# Patient Record
Sex: MALE | Race: BLACK OR AFRICAN AMERICAN | Employment: UNEMPLOYED
[De-identification: names, ages, dates, MRNs, and addresses within clinical notes are randomized per-mention and may not be internally consistent; named-entity substitution may affect disease eponyms.]

---

## 2017-02-24 ENCOUNTER — OFFICE VISIT (OUTPATIENT)
Dept: INTERNAL MEDICINE | Facility: CLINIC | Age: 62
End: 2017-02-24

## 2017-02-24 ENCOUNTER — HOSPITAL ENCOUNTER (OUTPATIENT)
Age: 62
Setting detail: SPECIMEN
Discharge: HOME | End: 2017-02-24
Payer: MEDICAID

## 2017-02-24 VITALS
HEART RATE: 64 BPM | HEIGHT: 75 IN | SYSTOLIC BLOOD PRESSURE: 164 MMHG | WEIGHT: 173.8 LBS | DIASTOLIC BLOOD PRESSURE: 85 MMHG | BODY MASS INDEX: 21.61 KG/M2

## 2017-02-24 DIAGNOSIS — Z11.59 NEED FOR HEPATITIS C SCREENING TEST: ICD-10-CM

## 2017-02-24 DIAGNOSIS — N40.1 BPH (BENIGN PROSTATIC HYPERTROPHY) WITH URINARY OBSTRUCTION: ICD-10-CM

## 2017-02-24 DIAGNOSIS — Z11.4 SCREENING FOR HIV WITHOUT PRESENCE OF RISK FACTORS: ICD-10-CM

## 2017-02-24 DIAGNOSIS — M16.11 ARTHRITIS OF RIGHT HIP: ICD-10-CM

## 2017-02-24 DIAGNOSIS — E78.00 PURE HYPERCHOLESTEROLEMIA: ICD-10-CM

## 2017-02-24 DIAGNOSIS — F14.10 COCAINE ABUSE (HCC): ICD-10-CM

## 2017-02-24 DIAGNOSIS — Z01.818 PRE-OP EVALUATION: Primary | ICD-10-CM

## 2017-02-24 DIAGNOSIS — N18.30 CKD (CHRONIC KIDNEY DISEASE) STAGE 3, GFR 30-59 ML/MIN (HCC): ICD-10-CM

## 2017-02-24 DIAGNOSIS — I10 ESSENTIAL HYPERTENSION: ICD-10-CM

## 2017-02-24 DIAGNOSIS — N13.8 BPH (BENIGN PROSTATIC HYPERTROPHY) WITH URINARY OBSTRUCTION: ICD-10-CM

## 2017-02-24 DIAGNOSIS — I50.22 CHRONIC SYSTOLIC CONGESTIVE HEART FAILURE (HCC): ICD-10-CM

## 2017-02-24 LAB
ANION GAP SERPL CALCULATED.3IONS-SCNC: 25 MMOL/L (ref 9–17)
BUN BLDV-MCNC: 16 MG/DL (ref 8–23)
BUN/CREAT BLD: ABNORMAL (ref 9–20)
CALCIUM SERPL-MCNC: 9.2 MG/DL (ref 8.6–10.4)
CHLORIDE BLD-SCNC: 102 MMOL/L (ref 98–107)
CHOLESTEROL/HDL RATIO: 2.3
CHOLESTEROL: 153 MG/DL
CO2: 22 MMOL/L (ref 20–31)
CREAT SERPL-MCNC: 1.16 MG/DL (ref 0.7–1.2)
GFR AFRICAN AMERICAN: >60 ML/MIN
GFR NON-AFRICAN AMERICAN: >60 ML/MIN
GFR SERPL CREATININE-BSD FRML MDRD: ABNORMAL ML/MIN/{1.73_M2}
GFR SERPL CREATININE-BSD FRML MDRD: ABNORMAL ML/MIN/{1.73_M2}
GLUCOSE BLD-MCNC: 83 MG/DL (ref 70–99)
HCT VFR BLD CALC: 47.3 % (ref 41–53)
HDLC SERPL-MCNC: 68 MG/DL
HEMOGLOBIN: 15.1 G/DL (ref 13.5–17.5)
HEPATITIS C ANTIBODY: NONREACTIVE
HIV AG/AB: NONREACTIVE
LDL CHOLESTEROL: 74 MG/DL (ref 0–130)
MCH RBC QN AUTO: 26.8 PG (ref 26–34)
MCHC RBC AUTO-ENTMCNC: 32 G/DL (ref 31–37)
MCV RBC AUTO: 84 FL (ref 80–100)
PDW BLD-RTO: 15.1 % (ref 12.5–15.4)
PLATELET # BLD: 294 K/UL (ref 140–450)
PMV BLD AUTO: 9.2 FL (ref 6–12)
POTASSIUM SERPL-SCNC: 4 MMOL/L (ref 3.7–5.3)
RBC # BLD: 5.63 M/UL (ref 4.5–5.9)
SODIUM BLD-SCNC: 149 MMOL/L (ref 135–144)
TRIGL SERPL-MCNC: 54 MG/DL
VLDLC SERPL CALC-MCNC: NORMAL MG/DL (ref 1–30)
WBC # BLD: 6.3 K/UL (ref 3.5–11)

## 2017-02-24 PROCEDURE — 80061 LIPID PANEL: CPT

## 2017-02-24 PROCEDURE — 87389 HIV-1 AG W/HIV-1&-2 AB AG IA: CPT

## 2017-02-24 PROCEDURE — 99214 OFFICE O/P EST MOD 30 MIN: CPT | Performed by: INTERNAL MEDICINE

## 2017-02-24 PROCEDURE — 86803 HEPATITIS C AB TEST: CPT

## 2017-02-24 PROCEDURE — 36415 COLL VENOUS BLD VENIPUNCTURE: CPT

## 2017-02-24 PROCEDURE — 80048 BASIC METABOLIC PNL TOTAL CA: CPT

## 2017-02-24 PROCEDURE — 85027 COMPLETE CBC AUTOMATED: CPT

## 2017-02-24 RX ORDER — LISINOPRIL 5 MG/1
5 TABLET ORAL DAILY
Qty: 30 TABLET | Refills: 2 | Status: SHIPPED | OUTPATIENT
Start: 2017-02-24 | End: 2017-06-12 | Stop reason: SDUPTHER

## 2017-02-24 RX ORDER — PRAVASTATIN SODIUM 20 MG
20 TABLET ORAL EVERY EVENING
Qty: 30 TABLET | Refills: 2 | Status: SHIPPED | OUTPATIENT
Start: 2017-02-24 | End: 2017-06-12 | Stop reason: SDUPTHER

## 2017-02-24 RX ORDER — TAMSULOSIN HYDROCHLORIDE 0.4 MG/1
0.4 CAPSULE ORAL DAILY
Qty: 30 CAPSULE | Refills: 2 | Status: SHIPPED | OUTPATIENT
Start: 2017-02-24 | End: 2017-06-12 | Stop reason: SDUPTHER

## 2017-02-24 RX ORDER — ASPIRIN 81 MG/1
81 TABLET ORAL DAILY
Qty: 30 TABLET | Refills: 2 | Status: SHIPPED | OUTPATIENT
Start: 2017-02-24 | End: 2017-06-12 | Stop reason: SDUPTHER

## 2017-02-24 RX ORDER — SENNOSIDES 8.6 MG
650 CAPSULE ORAL EVERY 8 HOURS PRN
Qty: 60 TABLET | Refills: 2 | Status: SHIPPED | OUTPATIENT
Start: 2017-02-24 | End: 2017-06-12 | Stop reason: SDUPTHER

## 2017-02-24 RX ORDER — AMLODIPINE BESYLATE 5 MG/1
5 TABLET ORAL DAILY
Qty: 30 TABLET | Refills: 2 | Status: SHIPPED | OUTPATIENT
Start: 2017-02-24 | End: 2017-06-12 | Stop reason: SDUPTHER

## 2017-02-24 ASSESSMENT — PATIENT HEALTH QUESTIONNAIRE - PHQ9
SUM OF ALL RESPONSES TO PHQ9 QUESTIONS 1 & 2: 1
1. LITTLE INTEREST OR PLEASURE IN DOING THINGS: 1
2. FEELING DOWN, DEPRESSED OR HOPELESS: 0
SUM OF ALL RESPONSES TO PHQ QUESTIONS 1-9: 1

## 2017-04-12 ENCOUNTER — HOSPITAL ENCOUNTER (OUTPATIENT)
Age: 62
Discharge: HOME | End: 2017-04-12
Payer: MEDICAID

## 2017-05-09 ENCOUNTER — TELEPHONE (OUTPATIENT)
Dept: INTERNAL MEDICINE | Age: 62
End: 2017-05-09

## 2017-06-12 ENCOUNTER — OFFICE VISIT (OUTPATIENT)
Dept: INTERNAL MEDICINE | Age: 62
End: 2017-06-12
Payer: MEDICAID

## 2017-06-12 VITALS
DIASTOLIC BLOOD PRESSURE: 83 MMHG | HEART RATE: 79 BPM | BODY MASS INDEX: 21.76 KG/M2 | HEIGHT: 75 IN | WEIGHT: 175 LBS | SYSTOLIC BLOOD PRESSURE: 131 MMHG

## 2017-06-12 DIAGNOSIS — M16.11 ARTHRITIS OF RIGHT HIP: ICD-10-CM

## 2017-06-12 DIAGNOSIS — E78.00 PURE HYPERCHOLESTEROLEMIA: ICD-10-CM

## 2017-06-12 DIAGNOSIS — N13.8 BPH (BENIGN PROSTATIC HYPERTROPHY) WITH URINARY OBSTRUCTION: ICD-10-CM

## 2017-06-12 DIAGNOSIS — N40.1 BPH (BENIGN PROSTATIC HYPERTROPHY) WITH URINARY OBSTRUCTION: ICD-10-CM

## 2017-06-12 DIAGNOSIS — I10 ESSENTIAL HYPERTENSION: Primary | ICD-10-CM

## 2017-06-12 DIAGNOSIS — Z12.11 COLON CANCER SCREENING: ICD-10-CM

## 2017-06-12 DIAGNOSIS — I50.22 CHRONIC SYSTOLIC CONGESTIVE HEART FAILURE (HCC): ICD-10-CM

## 2017-06-12 PROCEDURE — 99214 OFFICE O/P EST MOD 30 MIN: CPT | Performed by: INTERNAL MEDICINE

## 2017-06-12 RX ORDER — PRAVASTATIN SODIUM 20 MG
20 TABLET ORAL EVERY EVENING
Qty: 30 TABLET | Refills: 2 | Status: SHIPPED | OUTPATIENT
Start: 2017-06-12 | End: 2017-09-20 | Stop reason: SDUPTHER

## 2017-06-12 RX ORDER — ASPIRIN 81 MG/1
81 TABLET ORAL DAILY
Qty: 30 TABLET | Refills: 2 | Status: SHIPPED | OUTPATIENT
Start: 2017-06-12 | End: 2017-09-20 | Stop reason: SDUPTHER

## 2017-06-12 RX ORDER — SENNOSIDES 8.6 MG
650 CAPSULE ORAL EVERY 8 HOURS PRN
Qty: 60 TABLET | Refills: 2 | Status: SHIPPED | OUTPATIENT
Start: 2017-06-12 | End: 2017-09-20 | Stop reason: SDUPTHER

## 2017-06-12 RX ORDER — AMLODIPINE BESYLATE 5 MG/1
5 TABLET ORAL DAILY
Qty: 30 TABLET | Refills: 2 | Status: SHIPPED | OUTPATIENT
Start: 2017-06-12 | End: 2017-09-20 | Stop reason: SDUPTHER

## 2017-06-12 RX ORDER — TAMSULOSIN HYDROCHLORIDE 0.4 MG/1
0.4 CAPSULE ORAL DAILY
Qty: 30 CAPSULE | Refills: 2 | Status: SHIPPED | OUTPATIENT
Start: 2017-06-12 | End: 2017-09-20 | Stop reason: SDUPTHER

## 2017-06-12 RX ORDER — LISINOPRIL 5 MG/1
5 TABLET ORAL DAILY
Qty: 30 TABLET | Refills: 2 | Status: SHIPPED | OUTPATIENT
Start: 2017-06-12 | End: 2017-09-20 | Stop reason: SDUPTHER

## 2017-06-17 ENCOUNTER — HOSPITAL ENCOUNTER (OUTPATIENT)
Age: 62
Setting detail: OBSERVATION
Discharge: HOME | End: 2017-06-19
Attending: EMERGENCY MEDICINE | Admitting: EMERGENCY MEDICINE
Payer: MEDICAID

## 2017-06-17 ENCOUNTER — APPOINTMENT (OUTPATIENT)
Dept: GENERAL RADIOLOGY | Age: 62
End: 2017-06-17
Payer: MEDICAID

## 2017-06-17 DIAGNOSIS — R63.0 APPETITE IMPAIRED: ICD-10-CM

## 2017-06-17 DIAGNOSIS — R11.0 NAUSEA: ICD-10-CM

## 2017-06-17 DIAGNOSIS — R10.13 ABDOMINAL PAIN, EPIGASTRIC: Primary | ICD-10-CM

## 2017-06-17 LAB
ABSOLUTE EOS #: 0.3 K/UL (ref 0–0.4)
ABSOLUTE LYMPH #: 2.1 K/UL (ref 1–4.8)
ABSOLUTE MONO #: 0.7 K/UL (ref 0.1–1.2)
ALBUMIN SERPL-MCNC: 3.7 G/DL (ref 3.5–5.2)
ALBUMIN/GLOBULIN RATIO: 1.2 (ref 1–2.5)
ALP BLD-CCNC: 81 U/L (ref 40–129)
ALT SERPL-CCNC: 14 U/L (ref 5–41)
ANION GAP SERPL CALCULATED.3IONS-SCNC: 13 MMOL/L (ref 9–17)
AST SERPL-CCNC: 17 U/L
BASOPHILS # BLD: 1 %
BASOPHILS ABSOLUTE: 0 K/UL (ref 0–0.2)
BILIRUB SERPL-MCNC: 0.26 MG/DL (ref 0.3–1.2)
BILIRUBIN DIRECT: 0.09 MG/DL
BILIRUBIN URINE: NEGATIVE
BILIRUBIN, INDIRECT: 0.17 MG/DL (ref 0–1)
BUN BLDV-MCNC: 12 MG/DL (ref 8–23)
BUN/CREAT BLD: ABNORMAL (ref 9–20)
CALCIUM SERPL-MCNC: 8.7 MG/DL (ref 8.6–10.4)
CHLORIDE BLD-SCNC: 111 MMOL/L (ref 98–107)
CO2: 20 MMOL/L (ref 20–31)
COLOR: YELLOW
COMMENT UA: NORMAL
CREAT SERPL-MCNC: 1.1 MG/DL (ref 0.7–1.2)
DIFFERENTIAL TYPE: ABNORMAL
EOSINOPHILS RELATIVE PERCENT: 4 %
GFR AFRICAN AMERICAN: >60 ML/MIN
GFR NON-AFRICAN AMERICAN: >60 ML/MIN
GFR SERPL CREATININE-BSD FRML MDRD: ABNORMAL ML/MIN/{1.73_M2}
GFR SERPL CREATININE-BSD FRML MDRD: ABNORMAL ML/MIN/{1.73_M2}
GLOBULIN: ABNORMAL G/DL (ref 1.5–3.8)
GLUCOSE BLD-MCNC: 79 MG/DL (ref 70–99)
GLUCOSE URINE: NEGATIVE
HCT VFR BLD CALC: 44.8 % (ref 41–53)
HEMOGLOBIN: 14.5 G/DL (ref 13.5–17.5)
KETONES, URINE: NEGATIVE
LACTIC ACID, WHOLE BLOOD: 1.1 MMOL/L (ref 0.7–2.1)
LEUKOCYTE ESTERASE, URINE: NEGATIVE
LYMPHOCYTES # BLD: 36 %
MCH RBC QN AUTO: 27.4 PG (ref 26–34)
MCHC RBC AUTO-ENTMCNC: 32.4 G/DL (ref 31–37)
MCV RBC AUTO: 84.3 FL (ref 80–100)
MONOCYTES # BLD: 12 %
NITRITE, URINE: NEGATIVE
PDW BLD-RTO: 15.9 % (ref 12.5–15.4)
PH UA: 5.5 (ref 5–8)
PLATELET # BLD: 275 K/UL (ref 140–450)
PLATELET ESTIMATE: ABNORMAL
PMV BLD AUTO: 8.7 FL (ref 6–12)
POC TROPONIN I: 0.01 NG/ML (ref 0–0.1)
POC TROPONIN I: 0.01 NG/ML (ref 0–0.1)
POC TROPONIN INTERP: NORMAL
POC TROPONIN INTERP: NORMAL
POTASSIUM SERPL-SCNC: 3.6 MMOL/L (ref 3.7–5.3)
PROTEIN UA: NEGATIVE
RBC # BLD: 5.31 M/UL (ref 4.5–5.9)
RBC # BLD: ABNORMAL 10*6/UL
SEG NEUTROPHILS: 47 %
SEGMENTED NEUTROPHILS ABSOLUTE COUNT: 2.7 K/UL (ref 1.8–7.7)
SODIUM BLD-SCNC: 144 MMOL/L (ref 135–144)
SPECIFIC GRAVITY UA: 1.02 (ref 1–1.03)
TOTAL PROTEIN: 6.8 G/DL (ref 6.4–8.3)
TURBIDITY: CLEAR
URINE HGB: NEGATIVE
UROBILINOGEN, URINE: NORMAL
WBC # BLD: 5.9 K/UL (ref 3.5–11)
WBC # BLD: ABNORMAL 10*3/UL

## 2017-06-17 PROCEDURE — 2580000003 HC RX 258: Performed by: EMERGENCY MEDICINE

## 2017-06-17 PROCEDURE — 87088 URINE BACTERIA CULTURE: CPT

## 2017-06-17 PROCEDURE — 99285 EMERGENCY DEPT VISIT HI MDM: CPT

## 2017-06-17 PROCEDURE — 6360000002 HC RX W HCPCS: Performed by: EMERGENCY MEDICINE

## 2017-06-17 PROCEDURE — 83605 ASSAY OF LACTIC ACID: CPT

## 2017-06-17 PROCEDURE — 87086 URINE CULTURE/COLONY COUNT: CPT

## 2017-06-17 PROCEDURE — 74020 XR ABDOMEN STANDARD: CPT

## 2017-06-17 PROCEDURE — 71020 XR CHEST STANDARD TWO VW: CPT

## 2017-06-17 PROCEDURE — G0378 HOSPITAL OBSERVATION PER HR: HCPCS

## 2017-06-17 PROCEDURE — 84484 ASSAY OF TROPONIN QUANT: CPT

## 2017-06-17 PROCEDURE — 87186 SC STD MICRODIL/AGAR DIL: CPT

## 2017-06-17 PROCEDURE — 96374 THER/PROPH/DIAG INJ IV PUSH: CPT

## 2017-06-17 PROCEDURE — 80076 HEPATIC FUNCTION PANEL: CPT

## 2017-06-17 PROCEDURE — 81003 URINALYSIS AUTO W/O SCOPE: CPT

## 2017-06-17 PROCEDURE — 6370000000 HC RX 637 (ALT 250 FOR IP): Performed by: EMERGENCY MEDICINE

## 2017-06-17 PROCEDURE — 93005 ELECTROCARDIOGRAM TRACING: CPT

## 2017-06-17 PROCEDURE — 85025 COMPLETE CBC W/AUTO DIFF WBC: CPT

## 2017-06-17 PROCEDURE — 80048 BASIC METABOLIC PNL TOTAL CA: CPT

## 2017-06-17 RX ORDER — LISINOPRIL 5 MG/1
5 TABLET ORAL DAILY
Status: DISCONTINUED | OUTPATIENT
Start: 2017-06-17 | End: 2017-06-19 | Stop reason: HOSPADM

## 2017-06-17 RX ORDER — SODIUM CHLORIDE 9 MG/ML
INJECTION, SOLUTION INTRAVENOUS CONTINUOUS
Status: DISCONTINUED | OUTPATIENT
Start: 2017-06-17 | End: 2017-06-19 | Stop reason: HOSPADM

## 2017-06-17 RX ORDER — SODIUM CHLORIDE 0.9 % (FLUSH) 0.9 %
10 SYRINGE (ML) INJECTION EVERY 12 HOURS SCHEDULED
Status: DISCONTINUED | OUTPATIENT
Start: 2017-06-17 | End: 2017-06-19 | Stop reason: HOSPADM

## 2017-06-17 RX ORDER — 0.9 % SODIUM CHLORIDE 0.9 %
1000 INTRAVENOUS SOLUTION INTRAVENOUS ONCE
Status: COMPLETED | OUTPATIENT
Start: 2017-06-17 | End: 2017-06-17

## 2017-06-17 RX ORDER — AMLODIPINE BESYLATE 5 MG/1
5 TABLET ORAL DAILY
Status: DISCONTINUED | OUTPATIENT
Start: 2017-06-17 | End: 2017-06-19 | Stop reason: HOSPADM

## 2017-06-17 RX ORDER — SODIUM CHLORIDE 0.9 % (FLUSH) 0.9 %
10 SYRINGE (ML) INJECTION PRN
Status: DISCONTINUED | OUTPATIENT
Start: 2017-06-17 | End: 2017-06-19 | Stop reason: HOSPADM

## 2017-06-17 RX ORDER — MORPHINE SULFATE 2 MG/ML
2 INJECTION, SOLUTION INTRAMUSCULAR; INTRAVENOUS EVERY 4 HOURS PRN
Status: DISCONTINUED | OUTPATIENT
Start: 2017-06-17 | End: 2017-06-19 | Stop reason: HOSPADM

## 2017-06-17 RX ORDER — ASPIRIN 81 MG/1
81 TABLET ORAL DAILY
Status: DISCONTINUED | OUTPATIENT
Start: 2017-06-17 | End: 2017-06-19 | Stop reason: HOSPADM

## 2017-06-17 RX ORDER — MORPHINE SULFATE 4 MG/ML
4 INJECTION, SOLUTION INTRAMUSCULAR; INTRAVENOUS EVERY 4 HOURS PRN
Status: DISCONTINUED | OUTPATIENT
Start: 2017-06-17 | End: 2017-06-19 | Stop reason: HOSPADM

## 2017-06-17 RX ORDER — ACETAMINOPHEN 325 MG/1
650 TABLET ORAL EVERY 4 HOURS PRN
Status: DISCONTINUED | OUTPATIENT
Start: 2017-06-17 | End: 2017-06-18

## 2017-06-17 RX ORDER — TAMSULOSIN HYDROCHLORIDE 0.4 MG/1
0.4 CAPSULE ORAL DAILY
Status: DISCONTINUED | OUTPATIENT
Start: 2017-06-17 | End: 2017-06-19 | Stop reason: HOSPADM

## 2017-06-17 RX ORDER — ONDANSETRON 2 MG/ML
4 INJECTION INTRAMUSCULAR; INTRAVENOUS ONCE
Status: COMPLETED | OUTPATIENT
Start: 2017-06-17 | End: 2017-06-17

## 2017-06-17 RX ORDER — PRAVASTATIN SODIUM 20 MG
20 TABLET ORAL EVERY EVENING
Status: DISCONTINUED | OUTPATIENT
Start: 2017-06-17 | End: 2017-06-19 | Stop reason: HOSPADM

## 2017-06-17 RX ADMIN — ASPIRIN 81 MG: 81 TABLET, COATED ORAL at 18:40

## 2017-06-17 RX ADMIN — TAMSULOSIN HYDROCHLORIDE 0.4 MG: 0.4 CAPSULE ORAL at 18:40

## 2017-06-17 RX ADMIN — SODIUM CHLORIDE: 9 INJECTION, SOLUTION INTRAVENOUS at 18:32

## 2017-06-17 RX ADMIN — PRAVASTATIN SODIUM 20 MG: 20 TABLET ORAL at 18:40

## 2017-06-17 RX ADMIN — SODIUM CHLORIDE 1000 ML: 9 INJECTION, SOLUTION INTRAVENOUS at 14:56

## 2017-06-17 RX ADMIN — ACETAMINOPHEN 650 MG: 325 TABLET ORAL at 23:02

## 2017-06-17 RX ADMIN — ONDANSETRON 4 MG: 2 INJECTION INTRAMUSCULAR; INTRAVENOUS at 14:56

## 2017-06-17 RX ADMIN — AMLODIPINE BESYLATE 5 MG: 5 TABLET ORAL at 18:40

## 2017-06-17 RX ADMIN — LISINOPRIL 5 MG: 5 TABLET ORAL at 18:40

## 2017-06-17 RX ADMIN — ACETAMINOPHEN 650 MG: 325 TABLET ORAL at 18:40

## 2017-06-17 ASSESSMENT — PAIN SCALES - GENERAL
PAINLEVEL_OUTOF10: 0
PAINLEVEL_OUTOF10: 10
PAINLEVEL_OUTOF10: 3
PAINLEVEL_OUTOF10: 6

## 2017-06-17 ASSESSMENT — ENCOUNTER SYMPTOMS
RHINORRHEA: 0
VOMITING: 1
BLOOD IN STOOL: 0
SORE THROAT: 0
CONSTIPATION: 0
DIARRHEA: 1
ABDOMINAL PAIN: 1
SHORTNESS OF BREATH: 0
COUGH: 0
NAUSEA: 1

## 2017-06-17 ASSESSMENT — PAIN DESCRIPTION - PAIN TYPE
TYPE: ACUTE PAIN

## 2017-06-17 ASSESSMENT — PAIN DESCRIPTION - DESCRIPTORS
DESCRIPTORS: SHARP
DESCRIPTORS: BURNING;SORE
DESCRIPTORS: SORE

## 2017-06-17 ASSESSMENT — PAIN DESCRIPTION - LOCATION
LOCATION: ABDOMEN

## 2017-06-17 ASSESSMENT — PAIN DESCRIPTION - FREQUENCY
FREQUENCY: CONTINUOUS
FREQUENCY: INTERMITTENT

## 2017-06-17 ASSESSMENT — PAIN DESCRIPTION - PROGRESSION: CLINICAL_PROGRESSION: NOT CHANGED

## 2017-06-17 ASSESSMENT — PAIN DESCRIPTION - ORIENTATION
ORIENTATION: MID;UPPER
ORIENTATION: MID;UPPER

## 2017-06-18 PROCEDURE — 96375 TX/PRO/DX INJ NEW DRUG ADDON: CPT

## 2017-06-18 PROCEDURE — C9113 INJ PANTOPRAZOLE SODIUM, VIA: HCPCS | Performed by: INTERNAL MEDICINE

## 2017-06-18 PROCEDURE — 6360000002 HC RX W HCPCS: Performed by: EMERGENCY MEDICINE

## 2017-06-18 PROCEDURE — 2580000003 HC RX 258: Performed by: EMERGENCY MEDICINE

## 2017-06-18 PROCEDURE — 96376 TX/PRO/DX INJ SAME DRUG ADON: CPT

## 2017-06-18 PROCEDURE — 6360000002 HC RX W HCPCS: Performed by: INTERNAL MEDICINE

## 2017-06-18 PROCEDURE — G0378 HOSPITAL OBSERVATION PER HR: HCPCS

## 2017-06-18 PROCEDURE — 6370000000 HC RX 637 (ALT 250 FOR IP): Performed by: EMERGENCY MEDICINE

## 2017-06-18 PROCEDURE — 2580000003 HC RX 258: Performed by: INTERNAL MEDICINE

## 2017-06-18 RX ORDER — 0.9 % SODIUM CHLORIDE 0.9 %
10 VIAL (ML) INJECTION DAILY
Status: DISCONTINUED | OUTPATIENT
Start: 2017-06-18 | End: 2017-06-19 | Stop reason: HOSPADM

## 2017-06-18 RX ORDER — SENNOSIDES 8.6 MG
650 CAPSULE ORAL EVERY 8 HOURS PRN
Status: DISCONTINUED | OUTPATIENT
Start: 2017-06-18 | End: 2017-06-18

## 2017-06-18 RX ORDER — PANTOPRAZOLE SODIUM 40 MG/10ML
40 INJECTION, POWDER, LYOPHILIZED, FOR SOLUTION INTRAVENOUS DAILY
Status: DISCONTINUED | OUTPATIENT
Start: 2017-06-18 | End: 2017-06-19 | Stop reason: HOSPADM

## 2017-06-18 RX ORDER — PANTOPRAZOLE SODIUM 40 MG/1
40 TABLET, DELAYED RELEASE ORAL
Status: DISCONTINUED | OUTPATIENT
Start: 2017-06-18 | End: 2017-06-18

## 2017-06-18 RX ORDER — SENNOSIDES 8.6 MG
650 CAPSULE ORAL EVERY 6 HOURS PRN
Status: DISCONTINUED | OUTPATIENT
Start: 2017-06-18 | End: 2017-06-19 | Stop reason: HOSPADM

## 2017-06-18 RX ORDER — SENNOSIDES 8.6 MG
650 CAPSULE ORAL EVERY 4 HOURS PRN
Status: DISCONTINUED | OUTPATIENT
Start: 2017-06-18 | End: 2017-06-18

## 2017-06-18 RX ADMIN — TAMSULOSIN HYDROCHLORIDE 0.4 MG: 0.4 CAPSULE ORAL at 08:56

## 2017-06-18 RX ADMIN — SODIUM CHLORIDE: 9 INJECTION, SOLUTION INTRAVENOUS at 12:11

## 2017-06-18 RX ADMIN — Medication 650 MG: at 23:28

## 2017-06-18 RX ADMIN — LISINOPRIL 5 MG: 5 TABLET ORAL at 08:55

## 2017-06-18 RX ADMIN — SODIUM CHLORIDE: 9 INJECTION, SOLUTION INTRAVENOUS at 04:34

## 2017-06-18 RX ADMIN — MORPHINE SULFATE 2 MG: 2 INJECTION, SOLUTION INTRAMUSCULAR; INTRAVENOUS at 12:08

## 2017-06-18 RX ADMIN — ASPIRIN 81 MG: 81 TABLET, COATED ORAL at 08:56

## 2017-06-18 RX ADMIN — AMLODIPINE BESYLATE 5 MG: 5 TABLET ORAL at 08:56

## 2017-06-18 RX ADMIN — PANTOPRAZOLE SODIUM 40 MG: 40 INJECTION, POWDER, FOR SOLUTION INTRAVENOUS at 12:00

## 2017-06-18 RX ADMIN — MORPHINE SULFATE 2 MG: 2 INJECTION, SOLUTION INTRAMUSCULAR; INTRAVENOUS at 16:45

## 2017-06-18 RX ADMIN — PRAVASTATIN SODIUM 20 MG: 20 TABLET ORAL at 18:27

## 2017-06-18 RX ADMIN — ACETAMINOPHEN 650 MG: 325 TABLET ORAL at 08:55

## 2017-06-18 RX ADMIN — Medication 650 MG: at 18:00

## 2017-06-18 RX ADMIN — Medication 10 ML: at 12:00

## 2017-06-18 RX ADMIN — SODIUM CHLORIDE: 9 INJECTION, SOLUTION INTRAVENOUS at 23:50

## 2017-06-18 ASSESSMENT — PAIN DESCRIPTION - LOCATION
LOCATION: HIP
LOCATION_2: ABDOMEN
LOCATION: ABDOMEN
LOCATION: ABDOMEN
LOCATION: HIP

## 2017-06-18 ASSESSMENT — PAIN DESCRIPTION - FREQUENCY
FREQUENCY: CONTINUOUS
FREQUENCY: CONTINUOUS
FREQUENCY: INTERMITTENT

## 2017-06-18 ASSESSMENT — PAIN DESCRIPTION - DESCRIPTORS
DESCRIPTORS: ACHING
DESCRIPTORS: ACHING
DESCRIPTORS: ACHING;BURNING

## 2017-06-18 ASSESSMENT — PAIN SCALES - GENERAL
PAINLEVEL_OUTOF10: 7
PAINLEVEL_OUTOF10: 0
PAINLEVEL_OUTOF10: 5
PAINLEVEL_OUTOF10: 3
PAINLEVEL_OUTOF10: 7
PAINLEVEL_OUTOF10: 6
PAINLEVEL_OUTOF10: 5

## 2017-06-18 ASSESSMENT — PAIN DESCRIPTION - PROGRESSION
CLINICAL_PROGRESSION: NOT CHANGED
CLINICAL_PROGRESSION: NOT CHANGED

## 2017-06-18 ASSESSMENT — PAIN DESCRIPTION - INTENSITY: RATING_2: 5

## 2017-06-18 ASSESSMENT — PAIN DESCRIPTION - ORIENTATION
ORIENTATION: UPPER;MID
ORIENTATION: UPPER;MID
ORIENTATION: LEFT

## 2017-06-18 ASSESSMENT — PAIN DESCRIPTION - PAIN TYPE: TYPE: CHRONIC PAIN

## 2017-06-18 ASSESSMENT — PAIN DESCRIPTION - DIRECTION: RADIATING_TOWARDS: TO KNEE

## 2017-06-18 ASSESSMENT — PAIN DESCRIPTION - ONSET: ONSET: ON-GOING

## 2017-06-19 ENCOUNTER — APPOINTMENT (OUTPATIENT)
Dept: CT IMAGING | Age: 62
End: 2017-06-19
Payer: MEDICAID

## 2017-06-19 ENCOUNTER — ANESTHESIA EVENT (OUTPATIENT)
Dept: ENDOSCOPY | Age: 62
End: 2017-06-19
Payer: MEDICAID

## 2017-06-19 ENCOUNTER — ANESTHESIA (OUTPATIENT)
Dept: ENDOSCOPY | Age: 62
End: 2017-06-19
Payer: MEDICAID

## 2017-06-19 VITALS
OXYGEN SATURATION: 100 % | DIASTOLIC BLOOD PRESSURE: 56 MMHG | RESPIRATION RATE: 17 BRPM | SYSTOLIC BLOOD PRESSURE: 99 MMHG

## 2017-06-19 VITALS
SYSTOLIC BLOOD PRESSURE: 147 MMHG | WEIGHT: 171.3 LBS | HEIGHT: 75 IN | BODY MASS INDEX: 21.3 KG/M2 | TEMPERATURE: 97.6 F | OXYGEN SATURATION: 97 % | RESPIRATION RATE: 15 BRPM | DIASTOLIC BLOOD PRESSURE: 88 MMHG | HEART RATE: 65 BPM

## 2017-06-19 LAB
CULTURE: ABNORMAL
CULTURE: ABNORMAL
GLUCOSE BLD-MCNC: 103 MG/DL (ref 75–110)
GLUCOSE BLD-MCNC: 59 MG/DL (ref 75–110)
LIPASE: 17 U/L (ref 13–60)
Lab: ABNORMAL
ORGANISM: ABNORMAL
SPECIMEN DESCRIPTION: ABNORMAL
STATUS: ABNORMAL

## 2017-06-19 PROCEDURE — 6360000002 HC RX W HCPCS: Performed by: NURSE ANESTHETIST, CERTIFIED REGISTERED

## 2017-06-19 PROCEDURE — 3700000000 HC ANESTHESIA ATTENDED CARE: Performed by: INTERNAL MEDICINE

## 2017-06-19 PROCEDURE — 74177 CT ABD & PELVIS W/CONTRAST: CPT

## 2017-06-19 PROCEDURE — 2580000003 HC RX 258: Performed by: NURSE ANESTHETIST, CERTIFIED REGISTERED

## 2017-06-19 PROCEDURE — 6370000000 HC RX 637 (ALT 250 FOR IP): Performed by: EMERGENCY MEDICINE

## 2017-06-19 PROCEDURE — 88342 IMHCHEM/IMCYTCHM 1ST ANTB: CPT

## 2017-06-19 PROCEDURE — 6360000004 HC RX CONTRAST MEDICATION: Performed by: EMERGENCY MEDICINE

## 2017-06-19 PROCEDURE — 74160 CT ABDOMEN W/CONTRAST: CPT

## 2017-06-19 PROCEDURE — 82947 ASSAY GLUCOSE BLOOD QUANT: CPT

## 2017-06-19 PROCEDURE — 7100000011 HC PHASE II RECOVERY - ADDTL 15 MIN: Performed by: INTERNAL MEDICINE

## 2017-06-19 PROCEDURE — 2580000003 HC RX 258: Performed by: INTERNAL MEDICINE

## 2017-06-19 PROCEDURE — 7100000010 HC PHASE II RECOVERY - FIRST 15 MIN: Performed by: INTERNAL MEDICINE

## 2017-06-19 PROCEDURE — 2500000003 HC RX 250 WO HCPCS: Performed by: NURSE ANESTHETIST, CERTIFIED REGISTERED

## 2017-06-19 PROCEDURE — 3609012400 HC EGD TRANSORAL BIOPSY SINGLE/MULTIPLE: Performed by: INTERNAL MEDICINE

## 2017-06-19 PROCEDURE — 88305 TISSUE EXAM BY PATHOLOGIST: CPT

## 2017-06-19 PROCEDURE — 6360000002 HC RX W HCPCS: Performed by: INTERNAL MEDICINE

## 2017-06-19 PROCEDURE — C9113 INJ PANTOPRAZOLE SODIUM, VIA: HCPCS | Performed by: INTERNAL MEDICINE

## 2017-06-19 PROCEDURE — G0378 HOSPITAL OBSERVATION PER HR: HCPCS

## 2017-06-19 PROCEDURE — 96376 TX/PRO/DX INJ SAME DRUG ADON: CPT

## 2017-06-19 PROCEDURE — 36415 COLL VENOUS BLD VENIPUNCTURE: CPT

## 2017-06-19 PROCEDURE — 83690 ASSAY OF LIPASE: CPT

## 2017-06-19 RX ORDER — PROPOFOL 10 MG/ML
INJECTION, EMULSION INTRAVENOUS PRN
Status: DISCONTINUED | OUTPATIENT
Start: 2017-06-19 | End: 2017-06-19 | Stop reason: SDUPTHER

## 2017-06-19 RX ORDER — CEPHALEXIN 250 MG/1
250 CAPSULE ORAL 4 TIMES DAILY
Qty: 28 CAPSULE | Refills: 0 | Status: SHIPPED | OUTPATIENT
Start: 2017-06-19 | End: 2017-06-26

## 2017-06-19 RX ORDER — SODIUM CHLORIDE 9 MG/ML
INJECTION, SOLUTION INTRAVENOUS ONCE
Status: COMPLETED | OUTPATIENT
Start: 2017-06-19 | End: 2017-06-19

## 2017-06-19 RX ORDER — SODIUM CHLORIDE 9 MG/ML
INJECTION, SOLUTION INTRAVENOUS CONTINUOUS PRN
Status: DISCONTINUED | OUTPATIENT
Start: 2017-06-19 | End: 2017-06-19 | Stop reason: SDUPTHER

## 2017-06-19 RX ORDER — LIDOCAINE HYDROCHLORIDE 10 MG/ML
INJECTION, SOLUTION INFILTRATION; PERINEURAL PRN
Status: DISCONTINUED | OUTPATIENT
Start: 2017-06-19 | End: 2017-06-19 | Stop reason: SDUPTHER

## 2017-06-19 RX ORDER — GLYCOPYRROLATE 0.2 MG/ML
INJECTION INTRAMUSCULAR; INTRAVENOUS PRN
Status: DISCONTINUED | OUTPATIENT
Start: 2017-06-19 | End: 2017-06-19 | Stop reason: SDUPTHER

## 2017-06-19 RX ADMIN — IOVERSOL 130 ML: 741 INJECTION INTRA-ARTERIAL; INTRAVENOUS at 02:40

## 2017-06-19 RX ADMIN — AMLODIPINE BESYLATE 5 MG: 5 TABLET ORAL at 14:19

## 2017-06-19 RX ADMIN — PROPOFOL 10 MG: 10 INJECTION, EMULSION INTRAVENOUS at 08:47

## 2017-06-19 RX ADMIN — Medication 650 MG: at 05:48

## 2017-06-19 RX ADMIN — GLYCOPYRROLATE 0.2 MG: 0.2 INJECTION INTRAMUSCULAR; INTRAVENOUS at 08:45

## 2017-06-19 RX ADMIN — SODIUM CHLORIDE: 9 INJECTION, SOLUTION INTRAVENOUS at 08:40

## 2017-06-19 RX ADMIN — GLYCOPYRROLATE 0.2 MG: 0.2 INJECTION INTRAMUSCULAR; INTRAVENOUS at 08:43

## 2017-06-19 RX ADMIN — PROPOFOL 20 MG: 10 INJECTION, EMULSION INTRAVENOUS at 08:51

## 2017-06-19 RX ADMIN — LISINOPRIL 5 MG: 5 TABLET ORAL at 14:20

## 2017-06-19 RX ADMIN — PANTOPRAZOLE SODIUM 40 MG: 40 INJECTION, POWDER, FOR SOLUTION INTRAVENOUS at 14:20

## 2017-06-19 RX ADMIN — Medication 10 ML: at 14:29

## 2017-06-19 RX ADMIN — LIDOCAINE HYDROCHLORIDE 50 MG: 10 INJECTION, SOLUTION INFILTRATION; PERINEURAL at 08:43

## 2017-06-19 RX ADMIN — PROPOFOL 50 MG: 10 INJECTION, EMULSION INTRAVENOUS at 08:43

## 2017-06-19 RX ADMIN — TAMSULOSIN HYDROCHLORIDE 0.4 MG: 0.4 CAPSULE ORAL at 14:19

## 2017-06-19 RX ADMIN — PROPOFOL 10 MG: 10 INJECTION, EMULSION INTRAVENOUS at 08:49

## 2017-06-19 RX ADMIN — PROPOFOL 10 MG: 10 INJECTION, EMULSION INTRAVENOUS at 08:45

## 2017-06-19 RX ADMIN — SODIUM CHLORIDE: 9 INJECTION, SOLUTION INTRAVENOUS at 07:58

## 2017-06-19 RX ADMIN — ASPIRIN 81 MG: 81 TABLET, COATED ORAL at 14:19

## 2017-06-19 RX ADMIN — Medication 650 MG: at 14:18

## 2017-06-19 ASSESSMENT — PAIN SCALES - GENERAL
PAINLEVEL_OUTOF10: 0
PAINLEVEL_OUTOF10: 5
PAINLEVEL_OUTOF10: 0
PAINLEVEL_OUTOF10: 5
PAINLEVEL_OUTOF10: 3

## 2017-06-20 LAB
EKG ATRIAL RATE: 54 BPM
EKG P AXIS: 68 DEGREES
EKG P-R INTERVAL: 148 MS
EKG Q-T INTERVAL: 448 MS
EKG QRS DURATION: 82 MS
EKG QTC CALCULATION (BAZETT): 424 MS
EKG R AXIS: 36 DEGREES
EKG T AXIS: 54 DEGREES
EKG VENTRICULAR RATE: 54 BPM
SURGICAL PATHOLOGY REPORT: NORMAL

## 2017-07-06 ENCOUNTER — TELEPHONE (OUTPATIENT)
Dept: GASTROENTEROLOGY | Age: 62
End: 2017-07-06

## 2017-07-06 DIAGNOSIS — A04.8 H. PYLORI INFECTION: Primary | ICD-10-CM

## 2017-07-06 RX ORDER — METRONIDAZOLE 250 MG/1
250 TABLET ORAL 4 TIMES DAILY
Qty: 56 TABLET | Refills: 0 | Status: CANCELLED | OUTPATIENT
Start: 2017-07-06 | End: 2017-07-20

## 2017-07-06 RX ORDER — TETRACYCLINE HYDROCHLORIDE 500 MG/1
500 CAPSULE ORAL 4 TIMES DAILY
Qty: 56 CAPSULE | Refills: 0 | Status: CANCELLED | OUTPATIENT
Start: 2017-07-06 | End: 2017-07-20

## 2017-07-06 RX ORDER — BISMUTH SUBSALICYLATE 262 MG/1
524 TABLET, CHEWABLE ORAL
Qty: 112 TABLET | Refills: 0 | Status: CANCELLED | OUTPATIENT
Start: 2017-07-06 | End: 2017-07-20

## 2017-07-06 RX ORDER — OMEPRAZOLE 20 MG/1
20 CAPSULE, DELAYED RELEASE ORAL 2 TIMES DAILY
Qty: 28 CAPSULE | Refills: 0 | Status: CANCELLED | OUTPATIENT
Start: 2017-07-06 | End: 2017-07-20

## 2017-07-14 RX ORDER — BISMUTH SUBSALICYLATE 262 MG/1
524 TABLET, CHEWABLE ORAL
Qty: 112 TABLET | Refills: 0 | OUTPATIENT
Start: 2017-07-14 | End: 2017-07-28

## 2017-07-14 RX ORDER — TETRACYCLINE HYDROCHLORIDE 500 MG/1
500 CAPSULE ORAL 4 TIMES DAILY
Qty: 56 CAPSULE | Refills: 0 | OUTPATIENT
Start: 2017-07-15 | End: 2017-07-29

## 2017-07-14 RX ORDER — METRONIDAZOLE 250 MG/1
250 TABLET ORAL 4 TIMES DAILY
Qty: 56 TABLET | Refills: 0 | OUTPATIENT
Start: 2017-07-15 | End: 2017-07-29

## 2017-07-14 RX ORDER — OMEPRAZOLE 20 MG/1
20 CAPSULE, DELAYED RELEASE ORAL 2 TIMES DAILY
Qty: 28 CAPSULE | Refills: 0 | Status: ON HOLD | OUTPATIENT
Start: 2017-07-15 | End: 2018-03-11 | Stop reason: ALTCHOICE

## 2017-09-20 ENCOUNTER — OFFICE VISIT (OUTPATIENT)
Dept: INTERNAL MEDICINE | Age: 62
End: 2017-09-20
Payer: MEDICAID

## 2017-09-20 VITALS
DIASTOLIC BLOOD PRESSURE: 88 MMHG | HEART RATE: 58 BPM | BODY MASS INDEX: 21.28 KG/M2 | HEIGHT: 74 IN | SYSTOLIC BLOOD PRESSURE: 162 MMHG | WEIGHT: 165.8 LBS

## 2017-09-20 DIAGNOSIS — Z23 NEED FOR PROPHYLACTIC VACCINATION AND INOCULATION AGAINST INFLUENZA: ICD-10-CM

## 2017-09-20 DIAGNOSIS — N40.1 BPH (BENIGN PROSTATIC HYPERTROPHY) WITH URINARY OBSTRUCTION: ICD-10-CM

## 2017-09-20 DIAGNOSIS — I50.22 CHRONIC SYSTOLIC CONGESTIVE HEART FAILURE (HCC): ICD-10-CM

## 2017-09-20 DIAGNOSIS — I10 ESSENTIAL HYPERTENSION: Primary | ICD-10-CM

## 2017-09-20 DIAGNOSIS — M16.11 ARTHRITIS OF RIGHT HIP: ICD-10-CM

## 2017-09-20 DIAGNOSIS — N13.8 BPH (BENIGN PROSTATIC HYPERTROPHY) WITH URINARY OBSTRUCTION: ICD-10-CM

## 2017-09-20 DIAGNOSIS — E78.00 PURE HYPERCHOLESTEROLEMIA: ICD-10-CM

## 2017-09-20 PROCEDURE — 90471 IMMUNIZATION ADMIN: CPT | Performed by: INTERNAL MEDICINE

## 2017-09-20 PROCEDURE — 99214 OFFICE O/P EST MOD 30 MIN: CPT | Performed by: INTERNAL MEDICINE

## 2017-09-20 PROCEDURE — 90688 IIV4 VACCINE SPLT 0.5 ML IM: CPT | Performed by: INTERNAL MEDICINE

## 2017-09-20 RX ORDER — SIMETHICONE 40MG/0.6ML
SUSPENSION, DROPS(FINAL DOSAGE FORM)(ML) ORAL
Refills: 0 | Status: ON HOLD | COMMUNITY
Start: 2017-07-22 | End: 2018-03-11 | Stop reason: ALTCHOICE

## 2017-09-20 RX ORDER — SENNOSIDES 8.6 MG
650 CAPSULE ORAL EVERY 8 HOURS PRN
Qty: 60 TABLET | Refills: 2 | Status: ON HOLD | OUTPATIENT
Start: 2017-09-20 | End: 2022-03-11

## 2017-09-20 RX ORDER — TAMSULOSIN HYDROCHLORIDE 0.4 MG/1
0.4 CAPSULE ORAL DAILY
Qty: 30 CAPSULE | Refills: 2 | Status: ON HOLD | OUTPATIENT
Start: 2017-09-20 | End: 2018-03-11 | Stop reason: HOSPADM

## 2017-09-20 RX ORDER — AMLODIPINE BESYLATE 5 MG/1
5 TABLET ORAL DAILY
Qty: 30 TABLET | Refills: 2 | Status: ON HOLD | OUTPATIENT
Start: 2017-09-20 | End: 2018-03-11 | Stop reason: HOSPADM

## 2017-09-20 RX ORDER — PRAVASTATIN SODIUM 20 MG
20 TABLET ORAL EVERY EVENING
Qty: 30 TABLET | Refills: 2 | Status: ON HOLD | OUTPATIENT
Start: 2017-09-20 | End: 2018-03-11 | Stop reason: HOSPADM

## 2017-09-20 RX ORDER — METRONIDAZOLE 250 MG/1
250 TABLET ORAL 4 TIMES DAILY
Status: ON HOLD | COMMUNITY
End: 2018-03-11 | Stop reason: ALTCHOICE

## 2017-09-20 RX ORDER — LISINOPRIL 10 MG/1
10 TABLET ORAL DAILY
Qty: 30 TABLET | Refills: 2 | Status: ON HOLD | OUTPATIENT
Start: 2017-09-20 | End: 2018-03-11 | Stop reason: HOSPADM

## 2017-09-20 RX ORDER — ASPIRIN 81 MG/1
81 TABLET ORAL DAILY
Qty: 30 TABLET | Refills: 2 | Status: ON HOLD | OUTPATIENT
Start: 2017-09-20 | End: 2018-03-11 | Stop reason: HOSPADM

## 2017-09-20 RX ORDER — TETRACYCLINE HYDROCHLORIDE 500 MG/1
500 CAPSULE ORAL 4 TIMES DAILY
Status: ON HOLD | COMMUNITY
End: 2018-03-11 | Stop reason: ALTCHOICE

## 2018-03-10 ENCOUNTER — APPOINTMENT (OUTPATIENT)
Dept: GENERAL RADIOLOGY | Age: 63
End: 2018-03-10
Payer: MEDICAID

## 2018-03-10 ENCOUNTER — HOSPITAL ENCOUNTER (OUTPATIENT)
Age: 63
Setting detail: OBSERVATION
Discharge: HOME | End: 2018-03-11
Attending: EMERGENCY MEDICINE | Admitting: EMERGENCY MEDICINE
Payer: MEDICAID

## 2018-03-10 ENCOUNTER — APPOINTMENT (OUTPATIENT)
Dept: CT IMAGING | Age: 63
End: 2018-03-10
Payer: MEDICAID

## 2018-03-10 DIAGNOSIS — N17.9 AKI (ACUTE KIDNEY INJURY) (HCC): ICD-10-CM

## 2018-03-10 DIAGNOSIS — R79.89 ELEVATED LACTIC ACID LEVEL: ICD-10-CM

## 2018-03-10 DIAGNOSIS — R10.84 GENERALIZED ABDOMINAL PAIN: Primary | ICD-10-CM

## 2018-03-10 PROBLEM — R10.9 ABDOMINAL PAIN: Status: ACTIVE | Noted: 2018-03-10

## 2018-03-10 LAB
ALBUMIN SERPL-MCNC: 3.8 G/DL (ref 3.5–5.2)
ALBUMIN/GLOBULIN RATIO: 1.2 (ref 1–2.5)
ALP BLD-CCNC: 85 U/L (ref 40–129)
ALT SERPL-CCNC: 15 U/L (ref 5–41)
ANION GAP SERPL CALCULATED.3IONS-SCNC: 17 MMOL/L (ref 9–17)
AST SERPL-CCNC: 34 U/L
BILIRUB SERPL-MCNC: 0.52 MG/DL (ref 0.3–1.2)
BNP INTERPRETATION: ABNORMAL
BUN BLDV-MCNC: 10 MG/DL (ref 8–23)
BUN/CREAT BLD: ABNORMAL (ref 9–20)
CALCIUM SERPL-MCNC: 8.6 MG/DL (ref 8.6–10.4)
CHLORIDE BLD-SCNC: 102 MMOL/L (ref 98–107)
CO2: 20 MMOL/L (ref 20–31)
CREAT SERPL-MCNC: 1.37 MG/DL (ref 0.7–1.2)
EKG ATRIAL RATE: 75 BPM
EKG P AXIS: 80 DEGREES
EKG P-R INTERVAL: 126 MS
EKG Q-T INTERVAL: 408 MS
EKG QRS DURATION: 76 MS
EKG QTC CALCULATION (BAZETT): 455 MS
EKG R AXIS: 48 DEGREES
EKG T AXIS: 69 DEGREES
EKG VENTRICULAR RATE: 75 BPM
GFR AFRICAN AMERICAN: >60 ML/MIN
GFR NON-AFRICAN AMERICAN: 52 ML/MIN
GFR SERPL CREATININE-BSD FRML MDRD: ABNORMAL ML/MIN/{1.73_M2}
GFR SERPL CREATININE-BSD FRML MDRD: ABNORMAL ML/MIN/{1.73_M2}
GLUCOSE BLD-MCNC: 95 MG/DL (ref 70–99)
HCT VFR BLD CALC: 55.5 % (ref 40.7–50.3)
HEMOGLOBIN: 16.6 G/DL (ref 13–17)
LACTIC ACID, WHOLE BLOOD: 3 MMOL/L (ref 0.7–2.1)
LACTIC ACID, WHOLE BLOOD: 4.2 MMOL/L (ref 0.7–2.1)
LACTIC ACID: ABNORMAL MMOL/L
LACTIC ACID: ABNORMAL MMOL/L
LIPASE: 21 U/L (ref 13–60)
MCH RBC QN AUTO: 26.7 PG (ref 25.2–33.5)
MCHC RBC AUTO-ENTMCNC: 29.9 G/DL (ref 28.4–34.8)
MCV RBC AUTO: 89.4 FL (ref 82.6–102.9)
NRBC AUTOMATED: 0.5 PER 100 WBC
PDW BLD-RTO: 14.6 % (ref 11.8–14.4)
PLATELET # BLD: 200 K/UL (ref 138–453)
PMV BLD AUTO: 11.3 FL (ref 8.1–13.5)
POC TROPONIN I: 0.02 NG/ML (ref 0–0.1)
POC TROPONIN I: 0.03 NG/ML (ref 0–0.1)
POC TROPONIN INTERP: NORMAL
POC TROPONIN INTERP: NORMAL
POTASSIUM SERPL-SCNC: 4 MMOL/L (ref 3.7–5.3)
PRO-BNP: 440 PG/ML
RBC # BLD: 6.21 M/UL (ref 4.21–5.77)
SODIUM BLD-SCNC: 139 MMOL/L (ref 135–144)
TOTAL PROTEIN: 6.9 G/DL (ref 6.4–8.3)
WBC # BLD: 3.9 K/UL (ref 3.5–11.3)

## 2018-03-10 PROCEDURE — 94640 AIRWAY INHALATION TREATMENT: CPT

## 2018-03-10 PROCEDURE — 93005 ELECTROCARDIOGRAM TRACING: CPT

## 2018-03-10 PROCEDURE — S0028 INJECTION, FAMOTIDINE, 20 MG: HCPCS | Performed by: EMERGENCY MEDICINE

## 2018-03-10 PROCEDURE — 80053 COMPREHEN METABOLIC PANEL: CPT

## 2018-03-10 PROCEDURE — 6360000002 HC RX W HCPCS: Performed by: EMERGENCY MEDICINE

## 2018-03-10 PROCEDURE — 84484 ASSAY OF TROPONIN QUANT: CPT

## 2018-03-10 PROCEDURE — 96374 THER/PROPH/DIAG INJ IV PUSH: CPT

## 2018-03-10 PROCEDURE — 83880 ASSAY OF NATRIURETIC PEPTIDE: CPT

## 2018-03-10 PROCEDURE — 71045 X-RAY EXAM CHEST 1 VIEW: CPT

## 2018-03-10 PROCEDURE — 6370000000 HC RX 637 (ALT 250 FOR IP): Performed by: EMERGENCY MEDICINE

## 2018-03-10 PROCEDURE — 99285 EMERGENCY DEPT VISIT HI MDM: CPT

## 2018-03-10 PROCEDURE — 2500000003 HC RX 250 WO HCPCS: Performed by: EMERGENCY MEDICINE

## 2018-03-10 PROCEDURE — G0378 HOSPITAL OBSERVATION PER HR: HCPCS

## 2018-03-10 PROCEDURE — 85027 COMPLETE CBC AUTOMATED: CPT

## 2018-03-10 PROCEDURE — 83605 ASSAY OF LACTIC ACID: CPT

## 2018-03-10 PROCEDURE — 74022 RADEX COMPL AQT ABD SERIES: CPT

## 2018-03-10 PROCEDURE — 74174 CTA ABD&PLVS W/CONTRAST: CPT

## 2018-03-10 PROCEDURE — 6360000004 HC RX CONTRAST MEDICATION: Performed by: EMERGENCY MEDICINE

## 2018-03-10 PROCEDURE — 2580000003 HC RX 258: Performed by: EMERGENCY MEDICINE

## 2018-03-10 PROCEDURE — 96375 TX/PRO/DX INJ NEW DRUG ADDON: CPT

## 2018-03-10 PROCEDURE — 83690 ASSAY OF LIPASE: CPT

## 2018-03-10 RX ORDER — IPRATROPIUM BROMIDE AND ALBUTEROL SULFATE 2.5; .5 MG/3ML; MG/3ML
1 SOLUTION RESPIRATORY (INHALATION)
Status: DISCONTINUED | OUTPATIENT
Start: 2018-03-10 | End: 2018-03-11

## 2018-03-10 RX ORDER — 0.9 % SODIUM CHLORIDE 0.9 %
1000 INTRAVENOUS SOLUTION INTRAVENOUS ONCE
Status: COMPLETED | OUTPATIENT
Start: 2018-03-10 | End: 2018-03-10

## 2018-03-10 RX ORDER — ALBUTEROL SULFATE 2.5 MG/3ML
5 SOLUTION RESPIRATORY (INHALATION)
Status: DISCONTINUED | OUTPATIENT
Start: 2018-03-10 | End: 2018-03-11

## 2018-03-10 RX ORDER — ALBUTEROL SULFATE 90 UG/1
2 AEROSOL, METERED RESPIRATORY (INHALATION)
Status: DISCONTINUED | OUTPATIENT
Start: 2018-03-10 | End: 2018-03-11

## 2018-03-10 RX ORDER — MORPHINE SULFATE 4 MG/ML
4 INJECTION, SOLUTION INTRAMUSCULAR; INTRAVENOUS ONCE
Status: COMPLETED | OUTPATIENT
Start: 2018-03-10 | End: 2018-03-10

## 2018-03-10 RX ORDER — 0.9 % SODIUM CHLORIDE 0.9 %
30 INTRAVENOUS SOLUTION INTRAVENOUS ONCE
Status: COMPLETED | OUTPATIENT
Start: 2018-03-10 | End: 2018-03-10

## 2018-03-10 RX ORDER — LABETALOL HYDROCHLORIDE 5 MG/ML
10 INJECTION, SOLUTION INTRAVENOUS ONCE
Status: COMPLETED | OUTPATIENT
Start: 2018-03-10 | End: 2018-03-10

## 2018-03-10 RX ADMIN — ALBUTEROL SULFATE 2 PUFF: 90 AEROSOL, METERED RESPIRATORY (INHALATION) at 21:54

## 2018-03-10 RX ADMIN — IOPAMIDOL 75 ML: 755 INJECTION, SOLUTION INTRAVENOUS at 19:07

## 2018-03-10 RX ADMIN — FAMOTIDINE 20 MG: 10 INJECTION INTRAVENOUS at 14:06

## 2018-03-10 RX ADMIN — MORPHINE SULFATE 4 MG: 4 INJECTION INTRAVENOUS at 17:52

## 2018-03-10 RX ADMIN — SODIUM CHLORIDE 1000 ML: 9 INJECTION, SOLUTION INTRAVENOUS at 19:12

## 2018-03-10 RX ADMIN — SODIUM CHLORIDE 2136 ML: 9 INJECTION, SOLUTION INTRAVENOUS at 16:01

## 2018-03-10 RX ADMIN — LABETALOL HYDROCHLORIDE 10 MG: 5 INJECTION, SOLUTION INTRAVENOUS at 20:40

## 2018-03-10 ASSESSMENT — ENCOUNTER SYMPTOMS
WHEEZING: 0
EYE ITCHING: 0
COLOR CHANGE: 0
NAUSEA: 0
EYE DISCHARGE: 0
ABDOMINAL PAIN: 1
ABDOMINAL DISTENTION: 0
STRIDOR: 0
CHEST TIGHTNESS: 0
BACK PAIN: 0
SHORTNESS OF BREATH: 0
COUGH: 0
VOMITING: 0
BLOOD IN STOOL: 0
DIARRHEA: 0

## 2018-03-10 ASSESSMENT — PAIN SCALES - GENERAL
PAINLEVEL_OUTOF10: 7
PAINLEVEL_OUTOF10: 7

## 2018-03-10 ASSESSMENT — PAIN DESCRIPTION - DESCRIPTORS: DESCRIPTORS: SHOOTING

## 2018-03-10 ASSESSMENT — PAIN DESCRIPTION - LOCATION: LOCATION: ABDOMEN

## 2018-03-10 ASSESSMENT — PAIN DESCRIPTION - PAIN TYPE: TYPE: ACUTE PAIN

## 2018-03-10 ASSESSMENT — PAIN DESCRIPTION - PROGRESSION: CLINICAL_PROGRESSION: RESOLVED

## 2018-03-10 ASSESSMENT — PAIN DESCRIPTION - FREQUENCY: FREQUENCY: INTERMITTENT

## 2018-03-10 NOTE — ED PROVIDER NOTES
Systems   Constitutional: Negative for chills, diaphoresis, fatigue and fever. HENT: Negative for congestion. Eyes: Negative for discharge and itching. Respiratory: Negative for cough, chest tightness, shortness of breath, wheezing and stridor. Cardiovascular: Negative for chest pain, palpitations and leg swelling. Gastrointestinal: Positive for abdominal pain. Negative for abdominal distention, blood in stool, diarrhea, nausea and vomiting. Genitourinary: Negative for difficulty urinating, dysuria, flank pain, frequency, hematuria and urgency. Musculoskeletal: Negative for arthralgias and back pain. Skin: Negative for color change and pallor. Neurological: Negative for dizziness, light-headedness, numbness and headaches. Psychiatric/Behavioral: Negative for agitation and behavioral problems.        PHYSICAL EXAM   (up to 7 for level 4, 8 or more for level 5)      INITIAL VITALS:   BP (!) 166/78   Pulse 77   Temp 99.1 °F (37.3 °C) (Oral)   Resp 20   Ht 6' 3\" (1.905 m)   Wt 157 lb (71.2 kg)   SpO2 95%   BMI 19.62 kg/m²     Physical Exam    CONSTITUTIONAL: AOx4, NAD, cooperative with exam, afebrile   HEAD: normocephalic, atraumatic   EYES: PERRL, EOMI, anicteric sclera   ENT: Moist mucous membranes, uvula midline   NECK: Supple, symmetric, trachea midline   BACK: Symmetric, no deformity   LUNGS: Bilateral breath sounds, CTAB, no rales/ronchi/wheezes   CARDIOVASCULAR: RRR, no m/r/g, 2+ pulses throughout   ABDOMEN: Soft, non-tender, non-distended, +BS   : No inguinal hernias or lymphadenopathy   NEUROLOGIC:  MAEx4, normal sensorium and gait; normal strength throughout   MUSCULOSKELETAL: No clubbing, cyanosis or edema;    SKIN: No rash, pallor or wounds        DIFFERENTIAL  DIAGNOSIS     PLAN (LABS / IMAGING / EKG):  Orders Placed This Encounter   Procedures    XR Acute Abd Series Chest 1 VW    CTA ABDOMEN PELVIS W CONTRAST    XR CHEST LATERAL    Comprehensive Metabolic Panel    Lactic Acid, Plasma    LIPASE    Lactic Acid, Plasma    CBC    Brain Natriuretic Peptide    Vital signs    Misc nursing order (specify)    Inpatient consult to Vascular Surgery    POCT troponin    POCT troponin    PATIENT STATUS (FROM ED OR OR/PROCEDURAL) Observation       MEDICATIONS ORDERED:  Orders Placed This Encounter   Medications    famotidine (PEPCID) injection 20 mg    0.9 % sodium chloride bolus    morphine (PF) injection 4 mg    iopamidol (ISOVUE-370) 76 % injection 75 mL    0.9 % sodium chloride bolus    labetalol (NORMODYNE;TRANDATE) injection 10 mg       DDX: DDX: GERD, PUD, pancreatitis, cholecystitis, GB colic, cholangitis, Nokr-Jahi-Sqakgs, ACS/ MI, pneumonia, SBO, DKA, AAA, mesenteric ischemia, perforated viscous, acute gastroenteritis, NSAP, pyelonephritis, kidney stone, appendicitis, hernia,  UTI, constipation,    DIAGNOSTIC RESULTS / EMERGENCY DEPARTMENT COURSE / MDM     LABS:  Results for orders placed or performed during the hospital encounter of 03/10/18   Comprehensive Metabolic Panel   Result Value Ref Range    Glucose 95 70 - 99 mg/dL    BUN 10 8 - 23 mg/dL    CREATININE 1.37 (H) 0.70 - 1.20 mg/dL    Bun/Cre Ratio NOT REPORTED 9 - 20    Calcium 8.6 8.6 - 10.4 mg/dL    Sodium 139 135 - 144 mmol/L    Potassium 4.0 3.7 - 5.3 mmol/L    Chloride 102 98 - 107 mmol/L    CO2 20 20 - 31 mmol/L    Anion Gap 17 9 - 17 mmol/L    Alkaline Phosphatase 85 40 - 129 U/L    ALT 15 5 - 41 U/L    AST 34 <40 U/L    Total Bilirubin 0.52 0.3 - 1.2 mg/dL    Total Protein 6.9 6.4 - 8.3 g/dL    Alb 3.8 3.5 - 5.2 g/dL    Albumin/Globulin Ratio 1.2 1.0 - 2.5    GFR Non-African American 52 (L) >60 mL/min    GFR African American >60 >60 mL/min    GFR Comment          GFR Staging NOT REPORTED    Lactic Acid, Plasma   Result Value Ref Range    Lactic Acid NOT REPORTED mmol/L    Lactic Acid, Whole Blood 4.2 (H) 0.7 - 2.1 mmol/L   LIPASE   Result Value Ref Range    Lipase 21 13 - 60 U/L   Lactic Acid, Plasma iliac arteries without evidence of a significant stenosis. Bulky calcified plaque in both internal iliac arteries. External iliac arteries are mildly tortuous and patent without stenosis. Moderate to large stool load in the rectosigmoid colon. Urinary bladder is grossly normal.  No free air or free fluid. There is severe right hip degenerative osteoarthritis. There is a left hip arthroplasty with migration. Calcific aorto iliac atherosclerotic disease without evidence of of significant stenosis or aneurysm. There is bulky calcified plaque in both internal iliac arteries. Mild calcific plaque at the origins of the celiac and superior mesenteric arteries without evidence of a significant stenosis. There is a mild stenosis at the origin of the SMA. The TORRI is patent. There are 2 right renal arteries. There is a moderate stenosis of the smaller more inferiorly located right renal artery. Severe right hip degenerative osteoarthritis. Rectal fecal impaction. EKG  None    All EKG's are interpreted by the Emergency Department Physician who either signs or Co-signs this chart in the absence of a cardiologist.    EMERGENCY DEPARTMENT COURSE:  ED Course as of Mar 10 2146   Sat Mar 10, 2018   1604 Patient presented with a complaint of abdominal pain associated with diaphoresis. Elevated lactic acid, concern for mesenteric ischemia. Will obtain CTA of the abdomen. [KO]   1630 After 30 mL. Take the patient's lactic acid is still 3. We'll repeat a lactic acid. [KO]   2121 Vascular surgery have come down to evaluate the patient. I was in the room when the vascular surgery resident Dr. Rick Perez was sent speaking to the patient. Patient now denies the symptoms that was concerning when he initially came in. When initially came in he is manic component with abdominal pain associated with diaphoresis and the pain happen every time he eats. Nausea. He only has abdominal pain when he coughs.   Nevertheless vascular

## 2018-03-10 NOTE — ED NOTES
Pt reports pain in his legs. Pt reports started 3 days ago. Resident notified.       Lin Peraza RN  03/10/18 [id number]

## 2018-03-11 VITALS
HEIGHT: 75 IN | RESPIRATION RATE: 15 BRPM | DIASTOLIC BLOOD PRESSURE: 91 MMHG | HEART RATE: 73 BPM | BODY MASS INDEX: 19.52 KG/M2 | TEMPERATURE: 98.8 F | SYSTOLIC BLOOD PRESSURE: 153 MMHG | WEIGHT: 157 LBS | OXYGEN SATURATION: 93 %

## 2018-03-11 LAB
ALBUMIN SERPL-MCNC: 3.2 G/DL (ref 3.5–5.2)
ALBUMIN/GLOBULIN RATIO: 1.1 (ref 1–2.5)
ALP BLD-CCNC: 70 U/L (ref 40–129)
ALT SERPL-CCNC: 13 U/L (ref 5–41)
ANION GAP SERPL CALCULATED.3IONS-SCNC: 14 MMOL/L (ref 9–17)
AST SERPL-CCNC: 27 U/L
BILIRUB SERPL-MCNC: 0.35 MG/DL (ref 0.3–1.2)
BUN BLDV-MCNC: 6 MG/DL (ref 8–23)
BUN/CREAT BLD: ABNORMAL (ref 9–20)
CALCIUM SERPL-MCNC: 7.8 MG/DL (ref 8.6–10.4)
CHLORIDE BLD-SCNC: 105 MMOL/L (ref 98–107)
CO2: 19 MMOL/L (ref 20–31)
CREAT SERPL-MCNC: 0.88 MG/DL (ref 0.7–1.2)
GFR AFRICAN AMERICAN: >60 ML/MIN
GFR NON-AFRICAN AMERICAN: >60 ML/MIN
GFR SERPL CREATININE-BSD FRML MDRD: ABNORMAL ML/MIN/{1.73_M2}
GFR SERPL CREATININE-BSD FRML MDRD: ABNORMAL ML/MIN/{1.73_M2}
GLUCOSE BLD-MCNC: 132 MG/DL (ref 70–99)
LACTIC ACID, WHOLE BLOOD: 1.7 MMOL/L (ref 0.7–2.1)
LACTIC ACID: NORMAL MMOL/L
POTASSIUM SERPL-SCNC: 3.4 MMOL/L (ref 3.7–5.3)
SODIUM BLD-SCNC: 138 MMOL/L (ref 135–144)
TOTAL PROTEIN: 6.2 G/DL (ref 6.4–8.3)

## 2018-03-11 PROCEDURE — 6370000000 HC RX 637 (ALT 250 FOR IP): Performed by: EMERGENCY MEDICINE

## 2018-03-11 PROCEDURE — 6360000002 HC RX W HCPCS: Performed by: EMERGENCY MEDICINE

## 2018-03-11 PROCEDURE — 2580000003 HC RX 258: Performed by: EMERGENCY MEDICINE

## 2018-03-11 PROCEDURE — 94640 AIRWAY INHALATION TREATMENT: CPT

## 2018-03-11 PROCEDURE — 36415 COLL VENOUS BLD VENIPUNCTURE: CPT

## 2018-03-11 PROCEDURE — G0378 HOSPITAL OBSERVATION PER HR: HCPCS

## 2018-03-11 PROCEDURE — 83605 ASSAY OF LACTIC ACID: CPT

## 2018-03-11 PROCEDURE — 80053 COMPREHEN METABOLIC PANEL: CPT

## 2018-03-11 RX ORDER — AMLODIPINE BESYLATE 5 MG/1
5 TABLET ORAL DAILY
Qty: 30 TABLET | Refills: 3 | Status: ON HOLD | OUTPATIENT
Start: 2018-03-12 | End: 2022-04-07 | Stop reason: HOSPADM

## 2018-03-11 RX ORDER — ASPIRIN 81 MG/1
81 TABLET ORAL DAILY
Status: DISCONTINUED | OUTPATIENT
Start: 2018-03-11 | End: 2018-03-11 | Stop reason: HOSPADM

## 2018-03-11 RX ORDER — AMLODIPINE BESYLATE 5 MG/1
5 TABLET ORAL DAILY
Status: DISCONTINUED | OUTPATIENT
Start: 2018-03-11 | End: 2018-03-11 | Stop reason: HOSPADM

## 2018-03-11 RX ORDER — LISINOPRIL 10 MG/1
10 TABLET ORAL DAILY
Status: DISCONTINUED | OUTPATIENT
Start: 2018-03-11 | End: 2018-03-11 | Stop reason: HOSPADM

## 2018-03-11 RX ORDER — TAMSULOSIN HYDROCHLORIDE 0.4 MG/1
0.4 CAPSULE ORAL DAILY
Qty: 30 CAPSULE | Refills: 3 | Status: SHIPPED | OUTPATIENT
Start: 2018-03-12

## 2018-03-11 RX ORDER — PRAVASTATIN SODIUM 20 MG
20 TABLET ORAL EVERY EVENING
Qty: 30 TABLET | Refills: 3 | Status: ON HOLD | OUTPATIENT
Start: 2018-03-11 | End: 2022-04-07 | Stop reason: HOSPADM

## 2018-03-11 RX ORDER — TAMSULOSIN HYDROCHLORIDE 0.4 MG/1
0.4 CAPSULE ORAL DAILY
Status: DISCONTINUED | OUTPATIENT
Start: 2018-03-11 | End: 2018-03-11 | Stop reason: HOSPADM

## 2018-03-11 RX ORDER — SODIUM CHLORIDE 9 MG/ML
INJECTION, SOLUTION INTRAVENOUS CONTINUOUS
Status: DISCONTINUED | OUTPATIENT
Start: 2018-03-11 | End: 2018-03-11 | Stop reason: HOSPADM

## 2018-03-11 RX ORDER — POTASSIUM CHLORIDE 20 MEQ/1
20 TABLET, EXTENDED RELEASE ORAL ONCE
Status: COMPLETED | OUTPATIENT
Start: 2018-03-11 | End: 2018-03-11

## 2018-03-11 RX ORDER — ASPIRIN 81 MG/1
81 TABLET ORAL DAILY
Qty: 30 TABLET | Refills: 3 | Status: SHIPPED | OUTPATIENT
Start: 2018-03-12

## 2018-03-11 RX ORDER — LISINOPRIL 10 MG/1
10 TABLET ORAL DAILY
Qty: 30 TABLET | Refills: 3 | Status: ON HOLD | OUTPATIENT
Start: 2018-03-12 | End: 2022-04-07 | Stop reason: HOSPADM

## 2018-03-11 RX ORDER — PREDNISONE 20 MG/1
40 TABLET ORAL ONCE
Status: COMPLETED | OUTPATIENT
Start: 2018-03-11 | End: 2018-03-11

## 2018-03-11 RX ORDER — PRAVASTATIN SODIUM 20 MG
20 TABLET ORAL EVERY EVENING
Status: DISCONTINUED | OUTPATIENT
Start: 2018-03-11 | End: 2018-03-11 | Stop reason: HOSPADM

## 2018-03-11 RX ADMIN — LISINOPRIL 10 MG: 10 TABLET ORAL at 09:35

## 2018-03-11 RX ADMIN — SODIUM CHLORIDE: 9 INJECTION, SOLUTION INTRAVENOUS at 00:53

## 2018-03-11 RX ADMIN — PREDNISONE 40 MG: 20 TABLET ORAL at 00:55

## 2018-03-11 RX ADMIN — AMLODIPINE BESYLATE 5 MG: 5 TABLET ORAL at 09:35

## 2018-03-11 RX ADMIN — PRAVASTATIN SODIUM 20 MG: 20 TABLET ORAL at 00:55

## 2018-03-11 RX ADMIN — Medication 81 MG: at 09:35

## 2018-03-11 RX ADMIN — ALBUTEROL SULFATE 5 MG: 5 SOLUTION RESPIRATORY (INHALATION) at 08:20

## 2018-03-11 RX ADMIN — POTASSIUM CHLORIDE 20 MEQ: 20 TABLET, EXTENDED RELEASE ORAL at 09:35

## 2018-03-11 RX ADMIN — TAMSULOSIN HYDROCHLORIDE 0.4 MG: 0.4 CAPSULE ORAL at 09:35

## 2018-03-11 ASSESSMENT — PAIN SCALES - GENERAL: PAINLEVEL_OUTOF10: 0

## 2018-03-11 NOTE — CARE COORDINATION
Patient will be discharged to home, cab set up through [facility]. [facility] to all back with  time for patient.

## 2018-03-11 NOTE — PROGRESS NOTES
Division of Vascular Surgery             Progress Note      Name: Otoniel Correia  MRN: [id number]         Overnight Events:      No acute events overnight. Subjective:       Patient was seen and examined. Still complaining of cough which is worse when coughing. Physical Exam:     Vitals:  BP (!) 153/88   Pulse 72   Temp 98.1 °F (36.7 °C) (Oral)   Resp 18   Ht 6' 3\" (1.905 m)   Wt 157 lb (71.2 kg)   SpO2 95%   BMI 19.62 kg/m²     General appearance - alert, well appearing and in no acute distress  Mental status - oriented to person, place and time with normal affect  Head - normocephalic and atraumatic  Neck - supple, no carotid bruits, thyroid not palpable, no JVD  Chest - clear to auscultation, normal effort  Heart - normal rate, regular rhythm, no murmurs  Abdomen - soft, non-tender, non-distended, bowel sounds present all four quadrants, no masses  Neurological - normal speech, no focal findings or movement disorder noted, cranial nerves II through XII grossly intact  Extremities - peripheral pulses palpable, no pedal edema or calf pain with palpation  Skin - no gross lesions, rashes, or induration noted     R radial 2+ L radial 2+   R femoral 2+ L femoral 2+   R posterior tibial 2+ L posterior tibial 2+   R dorsalis pedis 2+ L dorsalis pedis 2+     Imaging:     XR CHEST LATERAL   Final Result   Middle lobe opacification is identified, which could represent scarring   atelectasis. Correlation with multiple previous evaluations dating back to   2015 suggested there is some chronic appearing process in this area. A CT of   the chest should be considered, perhaps on outpatient basis depending on   clinical presentation. CTA ABDOMEN PELVIS W CONTRAST   Final Result   Calcific aorto iliac atherosclerotic disease without evidence of of   significant stenosis or aneurysm. There is bulky calcified plaque in both   internal iliac arteries.       Mild calcific plaque at the origins of the celiac

## 2018-03-11 NOTE — ED NOTES
Pt asleep on cart, RR are even and unlabored, NAD, visible chest rise and fall. Continuing to monitor.      Shena Rivera RN  17/52/92 0910

## 2018-03-11 NOTE — H&P
[facility]  CDU / OBSERVATION eNCOUnter  Resident Note     Pt Name: Kelsey Mehta  MRN: [id number]  [place] 1955  Date of evaluation: 3/11/18  Patient's PCP is :  Joey Arana Officer, MD    [address] [place] [address]       Chief Complaint   Patient presents with    Abdominal Pain     c/o abdominal pain x2-3 days. States the pain worsens when he eats. HISTORY OF PRESENT ILLNESS    Kelsey Mehta is a 57 y.o. male who presents with acute Diffuse sharp abdominal pain most likely etiology GERD versus atherosclerosis. Patient presents with acute diffuse sharp abdominal pain, nonradiating, worsened with food intake, no alleviating factors, intermittent that his been occurring over the past 3 days with meals. Patient had CT abdomen concerning for mild stenosis of intestinal arteries however patient had pain resolved after he had a large bowel movement. Location/Symptom: Diffuse abdominal pain  Timing/Onset: Acute  Provocation: Food intake  Quality: Sharp  Radiation: None  Severity: Moderate  Timing/Duration: Intermittent    Modifying Factors: None    REVIEW OF SYSTEMS       General ROS - No fevers, No malaise   Ophthalmic ROS - No discharge, No changes in vision  ENT ROS -  No sore throat, No rhinorrhea,   Respiratory ROS - no shortness of breath, no cough, no  wheezing  Cardiovascular ROS - No chest pain, no dyspnea on exertion  Gastrointestinal ROS - Positive abdominal pain, no nausea or vomiting, no change in bowel habits, no black or bloody stools  Genito-Urinary ROS - No dysuria, trouble voiding, or hematuria  Musculoskeletal ROS - No myalgias, No arthalgias  Neurological ROS - No headache, no dizziness/lightheadedness, No focal weakness, no loss of sensation  Dermatological ROS - No lesions, No rash     (PQRS) Advance directives on face sheet per hospital policy.  No change unless specifically mentioned in chart    PAST MEDICAL HISTORY    has a past medical history of Alcohol abuse; Arthritis; Chronic kidney disease; Full dentures; Hypertension; Pure hypercholesterolemia; Systolic CHF ([address] and Wears glasses. I have reviewed the past medical history with the patient and it is pertinent to this complaint. SURGICAL HISTORY      has a past surgical history that includes Hip fracture surgery (Left); Appendectomy; and pr egd transoral biopsy single/multiple (2017). I have reviewed and agree with Surgical History entered    CURRENT MEDICATIONS         amLODIPine (NORVASC) tablet 5 mg Daily   aspirin EC tablet 81 mg Daily   lisinopril (PRINIVIL;ZESTRIL) tablet 10 mg Daily   pravastatin (PRAVACHOL) tablet 20 mg QPM   tamsulosin (FLOMAX) capsule 0.4 mg Daily   0.9 % sodium chloride infusion Continuous   albuterol (PROVENTIL) nebulizer solution 2.5 mg Q6H PRN       All medication charted and reviewed. ALLERGIES     has No Known Allergies. FAMILY HISTORY     indicated that his mother is . family history includes Cancer in his mother; Diabetes in his mother. The patient denies any pertinent family history. I have reviewed and agree with the family history entered. I have reviewed the Family History and it is not significant to the case    SOCIAL HISTORY      reports that he has been smoking Cigarettes. He has been smoking about 0.00 packs per day. He does not have any smokeless tobacco history on file. He reports that he drinks alcohol. He reports that he uses drugs, including Cocaine and Marijuana. I have reviewed and agree with all Social.  There are no concerns for substance abuse/use. PHYSICAL EXAM     INITIAL VITALS:  height is 6' 3\" (1.905 m) and weight is 157 lb (71.2 kg). His oral temperature is 98.8 °F (37.1 °C). His blood pressure is 153/91 (abnormal) and his pulse is 73. His respiration is 15 and oxygen saturation is 93%.       CONSTITUTIONAL: AOx4, no apparent distress, appears stated age    HEAD: normocephalic, atraumatic   EYES: PERRLA, EOMI    ENT: moist mucous membranes, uvula midline   NECK: supple, symmetric   BACK: symmetric   LUNGS: clear to auscultation bilaterally   CARDIOVASCULAR: regular rate and rhythm, no murmurs, rubs or gallops   ABDOMEN: soft, non-tender, non-distended with normal active bowel sounds   NEUROLOGIC:  MAEx4, no focal sensory or motor deficits   MUSCULOSKELETAL: no clubbing, cyanosis or edema   SKIN: no rash or wounds       DIFFERENTIAL DIAGNOSIS/MDM:     GERD, PUD, pancreatitis, cholecystitis, GB colic, cholangitis, SBO, DKA, AAA, mesenteric ischemia, perforated viscous, acute gastroenteritis, NSAP, pyelonephritis, kidney stone, appendicitis, hernia, D-TICS, UTI, constipation, testicular torsion, epididymitis/ orchitis      DIAGNOSTIC RESULTS       RADIOLOGY:   I directly visualized the following  images and reviewed the radiologist interpretations:    Xr Acute Abd Series Chest 1 Vw    Result Date: 3/10/2018  EXAMINATION: ACUTE ABDOMINAL SERIES WITH SINGLE  VIEW OF THE CHEST 3/10/2018 2:23 pm COMPARISON: Abdomen radiographs from 06/17/2017. HISTORY: ORDERING SYSTEM PROVIDED HISTORY: epigastric abdomina pain TECHNOLOGIST PROVIDED HISTORY: Reason for exam:->epigastric abdomina pain FINDINGS: There is no focal airspace consolidation, pleural effusion or pneumothorax. The cardiomediastinal silhouette appears within normal limits, given technique and there is no pulmonary vascular congestion. There is a moderate stool burden in the colon. Air and stool is seen to the level of the distal colon/rectum. No air-filled dilated loops of small bowel identified. There are no differential air-fluid levels. No free intraperitoneal air. No abnormal calcifications project over the abdomen. Incompletely visualized left hip prosthesis identified. There are severe hypertrophic degenerative changes with near complete obliteration of the joint space, osteophytosis and subchondral cystic and sclerotic change of the right hip.   Visualized osseous structures appear intact, and are otherwise grossly unremarkable, given the non dedicated imaging. 1. No radiographic evidence for acute cardiopulmonary disease process. 2. Nonobstructive bowel gas pattern. 3. No free intraperitoneal air. Xr Chest Lateral    Result Date: 3/10/2018  EXAMINATION: XR CHEST LATERAL LEFT 3/10/2018 9:08 pm COMPARISON: Multiple prior x-rays dating back to 02/20/2015. HISTORY: ORDERING SYSTEM PROVIDED HISTORY: cough TECHNOLOGIST PROVIDED HISTORY: Reason for exam:->cough FINDINGS: Middle lobe opacity is identified on the lateral view the chest with a juxta phrenic peak seen related to the right hemidiaphragm. Increased reticular opacities are identified elsewhere without focal consolidation. Middle lobe opacification is identified, which could represent scarring atelectasis. Correlation with multiple previous evaluations dating back to 2015 suggested there is some chronic appearing process in this area. A CT of the chest should be considered, perhaps on outpatient basis depending on clinical presentation. Cta Abdomen Pelvis W Contrast    Result Date: 3/10/2018  EXAMINATION: CTA OF THE ABDOMEN AND PELVIS WITH AND WITHOUT CONTRAST 3/10/2018 7:08 pm: TECHNIQUE: CTA of the abdomen and pelvis was performed without and with the administration of intravenous contrast. Multiplanar reformatted images are provided for review. MIP images are provided for review. Dose modulation, iterative reconstruction, and/or weight based adjustment of the mA/kV was utilized to reduce the radiation dose to as low as reasonably achievable. COMPARISON: 06/19/2017 HISTORY: ORDERING SYSTEM PROVIDED HISTORY: abdominal pain, worse every time he eats and associated with diaphoresis. . Concern for meesenteric ischemia FINDINGS: CTA ABDOMEN: The abdominal aorta is normal in caliber with homogeneous enhancement. No evidence of aneurysm or dissection. There is calcific aorto iliac atherosclerotic disease. hip degenerative osteoarthritis. Rectal fecal impaction. LABS:  I have reviewed and interpreted all available lab results. Labs Reviewed   COMPREHENSIVE METABOLIC PANEL - Abnormal; Notable for the following:        Result Value    CREATININE 1.37 (*)     GFR Non- 52 (*)     All other components within normal limits   LACTIC ACID, PLASMA - Abnormal; Notable for the following:     Lactic Acid, Whole Blood 4.2 (*)     All other components within normal limits   LACTIC ACID, PLASMA - Abnormal; Notable for the following:     Lactic Acid, Whole Blood 3.0 (*)     All other components within normal limits   CBC - Abnormal; Notable for the following:     RBC 6.21 (*)     Hematocrit 55.5 (*)     RDW 14.6 (*)     NRBC Automated 0.5 (*)     All other components within normal limits   BRAIN NATRIURETIC PEPTIDE - Abnormal; Notable for the following:     Pro- (*)     All other components within normal limits   COMPREHENSIVE METABOLIC PANEL - Abnormal; Notable for the following:     Glucose 132 (*)     BUN 6 (*)     Calcium 7.8 (*)     Potassium 3.4 (*)     CO2 19 (*)     Total Protein 6.2 (*)     Alb 3.2 (*)     All other components within normal limits   LIPASE   LACTIC ACID, PLASMA   CBC WITH AUTO DIFFERENTIAL   POCT TROPONIN   POCT TROPONIN   POCT TROPONIN   POCT TROPONIN     CDU IMPRESSION       Regine Wei is a 57 y.o. male who presents with    1.  Acute Diffuse sharp abdominal pain most likely etiology GERD versus atherosclerosis         CDU PLAN     · Acute Diffuse sharp abdominal pain: CT abdomen concerning for atherosclerosis, vascular surgery stating no intervention and patient had improvement of pain after large bowel movement, discharge today   IP CONSULT TO VASCULAR SURGERY  IP CONSULT TO SOCIAL WORK  · Further workup and evaluation   · Follow up recommendations     · Continue home meds, pain control   · Monitor vitals, labs, imaging         CONSULTS:    IP CONSULT TO VASCULAR

## 2018-03-11 NOTE — ED NOTES
Vascular at bedside to perform occult blood stool sample, negative.      Shena Rivera RN  34/36/52 [id number]

## 2018-03-11 NOTE — CARE COORDINATION
Case Management Initial Discharge Plan  Maxi Leslie,         Readmission Risk              Readmission Risk:        16.75       Age 70 or Greater:  0    Admitted from SNF or Requires Paid or Family Care:  0    Currently has CHF,COPD,ARF,CRI,or is on dialysis:  4    Takes more than 5 Prescription Medications:  4    Takes Digoxin,Insulin,Anticoagulants,Narcotics or ASA/Plavix:  [address] in Past 12 Months:  0    On Disability:  3    Patient Considers own Health:  3.75            Met with:patient to discuss discharge plans.    Information verified: address, contacts, phone number, , insurance Yes  PCP: Joey Blackmon MD  Date of last visit:  Past year    Insurance Provider: [facility]    Discharge Planning  Current Residence:  Private Residence  Living Arrangements:  Family Members   Home has 1 stories/few stairs to climb  Support Systems:  Family Members  Current Services PTA:  None, Durable Medical Equipment Supplier:  rollator  Patient able to perform ADL's:Independent  DME used to aid ambulation prior to admission:  rollator/during admission his rollator    Potential Assistance Needed:  [phone]: [employer] Medications:     Does patient want to participate in local refill/ meds to beds program?       Patient agreeable to home care: No  Cabot of choice provided:  n/a      Type of Home Care Services:  None  Patient expects to be discharged to:  home    Prior SNF/Rehab Placement and Facility:   Agreeable to SNF/Rehab: No  Cabot of choice provided: n/a   Evaluation: no    Expected Discharge date:  18  Follow Up Appointment: Best Day/ Time: Wednesday PM    Transportation provider:  Traci  Transportation arrangements needed for discharge: Yes    Discharge Plan: home independently        Electronically signed by Ryan RN on 3/11/18 at 11:57 AM

## 2018-03-11 NOTE — CONSULTS
(ACHROMYCIN;SUMYCIN) 500 MG capsule Take 500 mg by mouth 4 times daily    Historical Provider, MD   metroNIDAZOLE (FLAGYL) 250 MG tablet Take 250 mg by mouth 4 times daily    Historical Provider, MD   tamsulosin (FLOMAX) 0.4 MG capsule Take 1 capsule by mouth daily 9/20/17   Joey Chino MD   pravastatin (PRAVACHOL) 20 MG tablet Take 1 tablet by mouth every evening 9/20/17   Joey Chino MD   lisinopril (PRINIVIL;ZESTRIL) 10 MG tablet Take 1 tablet by mouth daily DC 5 mg dose 9/20/17   Joey Chino MD   amLODIPine (NORVASC) 5 MG tablet Take 1 tablet by mouth daily 9/20/17   Joey Chino MD   aspirin 81 MG EC tablet Take 1 tablet by mouth daily 9/20/17   Joey Chino MD   acetaminophen (TYLENOL 8 HOUR) 650 MG extended release tablet Take 1 tablet by mouth every 8 hours as needed for Pain 9/20/17   Joey Chino MD   omeprazole (PRILOSEC) 20 MG delayed release capsule Take 1 capsule by mouth 2 times daily for 14 days 7/15/17 9/20/18  Ally Chris,         Allergies:       Patient has no known allergies. Social History:     Tobacco:    reports that he has been smoking Cigarettes. He has been smoking about 0.00 packs per day. He does not have any smokeless tobacco history on file. Alcohol:      reports that he drinks alcohol. Drug Use:  reports that he uses drugs, including Cocaine and Marijuana.     Family History:     Family History   Problem Relation Age of Onset    Diabetes Mother     Cancer Mother        Review of Systems:     Positive and Negative as described in HPI    Constitutional:  Admits to recent chills/ night sweats, negative for  Current fevers, chills, sweats, fatigue, and weight loss though   HEENT:  negative for vision or hearing changes,   Respiratory:  negative for shortness of breath, cough, or congestion  Cardiovascular:  negative for  chest pain, palpitations  Gastrointestinal:  Constipation for past 2 days, abdominal pain past 4 days, negative for nausea, vomiting, diarrhea, , Genitourinary:  negative for frequency, dysuria  Integument:  negative for rash, skin lesions  Chest/Breast:  No painful inspiration or expiration, no rib sternal pain  Musculoskeletal:  negative for muscle aches or joint pain  Neurological:  negative for headaches, dizziness, lightheadedness, numbness, pain and tingling extremities  Lymphatics: no lymphadenopathy or painful masses  Behavior/Psych:  negative for depression and anxiety    Physical Exam:     Vitals:  BP (!) 194/97   Pulse 77   Temp 99.1 °F (37.3 °C) (Oral)   Resp 20   Ht 6' 3\" (1.905 m)   Wt 157 lb (71.2 kg)   SpO2 95%   BMI 19.62 kg/m²     General appearance - alert, well appearing and in no acute distress  Mental status - oriented to person, place and time with normal affect  Head - normocephalic and atraumatic  Eyes - pupils equal and reactive, extraocular eye movements intact, conjunctiva clear  Ears - hearing appears to be intact  Nose - no drainage noted  Mouth - mucous membranes moist  Neck - supple, no carotid bruits, thyroid not palpable, no JVD  Chest - clear to auscultation, normal effort  Heart - normal rate, regular rhythm, no murmurs  Abdomen - soft, mild tenderness to lower quadrants, non-distended, bowel sounds present in all four quadrants, no masses, hepatomegaly, splenomegaly or aortic enlargement  Neurological - normal speech, no focal findings or movement disorder noted, cranial nerves II through XII grossly intact  Extremities - peripheral pulses palpable, no pedal edema or calf pain with palpation  Skin - no gross lesions, rashes, or induration noted    R radial 2+ L radial 2+   R femoral 2+ L femoral 2+   R posterior tibial 2+ L posterior tibial 2+   R dorsalis pedis 2+ L dorsalis pedis 2+         Imaging:   Xr Acute Abd Series Chest 1 Vw    Result Date: 3/10/2018  EXAMINATION: ACUTE ABDOMINAL SERIES WITH SINGLE  VIEW OF THE CHEST 3/10/2018 2:23 pm COMPARISON: Abdomen radiographs from 06/17/2017. HISTORY: ORDERING SYSTEM PROVIDED HISTORY: epigastric abdomina pain TECHNOLOGIST PROVIDED HISTORY: Reason for exam:->epigastric abdomina pain FINDINGS: There is no focal airspace consolidation, pleural effusion or pneumothorax. The cardiomediastinal silhouette appears within normal limits, given technique and there is no pulmonary vascular congestion. There is a moderate stool burden in the colon. Air and stool is seen to the level of the distal colon/rectum. No air-filled dilated loops of small bowel identified. There are no differential air-fluid levels. No free intraperitoneal air. No abnormal calcifications project over the abdomen. Incompletely visualized left hip prosthesis identified. There are severe hypertrophic degenerative changes with near complete obliteration of the joint space, osteophytosis and subchondral cystic and sclerotic change of the right hip. Visualized osseous structures appear intact, and are otherwise grossly unremarkable, given the non dedicated imaging. 1. No radiographic evidence for acute cardiopulmonary disease process. 2. Nonobstructive bowel gas pattern. 3. No free intraperitoneal air. Xr Chest Lateral    Result Date: 3/10/2018  EXAMINATION: XR CHEST LATERAL LEFT 3/10/2018 9:08 pm COMPARISON: Multiple prior x-rays dating back to 02/20/2015. HISTORY: ORDERING SYSTEM PROVIDED HISTORY: cough TECHNOLOGIST PROVIDED HISTORY: Reason for exam:->cough FINDINGS: Middle lobe opacity is identified on the lateral view the chest with a juxta phrenic peak seen related to the right hemidiaphragm. Increased reticular opacities are identified elsewhere without focal consolidation. Middle lobe opacification is identified, which could represent scarring atelectasis. Correlation with multiple previous evaluations dating back to 2015 suggested there is some chronic appearing process in this area.   A CT of the chest should be considered, perhaps on outpatient basis depending on

## 2018-03-11 NOTE — ED PROVIDER NOTES
[facility] ED  Emergency Department  Emergency Medicine Resident Sign-out     Care of Parisa Webster was assumed from Dr. Yoan Diez and is being seen for Abdominal Pain (c/o abdominal pain x2-3 days. States the pain worsens when he eats.  )  . The patient's initial evaluation and plan have been discussed with the prior provider who initially evaluated the patient.      EMERGENCY DEPARTMENT COURSE / MEDICAL DECISION MAKING:       MEDICATIONS GIVEN:  Orders Placed This Encounter   Medications    famotidine (PEPCID) injection 20 mg    0.9 % sodium chloride bolus    morphine (PF) injection 4 mg    iopamidol (ISOVUE-370) 76 % injection 75 mL    0.9 % sodium chloride bolus    labetalol (NORMODYNE;TRANDATE) injection 10 mg    albuterol (PROVENTIL) nebulizer solution 5 mg    ipratropium-albuterol (DUONEB) nebulizer solution 1 ampule    albuterol (PROVENTIL) nebulizer solution 5 mg    albuterol sulfate  (90 Base) MCG/ACT inhaler 2 puff    AND Linked Order Group     albuterol sulfate  (90 Base) MCG/ACT inhaler 2 puff     ipratropium (ATROVENT HFA) 17 MCG/ACT inhaler 2 puff    ipratropium (ATROVENT) 0.02 % nebulizer solution 0.5 mg       LABS / RADIOLOGY:     Labs Reviewed   COMPREHENSIVE METABOLIC PANEL - Abnormal; Notable for the following:        Result Value    CREATININE 1.37 (*)     GFR Non- 52 (*)     All other components within normal limits   LACTIC ACID, PLASMA - Abnormal; Notable for the following:     Lactic Acid, Whole Blood 4.2 (*)     All other components within normal limits   LACTIC ACID, PLASMA - Abnormal; Notable for the following:     Lactic Acid, Whole Blood 3.0 (*)     All other components within normal limits   CBC - Abnormal; Notable for the following:     RBC 6.21 (*)     Hematocrit 55.5 (*)     RDW 14.6 (*)     NRBC Automated 0.5 (*)     All other components within normal limits   BRAIN NATRIURETIC PEPTIDE - Abnormal; Notable for the following:     Pro- (*)     All other components within normal limits   LIPASE   POCT TROPONIN   POCT TROPONIN   POCT TROPONIN       Xr Acute Abd Series Chest 1 Vw    Result Date: 3/10/2018  EXAMINATION: ACUTE ABDOMINAL SERIES WITH SINGLE  VIEW OF THE CHEST 3/10/2018 2:23 pm COMPARISON: Abdomen radiographs from 06/17/2017. HISTORY: ORDERING SYSTEM PROVIDED HISTORY: epigastric abdomina pain TECHNOLOGIST PROVIDED HISTORY: Reason for exam:->epigastric abdomina pain FINDINGS: There is no focal airspace consolidation, pleural effusion or pneumothorax. The cardiomediastinal silhouette appears within normal limits, given technique and there is no pulmonary vascular congestion. There is a moderate stool burden in the colon. Air and stool is seen to the level of the distal colon/rectum. No air-filled dilated loops of small bowel identified. There are no differential air-fluid levels. No free intraperitoneal air. No abnormal calcifications project over the abdomen. Incompletely visualized left hip prosthesis identified. There are severe hypertrophic degenerative changes with near complete obliteration of the joint space, osteophytosis and subchondral cystic and sclerotic change of the right hip. Visualized osseous structures appear intact, and are otherwise grossly unremarkable, given the non dedicated imaging. 1. No radiographic evidence for acute cardiopulmonary disease process. 2. Nonobstructive bowel gas pattern. 3. No free intraperitoneal air. Xr Chest Lateral    Result Date: 3/10/2018  EXAMINATION: XR CHEST LATERAL LEFT 3/10/2018 9:08 pm COMPARISON: Multiple prior x-rays dating back to 02/20/2015. HISTORY: ORDERING SYSTEM PROVIDED HISTORY: cough TECHNOLOGIST PROVIDED HISTORY: Reason for exam:->cough FINDINGS: Middle lobe opacity is identified on the lateral view the chest with a juxta phrenic peak seen related to the right hemidiaphragm.   Increased reticular opacities are identified elsewhere without focal consolidation. Middle lobe opacification is identified, which could represent scarring atelectasis. Correlation with multiple previous evaluations dating back to 2015 suggested there is some chronic appearing process in this area. A CT of the chest should be considered, perhaps on outpatient basis depending on clinical presentation. Cta Abdomen Pelvis W Contrast    Result Date: 3/10/2018  EXAMINATION: CTA OF THE ABDOMEN AND PELVIS WITH AND WITHOUT CONTRAST 3/10/2018 7:08 pm: TECHNIQUE: CTA of the abdomen and pelvis was performed without and with the administration of intravenous contrast. Multiplanar reformatted images are provided for review. MIP images are provided for review. Dose modulation, iterative reconstruction, and/or weight based adjustment of the mA/kV was utilized to reduce the radiation dose to as low as reasonably achievable. COMPARISON: 06/19/2017 HISTORY: ORDERING SYSTEM PROVIDED HISTORY: abdominal pain, worse every time he eats and associated with diaphoresis. . Concern for meesenteric ischemia FINDINGS: CTA ABDOMEN: The abdominal aorta is normal in caliber with homogeneous enhancement. No evidence of aneurysm or dissection. There is calcific aorto iliac atherosclerotic disease. There is mild calcific plaque at the origins of the celiac and superior mesenteric arteries without evidence of a significant stenosis. A mild stenosis is suspected at the origin of the SMA. There are 2 right renal arteries. The larger more superiorly located renal artery is patent without stenosis. There is a moderate stenosis in the proximal aspect of the smaller more inferiorly located right renal artery. There is a single left renal artery. There is focal calcified plaque at the origin of the left renal artery without evidence of stenosis. The inferior mesenteric artery is patent.  Lung bases are clear and the heart size is normal.  There was reflux of contrast into the inferior vena cava and

## 2018-03-11 NOTE — ED NOTES
Pt back in ED from [address] [facility] X [address] [facility]  60/59/29 2111

## 2018-04-25 ENCOUNTER — APPOINTMENT (OUTPATIENT)
Dept: CT IMAGING | Age: 63
End: 2018-04-25
Payer: MEDICAID

## 2018-04-25 ENCOUNTER — HOSPITAL ENCOUNTER (EMERGENCY)
Age: 63
Discharge: HOME | End: 2018-04-25
Attending: EMERGENCY MEDICINE
Payer: MEDICAID

## 2018-04-25 VITALS
HEIGHT: 75 IN | TEMPERATURE: 97.8 F | RESPIRATION RATE: 16 BRPM | SYSTOLIC BLOOD PRESSURE: 175 MMHG | HEART RATE: 64 BPM | OXYGEN SATURATION: 97 % | BODY MASS INDEX: 19.52 KG/M2 | WEIGHT: 157 LBS | DIASTOLIC BLOOD PRESSURE: 97 MMHG

## 2018-04-25 DIAGNOSIS — R10.9 FLANK PAIN: Primary | ICD-10-CM

## 2018-04-25 LAB
ABSOLUTE EOS #: 0.28 K/UL (ref 0–0.44)
ABSOLUTE IMMATURE GRANULOCYTE: <0.03 K/UL (ref 0–0.3)
ABSOLUTE LYMPH #: 2.61 K/UL (ref 1.1–3.7)
ABSOLUTE MONO #: 0.46 K/UL (ref 0.1–1.2)
ALBUMIN SERPL-MCNC: 3.6 G/DL (ref 3.5–5.2)
ALBUMIN/GLOBULIN RATIO: 1 (ref 1–2.5)
ALP BLD-CCNC: 83 U/L (ref 40–129)
ALT SERPL-CCNC: 9 U/L (ref 5–41)
ANION GAP SERPL CALCULATED.3IONS-SCNC: 14 MMOL/L (ref 9–17)
AST SERPL-CCNC: 16 U/L
BASOPHILS # BLD: 1 % (ref 0–2)
BASOPHILS ABSOLUTE: 0.05 K/UL (ref 0–0.2)
BILIRUB SERPL-MCNC: 0.4 MG/DL (ref 0.3–1.2)
BILIRUBIN URINE: NEGATIVE
BUN BLDV-MCNC: 10 MG/DL (ref 8–23)
BUN/CREAT BLD: ABNORMAL (ref 9–20)
CALCIUM SERPL-MCNC: 8.7 MG/DL (ref 8.6–10.4)
CHLORIDE BLD-SCNC: 110 MMOL/L (ref 98–107)
CO2: 19 MMOL/L (ref 20–31)
COLOR: YELLOW
COMMENT UA: NORMAL
CREAT SERPL-MCNC: 1.09 MG/DL (ref 0.7–1.2)
DIFFERENTIAL TYPE: ABNORMAL
EKG ATRIAL RATE: 61 BPM
EKG P AXIS: 53 DEGREES
EKG P-R INTERVAL: 132 MS
EKG Q-T INTERVAL: 428 MS
EKG QRS DURATION: 88 MS
EKG QTC CALCULATION (BAZETT): 430 MS
EKG R AXIS: 34 DEGREES
EKG T AXIS: 38 DEGREES
EKG VENTRICULAR RATE: 61 BPM
EOSINOPHILS RELATIVE PERCENT: 5 % (ref 1–4)
GFR AFRICAN AMERICAN: >60 ML/MIN
GFR NON-AFRICAN AMERICAN: >60 ML/MIN
GFR SERPL CREATININE-BSD FRML MDRD: ABNORMAL ML/MIN/{1.73_M2}
GFR SERPL CREATININE-BSD FRML MDRD: ABNORMAL ML/MIN/{1.73_M2}
GLUCOSE BLD-MCNC: 78 MG/DL (ref 70–99)
GLUCOSE URINE: NEGATIVE
HCT VFR BLD CALC: 49.3 % (ref 40.7–50.3)
HEMOGLOBIN: 15.5 G/DL (ref 13–17)
IMMATURE GRANULOCYTES: 0 %
KETONES, URINE: NEGATIVE
LEUKOCYTE ESTERASE, URINE: NEGATIVE
LIPASE: 28 U/L (ref 13–60)
LYMPHOCYTES # BLD: 44 % (ref 24–43)
MCH RBC QN AUTO: 26.4 PG (ref 25.2–33.5)
MCHC RBC AUTO-ENTMCNC: 31.4 G/DL (ref 28.4–34.8)
MCV RBC AUTO: 83.8 FL (ref 82.6–102.9)
MONOCYTES # BLD: 8 % (ref 3–12)
NITRITE, URINE: NEGATIVE
NRBC AUTOMATED: 0 PER 100 WBC
PDW BLD-RTO: 14.1 % (ref 11.8–14.4)
PH UA: 6.5 (ref 5–8)
PLATELET # BLD: 231 K/UL (ref 138–453)
PLATELET ESTIMATE: ABNORMAL
PMV BLD AUTO: 10.7 FL (ref 8.1–13.5)
POC TROPONIN I: 0.02 NG/ML (ref 0–0.1)
POC TROPONIN INTERP: NORMAL
POTASSIUM SERPL-SCNC: 4.5 MMOL/L (ref 3.7–5.3)
PROTEIN UA: NEGATIVE
RBC # BLD: 5.88 M/UL (ref 4.21–5.77)
RBC # BLD: ABNORMAL 10*6/UL
SEG NEUTROPHILS: 42 % (ref 36–65)
SEGMENTED NEUTROPHILS ABSOLUTE COUNT: 2.47 K/UL (ref 1.5–8.1)
SODIUM BLD-SCNC: 143 MMOL/L (ref 135–144)
SPECIFIC GRAVITY UA: 1.01 (ref 1–1.03)
TOTAL PROTEIN: 7.2 G/DL (ref 6.4–8.3)
TURBIDITY: CLEAR
URINE HGB: NEGATIVE
UROBILINOGEN, URINE: NORMAL
WBC # BLD: 5.9 K/UL (ref 3.5–11.3)
WBC # BLD: ABNORMAL 10*3/UL

## 2018-04-25 PROCEDURE — 80053 COMPREHEN METABOLIC PANEL: CPT

## 2018-04-25 PROCEDURE — 99285 EMERGENCY DEPT VISIT HI MDM: CPT

## 2018-04-25 PROCEDURE — 83690 ASSAY OF LIPASE: CPT

## 2018-04-25 PROCEDURE — 84484 ASSAY OF TROPONIN QUANT: CPT

## 2018-04-25 PROCEDURE — 93005 ELECTROCARDIOGRAM TRACING: CPT

## 2018-04-25 PROCEDURE — 85025 COMPLETE CBC W/AUTO DIFF WBC: CPT

## 2018-04-25 PROCEDURE — 81003 URINALYSIS AUTO W/O SCOPE: CPT

## 2018-04-25 PROCEDURE — 74176 CT ABD & PELVIS W/O CONTRAST: CPT

## 2018-04-25 RX ORDER — IBUPROFEN 800 MG/1
800 TABLET ORAL EVERY 8 HOURS PRN
Qty: 30 TABLET | Refills: 0 | Status: SHIPPED | OUTPATIENT
Start: 2018-04-25 | End: 2022-08-04

## 2018-04-25 RX ORDER — CYCLOBENZAPRINE HCL 5 MG
5 TABLET ORAL 3 TIMES DAILY PRN
Qty: 10 TABLET | Refills: 0 | Status: SHIPPED | OUTPATIENT
Start: 2018-04-25 | End: 2018-05-05

## 2018-04-25 RX ORDER — ONDANSETRON 2 MG/ML
4 INJECTION INTRAMUSCULAR; INTRAVENOUS ONCE
Status: DISCONTINUED | OUTPATIENT
Start: 2018-04-25 | End: 2018-04-25 | Stop reason: HOSPADM

## 2018-04-25 RX ORDER — MORPHINE SULFATE 4 MG/ML
4 INJECTION, SOLUTION INTRAMUSCULAR; INTRAVENOUS ONCE
Status: DISCONTINUED | OUTPATIENT
Start: 2018-04-25 | End: 2018-04-25 | Stop reason: HOSPADM

## 2018-04-25 ASSESSMENT — ENCOUNTER SYMPTOMS
EYE PAIN: 0
DIARRHEA: 0
ABDOMINAL PAIN: 0
VOMITING: 0
BACK PAIN: 0
SHORTNESS OF BREATH: 1
EYE DISCHARGE: 0
SORE THROAT: 0
NAUSEA: 0
COUGH: 0
COLOR CHANGE: 0

## 2018-04-25 ASSESSMENT — PAIN SCALES - GENERAL: PAINLEVEL_OUTOF10: 3

## 2018-04-25 ASSESSMENT — PAIN DESCRIPTION - LOCATION: LOCATION: FLANK

## 2018-04-25 ASSESSMENT — PAIN DESCRIPTION - ORIENTATION: ORIENTATION: LEFT

## 2018-11-29 ENCOUNTER — HOSPITAL ENCOUNTER (EMERGENCY)
Age: 63
Discharge: HOME | End: 2018-11-29
Attending: EMERGENCY MEDICINE
Payer: MEDICARE

## 2018-11-29 VITALS
OXYGEN SATURATION: 96 % | HEIGHT: 75 IN | SYSTOLIC BLOOD PRESSURE: 172 MMHG | BODY MASS INDEX: 19.65 KG/M2 | HEART RATE: 73 BPM | TEMPERATURE: 97.9 F | DIASTOLIC BLOOD PRESSURE: 96 MMHG | RESPIRATION RATE: 16 BRPM | WEIGHT: 158 LBS

## 2018-11-29 DIAGNOSIS — H57.11 PAIN AROUND RIGHT EYE: Primary | ICD-10-CM

## 2018-11-29 PROCEDURE — 6370000000 HC RX 637 (ALT 250 FOR IP): Performed by: EMERGENCY MEDICINE

## 2018-11-29 PROCEDURE — 99282 EMERGENCY DEPT VISIT SF MDM: CPT

## 2018-11-29 RX ORDER — ERYTHROMYCIN 5 MG/G
OINTMENT OPHTHALMIC EVERY 6 HOURS SCHEDULED
Status: DISCONTINUED | OUTPATIENT
Start: 2018-11-29 | End: 2018-11-29 | Stop reason: HOSPADM

## 2018-11-29 RX ADMIN — ERYTHROMYCIN: 5 OINTMENT OPHTHALMIC at 05:33

## 2018-11-29 ASSESSMENT — ENCOUNTER SYMPTOMS
NAUSEA: 0
TROUBLE SWALLOWING: 0
WHEEZING: 0
SORE THROAT: 0
BLOOD IN STOOL: 0
VOMITING: 0
PHOTOPHOBIA: 0
COLOR CHANGE: 0
BACK PAIN: 0
CHEST TIGHTNESS: 0
FACIAL SWELLING: 0
DIARRHEA: 0
EYE REDNESS: 0
CONSTIPATION: 0
SINUS PRESSURE: 0
SHORTNESS OF BREATH: 0
COUGH: 0
EYE PAIN: 1
EYE DISCHARGE: 0
RHINORRHEA: 0
ABDOMINAL PAIN: 0

## 2018-11-29 NOTE — ED PROVIDER NOTES
confusion, decreased concentration, hallucinations, self-injury, sleep disturbance and suicidal ideas. PAST MEDICAL HISTORY     Past Medical History:   Diagnosis Date    Alcohol abuse 6/23/2015    Arthritis     Chronic kidney disease     Full dentures     upper and lower full    Hypertension     Pure hypercholesterolemia 66/3/9855    Systolic CHF ([address]     Wears glasses        SURGICAL HISTORY       Past Surgical History:   Procedure Laterality Date    APPENDECTOMY      HIP FRACTURE SURGERY Left     W/ HARDWARE    [place] EGD TRANSORAL BIOPSY SINGLE/MULTIPLE  6/19/2017    EGD BIOPSY performed by Ebony López DO at [address] [place] [address]       Current Discharge Medication List      CONTINUE these medications which have NOT CHANGED    Details   ibuprofen (ADVIL;MOTRIN) 800 MG tablet Take 1 tablet by mouth every 8 hours as needed for Pain  Qty: 30 tablet, Refills: 0      lisinopril (PRINIVIL;ZESTRIL) 10 MG tablet Take 1 tablet by mouth daily  Qty: 30 tablet, Refills: 3      pravastatin (PRAVACHOL) 20 MG tablet Take 1 tablet by mouth every evening  Qty: 30 tablet, Refills: 3      amLODIPine (NORVASC) 5 MG tablet Take 1 tablet by mouth daily  Qty: 30 tablet, Refills: 3      aspirin 81 MG EC tablet Take 1 tablet by mouth daily  Qty: 30 tablet, Refills: 3      tamsulosin (FLOMAX) 0.4 MG capsule Take 1 capsule by mouth daily  Qty: 30 capsule, Refills: 3      acetaminophen (TYLENOL 8 HOUR) 650 MG extended release tablet Take 1 tablet by mouth every 8 hours as needed for Pain  Qty: 60 tablet, Refills: 2    Associated Diagnoses: Arthritis of right hip             ALLERGIES     has No Known Allergies. SOCIAL HISTORY      reports that he has been smoking Cigarettes. He has been smoking about 0.00 packs per day. He does not have any smokeless tobacco history on file. He reports that he drinks alcohol. He reports that he uses drugs, including Cocaine and Marijuana.     PHYSICAL EXAM     INITIAL

## 2018-11-30 ENCOUNTER — CARE COORDINATION (OUTPATIENT)
Dept: CARE COORDINATION | Age: 63
End: 2018-11-30

## 2022-03-09 ENCOUNTER — HOSPITAL ENCOUNTER (INPATIENT)
Age: 67
LOS: 2 days | Discharge: NURSING, REHAB OR LONG-TERM CARE | DRG: 303 | End: 2022-03-11
Attending: EMERGENCY MEDICINE | Admitting: INTERNAL MEDICINE
Payer: MEDICARE

## 2022-03-09 ENCOUNTER — APPOINTMENT (OUTPATIENT)
Dept: GENERAL RADIOLOGY | Age: 67
DRG: 303 | End: 2022-03-09
Payer: MEDICARE

## 2022-03-09 DIAGNOSIS — R07.9 CHEST PAIN, UNSPECIFIED TYPE: Primary | ICD-10-CM

## 2022-03-09 PROBLEM — I50.42 CHRONIC COMBINED SYSTOLIC AND DIASTOLIC CONGESTIVE HEART FAILURE (HCC): Status: ACTIVE | Noted: 2022-03-09

## 2022-03-09 LAB
ABSOLUTE EOS #: 0.2 K/UL (ref 0–0.4)
ABSOLUTE LYMPH #: 1.9 K/UL (ref 1–4.8)
ABSOLUTE MONO #: 0.5 K/UL (ref 0.1–1.3)
ALBUMIN SERPL-MCNC: 3.8 G/DL (ref 3.5–5.2)
ALP BLD-CCNC: 98 U/L (ref 40–129)
ALT SERPL-CCNC: 10 U/L (ref 5–41)
AMPHETAMINE SCREEN URINE: NEGATIVE
ANION GAP SERPL CALCULATED.3IONS-SCNC: 12 MMOL/L (ref 9–17)
AST SERPL-CCNC: 18 U/L
BARBITURATE SCREEN URINE: NEGATIVE
BASOPHILS # BLD: 1 % (ref 0–2)
BASOPHILS ABSOLUTE: 0.1 K/UL (ref 0–0.2)
BENZODIAZEPINE SCREEN, URINE: NEGATIVE
BILIRUB SERPL-MCNC: 0.59 MG/DL (ref 0.3–1.2)
BUN BLDV-MCNC: 19 MG/DL (ref 8–23)
CALCIUM SERPL-MCNC: 9.1 MG/DL (ref 8.6–10.4)
CANNABINOID SCREEN URINE: NEGATIVE
CHLORIDE BLD-SCNC: 105 MMOL/L (ref 98–107)
CO2: 23 MMOL/L (ref 20–31)
COCAINE METABOLITE, URINE: POSITIVE
CREAT SERPL-MCNC: 1.03 MG/DL (ref 0.7–1.2)
EOSINOPHILS RELATIVE PERCENT: 3 % (ref 0–4)
GFR AFRICAN AMERICAN: >60 ML/MIN
GFR NON-AFRICAN AMERICAN: >60 ML/MIN
GFR SERPL CREATININE-BSD FRML MDRD: ABNORMAL ML/MIN/{1.73_M2}
GLUCOSE BLD-MCNC: 135 MG/DL (ref 75–110)
GLUCOSE BLD-MCNC: 58 MG/DL (ref 70–99)
HCT VFR BLD CALC: 39.9 % (ref 41–53)
HEMOGLOBIN: 12.8 G/DL (ref 13.5–17.5)
INFLUENZA A: NOT DETECTED
INFLUENZA B: NOT DETECTED
LIPASE: 25 U/L (ref 13–60)
LYMPHOCYTES # BLD: 28 % (ref 24–44)
MAGNESIUM: 1.9 MG/DL (ref 1.6–2.6)
MCH RBC QN AUTO: 26.8 PG (ref 26–34)
MCHC RBC AUTO-ENTMCNC: 31.9 G/DL (ref 31–37)
MCV RBC AUTO: 83.9 FL (ref 80–100)
METHADONE SCREEN, URINE: NEGATIVE
MONOCYTES # BLD: 7 % (ref 1–7)
OPIATES, URINE: NEGATIVE
OXYCODONE SCREEN URINE: NEGATIVE
PDW BLD-RTO: 15.6 % (ref 11.5–14.9)
PHENCYCLIDINE, URINE: NEGATIVE
PLATELET # BLD: 301 K/UL (ref 150–450)
PMV BLD AUTO: 8.1 FL (ref 6–12)
POTASSIUM SERPL-SCNC: 3.8 MMOL/L (ref 3.7–5.3)
RBC # BLD: 4.76 M/UL (ref 4.5–5.9)
REASON FOR REJECTION: NORMAL
SARS-COV-2 RNA, RT PCR: NOT DETECTED
SEG NEUTROPHILS: 61 % (ref 36–66)
SEGMENTED NEUTROPHILS ABSOLUTE COUNT: 4.4 K/UL (ref 1.3–9.1)
SODIUM BLD-SCNC: 140 MMOL/L (ref 135–144)
SOURCE: NORMAL
SPECIMEN DESCRIPTION: NORMAL
TEST INFORMATION: ABNORMAL
TOTAL PROTEIN: 7.1 G/DL (ref 6.4–8.3)
TROPONIN, HIGH SENSITIVITY: 20 NG/L (ref 0–22)
TROPONIN, HIGH SENSITIVITY: 20 NG/L (ref 0–22)
WBC # BLD: 7 K/UL (ref 3.5–11)
ZZ NTE CLEAN UP: ORDERED TEST: NORMAL
ZZ NTE WITH NAME CLEAN UP: SPECIMEN SOURCE: NORMAL

## 2022-03-09 PROCEDURE — 6370000000 HC RX 637 (ALT 250 FOR IP): Performed by: STUDENT IN AN ORGANIZED HEALTH CARE EDUCATION/TRAINING PROGRAM

## 2022-03-09 PROCEDURE — 82947 ASSAY GLUCOSE BLOOD QUANT: CPT

## 2022-03-09 PROCEDURE — 6360000002 HC RX W HCPCS: Performed by: STUDENT IN AN ORGANIZED HEALTH CARE EDUCATION/TRAINING PROGRAM

## 2022-03-09 PROCEDURE — 99285 EMERGENCY DEPT VISIT HI MDM: CPT

## 2022-03-09 PROCEDURE — 80307 DRUG TEST PRSMV CHEM ANLYZR: CPT

## 2022-03-09 PROCEDURE — 93005 ELECTROCARDIOGRAM TRACING: CPT | Performed by: EMERGENCY MEDICINE

## 2022-03-09 PROCEDURE — 85025 COMPLETE CBC W/AUTO DIFF WBC: CPT

## 2022-03-09 PROCEDURE — 2580000003 HC RX 258: Performed by: STUDENT IN AN ORGANIZED HEALTH CARE EDUCATION/TRAINING PROGRAM

## 2022-03-09 PROCEDURE — 36415 COLL VENOUS BLD VENIPUNCTURE: CPT

## 2022-03-09 PROCEDURE — 83735 ASSAY OF MAGNESIUM: CPT

## 2022-03-09 PROCEDURE — 84484 ASSAY OF TROPONIN QUANT: CPT

## 2022-03-09 PROCEDURE — 80053 COMPREHEN METABOLIC PANEL: CPT

## 2022-03-09 PROCEDURE — 87636 SARSCOV2 & INF A&B AMP PRB: CPT

## 2022-03-09 PROCEDURE — 2060000000 HC ICU INTERMEDIATE R&B

## 2022-03-09 PROCEDURE — 71045 X-RAY EXAM CHEST 1 VIEW: CPT

## 2022-03-09 PROCEDURE — 83690 ASSAY OF LIPASE: CPT

## 2022-03-09 RX ORDER — ACETAMINOPHEN 650 MG/1
650 SUPPOSITORY RECTAL EVERY 6 HOURS PRN
Status: DISCONTINUED | OUTPATIENT
Start: 2022-03-09 | End: 2022-03-11 | Stop reason: HOSPADM

## 2022-03-09 RX ORDER — AMLODIPINE BESYLATE 5 MG/1
5 TABLET ORAL DAILY
Status: DISCONTINUED | OUTPATIENT
Start: 2022-03-09 | End: 2022-03-10

## 2022-03-09 RX ORDER — TAMSULOSIN HYDROCHLORIDE 0.4 MG/1
0.4 CAPSULE ORAL DAILY
Status: DISCONTINUED | OUTPATIENT
Start: 2022-03-09 | End: 2022-03-11 | Stop reason: HOSPADM

## 2022-03-09 RX ORDER — ONDANSETRON 2 MG/ML
4 INJECTION INTRAMUSCULAR; INTRAVENOUS EVERY 6 HOURS PRN
Status: DISCONTINUED | OUTPATIENT
Start: 2022-03-09 | End: 2022-03-11 | Stop reason: HOSPADM

## 2022-03-09 RX ORDER — ONDANSETRON 4 MG/1
4 TABLET, ORALLY DISINTEGRATING ORAL EVERY 8 HOURS PRN
Status: DISCONTINUED | OUTPATIENT
Start: 2022-03-09 | End: 2022-03-11 | Stop reason: HOSPADM

## 2022-03-09 RX ORDER — NITROGLYCERIN 0.4 MG/1
0.4 TABLET SUBLINGUAL EVERY 5 MIN PRN
Status: DISCONTINUED | OUTPATIENT
Start: 2022-03-09 | End: 2022-03-11 | Stop reason: HOSPADM

## 2022-03-09 RX ORDER — HYDRALAZINE HYDROCHLORIDE 10 MG/1
10 TABLET, FILM COATED ORAL EVERY 8 HOURS PRN
Status: DISCONTINUED | OUTPATIENT
Start: 2022-03-09 | End: 2022-03-11 | Stop reason: HOSPADM

## 2022-03-09 RX ORDER — SODIUM CHLORIDE 0.9 % (FLUSH) 0.9 %
5-40 SYRINGE (ML) INJECTION EVERY 12 HOURS SCHEDULED
Status: DISCONTINUED | OUTPATIENT
Start: 2022-03-09 | End: 2022-03-11 | Stop reason: HOSPADM

## 2022-03-09 RX ORDER — POTASSIUM CHLORIDE 7.45 MG/ML
10 INJECTION INTRAVENOUS PRN
Status: DISCONTINUED | OUTPATIENT
Start: 2022-03-09 | End: 2022-03-11 | Stop reason: HOSPADM

## 2022-03-09 RX ORDER — SODIUM CHLORIDE 9 MG/ML
25 INJECTION, SOLUTION INTRAVENOUS PRN
Status: DISCONTINUED | OUTPATIENT
Start: 2022-03-09 | End: 2022-03-11 | Stop reason: HOSPADM

## 2022-03-09 RX ORDER — ASPIRIN 81 MG/1
81 TABLET ORAL DAILY
Status: DISCONTINUED | OUTPATIENT
Start: 2022-03-09 | End: 2022-03-09

## 2022-03-09 RX ORDER — METOPROLOL TARTRATE 5 MG/5ML
5 INJECTION INTRAVENOUS EVERY 6 HOURS PRN
Status: DISCONTINUED | OUTPATIENT
Start: 2022-03-09 | End: 2022-03-09

## 2022-03-09 RX ORDER — ACETAMINOPHEN 325 MG/1
650 TABLET ORAL EVERY 6 HOURS PRN
Status: DISCONTINUED | OUTPATIENT
Start: 2022-03-09 | End: 2022-03-11 | Stop reason: HOSPADM

## 2022-03-09 RX ORDER — LISINOPRIL 10 MG/1
10 TABLET ORAL DAILY
Status: DISCONTINUED | OUTPATIENT
Start: 2022-03-09 | End: 2022-03-11 | Stop reason: HOSPADM

## 2022-03-09 RX ORDER — PRAVASTATIN SODIUM 20 MG
20 TABLET ORAL EVERY EVENING
Status: CANCELLED | OUTPATIENT
Start: 2022-03-09

## 2022-03-09 RX ORDER — ASPIRIN 81 MG/1
81 TABLET ORAL DAILY
Status: DISCONTINUED | OUTPATIENT
Start: 2022-03-10 | End: 2022-03-11 | Stop reason: HOSPADM

## 2022-03-09 RX ORDER — ATORVASTATIN CALCIUM 40 MG/1
40 TABLET, FILM COATED ORAL NIGHTLY
Status: DISCONTINUED | OUTPATIENT
Start: 2022-03-09 | End: 2022-03-11 | Stop reason: HOSPADM

## 2022-03-09 RX ORDER — SODIUM CHLORIDE 0.9 % (FLUSH) 0.9 %
5-40 SYRINGE (ML) INJECTION PRN
Status: DISCONTINUED | OUTPATIENT
Start: 2022-03-09 | End: 2022-03-11 | Stop reason: HOSPADM

## 2022-03-09 RX ORDER — POLYETHYLENE GLYCOL 3350 17 G/17G
17 POWDER, FOR SOLUTION ORAL DAILY PRN
Status: DISCONTINUED | OUTPATIENT
Start: 2022-03-09 | End: 2022-03-11 | Stop reason: HOSPADM

## 2022-03-09 RX ORDER — POTASSIUM CHLORIDE 20 MEQ/1
40 TABLET, EXTENDED RELEASE ORAL PRN
Status: DISCONTINUED | OUTPATIENT
Start: 2022-03-09 | End: 2022-03-11 | Stop reason: HOSPADM

## 2022-03-09 RX ORDER — NITROGLYCERIN 0.3 MG/1
0.3 TABLET SUBLINGUAL EVERY 5 MIN PRN
COMMUNITY

## 2022-03-09 RX ADMIN — TAMSULOSIN HYDROCHLORIDE 0.4 MG: 0.4 CAPSULE ORAL at 17:17

## 2022-03-09 RX ADMIN — ENOXAPARIN SODIUM 40 MG: 100 INJECTION SUBCUTANEOUS at 17:17

## 2022-03-09 RX ADMIN — LISINOPRIL 10 MG: 10 TABLET ORAL at 17:17

## 2022-03-09 RX ADMIN — ATORVASTATIN CALCIUM 40 MG: 40 TABLET, FILM COATED ORAL at 20:53

## 2022-03-09 RX ADMIN — AMLODIPINE BESYLATE 5 MG: 5 TABLET ORAL at 17:17

## 2022-03-09 RX ADMIN — SODIUM CHLORIDE, PRESERVATIVE FREE 10 ML: 5 INJECTION INTRAVENOUS at 20:52

## 2022-03-09 ASSESSMENT — ENCOUNTER SYMPTOMS
BACK PAIN: 0
RESPIRATORY NEGATIVE: 1
DIARRHEA: 0
COUGH: 0
VOMITING: 0
NAUSEA: 0

## 2022-03-09 ASSESSMENT — PAIN SCALES - GENERAL
PAINLEVEL_OUTOF10: 1
PAINLEVEL_OUTOF10: 0
PAINLEVEL_OUTOF10: 0

## 2022-03-09 ASSESSMENT — PAIN DESCRIPTION - LOCATION: LOCATION: CHEST

## 2022-03-09 NOTE — PROGRESS NOTES
Medication History completed:    New medications: nitroGLYCERIN 0.3 MG SL     Medications discontinued: None    Other pertinent information:  medications were confirmed with patient. Thank you,   Sharron Lindo, PharmD candidate 2022.

## 2022-03-09 NOTE — ED PROVIDER NOTES
Daryl    Pt Name: Joel Michael  MRN: [id number]  [place] 1955  Date of evaluation: 3/9/22  CHIEF COMPLAINT       Chief Complaint   Patient presents with    Chest Pain     PT GIVEN  MG PER LS 2 / PT TOOK 1 NTG AT HOME - pt did cocaine yesterday , ETOH/ smokes cigarettes     HISTORY OF PRESENT ILLNESS     Chest Pain  Pain location:  Substernal area  Pain quality: aching and pressure    Pain radiates to:  Does not radiate  Pain severity:  Moderate  Onset quality:  Gradual  Duration: this morning, took nitro, resolved. Progression:  Resolved  Chronicity:  New  Context comment:  Admits to cocaine and alcohol use  Relieved by:  Nitroglycerin  Worsened by:  Nothing  Associated symptoms: no back pain, no cough, no nausea and no vomiting            REVIEW OF SYSTEMS     Review of Systems   Respiratory: Negative for cough. Cardiovascular: Positive for chest pain. Gastrointestinal: Negative for nausea and vomiting. Musculoskeletal: Negative for back pain. All other systems reviewed and are negative.     PASTMEDICAL HISTORY     Past Medical History:   Diagnosis Date    Alcohol abuse 6/23/2015    Arthritis     Chronic kidney disease     Full dentures     upper and lower full    Hypertension     Pure hypercholesterolemia 87/0/9202    Systolic CHF ([address]     Wears glasses      Past Problem List  Patient Active Problem List   Diagnosis Code    CKD (chronic kidney disease) stage 3, GFR 30-59 ml/min (HCC) N18.30    Essential hypertension I10    Arthritis of right hip M16.11    Chronic systolic congestive heart failure (HCC) I50.22    Benign prostatic hyperplasia with urinary obstruction N40.1, N13.8    Pure hypercholesterolemia E78.00    Abdominal pain R10.9    Chest pain R07.9     SURGICAL HISTORY       Past Surgical History:   Procedure Laterality Date    APPENDECTOMY      HIP FRACTURE SURGERY Left     W/ HARDWARE    [place] EGD TRANSORAL BIOPSY SINGLE/MULTIPLE External ear normal.      Nose: Nose normal. No congestion. Mouth/Throat:      Mouth: Mucous membranes are moist.      Pharynx: Oropharynx is clear. Eyes:      General:         Right eye: No discharge. Left eye: No discharge. Conjunctiva/sclera: Conjunctivae normal.      Pupils: Pupils are equal, round, and reactive to light. Neck:      Trachea: No tracheal deviation. Cardiovascular:      Rate and Rhythm: Normal rate and regular rhythm. Pulses: Normal pulses. Heart sounds: Normal heart sounds. Pulmonary:      Effort: Pulmonary effort is normal. No respiratory distress. Breath sounds: Normal breath sounds. No stridor. No wheezing or rales. Abdominal:      Palpations: Abdomen is soft. Tenderness: There is no abdominal tenderness. There is no guarding or rebound. Musculoskeletal:         General: No tenderness or deformity. Normal range of motion. Cervical back: Normal range of motion and neck supple. Skin:     General: Skin is warm and dry. Capillary Refill: Capillary refill takes less than 2 seconds. Findings: No erythema or rash. Neurological:      General: No focal deficit present. Mental Status: He is alert and oriented to person, place, and time. Coordination: Coordination normal.   Psychiatric:         Mood and Affect: Mood normal.         Behavior: Behavior normal.         Thought Content:  Thought content normal.         Judgment: Judgment normal.         MEDICAL DECISION MAKING:       ED Course as of 03/09/22 1504   Wed Mar 09, 2022   [address]  res[address] for admit to pcu [WM]   [address] Rice Prader for admit [WM]      ED Course User Index  [WM] Creasie Homans, MD     Do not suspect aortic dissection or pe  Procedures    DIAGNOSTIC RESULTS   EKG:All EKG's are interpreted by the Emergency Department Physician who either signs or Co-signs this chart in the absence of a cardiologist.  NSR, nonspecific changes, new t wave inversions lateral leads, no st elevations, normal rate and normal intervals        RADIOLOGY:All plain film, CT, MRI, and formal ultrasound images (except ED bedside ultrasound) are read by the radiologist, see reports below, unless otherwisenoted in MDM or here. XR CHEST PORTABLE   Final Result   No acute cardiopulmonary findings           LABS: All lab results were reviewed by myself, and all abnormals are listed below. Labs Reviewed   CBC WITH AUTO DIFFERENTIAL - Abnormal; Notable for the following components:       Result Value    Hemoglobin 12.8 (*)     Hematocrit 39.9 (*)     RDW 15.6 (*)     All other components within normal limits   COMPREHENSIVE METABOLIC PANEL - Abnormal; Notable for the following components:    Glucose 58 (*)     All other components within normal limits   COVID-19 & INFLUENZA COMBO   SPECIMEN REJECTION   MAGNESIUM   TROPONIN   TROPONIN       EMERGENCY DEPARTMENTCOURSE:         Vitals:    Vitals:    03/09/22 1312 03/09/22 1332 03/09/22 1345 03/09/22 1402   BP: (!) 164/96 (!) 169/98 (!) 169/100 (!) 173/94   Pulse: 78 78 76 79   Resp: 18 18 19 20   Temp:       SpO2: 95%  95%    Weight:           The patient was given the following medications while in the emergency department:  No orders of the defined types were placed in this encounter. CONSULTS:  IP CONSULT TO INTERNAL MEDICINE    FINAL IMPRESSION      1. Chest pain, unspecified type          DISPOSITION/PLAN   DISPOSITION Admitted 03/09/2022 02:06:09 PM      PATIENT REFERRED TO:  No follow-up provider specified. DISCHARGE MEDICATIONS:  New Prescriptions    No medications on file     The care is provided during an unprecedented national emergency due to the novel coronavirus, COVID 19.   MD Danilo Montenegro MD  03/09/22 [id number]

## 2022-03-09 NOTE — H&P
[address] [facility] [address]      [address] [facility]     HISTORY AND PHYSICAL EXAMINATION            Date:   3/10/2022  Patient name:  Ness Woody  Date of admission:  3/9/2022 11:03 AM  MRN:   [id number]  Account:  [de-identified]  YOB: 1955  PCP:    Sylvia Sun MD  Room:   [address] [place] [address]  Code Status:    Full Code    Chief Complaint:     Chief Complaint   Patient presents with    Chest Pain     PT GIVEN  MG PER LS 2 / PT TOOK 1 NTG AT HOME - pt did cocaine yesterday , ETOH/ smokes cigarettes       History Obtained From:     patient    History of Present Illness: The patient is a 74 y.o. Non- / non  male who presents withChest Pain (PT GIVEN  MG PER LS 2 / PT TOOK 1 NTG AT HOME - pt did cocaine yesterday , ETOH/ smokes cigarettes)   and he is admitted to the hospital for the management of chest pain. Has prior history of CAD, HFrEF45%, CVA, hypertension, cocaine use. Current episode of chest pain started last night. Chest pain:  Started: Last night  Location: Substernal  Frequency: Constant  Quality: Dull  Intensity: moderate  Radiation: none  Aggravated by: none  Relieved by: nitroglycerin    Recent trauma: none  Shortness of breath: no  Cough: no  Fever/chills: no    History of similar complaint in the past: Yes. Multiple admissions with CP. Hx of ischemic cardiomyopathy, severe triple vessel coronary calcification.          Past Medical History:     Past Medical History:   Diagnosis Date    Alcohol abuse 6/23/2015    Arthritis     Chronic kidney disease     Full dentures     upper and lower full    Hypertension     Pure hypercholesterolemia 72/8/4896    Systolic CHF ([address]     Wears glasses         Past SurgicalHistory:     Past Surgical History:   Procedure Laterality Date    APPENDECTOMY      HIP FRACTURE SURGERY Left     W/ HARDWARE    NE EGD TRANSORAL BIOPSY SINGLE/MULTIPLE  6/19/2017    EGD BIOPSY performed by Blane Cruz DO at [facility]        Medications Prior to Admission:        Prior to Admission medications    Medication Sig Start Date End Date Taking? Authorizing Provider   nitroGLYCERIN (NITROSTAT) 0.3 MG SL tablet Place 0.3 mg under the tongue every 5 minutes as needed for Chest pain up to max of 3 total doses. If no relief after 1 dose, call 911. Yes Historical Provider, MD   ibuprofen (ADVIL;MOTRIN) 800 MG tablet Take 1 tablet by mouth every 8 hours as needed for Pain 4/25/18  Yes Familia Coombs MD   lisinopril (PRINIVIL;ZESTRIL) 10 MG tablet Take 1 tablet by mouth daily 3/12/18  Yes Marisol Short MD   pravastatin (PRAVACHOL) 20 MG tablet Take 1 tablet by mouth every evening 3/11/18  Yes Marisol Short MD   [facility]) 5 MG tablet Take 1 tablet by mouth daily 3/12/18  Yes Marisol Short MD   aspirin 81 MG EC tablet Take 1 tablet by mouth daily 3/12/18  Yes Marisol Short MD   tamsulosin [facility]) 0.4 MG capsule Take 1 capsule by mouth daily 3/12/18  Yes Marisol Short MD   acetaminophen (TYLENOL 8 HOUR) 650 MG extended release tablet Take 1 tablet by mouth every 8 hours as needed for Pain 9/20/17  Yes Joey Perdomo MD        Allergies:     Patient has no known allergies. Social History:     Tobacco:    reports that he has been smoking cigarettes. He has been smoking about 0.00 packs per day. He has never used smokeless tobacco.  Alcohol:      reports current alcohol use. Drug Use:  reports current drug use. Drugs: Cocaine and Marijuana ([place]). Family History:     Family History   Problem Relation Age of Onset    Diabetes Mother     Cancer Mother        Review of Systems:     Positive and Negative as described in HPI. Review of Systems   Constitutional: Negative. Respiratory: Negative. Cardiovascular: Positive for chest pain. Negative for palpitations and leg swelling. Gastrointestinal: Negative for diarrhea, nausea and vomiting. Genitourinary: Negative. Skin: Negative. Neurological: Negative. Psychiatric/Behavioral: Negative. Physical Exam:   BP (!) 93/48   Pulse 70   Temp 97.5 °F (36.4 °C) (Oral)   Resp 18   Ht 6' 3\" (1.905 m)   Wt 140 lb 3.4 oz (63.6 kg)   SpO2 96%   BMI 17.53 kg/m²   Temp (24hrs), Av.9 °F (36.6 °C), Min:97.3 °F (36.3 °C), Max:98.4 °F (36.9 °C)    Recent Labs     22  1547   POCGLU 135*       Intake/Output Summary (Last 24 hours) at 3/10/2022 1407  Last data filed at 3/10/2022 0720  Gross per 24 hour   Intake --   Output 525 ml   Net -525 ml       Physical Exam  Vitals and nursing note reviewed. Constitutional:       General: He is not in acute distress. Cardiovascular:      Rate and Rhythm: Normal rate and regular rhythm. Heart sounds: Normal heart sounds. No murmur heard. No gallop. Pulmonary:      Effort: No respiratory distress. Breath sounds: No wheezing or rales. Abdominal:      General: There is no distension. Tenderness: There is no abdominal tenderness. Skin:     Capillary Refill: Capillary refill takes less than 2 seconds. Neurological:      Mental Status: He is alert and oriented to person, place, and time.    Psychiatric:         Mood and Affect: Mood normal.         Investigations:     Laboratory Testing:  Recent Results (from the past 24 hour(s))   Comprehensive Metabolic Panel    Collection Time: 22  2:18 PM   Result Value Ref Range    Glucose 58 (L) 70 - 99 mg/dL    BUN 19 8 - 23 mg/dL    CREATININE 1.03 0.70 - 1.20 mg/dL    Calcium 9.1 8.6 - 10.4 mg/dL    Sodium 140 135 - 144 mmol/L    Potassium 3.8 3.7 - 5.3 mmol/L    Chloride 105 98 - 107 mmol/L    CO2 23 20 - 31 mmol/L    Anion Gap 12 9 - 17 mmol/L    Alkaline Phosphatase 98 40 - 129 U/L    ALT 10 5 - 41 U/L    AST 18 <40 U/L    Total Bilirubin 0.59 0.3 - 1.2 mg/dL    Total Protein 7.1 6.4 - 8.3 g/dL    Albumin 3.8 3.5 - 5.2 g/dL    GFR Non-African American >60 >60 mL/min    GFR African American >60 >60 mL/min    GFR Comment         Magnesium    Collection Time: 03/09/22  2:18 PM   Result Value Ref Range    Magnesium 1.9 1.6 - 2.6 mg/dL   Troponin    Collection Time: 03/09/22  2:18 PM   Result Value Ref Range    Troponin, High Sensitivity 20 0 - 22 ng/L   POC Glucose Fingerstick    Collection Time: 03/09/22  3:47 PM   Result Value Ref Range    POC Glucose 135 (H) 75 - 110 mg/dL   Troponin    Collection Time: 03/09/22  5:30 PM   Result Value Ref Range    Troponin, High Sensitivity 20 0 - 22 ng/L   Lipase    Collection Time: 03/09/22  5:30 PM   Result Value Ref Range    Lipase 25 13 - 60 U/L   Urine Drug Screen    Collection Time: 03/09/22  7:35 PM   Result Value Ref Range    Amphetamine Screen, Ur NEGATIVE NEGATIVE    Barbiturate Screen, Ur NEGATIVE NEGATIVE    Benzodiazepine Screen, Urine NEGATIVE NEGATIVE    Cocaine Metabolite, Urine POSITIVE (A) NEGATIVE    Methadone Screen, Urine NEGATIVE NEGATIVE    Opiates, Urine NEGATIVE NEGATIVE    Phencyclidine, Urine NEGATIVE NEGATIVE    Cannabinoid Scrn, Ur NEGATIVE NEGATIVE    Oxycodone Screen, Ur NEGATIVE NEGATIVE    Test Information       Assay provides medical screening only. The absence of expected drug(s) and/or metabolite(s) may indicate diluted or adulterated urine, limitations of testing or timing of collection.    CBC    Collection Time: 03/10/22  4:41 AM   Result Value Ref Range    WBC 5.8 3.5 - 11.0 k/uL    RBC 4.50 4.5 - 5.9 m/uL    Hemoglobin 12.0 (L) 13.5 - 17.5 g/dL    Hematocrit 37.4 (L) 41 - 53 %    MCV 82.9 80 - 100 fL    MCH 26.6 26 - 34 pg    MCHC 32.1 31 - 37 g/dL    RDW 16.1 (H) 11.5 - 14.9 %    Platelets 654 333 - 981 k/uL    MPV 7.9 6.0 - 12.0 fL   Basic Metabolic Panel w/ Reflex to MG    Collection Time: 03/10/22  4:41 AM   Result Value Ref Range    Glucose 99 70 - 99 mg/dL    BUN 26 (H) 8 - 23 mg/dL    CREATININE 1.00 0.70 - 1.20 mg/dL    Calcium 8.3 (L) 8.6 - 10.4 mg/dL    Sodium 139 135 - 144 mmol/L    Potassium 3.7 3.7 - 5.3 mmol/L    Chloride 106 98 - 107 mmol/L    CO2 24 20 - 31 mmol/L    Anion Gap 9 9 - 17 mmol/L    GFR Non-African American >60 >60 mL/min    GFR African American >60 >60 mL/min    GFR Comment         Lipid panel - fasting    Collection Time: 03/10/22  4:41 AM   Result Value Ref Range    Cholesterol 108 <200 mg/dL    HDL 46 >40 mg/dL    LDL Cholesterol 52 0 - 130 mg/dL    Chol/HDL Ratio 2.3 <5    Triglycerides 49 <150 mg/dL   Cortisol Total    Collection Time: 03/10/22  4:41 AM   Result Value Ref Range    Cortisol 8.5 2.7 - 18.4 ug/dL    Cortisol Collection Info PENDING    Prealbumin    Collection Time: 03/10/22  4:41 AM   Result Value Ref Range    Prealbumin 14.7 (L) 20 - 40 mg/dL   INSULIN, TOTAL    Collection Time: 03/10/22  4:41 AM   Result Value Ref Range    Insulin Comment PENDING     Insulin 14.3 mU/L    Insulin Reference Range:         C-Peptide    Collection Time: 03/10/22  4:41 AM   Result Value Ref Range    C-Peptide 2.5 1.1 - 4.4 ng/mL       Imaging/Diagnostics:  XR CHEST PORTABLE    Result Date: 3/9/2022  EXAMINATION: ONE XRAY VIEW OF THE CHEST 3/9/2022 11:36 am COMPARISON: Chest x-ray dated 17 June 2017, 10 March 2018 HISTORY: ORDERING SYSTEM PROVIDED HISTORY: chest pain TECHNOLOGIST PROVIDED HISTORY: chest pain Reason for Exam: chest pain FINDINGS: Normal cardiomediastinal silhouette. No acute airspace infiltrate. No pneumothorax or pleural effusion. No acute cardiopulmonary findings       Assessment :      Primary Problem  Chest pain    Active Hospital Problems    Diagnosis Date Noted    Chest pain [R07.9] 03/09/2022    Chronic combined systolic and diastolic congestive heart failure ([facility] [address] [I50.42] 03/09/2022    Essential hypertension [I10] 06/23/2015       Plan:     Patient status Admit as inpatient in the  Progressive Unit/Step down    Chest pain r/u  Trend trops 20>?   EKG- NSR, new T wave inversions in lateral leads, no ST elevation  Start ASA, lipitor, lisinopril. Nitro sublingual prn. Avoid beta-blockers for cocaine abuse  Cardio consult for stress test  Will also check lipase, UDS for other toxic substances  Check and replace electrolytes as needed  Lipid panel    Hypertension  Continue norvasc 10mg, lisinopril 10mg. Hydralazine prn added. Avoid BB    Low glucose: 58 - not diabetic. Will check cortisol and total insulin/proinsulin/sulfonylurea screen. DVT ppx: [id number]    Consultations:   IP CONSULT TO INTERNAL MEDICINE  IP CONSULT TO CARDIOLOGY  IP CONSULT TO SOCIAL WORK     Patient is admitted as inpatient status because of co-morbiditieslisted above, severity of signs and symptoms as outlined, requirement for current medical therapies and most importantly because of direct risk to patient if care not provided in a hospital setting. Risa Harris MD  3/10/2022  2:07 PM    Copy sent to Dr. Travis Dutta MD    Attending Physician Statement    I have discussed the case of Kenney Nielson, including pertinent history and exam findings with the resident. I have seen and examined the patient and the key elements of the encounter have been performed by me. I agree with the assessment, plan, and orders as documented by the resident. The patient is admitted with atypical chest pain it is to be noted that he uses cocaine on a regular basis.   It is to be noted that he is noncompliant with his antihypertensive regimen  Electronically signed by Risa Harris MD on 3/10/2022 at 2:08 PM

## 2022-03-10 ENCOUNTER — APPOINTMENT (OUTPATIENT)
Dept: NUCLEAR MEDICINE | Age: 67
DRG: 303 | End: 2022-03-10
Payer: MEDICARE

## 2022-03-10 LAB
ANION GAP SERPL CALCULATED.3IONS-SCNC: 9 MMOL/L (ref 9–17)
BUN BLDV-MCNC: 26 MG/DL (ref 8–23)
C-PEPTIDE: 2.5 NG/ML (ref 1.1–4.4)
CALCIUM SERPL-MCNC: 8.3 MG/DL (ref 8.6–10.4)
CHLORIDE BLD-SCNC: 106 MMOL/L (ref 98–107)
CHOLESTEROL/HDL RATIO: 2.3
CHOLESTEROL: 108 MG/DL
CO2: 24 MMOL/L (ref 20–31)
CORTISOL: 8.5 UG/DL (ref 2.7–18.4)
CREAT SERPL-MCNC: 1 MG/DL (ref 0.7–1.2)
GFR AFRICAN AMERICAN: >60 ML/MIN
GFR NON-AFRICAN AMERICAN: >60 ML/MIN
GFR SERPL CREATININE-BSD FRML MDRD: ABNORMAL ML/MIN/{1.73_M2}
GLUCOSE BLD-MCNC: 99 MG/DL (ref 70–99)
HCT VFR BLD CALC: 37.4 % (ref 41–53)
HDLC SERPL-MCNC: 46 MG/DL
HEMOGLOBIN: 12 G/DL (ref 13.5–17.5)
INSULIN REFERENCE RANGE:: NORMAL
INSULIN: 14.3 MU/L
LDL CHOLESTEROL: 52 MG/DL (ref 0–130)
LV EF: 34 %
LVEF MODALITY: NORMAL
MCH RBC QN AUTO: 26.6 PG (ref 26–34)
MCHC RBC AUTO-ENTMCNC: 32.1 G/DL (ref 31–37)
MCV RBC AUTO: 82.9 FL (ref 80–100)
PDW BLD-RTO: 16.1 % (ref 11.5–14.9)
PLATELET # BLD: 276 K/UL (ref 150–450)
PMV BLD AUTO: 7.9 FL (ref 6–12)
POTASSIUM SERPL-SCNC: 3.7 MMOL/L (ref 3.7–5.3)
PREALBUMIN: 14.7 MG/DL (ref 20–40)
RBC # BLD: 4.5 M/UL (ref 4.5–5.9)
SODIUM BLD-SCNC: 139 MMOL/L (ref 135–144)
TRIGL SERPL-MCNC: 49 MG/DL
WBC # BLD: 5.8 K/UL (ref 3.5–11)

## 2022-03-10 PROCEDURE — 80061 LIPID PANEL: CPT

## 2022-03-10 PROCEDURE — 97162 PT EVAL MOD COMPLEX 30 MIN: CPT

## 2022-03-10 PROCEDURE — 2580000003 HC RX 258: Performed by: INTERNAL MEDICINE

## 2022-03-10 PROCEDURE — 84134 ASSAY OF PREALBUMIN: CPT

## 2022-03-10 PROCEDURE — 6360000002 HC RX W HCPCS: Performed by: INTERNAL MEDICINE

## 2022-03-10 PROCEDURE — 85027 COMPLETE CBC AUTOMATED: CPT

## 2022-03-10 PROCEDURE — 82533 TOTAL CORTISOL: CPT

## 2022-03-10 PROCEDURE — G0480 DRUG TEST DEF 1-7 CLASSES: HCPCS

## 2022-03-10 PROCEDURE — 93017 CV STRESS TEST TRACING ONLY: CPT

## 2022-03-10 PROCEDURE — 2580000003 HC RX 258: Performed by: STUDENT IN AN ORGANIZED HEALTH CARE EDUCATION/TRAINING PROGRAM

## 2022-03-10 PROCEDURE — 83525 ASSAY OF INSULIN: CPT

## 2022-03-10 PROCEDURE — A9500 TC99M SESTAMIBI: HCPCS | Performed by: INTERNAL MEDICINE

## 2022-03-10 PROCEDURE — 36415 COLL VENOUS BLD VENIPUNCTURE: CPT

## 2022-03-10 PROCEDURE — 97166 OT EVAL MOD COMPLEX 45 MIN: CPT

## 2022-03-10 PROCEDURE — 99223 1ST HOSP IP/OBS HIGH 75: CPT | Performed by: INTERNAL MEDICINE

## 2022-03-10 PROCEDURE — 78452 HT MUSCLE IMAGE SPECT MULT: CPT

## 2022-03-10 PROCEDURE — 80048 BASIC METABOLIC PNL TOTAL CA: CPT

## 2022-03-10 PROCEDURE — 6360000002 HC RX W HCPCS: Performed by: STUDENT IN AN ORGANIZED HEALTH CARE EDUCATION/TRAINING PROGRAM

## 2022-03-10 PROCEDURE — 6370000000 HC RX 637 (ALT 250 FOR IP): Performed by: STUDENT IN AN ORGANIZED HEALTH CARE EDUCATION/TRAINING PROGRAM

## 2022-03-10 PROCEDURE — 84681 ASSAY OF C-PEPTIDE: CPT

## 2022-03-10 PROCEDURE — 3430000000 HC RX DIAGNOSTIC RADIOPHARMACEUTICAL: Performed by: INTERNAL MEDICINE

## 2022-03-10 PROCEDURE — 2060000000 HC ICU INTERMEDIATE R&B

## 2022-03-10 RX ORDER — ATROPINE SULFATE 0.1 MG/ML
0.5 INJECTION INTRAVENOUS EVERY 5 MIN PRN
Status: ACTIVE | OUTPATIENT
Start: 2022-03-10 | End: 2022-03-10

## 2022-03-10 RX ORDER — SODIUM CHLORIDE 0.9 % (FLUSH) 0.9 %
10 SYRINGE (ML) INJECTION PRN
Status: DISCONTINUED | OUTPATIENT
Start: 2022-03-10 | End: 2022-03-11 | Stop reason: HOSPADM

## 2022-03-10 RX ORDER — SODIUM CHLORIDE 0.9 % (FLUSH) 0.9 %
5-40 SYRINGE (ML) INJECTION PRN
Status: ACTIVE | OUTPATIENT
Start: 2022-03-10 | End: 2022-03-10

## 2022-03-10 RX ORDER — SODIUM CHLORIDE 9 MG/ML
500 INJECTION, SOLUTION INTRAVENOUS CONTINUOUS PRN
Status: ACTIVE | OUTPATIENT
Start: 2022-03-10 | End: 2022-03-10

## 2022-03-10 RX ORDER — NITROGLYCERIN 0.4 MG/1
0.4 TABLET SUBLINGUAL EVERY 5 MIN PRN
Status: ACTIVE | OUTPATIENT
Start: 2022-03-10 | End: 2022-03-10

## 2022-03-10 RX ORDER — ALBUTEROL SULFATE 90 UG/1
2 AEROSOL, METERED RESPIRATORY (INHALATION) PRN
Status: ACTIVE | OUTPATIENT
Start: 2022-03-10 | End: 2022-03-10

## 2022-03-10 RX ORDER — AMINOPHYLLINE DIHYDRATE 25 MG/ML
50 INJECTION, SOLUTION INTRAVENOUS PRN
Status: ACTIVE | OUTPATIENT
Start: 2022-03-10 | End: 2022-03-10

## 2022-03-10 RX ORDER — METOPROLOL TARTRATE 5 MG/5ML
5 INJECTION INTRAVENOUS EVERY 5 MIN PRN
Status: ACTIVE | OUTPATIENT
Start: 2022-03-10 | End: 2022-03-10

## 2022-03-10 RX ADMIN — LISINOPRIL 10 MG: 10 TABLET ORAL at 08:42

## 2022-03-10 RX ADMIN — TETRAKIS(2-METHOXYISOBUTYLISOCYANIDE)COPPER(I) TETRAFLUOROBORATE 10.8 MILLICURIE: 1 INJECTION, POWDER, LYOPHILIZED, FOR SOLUTION INTRAVENOUS at 08:50

## 2022-03-10 RX ADMIN — SODIUM CHLORIDE, PRESERVATIVE FREE 10 ML: 5 INJECTION INTRAVENOUS at 08:50

## 2022-03-10 RX ADMIN — SODIUM CHLORIDE, PRESERVATIVE FREE 10 ML: 5 INJECTION INTRAVENOUS at 20:50

## 2022-03-10 RX ADMIN — SODIUM CHLORIDE, PRESERVATIVE FREE 10 ML: 5 INJECTION INTRAVENOUS at 08:41

## 2022-03-10 RX ADMIN — TETRAKIS(2-METHOXYISOBUTYLISOCYANIDE)COPPER(I) TETRAFLUOROBORATE 45.8 MILLICURIE: 1 INJECTION, POWDER, LYOPHILIZED, FOR SOLUTION INTRAVENOUS at 10:19

## 2022-03-10 RX ADMIN — ENOXAPARIN SODIUM 40 MG: 100 INJECTION SUBCUTANEOUS at 08:42

## 2022-03-10 RX ADMIN — REGADENOSON 0.4 MG: 0.08 INJECTION, SOLUTION INTRAVENOUS at 10:17

## 2022-03-10 RX ADMIN — AMLODIPINE BESYLATE 5 MG: 5 TABLET ORAL at 08:40

## 2022-03-10 RX ADMIN — TAMSULOSIN HYDROCHLORIDE 0.4 MG: 0.4 CAPSULE ORAL at 08:42

## 2022-03-10 RX ADMIN — ATORVASTATIN CALCIUM 40 MG: 40 TABLET, FILM COATED ORAL at 20:50

## 2022-03-10 RX ADMIN — ASPIRIN 81 MG: 81 TABLET, COATED ORAL at 08:42

## 2022-03-10 RX ADMIN — SODIUM CHLORIDE, PRESERVATIVE FREE 10 ML: 5 INJECTION INTRAVENOUS at 10:19

## 2022-03-10 ASSESSMENT — ENCOUNTER SYMPTOMS
ABDOMINAL PAIN: 0
EYE REDNESS: 0
NAUSEA: 0
SORE THROAT: 0
VOMITING: 0
COUGH: 0
SHORTNESS OF BREATH: 0

## 2022-03-10 ASSESSMENT — PAIN DESCRIPTION - LOCATION
LOCATION: GENERALIZED
LOCATION: GENERALIZED

## 2022-03-10 ASSESSMENT — PAIN DESCRIPTION - PAIN TYPE
TYPE: CHRONIC PAIN
TYPE: CHRONIC PAIN

## 2022-03-10 NOTE — PROGRESS NOTES
Discussed case with Dr. Sarah Young. Ordered brian scan ST. OK to resume pts diet when he returns. RN called Nuc med and informed them of order.  Electronically signed by Mack Thomas RN on 3/10/2022 at 8:26 AM

## 2022-03-10 NOTE — PROCEDURES
[address] [facility]                    [address] [facility]                              CARDIAC STRESS TEST    PATIENT NAME: Gaby Gonzalez                      :        1955  MED REC NO:   [id number]                              ROOM:       [id number]  ACCOUNT NO:   [de-identified]                           ADMIT DATE: 2022  PROVIDER:     Brian Womack    DATE OF STUDY:  03/10/2022    ORDERING PROVIDER:  Mellisa Manzano MD    INTERPRETING PHYSICIAN:  Paolo Woody MD    _____ STRESS TESTING  TEST TYPE: LEXISCAN CARDIOLYTE STRESS TEST  INDICATION: CHEST PAIN  REFERRING PHYSICIAN: NAY JEAN MD    RESTING HEART RATE: 62 BEATS PER MINUTE  RESTING BLOOD PRESSURE: 126/79    MEDICATION(S) GIVEN: 0.4 MG IV LEXISCAN  REASON FOR TERMINATION: MEDICATION INFUSION COMPLETE    RESTING EKG: ABNORMAL SR HEART RATE 62 BEATS PER MINUTE T WAVE  ABNORMALITIES INFERIOR AND PRECORDIAL LEADS. STRESS HEART RESPONSE: NORMAL RESPONSE  BLOOD PRESSURE RESPONSE:  STRESS EKGs: NO CHANGES SEEN  CHEST DISCOMFORT: NO PAIN DURING STRESS  ISCHEMIC EKG CHANGES: UNINTERPRETABLE    EKG IMPRESSION: ELECTROCARDIOGRAPHICALLY UNINTERPRETABLE FOR EKG CHANGES  LEXISCAN STRESS TEST. RADIOISOTOPE RESULTS TO FOLLOW FROM THE DEPARTMENT  OF NUCLEAR MEDICINE. COMMENTS: ABNORMAL PRETEST EKG PRECLUDES ECG CRITERIAS FOR MYOCARDIAL  ISCHEMIA. RARE PREMATURE VENTRICULAR CONTRACTION.   Paolo Woody    D: 03/10/2022 12:17:54       T: 03/10/2022 12:18:41     AS/SAVANNA  Job#: [id number]     Doc#: Unknown    CC:    (Retain this field even if not dictated or not decipherable)

## 2022-03-10 NOTE — PLAN OF CARE
Problem: Skin Integrity:  Goal: Will show no infection signs and symptoms  Description: Will show no infection signs and symptoms  3/10/2022 1709 by Randy Mello RN  Outcome: Ongoing  Note: Skin assessment complete. Pt turned and repositioned per self. Area kept free from moisture. Proper nourishment and fluids encouraged, as appropriate. Skin remains clean, dry, and intact. Will continue to monitor for additional needs and changes in skin breakdown. Problem: Falls - Risk of:  Goal: Will remain free from falls  Description: Will remain free from falls  3/10/2022 1709 by Randy Mello RN  Outcome: Ongoing  Note: The patient remained free from falls this shift, call light within reach, bed in locked and lowest position. Side rails up x2. Continue to monitor closely.        Problem: Pain:  Goal: Pain level will decrease  Description: Pain level will decrease  Outcome: Ongoing  Note: No complaints of chest pain this shift

## 2022-03-10 NOTE — PROGRESS NOTES
Patient presents for CST Lexiscan  Stress Tech performs patient preparation of physical comfort, review test procedures, pre-stress EKG. Consent verified. Educated patient on test procedure and possible side effects of Lexiscan. Cardiologist reviewed pre-test EKG and is present for test. Patient tolerated test well and provided po caffienated beverage. Pre test /79 HR 62 and post test /72 HR 76. EKG portion complete, Nuc Med portion pending.

## 2022-03-10 NOTE — PROGRESS NOTES
Dr. Meenakshi Brambila notified regarding Pt's NPO status. Dr. Meenakshi Brambila stated physician rounding today will decide on further testing and keep Pt NPO. RN notified  regarding Pt's low blood pressures overnight. Dr. Meenakshi Brambila did not want to put parameters on Bp meds at this time.

## 2022-03-10 NOTE — CARE COORDINATION
CASE MANAGEMENT NOTE:    Admission Date:  3/9/2022 Kenney Nielson is a 74 y.o.  male    Admitted for : Chest pain [R07.9]  Chest pain, unspecified type [R07.9]    Met with:  Patient    PCP:  None, offered assist patient reported someone is already helping him set it up he has all the information at home. Insurance:  BCBS     Is patient alert and oriented at time of discussion:  Yes    Current Residence/ Living Arrangements:  independently at home             Current Services PTA:  No    Does patient go to outpatient dialysis: No  If yes, location and chair time:     Is patient agreeable to VNS: No    Freedom of choice provided:  NA    List of [address] provided: NA    VNS chosen:  NA    DME:  shower chair and grab bars    Home Oxygen: No    Nebulizer: No    CPAP/BIPAP: No    Supplier: N/A    Potential Assistance Needed: Yes    SNF needed: Yes, patient has no preferences LSW notified. [place] of choice and list provided: No, LSW to follow    Pharmacy:  [facility]       Does Patient want to use MEDS to BEDS? No    Is patient currently receiving oral anticoagulation therapy? No    Is the Patient an [facility] with Readmission Risk Score greater than 14%? No  If yes, pt needs a follow up appointment made within 7 days. Family Members/Caregivers that pt would like involved in their care:    No    If yes, list name here:      Transportation Provider:  Family  And friends           Discharge Plan:  Patient from two story home with cousin. Relies on family and friends for transport. DME: [employer]. Denies wanting assistance for PCP. PT recommends SNF, patient agreeable. LSW following. Denies need for information on drug and alcohol rehab.                    Electronically signed by: Rejeana Scheuermann, RN on 3/10/2022 at 1:20 PM

## 2022-03-10 NOTE — PROGRESS NOTES
Dr. Fran Ellis notified of positive stress test results. Also notified of hypotension and reviewed that pt is on anytihypertensives. Pts BP got in the 80s last night. Dr. Fran Ellis will review meds, plan for cath tomorrow.

## 2022-03-10 NOTE — PLAN OF CARE
Problem: Skin Integrity:  Goal: Will show no infection signs and symptoms  Description: Will show no infection signs and symptoms  3/10/2022 0351 by Mingo Paulson RN  Outcome: Ongoing     Problem: Skin Integrity:  Goal: Absence of new skin breakdown  Description: Absence of new skin breakdown  3/10/2022 0351 by Mingo Paulson RN  Outcome: Ongoing     Problem: Falls - Risk of:  Goal: Will remain free from falls  Description: Will remain free from falls  3/10/2022 0351 by Mingo Paulson RN  Outcome: Ongoing     Problem: Falls - Risk of:  Goal: Absence of physical injury  Description: Absence of physical injury  3/10/2022 0351 by Mingo Paulson RN  Outcome: Ongoing

## 2022-03-10 NOTE — CONSULTS
[facility]  In Patient Cardiology Consult             Date:   3/10/2022  Patient name: Trisha Jarquin  Date of admission:  3/9/2022 11:03 AM  MRN:   [id number]  YOB: 1955    Reason for Admission: Chest pain    CHIEF COMPLAINT: Chest pain    History Obtained From: Patient and chart    HISTORY OF PRESENT ILLNESS:    This is a 58-year-old male presented due to chest pain. He has a history of CAD. He stated that his chest pain started 1 night prior to admission. He does use cocaine. Also uses alcohol. Has a history of HFrEF with ejection fraction 45%, CVA, hypertension. He underwent a nuclear stress test results of which are pending. Denies any active chest pains. Past Medical History:    Past Medical History:   Diagnosis Date    Alcohol abuse 6/23/2015    Arthritis     Chronic kidney disease     Full dentures     upper and lower full    Hypertension     Pure hypercholesterolemia 56/9/5846    Systolic CHF ([address]     Wears glasses          Past Surgical History:    Past Surgical History:   Procedure Laterality Date    APPENDECTOMY      HIP FRACTURE SURGERY Left     W/ HARDWARE    CT EGD TRANSORAL BIOPSY SINGLE/MULTIPLE  6/19/2017    EGD BIOPSY performed by Chanelle Oscar, DO at [address] Medications:    Outpatient Medications Marked as Taking for the 3/9/22 encounter [facility] Encounter)   Medication Sig Dispense Refill    nitroGLYCERIN (NITROSTAT) 0.3 MG SL tablet Place 0.3 mg under the tongue every 5 minutes as needed for Chest pain up to max of 3 total doses. If no relief after 1 dose, call 911.       ibuprofen (ADVIL;MOTRIN) 800 MG tablet Take 1 tablet by mouth every 8 hours as needed for Pain 30 tablet 0    lisinopril (PRINIVIL;ZESTRIL) 10 MG tablet Take 1 tablet by mouth daily 30 tablet 3    pravastatin (PRAVACHOL) 20 MG tablet Take 1 tablet by mouth every evening 30 tablet 3    amLODIPine (NORVASC) 5 MG tablet Take 1 tablet by mouth daily 30 tablet 3    aspirin 81 MG EC tablet Take 1 tablet by mouth daily 30 tablet 3    tamsulosin (FLOMAX) 0.4 MG capsule Take 1 capsule by mouth daily 30 capsule 3    acetaminophen (TYLENOL 8 HOUR) 650 MG extended release tablet Take 1 tablet by mouth every 8 hours as needed for Pain 60 tablet 2        Allergies:  Patient has no known allergies. Social History:    Social History     Socioeconomic History    Marital status: Single     Spouse name: None    Number of children: None    Years of education: None    Highest education level: None   Occupational History    None   Tobacco Use    Smoking status: Light Tobacco Smoker     Packs/day: 0.00     Types: Cigarettes    Smokeless tobacco: Never Used    Tobacco comment: \"only smokes when he drinks, 1 packs lasts him a month\"   Vaping Use    Vaping Use: Never used   Substance and Sexual Activity    Alcohol use: Yes     Alcohol/week: 0.0 standard drinks     Comment:  \" drinks a pint on the weekend\" off and on    Drug use: Yes     Types: Cocaine, Marijuana (Weed)     Comment: cocaine and marijuana last used 9/9/17,    Sexual activity: Not Currently   Other Topics Concern    None   Social History Narrative    None     Social Determinants of Health     Financial Resource Strain:     Difficulty of Paying Living Expenses: Not on file   Food Insecurity:     Worried About Running Out of Food in the Last Year: Not on file    Tino of Food in the Last Year: Not on file   Transportation Needs:     Lack of Transportation (Medical): Not on file    Lack of Transportation (Non-Medical):  Not on file   Physical Activity:     Days of Exercise per Week: Not on file    Minutes of Exercise per Session: Not on file   Stress:     Feeling of Stress : Not on file   Social Connections:     Frequency of Communication with Friends and Family: Not on file    Frequency of Social Gatherings with Friends and Family: Not on file    Attends Jainism Services: Not on file   [facility] Active Member of Clubs or Organizations: Not on file    Attends Club or Organization Meetings: Not on file    Marital Status: Not on file   Intimate Partner Violence:     Fear of Current or Ex-Partner: Not on file    Emotionally Abused: Not on file    Physically Abused: Not on file    Sexually Abused: Not on file   Housing Stability:     Unable to Pay for Housing in the Last Year: Not on file    Number of [place] in the Last Year: Not on file    Unstable Housing in the Last Year: Not on file        Family History:   Family History   Problem Relation Age of Onset    Diabetes Mother     Cancer Mother        REVIEW OF SYSTEMS:    · Constitutional: there has been no unanticipated weight loss. There's been No change in energy level, No change in activity level. · Eyes: No visual changes or diplopia. No scleral icterus. · ENT: No Headaches, hearing loss or vertigo. No mouth sores or sore throat. · Cardiovascular: As HPI  · Respiratory: As HPI  · Gastrointestinal: No abdominal pain, appetite loss, blood in stools. No change in bowel or bladder habits. · Genitourinary: No dysuria, trouble voiding, or hematuria. · Musculoskeletal:  No gait disturbance, No weakness or joint complaints. · Integumentary: No rash or pruritis. · Neurological: No headache, diplopia, change in muscle strength, numbness or tingling. No change in gait, balance, coordination, mood, affect, memory, mentation, behavior. · Psychiatric: No anxiety, or depression. · Endocrine: No temperature intolerance. No excessive thirst, fluid intake, or urination. No tremor. · Hematologic/Lymphatic: No abnormal bruising or bleeding, blood clots or swollen lymph nodes. · Allergic/Immunologic: No nasal congestion or hives.     PHYSICAL EXAM:    Physical Examination:    /73   Pulse 67   Temp 97.3 °F (36.3 °C) (Oral)   Resp 18   Ht 6' 3\" (1.905 m)   Wt 140 lb 3.4 oz (63.6 kg)   SpO2 93%   BMI 17.53 kg/m²    Constitutional and General Appearance: alert, cooperative, no distress and appears stated age  [de-identified]: PERRL, no cervical lymphadenopathy. No masses palpable. Normal oral mucosa  Respiratory:  · Normal excursion and expansion without use of accessory muscles  · Resp Auscultation: Good respiratory effort. No for increased work of breathing. On auscultation: clear  Cardiovascular:  · Heart tones are crisp and normal. regular S1 and S2. Murmurs: none  · Jugular venous pulsation Normal  Abdomen:  · No masses or tenderness  · Bowel sounds present  Extremities:  ·  No Cyanosis or Clubbing  ·  Lower extremity edema: none  ·  Skin: Warm and dry  Neurological:  · Alert and oriented. · Moves all extremities well  · No abnormalities of mood, affect, memory, mentation, or behavior are noted    DATA:    Diagnostics:      EKG:   Results for orders placed or performed during the hospital encounter of 04/25/18   EKG 12 Lead   Result Value Ref Range    Ventricular Rate 61 BPM    Atrial Rate 61 BPM    P-R Interval 132 ms    QRS Duration 88 ms    Q-T Interval 428 ms    QTc Calculation (Bazett) 430 ms    P Axis 53 degrees    R Axis 34 degrees    T Axis 38 degrees    Narrative    Normal sinus rhythm  Normal ECG  When compared with ECG of 10-MAR-2018 21:57,  No significant change was found     ECG: not available to me- needs to be scanned. Per ER record there is TWI laterally       Labs:     CBC:   Recent Labs     03/09/22  1200 03/10/22  0441   WBC 7.0 5.8   HGB 12.8* 12.0*   HCT 39.9* 37.4*    276     BMP:   Recent Labs     03/09/22  1418 03/10/22  0441    139   K 3.8 3.7   CO2 23 24   BUN 19 26*   CREATININE 1.03 1.00   LABGLOM >60 >60   GLUCOSE 58* 99     BNP: No results for input(s): BNP in the last 72 hours. PT/INR: No results for input(s): PROTIME, INR in the last 72 hours. APTT:No results for input(s): APTT in the last 72 hours.   CARDIAC ENZYMES:  Recent Labs     03/09/22  1418 03/09/22  1730   TROPHS 20 20     FASTING LIPID PANEL:  Lab Results   Component Value Date    HDL 46 03/10/2022    TRIG 49 03/10/2022     LIVER PROFILE:  Recent Labs     03/09/22  1418   AST 18   ALT 10   LABALBU 3.8         IMPRESSION:    Patient Active Problem List   Diagnosis    CKD (chronic kidney disease) stage 3, GFR 30-59 ml/min (HCC)    Essential hypertension    Arthritis of right hip    Chronic systolic congestive heart failure (HCC)    Benign prostatic hyperplasia with urinary obstruction    Pure hypercholesterolemia    Abdominal pain    Chest pain    Chronic combined systolic and diastolic congestive heart failure (HCC)       - CP- concern for angina  - Mild LV dysfunction- EF 45% on 2015  - HTN  - DL  - cocaine use    RECOMMENDATIONS:  - needs to stop cocaine use  - acute MI ruled out with troponins  - agree with stress test  - await results    Dispo: if stress test low risk, can follow up in 2 weeks. Discussed with patient and nursing. Thank you for allowing me to participate in the care of this patient, please do not hesitate to call if you have any questions. Chasidy Vasquez DO, [facility] [place], [address], [address], [address] [facility]  [email or web address]  [phone]

## 2022-03-10 NOTE — PROGRESS NOTES
[address] [facility] [employer]    PROGRESS NOTE             3/10/2022    7:36 AM    Name:   Bella Diaz  MRN:     [id number]     Acct:      [de-identified]   Room:   [facility] [place]/[address] [place] [address] Day:  1  Admit Date:  3/9/2022 11:03 AM    PCP:  No primary care provider on file. Code Status:  Full Code    Subjective:     C/C:   Chief Complaint   Patient presents with    Chest Pain     PT GIVEN  MG PER LS 2 / PT TOOK 1 NTG AT HOME - pt did cocaine yesterday , ETOH/ smokes cigarettes     This is a delayed entry note and reflect's the patient's condition and management earlier in day. Interval History Status: improved. Pt seen and examined. Chest pain has resolved. No new complaints. No acute overnight events. Brief History:     78-year-old male with past medical history of CAD, HFr EF 45%, CKD, HTN, cocaine use, tobacco use, and alcohol use presents to the ED on 3/9 complaining of constant dull substernal chest pain (nitroglycerin on night prior to admission after cocaine intake. Review of Systems:     Review of Systems   Constitutional: Negative for chills and fever. HENT: Negative for sore throat and tinnitus. Eyes: Negative for redness and visual disturbance. Respiratory: Negative for cough and shortness of breath. Cardiovascular: Negative for chest pain. Gastrointestinal: Negative for abdominal pain, nausea and vomiting. Musculoskeletal: Positive for arthralgias (chronic). Negative for myalgias. Neurological: Negative for dizziness, light-headedness and numbness. Psychiatric/Behavioral: Negative for agitation and behavioral problems. Medications:      Allergies:  No Known Allergies    Current Meds:   Scheduled Meds:    amLODIPine  5 mg Oral Daily    lisinopril  10 mg Oral Daily    tamsulosin  0.4 mg Oral Daily    sodium chloride flush  5-40 mL IntraVENous 2 times per day    atorvastatin  40 mg Oral Nightly    enoxaparin 40 mg SubCUTAneous Daily    aspirin  81 mg Oral Daily     Continuous Infusions:    sodium chloride       PRN Meds: nitroGLYCERIN, sodium chloride flush, sodium chloride, ondansetron **OR** ondansetron, acetaminophen **OR** acetaminophen, polyethylene glycol, potassium chloride **OR** potassium alternative oral replacement **OR** potassium chloride, magnesium sulfate, hydrALAZINE    Data:     Past Medical History:   has a past medical history of Alcohol abuse, Arthritis, Chronic kidney disease, Full dentures, Hypertension, Pure hypercholesterolemia, Systolic CHF ([address] and Wears glasses. Social History:   reports that he has been smoking cigarettes. He has been smoking about 0.00 packs per day. He has never used smokeless tobacco. He reports current alcohol use. He reports current drug use. Drugs: Cocaine and Marijuana (Margurite Pederson). Family History:   Family History   Problem Relation Age of Onset    Diabetes Mother     Cancer Mother        Vitals:  /73   Pulse 67   Temp 97.3 °F (36.3 °C) (Oral)   Resp 18   Ht 6' 3\" (1.905 m)   Wt 140 lb 3.4 oz (63.6 kg)   SpO2 93%   BMI 17.53 kg/m²   Temp (24hrs), Av °F (36.7 °C), Min:97.3 °F (36.3 °C), Max:98.4 °F (36.9 °C)    Recent Labs     22  1547   POCGLU 135*     Vitals:    03/10/22 0112 03/10/22 0404 03/10/22 0459 03/10/22 0723   BP: (!) 95/57 (!) 107/58  107/73   Pulse:  65  67   Resp:  16  18   Temp:  98.2 °F (36.8 °C)  97.3 °F (36.3 °C)   TempSrc:    Oral   SpO2:  94%  93%   Weight:   140 lb 3.4 oz (63.6 kg)    Height:           I/O(24Hr):     Intake/Output Summary (Last 24 hours) at 3/10/2022 0736  Last data filed at 3/9/2022 1846  Gross per 24 hour   Intake --   Output 200 ml   Net -200 ml       Labs:  Recent Results (from the past 24 hour(s))   CBC with Auto Differential    Collection Time: 22 12:00 PM   Result Value Ref Range    WBC 7.0 3.5 - 11.0 k/uL    RBC 4.76 4.5 - 5.9 m/uL    Hemoglobin 12.8 (L) 13.5 - 17.5 g/dL    Hematocrit Range    Cholesterol 108 <200 mg/dL    HDL 46 >40 mg/dL    LDL Cholesterol 52 0 - 130 mg/dL    Chol/HDL Ratio 2.3 <5    Triglycerides 49 <150 mg/dL         Lab Results   Component Value Date/Time    SPECIAL NOT REPORTED 06/17/2017 10:40 PM     Lab Results   Component Value Date/Time    CULTURE ESCHERICHIA COLI 10 to 50,000 CFU/ML (A) 06/17/2017 10:40 PM    CULTURE  06/17/2017 10:40 PM     [employer] [address] [address] [phone]         Radiology:    XR CHEST PORTABLE    Result Date: 3/9/2022  No acute cardiopulmonary findings        Physical Examination:        Physical Exam  Constitutional:       General: He is not in acute distress. Appearance: Normal appearance. HENT:      Head: Normocephalic. Right Ear: External ear normal.      Left Ear: External ear normal.      Nose: Nose normal.   Eyes:      Conjunctiva/sclera: Conjunctivae normal.   Cardiovascular:      Rate and Rhythm: Normal rate and regular rhythm. Pulmonary:      Effort: Pulmonary effort is normal. No respiratory distress. Breath sounds: No wheezing or rhonchi. Comments: Decreased aeration throughout  Abdominal:      General: There is no distension. Palpations: Abdomen is soft. Tenderness: There is no abdominal tenderness. Musculoskeletal:         General: No tenderness. Right lower leg: No edema. Left lower leg: No edema. Skin:     Comments: Dry skin   Neurological:      Mental Status: He is alert and oriented to person, place, and time. Psychiatric:         Behavior: Behavior normal.           Assessment:        Primary Problem  Chest pain    Active Hospital Problems    Diagnosis Date Noted    Chest pain [R07.9] 03/09/2022    Chronic combined systolic and diastolic congestive heart failure ([facility] [address] [I50.42] 03/09/2022    Essential hypertension [I10] 06/23/2015       Plan:        Rule out ACS  Tropes 20, repeat tropes 20  Continue ASA, lipitor, lisinopril.  Nitro sublingual prn  Avoid beta-blockers for cocaine abuse  Lipid panel unremarkable, lipase within normal limits  Cardio consulted  UDS positive for cocaine  Stress test today - results pending    Hypertension  Amlodipine 5 mg p.o. daily  Lisinopril 10mg   Hydralazine prn  Avoid BB    Code: full   DVT prophylaxis: lovenox      Please note: Use of a speech recognition software was used in the creation of portions of this note and dictation errors, including those of syntax and sound alike word substitutions, may have escaped proofreading.        Anthony Boswell DO  3/10/2022  7:36 AM

## 2022-03-10 NOTE — PROGRESS NOTES
[address]   Occupational Therapy Evaluation  Date: 3/10/22  Patient Name: Tutu Bautista       Room:   MRN: [id number]  Account: [de-identified]   : 1955  (75 y.o.) Gender: male     Discharge Recommendations:  Further Occupational Therapy is recommended upon facility discharge. Equipment Needed:  (TBD)    Referring Practitioner: Aston Chan MD  Diagnosis: Chest pain       Treatment Diagnosis: Impaired self care status  Past Medical History:  has a past medical history of Alcohol abuse, Arthritis, Chronic kidney disease, Full dentures, Hypertension, Pure hypercholesterolemia, Systolic CHF ([address] and Wears glasses. Past Surgical History:   has a past surgical history that includes Hip fracture surgery (Left); Appendectomy; and pr egd transoral biopsy single/multiple (2017).     Restrictions  Restrictions/Precautions: Fall Risk,General Precautions  Implants present? : Metal implants (L Hip)  Required Braces or Orthoses?: No     Vitals  Temp: 97.5 °F (36.4 °C)  Pulse: 70  Resp: 18  BP: (!) 93/48  Height: 6' 3\" (190.5 cm)  Weight: 140 lb 3.4 oz (63.6 kg)  BMI (Calculated): 17.6  Oxygen Therapy  SpO2: 96 %  Pulse Oximeter Device Mode: Intermittent  Pulse Oximeter Device Location: Finger  O2 Device: None (Room air)  O2 Flow Rate (L/min): 2 L/min  Level of Consciousness: Alert (0)    Subjective  Subjective: Pt resting in bed upon arrival. Pt was pleasant and agreeable to OT/PT eval  Comments: Ok per RN for OT/PT eval  Overall Orientation Status: Within Functional Limits  Vision  Vision: Impaired  Vision Exceptions: Wears glasses for reading  Hearing  Hearing: Within functional limits  Social/Functional History  Lives With: Family (Cousin)  Type of Home: House  Home Layout: Two level,Bed/Bath upstairs,1/2 bath on main level  Home Access: Stairs to enter without rails  Entrance Stairs - Number of Steps: 4 MIKHAIL; 13 upstairs with HR right side  Bathroom Shower/Tub: Tub/Shower unit,Curtain,Shower chair with back  Bathroom Toilet: Standard  Bathroom Equipment: Hand-held shower (sicnk close to toilet)  Bathroom Accessibility: Accessible  Home Equipment: [address] walker,4 wheeled walker  ADL Assistance: [address]: [address] [facility] Responsibilities: Yes  Ambulation Assistance: Independent (Radha Poser inside and W/C outside)  Transfer Assistance: Independent  Active : No  Patient's  Info: Uses wheelchair to go to store; pt reports that the doctors has someone to come and pick him up  IADL Comments: Pt reports sleepting in flat bed  Additional Comments: Pt reports that cousin is home all the time and able to assist as needed . Pain Assessment  Pain Assessment: 0-10  Pain Type: Chronic pain  Pain Location: Generalized (\"arthritis\")    Objective          Sensation  Overall Sensation Status: WFL (Pt denies)   ADL  Feeding: Setup  Grooming: Setup  UE Bathing: Stand by assistance  LE Bathing: Minimal assistance  UE Dressing: Stand by assistance  LE Dressing: Minimal assistance  Toileting: Contact guard assistance  Additional Comments: ADl scores based on clinical reasoning and skilled observation unless otherwise noted. UE Function           LUE Strength  Gross LUE Strength: WFL  L Hand General: 4-/5     LUE Tone: Normotonic     LUE AROM (degrees)  LUE AROM : WFL     Left Hand AROM (degrees)  Left Hand AROM: WFL  RUE Strength  Gross RUE Strength: WFL  R Hand General: 4-/5      RUE Tone: Normotonic     RUE AROM (degrees)  RUE AROM : WFL     Right Hand AROM (degrees)  Right Hand AROM: WFL    Fine Motor Skills  Coordination  Movements Are Fluid And Coordinated:  Yes                           Mobility  Supine to Sit: Stand by assistance  Sit to Supine: Stand by assistance       Balance  Sitting Balance: Stand by assistance  Standing Balance: Contact guard assistance  Standing Balance  Time: 1-2 minutes  Activity: functional transfers/mobility  Comment: with RW. Anterior lean with heavy reliance on BUE   Functional Mobility  Functional - Mobility Device: Rolling Walker  Activity: Other (To/from door)  Assist Level: Contact guard assistance  Functional Mobility Comments: Verbal cues for hand placement and safety. Pt demonstrated unsteadiness with anterior lean with heavy reliance on BUE  Bed mobility  Supine to Sit: Stand by assistance  Sit to Supine: Stand by assistance  Scooting: Stand by assistance  Comment: Bed mobility with HOB elevated with increased time to complete. Pt self assist BLE into bed      Transfers  Sit to stand: Contact guard assistance  Stand to sit: Contact guard assistance  Transfer Comments: Verbal cues for hand palcement and safety  Functional Activity Tolerance  Functional Activity Tolerance:  Tolerates 10 - 20 min exercise with multiple rests     Assessment  Assessment  Performance deficits / Impairments: Decreased ADL status,Decreased functional mobility ,Decreased strength,Decreased safe awareness,Decreased endurance,Decreased balance,Decreased high-level IADLs  Treatment Diagnosis: Impaired self care status  Prognosis: Good  Decision Making: Medium Complexity  REQUIRES OT FOLLOW UP: Yes  Discharge Recommendations: Patient would benefit from continued therapy after discharge  Activity Tolerance: Patient limited by fatigue         Functional Outcome Measures  AM-PAC Daily Activity Inpatient   How much help for putting on and taking off regular lower body clothing?: A Little  How much help for Bathing?: A Little  How much help for Toileting?: A Little  How much help for putting on and taking off regular upper body clothing?: A Little  How much help for taking care of personal grooming?: A Little  How much help for eating meals?: A Little  AM-[facility] Inpatient Daily Activity Raw Score: 18  AM-PAC Inpatient ADL T-Scale Score : 38.66  ADL Inpatient CMS 0-100% Score: 46.65  ADL Inpatient CMS G-Code Modifier : ELIZABETH Goals  Short term goals  Time Frame for Short term goals: By discharge  Short term goal 1: Pt will complete BADLs with modified independence and good safety with use of AE as needed  Short term goal 2: Pt will complete functional transfers/mobility during self care tasks with modified independence and good safety with use of least restrictive device   Short term goal 3: Pt will tolerate standing 5+ minutes during functional activities of choice with good safety  Short term goal 4: Pt will verbalize/demonstrate good understanding of home safety/fall prevention strateiges to increase safety and independence with self care and mobility  Short term goal 5: Pt will participate in 15+ minutes of therapetuic exercises/functional activities to increase safety and independence with self care and mobility    Plan  Safety Devices  Safety Devices in place: Yes  Type of devices:  All fall risk precautions in place,Bed alarm in place,Call light within reach,Gait belt,Patient at risk for falls,Left in bed,Nurse notified     Plan  Times per week: 4-6  Current Treatment Recommendations: Self-Care / ADL,Strengthening,Balance Training,Functional Mobility Training,Endurance Training,Pain Management,Safety Education & Training,Patient/Caregiver Education & Training,Equipment Evaluation, Education, & procurement,Home Management Training       Equipment Recommendations  Equipment Needed:  (TBD)  OT Individual Minutes  Time In: 0812  Time Out: 5157  Minutes: 19    Electronically signed by Roxane Bloch, OT on 3/10/22 at 4:02 PM EST

## 2022-03-11 ENCOUNTER — HOSPITAL ENCOUNTER (INPATIENT)
Dept: CARDIAC CATH/INVASIVE PROCEDURES | Age: 67
LOS: 28 days | Discharge: NURSING, REHAB OR LONG-TERM CARE | DRG: 233 | End: 2022-04-08
Attending: INTERNAL MEDICINE | Admitting: THORACIC SURGERY (CARDIOTHORACIC VASCULAR SURGERY)
Payer: MEDICARE

## 2022-03-11 VITALS
TEMPERATURE: 97.6 F | BODY MASS INDEX: 18.09 KG/M2 | SYSTOLIC BLOOD PRESSURE: 132 MMHG | HEART RATE: 65 BPM | RESPIRATION RATE: 18 BRPM | HEIGHT: 75 IN | WEIGHT: 145.5 LBS | DIASTOLIC BLOOD PRESSURE: 72 MMHG | OXYGEN SATURATION: 97 %

## 2022-03-11 DIAGNOSIS — Z98.890 S/P CARDIAC CATHETERIZATION: ICD-10-CM

## 2022-03-11 DIAGNOSIS — Z95.1 S/P CABG X 2: Primary | ICD-10-CM

## 2022-03-11 PROBLEM — I25.10 3-VESSEL CAD: Status: ACTIVE | Noted: 2022-03-11

## 2022-03-11 LAB
ANION GAP SERPL CALCULATED.3IONS-SCNC: 10 MMOL/L (ref 9–17)
BUN BLDV-MCNC: 15 MG/DL (ref 8–23)
CALCIUM SERPL-MCNC: 8.1 MG/DL (ref 8.6–10.4)
CHLORIDE BLD-SCNC: 109 MMOL/L (ref 98–107)
CO2: 21 MMOL/L (ref 20–31)
CREAT SERPL-MCNC: 0.9 MG/DL (ref 0.7–1.2)
EKG ATRIAL RATE: 83 BPM
EKG P AXIS: 83 DEGREES
EKG P-R INTERVAL: 144 MS
EKG Q-T INTERVAL: 390 MS
EKG QRS DURATION: 88 MS
EKG QTC CALCULATION (BAZETT): 458 MS
EKG R AXIS: -4 DEGREES
EKG T AXIS: -122 DEGREES
EKG VENTRICULAR RATE: 83 BPM
GFR AFRICAN AMERICAN: >60 ML/MIN
GFR NON-AFRICAN AMERICAN: >60 ML/MIN
GFR SERPL CREATININE-BSD FRML MDRD: ABNORMAL ML/MIN/{1.73_M2}
GLUCOSE BLD-MCNC: 94 MG/DL (ref 70–99)
HCT VFR BLD CALC: 39.1 % (ref 40.7–50.3)
HEMOGLOBIN: 11.7 G/DL (ref 13–17)
LV EF: 30 %
LV EF: 30 %
LVEF MODALITY: NORMAL
LVEF MODALITY: NORMAL
MCH RBC QN AUTO: 27.1 PG (ref 25.2–33.5)
MCHC RBC AUTO-ENTMCNC: 29.9 G/DL (ref 28.4–34.8)
MCV RBC AUTO: 90.7 FL (ref 82.6–102.9)
NRBC AUTOMATED: 0 PER 100 WBC
PARTIAL THROMBOPLASTIN TIME: 25.6 SEC (ref 20.5–30.5)
PDW BLD-RTO: 15.3 % (ref 11.8–14.4)
PLATELET # BLD: 265 K/UL (ref 138–453)
PMV BLD AUTO: 10.2 FL (ref 8.1–13.5)
POTASSIUM SERPL-SCNC: 3.8 MMOL/L (ref 3.7–5.3)
RBC # BLD: 4.31 M/UL (ref 4.21–5.77)
SODIUM BLD-SCNC: 140 MMOL/L (ref 135–144)
WBC # BLD: 6.6 K/UL (ref 3.5–11.3)

## 2022-03-11 PROCEDURE — 36415 COLL VENOUS BLD VENIPUNCTURE: CPT

## 2022-03-11 PROCEDURE — C1894 INTRO/SHEATH, NON-LASER: HCPCS

## 2022-03-11 PROCEDURE — 93010 ELECTROCARDIOGRAM REPORT: CPT | Performed by: INTERNAL MEDICINE

## 2022-03-11 PROCEDURE — 93458 L HRT ARTERY/VENTRICLE ANGIO: CPT

## 2022-03-11 PROCEDURE — 2709999900 HC NON-CHARGEABLE SUPPLY

## 2022-03-11 PROCEDURE — 2060000000 HC ICU INTERMEDIATE R&B

## 2022-03-11 PROCEDURE — B2151ZZ FLUOROSCOPY OF LEFT HEART USING LOW OSMOLAR CONTRAST: ICD-10-PCS | Performed by: INTERNAL MEDICINE

## 2022-03-11 PROCEDURE — 2580000003 HC RX 258: Performed by: STUDENT IN AN ORGANIZED HEALTH CARE EDUCATION/TRAINING PROGRAM

## 2022-03-11 PROCEDURE — 6370000000 HC RX 637 (ALT 250 FOR IP): Performed by: STUDENT IN AN ORGANIZED HEALTH CARE EDUCATION/TRAINING PROGRAM

## 2022-03-11 PROCEDURE — 6360000002 HC RX W HCPCS: Performed by: STUDENT IN AN ORGANIZED HEALTH CARE EDUCATION/TRAINING PROGRAM

## 2022-03-11 PROCEDURE — 7100000001 HC PACU RECOVERY - ADDTL 15 MIN

## 2022-03-11 PROCEDURE — 4A023N7 MEASUREMENT OF CARDIAC SAMPLING AND PRESSURE, LEFT HEART, PERCUTANEOUS APPROACH: ICD-10-PCS | Performed by: INTERNAL MEDICINE

## 2022-03-11 PROCEDURE — 7100000000 HC PACU RECOVERY - FIRST 15 MIN

## 2022-03-11 PROCEDURE — 99239 HOSP IP/OBS DSCHRG MGMT >30: CPT | Performed by: INTERNAL MEDICINE

## 2022-03-11 PROCEDURE — B2111ZZ FLUOROSCOPY OF MULTIPLE CORONARY ARTERIES USING LOW OSMOLAR CONTRAST: ICD-10-PCS | Performed by: INTERNAL MEDICINE

## 2022-03-11 PROCEDURE — 85027 COMPLETE CBC AUTOMATED: CPT

## 2022-03-11 PROCEDURE — 80048 BASIC METABOLIC PNL TOTAL CA: CPT

## 2022-03-11 PROCEDURE — 6360000004 HC RX CONTRAST MEDICATION

## 2022-03-11 PROCEDURE — 6360000002 HC RX W HCPCS

## 2022-03-11 PROCEDURE — 85730 THROMBOPLASTIN TIME PARTIAL: CPT

## 2022-03-11 PROCEDURE — 2500000003 HC RX 250 WO HCPCS

## 2022-03-11 RX ORDER — NITROGLYCERIN 0.4 MG/1
0.4 TABLET SUBLINGUAL EVERY 5 MIN PRN
Status: DISCONTINUED | OUTPATIENT
Start: 2022-03-11 | End: 2022-03-21

## 2022-03-11 RX ORDER — HEPARIN SODIUM 1000 [USP'U]/ML
60 INJECTION, SOLUTION INTRAVENOUS; SUBCUTANEOUS ONCE
Status: COMPLETED | OUTPATIENT
Start: 2022-03-11 | End: 2022-03-11

## 2022-03-11 RX ORDER — ACETAMINOPHEN 325 MG/1
650 TABLET ORAL EVERY 4 HOURS PRN
Status: DISCONTINUED | OUTPATIENT
Start: 2022-03-11 | End: 2022-03-21

## 2022-03-11 RX ORDER — SODIUM CHLORIDE 0.9 % (FLUSH) 0.9 %
5-40 SYRINGE (ML) INJECTION PRN
Status: DISCONTINUED | OUTPATIENT
Start: 2022-03-11 | End: 2022-03-21

## 2022-03-11 RX ORDER — SODIUM CHLORIDE 9 MG/ML
INJECTION, SOLUTION INTRAVENOUS CONTINUOUS
Status: DISCONTINUED | OUTPATIENT
Start: 2022-03-11 | End: 2022-03-18

## 2022-03-11 RX ORDER — SODIUM CHLORIDE 9 MG/ML
25 INJECTION, SOLUTION INTRAVENOUS PRN
Status: DISCONTINUED | OUTPATIENT
Start: 2022-03-11 | End: 2022-03-21

## 2022-03-11 RX ORDER — ASPIRIN 81 MG/1
81 TABLET ORAL DAILY
Status: DISCONTINUED | OUTPATIENT
Start: 2022-03-11 | End: 2022-03-21

## 2022-03-11 RX ORDER — SODIUM CHLORIDE 0.9 % (FLUSH) 0.9 %
5-40 SYRINGE (ML) INJECTION EVERY 12 HOURS SCHEDULED
Status: DISCONTINUED | OUTPATIENT
Start: 2022-03-11 | End: 2022-03-21

## 2022-03-11 RX ORDER — HEPARIN SODIUM 1000 [USP'U]/ML
30 INJECTION, SOLUTION INTRAVENOUS; SUBCUTANEOUS PRN
Status: DISCONTINUED | OUTPATIENT
Start: 2022-03-11 | End: 2022-03-21

## 2022-03-11 RX ORDER — HEPARIN SODIUM 1000 [USP'U]/ML
60 INJECTION, SOLUTION INTRAVENOUS; SUBCUTANEOUS PRN
Status: DISCONTINUED | OUTPATIENT
Start: 2022-03-11 | End: 2022-03-21

## 2022-03-11 RX ORDER — ATORVASTATIN CALCIUM 40 MG/1
40 TABLET, FILM COATED ORAL NIGHTLY
Status: DISCONTINUED | OUTPATIENT
Start: 2022-03-11 | End: 2022-03-21

## 2022-03-11 RX ADMIN — DESMOPRESSIN ACETATE 40 MG: 0.2 TABLET ORAL at 20:29

## 2022-03-11 RX ADMIN — ACETAMINOPHEN 650 MG: 325 TABLET ORAL at 22:12

## 2022-03-11 RX ADMIN — SODIUM CHLORIDE, PRESERVATIVE FREE 10 ML: 5 INJECTION INTRAVENOUS at 08:33

## 2022-03-11 RX ADMIN — Medication 12 UNITS/KG/HR: at 22:23

## 2022-03-11 RX ADMIN — SODIUM CHLORIDE, PRESERVATIVE FREE 10 ML: 5 INJECTION INTRAVENOUS at 20:59

## 2022-03-11 RX ADMIN — TAMSULOSIN HYDROCHLORIDE 0.4 MG: 0.4 CAPSULE ORAL at 08:33

## 2022-03-11 RX ADMIN — ASPIRIN 81 MG: 81 TABLET, COATED ORAL at 08:33

## 2022-03-11 RX ADMIN — HEPARIN SODIUM 3960 UNITS: 1000 INJECTION, SOLUTION INTRAVENOUS; SUBCUTANEOUS at 22:11

## 2022-03-11 ASSESSMENT — PAIN SCALES - GENERAL
PAINLEVEL_OUTOF10: 0
PAINLEVEL_OUTOF10: 0
PAINLEVEL_OUTOF10: 3

## 2022-03-11 NOTE — CONSULTS
[facility] Cardiothoracic Surgery  Consult    Patient's Name/Date of Birth: Homero Bailey / [birth year] (33 y.o.)    Date: March 11, 2022     Chief Complaint: Chest pains    HPI: Homero Bailey is a 74 y.o.  male who presented with the onset of chest pain. Patient states never had this type of chest pain before. Patient does admit that he is a heavy smoker alcohol user and drug user. Patient states he smokes crack 3 times a week. Patient smokes a pack of cigarettes a day but has cut down to 5 cigarettes a day. patient admitted to recent crack cocaine use. After cardiac catheterization patient noted to have multivessel CAD therefore cardiothoracic surgery was consulted. Patient denies taking any antiplatelet medication at home. Unknown family history of heart disease    Patient is currently resting in bed with no chest pain or shortness of breath. Showing no signs of DTs at this time.       ROS:   CONSTITUTIONAL:  A&0x4  Respiratory: negative  Cardiovascular: negative  Gastrointestinal: negative  Genitourinary:negative  Hematologic/lymphatic: negative  Musculoskeletal:negative  Neurological: negative  Endocrine: negative  Psychiatric: negative  Past Medical History:   Diagnosis Date    Alcohol abuse 6/23/2015    Arthritis     Chronic kidney disease     Full dentures     upper and lower full    Hypertension     Pure hypercholesterolemia 47/5/7201    Systolic CHF ([facility])     Wears glasses      Past Surgical History:   Procedure Laterality Date    APPENDECTOMY      HIP FRACTURE SURGERY Left     W/ HARDWARE    [place] EGD TRANSORAL BIOPSY SINGLE/MULTIPLE  6/19/2017    EGD BIOPSY performed by Hector Yip DO at [facility]     No Known Allergies  Family History   Problem Relation Age of Onset    Diabetes Mother     Cancer Mother      Social History     Socioeconomic History    Marital status: Single     Spouse name: Not on file    Number of children: Not on file    Years of education: Not on file    Highest education level: Not on file   Occupational History    Not on file   Tobacco Use    Smoking status: Light Tobacco Smoker     Packs/day: 0.00     Types: Cigarettes    Smokeless tobacco: Never Used    Tobacco comment: Karri Araujo smokes when he drinks, 1 packs lasts him a month\"   Vaping Use    Vaping Use: Never used   Substance and Sexual Activity    Alcohol use: Yes     Alcohol/week: 0.0 standard drinks     Comment:  \" drinks a pint on the weekend\" off and on    Drug use: Yes     Types: Cocaine, Marijuana (Weed)     Comment: cocaine and marijuana last used 9/9/17,    Sexual activity: Not Currently   Other Topics Concern    Not on file   Social History Narrative    Not on file     Social Determinants of Health     Financial Resource Strain:     Difficulty of Paying Living Expenses: Not on file   Food Insecurity:     Worried About Running Out of Food in the Last Year: Not on file    Tino of Food in the Last Year: Not on file   Transportation Needs:     Lack of Transportation (Medical): Not on file    Lack of Transportation (Non-Medical):  Not on file   Physical Activity:     Days of Exercise per Week: Not on file    Minutes of Exercise per Session: Not on file   Stress:     Feeling of Stress : Not on file   Social Connections:     Frequency of Communication with Friends and Family: Not on file    Frequency of Social Gatherings with Friends and Family: Not on file    Attends Sikh Services: Not on file    Active Member of Clubs or Organizations: Not on file    Attends Club or Organization Meetings: Not on file    Marital Status: Not on file   Intimate Partner Violence:     Fear of Current or Ex-Partner: Not on file    Emotionally Abused: Not on file    Physically Abused: Not on file    Sexually Abused: Not on file   Housing Stability:     Unable to Pay for Housing in the Last Year: Not on file    Number of [place] in the Last Year: Not on file    Unstable Housing in the Last Year: Not on file       Current Facility-Administered Medications   Medication Dose Route Frequency Provider Last Rate Last Admin    0.9 % sodium chloride infusion   IntraVENous Continuous Hemindermeet Kandy Meckel, MD        aspirin EC tablet 81 mg  81 mg Oral Daily Maria Guadalupe Crane MD        atorvastatin (LIPITOR) tablet 40 mg  40 mg Oral Nightly Maria Guadalupe Crane MD        sodium chloride flush 0.9 % injection 5-40 mL  5-40 mL IntraVENous 2 times per day Maria Guadalupe Crane MD        sodium chloride flush 0.9 % injection 5-40 mL  5-40 mL IntraVENous PRN Maria Guadalupe Crane MD        0.9 % sodium chloride infusion  25 mL IntraVENous PRN Maria Guadalupe Crane MD        acetaminophen (TYLENOL) tablet 650 mg  650 mg Oral Q4H PRN Maria Guadalupe Crane MD         Current Outpatient Medications   Medication Sig Dispense Refill    nitroGLYCERIN (NITROSTAT) 0.3 MG SL tablet Place 0.3 mg under the tongue every 5 minutes as needed for Chest pain up to max of 3 total doses. If no relief after 1 dose, call 911.       ibuprofen (ADVIL;MOTRIN) 800 MG tablet Take 1 tablet by mouth every 8 hours as needed for Pain 30 tablet 0    lisinopril (PRINIVIL;ZESTRIL) 10 MG tablet Take 1 tablet by mouth daily 30 tablet 3    pravastatin (PRAVACHOL) 20 MG tablet Take 1 tablet by mouth every evening 30 tablet 3    amLODIPine (NORVASC) 5 MG tablet Take 1 tablet by mouth daily 30 tablet 3    aspirin 81 MG EC tablet Take 1 tablet by mouth daily 30 tablet 3    tamsulosin (FLOMAX) 0.4 MG capsule Take 1 capsule by mouth daily 30 capsule 3    acetaminophen (TYLENOL 8 HOUR) 650 MG extended release tablet Take 1 tablet by mouth every 8 hours as needed for Pain 60 tablet 2     Facility-Administered Medications Ordered in Other Encounters   Medication Dose Route Frequency Provider Last Rate Last Admin    sodium chloride flush 0.9 % injection 10 mL  10 mL IntraVENous PRN Derek Quinonez DO   10 mL at 03/10/22 0850    [Held by provider] lisinopril (PRINIVIL;ZESTRIL) tablet 10 mg  10 mg Oral Daily Triny Muniz MD   10 mg at 03/10/22 0842    nitroGLYCERIN (NITROSTAT) SL tablet 0.4 mg  0.4 mg SubLINGual Q5 Min PRN Triny Muniz MD        tamsulosin (FLOMAX) capsule 0.4 mg  0.4 mg Oral Daily Triny Muniz MD   0.4 mg at 03/11/22 2037    sodium chloride flush 0.9 % injection 5-40 mL  5-40 mL IntraVENous 2 times per day Triny Muniz MD   10 mL at 03/11/22 0833    sodium chloride flush 0.9 % injection 5-40 mL  5-40 mL IntraVENous PRN Triny Muniz MD        0.9 % sodium chloride infusion  25 mL IntraVENous PRN Triny Muniz MD        ondansetron (ZOFRAN-ODT) disintegrating tablet 4 mg  4 mg Oral Q8H PRN Triny Muniz MD        Or    ondansetron (ZOFRAN) injection 4 mg  4 mg IntraVENous Q6H PRN Triny Muniz MD        acetaminophen (TYLENOL) tablet 650 mg  650 mg Oral Q6H PRN Triny Muniz MD        Or    acetaminophen (TYLENOL) suppository 650 mg  650 mg Rectal Q6H PRN Triny Muniz MD        polyethylene glycol (GLYCOLAX) packet 17 g  17 g Oral Daily PRN Triny Muniz MD        atorvastatin (LIPITOR) tablet 40 mg  40 mg Oral Nightly Triny Muniz MD   40 mg at 03/10/22 2050    potassium chloride (KLOR-CON M) extended release tablet 40 mEq  40 mEq Oral PRN Triny Muniz MD        Or    potassium bicarb-citric acid (EFFER-K) effervescent tablet 40 mEq  40 mEq Oral PRN Triny Muniz MD        Or    potassium chloride 10 mEq/100 mL IVPB (Peripheral Line)  10 mEq IntraVENous PRN Triny Muniz MD        magnesium sulfate 2,000 mg in dextrose 5 % 100 mL IVPB  2,000 mg IntraVENous PRN Triny Muniz MD        enoxaparin (LOVENOX) injection 40 mg  40 mg SubCUTAneous Daily Triny Muniz MD   40 mg at 03/10/22 0842    aspirin EC tablet 81 mg  81 mg Oral Daily Triny Muniz MD   81 mg at 03/11/22 0833    hydrALAZINE (APRESOLINE) tablet 10 mg  10 mg Oral Q8H PRN Triny Muniz MD           Physical Exam:  Vitals:    03/11/22 1545   BP: 124/65   Pulse: 64   Resp:    SpO2:      Weight:               General: Alert and Oriented x3. Sitting up in bed. No apparent distress. HEENT:  Normocephalic and atraumatic. PERRL. EOMI. Lips and oral mucosa moist and without lesions. Neck:  Supple. Trachea midline. Chest:  No abnormality. Equal and symmetric expansion with respiration. Lungs:  Clear to auscultation. Cardiac:  Regular rate and rhythm without murmurs, rubs or gallops. Abdomen:  Soft, non-tender, normoactive bowel sounds. No masses or organomegaly. Extremities:  No cyanosis, clubbing, or edema. Intact pulses in all four extremities. Musculoskeletal:  Intact range of motion of peripheral joints. Normal muscular strength. Neurologic:  Cranial nerves are grossly intact. Non-focal sensory deficits on exam.  Psychiatric: Mood and affect are appropriate. Imaging Studies:    Cardiac Cath:LMCA: has heavy calcification with mid 60% stenosis. LAD: has heavy calcification in the proximal segment with 40% stenosis. The   mid segment has 85% stenosis. LCx: has heavy calcification with ostial 99% stenosis. The OM1 has 100%   occlusion with left to left collaterals. The OM2 has 100% occlusion with   left to left collaterals. The LPDA is large and is normal.     RCA: is a non-dominant vessel and has proximal 90% stenosis. Echo:pending  CT:    1. Extensive centrilobular and paraseptal emphysema. 2. Extensive atherosclerotic disease, particularly along the coronary   arteries. 3. There are areas of atelectasis in the lingula.  There is collapse of the   right middle lobe   4. No focal lung infiltrate.          Prior Labs reviewed:   No labs of concern noted    Assessment   Patient Active Problem List   Diagnosis    CKD (chronic kidney disease) stage 3, GFR 30-59 ml/min (HCC)    Essential hypertension    Arthritis of right hip    Chronic systolic congestive heart failure (HCC)    Benign prostatic hyperplasia with urinary obstruction    Pure hypercholesterolemia    Abdominal pain    Chest pain    Chronic combined systolic and diastolic congestive heart failure (HCC)    S/P cardiac catheterization           PLAN:  Preop cabg ordered vascular US of carotids, vein mapping, upper arterial mapping. Ct chest shows scatter bullae. Add PFTs while awaiting surgical date and decision  Pending echocardiogram ordered  Pending surgical date and decision.       Spent 45 min with patient and chart on Friday and [address], APRN - NP, CNP  Phone: [phone]

## 2022-03-11 NOTE — DISCHARGE SUMMARY
[place] [address] Internal Medicine    Discharge Summary     Patient ID: Theo Santos  :     MRN: [id number]     ACCOUNT:  [de-identified]   Patient's PCP: Gailen Mortimer, MD  Admit Date: 3/9/2022   Discharge Date: 3/11/22  Length of Stay: 2  Code Status:  Full Code  Admitting Physician: Marcio Nelson MD  Discharge Physician: Aarti Davis MD     Active Discharge Diagnoses:     Primary Problem  Chest pain      [place] Problems    Diagnosis Date Noted    Chest pain [R07.9] 2022    Chronic combined systolic and diastolic congestive heart failure ([facility] [address] [I50.42] 2022    Essential hypertension [I10] 2015       Admission Condition:  fair     Discharged Condition: fair    Hospital Stay:     Hospital Course:  Theo Santos is a 74 y.o. male who was admitted for the management of Chest pain , presented to ER with Chest Pain (PT GIVEN  MG PER LS 2 / PT TOOK 1 NTG AT HOME - pt did cocaine yesterday , ETOH/ smokes cigarettes)      Theo Santos is a 22-year-old male with past medical history of CAD, HFr EF 45%, CKD, HTN, cocaine use, tobacco use, and alcohol use presents to the ED on 3/9 complaining of constant, dull, substernal chest pain on night prior to admission after cocaine intake which improved on nitroglycerin.     Seen by cardiology  Stress test done, on 3/9- High risk with stress-induced ischemia in lateral wall and septum, LVEF 34%  Transferred to [facility] for cardiac cath on 3/11      Significant therapeutic interventions: As above    Significant Diagnostic Studies:   Labs / Micro:    Lab Results   Component Value Date    WBC 5.8 03/10/2022    HGB 12.0 (L) 03/10/2022    HCT 37.4 (L) 03/10/2022    MCV 82.9 03/10/2022     03/10/2022          Lab Results   Component Value Date     2022    K 3.8 2022     2022    CO2 21 2022    BUN 15 2022    CREATININE 0.90 2022 GLUCOSE 94 03/11/2022    CALCIUM 8.1 03/11/2022          Radiology:    XR CHEST PORTABLE    Result Date: 3/9/2022  EXAMINATION: ONE XRAY VIEW OF THE CHEST 3/9/2022 11:36 am COMPARISON: Chest x-ray dated 17 June 2017, 10 March 2018 HISTORY: ORDERING SYSTEM PROVIDED HISTORY: chest pain TECHNOLOGIST PROVIDED HISTORY: chest pain Reason for Exam: chest pain FINDINGS: Normal cardiomediastinal silhouette. No acute airspace infiltrate. No pneumothorax or pleural effusion. No acute cardiopulmonary findings     NM Cardiac Stress Test Nuclear Imaging    Result Date: 3/10/2022  EXAMINATION: MYOCARDIAL PERFUSION IMAGING 3/10/2022 8:51 am TECHNIQUE: For the rest study, 10.8 mCi of Tc 99 labeled sestamibi were injected. SPECT images were acquired. Under cardiology supervision, 0.4mg Inetta Clayman was infused. After pharmacologic stress, 45.8 mCi of Tc 99 labeled sestamibi were injected. SPECT images with ECG gating were acquired. COMPARISON: None Available. HISTORY: ORDERING SYSTEM PROVIDED HISTORY: chest pain TECHNOLOGIST PROVIDED HISTORY: Reason for Exam: Chest pain Procedure Type->Rx chest pain Reason for Exam: chest pain FINDINGS: Images interpreted utilizing [employer] system. Oasis software not available for correlation. . Stress prone images are acquired. There is fixed severe apical perfusion defect encroaching into the apical inferior wall which persists on prone images. Fixed basal inferior wall perfusion defect also persisting on prone imaging. These would be compatible with infarct. Significant reversible moderate-sized lateral wall perfusion defect persisting on prone imaging. There is a lesser degree of septal reversibility. These would be compatible with stress-induced ischemia. Total perfusion defect 14%. Global hypokinesis. Decreased myocardial thickening. Perfusion scores are visually adjusted to account for artifact.  Summed stress score:  14 Summed rest score:  11 Summed reversibility score:  3 Function: End diastolic volume:  090JA Left ventricular ejection fraction:  34% TID score:  1.11 (scores greater than 1.39 are considered elevated for Lexiscan stress with Tc99m) Notes concerning risk stratification: Risk stratification incorporates both clinical history and some testing results. Final risk determination is the responsibility of the ordering provider as other patient information and test results may increase or decrease the risk assessment reported for this examination. Risk stratification criteria are adapted from \"Noninvasive Risk Stratification\" criteria from [employer] Arroyo. Al, ACC/AATS/AHA/ASE/ASNC/SCAI/SCCT/STS 2017 Appropriate Use Criteria For Coronary Revascularization in Patients With Stable Ischemic Heart Disease [facility] Volume 69, Issue 17, May 2017 High risk (>3% annual death or MI) 1. Severe resting LV dysfunction (LVEF <35%) not readily explained by non coronary causes 2. Resting perfusion abnormalities greater than 10% of the myocardium in patients without prior history or evidence of MI 3. Stress-induced perfusion abnormalities encumbering greater than or equal to 10% myocardium or stress segmental scores indicating multiple vascular territories with abnormalities 4. Stress-induced LV dilatation (TID ratio greater than 1.19 for exercise and greater than 1.39 for regadenoson) Intermediate risk (1% to 3% annual death or MI) 1. Mild/moderate resting LV dysfunction (LVEF 35% to 49%) not readily explained by non coronary causes. 2. Resting perfusion abnormalities in 5%-9.9% of the myocardium in patients without a history or prior evidence of MI 3. Stress-induced perfusion abnormality encumbering 5%-9.9% of the myocardium or stress segmental scores indicating 1 vascular territory with abnormalities but without LV dilation 4. Small wall motion abnormality involving 1-2 segments and only 1 coronary bed. Low Risk (Less than 1% annual death or MI) 1.  Normal or small myocardial perfusion defect at rest or with stress encumbering less than 5% of the myocardium. Significant stress-induced ischemia in the lateral wall and septum. Infarct in the apex encroaching into the apical inferior wall and also in the basal inferior wall. LVEF 34% Risk stratification: High risk. The findings were sent to the Radiology Results [address] at 1:49 pm on 3/10/2022to be communicated to a licensed caregiver. RECOMMENDATIONS: Unavailable         Consultations:    Consults:     Final Specialist Recommendations/Findings:   IP CONSULT TO INTERNAL MEDICINE  IP CONSULT TO CARDIOLOGY  IP CONSULT TO SOCIAL WORK      The patient was seen and examined on day of discharge and this discharge summary is in conjunction with any daily progress note from day of discharge. Discharge plan:     Disposition: [facility]      Instructions to Patient: Keep follow-up appointments      Requiring Further Evaluation/Follow Up POST HOSPITALIZATION/Incidental Findings:     Physician Follow Up:     No follow-up provider specified.      Activity: Resume as directed    Discharge Medications:      Medication List      ASK your doctor about these medications    amLODIPine 5 MG tablet  Commonly known as: NORVASC  Take 1 tablet by mouth daily     aspirin 81 MG EC tablet  Take 1 tablet by mouth daily     ibuprofen 800 MG tablet  Commonly known as: ADVIL;MOTRIN  Take 1 tablet by mouth every 8 hours as needed for Pain     lisinopril 10 MG tablet  Commonly known as: PRINIVIL;ZESTRIL  Take 1 tablet by mouth daily     nitroGLYCERIN 0.3 MG SL tablet  Commonly known as: NITROSTAT     pravastatin 20 MG tablet  Commonly known as: Pravachol  Take 1 tablet by mouth every evening     tamsulosin 0.4 MG capsule  Commonly known as: FLOMAX  Take 1 capsule by mouth daily            Time Spent on discharge is  35 mins in patient examination, evaluation, counseling as well as medication reconciliation, prescriptions for required medications, discharge plan and follow up.    Electronically signed by   Tabitha Ny MD  3/11/2022  9:28 AM      Thank you Dr. Cirilo Morse MD for the opportunity to be involved in this patient's care.

## 2022-03-11 NOTE — OP NOTE
[facility]    CARDIAC CATHETERIZATION    Date:   3/11/2022  Patient name:  Shanice Hernandez  Date of admission:  3/11/2022 11:25 AM  MRN:   [id number]  YOB: 1955    Operators:  Primary:   FAWAD Rachel MD (Attending Physician)    Assistant/CV fellow:  Matt Calhoun MD      Procedure performed:     [x] Left Heart Catheterization. [] Graft Angiography. [x] Left Ventriculography. [] Right Heart Catheterization. [x] Coronary Angiography. [] Aortic Valve Studies. [] PCI:      [] Other:       Pre Procedure Conscious Sedation Data:  ASA Class:    [] I [] II [] III [x] IV    Mallampati Class:  [x] I [] II [] III [] IV      Indication:  [] STEMI      [x] + Stress test  [] ACS      [] + EKG Changes  [] Non Q MI       [] Significant Risk Factors  [x] Recurrent Angina             [] Diabetes Mellitus    [] New LBBB      [] Uncontrolled HTN. [] CHF / Low EF changes     [] Abnormal CTA / Ca Score      Procedure:  Access:  [x] Femoral  [] Radial  artery       [x] Right  [] Left    Procedure: After informed consent was obtained with explanation of the risks and benefits, patient was brought to the cath lab. The access area was prepped and draped in sterile fashion. 1% lidocaine was used for local block. The artery was cannulated with 6  Fr sheath with brisk arterial blood return. The side port was frequently flushed and aspirated with normal saline. Findings:   Angiographic Findings        Cardiac Arteries and Lesion Findings       LMCA: has heavy calcification with mid 60% stenosis. LAD: has heavy calcification in the proximal segment with 40% stenosis. The   mid segment has 85% stenosis. LCx: has heavy calcification with ostial 99% stenosis. The OM1 has 100%   occlusion with left to left collaterals. The OM2 has 100% occlusion with   left to left collaterals.  The LPDA is large and is normal.     RCA: is a non-dominant vessel and has proximal 90% stenosis.      Coronary Tree      Dominance: Right       LV Analysis   LV function assessed as:Abnormal.   Ejection Fraction   +----------------------------------------------------------------------+---+   ! Method                                                                !EF%! +----------------------------------------------------------------------+---+   ! LV gram                                                               !30 !   +----------------------------------------------------------------------+---+       Procedure Summary        Three vessel disease with left main involvement.    Moderate LV systolic dysfunction. LVEF 30%.      Recommendations        Medical therapy as needed.    Risk factor modification.  CABG evaluation by CT surgery. Estimated Blood Loss: 5  mL      ____________________________________________________________________    History and Risk Factors    [x] Hypertension     [] Family history of CAD  [x] Hyperlipidemia     [] Cerebrovascular Disease   [] Prior MI       [] Peripheral Vascular disease   [] Prior PCI              [] Diabetes Mellitus    [] Left Main PCI. [] Currently on Dialysis. [] Prior CABG. [] Currently smoker. [] Cardiac Arrest outside of healthcare facility. [] Yes    [] No        Witnessed     [] Yes   [] No     Arrest after arrival of EMS  [] Yes   [] No     [] Cardiac Arrest at other Facility. [] Yes   [] No    Pre-Procedure Information. Heart Failure       [] Yes    [] No        Class  [] I      [] II  [] III    [] IV. New Diagnosis    [] Yes  [] No    HF Type      [] Systolic   [] Diastolic          [] Unknown. Diagnostic Test:   EKG       [x] Normal   [] Abnormal    New antiarrhythmia medications:    [] Yes   [] No   New onset atrial fibrillation / Flutter     [] Yes   [] No   ECG Abnormalities:      [] V. Fib   [] Qi V. Tach           [] NS V. T   [] New LBBB           [] T.  Inv  []  ST dev > 0.5 mm         [] PVC's freq  [] PVC's infrequent    Stress Test Performed:      [x] Yes    [] No     Type:     [] Stress Echo   [] Exercise Stress Test (no imaging)      [x] Stress Nuclear  [] Stress Imaging     Results   [] Negative   [x] Positive        [] Indeterminate  [] Unavailable     If Positive/ Risk / Extent of Ischemia:       [] Low  [x] Intermediate         [] High  [] Unavailable      Cardiac CTA Performed:     [] Yes    [x] No      Results   [] CAD   [] Non obstructive CAD      [] No CAD   [] Uncertain      [] Unknown   [] Structural Disease. Pre Procedure Medications:   [x] Yes    [] No         [x] ASA   [] Beta Blockers      [] Nitrate   [] Ca Channel Blockers      [] Ranolazine   [] Statin       [] Plavix/Others antiplatelets      Electronically signed on 3/11/2022 at 2:15 PM by:    Tony Johnston MD  Fellow, [address] I was present during entire procedure and performed all critical elements of the procedure.     Nimo Jones MD

## 2022-03-11 NOTE — PROGRESS NOTES
[facility]             Date:   3/11/2022  Patient name: Boo Kerr  Date of admission:  3/9/2022 11:03 AM  MRN:   [id number]  YOB: 1955    Reason for Admission: Chest pain    CHIEF COMPLAINT: Chest pain    Subjective :  Denies any active cp. Is s/p stress test as below. No sob.        Current Facility-Administered Medications:     sodium chloride flush 0.9 % injection 10 mL, 10 mL, IntraVENous, PRN, Derek Quinonez DO, 10 mL at 03/10/22 0850    [Held by provider] lisinopril (PRINIVIL;ZESTRIL) tablet 10 mg, 10 mg, Oral, Daily, Mattie Sanchez MD, 10 mg at 03/10/22 0842    nitroGLYCERIN (NITROSTAT) SL tablet 0.4 mg, 0.4 mg, SubLINGual, Q5 Min PRN, Mattie Sanchez MD    tamsulosin (FLOMAX) capsule 0.4 mg, 0.4 mg, Oral, Daily, Mattie Sanchez MD, 0.4 mg at 03/10/22 0842    sodium chloride flush 0.9 % injection 5-40 mL, 5-40 mL, IntraVENous, 2 times per day, Mattie Sanchez MD, 10 mL at 03/10/22 2050    sodium chloride flush 0.9 % injection 5-40 mL, 5-40 mL, IntraVENous, PRN, Mattie Sanchez MD    0.9 % sodium chloride infusion, 25 mL, IntraVENous, PRN, Mattie Sanchez MD    ondansetron (ZOFRAN-ODT) disintegrating tablet 4 mg, 4 mg, Oral, Q8H PRN **OR** ondansetron (ZOFRAN) injection 4 mg, 4 mg, IntraVENous, Q6H PRN, Mattie Sanchez MD    acetaminophen (TYLENOL) tablet 650 mg, 650 mg, Oral, Q6H PRN **OR** acetaminophen (TYLENOL) suppository 650 mg, 650 mg, Rectal, Q6H PRN, Mattie Sanchez MD    polyethylene glycol (GLYCOLAX) packet 17 g, 17 g, Oral, Daily PRN, Mattie Sanchez MD    atorvastatin (LIPITOR) tablet 40 mg, 40 mg, Oral, Nightly, Mattie Sanchez MD, 40 mg at 03/10/22 2050    potassium chloride (KLOR-CON M) extended release tablet 40 mEq, 40 mEq, Oral, PRN **OR** potassium bicarb-citric acid (EFFER-K) effervescent tablet 40 mEq, 40 mEq, Oral, PRN **OR** potassium chloride 10 mEq/100 mL IVPB (Peripheral Line), 10 mEq, IntraVENous, PRN, Elihu MD Daniel    magnesium sulfate 2,000 mg in dextrose 5 % 100 mL IVPB, 2,000 mg, IntraVENous, PRN, Yolanda Hodgson MD    enoxaparin (LOVENOX) injection 40 mg, 40 mg, SubCUTAneous, Daily, Yolanda Hodgson MD, 40 mg at 03/10/22 0842    aspirin EC tablet 81 mg, 81 mg, Oral, Daily, Yolanda Hodgson MD, 81 mg at 03/10/22 0842    hydrALAZINE (APRESOLINE) tablet 10 mg, 10 mg, Oral, Q8H PRN, Yolanda Hodgson MD     PHYSICAL EXAM:    Physical Examination:    /63   Pulse 64   Temp 97.5 °F (36.4 °C) (Oral)   Resp 18   Ht 6' 3\" (1.905 m)   Wt 145 lb 8.1 oz (66 kg)   SpO2 97%   BMI 18.19 kg/m²    Constitutional and General Appearance: alert, cooperative, no distress and appears stated age  HEENT: PERRL, no cervical lymphadenopathy. No masses palpable. Normal oral mucosa  Respiratory:  · Normal excursion and expansion without use of accessory muscles  · Resp Auscultation: Good respiratory effort. No for increased work of breathing. On auscultation: clear  Cardiovascular:  · Heart tones are crisp and normal. regular S1 and S2. Murmurs: none  · Jugular venous pulsation Normal  Abdomen:  · No masses or tenderness  · Bowel sounds present  Extremities:  ·  No Cyanosis or Clubbing  ·  Lower extremity edema: none  ·  Skin: Warm and dry  Neurological:  · Alert and oriented. · Moves all extremities well  · No abnormalities of mood, affect, memory, mentation, or behavior are noted    DATA:    Diagnostics:      EKG:   Sinus TWI inferolateral      Labs:     CBC:   Recent Labs     03/09/22  1200 03/10/22  0441   WBC 7.0 5.8   HGB 12.8* 12.0*   HCT 39.9* 37.4*    276     BMP:   Recent Labs     03/10/22  0441 03/11/22  0515    140   K 3.7 3.8   CO2 24 21   BUN 26* 15   CREATININE 1.00 0.90   LABGLOM >60 >60   GLUCOSE 99 94     BNP: No results for input(s): BNP in the last 72 hours. PT/INR: No results for input(s): PROTIME, INR in the last 72 hours. APTT:No results for input(s): APTT in the last 72 hours.   CARDIAC ENZYMES:  Recent Labs     03/09/22  1418 03/09/22  1730   TROPHS 20 20 FASTING LIPID PANEL:  Lab Results   Component Value Date    HDL 46 03/10/2022    TRIG 49 03/10/2022     LIVER PROFILE:  Recent Labs     03/09/22  1418   AST 18   ALT 10   LABALBU 3.8     Nuclear Stress 3/10/22:  Impression       Significant stress-induced ischemia in the lateral wall and septum.       Infarct in the apex encroaching into the apical inferior wall and also in the   basal inferior wall.       LVEF 34%       Risk stratification: High risk. IMPRESSION:    - CP- concern for angina  - Abnormal stress test 3/10/22- ischemia in lateral and septal walls- with EF 34%  - HTN  - DL  - cocaine use    RECOMMENDATIONS:  - needs to stop cocaine use  - check 2d echo  - needs cardiac cath  - I have recommend left heart catheterization with possible PCI. I have discussed risks (including but not limited to vascular injury, infection, hematoma, contrast induced kidney dysfunction, CVA and MI), benefits, alternatives in detail. All questions answered. Patient agrees to proceed. Discussed with patient and nursing. Thank you for allowing me to participate in the care of this patient, please do not hesitate to call if you have any questions. Paco Aguilar DO, [facility], [address], [address], [place] [address] [facility]  [facility]  [phone]

## 2022-03-11 NOTE — PLAN OF CARE
Problem: Skin Integrity:  Goal: Will show no infection signs and symptoms  Description: Will show no infection signs and symptoms  3/11/2022 0228 by Tejas Larose RN  Outcome: Ongoing     Problem: Skin Integrity:  Goal: Absence of new skin breakdown  Description: Absence of new skin breakdown  Outcome: Ongoing

## 2022-03-11 NOTE — H&P
[facility]  Procedure History and Physical Update          Patient Name: Farida Meade  MRN:    [id number]  YOB: 1955  Date of evaluation:  3/11/2022    Procedure:    Cardiac cath +/- PCI    Indication for procedure:  Abnormal stress test      Please refer to the office note completed by Dr. Jessika Upton on 03/11/0/2022 in the medical record and note that:    [x] I have examined the patient and reviewed the H&P/Consult and there are no changes to be made to the assessment or plan. [] I have examined the patient and reviewed the H&P/Consult and have noted the following changes:    Past Medical History:   Diagnosis Date    Alcohol abuse 6/23/2015    Arthritis     Chronic kidney disease     Full dentures     upper and lower full    Hypertension     Pure hypercholesterolemia 49/6/2633    Systolic CHF ([address]     Wears glasses        Past Surgical History:   Procedure Laterality Date    APPENDECTOMY      HIP FRACTURE SURGERY Left     W/ HARDWARE    [facility] EGD TRANSORAL BIOPSY SINGLE/MULTIPLE  6/19/2017    EGD BIOPSY performed by Jorge Deleon DO at \[facility]"" Endoscopy       Family History   Problem Relation Age of Onset    Diabetes Mother     Cancer Mother        No Known Allergies    Prior to Admission medications    Medication Sig Start Date End Date Taking? Authorizing Provider   nitroGLYCERIN (NITROSTAT) 0.3 MG SL tablet Place 0.3 mg under the tongue every 5 minutes as needed for Chest pain up to max of 3 total doses. If no relief after 1 dose, call 911.     Historical Provider, MD   ibuprofen (ADVIL;MOTRIN) 800 MG tablet Take 1 tablet by mouth every 8 hours as needed for Pain 4/25/18   Gumaro Clemons MD   lisinopril (PRINIVIL;ZESTRIL) 10 MG tablet Take 1 tablet by mouth daily 3/12/18   Nohemi Vicente MD   pravastatin (PRAVACHOL) 20 MG tablet Take 1 tablet by mouth every evening 3/11/18   Nohemi Vicente MD   amLODIPine (NORVASC) 5 MG tablet Take 1 tablet by mouth daily 3/12/18 Arron Tinajero MD   aspirin 81 MG EC tablet Take 1 tablet by mouth daily 3/12/18   Arron Tinajero MD   tamsulosin [facility]) 0.4 MG capsule Take 1 capsule by mouth daily 3/12/18   Arron Tinajero MD   acetaminophen (TYLENOL 8 HOUR) 650 MG extended release tablet Take 1 tablet by mouth every 8 hours as needed for Pain 9/20/17   Joey Cunha MD         Vitals:    03/11/22 1200   BP: (!) 158/77   Pulse: 58   Resp: 20   SpO2: 99%       Constitutional and General Appearance:   alert, cooperative, no distress and appears stated age  HEENT:  · PERRL, EOMI  Respiratory:  · Normal excursion and expansion without use of accessory muscles  · Resp Auscultation:  Good respiratory effort. No for increased work of breathing. On auscultation: clear to auscultation bilaterally  Cardiovascular:  · Regular rate and rhythm. · S1/S2  · No murmurs. · The apical impulse is not displaced  Abdomen:  · Soft  · Bowel sounds present  · Non-tender to palpation  Extremities:  · No cyanosis or clubbing  · Lower extremity edema: No.  Skin:  · Warm and dry  Neurological:  · Alert and oriented. · Moves all extremities well      Plan:  · Proceed with planned procedure. · Further orders to follow. Pre Procedure Conscious Sedation Data:     ASA Class:                  [] I [] II [x] III [] IV     Mallampati Class:       [] I [] II [x] III [] IV    Risks, benefits, and alternatives of cardiac catheterization were discussed, in detail, with patient. Risks include, but not limited to, bleeding, requiring blood transfusion, vascular complication requiring surgery, renal failure with need of dialysis, CVA, MI, death and anesthesia complications including intubation were discussed. Patient verbalized understanding and agreed to proceed with the procedure understanding the above risks and alternatives to the procedure.         Electronically signed on 03/11/22 at 1:39 PM by:    Hui Cabrera MD, MD   Fellow, [facility]. [facility]

## 2022-03-12 ENCOUNTER — APPOINTMENT (OUTPATIENT)
Dept: CT IMAGING | Age: 67
DRG: 233 | End: 2022-03-12
Attending: INTERNAL MEDICINE
Payer: MEDICARE

## 2022-03-12 LAB
PARTIAL THROMBOPLASTIN TIME: 37.9 SEC (ref 20.5–30.5)
PARTIAL THROMBOPLASTIN TIME: 64 SEC (ref 20.5–30.5)
PARTIAL THROMBOPLASTIN TIME: 65.6 SEC (ref 20.5–30.5)

## 2022-03-12 PROCEDURE — 93970 EXTREMITY STUDY: CPT

## 2022-03-12 PROCEDURE — 85730 THROMBOPLASTIN TIME PARTIAL: CPT

## 2022-03-12 PROCEDURE — 2060000000 HC ICU INTERMEDIATE R&B

## 2022-03-12 PROCEDURE — 2580000003 HC RX 258: Performed by: STUDENT IN AN ORGANIZED HEALTH CARE EDUCATION/TRAINING PROGRAM

## 2022-03-12 PROCEDURE — 6370000000 HC RX 637 (ALT 250 FOR IP): Performed by: STUDENT IN AN ORGANIZED HEALTH CARE EDUCATION/TRAINING PROGRAM

## 2022-03-12 PROCEDURE — 93931 UPPER EXTREMITY STUDY: CPT

## 2022-03-12 PROCEDURE — 36415 COLL VENOUS BLD VENIPUNCTURE: CPT

## 2022-03-12 PROCEDURE — 71250 CT THORAX DX C-: CPT

## 2022-03-12 PROCEDURE — 6360000002 HC RX W HCPCS: Performed by: STUDENT IN AN ORGANIZED HEALTH CARE EDUCATION/TRAINING PROGRAM

## 2022-03-12 PROCEDURE — 93880 EXTRACRANIAL BILAT STUDY: CPT

## 2022-03-12 PROCEDURE — 6370000000 HC RX 637 (ALT 250 FOR IP): Performed by: SURGERY

## 2022-03-12 RX ORDER — LISINOPRIL 10 MG/1
10 TABLET ORAL DAILY
Status: DISCONTINUED | OUTPATIENT
Start: 2022-03-12 | End: 2022-03-12

## 2022-03-12 RX ORDER — PRAVASTATIN SODIUM 20 MG
20 TABLET ORAL EVERY EVENING
Status: DISCONTINUED | OUTPATIENT
Start: 2022-03-12 | End: 2022-03-12

## 2022-03-12 RX ORDER — TAMSULOSIN HYDROCHLORIDE 0.4 MG/1
0.4 CAPSULE ORAL DAILY
Status: DISCONTINUED | OUTPATIENT
Start: 2022-03-12 | End: 2022-04-08 | Stop reason: HOSPADM

## 2022-03-12 RX ORDER — AMLODIPINE BESYLATE 5 MG/1
5 TABLET ORAL DAILY
Status: DISCONTINUED | OUTPATIENT
Start: 2022-03-12 | End: 2022-03-12

## 2022-03-12 RX ADMIN — SODIUM CHLORIDE, PRESERVATIVE FREE 10 ML: 5 INJECTION INTRAVENOUS at 22:30

## 2022-03-12 RX ADMIN — SODIUM CHLORIDE, PRESERVATIVE FREE 10 ML: 5 INJECTION INTRAVENOUS at 08:00

## 2022-03-12 RX ADMIN — HEPARIN SODIUM 1980 UNITS: 1000 INJECTION INTRAVENOUS; SUBCUTANEOUS at 06:21

## 2022-03-12 RX ADMIN — ASPIRIN 81 MG: 81 TABLET, COATED ORAL at 08:00

## 2022-03-12 RX ADMIN — DESMOPRESSIN ACETATE 40 MG: 0.2 TABLET ORAL at 22:30

## 2022-03-12 RX ADMIN — TAMSULOSIN HYDROCHLORIDE 0.4 MG: 0.4 CAPSULE ORAL at 14:21

## 2022-03-12 ASSESSMENT — PAIN SCALES - GENERAL
PAINLEVEL_OUTOF10: 0

## 2022-03-12 ASSESSMENT — PAIN - FUNCTIONAL ASSESSMENT
PAIN_FUNCTIONAL_ASSESSMENT: ACTIVITIES ARE NOT PREVENTED
PAIN_FUNCTIONAL_ASSESSMENT: ACTIVITIES ARE NOT PREVENTED

## 2022-03-12 NOTE — PROGRESS NOTES
[facility]  Progress Note                   Date:   3/12/2022  Patient name: José Miguel Hernandez  Date of admission:  3/11/2022  7:15 PM  MRN:   [id number]  YOB: 1955  PCP: Karina Key MD    Reason for Admission: Chest pain [R07.9]  S/P cardiac catheterization [Z98.890]  3-vessel CAD [I25.10]    Subjective:       Clinical Changes /Abnormalities: Patient seen and examined. Denies chest pain or shortness of breath. Tele/vitals/labs reviewed . Discussed case with RN. MVD on  cardiac cath yesterday. SR on monitor      Review of Systems    Medications:   Scheduled Meds:   tamsulosin  0.4 mg Oral Daily    aspirin  81 mg Oral Daily    atorvastatin  40 mg Oral Nightly    sodium chloride flush  5-40 mL IntraVENous 2 times per day     Continuous Infusions:   sodium chloride      sodium chloride      heparin (PORCINE) Infusion 14 Units/kg/hr (03/12/22 0700)     CBC:   Recent Labs     03/10/22  0441 03/11/22  2155   WBC 5.8 6.6   HGB 12.0* 11.7*    265     BMP:    Recent Labs     03/09/22  1418 03/10/22  0441 03/11/22  0515    139 140   K 3.8 3.7 3.8    106 109*   CO2 23 24 21   BUN 19 26* 15   CREATININE 1.03 1.00 0.90   GLUCOSE 58* 99 94     Hepatic:  Recent Labs     03/09/22  1418   AST 18   ALT 10   BILITOT 0.59   ALKPHOS 98     Troponin:   Recent Labs     03/09/22  1418 03/09/22  1730   TROPHS 20 20     BNP: No results for input(s): BNP in the last 72 hours. Lipids:   Recent Labs     03/10/22  0441   CHOL 108   HDL 46     INR: No results for input(s): INR in the last 72 hours. 3/11/2022- cardiac cath     Findings:   Angiographic Findings        Cardiac Arteries and Lesion Findings       LMCA: has heavy calcification with mid 60% stenosis. LAD: has heavy calcification in the proximal segment with 40% stenosis. The   mid segment has 85% stenosis. LCx: has heavy calcification with ostial 99% stenosis.  The OM1 has 100%   occlusion with left to left collaterals. The OM2 has 100% occlusion with   left to left collaterals. The LPDA is large and is normal.     RCA: is a non-dominant vessel and has proximal 90% stenosis.      Coronary Tree        Dominance: Right       LV Analysis   LV function assessed as:Abnormal.   Ejection Fraction   +----------------------------------------------------------------------+---+   ! Method                                                                !EF%! +----------------------------------------------------------------------+---+   ! LV gram                                                               !30 !   +----------------------------------------------------------------------+---+       Procedure Summary        Three vessel disease with left main involvement.    Moderate LV systolic dysfunction. LVEF 30%.      Recommendations        Medical therapy as needed.    Risk factor modification.  CABG evaluation by CT surgery. Nuclear Stress 3/10/22:  Impression       Significant stress-induced ischemia in the lateral wall and septum.       Infarct in the apex encroaching into the apical inferior wall and also in the   basal inferior wall.       LVEF 34%       Risk stratification: High risk. Objective:   Vitals: /66   Pulse 61   Temp 97.9 °F (36.6 °C) (Oral)   Resp 17   Ht 6' 3\" (1.905 m)   Wt 138 lb 3.2 oz (62.7 kg)   SpO2 93%   BMI 17.27 kg/m²   General appearance: alert and cooperative with exam  HEENT: Head: Normocephalic, no lesions, without obvious abnormality.   Neck:no JVD, trachea midline, no adenopathy  Lungs: Clear to auscultation  Heart: Regular rate and rhythm, s1/s2 auscultated, no murmurs, right grion no hematoma or bleeding   Abdomen: soft, non-tender, bowel sounds active  Extremities: no edema  Neurologic: not done        Assessment / Acute Cardiac Problems:   - CP- concern for angina  - Abnormal stress test 3/10/22- ischemia in lateral and septal walls- with EF 34%  - HTN  - DL  - cocaine use    Patient Active Problem List:     CKD (chronic kidney disease) stage 3, GFR 30-59 ml/min (HCC)     Essential hypertension     Arthritis of right hip     Chronic systolic congestive heart failure (HCC)     Benign prostatic hyperplasia with urinary obstruction     Pure hypercholesterolemia     Abdominal pain     Chest pain     Chronic combined systolic and diastolic congestive heart failure (HCC)     S/P cardiac catheterization     3-vessel CAD      Plan of Treatment:   1. MVD on cardiac cath - continue heparin , statin , asa for now , systolic Blood pressure in low 100's   2.  Awaiting CTS recommendation     Electronically signed by JACKELYN Gamboa NP on 3/12/2022 at 12:14 PM  [address]  [phone]

## 2022-03-12 NOTE — PROGRESS NOTES
Writer entered patient's room to adjust heparin drip rate. IV site was swollen and firm to touch. Writer stopped heparin drip at 0545 and notified Dr. Leo Guzman. Writer removed IV and held pressure for approximately 10 minutes. New IV access was obtained. Heparin drip was restarted and rate was adjusted (see MAR). Original IV site now soft to touch.

## 2022-03-13 LAB
HCT VFR BLD CALC: 38.1 % (ref 40.7–50.3)
HEMOGLOBIN: 11.5 G/DL (ref 13–17)
LV EF: 53 %
LVEF MODALITY: NORMAL
MCH RBC QN AUTO: 26.9 PG (ref 25.2–33.5)
MCHC RBC AUTO-ENTMCNC: 30.2 G/DL (ref 28.4–34.8)
MCV RBC AUTO: 89.2 FL (ref 82.6–102.9)
NRBC AUTOMATED: 0 PER 100 WBC
PARTIAL THROMBOPLASTIN TIME: 48.9 SEC (ref 20.5–30.5)
PARTIAL THROMBOPLASTIN TIME: 68.6 SEC (ref 20.5–30.5)
PARTIAL THROMBOPLASTIN TIME: 72.4 SEC (ref 20.5–30.5)
PDW BLD-RTO: 15.5 % (ref 11.8–14.4)
PLATELET # BLD: 264 K/UL (ref 138–453)
PMV BLD AUTO: 10.3 FL (ref 8.1–13.5)
RBC # BLD: 4.27 M/UL (ref 4.21–5.77)
WBC # BLD: 6.7 K/UL (ref 3.5–11.3)

## 2022-03-13 PROCEDURE — 2060000000 HC ICU INTERMEDIATE R&B

## 2022-03-13 PROCEDURE — 2580000003 HC RX 258: Performed by: STUDENT IN AN ORGANIZED HEALTH CARE EDUCATION/TRAINING PROGRAM

## 2022-03-13 PROCEDURE — 6370000000 HC RX 637 (ALT 250 FOR IP): Performed by: STUDENT IN AN ORGANIZED HEALTH CARE EDUCATION/TRAINING PROGRAM

## 2022-03-13 PROCEDURE — 6370000000 HC RX 637 (ALT 250 FOR IP): Performed by: SURGERY

## 2022-03-13 PROCEDURE — 36415 COLL VENOUS BLD VENIPUNCTURE: CPT

## 2022-03-13 PROCEDURE — 93306 TTE W/DOPPLER COMPLETE: CPT

## 2022-03-13 PROCEDURE — 99221 1ST HOSP IP/OBS SF/LOW 40: CPT | Performed by: NURSE PRACTITIONER

## 2022-03-13 PROCEDURE — 85027 COMPLETE CBC AUTOMATED: CPT

## 2022-03-13 PROCEDURE — 85730 THROMBOPLASTIN TIME PARTIAL: CPT

## 2022-03-13 PROCEDURE — 6360000002 HC RX W HCPCS: Performed by: STUDENT IN AN ORGANIZED HEALTH CARE EDUCATION/TRAINING PROGRAM

## 2022-03-13 RX ADMIN — TAMSULOSIN HYDROCHLORIDE 0.4 MG: 0.4 CAPSULE ORAL at 08:13

## 2022-03-13 RX ADMIN — Medication 13.94 UNITS/KG/HR: at 04:13

## 2022-03-13 RX ADMIN — SODIUM CHLORIDE, PRESERVATIVE FREE 10 ML: 5 INJECTION INTRAVENOUS at 08:19

## 2022-03-13 RX ADMIN — METOPROLOL TARTRATE 12.5 MG: 25 TABLET ORAL at 21:16

## 2022-03-13 RX ADMIN — DESMOPRESSIN ACETATE 40 MG: 0.2 TABLET ORAL at 21:16

## 2022-03-13 RX ADMIN — ASPIRIN 81 MG: 81 TABLET, COATED ORAL at 08:13

## 2022-03-13 RX ADMIN — HEPARIN SODIUM 1980 UNITS: 1000 INJECTION INTRAVENOUS; SUBCUTANEOUS at 16:18

## 2022-03-13 ASSESSMENT — PAIN SCALES - GENERAL
PAINLEVEL_OUTOF10: 0

## 2022-03-13 NOTE — FLOWSHEET NOTE
SPIRITUAL CARE PROGRESS NOTE        Spiritual Assessment: Patient was welcoming and receptive to a visit from [facility]. Patient engaged in conversation. .  Patient expressed feeling \"fine\". Patient denies any emotional distress or any profoundly negative feelings. Patient denies having spiritual beliefs which suppourt him. Patient denies experiencing any spiritual distress at this time. Patient is well connected with friends and family who are communicating with the patient regularly.  Intervention: I provided compassionate/active listening and validated patient's feelings, concerns, and experiences.  inquired about patient's support network.  facilitated discussion related to eduardo issues and offered a prayer. Patient was not receptive to a prayer at this time.  Outcome: Patient expressed gratitude for my visit.      Chaplains will remain available to offer spiritual and emotional support upon request.       03/13/22 1906   Encounter Summary   Services provided to: Patient   Referral/Consult From: [facility] Family members   Continue Visiting   (3/13/22)   Complexity of Encounter Low   Length of Encounter 15 minutes   Routine   Type Initial   Assessment Calm   Intervention Active listening;Explored feelings, thoughts, concerns   Outcome Expressed gratitude       Electronically signed by Chaplain Resident Kathy Strickland MDiv.on 3/13/2022 at 7:07 PM   [facility] [facility]  [phone]

## 2022-03-13 NOTE — CARE COORDINATION
Case Management Initial Discharge Plan  Kerrie,             Met with:patient to discuss discharge plans. Information verified: address, contacts, phone number, , insurance Yes  Insurance Provider: [facility]    Emergency Contact/Next of Kin name & number: Magno Reyes [phone] (cousin)  Who are involved in patient's support system? Sarai    PCP: Dary Posada MD  Date of last visit: unsure      Discharge Planning    Living Arrangements:  Family Members     Home has 2 stories  4 stairs to climb to get into front door, 1 flightstairs to climb to reach second floor  Location of bedroom/bathroom in home he lives with his cousin and he lives on second floor of house    Patient able to perform ADL's:Independent    Current Services (outpatient & in home) none  DME equipment: electric wheelchair  DME provider:      Is patient receiving oral anticoagulation therapy? No    If indicated:   Physician managing anticoagulation treatment:    Where does patient obtain lab work for ATC treatment? Potential Assistance Needed:  Durable Medical Equipment    Patient agreeable to home care: No  [place] of choice provided:  n/a    Prior SNF/Rehab Placement and Facility:    Agreeable to SNF/Rehab: No  [place] of choice provided: n/a     Evaluation: no    Expected Discharge date:  22    Patient expects to be discharged to: If home: is the family and/or caregiver wiling & able to provide support at home? Yes   Who will be providing this support? Sarai    Follow Up Appointment: Best Day/ Time: Monday AM    Transportation provider: can get a ride  Transportation arrangements needed for discharge: No     Readmission Risk              Risk of Unplanned Readmission:  9             Does patient have a readmission risk score greater than 14?: No  If yes, follow-up appointment must be made within 7 days of discharge.      Goals of Care:       Educated patient on transitional options, provided freedom of choice and are agreeable with plan      Discharge Plan: home, can get a ride          Electronically signed by Gera Hernandez RN on 3/13/22 at 9:51 AM EDT

## 2022-03-13 NOTE — PROGRESS NOTES
[facility]  Progress Note                   Date:   3/13/2022  Patient name: Trisha Jarquin  Date of admission:  3/11/2022  7:15 PM  MRN:   [id number]  YOB: 1955  PCP: Sun Franco MD    Reason for Admission: Chest pain [R07.9]  S/P cardiac catheterization [Z98.890]  3-vessel CAD [I25.10]    Subjective:       Clinical Changes /Abnormalities: Patient seen and examined. Denies chest pain or shortness of breath. Tele/vitals/labs reviewed . Discussed case with RN. SR on monitor      Review of Systems    Medications:   Scheduled Meds:   tamsulosin  0.4 mg Oral Daily    aspirin  81 mg Oral Daily    atorvastatin  40 mg Oral Nightly    sodium chloride flush  5-40 mL IntraVENous 2 times per day     Continuous Infusions:   sodium chloride      sodium chloride      heparin (PORCINE) Infusion 12 Units/kg/hr (03/13/22 1108)     CBC:   Recent Labs     03/11/22  2155 03/13/22  0629   WBC 6.6 6.7   HGB 11.7* 11.5*    264     BMP:    Recent Labs     03/11/22  0515      K 3.8   *   CO2 21   BUN 15   CREATININE 0.90   GLUCOSE 94     Hepatic:  No results for input(s): AST, ALT, ALB, BILITOT, ALKPHOS in the last 72 hours. Troponin:   No results for input(s): TROPHS in the last 72 hours. BNP: No results for input(s): BNP in the last 72 hours. Lipids:   No results for input(s): CHOL, HDL in the last 72 hours. Invalid input(s): LDLCALCU  INR: No results for input(s): INR in the last 72 hours. 3/11/2022- cardiac cath     Findings:   Angiographic Findings        Cardiac Arteries and Lesion Findings       LMCA: has heavy calcification with mid 60% stenosis. LAD: has heavy calcification in the proximal segment with 40% stenosis. The   mid segment has 85% stenosis. LCx: has heavy calcification with ostial 99% stenosis. The OM1 has 100%   occlusion with left to left collaterals. The OM2 has 100% occlusion with   left to left collaterals.  The LPDA is large and is normal.     RCA: is a non-dominant vessel and has proximal 90% stenosis.      Coronary Tree        Dominance: Right       LV Analysis   LV function assessed as:Abnormal.   Ejection Fraction   +----------------------------------------------------------------------+---+   ! Method                                                                !EF%! +----------------------------------------------------------------------+---+   ! LV gram                                                               !30 !   +----------------------------------------------------------------------+---+       Procedure Summary        Three vessel disease with left main involvement.    Moderate LV systolic dysfunction. LVEF 30%.      Recommendations        Medical therapy as needed.    Risk factor modification.  CABG evaluation by CT surgery. Nuclear Stress 3/10/22:  Impression       Significant stress-induced ischemia in the lateral wall and septum.       Infarct in the apex encroaching into the apical inferior wall and also in the   basal inferior wall.       LVEF 34%       Risk stratification: High risk. Objective:   Vitals: /70   Pulse 81   Temp 98 °F (36.7 °C) (Oral)   Resp 21   Ht 6' 3\" (1.905 m)   Wt 136 lb 9.6 oz (62 kg)   SpO2 95%   BMI 17.07 kg/m²   General appearance: alert and cooperative with exam  HEENT: Head: Normocephalic, no lesions, without obvious abnormality.   Neck:no JVD, trachea midline, no adenopathy  Lungs: Clear to auscultation  Heart: Regular rate and rhythm, s1/s2 auscultated, no murmurs, right grion no hematoma or bleeding   Abdomen: soft, non-tender, bowel sounds active  Extremities: no edema  Neurologic: not done        Assessment / Acute Cardiac Problems:   - CP- concern for angina  - Abnormal stress test 3/10/22- ischemia in lateral and septal walls- with EF 34%  - HTN  - DL  - cocaine use    Patient Active Problem List:     CKD (chronic kidney disease) stage 3, GFR 30-59 ml/min [facility])     Essential hypertension     Arthritis of right hip     Chronic systolic congestive heart failure (HCC)     Benign prostatic hyperplasia with urinary obstruction     Pure hypercholesterolemia     Abdominal pain     Chest pain     Chronic combined systolic and diastolic congestive heart failure (HCC)     S/P cardiac catheterization     3-vessel CAD      Plan of Treatment:   1. MVD on cardiac cath -clinically no fluid overload , continue heparin , statin , asa for now , will start low dose BB   2. Appreciate CTS recommendation -preop work-up for surgery in process  3. Echo pending  4.  Keep K.4, Mg >2    Electronically signed by JACKELYN Collado - NP on 3/13/2022 at 4:01 PM  [address]  [phone]

## 2022-03-14 LAB — PARTIAL THROMBOPLASTIN TIME: 62.7 SEC (ref 20.5–30.5)

## 2022-03-14 PROCEDURE — 85730 THROMBOPLASTIN TIME PARTIAL: CPT

## 2022-03-14 PROCEDURE — 6370000000 HC RX 637 (ALT 250 FOR IP): Performed by: SURGERY

## 2022-03-14 PROCEDURE — 94729 DIFFUSING CAPACITY: CPT

## 2022-03-14 PROCEDURE — 94726 PLETHYSMOGRAPHY LUNG VOLUMES: CPT

## 2022-03-14 PROCEDURE — 6360000002 HC RX W HCPCS: Performed by: STUDENT IN AN ORGANIZED HEALTH CARE EDUCATION/TRAINING PROGRAM

## 2022-03-14 PROCEDURE — 94664 DEMO&/EVAL PT USE INHALER: CPT

## 2022-03-14 PROCEDURE — 2580000003 HC RX 258: Performed by: STUDENT IN AN ORGANIZED HEALTH CARE EDUCATION/TRAINING PROGRAM

## 2022-03-14 PROCEDURE — 94060 EVALUATION OF WHEEZING: CPT

## 2022-03-14 PROCEDURE — 94640 AIRWAY INHALATION TREATMENT: CPT

## 2022-03-14 PROCEDURE — 2060000000 HC ICU INTERMEDIATE R&B

## 2022-03-14 PROCEDURE — 6370000000 HC RX 637 (ALT 250 FOR IP): Performed by: STUDENT IN AN ORGANIZED HEALTH CARE EDUCATION/TRAINING PROGRAM

## 2022-03-14 PROCEDURE — 36415 COLL VENOUS BLD VENIPUNCTURE: CPT

## 2022-03-14 RX ADMIN — METOPROLOL TARTRATE 12.5 MG: 25 TABLET ORAL at 10:08

## 2022-03-14 RX ADMIN — DESMOPRESSIN ACETATE 40 MG: 0.2 TABLET ORAL at 20:50

## 2022-03-14 RX ADMIN — ACETAMINOPHEN 650 MG: 325 TABLET ORAL at 20:50

## 2022-03-14 RX ADMIN — TAMSULOSIN HYDROCHLORIDE 0.4 MG: 0.4 CAPSULE ORAL at 10:08

## 2022-03-14 RX ADMIN — Medication 13.94 UNITS/KG/HR: at 13:30

## 2022-03-14 RX ADMIN — METOPROLOL TARTRATE 12.5 MG: 25 TABLET ORAL at 20:50

## 2022-03-14 RX ADMIN — ASPIRIN 81 MG: 81 TABLET, COATED ORAL at 10:08

## 2022-03-14 ASSESSMENT — PAIN SCALES - GENERAL
PAINLEVEL_OUTOF10: 3
PAINLEVEL_OUTOF10: 0
PAINLEVEL_OUTOF10: 1
PAINLEVEL_OUTOF10: 0
PAINLEVEL_OUTOF10: 0
PAINLEVEL_OUTOF10: 3
PAINLEVEL_OUTOF10: 0

## 2022-03-14 ASSESSMENT — PAIN DESCRIPTION - LOCATION: LOCATION: HIP;KNEE

## 2022-03-14 ASSESSMENT — PAIN DESCRIPTION - PAIN TYPE: TYPE: CHRONIC PAIN

## 2022-03-14 ASSESSMENT — PAIN DESCRIPTION - FREQUENCY: FREQUENCY: CONTINUOUS

## 2022-03-14 ASSESSMENT — PAIN DESCRIPTION - ONSET: ONSET: ON-GOING

## 2022-03-14 NOTE — PROCEDURES
[address] [facility]                               PULMONARY FUNCTION    PATIENT NAME: Misti Carmona                      :        1955  MED REC NO:   [id number]                             ROOM:         ACCOUNT NO:   [de-identified]                           ADMIT DATE: 2022  PROVIDER:     Gregg López    DATE OF PROCEDURE:  2022    The spirometry shows FVC is 3.13, 72% predicted. FEV1 is 1.54, 46%  predicted, consistent with moderate-to-severe obstructive ventilatory  impairment. Post bronchodilator, there is significant improvement in  FEV1 and both FVC consistent with bronchospasticity in response to  bronchodilator. Lung volume shows residual volume is 3.39, 126%  predicted consistent with mild airway trapping or hyperinflation. Total  lung capacity is 6.75, 84% predicted, which is within normal limits. Diffusion capacity is 9.50, 24% predicted consistent with severe  reduction in diffusion capacity which could be secondary to emphysema,  pulmonary vascular disease or intraparenchymal lung disease. Clinical  correlation is recommended. IMPRESSION:  This pulmonary function test is consistent with moderate to  severe obstructive ventilatory impairment with significant response to  bronchodilator. Lung volume is suggestive of mild airway  trapping/hyperinflation. Diffusion capacity is severely reduced, which  is likely secondary to emphysema or concomitant pulmonary vascular  disease, intraparenchymal lung disease. Clinical correlation is  recommended.         Dmitriy Damian    D: 2022 11:14:54       T: 2022 11:18:31     [email or web address]  Job#: [id number]     Doc#: [id number]    CC:

## 2022-03-14 NOTE — PLAN OF CARE
Problem: Pain:  Goal: Pain level will decrease  Description: Pain level will decrease  3/13/2022 2328 by Elizabeth Arriola RN  Outcome: Ongoing  3/13/2022 1359 by Marcus Knox RN  Outcome: Met This Shift  Goal: Control of acute pain  Description: Control of acute pain  3/13/2022 2328 by Elizabeth Arriola RN  Outcome: Ongoing  3/13/2022 1359 by Marcus Knox RN  Outcome: Met This Shift  Goal: Control of chronic pain  Description: Control of chronic pain  3/13/2022 2328 by Elizabeth Arriola RN  Outcome: Ongoing  3/13/2022 1359 by Marcus Knox RN  Outcome: Met This Shift     Problem: Skin Integrity:  Goal: Will show no infection signs and symptoms  Description: Will show no infection signs and symptoms  3/13/2022 2328 by Elizabeth Arriola RN  Outcome: Ongoing  3/13/2022 1359 by Marcus Knox RN  Outcome: Met This Shift  Goal: Absence of new skin breakdown  Description: Absence of new skin breakdown  3/13/2022 2328 by Elizabeth Arriola RN  Outcome: Ongoing  3/13/2022 1359 by Marcus Knox RN  Outcome: Met This Shift     Problem: Falls - Risk of:  Goal: Will remain free from falls  Description: Will remain free from falls  3/13/2022 2328 by Elizabeth Arriola RN  Outcome: Ongoing  3/13/2022 1359 by Marcus Knox RN  Outcome: Met This Shift  Goal: Absence of physical injury  Description: Absence of physical injury  3/13/2022 2328 by Elizabeth Arriola RN  Outcome: Ongoing  3/13/2022 1359 by Marcus Knox RN  Outcome: Met This Shift

## 2022-03-14 NOTE — PROGRESS NOTES
Writer entered pt room to replace pulse ox. Pt allowed writer to replace pulse ox but then stated \"I am not keeping this on\". Writer educated pt on importance of continuous monitoring. Pt stated \"Ill just take it off when you leave\".

## 2022-03-14 NOTE — PROGRESS NOTES
[facility]  Progress Note                   Date:   3/14/2022  Patient name: Toi Montalvo  Date of admission:  3/11/2022  7:15 PM  MRN:   [id number]  YOB: 1955  PCP: Markie Crane MD    Reason for Admission: Chest pain [R07.9]  S/P cardiac catheterization [Z98.890]  3-vessel CAD [I25.10]    Subjective:       Clinical Changes /Abnormalities: Patient seen and examined. Denies chest pain or shortness of breath, resting comfortably with no distress . Tele/vitals/labs reviewed . . SR on monitor      Review of Systems    Medications:   Scheduled Meds:   metoprolol tartrate  12.5 mg Oral BID    tamsulosin  0.4 mg Oral Daily    aspirin  81 mg Oral Daily    atorvastatin  40 mg Oral Nightly    sodium chloride flush  5-40 mL IntraVENous 2 times per day     Continuous Infusions:   sodium chloride      sodium chloride      heparin (PORCINE) Infusion 14 Units/kg/hr (03/14/22 0700)     CBC:   Recent Labs     03/11/22  2155 03/13/22  0629   WBC 6.6 6.7   HGB 11.7* 11.5*    264     BMP:    No results for input(s): NA, K, CL, CO2, BUN, CREATININE, GLUCOSE in the last 72 hours. Hepatic:  No results for input(s): AST, ALT, ALB, BILITOT, ALKPHOS in the last 72 hours. Troponin:   No results for input(s): TROPHS in the last 72 hours. BNP: No results for input(s): BNP in the last 72 hours. Lipids:   No results for input(s): CHOL, HDL in the last 72 hours. Invalid input(s): LDLCALCU  INR: No results for input(s): INR in the last 72 hours. 3/11/2022- cardiac cath     Findings:   Angiographic Findings        Cardiac Arteries and Lesion Findings       LMCA: has heavy calcification with mid 60% stenosis. LAD: has heavy calcification in the proximal segment with 40% stenosis. The   mid segment has 85% stenosis. LCx: has heavy calcification with ostial 99% stenosis. The OM1 has 100%   occlusion with left to left collaterals.  The OM2 has 100% occlusion with   left to left collaterals. The LPDA is large and is normal.     RCA: is a non-dominant vessel and has proximal 90% stenosis.      Coronary Tree        Dominance: Right       LV Analysis   LV function assessed as:Abnormal.   Ejection Fraction   +----------------------------------------------------------------------+---+   ! Method                                                                !EF%! +----------------------------------------------------------------------+---+   ! LV gram                                                               !30 !   +----------------------------------------------------------------------+---+       Procedure Summary        Three vessel disease with left main involvement.    Moderate LV systolic dysfunction. LVEF 30%.      Recommendations        Medical therapy as needed.    Risk factor modification.  CABG evaluation by CT surgery. Nuclear Stress 3/10/22:  Impression       Significant stress-induced ischemia in the lateral wall and septum.       Infarct in the apex encroaching into the apical inferior wall and also in the   basal inferior wall.       LVEF 34%       Risk stratification: High risk. Echo 3/13//2022     Summary  Global left ventricular systolic function is normal. Estimated EF 93-40%  Normal diastolic filling. Normal right ventricular size and function. Mild mitral regurgitation. Mild tricuspid regurgitation. Mild pulmonary hypertension with an estimated right ventricular systolic  pressure of 41 mmHg.   No pericardial effusion.     Signature  ----------------------------------------------------------------------------   Electronically signed by Vesna Rider(Sonographer) on 03/13/2022   12:51 PM  ----------------------------------------------------------------------------     ----------------------------------------------------------------------------   Electronically signed by Vinh Chau(Interpreting physician) on   03/14/2022 09:19 AM    Objective: Vitals: BP (!) 165/86   Pulse 74   Temp 97.3 °F (36.3 °C)   Resp 25   Ht 6' 3\" (1.905 m)   Wt 143 lb (64.9 kg)   SpO2 99%   BMI 17.87 kg/m²   General appearance: alert and cooperative with exam  HEENT: Head: Normocephalic, no lesions, without obvious abnormality. Neck:no JVD, trachea midline, no adenopathy  Lungs: Clear to auscultation  Heart: Regular rate and rhythm, s1/s2 auscultated, no murmurs, right grion no hematoma or bleeding   Abdomen: soft, non-tender, bowel sounds active  Extremities: no edema  Neurologic: not done        Assessment / Acute Cardiac Problems:   - CP- concern for angina  - Abnormal stress test 3/10/22- ischemia in lateral and septal walls- with EF 34%  - HTN  - DL  - cocaine use    Patient Active Problem List:     CKD (chronic kidney disease) stage 3, GFR 30-59 ml/min (HCC)     Essential hypertension     Arthritis of right hip     Chronic systolic congestive heart failure (HCC)     Benign prostatic hyperplasia with urinary obstruction     Pure hypercholesterolemia     Abdominal pain     Chest pain     Chronic combined systolic and diastolic congestive heart failure (HCC)     S/P cardiac catheterization     3-vessel CAD      Plan of Treatment:   1. MVD on cardiac cath -clinically no fluid overload , continue heparin , statin , asa for now , will start low dose BB   2. Appreciate CTS recommendation -preop work-up for surgery in process  3. Echo reviewed as above - preserved LV function with EF 50-55%  4.  Keep K.4, Mg >2    Electronically signed by JACKELYN Jamison NP on 3/14/2022 at 11:16 AM  [address]  [phone]

## 2022-03-14 NOTE — CARE COORDINATION
03/14/22 0854   Readmission Assessment   Number of Days since last admission?   (not a readmit. [facility])

## 2022-03-15 LAB
ACETOHEXAMIDE: NOT DETECTED NG/ML
CHLORPROPAMIDE: NOT DETECTED NG/ML
GLIMEPIRIDE: NOT DETECTED NG/ML
GLIPIZIDE: NOT DETECTED NG/ML
GLYBURIDE: NOT DETECTED NG/ML
HCT VFR BLD CALC: 36.4 % (ref 40.7–50.3)
HEMOGLOBIN: 11.1 G/DL (ref 13–17)
MCH RBC QN AUTO: 27.3 PG (ref 25.2–33.5)
MCHC RBC AUTO-ENTMCNC: 30.5 G/DL (ref 28.4–34.8)
MCV RBC AUTO: 89.4 FL (ref 82.6–102.9)
NATEGLINIDE: NOT DETECTED NG/ML
NRBC AUTOMATED: 0 PER 100 WBC
PARTIAL THROMBOPLASTIN TIME: 52.2 SEC (ref 20.5–30.5)
PDW BLD-RTO: 15.5 % (ref 11.8–14.4)
PLATELET # BLD: 267 K/UL (ref 138–453)
PMV BLD AUTO: 10.4 FL (ref 8.1–13.5)
RBC # BLD: 4.07 M/UL (ref 4.21–5.77)
REPAGLINIDE: NOT DETECTED NG/ML
TOLAZAMIDE: NOT DETECTED NG/ML
TOLBUTAMIDE: NOT DETECTED NG/ML
WBC # BLD: 7 K/UL (ref 3.5–11.3)

## 2022-03-15 PROCEDURE — 85027 COMPLETE CBC AUTOMATED: CPT

## 2022-03-15 PROCEDURE — 2580000003 HC RX 258: Performed by: STUDENT IN AN ORGANIZED HEALTH CARE EDUCATION/TRAINING PROGRAM

## 2022-03-15 PROCEDURE — 85730 THROMBOPLASTIN TIME PARTIAL: CPT

## 2022-03-15 PROCEDURE — 6370000000 HC RX 637 (ALT 250 FOR IP): Performed by: SURGERY

## 2022-03-15 PROCEDURE — 6360000002 HC RX W HCPCS: Performed by: STUDENT IN AN ORGANIZED HEALTH CARE EDUCATION/TRAINING PROGRAM

## 2022-03-15 PROCEDURE — 2060000000 HC ICU INTERMEDIATE R&B

## 2022-03-15 PROCEDURE — 99223 1ST HOSP IP/OBS HIGH 75: CPT | Performed by: INTERNAL MEDICINE

## 2022-03-15 PROCEDURE — 6370000000 HC RX 637 (ALT 250 FOR IP): Performed by: STUDENT IN AN ORGANIZED HEALTH CARE EDUCATION/TRAINING PROGRAM

## 2022-03-15 PROCEDURE — 36415 COLL VENOUS BLD VENIPUNCTURE: CPT

## 2022-03-15 RX ORDER — ALBUTEROL SULFATE 2.5 MG/3ML
2.5 SOLUTION RESPIRATORY (INHALATION) EVERY 6 HOURS PRN
Status: DISCONTINUED | OUTPATIENT
Start: 2022-03-15 | End: 2022-03-21

## 2022-03-15 RX ORDER — BUDESONIDE AND FORMOTEROL FUMARATE DIHYDRATE 160; 4.5 UG/1; UG/1
2 AEROSOL RESPIRATORY (INHALATION) 2 TIMES DAILY
Status: DISCONTINUED | OUTPATIENT
Start: 2022-03-15 | End: 2022-03-22

## 2022-03-15 RX ORDER — 0.9 % SODIUM CHLORIDE 0.9 %
500 INTRAVENOUS SOLUTION INTRAVENOUS ONCE
Status: COMPLETED | OUTPATIENT
Start: 2022-03-15 | End: 2022-03-15

## 2022-03-15 RX ORDER — AMLODIPINE BESYLATE 5 MG/1
5 TABLET ORAL DAILY
Status: DISCONTINUED | OUTPATIENT
Start: 2022-03-15 | End: 2022-03-17

## 2022-03-15 RX ADMIN — METOPROLOL TARTRATE 12.5 MG: 25 TABLET ORAL at 20:00

## 2022-03-15 RX ADMIN — ASPIRIN 81 MG: 81 TABLET, COATED ORAL at 10:15

## 2022-03-15 RX ADMIN — SODIUM CHLORIDE 500 ML: 9 INJECTION, SOLUTION INTRAVENOUS at 01:16

## 2022-03-15 RX ADMIN — AMLODIPINE BESYLATE 5 MG: 5 TABLET ORAL at 17:58

## 2022-03-15 RX ADMIN — METOPROLOL TARTRATE 12.5 MG: 25 TABLET ORAL at 10:15

## 2022-03-15 RX ADMIN — Medication 14 UNITS/KG/HR: at 15:36

## 2022-03-15 RX ADMIN — TAMSULOSIN HYDROCHLORIDE 0.4 MG: 0.4 CAPSULE ORAL at 10:15

## 2022-03-15 RX ADMIN — DESMOPRESSIN ACETATE 40 MG: 0.2 TABLET ORAL at 20:00

## 2022-03-15 ASSESSMENT — PAIN SCALES - GENERAL
PAINLEVEL_OUTOF10: 0

## 2022-03-15 NOTE — PROGRESS NOTES
airway reversibility but does have significantly reduced diffusion capacity his RVSP was reported to be mild 40 on echocardiogram.  He does have dyspnea on mild exertion but some of that could also be related to multivessel coronary artery disease along with his COPD/emphysema. Depending upon his dyspnea and pulmonary function test he is at least moderate risk for surgery for postop complication including postop hypoxia/postop respiratory failure/atelectasis but surgery is not contraindicated currently is maintaining saturation on room air will optimize bronchodilator therapy as he has multivessel coronary artery disease discussed the risk and benefit of surgery with patient and would recommend CT surgery to discuss with patient before proceeding with surgery. Will start Symbicort and Spiriva. Encourage incentive spirometry deep breathing and cough. Complete smoking cessation and cocaine use discussed. Aspirin statin and heparin per cardiology and CT surgery      Discussed with nursing staff, treatment and plan discussed. Please note that this chart was generated using voice recognition Dragon dictation software. Although every effort was made to ensure the accuracy of this automated transcription, some errors in transcription may have occurred.      Jarred Burgos MD  3/15/2022 3:55 PM

## 2022-03-15 NOTE — CARE COORDINATION
Met with pt to discuss transitional planning. He plans to return home with family support and home care after CABG. He has no preference on agency. Referral to [facility]. [place] notified. They can accept and will follow along.  Pt has walker available at home if needed

## 2022-03-15 NOTE — PROGRESS NOTES
[facility]  Progress Note                   Date:   3/15/2022  Patient name: Ira Reese  Date of admission:  3/11/2022  7:15 PM  MRN:   [id number]  YOB: 1955  PCP: Cirilo Morse MD    Reason for Admission: Chest pain [R07.9]  S/P cardiac catheterization [Z98.890]  3-vessel CAD [I25.10]    Subjective:       Clinical Changes /Abnormalities: Patient seen and examined. Denies chest pain or shortness of breath, resting comfortably with no distress . Tele/vitals/labs reviewed . Review of Systems    Medications:   Scheduled Meds:   budesonide-formoterol  2 puff Inhalation BID    tiotropium  2 puff Inhalation Daily    metoprolol tartrate  12.5 mg Oral BID    tamsulosin  0.4 mg Oral Daily    aspirin  81 mg Oral Daily    atorvastatin  40 mg Oral Nightly    sodium chloride flush  5-40 mL IntraVENous 2 times per day     Continuous Infusions:   sodium chloride      sodium chloride      heparin (PORCINE) Infusion 14 Units/kg/hr (03/15/22 1536)     CBC:   Recent Labs     03/13/22  0629 03/15/22  0536   WBC 6.7 7.0   HGB 11.5* 11.1*    267     BMP:    No results for input(s): NA, K, CL, CO2, BUN, CREATININE, GLUCOSE in the last 72 hours. Hepatic:  No results for input(s): AST, ALT, ALB, BILITOT, ALKPHOS in the last 72 hours. Troponin:   No results for input(s): TROPHS in the last 72 hours. BNP: No results for input(s): BNP in the last 72 hours. Lipids:   No results for input(s): CHOL, HDL in the last 72 hours. Invalid input(s): LDLCALCU  INR: No results for input(s): INR in the last 72 hours. 3/11/2022- cardiac cath     Findings:   Angiographic Findings        Cardiac Arteries and Lesion Findings       LMCA: has heavy calcification with mid 60% stenosis. LAD: has heavy calcification in the proximal segment with 40% stenosis. The   mid segment has 85% stenosis. LCx: has heavy calcification with ostial 99% stenosis.  The OM1 has 100%   occlusion with left to left collaterals. The OM2 has 100% occlusion with   left to left collaterals. The LPDA is large and is normal.     RCA: is a non-dominant vessel and has proximal 90% stenosis.      Coronary Tree        Dominance: Right       LV Analysis   LV function assessed as:Abnormal.   Ejection Fraction   +----------------------------------------------------------------------+---+   ! Method                                                                !EF%! +----------------------------------------------------------------------+---+   ! LV gram                                                               !30 !   +----------------------------------------------------------------------+---+       Procedure Summary        Three vessel disease with left main involvement.    Moderate LV systolic dysfunction. LVEF 30%.      Recommendations        Medical therapy as needed.    Risk factor modification.  CABG evaluation by CT surgery. Nuclear Stress 3/10/22:  Impression       Significant stress-induced ischemia in the lateral wall and septum.       Infarct in the apex encroaching into the apical inferior wall and also in the   basal inferior wall.       LVEF 34%       Risk stratification: High risk. Echo 3/13//2022     Summary  Global left ventricular systolic function is normal. Estimated EF 12-85%  Normal diastolic filling. Normal right ventricular size and function. Mild mitral regurgitation. Mild tricuspid regurgitation. Mild pulmonary hypertension with an estimated right ventricular systolic  pressure of 41 mmHg.   No pericardial effusion.     Signature  ----------------------------------------------------------------------------   Electronically signed by Deirdre GarciaSonographer) on 03/13/2022   12:51 PM  ----------------------------------------------------------------------------     ----------------------------------------------------------------------------   Electronically signed by Vinh Chau(Interpreting physician) on   03/14/2022 09:19 AM    Objective:   Vitals: BP (!) 146/82   Pulse 53   Temp 98.2 °F (36.8 °C) (Oral)   Resp 18   Ht 6' 3\" (1.905 m)   Wt 144 lb (65.3 kg)   SpO2 99%   BMI 18.00 kg/m²   General appearance: alert and cooperative with exam  HEENT: Head: Normocephalic, no lesions, without obvious abnormality. Neck:no JVD, trachea midline, no adenopathy  Lungs: Clear to auscultation  Heart: Regular rate and rhythm, s1/s2 auscultated, no murmurs, right grion no hematoma or bleeding   Abdomen: soft, non-tender, bowel sounds active  Extremities: no edema  Neurologic: not done        Assessment / Acute Cardiac Problems:   - CP- concern for angina  - Abnormal stress test 3/10/22- ischemia in lateral and septal walls- with EF 34%  - HTN  - DL  - cocaine use    Patient Active Problem List:     CKD (chronic kidney disease) stage 3, GFR 30-59 ml/min (HCC)     Essential hypertension     Arthritis of right hip     Chronic systolic congestive heart failure (HCC)     Benign prostatic hyperplasia with urinary obstruction     Pure hypercholesterolemia     Abdominal pain     Chest pain     Chronic combined systolic and diastolic congestive heart failure (HCC)     S/P cardiac catheterization     3-vessel CAD      Plan of Treatment:   1. MVD on cardiac cath -clinically no fluid overload , continue BB,  heparin , statin , asa for now  . Will add norvasc   2. Appreciate CTS recommendation -preop work-up for surgery in process  3. Echo reviewed as above - preserved LV function with EF 50-55%  4.  Keep K.4, Mg >2    Electronically signed by JACKELYN Baxter - NP on 3/15/2022 at 5:00 PM  [address]  [phone]

## 2022-03-15 NOTE — CONSULTS
PULMONARY & CRITICAL CARE MEDICINE CONSULT NOTE     Patient:  Helen اعللي  MRN: [id number]  Admit date: 3/11/2022  Primary Care Physician: Manuel Zaragoza MD  Consulting Physician: Vinny Loyola MD    HISTORY     CHIEF COMPLAINT/REASON FOR CONSULT:    Preop CABG, poor PFT    HISTORY OF PRESENT ILLNESS:  The patient is a 74 y.o. male, who is a poor historian, admitted with chest pain to [place] on 3/9/2022, underwent stress test which revealed ischemia to lateral septal wall with EF 34%. Transferred to [facility] for cardiac catheterization which showed multivessel coronary artery disease EF 30% including left main disease. CT surgery on board for CABG. Periop pulmonary function tests revealed FEV1 46% and significant positive bronchodilator response with FEV1 of 56% and severe reduction in diffusion capacity less than 30% with air trapping and hyperinflation. CT chest revealed emphysema and bullae. Patient has centrilobular and paraseptal emphysema. He is a chronic smoker for 50 years. 1 pack lasting for 1 week. Denies alcohol use. Consumes cocaine. Patient has undiagnosed COPD, Gold stage II with FEV1 postbronchodilator 56%. Diffuse emphysema evidenced by decreased diffusion capacity. He has never been on home oxygen or bronchodilators. He has past medical history of CKD stage III  Essential hypertension  Chronic combined diastolic and systolic CHF, EF 00%. Previously 50-55%  Mild pulmonary hypertension  Cocaine use  Chronic smoker  Right hip arthritis  Essential hypertension CT chest 3/12/2022:    Revealed extensive centrilobular and paraseptal emphysema. Some areas of atelectasis in the lingula.   Collapse of right middle lobe      AST MEDICAL HISTORY:        Diagnosis Date    3-vessel CAD 3/11/2022    Alcohol abuse 6/23/2015    Arthritis     Chronic kidney disease     Full dentures     upper and lower full    Hypertension     Pure hypercholesterolemia 11/4/2016    Systolic CHF ([facility] [address]     Wears glasses      PAST SURGICAL HISTORY:        Procedure Laterality Date    APPENDECTOMY      HIP FRACTURE SURGERY Left     W/ HARDWARE    CO EGD TRANSORAL BIOPSY SINGLE/MULTIPLE  6/19/2017    EGD BIOPSY performed by Brigette Paris DO at [facility]     FAMILY HISTORY:       Problem Relation Age of Onset    Diabetes Mother     Cancer Mother      SOCIAL HISTORY:   TOBACCO:   reports that he has been smoking cigarettes. He has been smoking about 0.00 packs per day. He has never used smokeless tobacco.  ETOH:  reports current alcohol use. DRUGS: reports current drug use. Drugs: Cocaine and Marijuana (Charlalatoya Brighter). AVOCATION/OCCUPATIONAL EXPOSURE:    The patient denies asbestos, silica dust, coal, foundry, quarry or [employer] exposure. The patient denies  to having pet dogs, cats, turtles or exotic birds at home. There is no history of TB or TB exposure. There is no exposure to sick contacts. Travel history is not significant history of risk factors for pulmonary disease. The patient denies using Hot Tubs. ALLERGIES:    No Known Allergies      HOME MEDICATIONS:  Prior to Admission medications    Medication Sig Start Date End Date Taking? Authorizing Provider   nitroGLYCERIN (NITROSTAT) 0.3 MG SL tablet Place 0.3 mg under the tongue every 5 minutes as needed for Chest pain up to max of 3 total doses. If no relief after 1 dose, call 911.    Yes Historical Provider, MD   ibuprofen (ADVIL;MOTRIN) 800 MG tablet Take 1 tablet by mouth every 8 hours as needed for Pain 4/25/18   Jules Kang MD   lisinopril (PRINIVIL;ZESTRIL) 10 MG tablet Take 1 tablet by mouth daily 3/12/18   Renzo Arzola MD   pravastatin (PRAVACHOL) 20 MG tablet Take 1 tablet by mouth every evening 3/11/18   Renzo Arzola MD   amLODIPine [facility]) 5 MG tablet Take 1 tablet by mouth daily 3/12/18   Renzo Arzola MD   aspirin 81 MG EC tablet Take 1 tablet by mouth daily 3/12/18   Renzo Arzola MD   tamsulosin [facility]) 0.4 MG capsule Take 1 capsule by mouth daily 3/12/18   Nohemi Vicente MD     IMMUNIZATIONS:  Most Recent Immunizations   Administered Date(s) Administered    COVID-19, J&J, PF, 0.5 mL 2021    Influenza, Quadv, IM, (6 mo and older Fluzone, Flulaval, Fluarix and 3 yrs and older Afluria) 2017    Pneumococcal Polysaccharide ([id number]) 2014    Tdap (Boostrix, Adacel) 2016       REVIEW OF SYSTEMS:  General: negative for chills, fatigue or fever  ENT: negative for headaches, nasal congestion, sore throat or visual changes  Allergy and Immunology: negative for postnasal drip or seasonal allergies  Hematological and Lymphatic: negative for bleeding problems, swollen lymph nodes  Respiratory: Positive for exertional dyspnea.   Negative for cough, hemoptysis, sputum changes, tachypnea or wheezing  Positive for chest pain negative for edema or palpitations  Gastrointestinal: negative for abdominal pain, change in bowel habits or nausea/vomiting  Genito-Urinary: negative for dysuria or urinary frequency/urgency  Musculoskeletal: negative for joint pain or joint swelling  Neurological: negative for numbness/tingling, seizures or weakness  Dermatological: negative for pruritus or rash    PHYSICAL EXAMINATION     VITAL SIGNS:   LAST-  BP (!) 146/82   Pulse 53   Temp 98.2 °F (36.8 °C) (Oral)   Resp 18   Ht 6' 3\" (1.905 m)   Wt 144 lb (65.3 kg)   SpO2 99%   BMI 18.00 kg/m²   8-24 HR RANGE-  TEMP Temp  Av.4 °F (36.9 °C)  Min: 97.8 °F (36.6 °C)  Max: 99.1 °F (66.8 °C)   BP Systolic (04TSY), XHQ:393 , Min:99 , LYV:416      Diastolic (29XVD), JYOTSNA:62, Min:53, Max:85     PULSE Pulse  Av.7  Min: 51  Max: 63   RR Resp  Av.5  Min: 18  Max: 21   O2 SAT SpO2  Av %  Min: 99 %  Max: 99 %   OXYGEN DELIVERY No data recorded     SYSTEMIC EXAMINATION:    General appearance - well appearing, overweight, comfortable and in no acute distress   Mental status - alert, oriented to person, place, and time   Eyes - pupils equal and reactive, extraocular eye movements intact, sclera anicteric   Ears - not examined   Nose - normal and patent, no erythema, discharge   Mouth - mucous membranes moist, pharynx normal without lesions   Neck - supple, no significant adenopathy, carotids upstroke normal bilaterally, no bruits   Lymphatics - no palpable lymphadenopathy, no hepatosplenomegaly   Chest -bilateral breath sounds clear, but decreased , no wheezes, rales or rhonchi, symmetric air entry   Heart - normal rate, regular rhythm, normal S1, S2, no murmurs, rubs, clicks or gallops   Abdomen - soft, nontender, nondistended, no masses or organomegaly   Neurological - motor and sensory grossly normal bilaterally   Musculoskeletal - no joint tenderness, deformity or swelling   Extremities -clubbing present. No pedal edema   Skin - normal coloration and turgor, no rashes, no suspicious skin lesions noted    DATA REVIEW     Medications: Current Inpatient  Scheduled Meds:   budesonide-formoterol  2 puff Inhalation BID    tiotropium  2 puff Inhalation Daily    amLODIPine  5 mg Oral Daily    metoprolol tartrate  12.5 mg Oral BID    tamsulosin  0.4 mg Oral Daily    aspirin  81 mg Oral Daily    atorvastatin  40 mg Oral Nightly    sodium chloride flush  5-40 mL IntraVENous 2 times per day     Continuous Infusions:   sodium chloride      sodium chloride      heparin (PORCINE) Infusion 14 Units/kg/hr (03/15/22 1536)     INPUT/OUTPUT:  In: 1362.4 [P.O.:600; I.V.:262.1]  Out: 2100 [Urine:2100]    LABS:-  ABGs:   No results found for: PH, PCO2, PO2, HCO3, O2SAT  No results for input(s): PHART, PO2ART, VJJ3PXQ, FXJ3WXG, BEART, Q2VKLXHG in the last 72 hours.   No results found for: POCPH, POCPCO2, POCPO2, POCHCO3, RIES0FVA  CBC:   Recent Labs     03/13/22  0629 03/15/22  0536   WBC 6.7 7.0   HGB 11.5* 11.1*   HCT 38.1* 36.4*   MCV 89.2 89.4    267   RBC 4.27 4.07*   MCH 26.9 27.3   MCHC 30.2 30.5   RDW 15.5* 15.5*     BMP:   No results for input(s): NA, K, CL, CO2, BUN, CREATININE, GLUCOSE, PHOS, CA, IONCA in the last 72 hours. Invalid input(s):  MG  Liver Function Test:   No results for input(s): PROT, LABALBU, ALT, AST, GGT, ALKPHOS, BILITOT in the last 72 hours. Amylase/Lipase:  No results for input(s): AMYLASE, LIPASE in the last 72 hours. Coagulation Profile:   Recent Labs     03/13/22  2148 03/14/22  0343 03/15/22  0536   APTT 68.6* 62.7* 52.2*     Cardiac Enzymes:  No results for input(s): CKTOTAL, CKMB, CKMBINDEX, TROPONINI in the last 72 hours.   Lactic Acid:  Lab Results   Component Value Date    LACTA NOT REPORTED 03/11/2018    LACTA NOT REPORTED 03/10/2018    LACTA NOT REPORTED 03/10/2018     BNP:   Lab Results   Component Value Date    BNP 70 09/13/2013     D-Dimer:  No results found for: DDIMER  Others:   No results found for: TSH, B5APEEY, Z6YSEGU, THYROIDAB, FT3, T4FREE  Lab Results   Component Value Date    SEDRATE 20 (H) 05/21/2014    CRP 15.2 (H) 05/21/2014     No results found for: Tomeka Bell  No results found for: IRON, TIBC, FERRITIN  No results found for: SPEP, UPEP  Lab Results   Component Value Date    PSA 1.11 11/04/2016     Microbiology:    Pathology:    Radiology:  Echocardiogram Complete 2D w Doppler w Color    Result Date: 3/14/2022  Transthoracic Echocardiography Report (TTE)  Patient Name Juvenal Montenegro       Date of Study           03/13/2022               [place]   Date of      1955  Gender                  Male  Birth   Age          74 year(s)  Race                    Black   Room Number  2107        Height:                 75 inch, 190.5 cm   Corporate ID [id number]    Weight:                 136 pounds, 61.7 kg  #   Patient Acct [de-identified]   BSA:        1.86 m^2    BMI:        17 kg/m^2  #   MR #         [id number]      Sonographer             Swati Bethea   Accession #  [id number]  Interpreting Physician  Vinh Chau   Fellow Referring Nurse                           Practitioner   Interpreting             Referring Physician     Ron Reyes. Roxie Singh,  Fellow                                           APRN-NP  Type of Study   TTE procedure:2D Echocardiogram, M-Mode, Doppler, Color Doppler. Procedure Date Date: 03/13/2022 Start: 12:17 PM Study Location: [facility] Technical Quality: Fair visualization Indications:Multi vessel CAD and Pre-Op CABG. History / Tech. Comments: Procedure explained to patient. Study done at the bedside. STAT call in Patient Status: Inpatient Height: 75 inches Weight: 136 pounds BSA: 1.86 m^2 BMI: 17 kg/m^2 Allergies   - *No Known Allergies. CONCLUSIONS Summary Global left ventricular systolic function is normal. Estimated EF 65-61% Normal diastolic filling. Normal right ventricular size and function. Mild mitral regurgitation. Mild tricuspid regurgitation. Mild pulmonary hypertension with an estimated right ventricular systolic pressure of 41 mmHg. No pericardial effusion. Signature ----------------------------------------------------------------------------  Electronically signed by Vesna Sagastume(Sonographer) on 03/13/2022  12:51 PM ---------------------------------------------------------------------------- ----------------------------------------------------------------------------  Electronically signed by Vinh Chau(Interpreting physician) on  03/14/2022 09:19 AM ---------------------------------------------------------------------------- FINDINGS Left Atrium Left atrium is normal in size. Left Ventricle Left ventricle is normal in size. Global left ventricular systolic function is normal. Calculated EF via Parks's method is 48 %. Mild septal hypertrophy. Normal diastolic filling. Right Atrium Right atrium is normal in size. Right Ventricle Normal right ventricular size and function. Mitral Valve Normal mitral valve structure. Mild mitral regurgitation. No mitral stenosis.  Aortic Valve Aortic valve structure and function normal. Aortic valve is trileaflet. No aortic insufficiency. No aortic stenosis. Tricuspid Valve Normal tricuspid valve leaflets. Mild tricuspid regurgitation. No tricuspid stenosis. Mild pulmonary hypertension with an estimated right ventricular systolic pressure of 41 mmHg. Pulmonic Valve Pulmonic valve is normal in structure and function. No pulmonic insufficiency. No evidence of pulmonic stenosis. Pericardial Effusion No pericardial effusion. Miscellaneous Normal aortic root dimension. E/E' average = 11.0. IVC normal diameter & inspiratory collapse indicating normal RA filling pressure .  M-mode / 2D Measurements & Calculations:   LVIDd:4.9 cm(3.7 - 5.6 cm)       Diastolic CWPXMM:44.98 ml  LVIDs:3.8 cm(2.2 - 4.0 cm)       Systolic BBJOCN:71.91 ml  IVSd:1.2 cm(0.6 - 1.1 cm)        Aortic Root:3.3 cm(2.0 - 3.7 cm)  LVPWd:0.8 cm(0.6 - 1.1 cm)       LA Dimension: 2.9 cm(1.9 - 4.0 cm)  Fractional Shortenin.45 %    LA volume/Index: 35.47 ml /19m^2  Calculated LVEF (%): 49.08 %     LVOT:2.2 cm                                   RVDd:3.5 cm   Mitral:                                 Aortic   Valve Area (P1/2-Time): 2.78 cm^2       Peak Velocity: 1.26 m/s  Peak E-Wave: 0.87 m/s                   Mean Velocity: 0.84 m/s  Peak A-Wave: 0.67 m/s                   Peak Gradient: 6.35 mmHg  E/A Ratio: 1.29                         Mean Gradient: 4 mmHg  Peak Gradient: 2.99 mmHg  Mean Gradient: 1 mmHg  Deceleration Time: 258 msec             Area (continuity): 2.33 cm^2  P1/2t: 79 msec                          AV VTI: 26.9 cm   Area (continuity): 3.32 cm^2  Mean Velocity: 0.49 m/s   Tricuspid:                              Pulmonic:   Estimated RVSP: 41 mmHg                 Peak Velocity: 0.71 m/s  Peak TR Velocity: 3.00 m/s              Peak Gradient: 1.99 mmHg  Peak TR Gradient: 36 mmHg  Estimated RA Pressure: 5 mmHg                                           Estimated PASP: 41 mmHg Diastology / Tissue Doppler Septal Wall E' velocity:0.06 m/s Septal Wall E/E':14.7 Lateral Wall E' velocity:0.12 m/s Lateral Wall E/E':7.3    CT CHEST WO CONTRAST    Result Date: 3/12/2022  EXAMINATION: CT OF THE CHEST WITHOUT CONTRAST 3/12/2022 2:23 am TECHNIQUE: CT of the chest was performed without the administration of intravenous contrast. Multiplanar reformatted images are provided for review. Dose modulation, iterative reconstruction, and/or weight based adjustment of the mA/kV was utilized to reduce the radiation dose to as low as reasonably achievable. COMPARISON: Chest x-ray on 03/09/2022 HISTORY: ORDERING SYSTEM PROVIDED HISTORY: preop cabg TECHNOLOGIST PROVIDED HISTORY: preop cabg Reason for Exam: pre op cabg FINDINGS: Mediastinum: Thyroid gland is normal in appearance. Vascular calcifications are noted along the aorta and its branch vessels. Calcified right hilar and mediastinal lymph nodes are compatible with sequela of old granulomatous disease. The heart size is normal.  Extensive coronary artery vascular calcifications are noted. The pulmonary artery is dilated suggesting pulmonary hypertension. No pericardial effusion. Lungs/pleura: Extensive centrilobular and paraseptal emphysema is noted in the lungs. There are areas of atelectasis in the lingula and posterior left upper lobe There is collapse of the right middle lobe. Calcified granuloma is noted in the right lower lobe. No focal lung infiltrate. No pleural effusion or pneumothorax. Upper Abdomen: High attenuation material is noted in the kidneys which could be related to vascular calcifications or excretion of contrast.  Extensive atherosclerotic plaque is noted in the aorta and its branch vessels. Limited images through the upper abdomen are otherwise unremarkable. Soft Tissues/Bones: No appreciable soft tissue swelling. No axillary adenopathy. Degenerative changes are noted in the spine. No fracture.      1. Extensive centrilobular and paraseptal emphysema. 2. Extensive atherosclerotic disease, particularly along the coronary arteries. 3. There are areas of atelectasis in the lingula. There is collapse of the right middle lobe 4. No focal lung infiltrate. XR CHEST PORTABLE    Result Date: 3/9/2022  EXAMINATION: ONE XRAY VIEW OF THE CHEST 3/9/2022 11:36 am COMPARISON: Chest x-ray dated 17 June 2017, 10 March 2018 HISTORY: ORDERING SYSTEM PROVIDED HISTORY: chest pain TECHNOLOGIST PROVIDED HISTORY: chest pain Reason for Exam: chest pain FINDINGS: Normal cardiomediastinal silhouette. No acute airspace infiltrate. No pneumothorax or pleural effusion. No acute cardiopulmonary findings     VL DUP CAROTID BILATERAL    Result Date: 3/13/2022    [facility]  Vascular Carotid Procedure   Patient Name Ekaterina Reinoso       Date of Study         03/12/2022               [place]   Date of      1955  Gender                Male  Birth   Age          74 year(s)  Race                  Black   Room Number  2107        Height:               75 inch, 190.5 cm   Corporate ID [id number]    Weight:               136 pounds, 61.7 kg  #   Patient Acct [de-identified]   BSA:       1.86 m^2   BMI:         17 kg/m^2  #   MR #         [id number]      Tessa Hodge, [facility]   Accession #  [id number]  Interpreting          [address]                           Physician   Referring                Referring Physician   Cb Garza. Charmayne Sender, APRN-SOBIA  Nurse  Practitioner  Procedure Type of Study:   Cerebral: Carotid, Carotid Scan Bilateral.  Indications for Study:Pre-op [facility]. Patient Status: In Patient. Technical Quality:Adequate visualization.  Comments:Basic Classification of ICA Stenosis: PSV - Peak Systolic Velocity Normal: No plaque or calcification identified, no elevation of PSV Mild: <50% spectral broadening without increased PSV Moderate: 50 - 69% PSV >125 - <230 cm/sec Severe: 70 - 99% PSV >230 cm/sec Critical: 80 - 99% PSV >230cm/sec and/or End Diastolic Velocities >336TH/TIMOTEO. Conclusions   Summary   Mild < 50% stenosis of the internal carotid arteries bilaterally. Patent vertebral arteries bilaterally with antegrade flow. Signature   ----------------------------------------------------------------  Electronically signed by Ethel Pisano RVT(Sonographer) on  03/12/2022 02:27 PM  ----------------------------------------------------------------   ----------------------------------------------------------------  Electronically signed by Thomasenia Starr Reyes,Arthur(Interpreting  physician) on 03/13/2022 08:04 PM  ----------------------------------------------------------------  Findings:   Right Impression:                    Left Impression:  The common carotid artery has a      The common carotid artery has a  smooth heterogeneous plaque causing  smooth heterogeneous plaque causing a  a <50% stenosis. <50% stenosis. The internal carotid artery has an   The internal carotid artery has an  irregular homogeneous plaque causing irregular calcific plaque causing a  a <50% stenosis based on velocities. <50% stenosis based on velocities. The vertebral artery is patent with  The vertebral artery is patent with  antegrade flow. antegrade flow. Risk Factors   - The patient's risk factor(s) include: untreated diabetes mellitus. - Current - Every day. - The patient's last creatinine was 0.9 mg/dl. Allergies   - Allergy:*No Known Allergies(Miscellaneous). Velocities are measured in cm/s ; Diameters are measured in cm Carotid Right Measurements +------------+-------+-------+--------+-------+------------+---------------+ ! Location    ! PSV    ! EDV    ! Angle   ! RI     !%Stenosis   ! Tortuosity     ! +------------+-------+-------+--------+-------+------------+---------------+ ! Prox CCA    !128    !18     !60      !0.86   !            !               ! +------------+-------+-------+--------+-------+------------+---------------+ ! Mid CCA     !135    !14.9   !60      !0.89   !            !               ! +------------+-------+-------+--------+-------+------------+---------------+ ! Dist CCA    !126    !19.3   !60      !0.85   !            !               ! +------------+-------+-------+--------+-------+------------+---------------+ ! Bulb        !117    !14.3   !60      !0.88   !            !               ! +------------+-------+-------+--------+-------+------------+---------------+ ! Prox ICA    !63.4   !8.76   !60      !0.86   !            !               ! +------------+-------+-------+--------+-------+------------+---------------+ ! Mid ICA     !61.6   !11.2   !60      !0.82   !            !               ! +------------+-------+-------+--------+-------+------------+---------------+ ! Dist ICA    !69.4   !18.6   ! 60      !0.73   !            !               ! +------------+-------+-------+--------+-------+------------+---------------+ ! Prox ECA    !75.3   !7.36   !60      !0.9    ! !               ! +------------+-------+-------+--------+-------+------------+---------------+ ! Vertebral   !73.2   !11.6   !60      !0.84   !            !               ! +------------+-------+-------+--------+-------+------------+---------------+   - There is antegrade vertebral flow noted on the right side. - Additional Measurements:ICAPSV/CCAPSV 0.54. ICAEDV/CCAEDV 1.03. Carotid Left Measurements +------------+-------+-------+--------+-------+------------+---------------+ ! Location    ! PSV    ! EDV    ! Angle   ! RI     !%Stenosis   ! Tortuosity     ! +------------+-------+-------+--------+-------+------------+---------------+ ! Prox CCA    !138    !16.8   !60      !0.88   !            !               ! +------------+-------+-------+--------+-------+------------+---------------+ ! Mid CCA     !114    !17.5   !60      !0.85   !            !               ! +------------+-------+-------+--------+-------+------------+---------------+ ! Dist CCA    !91.8   !11     !60      !0.88   !            !               ! +------------+-------+-------+--------+-------+------------+---------------+ ! Bulb        !83.4   !10.1   ! 60      !0.88   !            !               ! +------------+-------+-------+--------+-------+------------+---------------+ ! Prox ICA    !92.2   !8.78   !60      !0.9    ! !               ! +------------+-------+-------+--------+-------+------------+---------------+ ! Mid ICA     !72.9   !15.4   !60      !0.79   !            !               ! +------------+-------+-------+--------+-------+------------+---------------+ ! Dist ICA    !57.4   !8.78   !60      !0.85   !            !               ! +------------+-------+-------+--------+-------+------------+---------------+ ! Prox ECA    !60.5   !4.08   !60      !0.93   !            !               ! +------------+-------+-------+--------+-------+------------+---------------+ ! Vertebral   !52.5   !9.8    ! 60      !0.81   !            !               ! +------------+-------+-------+--------+-------+------------+---------------+   - There is antegrade vertebral flow noted on the left side. - Additional Measurements:ICAPSV/CCAPSV 0.67. ICAEDV/CCAEDV 0.92.     VL DUP UPPER EXTREMITY ARTERIES BILATERAL    Result Date: 3/13/2022    [facility]  Vascular Upper Extremities Arterial Duplex Procedure   Patient Name Clint Rivas       Date of Study         03/12/2022               [place]   Date of      1955  Gender                Male  Birth   Age          74 year(s)  Race                  Black   Room Number  2107        Height:               75 inch, 190.5 cm   Corporate ID [id number]    Weight:               136 pounds, 61.7 kg  #   Patient Acct [de-identified]   BSA:       1.86 m^2   BMI:         17 kg/m^2  #   MR #         [id number]      [facility]   Accession #  [id number] ! +----------------+----+-----+-------------------+---------+----------------+ ! Mid Radial      !0   !0    !                   !         !                ! +----------------+----+-----+-------------------+---------+----------------+ ! Dist Radial     !67.3! ! Multiphasic        !0.25     !0.28            ! +----------------+----+-----+-------------------+---------+----------------+ ! Prox Ulnar      !49.6!0.74 ! Multiphasic        !0.2      !0.2             ! +----------------+----+-----+-------------------+---------+----------------+ ! Mid Ulnar       !56  !1.13 ! Multiphasic        !0.18     !0.16            ! +----------------+----+-----+-------------------+---------+----------------+ ! Dist Ulnar      !64. 9!1.16 ! Multiphasic        !0.15     !0.11            ! +----------------+----+-----+-------------------+---------+----------------+ Left Upper Extremities Duplex Measurements +----------------+----+-----+-------------------+---------+----------------+ ! Location        ! PSV ! Ratio! Wave Description   ! AP Diam  !Trans Diam      ! +----------------+----+-----+-------------------+---------+----------------+ ! Dist Brachial   !67.3! ! Multiphasic        !         !                ! +----------------+----+-----+-------------------+---------+----------------+ ! Prox Radial     !74. 7!1.11 ! Multiphasic        !0.18     !0.2             ! +----------------+----+-----+-------------------+---------+----------------+ ! Mid Radial      !65.4!0.88 ! Multiphasic        !0.21     !0.26            ! +----------------+----+-----+-------------------+---------+----------------+ ! Dist Radial     !65.9!1.01 ! Multiphasic        !0.19     !0.26            ! +----------------+----+-----+-------------------+---------+----------------+ ! Prox Ulnar      !56  !0.83 ! Multiphasic        !0.16     !0.2             ! +----------------+----+-----+-------------------+---------+----------------+ ! Mid Ulnar       !55  !0.98 ! Multiphasic        !0.2 !0. 25            ! +----------------+----+-----+-------------------+---------+----------------+ ! Dist Ulnar      !69.8!1.27 ! Multiphasic        !0.16     !0.18            ! +----------------+----+-----+-------------------+---------+----------------+    NM Cardiac Stress Test Nuclear Imaging    Result Date: 3/10/2022  EXAMINATION: MYOCARDIAL PERFUSION IMAGING 3/10/2022 8:51 am TECHNIQUE: For the rest study, 10.8 mCi of Tc 99 labeled sestamibi were injected. SPECT images were acquired. Under cardiology supervision, 0.4mg Leandrew Heys was infused. After pharmacologic stress, 45.8 mCi of Tc 99 labeled sestamibi were injected. SPECT images with ECG gating were acquired. COMPARISON: None Available. HISTORY: ORDERING SYSTEM PROVIDED HISTORY: chest pain TECHNOLOGIST PROVIDED HISTORY: Reason for Exam: Chest pain Procedure Type->Rx chest pain Reason for Exam: chest pain FINDINGS: Images interpreted utilizing IMedExchangeS system. Oasis software not available for correlation. . Stress prone images are acquired. There is fixed severe apical perfusion defect encroaching into the apical inferior wall which persists on prone images. Fixed basal inferior wall perfusion defect also persisting on prone imaging. These would be compatible with infarct. Significant reversible moderate-sized lateral wall perfusion defect persisting on prone imaging. There is a lesser degree of septal reversibility. These would be compatible with stress-induced ischemia. Total perfusion defect 14%. Global hypokinesis. Decreased myocardial thickening. Perfusion scores are visually adjusted to account for artifact.  Summed stress score:  14 Summed rest score:  11 Summed reversibility score:  3 Function: End diastolic volume:  581WT Left ventricular ejection fraction:  34% TID score:  1.11 (scores greater than 1.39 are considered elevated for Lexiscan stress with Tc99m) Notes concerning risk stratification: Risk stratification incorporates both clinical history and some testing results. Final risk determination is the responsibility of the ordering provider as other patient information and test results may increase or decrease the risk assessment reported for this examination. Risk stratification criteria are adapted from \"Noninvasive Risk Stratification\" criteria from Macario Arroyo. Al, ACC/AATS/AHA/ASE/ASNC/SCAI/SCCT/STS 2017 Appropriate Use Criteria For Coronary Revascularization in Patients With Stable Ischemic Heart Disease [facility] Volume 69, Issue 17, May 2017 High risk (>3% annual death or MI) 1. Severe resting LV dysfunction (LVEF <35%) not readily explained by non coronary causes 2. Resting perfusion abnormalities greater than 10% of the myocardium in patients without prior history or evidence of MI 3. Stress-induced perfusion abnormalities encumbering greater than or equal to 10% myocardium or stress segmental scores indicating multiple vascular territories with abnormalities 4. Stress-induced LV dilatation (TID ratio greater than 1.19 for exercise and greater than 1.39 for regadenoson) Intermediate risk (1% to 3% annual death or MI) 1. Mild/moderate resting LV dysfunction (LVEF 35% to 49%) not readily explained by non coronary causes. 2. Resting perfusion abnormalities in 5%-9.9% of the myocardium in patients without a history or prior evidence of MI 3. Stress-induced perfusion abnormality encumbering 5%-9.9% of the myocardium or stress segmental scores indicating 1 vascular territory with abnormalities but without LV dilation 4. Small wall motion abnormality involving 1-2 segments and only 1 coronary bed. Low Risk (Less than 1% annual death or MI) 1. Normal or small myocardial perfusion defect at rest or with stress encumbering less than 5% of the myocardium. Significant stress-induced ischemia in the lateral wall and septum. Infarct in the apex encroaching into the apical inferior wall and also in the basal inferior wall.  LVEF 34% Risk stratification: High risk. The findings were sent to the Radiology Results [address] at 1:49 pm on 3/10/2022to be communicated to a licensed caregiver. RECOMMENDATIONS: Unavailable     VL Vein Mapping Lower Bilateral    Result Date: 3/13/2022    [facility]  Vascular Lower Extremity Vein Mapping Procedure   Patient Name Garima Green       Date of Study         03/12/2022               [facility]   Date of      1955  Gender                Male  Birth   Age          74 year(s)  Race                  Black   Room Number  2107        Height:               75 inch, 190.5 cm   Corporate ID [id number]    Weight:               136 pounds, 61.7 kg  #   Patient Acct [de-identified]   BSA:       1.86 m^2   BMI:         17 kg/m^2  #   MR #         [id number]      Amor Aviles RVT   Accession #  [id number]  Interpreting          [address] [place] [address]                           Physician   Referring                Referring Physician   Gallo Brito. JACKELYN Gonzales-NP  Nurse  Practitioner  Procedure Type of Study:   Veins: Lower Extremity Vein Mapping. Indications for Study:Pre-op OHS. Patient Status: In Patient. Technical Quality:Adequate visualization. Conclusions   Summary   No DVT in the bilateral common femoral veins. Vein sizes are listed in the table above. Signature   ----------------------------------------------------------------  Electronically signed by Magui Floyd RVT(Sonographer) on  03/12/2022 02:31 PM  ----------------------------------------------------------------   ----------------------------------------------------------------  Electronically signed by Madlyn Kindle Reyes,Arthur(Interpreting  physician) on 03/13/2022 08:02 PM  ----------------------------------------------------------------  Findings:   Right Impression:                    Left Impression:  Common femoral and femoral veins are Common femoral and femoral veins are  compressible. compressible.   Risk Factors   - The patient's risk factor(s) include: untreated diabetes mellitus. - Current - Every day. - The patient's last creatinine was 0.9 mg/dl. Allergies   - Allergy:*No Known Allergies(Miscellaneous). Velocities are measured in cm/s ; Diameters are measured in cm +--------------------------------------++--------+-----+----+--------+-----+ ! Superficial - Great Saphenous Vein    ! ! Right   ! ! Left!        !     ! +--------------------------------------++--------+-----+----+--------+-----+ ! Location                              ! !Diameter! Depth! !Diameter! Depth! +--------------------------------------++--------+-----+----+--------+-----+ ! Sapheno Femoral Junction              ! !0.47    !     !    !0.42    !     ! +--------------------------------------++--------+-----+----+--------+-----+ ! GSV Mid Thigh                         !!0.24    !     !    !0.25    !     ! +--------------------------------------++--------+-----+----+--------+-----+ ! GSV Knee                              !!0.26    !     !    !0.21    !     ! +--------------------------------------++--------+-----+----+--------+-----+ ! GSV High Calf                         !!0.15    !     !    !0.18    !     ! +--------------------------------------++--------+-----+----+--------+-----+ ! GSV Low Calf                          !!0.2     !     !    !0.07    !     ! +--------------------------------------++--------+-----+----+--------+-----+ ! GSV Ankle                             !!0.24    !     !    !0.07    !     ! +--------------------------------------++--------+-----+----+--------+-----+      Pulmonary Function test:    Polysomnogram:    Echocardiogram:   Results for orders placed during the hospital encounter of 03/11/22    Echocardiogram Complete 2D w Doppler w Color    Narrative  Transthoracic Echocardiography Report (TTE)    Patient Name Rc El       Date of Study           03/13/2022  [facility]    Date of      1955  Gender Male  Birth    Age          74 year(s)  Race                    Black    Room Number  2107        Height:                 75 inch, 190.5 cm    Corporate ID [id number]    Weight:                 136 pounds, 61.7 kg  #    Patient Acct [de-identified]   BSA:        1.86 m^2    BMI:        17 kg/m^2  #    MR #         [id number]      Sonographer             Wolfgang Augustin    Accession #  [id number]  Interpreting Physician  Vinh Chau    Fellow                   Referring Nurse  Practitioner    Interpreting             Referring Physician     Michele Castro. Erica Mcgregor,  Fellow                                           APRN-NP    Type of Study    TTE procedure:2D Echocardiogram, M-Mode, Doppler, Color Doppler. Procedure Date  Date: 03/13/2022 Start: 12:17 PM    Study Location: [facility]  Technical Quality: Fair visualization    Indications:Multi vessel CAD and Pre-Op CABG. History / Tech. Comments:  Procedure explained to patient. Study done at the bedside. STAT call in    Patient Status: Inpatient    Height: 75 inches Weight: 136 pounds BSA: 1.86 m^2 BMI: 17 kg/m^2    Allergies  - *No Known Allergies. CONCLUSIONS    Summary  Global left ventricular systolic function is normal. Estimated EF 94-48%  Normal diastolic filling. Normal right ventricular size and function. Mild mitral regurgitation. Mild tricuspid regurgitation. Mild pulmonary hypertension with an estimated right ventricular systolic  pressure of 41 mmHg. No pericardial effusion.     Signature  ----------------------------------------------------------------------------  Electronically signed by Vesna Mims(Sonographer) on 03/13/2022  12:51 PM  ----------------------------------------------------------------------------    ----------------------------------------------------------------------------  Electronically signed by Vinh Chau(Interpreting physician) on  03/14/2022 09:19 AM  ----------------------------------------------------------------------------  FINDINGS  Left Atrium  Left atrium is normal in size. Left Ventricle  Left ventricle is normal in size. Global left ventricular systolic function  is normal. Calculated EF via Parks's method is 48 %. Mild septal hypertrophy. Normal diastolic filling. Right Atrium  Right atrium is normal in size. Right Ventricle  Normal right ventricular size and function. Mitral Valve  Normal mitral valve structure. Mild mitral regurgitation. No mitral stenosis. Aortic Valve  Aortic valve structure and function normal.  Aortic valve is trileaflet. No aortic insufficiency. No aortic stenosis. Tricuspid Valve  Normal tricuspid valve leaflets. Mild tricuspid regurgitation. No tricuspid stenosis. Mild pulmonary hypertension with an estimated right ventricular systolic  pressure of 41 mmHg. Pulmonic Valve  Pulmonic valve is normal in structure and function. No pulmonic insufficiency. No evidence of pulmonic stenosis. Pericardial Effusion  No pericardial effusion. Miscellaneous  Normal aortic root dimension. E/E' average = 11.0. IVC normal diameter & inspiratory collapse indicating normal RA filling  pressure .     M-mode / 2D Measurements & Calculations:    LVIDd:4.9 cm(3.7 - 5.6 cm)       Diastolic QGGXBV:27.43 ml  LVIDs:3.8 cm(2.2 - 4.0 cm)       Systolic RZOHBD:61.04 ml  IVSd:1.2 cm(0.6 - 1.1 cm)        Aortic Root:3.3 cm(2.0 - 3.7 cm)  LVPWd:0.8 cm(0.6 - 1.1 cm)       LA Dimension: 2.9 cm(1.9 - 4.0 cm)  Fractional Shortenin.45 %    LA volume/Index: 35.47 ml /19m^2  Calculated LVEF (%): 49.08 %     LVOT:2.2 cm  RVDd:3.5 cm    Mitral:                                 Aortic    Valve Area (P1/2-Time): 2.78 cm^2       Peak Velocity: 1.26 m/s  Peak E-Wave: 0.87 m/s                   Mean Velocity: 0.84 m/s  Peak A-Wave: 0.67 m/s                   Peak Gradient: 6.35 mmHg  E/A Ratio: 1.29                         Mean Gradient: 4 mmHg  Peak Gradient: 2.99 mmHg  Mean Gradient: 1 mmHg  Deceleration Time: 258 msec             Area (continuity): 2.33 cm^2  P1/2t: 79 msec                          AV VTI: 26.9 cm    Area (continuity): 3.32 cm^2  Mean Velocity: 0.49 m/s    Tricuspid:                              Pulmonic:    Estimated RVSP: 41 mmHg                 Peak Velocity: 0.71 m/s  Peak TR Velocity: 3.00 m/s              Peak Gradient: 1.99 mmHg  Peak TR Gradient: 36 mmHg  Estimated RA Pressure: 5 mmHg    Estimated PASP: 41 mmHg    Diastology / Tissue Doppler  Septal Wall E' velocity:0.06 m/s  Septal Wall E/E':14.7  Lateral Wall E' velocity:0.12 m/s  Lateral Wall E/E':7.3    No results found for this or any previous visit. Cardiac Catheterization:   No results found for this or any previous visit. ASSESSMENT AND PLAN     Assessment:    MV CAD,Plan for CABG   Undiagnosed COPD, stage II  Emphysema  Chronic smoker  Cocaine use  Chronic combined diastolic and systolic CHF, EF 84%. Previously 50-55%  Mild pulmonary hypertension  Right hip arthritis  Essential hypertension  CKD stage III  Essential hypertension    Pulmonary function test:  FEV1 56 with airway reversibility  Significantly reduced diffusion capacity  RVSP 41 on echo      Plan:    I personally interviewed/examined the patient; reviewed interval history, interpreted all available radiographic and laboratory data at the time of service. Patient remains hemodynamically stable and is currently saturating well on room air  Recommend supplemental oxygen if needed to keep oxygen saturation greater than 92%    Patient is moderate risk for surgery with this chronic undiagnosed untreated COPD/emphysema and poor pulmonary reserve in the setting of multivessel coronary artery disease. At risk for postop hypoxia/respiratory failure atelectasis. \    Started on bronchodilators with Symbicort and Spiriva.   Albuterol nebulization as needed  Encourage incentive spirometry  Counseled on smoking cessation and cocaine use cessation    DVT and stress ulcer prophylaxis  Physical/occupational therapy; increase activity as tolerated. I updated the patient regarding the current clinical condition, provisional diagnosis and management plan. He verbalized a clear understanding and I addressed his concerns, and answered all questions to the best of my abilities. It was my pleasure to evaluate Ji Contreras today. We will continue to follow. I would like to thank you for allowing me to participate in the care of this patient. Please feel free to call with any further questions or concerns. Maye Tejeda MD  PGY-3, Internal Medicine Resident  [id number] [facility]  3/15/2022 5:34 PM      Please note that this chart was generated using voice recognition Dragon dictation software. Although every effort was made to ensure the accuracy of this automated transcription, some errors in transcription may have occurred. Attending Physician Statement  I have discussed the care of Ji Contreras, including pertinent history and exam findings with the resident. I have reviewed the key elements of all parts of the encounter with the resident. I have seen and examined the patient with the resident. I agree with the assessment and plan and status of the problem list as documented.     Please see full consultation note by pulmonary resident Dr. Keri Donato  Patient is rather poor historian history of hypertension, history of mild systolic dysfunction admitted with chest pain initially to [facility] on 03/09/2022 non-ST elevation MI ruled out by troponins in 20s had a stress test done because of high suspicion which shows ischemia of lateral septal wall with ejection fraction of 34%.   He does have history of cocaine use and apparently he had used cocaine before admission to [facility].  He was transferred to [facility] for cardiac catheterization which shows multivessel coronary artery disease with EF of 30% including left main disease. He remains on a statin and heparin. CT surgery was consulted for CABG. Patient had pulmonary function test done which shows FEV1 of 46% with significance positive bronchodilator FEV1 of 56% with severe reduction in diffusion capacity of less than 30% with air trapping/hyperinflation. CT scan of the chest shows areas of blebs and bulla medially and centrilobular and paraseptal emphysema on CT scan.     Patient does complain of shortness of breath on mild activity and exertion. He does have chronic cough without no change denies increased wheezing currently denies chest pain. He has history of cocaine use. Does have history of smoking patient is not able to tell me that how many cigarettes he used to smoke but he claimed that now he smoke 1 pack/week and had been smoking for almost 50 years.     He does have a stage II obstruction with postbronchodilator FEV1 of 56 with airway reversibility but does have significantly reduced diffusion capacity his RVSP was reported to be mild 40 on echocardiogram.  He does have dyspnea on mild exertion but some of that could also be related to multivessel coronary artery disease along with his COPD/emphysema. Depending upon his dyspnea and pulmonary function test he is at least moderate risk for surgery for postop complication including postop hypoxia/postop respiratory failure/atelectasis but surgery is not contraindicated currently is maintaining saturation on room air will optimize bronchodilator therapy as he has multivessel coronary artery disease discussed the risk and benefit of surgery with patient and would recommend CT surgery to discuss with patient before proceeding with surgery.     Will start Symbicort and Spiriva. Encourage incentive spirometry deep breathing and cough. Complete smoking cessation and cocaine use discussed.   Aspirin statin and heparin per cardiology and CT surgery        Discussed with nursing staff, treatment and plan discussed.        Please note that this chart was generated using voice recognition Dragon dictation software.  Although every effort was made to ensure the accuracy of this automated transcription, some errors in transcription may have occurred.   Deloris Jean MD  3/15/2022 5:55 PM

## 2022-03-16 LAB — PARTIAL THROMBOPLASTIN TIME: 50.2 SEC (ref 20.5–30.5)

## 2022-03-16 PROCEDURE — 36415 COLL VENOUS BLD VENIPUNCTURE: CPT

## 2022-03-16 PROCEDURE — 6360000002 HC RX W HCPCS: Performed by: STUDENT IN AN ORGANIZED HEALTH CARE EDUCATION/TRAINING PROGRAM

## 2022-03-16 PROCEDURE — 6370000000 HC RX 637 (ALT 250 FOR IP): Performed by: STUDENT IN AN ORGANIZED HEALTH CARE EDUCATION/TRAINING PROGRAM

## 2022-03-16 PROCEDURE — 6370000000 HC RX 637 (ALT 250 FOR IP): Performed by: SURGERY

## 2022-03-16 PROCEDURE — 2580000003 HC RX 258: Performed by: STUDENT IN AN ORGANIZED HEALTH CARE EDUCATION/TRAINING PROGRAM

## 2022-03-16 PROCEDURE — 2060000000 HC ICU INTERMEDIATE R&B

## 2022-03-16 PROCEDURE — 85730 THROMBOPLASTIN TIME PARTIAL: CPT

## 2022-03-16 PROCEDURE — 94761 N-INVAS EAR/PLS OXIMETRY MLT: CPT

## 2022-03-16 PROCEDURE — 94640 AIRWAY INHALATION TREATMENT: CPT

## 2022-03-16 PROCEDURE — 99233 SBSQ HOSP IP/OBS HIGH 50: CPT | Performed by: INTERNAL MEDICINE

## 2022-03-16 RX ADMIN — BUDESONIDE AND FORMOTEROL FUMARATE DIHYDRATE 2 PUFF: 160; 4.5 AEROSOL RESPIRATORY (INHALATION) at 07:46

## 2022-03-16 RX ADMIN — TAMSULOSIN HYDROCHLORIDE 0.4 MG: 0.4 CAPSULE ORAL at 08:03

## 2022-03-16 RX ADMIN — Medication 14 UNITS/KG/HR: at 22:32

## 2022-03-16 RX ADMIN — BUDESONIDE AND FORMOTEROL FUMARATE DIHYDRATE 2 PUFF: 160; 4.5 AEROSOL RESPIRATORY (INHALATION) at 21:13

## 2022-03-16 RX ADMIN — SODIUM CHLORIDE, PRESERVATIVE FREE 10 ML: 5 INJECTION INTRAVENOUS at 08:04

## 2022-03-16 RX ADMIN — METOPROLOL TARTRATE 12.5 MG: 25 TABLET ORAL at 21:16

## 2022-03-16 RX ADMIN — TIOTROPIUM BROMIDE INHALATION SPRAY 2 PUFF: 3.12 SPRAY, METERED RESPIRATORY (INHALATION) at 07:46

## 2022-03-16 RX ADMIN — DESMOPRESSIN ACETATE 40 MG: 0.2 TABLET ORAL at 21:16

## 2022-03-16 RX ADMIN — ASPIRIN 81 MG: 81 TABLET, COATED ORAL at 08:03

## 2022-03-16 RX ADMIN — AMLODIPINE BESYLATE 5 MG: 5 TABLET ORAL at 08:03

## 2022-03-16 ASSESSMENT — PAIN SCALES - WONG BAKER
WONGBAKER_NUMERICALRESPONSE: 0

## 2022-03-16 ASSESSMENT — PAIN SCALES - GENERAL
PAINLEVEL_OUTOF10: 0

## 2022-03-16 ASSESSMENT — ENCOUNTER SYMPTOMS
SORE THROAT: 0
DIARRHEA: 0
TROUBLE SWALLOWING: 0
COUGH: 1
PHOTOPHOBIA: 0
WHEEZING: 0
VOICE CHANGE: 0
ALLERGIC/IMMUNOLOGIC NEGATIVE: 1
VOMITING: 0
EYE REDNESS: 0
SHORTNESS OF BREATH: 1
ABDOMINAL PAIN: 0

## 2022-03-16 NOTE — PROGRESS NOTES
[facility]  Progress Note                   Date:   3/16/2022  Patient name: Deepak Wilkes  Date of admission:  3/11/2022  7:15 PM  MRN:   [id number]  YOB: 1955  PCP: Moira Lentz MD    Reason for Admission: Chest pain [R07.9]  S/P cardiac catheterization [Z98.890]  3-vessel CAD [I25.10]    Subjective:       Clinical Changes /Abnormalities: Patient seen and examined. Denies chest pain or shortness of breath, resting comfortably with no distress  . Tele/vitals/labs reviewed . Review of Systems    Medications:   Scheduled Meds:   budesonide-formoterol  2 puff Inhalation BID    tiotropium  2 puff Inhalation Daily    amLODIPine  5 mg Oral Daily    metoprolol tartrate  12.5 mg Oral BID    tamsulosin  0.4 mg Oral Daily    aspirin  81 mg Oral Daily    atorvastatin  40 mg Oral Nightly    sodium chloride flush  5-40 mL IntraVENous 2 times per day     Continuous Infusions:   sodium chloride      sodium chloride      heparin (PORCINE) Infusion 14 Units/kg/hr (03/15/22 1600)     CBC:   Recent Labs     03/15/22  0536   WBC 7.0   HGB 11.1*        BMP:    No results for input(s): NA, K, CL, CO2, BUN, CREATININE, GLUCOSE in the last 72 hours. Hepatic:  No results for input(s): AST, ALT, ALB, BILITOT, ALKPHOS in the last 72 hours. Troponin:   No results for input(s): TROPHS in the last 72 hours. BNP: No results for input(s): BNP in the last 72 hours. Lipids:   No results for input(s): CHOL, HDL in the last 72 hours. Invalid input(s): LDLCALCU  INR: No results for input(s): INR in the last 72 hours. 3/11/2022- cardiac cath     Findings:   Angiographic Findings        Cardiac Arteries and Lesion Findings       LMCA: has heavy calcification with mid 60% stenosis. LAD: has heavy calcification in the proximal segment with 40% stenosis. The   mid segment has 85% stenosis. LCx: has heavy calcification with ostial 99% stenosis.  The OM1 has 100%   occlusion with left to left collaterals. The OM2 has 100% occlusion with   left to left collaterals. The LPDA is large and is normal.     RCA: is a non-dominant vessel and has proximal 90% stenosis.      Coronary Tree        Dominance: Right       LV Analysis   LV function assessed as:Abnormal.   Ejection Fraction   +----------------------------------------------------------------------+---+   ! Method                                                                !EF%! +----------------------------------------------------------------------+---+   ! LV gram                                                               !30 !   +----------------------------------------------------------------------+---+       Procedure Summary        Three vessel disease with left main involvement.    Moderate LV systolic dysfunction. LVEF 30%.      Recommendations        Medical therapy as needed.    Risk factor modification.  CABG evaluation by CT surgery. Nuclear Stress 3/10/22:  Impression       Significant stress-induced ischemia in the lateral wall and septum.       Infarct in the apex encroaching into the apical inferior wall and also in the   basal inferior wall.       LVEF 34%       Risk stratification: High risk. Echo 3/13//2022     Summary  Global left ventricular systolic function is normal. Estimated EF 57-40%  Normal diastolic filling. Normal right ventricular size and function. Mild mitral regurgitation. Mild tricuspid regurgitation. Mild pulmonary hypertension with an estimated right ventricular systolic  pressure of 41 mmHg.   No pericardial effusion.     Signature  ----------------------------------------------------------------------------   Electronically signed by Vesna Balderrama(Sonographer) on 03/13/2022   12:51 PM  ----------------------------------------------------------------------------     ----------------------------------------------------------------------------   Electronically signed by Vinh Chau(Interpreting physician) on   03/14/2022 09:19 AM    Objective:   Vitals: /62   Pulse 55   Temp 97.5 °F (36.4 °C) (Oral)   Resp 21   Ht 6' 3\" (1.905 m)   Wt 144 lb (65.3 kg)   SpO2 99%   BMI 18.00 kg/m²   General appearance: alert and cooperative with exam  HEENT: Head: Normocephalic, no lesions, without obvious abnormality. Neck:no JVD, trachea midline, no adenopathy  Lungs: Clear to auscultation  Heart: Regular rate and rhythm, s1/s2 auscultated, no murmurs, right grion no hematoma or bleeding   Abdomen: soft, non-tender, bowel sounds active  Extremities: no edema  Neurologic: not done        Assessment / Acute Cardiac Problems:   - CP- concern for angina  - Abnormal stress test 3/10/22- ischemia in lateral and septal walls- with EF 34%  - HTN  - DL  - cocaine use    Patient Active Problem List:     CKD (chronic kidney disease) stage 3, GFR 30-59 ml/min (HCC)     Essential hypertension     Arthritis of right hip     Chronic systolic congestive heart failure (HCC)     Benign prostatic hyperplasia with urinary obstruction     Pure hypercholesterolemia     Abdominal pain     Chest pain     Chronic combined systolic and diastolic congestive heart failure (HCC)     S/P cardiac catheterization     3-vessel CAD      Plan of Treatment:   1. MVD on cardiac cath -clinically no fluid overload , continue BB,  heparin , statin , asa and norvasc   2. preop work-up for surgery in process- appreciate pulmonary and CTS surgery input   3. Echo reviewed as above - preserved LV function with EF 50-55%  4.  Keep K.4, Mg >2    Electronically signed by JACKELYN Isaacs NP on 3/16/2022 at [address] [facility] [address].  [phone]

## 2022-03-16 NOTE — PROGRESS NOTES
PULMONARY & CRITICAL CARE MEDICINE PROGRESS  NOTE     Patient:  Farida Meade  MRN: [id number]  Admit date: 3/11/2022  Primary Care Physician: Yoon Sanchez MD  Consulting Physician: Nathan Land MD  CODE Status: Full Code  LOS: 5    SUBJECTIVE     I personally interviewed/examined the patient, reviewed interval history and interpreted all available radiographic, laboratory data at the time of service. Chief Compliant/Reason for Initial Consult:     COPD/preop evaluation/chronic cough    Brief Hospital Course: The patient is a 74 y.o. male history of hypertension, history of mild systolic dysfunction admitted with chest pain initially to [facility] on 03/09/2022 non-ST elevation MI ruled out by troponins in 20s had a stress test done because of high suspicion which shows ischemia of lateral septal wall with ejection fraction of 34%. He does have history of cocaine use and apparently he had used cocaine before admission to [facility].  He was transferred to [facility] for cardiac catheterization which shows multivessel coronary artery disease with EF of 30% including left main disease. He remains on a statin and heparin. CT surgery was consulted for CABG. Patient had pulmonary function test done which shows FEV1 of 46% with significance positive bronchodilator FEV1 of 56% with severe reduction in diffusion capacity of less than 30% with air trapping/hyperinflation. CT scan of the chest shows areas of blebs and bulla medially and centrilobular and paraseptal emphysema on CT scan.     Patient does complain of shortness of breath on mild activity and exertion. He does have chronic cough without no change denies increased wheezing currently denies chest pain. He has history of cocaine use.   Does have history of smoking patient is not able to tell me that how many cigarettes he used to smoke but he claimed that now he smoke 1 pack/week and had been smoking for almost 50 years. Interval History:  22  Overnight he remained hemodynamically stable. He is on room air maintaining saturation. He is afebrile. Does not complain of chest pain currently. He does have cough denies sputum production feel like mostly in his throat. Review of Systems:  Review of Systems   Constitutional: Positive for activity change and fatigue. Negative for appetite change. HENT: Negative for postnasal drip, sore throat, trouble swallowing and voice change. Eyes: Negative for photophobia, redness and visual disturbance. Respiratory: Positive for cough and shortness of breath. Negative for wheezing. Cardiovascular: Negative for chest pain, palpitations and leg swelling. Gastrointestinal: Negative for abdominal pain, diarrhea and vomiting. Endocrine: Negative. Genitourinary: Negative for difficulty urinating, dysuria, frequency and hematuria. Musculoskeletal: Negative for arthralgias, gait problem and joint swelling. Allergic/Immunologic: Negative. Neurological: Negative for dizziness, syncope, speech difficulty and headaches. Hematological: Negative for adenopathy. Does not bruise/bleed easily. Psychiatric/Behavioral: Negative.         OBJECTIVE     VITAL SIGNS:   LAST-  /78   Pulse 55   Temp 97.7 °F (36.5 °C) (Oral)   Resp 21   Ht 6' 3\" (1.905 m)   Wt 144 lb (65.3 kg)   SpO2 99%   BMI 18.00 kg/m²   8-24 HR RANGE-  TEMP Temp  Av.9 °F (36.6 °C)  Min: 97.5 °F (36.4 °C)  Max: 98.2 °F (56.7 °C)   BP Systolic (98DCH), TVH:768 , Min:113 , YGL:199      Diastolic (77BWK), UTI:64, Min:65, Max:82     PULSE Pulse  Av  Min: 53  Max: 62   RR Resp  Av.3  Min: 18  Max: 21   O2 SAT No data recorded   OXYGEN DELIVERY No data recorded     Systemic Examination:   Physical Exam  General appearance - looks comfortable and in no acute distress, chronically ill  Mental status - alert, oriented to person, place, and time  Eyes - pupils equal and reactive, extraocular eye movements intact  Mouth - mucous membranes moist, pharynx normal without lesions  Neck - supple, no significant adenopathy  Chest - Chest was symmetrical without dullness to percussion. Breath sounds bilaterally distant. There were no wheezes, rhonchi or rales. There is no intercostal recession or use of accessory muscles  Heart - normal rate, regular rhythm, normal S1, S2, no murmurs, rubs, clicks or gallops  Abdomen - soft, nontender, nondistended, no masses or organomegaly  Neurological - alert, oriented, normal speech, no focal findings or movement disorder noted  Extremities - peripheral pulses normal, no pedal edema, no clubbing or cyanosis  Skin - normal coloration and turgor, no rashes, no suspicious skin lesions noted     DATA REVIEW     Medications:  Scheduled Meds:   budesonide-formoterol  2 puff Inhalation BID    tiotropium  2 puff Inhalation Daily    amLODIPine  5 mg Oral Daily    metoprolol tartrate  12.5 mg Oral BID    tamsulosin  0.4 mg Oral Daily    aspirin  81 mg Oral Daily    atorvastatin  40 mg Oral Nightly    sodium chloride flush  5-40 mL IntraVENous 2 times per day     Continuous Infusions:   sodium chloride      sodium chloride      heparin (PORCINE) Infusion 14 Units/kg/hr (03/15/22 1600)     LABS:-  ABG:   No results for input(s): POCPH, POCPCO2, POCPO2, POCHCO3, KHGI5KPV in the last 72 hours. CBC:   Recent Labs     03/15/22  0536   WBC 7.0   HGB 11.1*   HCT 36.4*   MCV 89.4      RBC 4.07*   MCH 27.3   MCHC 30.5   RDW 15.5*     BMP:   No results for input(s): NA, K, CL, CO2, BUN, CREATININE, GLUCOSE, PHOS, CA, IONCA in the last 72 hours. Invalid input(s):  MG  Liver Function Test:   No results for input(s): PROT, LABALBU, ALT, AST, GGT, ALKPHOS, BILITOT in the last 72 hours. Amylase/Lipase:  No results for input(s): AMYLASE, LIPASE in the last 72 hours.   Coagulation Profile:   Recent Labs 03/14/22  0343 03/15/22  0536 03/16/22  0548   APTT 62.7* 52.2* 50.2*     Cardiac Enzymes:  No results for input(s): CKTOTAL, CKMB, CKMBINDEX, TROPONINI in the last 72 hours. Lactic Acid:  Lab Results   Component Value Date    LACTA NOT REPORTED 03/11/2018    LACTA NOT REPORTED 03/10/2018    LACTA NOT REPORTED 03/10/2018     BNP:   Lab Results   Component Value Date    BNP 70 09/13/2013     D-Dimer:  No results found for: DDIMER  Others:   No results found for: TSH, W5HOUZX, W2RFPYO, THYROIDAB, FT3, T4FREE  Lab Results   Component Value Date    SEDRATE 20 (H) 05/21/2014    CRP 15.2 (H) 05/21/2014     No results found for: Eugene Hedger  No results found for: IRON, TIBC, FERRITIN  No results found for: SPEP, UPEP  Lab Results   Component Value Date    PSA 1.11 11/04/2016       Input/Output:    Intake/Output Summary (Last 24 hours) at 3/16/2022 1057  Last data filed at 3/15/2022 1700  Gross per 24 hour   Intake 677 ml   Output 600 ml   Net 77 ml       Microbiology:  No results for input(s): SPECDESC, SPECDESC, SPECIAL, CULTURE, CULTURE, STATUS, ORG, CDIFFTOXPCR, CAMPYLOBPCR, SALMONELLAPC, SHIGAPCR, SHIGELLAPCR, MPNEUG, MPNEUM, LACTOQL in the last 72 hours. Pathology:    Radiology reports:  VL DUP UPPER EXTREMITY ARTERIES BILATERAL   Final Result      VL Vein Mapping Lower Bilateral   Final Result      VL DUP CAROTID BILATERAL   Final Result      CT CHEST WO CONTRAST   Final Result   1. Extensive centrilobular and paraseptal emphysema. 2. Extensive atherosclerotic disease, particularly along the coronary   arteries. 3. There are areas of atelectasis in the lingula. There is collapse of the   right middle lobe   4. No focal lung infiltrate.              Echocardiogram:   Results for orders placed during the hospital encounter of 03/11/22    Echocardiogram Complete 2D w Doppler w Color    Narrative  Transthoracic Echocardiography Report (TTE)    Patient Name Imer Mccann       Date of Study 03/13/2022  [facility]CY    Date of      1955  Gender                  Male  Birth    Age          74 year(s)  Race                    Black    Room Number  2107        Height:                 75 inch, 190.5 cm    Corporate ID [id number]    Weight:                 136 pounds, 61.7 kg  #    Patient Acct [de-identified]   BSA:        1.86 m^2    BMI:        17 kg/m^2  #    MR #         [id number]      Sonographer             Crystal Cruz    Accession #  [id number]  Interpreting Physician  Vinh Chau    Fellow                   Referring Nurse  Practitioner    Interpreting             Referring Physician     Piter Renner. Marley Watson,  Fellow                                           APRN-NP    Type of Study    TTE procedure:2D Echocardiogram, M-Mode, Doppler, Color Doppler. Procedure Date  Date: 03/13/2022 Start: 12:17 PM    Study Location: [facility]  Technical Quality: Fair visualization    Indications:Multi vessel CAD and Pre-Op CABG. History / Tech. Comments:  Procedure explained to patient. Study done at the bedside. STAT call in    Patient Status: Inpatient    Height: 75 inches Weight: 136 pounds BSA: 1.86 m^2 BMI: 17 kg/m^2    Allergies  - *No Known Allergies. CONCLUSIONS    Summary  Global left ventricular systolic function is normal. Estimated EF 76-15%  Normal diastolic filling. Normal right ventricular size and function. Mild mitral regurgitation. Mild tricuspid regurgitation. Mild pulmonary hypertension with an estimated right ventricular systolic  pressure of 41 mmHg. No pericardial effusion.     Signature  ----------------------------------------------------------------------------  Electronically signed by Vesna Wallace(Sonographer) on 03/13/2022  12:51 PM  ----------------------------------------------------------------------------    ----------------------------------------------------------------------------  Electronically signed by Tony ChauInterpreting physician) on  2022 09:19 AM  ----------------------------------------------------------------------------  FINDINGS  Left Atrium  Left atrium is normal in size. Left Ventricle  Left ventricle is normal in size. Global left ventricular systolic function  is normal. Calculated EF via Parks's method is 48 %. Mild septal hypertrophy. Normal diastolic filling. Right Atrium  Right atrium is normal in size. Right Ventricle  Normal right ventricular size and function. Mitral Valve  Normal mitral valve structure. Mild mitral regurgitation. No mitral stenosis. Aortic Valve  Aortic valve structure and function normal.  Aortic valve is trileaflet. No aortic insufficiency. No aortic stenosis. Tricuspid Valve  Normal tricuspid valve leaflets. Mild tricuspid regurgitation. No tricuspid stenosis. Mild pulmonary hypertension with an estimated right ventricular systolic  pressure of 41 mmHg. Pulmonic Valve  Pulmonic valve is normal in structure and function. No pulmonic insufficiency. No evidence of pulmonic stenosis. Pericardial Effusion  No pericardial effusion. Miscellaneous  Normal aortic root dimension. E/E' average = 11.0. IVC normal diameter & inspiratory collapse indicating normal RA filling  pressure .     M-mode / 2D Measurements & Calculations:    LVIDd:4.9 cm(3.7 - 5.6 cm)       Diastolic CJTFIH:38.01 ml  LVIDs:3.8 cm(2.2 - 4.0 cm)       Systolic YQXQS.16 ml  IVSd:1.2 cm(0.6 - 1.1 cm)        Aortic Root:3.3 cm(2.0 - 3.7 cm)  LVPWd:0.8 cm(0.6 - 1.1 cm)       LA Dimension: 2.9 cm(1.9 - 4.0 cm)  Fractional Shortenin.45 %    LA volume/Index: 35.47 ml /19m^2  Calculated LVEF (%): 49.08 %     LVOT:2.2 cm  RVDd:3.5 cm    Mitral:                                 Aortic    Valve Area (P1/2-Time): 2.78 cm^2       Peak Velocity: 1.26 m/s  Peak E-Wave: 0.87 m/s                   Mean Velocity: 0.84 m/s  Peak A-Wave: 0.67 m/s                   Peak Gradient: 6.35 mmHg  E/A Ratio: 1.29 Mean Gradient: 4 mmHg  Peak Gradient: 2.99 mmHg  Mean Gradient: 1 mmHg  Deceleration Time: 258 msec             Area (continuity): 2.33 cm^2  P1/2t: 79 msec                          AV VTI: 26.9 cm    Area (continuity): 3.32 cm^2  Mean Velocity: 0.49 m/s    Tricuspid:                              Pulmonic:    Estimated RVSP: 41 mmHg                 Peak Velocity: 0.71 m/s  Peak TR Velocity: 3.00 m/s              Peak Gradient: 1.99 mmHg  Peak TR Gradient: 36 mmHg  Estimated RA Pressure: 5 mmHg    Estimated PASP: 41 mmHg    Diastology / Tissue Doppler  Septal Wall E' velocity:0.06 m/s  Septal Wall E/E':14.7  Lateral Wall E' velocity:0.12 m/s  Lateral Wall E/E':7.3      Cardiac Catheterization:   No results found for this or any previous visit. ASSESSMENT AND PLAN     Assessment:    MV CAD,Plan for CABG   COPD stage II on PFTs with severe reduction diffusion capacity  Chronic smoker  Cocaine use  Chronic combined diastolic and systolic CHF, EF 12%. Previously 50-55%  Mild pulmonary hypertension  Right hip arthritis  Essential hypertension  CKD stage III  Essential hypertension    Plan:    He does have a stage II obstruction with postbronchodilator FEV1 of 56 with airway reversibility but does have significantly reduced diffusion capacity his RVSP was reported to be mild 40 on echocardiogram.  He does have dyspnea on mild exertion but some of that could also be related to multivessel coronary artery disease along with his COPD/emphysema.   Depending upon his dyspnea and pulmonary function test he is at least moderate risk for surgery for postop complication including postop hypoxia/postop respiratory failure/atelectasis but surgery is not contraindicated currently is maintaining saturation on room air will optimize bronchodilator therapy as he has multivessel coronary artery disease discussed the risk and benefit of surgery with patient and would recommend CT surgery to discuss with patient before proceeding with surgery. Patient remains hemodynamically stable and is currently saturating well on room air  Supplemental oxygen to keep oxygen saturation greater than 92% if needed  Started on Symbicort and will continue. Continue with the Spiriva. Albuterol to be used as needed. Aspirin, statin, amlodipine, beta-blocker and heparin per cardiology  Encourage incentive spirometry, pulmonary toilet, aspiration precautions   Continue to monitor I/O with a goal of even/negative fluid balance  Antimicrobials reviewed; not on antibiotic  No need for steroid  DVT prophylaxis on therapeutic heparin  Physical/occupational/speech therapy; increase activity as tolerated    I updated the patient regarding the current clinical condition, provisional diagnosis and management plan. I addressed concerns and answered all questions to the best of my abilities. It was my pleasure to evaluate Boo Kerr today. We will continue to follow. I would like to thank you for allowing me to participate in the care of this patient. Please feel free to call with any further questions or concerns. Shania Cohen MD, M.D. Pulmonary and Critical Care Medicine           3/16/2022, 10:57 AM    Please note that this chart was generated using voice recognition Dragon dictation software. Although every effort was made to ensure the accuracy of this automated transcription, some errors in transcription may have occurred.

## 2022-03-16 NOTE — PLAN OF CARE
Appreciate Pulmonary input and assistance. Current plan is to continue maximal pulmonary and CHF medical management thru the weekend to optimize patient for the Dr. Silvia Ramirez early next week.     SAEID Berry

## 2022-03-17 LAB
HCT VFR BLD CALC: 38.1 % (ref 40.7–50.3)
HEMOGLOBIN: 11.2 G/DL (ref 13–17)
MCH RBC QN AUTO: 27.1 PG (ref 25.2–33.5)
MCHC RBC AUTO-ENTMCNC: 29.4 G/DL (ref 28.4–34.8)
MCV RBC AUTO: 92.3 FL (ref 82.6–102.9)
NRBC AUTOMATED: 0 PER 100 WBC
PARTIAL THROMBOPLASTIN TIME: 49.4 SEC (ref 20.5–30.5)
PARTIAL THROMBOPLASTIN TIME: 55.6 SEC (ref 20.5–30.5)
PARTIAL THROMBOPLASTIN TIME: 72.3 SEC (ref 20.5–30.5)
PDW BLD-RTO: 15.7 % (ref 11.8–14.4)
PLATELET # BLD: 262 K/UL (ref 138–453)
PMV BLD AUTO: 10.6 FL (ref 8.1–13.5)
RBC # BLD: 4.13 M/UL (ref 4.21–5.77)
WBC # BLD: 7.2 K/UL (ref 3.5–11.3)

## 2022-03-17 PROCEDURE — 85027 COMPLETE CBC AUTOMATED: CPT

## 2022-03-17 PROCEDURE — 2060000000 HC ICU INTERMEDIATE R&B

## 2022-03-17 PROCEDURE — 99233 SBSQ HOSP IP/OBS HIGH 50: CPT | Performed by: INTERNAL MEDICINE

## 2022-03-17 PROCEDURE — 6370000000 HC RX 637 (ALT 250 FOR IP): Performed by: SURGERY

## 2022-03-17 PROCEDURE — 85730 THROMBOPLASTIN TIME PARTIAL: CPT

## 2022-03-17 PROCEDURE — 6360000002 HC RX W HCPCS: Performed by: STUDENT IN AN ORGANIZED HEALTH CARE EDUCATION/TRAINING PROGRAM

## 2022-03-17 PROCEDURE — 6370000000 HC RX 637 (ALT 250 FOR IP): Performed by: STUDENT IN AN ORGANIZED HEALTH CARE EDUCATION/TRAINING PROGRAM

## 2022-03-17 PROCEDURE — 36415 COLL VENOUS BLD VENIPUNCTURE: CPT

## 2022-03-17 PROCEDURE — 2580000003 HC RX 258: Performed by: STUDENT IN AN ORGANIZED HEALTH CARE EDUCATION/TRAINING PROGRAM

## 2022-03-17 PROCEDURE — 94640 AIRWAY INHALATION TREATMENT: CPT

## 2022-03-17 RX ORDER — AMLODIPINE BESYLATE 10 MG/1
10 TABLET ORAL DAILY
Status: DISCONTINUED | OUTPATIENT
Start: 2022-03-18 | End: 2022-03-21

## 2022-03-17 RX ADMIN — METOPROLOL TARTRATE 12.5 MG: 25 TABLET ORAL at 08:48

## 2022-03-17 RX ADMIN — SODIUM CHLORIDE, PRESERVATIVE FREE 10 ML: 5 INJECTION INTRAVENOUS at 08:49

## 2022-03-17 RX ADMIN — AMLODIPINE BESYLATE 5 MG: 5 TABLET ORAL at 08:48

## 2022-03-17 RX ADMIN — ASPIRIN 81 MG: 81 TABLET, COATED ORAL at 08:48

## 2022-03-17 RX ADMIN — BUDESONIDE AND FORMOTEROL FUMARATE DIHYDRATE 2 PUFF: 160; 4.5 AEROSOL RESPIRATORY (INHALATION) at 21:25

## 2022-03-17 RX ADMIN — TAMSULOSIN HYDROCHLORIDE 0.4 MG: 0.4 CAPSULE ORAL at 08:48

## 2022-03-17 RX ADMIN — METOPROLOL TARTRATE 12.5 MG: 25 TABLET ORAL at 20:55

## 2022-03-17 RX ADMIN — HEPARIN SODIUM 1980 UNITS: 1000 INJECTION INTRAVENOUS; SUBCUTANEOUS at 08:53

## 2022-03-17 RX ADMIN — Medication 14 UNITS/KG/HR: at 20:59

## 2022-03-17 RX ADMIN — DESMOPRESSIN ACETATE 40 MG: 0.2 TABLET ORAL at 20:55

## 2022-03-17 ASSESSMENT — PAIN SCALES - GENERAL
PAINLEVEL_OUTOF10: 0

## 2022-03-17 ASSESSMENT — ENCOUNTER SYMPTOMS
EYE REDNESS: 0
SHORTNESS OF BREATH: 1
VOICE CHANGE: 0
ALLERGIC/IMMUNOLOGIC NEGATIVE: 1
COUGH: 0
WHEEZING: 0
VOMITING: 0
TROUBLE SWALLOWING: 0
ABDOMINAL PAIN: 0
PHOTOPHOBIA: 0
SORE THROAT: 0
DIARRHEA: 0

## 2022-03-17 ASSESSMENT — PAIN SCALES - WONG BAKER

## 2022-03-17 NOTE — PROGRESS NOTES
Spoke to the patient's daughter over the phone while in the patient's room and updated her with her father's current status. She is currently in [facility] and was requesting for an update from the medical team regarding plans for surgery next week. I've messaged the cardiology point-of-contact via secure chat regarding this request.  I'll pass this on so this can be followed up in the morning.     Daughter: Briana Meier  Contact #: [phone]

## 2022-03-17 NOTE — PROGRESS NOTES
[facility]  Progress Note                   Date:   3/17/2022  Patient name: Caitlin Borges  Date of admission:  3/11/2022  7:15 PM  MRN:   [id number]  YOB: 1955  PCP: Shy Piedra MD    Reason for Admission: Chest pain [R07.9]  S/P cardiac catheterization [Z98.890]  3-vessel CAD [I25.10]    Subjective:       Clinical Changes /Abnormalities: Patient seen and examined, eating lunch . Denies chest pain or shortness of breath, resting comfortably with no distress . Tele/vitals/labs reviewed . . SR on monitor      Review of Systems    Medications:   Scheduled Meds:   budesonide-formoterol  2 puff Inhalation BID    tiotropium  2 puff Inhalation Daily    amLODIPine  5 mg Oral Daily    metoprolol tartrate  12.5 mg Oral BID    tamsulosin  0.4 mg Oral Daily    aspirin  81 mg Oral Daily    atorvastatin  40 mg Oral Nightly    sodium chloride flush  5-40 mL IntraVENous 2 times per day     Continuous Infusions:   sodium chloride      sodium chloride      heparin (PORCINE) Infusion 16 Units/kg/hr (03/17/22 0853)     CBC:   Recent Labs     03/15/22  0536 03/17/22  0725   WBC 7.0 7.2   HGB 11.1* 11.2*    262     BMP:    No results for input(s): NA, K, CL, CO2, BUN, CREATININE, GLUCOSE in the last 72 hours. Hepatic:  No results for input(s): AST, ALT, ALB, BILITOT, ALKPHOS in the last 72 hours. Troponin:   No results for input(s): TROPHS in the last 72 hours. BNP: No results for input(s): BNP in the last 72 hours. Lipids:   No results for input(s): CHOL, HDL in the last 72 hours. Invalid input(s): LDLCALCU  INR: No results for input(s): INR in the last 72 hours. 3/11/2022- cardiac cath     Findings:   Angiographic Findings        Cardiac Arteries and Lesion Findings       LMCA: has heavy calcification with mid 60% stenosis. LAD: has heavy calcification in the proximal segment with 40% stenosis. The   mid segment has 85% stenosis.      LCx: has heavy calcification with ostial 99% stenosis. The OM1 has 100%   occlusion with left to left collaterals. The OM2 has 100% occlusion with   left to left collaterals. The LPDA is large and is normal.     RCA: is a non-dominant vessel and has proximal 90% stenosis.      Coronary Tree        Dominance: Right       LV Analysis   LV function assessed as:Abnormal.   Ejection Fraction   +----------------------------------------------------------------------+---+   ! Method                                                                !EF%! +----------------------------------------------------------------------+---+   ! LV gram                                                               !30 !   +----------------------------------------------------------------------+---+       Procedure Summary        Three vessel disease with left main involvement.    Moderate LV systolic dysfunction. LVEF 30%.      Recommendations        Medical therapy as needed.    Risk factor modification.  CABG evaluation by CT surgery. Nuclear Stress 3/10/22:  Impression       Significant stress-induced ischemia in the lateral wall and septum.       Infarct in the apex encroaching into the apical inferior wall and also in the   basal inferior wall.       LVEF 34%       Risk stratification: High risk. Echo 3/13//2022     Summary  Global left ventricular systolic function is normal. Estimated EF 97-74%  Normal diastolic filling. Normal right ventricular size and function. Mild mitral regurgitation. Mild tricuspid regurgitation. Mild pulmonary hypertension with an estimated right ventricular systolic  pressure of 41 mmHg.   No pericardial effusion.     Signature  ----------------------------------------------------------------------------   Electronically signed by Vesna Rider(Sonographer) on 03/13/2022   12:51 PM  ----------------------------------------------------------------------------     ----------------------------------------------------------------------------   Electronically signed by Vinh Chau(Interpreting physician) on   03/14/2022 09:19 AM    Objective:   Vitals: /73   Pulse 60   Temp 97.7 °F (36.5 °C) (Oral)   Resp 21   Ht 6' 3\" (1.905 m)   Wt 143 lb 11.2 oz (65.2 kg)   SpO2 94%   BMI 17.96 kg/m²   General appearance: alert and cooperative with exam  HEENT: Head: Normocephalic, no lesions, without obvious abnormality. Neck:no JVD, trachea midline, no adenopathy  Lungs: Clear to auscultation  Heart: Regular rate and rhythm, s1/s2 auscultated, no murmurs, right grion no hematoma or bleeding   Abdomen: soft, non-tender, bowel sounds active  Extremities: no edema  Neurologic: not done        Assessment / Acute Cardiac Problems:   - CP- concern for angina  - Abnormal stress test 3/10/22- ischemia in lateral and septal walls- with EF 34%  - HTN  - DL  - cocaine use    Patient Active Problem List:     CKD (chronic kidney disease) stage 3, GFR 30-59 ml/min ([facility])     Essential hypertension     Arthritis of right hip     Chronic systolic congestive heart failure (HCC)     Benign prostatic hyperplasia with urinary obstruction     Pure hypercholesterolemia     Abdominal pain     Chest pain     Chronic combined systolic and diastolic congestive heart failure (HCC)     S/P cardiac catheterization     3-vessel CAD      Plan of Treatment:   1. MVD on cardiac cath -clinically no fluid overload , continue heparin , statin , asa for now , low dose BB , will increase Norvasc to 10 mg blood pressure slightly elevated   2. Appreciate CTS recommendation -preop work-up for surgery in process  3. Echo reviewed as above - preserved LV function with EF 50-55%  4.  Keep K.4, Mg >2    Electronically signed by JACKELYN Hodgson NP on 3/17/2022 at 2:55 PM  [facility]

## 2022-03-17 NOTE — PLAN OF CARE
Problem: Pain:  Goal: Pain level will decrease  Description: Pain level will decrease  3/16/2022 2324 by Janee Vital RN  Outcome: Ongoing  3/16/2022 1633 by Sandra Mccartney RN  Outcome: Ongoing  Goal: Control of acute pain  Description: Control of acute pain  3/16/2022 2324 by Janee Vital RN  Outcome: Ongoing  3/16/2022 1633 by Sandra Mccartney RN  Outcome: Ongoing  Goal: Control of chronic pain  Description: Control of chronic pain  3/16/2022 2324 by Janee Vital RN  Outcome: Ongoing  3/16/2022 1633 by Sandra Mccartney RN  Outcome: Ongoing     Problem: Skin Integrity:  Goal: Will show no infection signs and symptoms  Description: Will show no infection signs and symptoms  3/16/2022 2324 by Janee Vital RN  Outcome: Ongoing  3/16/2022 1633 by Sandra Mccartney RN  Outcome: Ongoing  Goal: Absence of new skin breakdown  Description: Absence of new skin breakdown  3/16/2022 2324 by Janee Vital RN  Outcome: Ongoing  3/16/2022 1633 by Sandra Mccartney RN  Outcome: Ongoing     Problem: Falls - Risk of:  Goal: Will remain free from falls  Description: Will remain free from falls  3/16/2022 2324 by Janee Vital RN  Outcome: Ongoing  3/16/2022 1633 by Sandra Mccartney RN  Outcome: Ongoing  Goal: Absence of physical injury  Description: Absence of physical injury  3/16/2022 2324 by Janee Vital RN  Outcome: Ongoing  3/16/2022 1633 by Sandra Mccartney RN  Outcome: Ongoing

## 2022-03-17 NOTE — PROGRESS NOTES
PULMONARY & CRITICAL CARE MEDICINE PROGRESS  NOTE     Patient:  Ji Contreras  MRN: [id number]  Admit date: 3/11/2022  Primary Care Physician: Cassandra Yepez MD  Consulting Physician: Adria Molina MD  CODE Status: Full Code  LOS: 6    SUBJECTIVE     I personally interviewed/examined the patient, reviewed interval history and interpreted all available radiographic, laboratory data at the time of service. Chief Compliant/Reason for Initial Consult:     COPD/preop evaluation/chronic cough    Brief Hospital Course: The patient is a 74 y.o. male history of hypertension, history of mild systolic dysfunction admitted with chest pain initially to [facility] on 03/09/2022 non-ST elevation MI ruled out by troponins in 20s had a stress test done because of high suspicion which shows ischemia of lateral septal wall with ejection fraction of 34%. He does have history of cocaine use and apparently he had used cocaine before admission to [facility].  He was transferred to [place] for cardiac catheterization which shows multivessel coronary artery disease with EF of 30% including left main disease. He remains on a statin and heparin. CT surgery was consulted for CABG. Patient had pulmonary function test done which shows FEV1 of 46% with significance positive bronchodilator FEV1 of 56% with severe reduction in diffusion capacity of less than 30% with air trapping/hyperinflation. CT scan of the chest shows areas of blebs and bulla medially and centrilobular and paraseptal emphysema on CT scan.     Patient does complain of shortness of breath on mild activity and exertion. He does have chronic cough without no change denies increased wheezing currently denies chest pain. He has history of cocaine use.   Does have history of smoking patient is not able to tell me that how many cigarettes he used to smoke but he claimed that now he smoke 1 pack/week and had been smoking for almost 50 years. Interval History:  22     Afebrile, hemodynamically stable. Saturating well on room air. No chest pain this morning. Has some dry cough. Review of Systems:  Review of Systems   Constitutional: Positive for fatigue. Negative for activity change and appetite change. HENT: Negative for postnasal drip, sore throat, trouble swallowing and voice change. Eyes: Negative for photophobia, redness and visual disturbance. Respiratory: Positive for shortness of breath. Negative for cough and wheezing. Cardiovascular: Negative for chest pain, palpitations and leg swelling. Gastrointestinal: Negative for abdominal pain, diarrhea and vomiting. Endocrine: Negative. Genitourinary: Negative for difficulty urinating, dysuria, frequency and hematuria. Musculoskeletal: Negative for arthralgias, gait problem and joint swelling. Allergic/Immunologic: Negative. Neurological: Negative for dizziness, syncope, speech difficulty and headaches. Hematological: Negative for adenopathy. Does not bruise/bleed easily. Psychiatric/Behavioral: Negative. OBJECTIVE     VITAL SIGNS:   LAST-  /73   Pulse 60   Temp 97.7 °F (36.5 °C) (Oral)   Resp 21   Ht 6' 3\" (1.905 m)   Wt 143 lb 11.2 oz (65.2 kg)   SpO2 94%   BMI 17.96 kg/m²   8-24 HR RANGE-  TEMP Temp  Av.9 °F (36.6 °C)  Min: 97.7 °F (36.5 °C)  Max: 98.1 °F (72.6 °C)   BP Systolic (14TBH), MGK:115 , Min:107 , EQT:253      Diastolic (05ISD), OQR:86, Min:35, Max:73     PULSE Pulse  Av.3  Min: 54  Max: 88   RR Resp  Av  Min: 21  Max: 21   O2 SAT No data recorded   OXYGEN DELIVERY No data recorded     Systemic Examination:   Physical Exam  Constitutional:       Appearance: Normal appearance. Eyes:      Pupils: Pupils are equal, round, and reactive to light. Cardiovascular:      Rate and Rhythm: Normal rate and regular rhythm. Pulses: Normal pulses. Heart sounds: Normal heart sounds. Pulmonary:      Comments: Decreased bilateral breath sounds  Abdominal:      General: Abdomen is flat. Palpations: Abdomen is soft. Skin:     Capillary Refill: Capillary refill takes less than 2 seconds. Neurological:      General: No focal deficit present. Mental Status: He is alert. DATA REVIEW     Medications:  Scheduled Meds:   budesonide-formoterol  2 puff Inhalation BID    tiotropium  2 puff Inhalation Daily    amLODIPine  5 mg Oral Daily    metoprolol tartrate  12.5 mg Oral BID    tamsulosin  0.4 mg Oral Daily    aspirin  81 mg Oral Daily    atorvastatin  40 mg Oral Nightly    sodium chloride flush  5-40 mL IntraVENous 2 times per day     Continuous Infusions:   sodium chloride      sodium chloride      heparin (PORCINE) Infusion 16 Units/kg/hr (03/17/22 0853)     LABS:-  ABG:   No results for input(s): POCPH, POCPCO2, POCPO2, POCHCO3, MANN9ZAA in the last 72 hours. CBC:   Recent Labs     03/15/22  0536 03/17/22  0725   WBC 7.0 7.2   HGB 11.1* 11.2*   HCT 36.4* 38.1*   MCV 89.4 92.3    262   RBC 4.07* 4.13*   MCH 27.3 27.1   MCHC 30.5 29.4   RDW 15.5* 15.7*     BMP:   No results for input(s): NA, K, CL, CO2, BUN, CREATININE, GLUCOSE, PHOS, CA, IONCA in the last 72 hours. Invalid input(s):  MG  Liver Function Test:   No results for input(s): PROT, LABALBU, ALT, AST, GGT, ALKPHOS, BILITOT in the last 72 hours. Amylase/Lipase:  No results for input(s): AMYLASE, LIPASE in the last 72 hours. Coagulation Profile:   Recent Labs     03/15/22  0536 03/16/22  0548 03/17/22  0725   APTT 52.2* 50.2* 49.4*     Cardiac Enzymes:  No results for input(s): CKTOTAL, CKMB, CKMBINDEX, TROPONINI in the last 72 hours.   Lactic Acid:  Lab Results   Component Value Date    LACTA NOT REPORTED 03/11/2018    LACTA NOT REPORTED 03/10/2018    LACTA NOT REPORTED 03/10/2018     BNP:   Lab Results   Component Value Date    BNP 70 09/13/2013 D-Dimer:  No results found for: DDIMER  Others:   No results found for: TSH, X8WBTVG, D6HOUVU, THYROIDAB, FT3, T4FREE  Lab Results   Component Value Date    SEDRATE 20 (H) 05/21/2014    CRP 15.2 (H) 05/21/2014     No results found for: Milli Godwin  No results found for: IRON, TIBC, FERRITIN  No results found for: SPEP, UPEP  Lab Results   Component Value Date    PSA 1.11 11/04/2016       Input/Output:    Intake/Output Summary (Last 24 hours) at 3/17/2022 1347  Last data filed at 3/17/2022 0700  Gross per 24 hour   Intake 450 ml   Output 1175 ml   Net -725 ml       Microbiology:  No results for input(s): SPECDESC, SPECDESC, SPECIAL, CULTURE, CULTURE, STATUS, ORG, CDIFFTOXPCR, CAMPYLOBPCR, SALMONELLAPC, SHIGAPCR, SHIGELLAPCR, MPNEUG, MPNEUM, LACTOQL in the last 72 hours. Pathology:    Radiology reports:  VL DUP UPPER EXTREMITY ARTERIES BILATERAL   Final Result      VL Vein Mapping Lower Bilateral   Final Result      VL DUP CAROTID BILATERAL   Final Result      CT CHEST WO CONTRAST   Final Result   1. Extensive centrilobular and paraseptal emphysema. 2. Extensive atherosclerotic disease, particularly along the coronary   arteries. 3. There are areas of atelectasis in the lingula. There is collapse of the   right middle lobe   4. No focal lung infiltrate.              Echocardiogram:   Results for orders placed during the hospital encounter of 03/11/22    Echocardiogram Complete 2D w Doppler w Color    Narrative  Transthoracic Echocardiography Report (TTE)    Patient Name Omega Laughlin       Date of Study           03/13/2022  [place]    Date of      1955  Gender                  Male  Birth    Age          74 year(s)  Race                    Black    Room Number  2107        Height:                 75 inch, 190.5 cm    Corporate ID [id number]    Weight:                 136 pounds, 61.7 kg  #    Patient Acct [de-identified]   BSA:        1.86 m^2    BMI:        17 kg/m^2  #    MR #         [id number]      [id number] Haydee Brennan    Accession #  [id number]  Interpreting Physician  Vinh Chau    Fellow                   Referring Nurse  Practitioner    Interpreting             Referring Physician     Estephania Seay,  Fellow                                           APRN-NP    Type of Study    TTE procedure:2D Echocardiogram, M-Mode, Doppler, Color Doppler. Procedure Date  Date: 03/13/2022 Start: 12:17 PM    Study Location: [facility]  Technical Quality: Fair visualization    Indications:Multi vessel CAD and Pre-Op CABG. History / Tech. Comments:  Procedure explained to patient. Study done at the bedside. STAT call in    Patient Status: Inpatient    Height: 75 inches Weight: 136 pounds BSA: 1.86 m^2 BMI: 17 kg/m^2    Allergies  - *No Known Allergies. CONCLUSIONS    Summary  Global left ventricular systolic function is normal. Estimated EF 49-55%  Normal diastolic filling. Normal right ventricular size and function. Mild mitral regurgitation. Mild tricuspid regurgitation. Mild pulmonary hypertension with an estimated right ventricular systolic  pressure of 41 mmHg. No pericardial effusion. Signature  ----------------------------------------------------------------------------  Electronically signed by Vesna Mendiola(Sonographer) on 03/13/2022  12:51 PM  ----------------------------------------------------------------------------    ----------------------------------------------------------------------------  Electronically signed by Vinh Chau(Interpreting physician) on  03/14/2022 09:19 AM  ----------------------------------------------------------------------------  FINDINGS  Left Atrium  Left atrium is normal in size. Left Ventricle  Left ventricle is normal in size. Global left ventricular systolic function  is normal. Calculated EF via Parks's method is 48 %. Mild septal hypertrophy. Normal diastolic filling.   Right Atrium  Right atrium is normal in size.  Right Ventricle  Normal right ventricular size and function. Mitral Valve  Normal mitral valve structure. Mild mitral regurgitation. No mitral stenosis. Aortic Valve  Aortic valve structure and function normal.  Aortic valve is trileaflet. No aortic insufficiency. No aortic stenosis. Tricuspid Valve  Normal tricuspid valve leaflets. Mild tricuspid regurgitation. No tricuspid stenosis. Mild pulmonary hypertension with an estimated right ventricular systolic  pressure of 41 mmHg. Pulmonic Valve  Pulmonic valve is normal in structure and function. No pulmonic insufficiency. No evidence of pulmonic stenosis. Pericardial Effusion  No pericardial effusion. Miscellaneous  Normal aortic root dimension. E/E' average = 11.0. IVC normal diameter & inspiratory collapse indicating normal RA filling  pressure .     M-mode / 2D Measurements & Calculations:    LVIDd:4.9 cm(3.7 - 5.6 cm)       Diastolic QTHMQT:34.50 ml  LVIDs:3.8 cm(2.2 - 4.0 cm)       Systolic IAABJA:46.29 ml  IVSd:1.2 cm(0.6 - 1.1 cm)        Aortic Root:3.3 cm(2.0 - 3.7 cm)  LVPWd:0.8 cm(0.6 - 1.1 cm)       LA Dimension: 2.9 cm(1.9 - 4.0 cm)  Fractional Shortenin.45 %    LA volume/Index: 35.47 ml /19m^2  Calculated LVEF (%): 49.08 %     LVOT:2.2 cm  RVDd:3.5 cm    Mitral:                                 Aortic    Valve Area (P1/2-Time): 2.78 cm^2       Peak Velocity: 1.26 m/s  Peak E-Wave: 0.87 m/s                   Mean Velocity: 0.84 m/s  Peak A-Wave: 0.67 m/s                   Peak Gradient: 6.35 mmHg  E/A Ratio: 1.29                         Mean Gradient: 4 mmHg  Peak Gradient: 2.99 mmHg  Mean Gradient: 1 mmHg  Deceleration Time: 258 msec             Area (continuity): 2.33 cm^2  P1/2t: 79 msec                          AV VTI: 26.9 cm    Area (continuity): 3.32 cm^2  Mean Velocity: 0.49 m/s    Tricuspid:                              Pulmonic:    Estimated RVSP: 41 mmHg                 Peak Velocity: 0.71 m/s  Peak TR Velocity: 3.00 m/s              Peak Gradient: 1.99 mmHg  Peak TR Gradient: 36 mmHg  Estimated RA Pressure: 5 mmHg    Estimated PASP: 41 mmHg    Diastology / Tissue Doppler  Septal Wall E' velocity:0.06 m/s  Septal Wall E/E':14.7  Lateral Wall E' velocity:0.12 m/s  Lateral Wall E/E':7.3      Cardiac Catheterization:   No results found for this or any previous visit. ASSESSMENT AND PLAN     Assessment:    MV CAD,Plan for CABG   COPD stage II on PFTs with severe reduction diffusion capacity  Centrilobular and paraseptal emphysema  Chronic smoker  Cocaine use  Chronic combined diastolic and systolic CHF, EF 51%. Previously 50-55%  Mild pulmonary hypertension  Right hip arthritis  Essential hypertension  CKD stage III      Plan:    He does have a stage II obstruction with postbronchodilator FEV1 of 56 with airway reversibility but does have significantly reduced diffusion capacity his RVSP was reported to be mild 40 on echocardiogram.  He does have dyspnea on mild exertion but some of that could also be related to multivessel coronary artery disease along with his COPD/emphysema. Depending upon his dyspnea and pulmonary function test he is at least moderate risk for surgery for postop complication including postop hypoxia/postop respiratory failure/atelectasis but surgery is not contraindicated currently is maintaining saturation on room air will optimize bronchodilator therapy as he has multivessel coronary artery disease discussed the risk and benefit of surgery with patient and would recommend CT surgery to discuss with patient before proceeding with surgery. Patient remains hemodynamically stable and is currently saturating well on room air  Supplemental oxygen to keep oxygen saturation greater than 92% if needed  Continue Symbicort  Continue with the Spiriva. Albuterol to be used as needed.   Aspirin, statin, amlodipine, beta-blocker and heparin per cardiology  Encourage incentive spirometry, pulmonary toilet, aspiration precautions   Continue to monitor I/O with a goal of even/negative fluid balance  not on antibiotic  No need for steroid  DVT prophylaxis on therapeutic heparin  Physical/occupational/speech therapy; increase activity as tolerated    I updated the patient regarding the current clinical condition, provisional diagnosis and management plan. I addressed concerns and answered all questions to the best of my abilities. It was my pleasure to evaluate Love Baca today. We will continue to follow. I would like to thank you for allowing me to participate in the care of this patient. Please feel free to call with any further questions or concerns. Tala Meza MD  PGY-3, Internal Medicine Resident  [facility]  3/17/2022 1:50 PM    Please note that this chart was generated using voice recognition Dragon dictation software. Although every effort was made to ensure the accuracy of this automated transcription, some errors in transcription may have occurred. Attending Physician Statement  I have discussed the care of Love Baca, including pertinent history and exam findings,  with the resident. I have seen and examined the patient and the key elements of all parts of the encounter have been performed by me. I agree with the assessment, plan and orders as documented by the resident with additions . Moderate but not prohibitive risk for CABG  Continue bronchodilator therapy  I-S and deep breathing    Treatment plan Discussed with nursing staff in detail , all questions answered . Electronically signed by Greg Mack MD on   3/17/22 at 9:03 PM EDT    Please note that this chart was generated using voice recognition Dragon dictation software. Although every effort was made to ensure the accuracy of this automated transcription, some errors in transcription may have occurred.

## 2022-03-18 LAB
ANION GAP SERPL CALCULATED.3IONS-SCNC: 11 MMOL/L (ref 9–17)
BUN BLDV-MCNC: 18 MG/DL (ref 8–23)
CALCIUM SERPL-MCNC: 8.8 MG/DL (ref 8.6–10.4)
CHLORIDE BLD-SCNC: 111 MMOL/L (ref 98–107)
CO2: 21 MMOL/L (ref 20–31)
CREAT SERPL-MCNC: 0.99 MG/DL (ref 0.7–1.2)
GFR AFRICAN AMERICAN: >60 ML/MIN
GFR NON-AFRICAN AMERICAN: >60 ML/MIN
GFR SERPL CREATININE-BSD FRML MDRD: ABNORMAL ML/MIN/{1.73_M2}
GLUCOSE BLD-MCNC: 126 MG/DL (ref 70–99)
MAGNESIUM: 1.7 MG/DL (ref 1.6–2.6)
PARTIAL THROMBOPLASTIN TIME: 64.3 SEC (ref 20.5–30.5)
POTASSIUM SERPL-SCNC: 4.4 MMOL/L (ref 3.7–5.3)
SODIUM BLD-SCNC: 143 MMOL/L (ref 135–144)

## 2022-03-18 PROCEDURE — 99232 SBSQ HOSP IP/OBS MODERATE 35: CPT | Performed by: INTERNAL MEDICINE

## 2022-03-18 PROCEDURE — 6370000000 HC RX 637 (ALT 250 FOR IP): Performed by: SURGERY

## 2022-03-18 PROCEDURE — 80048 BASIC METABOLIC PNL TOTAL CA: CPT

## 2022-03-18 PROCEDURE — 94664 DEMO&/EVAL PT USE INHALER: CPT

## 2022-03-18 PROCEDURE — 6370000000 HC RX 637 (ALT 250 FOR IP): Performed by: STUDENT IN AN ORGANIZED HEALTH CARE EDUCATION/TRAINING PROGRAM

## 2022-03-18 PROCEDURE — 83735 ASSAY OF MAGNESIUM: CPT

## 2022-03-18 PROCEDURE — 94640 AIRWAY INHALATION TREATMENT: CPT

## 2022-03-18 PROCEDURE — 2060000000 HC ICU INTERMEDIATE R&B

## 2022-03-18 PROCEDURE — 2580000003 HC RX 258: Performed by: STUDENT IN AN ORGANIZED HEALTH CARE EDUCATION/TRAINING PROGRAM

## 2022-03-18 PROCEDURE — 36415 COLL VENOUS BLD VENIPUNCTURE: CPT

## 2022-03-18 PROCEDURE — 85730 THROMBOPLASTIN TIME PARTIAL: CPT

## 2022-03-18 PROCEDURE — 94761 N-INVAS EAR/PLS OXIMETRY MLT: CPT

## 2022-03-18 RX ADMIN — AMLODIPINE BESYLATE 10 MG: 10 TABLET ORAL at 09:30

## 2022-03-18 RX ADMIN — BUDESONIDE AND FORMOTEROL FUMARATE DIHYDRATE 2 PUFF: 160; 4.5 AEROSOL RESPIRATORY (INHALATION) at 22:13

## 2022-03-18 RX ADMIN — ASPIRIN 81 MG: 81 TABLET, COATED ORAL at 09:30

## 2022-03-18 RX ADMIN — BUDESONIDE AND FORMOTEROL FUMARATE DIHYDRATE 2 PUFF: 160; 4.5 AEROSOL RESPIRATORY (INHALATION) at 08:15

## 2022-03-18 RX ADMIN — SODIUM CHLORIDE, PRESERVATIVE FREE 10 ML: 5 INJECTION INTRAVENOUS at 09:30

## 2022-03-18 RX ADMIN — TIOTROPIUM BROMIDE INHALATION SPRAY 2 PUFF: 3.12 SPRAY, METERED RESPIRATORY (INHALATION) at 08:15

## 2022-03-18 RX ADMIN — DESMOPRESSIN ACETATE 40 MG: 0.2 TABLET ORAL at 20:29

## 2022-03-18 RX ADMIN — TAMSULOSIN HYDROCHLORIDE 0.4 MG: 0.4 CAPSULE ORAL at 09:30

## 2022-03-18 ASSESSMENT — PAIN SCALES - WONG BAKER
WONGBAKER_NUMERICALRESPONSE: 0

## 2022-03-18 ASSESSMENT — PAIN SCALES - GENERAL
PAINLEVEL_OUTOF10: 0

## 2022-03-18 ASSESSMENT — ENCOUNTER SYMPTOMS
DIARRHEA: 0
VOMITING: 0
VOICE CHANGE: 0
EYE REDNESS: 0
COUGH: 0
ALLERGIC/IMMUNOLOGIC NEGATIVE: 1
PHOTOPHOBIA: 0
WHEEZING: 0
TROUBLE SWALLOWING: 0
ABDOMINAL PAIN: 0
SORE THROAT: 0
SHORTNESS OF BREATH: 1

## 2022-03-18 NOTE — PROGRESS NOTES
PULMONARY & CRITICAL CARE MEDICINE PROGRESS  NOTE     Patient:  Bella Diaz  MRN: [id number]  Admit date: 3/11/2022  Primary Care Physician: Jeffrey Lawson MD  Consulting Physician: Dwayne Purvis MD  CODE Status: Full Code  LOS: 7    SUBJECTIVE     I personally interviewed/examined the patient, reviewed interval history and interpreted all available radiographic, laboratory data at the time of service. Chief Compliant/Reason for Initial Consult:     COPD/preop evaluation/chronic cough    Brief Hospital Course: The patient is a 74 y.o. male history of hypertension, history of mild systolic dysfunction admitted with chest pain initially to [facility] on 03/09/2022 non-ST elevation MI ruled out by troponins in 20s had a stress test done because of high suspicion which shows ischemia of lateral septal wall with ejection fraction of 34%. He does have history of cocaine use and apparently he had used cocaine before admission to [facility].  He was transferred to [facility] for cardiac catheterization which shows multivessel coronary artery disease with EF of 30% including left main disease. He remains on a statin and heparin. CT surgery was consulted for CABG. Patient had pulmonary function test done which shows FEV1 of 46% with significance positive bronchodilator FEV1 of 56% with severe reduction in diffusion capacity of less than 30% with air trapping/hyperinflation. CT scan of the chest shows areas of blebs and bulla medially and centrilobular and paraseptal emphysema on CT scan.     Patient does complain of shortness of breath on mild activity and exertion. He does have chronic cough without no change denies increased wheezing currently denies chest pain. He has history of cocaine use.   Does have history of smoking patient is not able to tell me that how many cigarettes he used to smoke but he claimed that now he smoke 1 pack/week and had been smoking for almost 50 years. Interval History:  22     Patient seen and examined in his room. Resting comfortably in his bed  Afebrile hemodynamically stable. Saturating at 98% on room air  Patient was bradycardic overnight with heart rates 50s-60s  Per nursing staff, patient had a pause for 4.7 seconds, patient was asymptomatic during this period    Patient scheduled for CABG early next week Monday/Tuesday    Review of Systems:  Review of Systems   Constitutional: Positive for fatigue. Negative for activity change and appetite change. HENT: Negative for postnasal drip, sore throat, trouble swallowing and voice change. Eyes: Negative for photophobia, redness and visual disturbance. Respiratory: Positive for shortness of breath. Negative for cough and wheezing. Cardiovascular: Negative for chest pain, palpitations and leg swelling. Gastrointestinal: Negative for abdominal pain, diarrhea and vomiting. Endocrine: Negative. Genitourinary: Negative for difficulty urinating, dysuria, frequency and hematuria. Musculoskeletal: Negative for arthralgias, gait problem and joint swelling. Allergic/Immunologic: Negative. Neurological: Negative for dizziness, syncope, speech difficulty and headaches. Hematological: Negative for adenopathy. Does not bruise/bleed easily. Psychiatric/Behavioral: Negative.         OBJECTIVE     VITAL SIGNS:   LAST-  /63   Pulse 52   Temp 97.9 °F (36.6 °C) (Oral)   Resp 17   Ht 6' 3\" (1.905 m)   Wt 146 lb 4.8 oz (66.4 kg)   SpO2 94%   BMI 18.29 kg/m²   8-24 HR RANGE-  TEMP Temp  Av °F (36.7 °C)  Min: 97.5 °F (36.4 °C)  Max: 98.7 °F (36.8 °C)   BP Systolic (57ATF), PXE:786 , Min:107 , QBR:403      Diastolic (55CUP), PQU:84, Min:63, Max:73     PULSE Pulse  Av.9  Min: 48  Max: 68   RR Resp  Av.3  Min: 15  Max: 17   O2 SAT SpO2  Av %  Min: 94 %  Max: 98 %   OXYGEN DELIVERY No data recorded Systemic Examination:   Physical Exam  Constitutional:       Appearance: Normal appearance. Eyes:      Pupils: Pupils are equal, round, and reactive to light. Cardiovascular:      Rate and Rhythm: Normal rate and regular rhythm. Pulses: Normal pulses. Heart sounds: Normal heart sounds. Pulmonary:      Comments: Decreased bilateral breath sounds  Abdominal:      General: Abdomen is flat. Palpations: Abdomen is soft. Skin:     Capillary Refill: Capillary refill takes less than 2 seconds. Neurological:      General: No focal deficit present. Mental Status: He is alert. DATA REVIEW     Medications:  Scheduled Meds:   amLODIPine  10 mg Oral Daily    budesonide-formoterol  2 puff Inhalation BID    tiotropium  2 puff Inhalation Daily    [Held by provider] metoprolol tartrate  12.5 mg Oral BID    tamsulosin  0.4 mg Oral Daily    aspirin  81 mg Oral Daily    atorvastatin  40 mg Oral Nightly    sodium chloride flush  5-40 mL IntraVENous 2 times per day     Continuous Infusions:   sodium chloride      heparin (PORCINE) Infusion 14 Units/kg/hr (03/17/22 2059)     LABS:-  ABG:   No results for input(s): POCPH, POCPCO2, POCPO2, POCHCO3, AUIE1SDJ in the last 72 hours. CBC:   Recent Labs     03/17/22  0725   WBC 7.2   HGB 11.2*   HCT 38.1*   MCV 92.3      RBC 4.13*   MCH 27.1   MCHC 29.4   RDW 15.7*     BMP:   No results for input(s): NA, K, CL, CO2, BUN, CREATININE, GLUCOSE, PHOS, CA, IONCA in the last 72 hours. Invalid input(s):  MG  Liver Function Test:   No results for input(s): PROT, LABALBU, ALT, AST, GGT, ALKPHOS, BILITOT in the last 72 hours. Amylase/Lipase:  No results for input(s): AMYLASE, LIPASE in the last 72 hours. Coagulation Profile:   Recent Labs     03/17/22  1435 03/17/22  2147 03/18/22  0554   APTT 72.3* 55.6* 64.3*     Cardiac Enzymes:  No results for input(s): CKTOTAL, CKMB, CKMBINDEX, TROPONINI in the last 72 hours.   Lactic Acid:  Lab Results Component Value Date    LACTA NOT REPORTED 03/11/2018    LACTA NOT REPORTED 03/10/2018    LACTA NOT REPORTED 03/10/2018     BNP:   Lab Results   Component Value Date    BNP 70 09/13/2013     D-Dimer:  No results found for: DDIMER  Others:   No results found for: TSH, E9VSPCK, B9ENMIE, THYROIDAB, FT3, T4FREE  Lab Results   Component Value Date    SEDRATE 20 (H) 05/21/2014    CRP 15.2 (H) 05/21/2014       Lab Results   Component Value Date    PSA 1.11 11/04/2016       Input/Output:    Intake/Output Summary (Last 24 hours) at 3/18/2022 1437  Last data filed at 3/18/2022 1253  Gross per 24 hour   Intake 799.67 ml   Output 1650 ml   Net -850.33 ml       Microbiology:  No results for input(s): SPECDESC, SPECDESC, SPECIAL, CULTURE, CULTURE, STATUS, ORG, CDIFFTOXPCR, CAMPYLOBPCR, SALMONELLAPC, SHIGAPCR, SHIGELLAPCR, MPNEUG, MPNEUM, LACTOQL in the last 72 hours. Pathology:    Radiology reports:  VL DUP UPPER EXTREMITY ARTERIES BILATERAL   Final Result      VL Vein Mapping Lower Bilateral   Final Result      VL DUP CAROTID BILATERAL   Final Result      CT CHEST WO CONTRAST   Final Result   1. Extensive centrilobular and paraseptal emphysema. 2. Extensive atherosclerotic disease, particularly along the coronary   arteries. 3. There are areas of atelectasis in the lingula. There is collapse of the   right middle lobe   4. No focal lung infiltrate.              Echocardiogram:   Results for orders placed during the hospital encounter of 03/11/22    Echocardiogram Complete 2D w Doppler w Color    Narrative  Transthoracic Echocardiography Report (TTE)    Patient Name Demetria Marie       Date of Study           03/13/2022  [facility]    Date of      1955  Gender                  Male  Birth    Age          74 year(s)  Race                    Black    Room Number  2107        Height:                 75 inch, 190.5 cm    Corporate ID [id number]    Weight:                 136 pounds, 61.7 kg  #    Patient Acct [de-identified]   BSA: 1.86 m^2    BMI:        17 kg/m^2  #    MR #         [id number]      Sonographer             Wolfgang Augustin    Accession #  [id number]  Interpreting Physician  Vinh Chau    Fellow                   Referring Nurse  Practitioner    Interpreting             Referring Physician     Michele Castro. Erica Mcgregor,  Fellow                                           JACKELYN-NP    Type of Study    TTE procedure:2D Echocardiogram, M-Mode, Doppler, Color Doppler. Procedure Date  Date: 03/13/2022 Start: 12:17 PM    Study Location: [facility]  Technical Quality: Fair visualization    Indications:Multi vessel CAD and Pre-Op CABG. History / Tech. Comments:  Procedure explained to patient. Study done at the bedside. STAT call in    Patient Status: Inpatient    Height: 75 inches Weight: 136 pounds BSA: 1.86 m^2 BMI: 17 kg/m^2    Allergies  - *No Known Allergies. CONCLUSIONS    Summary  Global left ventricular systolic function is normal. Estimated EF 25-07%  Normal diastolic filling. Normal right ventricular size and function. Mild mitral regurgitation. Mild tricuspid regurgitation. Mild pulmonary hypertension with an estimated right ventricular systolic  pressure of 41 mmHg. No pericardial effusion. Signature  ----------------------------------------------------------------------------  Electronically signed by Vesna Mims(Sonographer) on 03/13/2022  12:51 PM  ----------------------------------------------------------------------------    ----------------------------------------------------------------------------  Electronically signed by Chau,Hemindermeet(Interpreting physician) on  03/14/2022 09:19 AM  ----------------------------------------------------------------------------  FINDINGS  Left Atrium  Left atrium is normal in size. Left Ventricle  Left ventricle is normal in size. Global left ventricular systolic function  is normal. Calculated EF via Parks's method is 48 %.   Mild septal hypertrophy. Normal diastolic filling. Right Atrium  Right atrium is normal in size. Right Ventricle  Normal right ventricular size and function. Mitral Valve  Normal mitral valve structure. Mild mitral regurgitation. No mitral stenosis. Aortic Valve  Aortic valve structure and function normal.  Aortic valve is trileaflet. No aortic insufficiency. No aortic stenosis. Tricuspid Valve  Normal tricuspid valve leaflets. Mild tricuspid regurgitation. No tricuspid stenosis. Mild pulmonary hypertension with an estimated right ventricular systolic  pressure of 41 mmHg. Pulmonic Valve  Pulmonic valve is normal in structure and function. No pulmonic insufficiency. No evidence of pulmonic stenosis. Pericardial Effusion  No pericardial effusion. Miscellaneous  Normal aortic root dimension. E/E' average = 11.0. IVC normal diameter & inspiratory collapse indicating normal RA filling  pressure .     M-mode / 2D Measurements & Calculations:    LVIDd:4.9 cm(3.7 - 5.6 cm)       Diastolic MPCSSO:60.23 ml  LVIDs:3.8 cm(2.2 - 4.0 cm)       Systolic OOHXZV:63.83 ml  IVSd:1.2 cm(0.6 - 1.1 cm)        Aortic Root:3.3 cm(2.0 - 3.7 cm)  LVPWd:0.8 cm(0.6 - 1.1 cm)       LA Dimension: 2.9 cm(1.9 - 4.0 cm)  Fractional Shortenin.45 %    LA volume/Index: 35.47 ml /19m^2  Calculated LVEF (%): 49.08 %     LVOT:2.2 cm  RVDd:3.5 cm    Mitral:                                 Aortic    Valve Area (P1/2-Time): 2.78 cm^2       Peak Velocity: 1.26 m/s  Peak E-Wave: 0.87 m/s                   Mean Velocity: 0.84 m/s  Peak A-Wave: 0.67 m/s                   Peak Gradient: 6.35 mmHg  E/A Ratio: 1.29                         Mean Gradient: 4 mmHg  Peak Gradient: 2.99 mmHg  Mean Gradient: 1 mmHg  Deceleration Time: 258 msec             Area (continuity): 2.33 cm^2  P1/2t: 79 msec                          AV VTI: 26.9 cm    Area (continuity): 3.32 cm^2  Mean Velocity: 0.49 m/s    Tricuspid: Pulmonic:    Estimated RVSP: 41 mmHg                 Peak Velocity: 0.71 m/s  Peak TR Velocity: 3.00 m/s              Peak Gradient: 1.99 mmHg  Peak TR Gradient: 36 mmHg  Estimated RA Pressure: 5 mmHg    Estimated PASP: 41 mmHg    Diastology / Tissue Doppler  Septal Wall E' velocity:0.06 m/s  Septal Wall E/E':14.7  Lateral Wall E' velocity:0.12 m/s  Lateral Wall E/E':7.3      Cardiac Catheterization:   No results found for this or any previous visit. ASSESSMENT AND PLAN     Assessment:    MV CAD, Plan for CABG   Bradycardia  Sinus pause lasting for 4.7 seconds 3/17/2022  COPD stage II on PFTs with severe reduction diffusion capacity  Centrilobular and paraseptal emphysema  Chronic smoker  Cocaine use  Chronic combined diastolic and systolic CHF, EF 05%. Previously 50-55%  Mild pulmonary hypertension  Right hip arthritis  Essential hypertension  CKD stage III    Plan:    He does have a stage II obstruction with postbronchodilator FEV1 of 56 with airway reversibility but does have significantly reduced diffusion capacity his RVSP was reported to be mild 40 on echocardiogram.  He does have dyspnea on mild exertion but some of that could also be related to multivessel coronary artery disease along with his COPD/emphysema. Depending upon his dyspnea and pulmonary function test he is at least moderate risk for surgery for postop complication including postop hypoxia/postop respiratory failure/atelectasis but surgery is not contraindicated currently is maintaining saturation on room air will optimize bronchodilator therapy as he has multivessel coronary artery disease discussed the risk and benefit of surgery with patient and would recommend CT surgery to discuss with patient before proceeding with surgery. Recommend holding beta-blockers due to bradycardia and an episode of sinus pause overnight.   Follow-up BMP and magnesium    Patient remains hemodynamically stable and is currently saturating well on room air  Supplemental oxygen to keep oxygen saturation greater than 92% if needed  Continue Symbicort  Continue with the Spiriva. Albuterol to be used as needed. Aspirin, statin, amlodipine, beta-blocker (on hold) and heparin per cardiology  Encourage incentive spirometry, pulmonary toilet, aspiration precautions   Continue to monitor I/O with a goal of even/negative fluid balance  not on antibiotic  No need for steroid  DVT prophylaxis on therapeutic heparin  Physical/occupational/speech therapy; increase activity as tolerated    I updated the patient regarding the current clinical condition, provisional diagnosis and management plan. I addressed concerns and answered all questions to the best of my abilities. It was my pleasure to evaluate Homero Bailey today. We will continue to follow. I would like to thank you for allowing me to participate in the care of this patient. Please feel free to call with any further questions or concerns. Rosemarie Breen MD  PGY-3, Internal Medicine Resident  [address] [facility]  3/18/2022 2:37 PM    Please note that this chart was generated using voice recognition Dragon dictation software. Although every effort was made to ensure the accuracy of this automated transcription, some errors in transcription may have occurred. Attending Physician Statement  I have discussed the care of Homero Bailey, including pertinent history and exam findings with the resident. I have reviewed the key elements of all parts of the encounter with the resident. I have seen and examined the patient with the resident. I agree with the assessment and plan and status of the problem list as documented.     Please see full note by pulmonary as Dr. Teri Adhikari  I seen the patient during around today discussed with nursing staff. He is hemodynamically stable he was bradycardic this morning to 40s according to nursing staff. Metoprolol 12.5 mg was put on hold.   Patient did not have hypotension and was asymptomatic  According to patient he does not complain of shortness of breath and denies chest pain he denies sputum production mild cough he has bilateral distant breath sound no expiratory wheezing and rhonchi on my exam.  Continue with Symbicort and Spiriva. On heparin drip. CT surgery to follow for CABG. Follow-up with cardiology for adjustment of beta-blocker for bradycardia. Recommend to check BMP and magnesium.     Please note that this chart was generated using voice recognition Dragon dictation software.  Although every effort was made to ensure the accuracy of this automated transcription, some errors in transcription may have occurred.   Yoel Smith MD  3/18/2022 4:32 PM

## 2022-03-18 NOTE — PLAN OF CARE
Problem: Pain:  Goal: Pain level will decrease  Description: Pain level will decrease  3/18/2022 0148 by Leonides Simmonds, RN  Outcome: Ongoing  3/17/2022 1707 by John Díaz RN  Outcome: Ongoing  Goal: Control of acute pain  Description: Control of acute pain  3/18/2022 0148 by Leonides Simmonds, RN  Outcome: Ongoing  3/17/2022 1707 by John Díaz RN  Outcome: Ongoing  Goal: Control of chronic pain  Description: Control of chronic pain  3/18/2022 0148 by Leonides Simmonds, RN  Outcome: Ongoing  3/17/2022 1707 by John Díaz RN  Outcome: Ongoing     Problem: Skin Integrity:  Goal: Will show no infection signs and symptoms  Description: Will show no infection signs and symptoms  3/18/2022 0148 by Leonides Simmonds, RN  Outcome: Ongoing  3/17/2022 1707 by John Díaz RN  Outcome: Ongoing  Goal: Absence of new skin breakdown  Description: Absence of new skin breakdown  3/18/2022 0148 by Leonides Simmonds, RN  Outcome: Ongoing  3/17/2022 1707 by John Díaz RN  Outcome: Ongoing     Problem: Falls - Risk of:  Goal: Will remain free from falls  Description: Will remain free from falls  3/18/2022 0148 by Leonides Simmonds, RN  Outcome: Ongoing  3/17/2022 1707 by John Díaz RN  Outcome: Ongoing  Goal: Absence of physical injury  Description: Absence of physical injury  3/18/2022 0148 by Leonides Simmonds, RN  Outcome: Ongoing  3/17/2022 1707 by John Díaz RN  Outcome: Ongoing

## 2022-03-18 NOTE — PROGRESS NOTES
[facility]  Progress Note                   Date:   3/18/2022  Patient name: Elza Ty  Date of admission:  3/11/2022  7:15 PM  MRN:   [id number]  YOB: 1955  PCP: Martha Avila MD    Reason for Admission: Chest pain [R07.9]  S/P cardiac catheterization [Z98.890]  3-vessel CAD [I25.10]    Subjective:       Clinical Changes /Abnormalities: Patient seen and examined lying quietly in bed. Denies any chest pain or SOB. Labs, vitals, & tele reviewed. Remains SB/SR. Had pause last PM >3 second - asymptomatic. Unsure if he was sleeping. On IV Heparin gtt    Review of Systems    Medications:   Scheduled Meds:   amLODIPine  10 mg Oral Daily    budesonide-formoterol  2 puff Inhalation BID    tiotropium  2 puff Inhalation Daily    metoprolol tartrate  12.5 mg Oral BID    tamsulosin  0.4 mg Oral Daily    aspirin  81 mg Oral Daily    atorvastatin  40 mg Oral Nightly    sodium chloride flush  5-40 mL IntraVENous 2 times per day     Continuous Infusions:   sodium chloride      sodium chloride      heparin (PORCINE) Infusion 14 Units/kg/hr (03/17/22 2059)     CBC:   Recent Labs     03/17/22  0725   WBC 7.2   HGB 11.2*        BMP:    No results for input(s): NA, K, CL, CO2, BUN, CREATININE, GLUCOSE in the last 72 hours. Hepatic:  No results for input(s): AST, ALT, ALB, BILITOT, ALKPHOS in the last 72 hours. Troponin:   No results for input(s): TROPHS in the last 72 hours. BNP: No results for input(s): BNP in the last 72 hours. Lipids:   No results for input(s): CHOL, HDL in the last 72 hours. Invalid input(s): LDLCALCU  INR: No results for input(s): INR in the last 72 hours. 3/11/2022- cardiac cath     Findings:   Angiographic Findings        Cardiac Arteries and Lesion Findings       LMCA: has heavy calcification with mid 60% stenosis. LAD: has heavy calcification in the proximal segment with 40% stenosis. The   mid segment has 85% stenosis.      LCx: has heavy calcification with ostial 99% stenosis. The OM1 has 100%   occlusion with left to left collaterals. The OM2 has 100% occlusion with   left to left collaterals. The LPDA is large and is normal.     RCA: is a non-dominant vessel and has proximal 90% stenosis.      Coronary Tree        Dominance: Right       LV Analysis   LV function assessed as:Abnormal.   Ejection Fraction   +----------------------------------------------------------------------+---+   ! Method                                                                !EF%! +----------------------------------------------------------------------+---+   ! LV gram                                                               !30 !   +----------------------------------------------------------------------+---+       Procedure Summary        Three vessel disease with left main involvement.    Moderate LV systolic dysfunction. LVEF 30%.      Recommendations        Medical therapy as needed.    Risk factor modification.  CABG evaluation by CT surgery. Nuclear Stress 3/10/22:  Impression       Significant stress-induced ischemia in the lateral wall and septum.       Infarct in the apex encroaching into the apical inferior wall and also in the   basal inferior wall.       LVEF 34%       Risk stratification: High risk. Echo 3/13//2022     Summary  Global left ventricular systolic function is normal. Estimated EF 65-88%  Normal diastolic filling. Normal right ventricular size and function. Mild mitral regurgitation. Mild tricuspid regurgitation. Mild pulmonary hypertension with an estimated right ventricular systolic  pressure of 41 mmHg.   No pericardial effusion.     Signature  ----------------------------------------------------------------------------   Electronically signed by Vesna Laura(Sonographer) on 03/13/2022   12:51 PM  ----------------------------------------------------------------------------     ----------------------------------------------------------------------------   Electronically signed by Vinh Chau(Interpreting physician) on   03/14/2022 09:19 AM    Objective:   Vitals: /73   Pulse (!) 49   Temp 98.7 °F (37.1 °C) (Oral)   Resp 15   Ht 6' 3\" (1.905 m)   Wt 146 lb 4.8 oz (66.4 kg)   SpO2 98%   BMI 18.29 kg/m²   General appearance: alert and cooperative with exam  HEENT: Head: Normocephalic, no lesions, without obvious abnormality. Neck:no JVD, trachea midline, no adenopathy  Lungs: Clear to auscultation throughout. RA without distress  Heart: Regular rate and rhythm, s1/s2 auscultated, no murmurs. SB   Abdomen: soft, non-tender, bowel sounds active  Extremities: no edema  Neurologic: not done        Assessment / Acute Cardiac Problems:   - CP- concern for angina  - Abnormal stress test 3/10/22- ischemia in lateral and septal walls- with EF 34%  - HTN  - DL  - cocaine use    Patient Active Problem List:     CKD (chronic kidney disease) stage 3, GFR 30-59 ml/min (HCC)     Essential hypertension     Arthritis of right hip     Chronic systolic congestive heart failure (HCC)     Benign prostatic hyperplasia with urinary obstruction     Pure hypercholesterolemia     Abdominal pain     Chest pain     Chronic combined systolic and diastolic congestive heart failure (HCC)     S/P cardiac catheterization     3-vessel CAD      Plan of Treatment:   1. MVD on cardiac cath -Stable. Denies any CP or SOB. Continue heparin , statin, & ASA. Hold BB given pause & bradycardia. Monitor for KARL and CPAP prn.     2. Appreciate CTS recommendation -preop work-up for surgery in process  3. Echo reviewed as above - preserved LV function with EF 50-55%  4.  Keep K.4, Mg >2- Will recheck BMP as not done in days    Electronically signed by JACKELYN Azul - CNP on 3/18/2022 at 11:25 [address] [facility] [address] [facility].  [phone]

## 2022-03-18 NOTE — PLAN OF CARE
Plan for OR either Monday or Tuesday of next week. Unsuccessful attempt to reach daughter at phone number listed in chart. Will try again later.     SAEID Monroy

## 2022-03-18 NOTE — PROGRESS NOTES
Comprehensive Nutrition Assessment    Type and Reason for Visit:  Initial (LOS)    Nutrition Recommendations/Plan: Continue current diet. Encourage/monitor PO intakes as tolerated. Monitor need for oral supplements. Will monitor labs, weights, and plan of care. Nutrition Assessment:  Pt seen for length of stay. Admitted with c/o chest pain. PMH includes: CAD, CHF, CKD, HTN, COPD. Pt reports having a good appetite and eating well at meals. Consuming % of meals. S/p cardiac cath on 3/11. Labs/Meds reviewed. Malnutrition Assessment:  Malnutrition Status: At risk for malnutrition    Context:  Acute Illness     Findings of the 6 clinical characteristics of malnutrition:  Energy Intake:  No significant decrease in energy intake  Weight Loss:  No significant weight loss (per chart review)     Body Fat Loss:  1 - Mild body fat loss Orbital   Muscle Mass Loss:  Unable to assess (Pt covered by blankets at visit.)    Fluid Accumulation:  No significant fluid accumulation   Strength:  Not Performed    Estimated Daily Nutrient Needs:  Energy (kcal):  25-28 kcal/kg = 9470-4565 kcals/day; Weight Used for Energy Requirements:  Current     Protein (g):  1.2-1.5 gm/kg =  gm pro/day; Weight Used for Protein Requirements:  Current      Fluid (ml/day):  25 mL/kg = 0351-4786 mL/day or per MD; Method Used for Fluid Requirements:  ml/Kg      Nutrition Related Findings:  Labs/Meds reviewed. Last BM 3/14. Wounds:  None       Current Nutrition Therapies:    ADULT DIET;  Regular    Anthropometric Measures:  · Height: 6' 3\" (190.5 cm)  · Current Body Weight: 146 lb 4.8 oz (66.4 kg)   · Admission Body Weight: 141 lb 5 oz (64.1 kg)    · Usual Body Weight: 145 lb (65.8 kg)     · Ideal Body Weight: 196 lbs; % Ideal Body Weight 74.6 %   · BMI: 18.3  · BMI Categories: Underweight (BMI less than 18.5)       Nutrition Diagnosis:   · Underweight related to  (current condition?) as evidenced by BMI    Nutrition Interventions:   Food and/or Nutrient Delivery:  Continue Current Diet (Monitor need for oral supplements.)  Nutrition Education/Counseling:  No recommendation at this time   Coordination of Nutrition Care:  Continue to monitor while inpatient    Goals:  Meet % of estimated nutrition needs. Nutrition Monitoring and Evaluation:   Behavioral-Environmental Outcomes:  None Identified   Food/Nutrient Intake Outcomes:  Food and Nutrient Intake  Physical Signs/Symptoms Outcomes:  Biochemical Data,GI Status,Hemodynamic Status,Fluid Status or Edema,Nutrition Focused Physical Findings,Skin,Weight     Discharge Planning:     Too soon to determine     Electronically signed by Danielle Carmen RD, LD on 3/18/22 at 10:48 AM EDT    Contact: [phone]

## 2022-03-18 NOTE — PROGRESS NOTES
Attending Physician Statement  I have discussed the care of Pura Whittaker, including pertinent history and exam findings with the resident. I have reviewed the key elements of all parts of the encounter with the resident. I have seen and examined the patient with the resident. I agree with the assessment and plan and status of the problem list as documented. Please see full note by pulmonary as Dr. Jhonny Berkowitz  I seen the patient during around today discussed with nursing staff. He is hemodynamically stable he was bradycardic this morning to 40s according to nursing staff. Metoprolol 12.5 mg was put on hold. Patient did not have hypotension and was asymptomatic  According to patient he does not complain of shortness of breath and denies chest pain he denies sputum production mild cough he has bilateral distant breath sound no expiratory wheezing and rhonchi on my exam.  Continue with Symbicort and Spiriva. On heparin drip. CT surgery to follow for CABG. Follow-up with cardiology for adjustment of beta-blocker for bradycardia. Recommend to check BMP and magnesium. Please note that this chart was generated using voice recognition Dragon dictation software. Although every effort was made to ensure the accuracy of this automated transcription, some errors in transcription may have occurred.      Stephon Murphy MD  3/18/2022 11:22 AM

## 2022-03-18 NOTE — PLAN OF CARE
Problem: Pain:  Goal: Pain level will decrease  Description: Pain level will decrease  3/18/2022 1305 by Amelia Pitts RN  Outcome: Ongoing  3/18/2022 0148 by Real Borges RN  Outcome: Ongoing  Goal: Control of acute pain  Description: Control of acute pain  3/18/2022 1305 by Amelia Pitts RN  Outcome: Ongoing  3/18/2022 0148 by Real Borges RN  Outcome: Ongoing  Goal: Control of chronic pain  Description: Control of chronic pain  3/18/2022 1305 by Amelia Pitts RN  Outcome: Ongoing  3/18/2022 0148 by Real Borges RN  Outcome: Ongoing     Problem: Skin Integrity:  Goal: Will show no infection signs and symptoms  Description: Will show no infection signs and symptoms  3/18/2022 1305 by Amelia Pitts RN  Outcome: Ongoing  3/18/2022 0148 by Real Borges RN  Outcome: Ongoing  Goal: Absence of new skin breakdown  Description: Absence of new skin breakdown  3/18/2022 1305 by Amelia Pitts RN  Outcome: Ongoing  3/18/2022 0148 by Real Borges RN  Outcome: Ongoing     Problem: Falls - Risk of:  Goal: Will remain free from falls  Description: Will remain free from falls  3/18/2022 1305 by Amelia Pitts RN  Outcome: Ongoing  3/18/2022 0148 by Real Borges RN  Outcome: Ongoing  Goal: Absence of physical injury  Description: Absence of physical injury  3/18/2022 1305 by Amelia Pitts RN  Outcome: Ongoing  3/18/2022 0148 by Real Borges RN  Outcome: Ongoing     Problem: Nutrition  Goal: Optimal nutrition therapy  3/18/2022 1305 by Amelia Pitts RN  Outcome: Ongoing  3/18/2022 1049 by Osman Wiseman RD, LD  Outcome: Ongoing  Note: Nutrition Problem #1: Underweight  Intervention: Food and/or Nutrient Delivery: Continue Current Diet (Monitor need for oral supplements.)  Nutritional Goals: Meet % of estimated nutrition needs.      Problem: Cardiac:  Goal: Ability to maintain vital signs within normal range will improve  Description: Ability to maintain vital signs within normal range will improve  Outcome: Ongoing  Goal: Cardiovascular alteration will improve  Description: Cardiovascular alteration will improve  Outcome: Ongoing     Problem: Health Behavior:  Goal: Will modify at least one risk factor affecting health status  Description: Will modify at least one risk factor affecting health status  Outcome: Ongoing  Goal: Identification of resources available to assist in meeting health care needs will improve  Description: Identification of resources available to assist in meeting health care needs will improve  Outcome: Ongoing     Problem: Physical Regulation:  Goal: Complications related to the disease process, condition or treatment will be avoided or minimized  Description: Complications related to the disease process, condition or treatment will be avoided or minimized  Outcome: Ongoing   Discussed POC with pt at bedside. Questions and concerns addressed as needed with pt. Message passed along to cardiothoracic team (Yoav Peck) to call daughter, she is requesting a physician speak with her regarding upcoming surgery.

## 2022-03-18 NOTE — PROGRESS NOTES
Had a pause of 4.78 sec in the monitor,he was sleeping and aymptomatic,blood pressure 110/78mmHg and Spo2 96%on room air,informed to on call Dr. Armin Felty to monitor,no new orders.

## 2022-03-19 LAB
ANION GAP SERPL CALCULATED.3IONS-SCNC: 11 MMOL/L (ref 9–17)
BUN BLDV-MCNC: 17 MG/DL (ref 8–23)
CALCIUM SERPL-MCNC: 8.7 MG/DL (ref 8.6–10.4)
CHLORIDE BLD-SCNC: 113 MMOL/L (ref 98–107)
CO2: 21 MMOL/L (ref 20–31)
CREAT SERPL-MCNC: 1 MG/DL (ref 0.7–1.2)
GFR AFRICAN AMERICAN: >60 ML/MIN
GFR NON-AFRICAN AMERICAN: >60 ML/MIN
GFR SERPL CREATININE-BSD FRML MDRD: ABNORMAL ML/MIN/{1.73_M2}
GLUCOSE BLD-MCNC: 113 MG/DL (ref 70–99)
MAGNESIUM: 1.7 MG/DL (ref 1.6–2.6)
POTASSIUM SERPL-SCNC: 4 MMOL/L (ref 3.7–5.3)
SODIUM BLD-SCNC: 145 MMOL/L (ref 135–144)

## 2022-03-19 PROCEDURE — 80048 BASIC METABOLIC PNL TOTAL CA: CPT

## 2022-03-19 PROCEDURE — 99233 SBSQ HOSP IP/OBS HIGH 50: CPT | Performed by: INTERNAL MEDICINE

## 2022-03-19 PROCEDURE — 83735 ASSAY OF MAGNESIUM: CPT

## 2022-03-19 PROCEDURE — 6370000000 HC RX 637 (ALT 250 FOR IP): Performed by: STUDENT IN AN ORGANIZED HEALTH CARE EDUCATION/TRAINING PROGRAM

## 2022-03-19 PROCEDURE — 6370000000 HC RX 637 (ALT 250 FOR IP): Performed by: SURGERY

## 2022-03-19 PROCEDURE — 94761 N-INVAS EAR/PLS OXIMETRY MLT: CPT

## 2022-03-19 PROCEDURE — 36415 COLL VENOUS BLD VENIPUNCTURE: CPT

## 2022-03-19 PROCEDURE — 94640 AIRWAY INHALATION TREATMENT: CPT

## 2022-03-19 PROCEDURE — 6360000002 HC RX W HCPCS: Performed by: STUDENT IN AN ORGANIZED HEALTH CARE EDUCATION/TRAINING PROGRAM

## 2022-03-19 PROCEDURE — 2060000000 HC ICU INTERMEDIATE R&B

## 2022-03-19 PROCEDURE — 2580000003 HC RX 258: Performed by: STUDENT IN AN ORGANIZED HEALTH CARE EDUCATION/TRAINING PROGRAM

## 2022-03-19 RX ORDER — POTASSIUM CHLORIDE 7.45 MG/ML
10 INJECTION INTRAVENOUS PRN
Status: DISCONTINUED | OUTPATIENT
Start: 2022-03-19 | End: 2022-03-21

## 2022-03-19 RX ORDER — POTASSIUM CHLORIDE 20 MEQ/1
40 TABLET, EXTENDED RELEASE ORAL PRN
Status: DISCONTINUED | OUTPATIENT
Start: 2022-03-19 | End: 2022-03-21

## 2022-03-19 RX ORDER — MAGNESIUM SULFATE IN WATER 40 MG/ML
2000 INJECTION, SOLUTION INTRAVENOUS PRN
Status: DISCONTINUED | OUTPATIENT
Start: 2022-03-19 | End: 2022-03-21

## 2022-03-19 RX ADMIN — Medication 14 UNITS/KG/HR: at 01:05

## 2022-03-19 RX ADMIN — TIOTROPIUM BROMIDE INHALATION SPRAY 2 PUFF: 3.12 SPRAY, METERED RESPIRATORY (INHALATION) at 08:18

## 2022-03-19 RX ADMIN — BUDESONIDE AND FORMOTEROL FUMARATE DIHYDRATE 2 PUFF: 160; 4.5 AEROSOL RESPIRATORY (INHALATION) at 08:18

## 2022-03-19 RX ADMIN — NITROGLYCERIN 0.4 MG: 0.4 TABLET SUBLINGUAL at 08:54

## 2022-03-19 RX ADMIN — BUDESONIDE AND FORMOTEROL FUMARATE DIHYDRATE 2 PUFF: 160; 4.5 AEROSOL RESPIRATORY (INHALATION) at 22:27

## 2022-03-19 RX ADMIN — SODIUM CHLORIDE, PRESERVATIVE FREE 10 ML: 5 INJECTION INTRAVENOUS at 08:25

## 2022-03-19 RX ADMIN — TAMSULOSIN HYDROCHLORIDE 0.4 MG: 0.4 CAPSULE ORAL at 08:24

## 2022-03-19 RX ADMIN — AMLODIPINE BESYLATE 10 MG: 10 TABLET ORAL at 08:24

## 2022-03-19 RX ADMIN — ASPIRIN 81 MG: 81 TABLET, COATED ORAL at 08:24

## 2022-03-19 ASSESSMENT — ENCOUNTER SYMPTOMS
ALLERGIC/IMMUNOLOGIC NEGATIVE: 1
ABDOMINAL PAIN: 0
WHEEZING: 0
VOMITING: 0
SHORTNESS OF BREATH: 0
VOICE CHANGE: 0
DIARRHEA: 0
PHOTOPHOBIA: 0
COUGH: 0
TROUBLE SWALLOWING: 0
SORE THROAT: 0
EYE REDNESS: 0

## 2022-03-19 NOTE — PROGRESS NOTES
PULMONARY & CRITICAL CARE MEDICINE PROGRESS  NOTE     Patient:  Joel Michael  MRN: [id number]  Admit date: 3/11/2022  Primary Care Physician: Lary Valentine MD  Consulting Physician: Nimo Jones MD  CODE Status: Full Code  LOS: 8    SUBJECTIVE     I personally interviewed/examined the patient, reviewed interval history and interpreted all available radiographic, laboratory data at the time of service. Chief Compliant/Reason for Initial Consult:     COPD/preop evaluation/chronic cough    Brief Hospital Course: The patient is a 74 y.o. male history of hypertension, history of mild systolic dysfunction admitted with chest pain initially to [facility] on 03/09/2022 non-ST elevation MI ruled out by troponins in 20s had a stress test done because of high suspicion which shows ischemia of lateral septal wall with ejection fraction of 34%. He does have history of cocaine use and apparently he had used cocaine before admission to [facility].  He was transferred to [place] for cardiac catheterization which shows multivessel coronary artery disease with EF of 30% including left main disease. He remains on a statin and heparin. CT surgery was consulted for CABG. Patient had pulmonary function test done which shows FEV1 of 46% with significance positive bronchodilator FEV1 of 56% with severe reduction in diffusion capacity of less than 30% with air trapping/hyperinflation. CT scan of the chest shows areas of blebs and bulla medially and centrilobular and paraseptal emphysema on CT scan.     Patient does complain of shortness of breath on mild activity and exertion. He does have chronic cough without no change denies increased wheezing currently denies chest pain. He has history of cocaine use.   Does have history of smoking patient is not able to tell me that how many cigarettes he used to smoke but he claimed that now he smoke 1 pack/week and had been smoking for almost 50 years. Interval History:  22     Patient seen and examined in his room. Resting comfortably in his bed  Afebrile hemodynamically stable. Saturating at 94 % on room air  No more bradycardia overnight  Patient scheduled for CABG early next week Monday/Tuesday  No chest pain or pressure    Review of Systems:  Review of Systems   Constitutional: Negative for activity change, appetite change and fatigue. HENT: Negative for postnasal drip, sore throat, trouble swallowing and voice change. Eyes: Negative for photophobia, redness and visual disturbance. Respiratory: Negative for cough, shortness of breath and wheezing. Cardiovascular: Negative for chest pain, palpitations and leg swelling. Gastrointestinal: Negative for abdominal pain, diarrhea and vomiting. Endocrine: Negative. Genitourinary: Negative for difficulty urinating, dysuria, frequency and hematuria. Musculoskeletal: Negative for arthralgias, gait problem and joint swelling. Allergic/Immunologic: Negative. Neurological: Negative for dizziness, syncope, speech difficulty and headaches. Hematological: Negative for adenopathy. Does not bruise/bleed easily. Psychiatric/Behavioral: Negative. OBJECTIVE     VITAL SIGNS:   LAST-  BP (!) 94/57   Pulse 64   Temp 98.4 °F (36.9 °C)   Resp 18   Ht 6' 3\" (1.905 m)   Wt 146 lb 4.8 oz (66.4 kg)   SpO2 93%   BMI 18.29 kg/m²   8-24 HR RANGE-  TEMP Temp  Av.3 °F (36.8 °C)  Min: 97.9 °F (36.6 °C)  Max: 98.7 °F (25.1 °C)   BP Systolic (73LVW), MEB:102 , Min:94 , GBP:117      Diastolic (50OAO), VGA:58, Min:57, Max:73     PULSE Pulse  Av.9  Min: 48  Max: 76   RR Resp  Av.5  Min: 18  Max: 23   O2 SAT SpO2  Av %  Min: 91 %  Max: 93 %   OXYGEN DELIVERY No data recorded     Systemic Examination:   Physical Exam  Constitutional:       Appearance: Normal appearance.    Eyes:      Pupils: Pupils are equal, round, and reactive to light. Cardiovascular:      Rate and Rhythm: Normal rate and regular rhythm. Pulses: Normal pulses. Heart sounds: Normal heart sounds. Pulmonary:      Comments: Decreased bilateral breath sounds  Abdominal:      General: Abdomen is flat. Palpations: Abdomen is soft. Skin:     Capillary Refill: Capillary refill takes less than 2 seconds. Neurological:      General: No focal deficit present. Mental Status: He is alert. DATA REVIEW     Medications:  Scheduled Meds:   amLODIPine  10 mg Oral Daily    budesonide-formoterol  2 puff Inhalation BID    tiotropium  2 puff Inhalation Daily    [Held by provider] metoprolol tartrate  12.5 mg Oral BID    tamsulosin  0.4 mg Oral Daily    aspirin  81 mg Oral Daily    atorvastatin  40 mg Oral Nightly    sodium chloride flush  5-40 mL IntraVENous 2 times per day     Continuous Infusions:   sodium chloride      heparin (PORCINE) Infusion 14 Units/kg/hr (03/19/22 0105)     LABS:-  ABG:   No results for input(s): POCPH, POCPCO2, POCPO2, POCHCO3, GDZT6WES in the last 72 hours. CBC:   Recent Labs     03/17/22  0725   WBC 7.2   HGB 11.2*   HCT 38.1*   MCV 92.3      RBC 4.13*   MCH 27.1   MCHC 29.4   RDW 15.7*     BMP:   Recent Labs     03/18/22  1412      K 4.4   *   CO2 21   BUN 18   CREATININE 0.99   GLUCOSE 126*     Liver Function Test:   No results for input(s): PROT, LABALBU, ALT, AST, GGT, ALKPHOS, BILITOT in the last 72 hours. Amylase/Lipase:  No results for input(s): AMYLASE, LIPASE in the last 72 hours. Coagulation Profile:   Recent Labs     03/17/22  1435 03/17/22  2147 03/18/22  0554   APTT 72.3* 55.6* 64.3*     Cardiac Enzymes:  No results for input(s): CKTOTAL, CKMB, CKMBINDEX, TROPONINI in the last 72 hours.   Lactic Acid:  Lab Results   Component Value Date    LACTA NOT REPORTED 03/11/2018    LACTA NOT REPORTED 03/10/2018    LACTA NOT REPORTED 03/10/2018     BNP:   Lab Results Physician  Vinh Chau    Fellow                   Referring Nurse  Practitioner    Interpreting             Referring Physician     Luan Medina. Hector Munson,  Fellow                                           APRN-NP    Type of Study    TTE procedure:2D Echocardiogram, M-Mode, Doppler, Color Doppler. Procedure Date  Date: 03/13/2022 Start: 12:17 PM    Study Location: [facility]  Technical Quality: Fair visualization    Indications:Multi vessel CAD and Pre-Op CABG. History / Tech. Comments:  Procedure explained to patient. Study done at the bedside. STAT call in    Patient Status: Inpatient    Height: 75 inches Weight: 136 pounds BSA: 1.86 m^2 BMI: 17 kg/m^2    Allergies  - *No Known Allergies. CONCLUSIONS    Summary  Global left ventricular systolic function is normal. Estimated EF 24-25%  Normal diastolic filling. Normal right ventricular size and function. Mild mitral regurgitation. Mild tricuspid regurgitation. Mild pulmonary hypertension with an estimated right ventricular systolic  pressure of 41 mmHg. No pericardial effusion. Signature  ----------------------------------------------------------------------------  Electronically signed by Vesan Shaffer(Sonographer) on 03/13/2022  12:51 PM  ----------------------------------------------------------------------------    ----------------------------------------------------------------------------  Electronically signed by Vinh Chau(Interpreting physician) on  03/14/2022 09:19 AM  ----------------------------------------------------------------------------  FINDINGS  Left Atrium  Left atrium is normal in size. Left Ventricle  Left ventricle is normal in size. Global left ventricular systolic function  is normal. Calculated EF via Parks's method is 48 %. Mild septal hypertrophy. Normal diastolic filling. Right Atrium  Right atrium is normal in size.   Right Ventricle  Normal right ventricular size and function. Mitral Valve  Normal mitral valve structure. Mild mitral regurgitation. No mitral stenosis. Aortic Valve  Aortic valve structure and function normal.  Aortic valve is trileaflet. No aortic insufficiency. No aortic stenosis. Tricuspid Valve  Normal tricuspid valve leaflets. Mild tricuspid regurgitation. No tricuspid stenosis. Mild pulmonary hypertension with an estimated right ventricular systolic  pressure of 41 mmHg. Pulmonic Valve  Pulmonic valve is normal in structure and function. No pulmonic insufficiency. No evidence of pulmonic stenosis. Pericardial Effusion  No pericardial effusion. Miscellaneous  Normal aortic root dimension. E/E' average = 11.0. IVC normal diameter & inspiratory collapse indicating normal RA filling  pressure .     M-mode / 2D Measurements & Calculations:    LVIDd:4.9 cm(3.7 - 5.6 cm)       Diastolic FANLZH:67.83 ml  LVIDs:3.8 cm(2.2 - 4.0 cm)       Systolic LTPTRJ:19.55 ml  IVSd:1.2 cm(0.6 - 1.1 cm)        Aortic Root:3.3 cm(2.0 - 3.7 cm)  LVPWd:0.8 cm(0.6 - 1.1 cm)       LA Dimension: 2.9 cm(1.9 - 4.0 cm)  Fractional Shortenin.45 %    LA volume/Index: 35.47 ml /19m^2  Calculated LVEF (%): 49.08 %     LVOT:2.2 cm  RVDd:3.5 cm    Mitral:                                 Aortic    Valve Area (P1/2-Time): 2.78 cm^2       Peak Velocity: 1.26 m/s  Peak E-Wave: 0.87 m/s                   Mean Velocity: 0.84 m/s  Peak A-Wave: 0.67 m/s                   Peak Gradient: 6.35 mmHg  E/A Ratio: 1.29                         Mean Gradient: 4 mmHg  Peak Gradient: 2.99 mmHg  Mean Gradient: 1 mmHg  Deceleration Time: 258 msec             Area (continuity): 2.33 cm^2  P1/2t: 79 msec                          AV VTI: 26.9 cm    Area (continuity): 3.32 cm^2  Mean Velocity: 0.49 m/s    Tricuspid:                              Pulmonic:    Estimated RVSP: 41 mmHg                 Peak Velocity: 0.71 m/s  Peak TR Velocity: 3.00 m/s              Peak Gradient: 1.99 mmHg  Peak TR Gradient: 36 mmHg  Estimated RA Pressure: 5 mmHg    Estimated PASP: 41 mmHg    Diastology / Tissue Doppler  Septal Wall E' velocity:0.06 m/s  Septal Wall E/E':14.7  Lateral Wall E' velocity:0.12 m/s  Lateral Wall E/E':7.3      Cardiac Catheterization:   No results found for this or any previous visit. ASSESSMENT AND PLAN     Assessment:    MV CAD, Plan for CABG   Bradycardia  Sinus pause lasting for 4.7 seconds 3/17/2022  COPD stage II on PFTs with severe reduction diffusion capacity  Centrilobular and paraseptal emphysema  Chronic smoker  Cocaine use  Chronic combined diastolic and systolic CHF, EF 86%. Previously 50-55%  Mild pulmonary hypertension  Right hip arthritis  Essential hypertension  CKD stage III    Plan:    He does have a stage II obstruction with postbronchodilator FEV1 of 56 with airway reversibility but does have significantly reduced diffusion capacity his RVSP was reported to be mild 40 on echocardiogram.  He does have dyspnea on mild exertion but some of that could also be related to multivessel coronary artery disease along with his COPD/emphysema. Depending upon his dyspnea and pulmonary function test he is at least moderate risk for surgery for postop complication including postop hypoxia/postop respiratory failure/atelectasis but surgery is not contraindicated currently is maintaining saturation on room air will optimize bronchodilator therapy as he has multivessel coronary artery disease discussed the risk and benefit of surgery with patient and would recommend CT surgery to discuss with patient before proceeding with surgery  Patient remains hemodynamically stable and is currently saturating well on room air  Supplemental oxygen to keep oxygen saturation greater than 92% if needed  Continue Symbicort  Continue with the Spiriva. Albuterol to be used as needed.   Aspirin, statin, amlodipine, and heparin per cardiology  Encourage incentive spirometry, pulmonary toilet, aspiration precautions   Continue to monitor I/O with a goal of even/negative fluid balance  not on antibiotic  No need for steroid  DVT prophylaxis on therapeutic heparin  Physical/occupational/speech therapy; increase activity as tolerated    I updated the patient regarding the current clinical condition, provisional diagnosis and management plan. I addressed concerns and answered all questions to the best of my abilities. It was my pleasure to evaluate Clauda Leaver today. We will continue to follow. I would like to thank you for allowing me to participate in the care of this patient. Please feel free to call with any further questions or concerns.     Electronically signed Sunita Verdugo MD  3/19/2022 4:54 AM

## 2022-03-19 NOTE — PLAN OF CARE
Problem: Pain:  Description: Pain management should include both nonpharmacologic and pharmacologic interventions.   Goal: Pain level will decrease  Description: Pain level will decrease  3/19/2022 1646 by Joyce Lerner RN  Outcome: Met This Shift  3/19/2022 0614 by Cr Mason RN  Outcome: Ongoing  Goal: Control of acute pain  Description: Control of acute pain  3/19/2022 1646 by Joyce Lerner RN  Outcome: Met This Shift  3/19/2022 0614 by Cr Mason RN  Outcome: Ongoing  Goal: Control of chronic pain  Description: Control of chronic pain  3/19/2022 1646 by Joyce Lerner RN  Outcome: Met This Shift  3/19/2022 0614 by Cr Mason RN  Outcome: Ongoing     Problem: Skin Integrity:  Goal: Will show no infection signs and symptoms  Description: Will show no infection signs and symptoms  3/19/2022 1646 by Joyce Lerner RN  Outcome: Met This Shift  3/19/2022 0614 by Cr Mason RN  Outcome: Ongoing  Goal: Absence of new skin breakdown  Description: Absence of new skin breakdown  3/19/2022 1646 by Joyce Lerner RN  Outcome: Met This Shift  3/19/2022 0614 by Cr Mason RN  Outcome: Ongoing     Problem: Falls - Risk of:  Goal: Will remain free from falls  Description: Will remain free from falls  3/19/2022 1646 by Joyce Lerner RN  Outcome: Met This Shift  3/19/2022 0614 by Cr Mason RN  Outcome: Ongoing  Goal: Absence of physical injury  Description: Absence of physical injury  3/19/2022 1646 by Joyce Lerner RN  Outcome: Met This Shift  3/19/2022 0614 by Cr Mason RN  Outcome: Ongoing     Problem: Nutrition  Goal: Optimal nutrition therapy  3/19/2022 1646 by Joyce Lerner RN  Outcome: Met This Shift  3/19/2022 0614 by Cr Mason RN  Outcome: Ongoing     Problem: Cardiac:  Goal: Ability to maintain vital signs within normal range will improve  Description: Ability to maintain vital signs within normal range will improve  3/19/2022 1646 by Willene Favre Eugenia Dailey RN  Outcome: Met This Shift  3/19/2022 9397 by Alisia Perez RN  Outcome: Ongoing  Goal: Cardiovascular alteration will improve  Description: Cardiovascular alteration will improve  3/19/2022 1646 by Alonzo Samano RN  Outcome: Met This Shift  3/19/2022 0614 by Alisia Perez RN  Outcome: Ongoing     Problem: Health Behavior:  Goal: Will modify at least one risk factor affecting health status  Description: Will modify at least one risk factor affecting health status  3/19/2022 1646 by Alonzo Samano RN  Outcome: Met This Shift  3/19/2022 0614 by Alisia Perez RN  Outcome: Ongoing  Goal: Identification of resources available to assist in meeting health care needs will improve  Description: Identification of resources available to assist in meeting health care needs will improve  3/19/2022 1646 by Alonzo Samano RN  Outcome: Met This Shift  3/19/2022 0614 by Alisia Perez RN  Outcome: Ongoing     Problem: Physical Regulation:  Goal: Complications related to the disease process, condition or treatment will be avoided or minimized  Description: Complications related to the disease process, condition or treatment will be avoided or minimized  3/19/2022 1646 by Alonzo Samano RN  Outcome: Met This Shift  3/19/2022 0614 by Alisia Perez RN  Outcome: Ongoing

## 2022-03-19 NOTE — PROGRESS NOTES
[facility]  Progress Note                   Date:   3/19/2022  Patient name: Grant Bishop  Date of admission:  3/11/2022  7:15 PM  MRN:   [id number]  YOB: 1955  PCP: Kayli Borges MD    Reason for Admission: Chest pain [R07.9]  S/P cardiac catheterization [Z98.890]  3-vessel CAD [I25.10]    Subjective:       Clinical Changes /Abnormalities: Patient seen and examined in room sitting up on side of bed. Denies chest pain or SOB. Plan for OR on Monday at 8 am per CTS note. SR on tele HR 82      Review of Systems    Medications:   Scheduled Meds:   amLODIPine  10 mg Oral Daily    budesonide-formoterol  2 puff Inhalation BID    tiotropium  2 puff Inhalation Daily    [Held by provider] metoprolol tartrate  12.5 mg Oral BID    tamsulosin  0.4 mg Oral Daily    aspirin  81 mg Oral Daily    atorvastatin  40 mg Oral Nightly    sodium chloride flush  5-40 mL IntraVENous 2 times per day     Continuous Infusions:   sodium chloride      heparin (PORCINE) Infusion 14 Units/kg/hr (03/19/22 0105)     CBC:   Recent Labs     03/17/22  0725   WBC 7.2   HGB 11.2*        BMP:    Recent Labs     03/18/22  1412      K 4.4   *   CO2 21   BUN 18   CREATININE 0.99   GLUCOSE 126*     Hepatic:  No results for input(s): AST, ALT, ALB, BILITOT, ALKPHOS in the last 72 hours. Troponin:   No results for input(s): TROPHS in the last 72 hours. BNP: No results for input(s): BNP in the last 72 hours. Lipids:   No results for input(s): CHOL, HDL in the last 72 hours. Invalid input(s): LDLCALCU  INR: No results for input(s): INR in the last 72 hours. 3/11/2022- cardiac cath     Findings:   Angiographic Findings        Cardiac Arteries and Lesion Findings       LMCA: has heavy calcification with mid 60% stenosis. LAD: has heavy calcification in the proximal segment with 40% stenosis. The   mid segment has 85% stenosis.      LCx: has heavy calcification with ostial 99% stenosis. The OM1 has 100%   occlusion with left to left collaterals. The OM2 has 100% occlusion with   left to left collaterals. The LPDA is large and is normal.     RCA: is a non-dominant vessel and has proximal 90% stenosis.      Coronary Tree        Dominance: Right       LV Analysis   LV function assessed as:Abnormal.   Ejection Fraction   +----------------------------------------------------------------------+---+   ! Method                                                                !EF%! +----------------------------------------------------------------------+---+   ! LV gram                                                               !30 !   +----------------------------------------------------------------------+---+       Procedure Summary        Three vessel disease with left main involvement.    Moderate LV systolic dysfunction. LVEF 30%.      Recommendations        Medical therapy as needed.    Risk factor modification.  CABG evaluation by CT surgery. Nuclear Stress 3/10/22:  Impression       Significant stress-induced ischemia in the lateral wall and septum.       Infarct in the apex encroaching into the apical inferior wall and also in the   basal inferior wall.       LVEF 34%       Risk stratification: High risk. Echo 3/13//2022     Summary  Global left ventricular systolic function is normal. Estimated EF 47-60%  Normal diastolic filling. Normal right ventricular size and function. Mild mitral regurgitation. Mild tricuspid regurgitation. Mild pulmonary hypertension with an estimated right ventricular systolic  pressure of 41 mmHg.   No pericardial effusion.     Signature  ----------------------------------------------------------------------------   Electronically signed by Vesna Bowden(Sonographer) on 03/13/2022   12:51 PM  ----------------------------------------------------------------------------     ----------------------------------------------------------------------------   Electronically signed by Vinh Chau(Interpreting physician) on   03/14/2022 09:19 AM    Objective:   Vitals: BP 96/66   Pulse 104   Temp 98.4 °F (36.9 °C) (Oral)   Resp 20   Ht 6' 3\" (1.905 m)   Wt 146 lb 4.8 oz (66.4 kg)   SpO2 94%   BMI 18.29 kg/m²   General appearance: alert and cooperative with exam  HEENT: Head: Normocephalic, no lesions, without obvious abnormality. Neck:no JVD, trachea midline, no adenopathy  Lungs: Clear to auscultation throughout. RA without distress  Heart: Regular rate and rhythm, s1/s2 auscultated, no murmurs. SB   Abdomen: soft, non-tender, bowel sounds active  Extremities: no edema  Neurologic: not done        Assessment / Acute Cardiac Problems:   - CP- concern for angina  - Abnormal stress test 3/10/22- ischemia in lateral and septal walls- with EF 34%  - HTN  - DL  - cocaine use    Patient Active Problem List:     CKD (chronic kidney disease) stage 3, GFR 30-59 ml/min ([facility])     Essential hypertension     Arthritis of right hip     Chronic systolic congestive heart failure (HCC)     Benign prostatic hyperplasia with urinary obstruction     Pure hypercholesterolemia     Abdominal pain     Chest pain     Chronic combined systolic and diastolic congestive heart failure (HCC)     S/P cardiac catheterization     3-vessel CAD      Plan of Treatment:   1. MVD on cardiac cath -Stable. Denies any CP or SOB. Continue heparin , statin, & ASA. Hold BB given pause & bradycardia. Monitor for KARL and CPAP prn.     2. Appreciate CTS recommendation -Plan for OR on Monday at 8 AM per CTS note. 3. Keep K.4, Mg >2- K 4.4 and Mag 1.7 yesterday. Will reorder labs and replace if needed per modified SS.      Electronically signed by JACKELYN Mathur NP on 3/19/2022 at 1:51 PM  [facility] [place]

## 2022-03-19 NOTE — PLAN OF CARE
Plan for OR Monday [address] with daughter. She recommends short term rehab post op. All questions answered.     SAEID Ramos

## 2022-03-20 ENCOUNTER — ANESTHESIA EVENT (OUTPATIENT)
Dept: OPERATING ROOM | Age: 67
DRG: 233 | End: 2022-03-20
Payer: MEDICARE

## 2022-03-20 LAB — PARTIAL THROMBOPLASTIN TIME: 54.1 SEC (ref 20.5–30.5)

## 2022-03-20 PROCEDURE — 99233 SBSQ HOSP IP/OBS HIGH 50: CPT | Performed by: INTERNAL MEDICINE

## 2022-03-20 PROCEDURE — 85730 THROMBOPLASTIN TIME PARTIAL: CPT

## 2022-03-20 PROCEDURE — 94760 N-INVAS EAR/PLS OXIMETRY 1: CPT

## 2022-03-20 PROCEDURE — 6370000000 HC RX 637 (ALT 250 FOR IP): Performed by: STUDENT IN AN ORGANIZED HEALTH CARE EDUCATION/TRAINING PROGRAM

## 2022-03-20 PROCEDURE — 2580000003 HC RX 258: Performed by: STUDENT IN AN ORGANIZED HEALTH CARE EDUCATION/TRAINING PROGRAM

## 2022-03-20 PROCEDURE — 2060000000 HC ICU INTERMEDIATE R&B

## 2022-03-20 PROCEDURE — 36415 COLL VENOUS BLD VENIPUNCTURE: CPT

## 2022-03-20 RX ADMIN — DESMOPRESSIN ACETATE 40 MG: 0.2 TABLET ORAL at 19:45

## 2022-03-20 RX ADMIN — SODIUM CHLORIDE, PRESERVATIVE FREE 10 ML: 5 INJECTION INTRAVENOUS at 19:45

## 2022-03-20 ASSESSMENT — ENCOUNTER SYMPTOMS
PHOTOPHOBIA: 0
SORE THROAT: 0
VOICE CHANGE: 0
SHORTNESS OF BREATH: 0
DIARRHEA: 0
VOMITING: 0
TROUBLE SWALLOWING: 0
COUGH: 0
EYE REDNESS: 0
ALLERGIC/IMMUNOLOGIC NEGATIVE: 1
WHEEZING: 0
ABDOMINAL PAIN: 0

## 2022-03-20 NOTE — PROGRESS NOTES
[facility]  Progress Note                   Date:   3/20/2022  Patient name: Farida Meade  Date of admission:  3/11/2022  7:15 PM  MRN:   [id number]  YOB: 1955  PCP: Yoon Sanchez MD    Reason for Admission: Chest pain [R07.9]  S/P cardiac catheterization [Z98.890]  3-vessel CAD [I25.10]    Subjective:       Clinical Changes /Abnormalities: Patient seen and examined in room sitting up on side of bed. Denies chest pain or SOB. Plan for OR on Monday at 8 am per CTS note. Not on monitor today because he refuses. Not cooperative with nursing care. On heparin drip. Review of Systems    Medications:   Scheduled Meds:   amLODIPine  10 mg Oral Daily    budesonide-formoterol  2 puff Inhalation BID    tiotropium  2 puff Inhalation Daily    [Held by provider] metoprolol tartrate  12.5 mg Oral BID    tamsulosin  0.4 mg Oral Daily    aspirin  81 mg Oral Daily    atorvastatin  40 mg Oral Nightly    sodium chloride flush  5-40 mL IntraVENous 2 times per day     Continuous Infusions:   sodium chloride      heparin (PORCINE) Infusion 14 Units/kg/hr (03/20/22 1311)     CBC:   No results for input(s): WBC, HGB, PLT in the last 72 hours. BMP:    Recent Labs     03/18/22  1412 03/19/22  1434    145*   K 4.4 4.0   * 113*   CO2 21 21   BUN 18 17   CREATININE 0.99 1.00   GLUCOSE 126* 113*     Hepatic:  No results for input(s): AST, ALT, ALB, BILITOT, ALKPHOS in the last 72 hours. Troponin:   No results for input(s): TROPHS in the last 72 hours. BNP: No results for input(s): BNP in the last 72 hours. Lipids:   No results for input(s): CHOL, HDL in the last 72 hours. Invalid input(s): LDLCALCU  INR: No results for input(s): INR in the last 72 hours. 3/11/2022- cardiac cath     Findings:   Angiographic Findings        Cardiac Arteries and Lesion Findings       LMCA: has heavy calcification with mid 60% stenosis.      LAD: has heavy calcification in the proximal segment with 40% stenosis. The   mid segment has 85% stenosis. LCx: has heavy calcification with ostial 99% stenosis. The OM1 has 100%   occlusion with left to left collaterals. The OM2 has 100% occlusion with   left to left collaterals. The LPDA is large and is normal.     RCA: is a non-dominant vessel and has proximal 90% stenosis.      Coronary Tree        Dominance: Right       LV Analysis   LV function assessed as:Abnormal.   Ejection Fraction   +----------------------------------------------------------------------+---+   ! Method                                                                !EF%! +----------------------------------------------------------------------+---+   ! LV gram                                                               !30 !   +----------------------------------------------------------------------+---+       Procedure Summary        Three vessel disease with left main involvement.    Moderate LV systolic dysfunction. LVEF 30%.      Recommendations        Medical therapy as needed.    Risk factor modification.  CABG evaluation by CT surgery. Nuclear Stress 3/10/22:  Impression       Significant stress-induced ischemia in the lateral wall and septum.       Infarct in the apex encroaching into the apical inferior wall and also in the   basal inferior wall.       LVEF 34%       Risk stratification: High risk. Echo 3/13//2022     Summary  Global left ventricular systolic function is normal. Estimated EF 04-11%  Normal diastolic filling. Normal right ventricular size and function. Mild mitral regurgitation. Mild tricuspid regurgitation. Mild pulmonary hypertension with an estimated right ventricular systolic  pressure of 41 mmHg.   No pericardial effusion.     Signature  ----------------------------------------------------------------------------   Electronically signed by Vesna nAdres(Sonographer) on 03/13/2022   12:51 PM  ----------------------------------------------------------------------------     ----------------------------------------------------------------------------   Electronically signed by Vinh Chau(Interpreting physician) on   03/14/2022 09:19 AM    Objective:   Vitals: /71   Pulse 83   Temp 98.3 °F (36.8 °C) (Oral)   Resp 18   Ht 6' 3\" (1.905 m)   Wt 146 lb 4.8 oz (66.4 kg)   SpO2 92%   BMI 18.29 kg/m²   General appearance: alert and cooperative with exam  HEENT: Head: Normocephalic, no lesions, without obvious abnormality. Neck:no JVD, trachea midline, no adenopathy  Lungs: Clear to auscultation throughout. RA without distress  Heart: Regular rate and rhythm, s1/s2 auscultated, no murmurs. Off telemetry  Refusing. Abdomen: soft, non-tender, bowel sounds active  Extremities: no edema  Neurologic: not done        Assessment / Acute Cardiac Problems:   - CP- concern for angina  - Abnormal stress test 3/10/22- ischemia in lateral and septal walls- with EF 34%  - HTN  - DL  - cocaine use    Patient Active Problem List:     CKD (chronic kidney disease) stage 3, GFR 30-59 ml/min (HCC)     Essential hypertension     Arthritis of right hip     Chronic systolic congestive heart failure (HCC)     Benign prostatic hyperplasia with urinary obstruction     Pure hypercholesterolemia     Abdominal pain     Chest pain     Chronic combined systolic and diastolic congestive heart failure (HCC)     S/P cardiac catheterization     3-vessel CAD      Plan of Treatment:   1. MVD on cardiac cath -Stable. Denies any CP or SOB. Continue IV heparin, statin, & ASA. Hold BB given pause & bradycardia. Monitor for KARL and CPAP prn.     2. Appreciate CTS recommendation -Plan for OR on Monday at 8 AM per CTS note. 3. Keep K.4, Mg >2-  Patient refused lab draws today.      Electronically signed by JACKELYN Banda NP on 3/20/2022 at 1:50 PM  [address]  [phone]

## 2022-03-20 NOTE — PROGRESS NOTES
Patient is being uncooperative re: lab draws, taking medication, and getting patient assessment. Writer explained the importance of all of the above. Patient stated that he does not want to be bothered and is not sure if he will stay for his surgery on Monday. Will continue to monitor.

## 2022-03-20 NOTE — PROGRESS NOTES
PULMONARY & CRITICAL CARE MEDICINE PROGRESS  NOTE     Patient:  Love Baca  MRN: [id number]  Admit date: 3/11/2022  Primary Care Physician: Krystle Carr MD  Consulting Physician: Yovany Brito MD  CODE Status: Full Code  LOS: 9    SUBJECTIVE     I personally interviewed/examined the patient, reviewed interval history and interpreted all available radiographic, laboratory data at the time of service. Chief Compliant/Reason for Initial Consult:     COPD/preop evaluation/chronic cough    Brief Hospital Course: The patient is a 74 y.o. male history of hypertension, history of mild systolic dysfunction admitted with chest pain initially to [facility] on 03/09/2022 non-ST elevation MI ruled out by troponins in 20s had a stress test done because of high suspicion which shows ischemia of lateral septal wall with ejection fraction of 34%. He does have history of cocaine use and apparently he had used cocaine before admission to [facility].  He was transferred to [facility] for cardiac catheterization which shows multivessel coronary artery disease with EF of 30% including left main disease. He remains on a statin and heparin. CT surgery was consulted for CABG. Patient had pulmonary function test done which shows FEV1 of 46% with significance positive bronchodilator FEV1 of 56% with severe reduction in diffusion capacity of less than 30% with air trapping/hyperinflation. CT scan of the chest shows areas of blebs and bulla medially and centrilobular and paraseptal emphysema on CT scan.     Patient does complain of shortness of breath on mild activity and exertion. He does have chronic cough without no change denies increased wheezing currently denies chest pain. He has history of cocaine use.   Does have history of smoking patient is not able to tell me that how many cigarettes he used to smoke but he claimed that now he smoke 1 pack/week and had been smoking for almost 50 years. Interval History:  22     Patient seen and examined in his room. Resting in his bed comfortably. Afebrile hemodynamically stable. Saturating well on room air  Heart rate: 70s this a.m. Patient scheduled for CABG early next week Monday/Tuesday  No chest pain or pressure    Review of Systems:  Review of Systems   Constitutional: Negative for activity change, appetite change and fatigue. HENT: Negative for postnasal drip, sore throat, trouble swallowing and voice change. Eyes: Negative for photophobia, redness and visual disturbance. Respiratory: Negative for cough, shortness of breath and wheezing. Cardiovascular: Negative for chest pain, palpitations and leg swelling. Gastrointestinal: Negative for abdominal pain, diarrhea and vomiting. Endocrine: Negative. Genitourinary: Negative for difficulty urinating, dysuria, frequency and hematuria. Musculoskeletal: Negative for arthralgias, gait problem and joint swelling. Allergic/Immunologic: Negative. Neurological: Negative for dizziness, syncope, speech difficulty and headaches. Hematological: Negative for adenopathy. Does not bruise/bleed easily. Psychiatric/Behavioral: Negative. OBJECTIVE     VITAL SIGNS:   LAST-  /63   Pulse 79   Temp 98.1 °F (36.7 °C) (Oral)   Resp 18   Ht 6' 3\" (1.905 m)   Wt 146 lb 4.8 oz (66.4 kg)   SpO2 92%   BMI 18.29 kg/m²   8-24 HR RANGE-  TEMP Temp  Av °F (36.7 °C)  Min: 97.7 °F (36.5 °C)  Max: 98.3 °F (62.8 °C)   BP Systolic (83MEG), HFY:865 , Min:110 , MXK:411      Diastolic (78LUG), ASW:99, Min:63, Max:73     PULSE Pulse  Av.2  Min: 78  Max: 83   RR Resp  Av  Min: 18  Max: 18   O2 SAT SpO2  Av %  Min: 92 %  Max: 92 %   OXYGEN DELIVERY No data recorded     Systemic Examination:   Physical Exam  Constitutional:       Appearance: Normal appearance.    Eyes:      Pupils: Pupils are equal, round, and reactive to light. Cardiovascular:      Rate and Rhythm: Normal rate and regular rhythm. Pulses: Normal pulses. Heart sounds: Normal heart sounds. Pulmonary:      Comments: Decreased bilateral breath sounds  Abdominal:      General: Abdomen is flat. Palpations: Abdomen is soft. Skin:     Capillary Refill: Capillary refill takes less than 2 seconds. Neurological:      General: No focal deficit present. Mental Status: He is alert. DATA REVIEW     Medications:  Scheduled Meds:   amLODIPine  10 mg Oral Daily    budesonide-formoterol  2 puff Inhalation BID    tiotropium  2 puff Inhalation Daily    [Held by provider] metoprolol tartrate  12.5 mg Oral BID    tamsulosin  0.4 mg Oral Daily    aspirin  81 mg Oral Daily    atorvastatin  40 mg Oral Nightly    sodium chloride flush  5-40 mL IntraVENous 2 times per day     Continuous Infusions:   sodium chloride      heparin (PORCINE) Infusion 14 Units/kg/hr (03/20/22 1311)     LABS:-  ABG:   No results for input(s): POCPH, POCPCO2, POCPO2, POCHCO3, DOYF4GBQ in the last 72 hours. CBC:   No results for input(s): WBC, HGB, HCT, MCV, PLT, LABLYMP, MID, GRAN, LYMPHOPCT, MIDPERCENT, GRANULOCYTES, RBC, MCH, MCHC, RDW in the last 72 hours. Invalid input(s): RELATIVEPERCENT  BMP:   Recent Labs     03/18/22  1412 03/19/22  1434    145*   K 4.4 4.0   * 113*   CO2 21 21   BUN 18 17   CREATININE 0.99 1.00   GLUCOSE 126* 113*     Liver Function Test:   No results for input(s): PROT, LABALBU, ALT, AST, GGT, ALKPHOS, BILITOT in the last 72 hours. Amylase/Lipase:  No results for input(s): AMYLASE, LIPASE in the last 72 hours. Coagulation Profile:   Recent Labs     03/17/22  2147 03/18/22  0554 03/20/22  0958   APTT 55.6* 64.3* 54.1*     Cardiac Enzymes:  No results for input(s): CKTOTAL, CKMB, CKMBINDEX, TROPONINI in the last 72 hours.   Lactic Acid:  Lab Results   Component Value Date    LACTA NOT REPORTED 03/11/2018    LACTA NOT REPORTED 03/10/2018    LACTA NOT REPORTED 03/10/2018     BNP:   Lab Results   Component Value Date    BNP 70 09/13/2013     D-Dimer:  No results found for: DDIMER  Others:   No results found for: TSH, L0MMQGG, E9MAYYY, THYROIDAB, FT3, T4FREE  Lab Results   Component Value Date    SEDRATE 20 (H) 05/21/2014    CRP 15.2 (H) 05/21/2014       Lab Results   Component Value Date    PSA 1.11 11/04/2016       Input/Output:    Intake/Output Summary (Last 24 hours) at 3/20/2022 1549  Last data filed at 3/20/2022 1311  Gross per 24 hour   Intake 625.93 ml   Output 1575 ml   Net -949.07 ml       Microbiology:  No results for input(s): SPECDESC, SPECDESC, SPECIAL, CULTURE, CULTURE, STATUS, ORG, CDIFFTOXPCR, CAMPYLOBPCR, SALMONELLAPC, SHIGAPCR, SHIGELLAPCR, MPNEUG, MPNEUM, LACTOQL in the last 72 hours. Pathology:    Radiology reports:  VL DUP UPPER EXTREMITY ARTERIES BILATERAL   Final Result      VL Vein Mapping Lower Bilateral   Final Result      VL DUP CAROTID BILATERAL   Final Result      CT CHEST WO CONTRAST   Final Result   1. Extensive centrilobular and paraseptal emphysema. 2. Extensive atherosclerotic disease, particularly along the coronary   arteries. 3. There are areas of atelectasis in the lingula. There is collapse of the   right middle lobe   4. No focal lung infiltrate.              Echocardiogram:   Results for orders placed during the hospital encounter of 03/11/22    Echocardiogram Complete 2D w Doppler w Color    Narrative  Transthoracic Echocardiography Report (TTE)    Patient Name Michael Roblero       Date of Study           03/13/2022  [place]    Date of      1955  Gender                  Male  Birth    Age          74 year(s)  Race                    Black    Room Number  2107        Height:                 75 inch, 190.5 cm    Corporate ID [id number]    Weight:                 136 pounds, 61.7 kg  #    Patient Acct [de-identified]   BSA:        1.86 m^2    BMI:        17 kg/m^2  #    MR #         [id number] Sonographer             Reyes Marrero    Accession #  [id number]  Interpreting Physician  Vinh Chau    Fellow                   Referring Nurse  Practitioner    Interpreting             Referring Physician     Jordyn Hayes,  Fellow                                           APRN-NP    Type of Study    TTE procedure:2D Echocardiogram, M-Mode, Doppler, Color Doppler. Procedure Date  Date: 03/13/2022 Start: 12:17 PM    Study Location: [facility]  Technical Quality: Fair visualization    Indications:Multi vessel CAD and Pre-Op CABG. History / Tech. Comments:  Procedure explained to patient. Study done at the bedside. STAT call in    Patient Status: Inpatient    Height: 75 inches Weight: 136 pounds BSA: 1.86 m^2 BMI: 17 kg/m^2    Allergies  - *No Known Allergies. CONCLUSIONS    Summary  Global left ventricular systolic function is normal. Estimated EF 23-20%  Normal diastolic filling. Normal right ventricular size and function. Mild mitral regurgitation. Mild tricuspid regurgitation. Mild pulmonary hypertension with an estimated right ventricular systolic  pressure of 41 mmHg. No pericardial effusion. Signature  ----------------------------------------------------------------------------  Electronically signed by Vesna Davison(Sonographer) on 03/13/2022  12:51 PM  ----------------------------------------------------------------------------    ----------------------------------------------------------------------------  Electronically signed by Vinh Chau(Interpreting physician) on  03/14/2022 09:19 AM  ----------------------------------------------------------------------------  FINDINGS  Left Atrium  Left atrium is normal in size. Left Ventricle  Left ventricle is normal in size. Global left ventricular systolic function  is normal. Calculated EF via Parks's method is 48 %. Mild septal hypertrophy. Normal diastolic filling.   Right Atrium  Right atrium is normal in size. Right Ventricle  Normal right ventricular size and function. Mitral Valve  Normal mitral valve structure. Mild mitral regurgitation. No mitral stenosis. Aortic Valve  Aortic valve structure and function normal.  Aortic valve is trileaflet. No aortic insufficiency. No aortic stenosis. Tricuspid Valve  Normal tricuspid valve leaflets. Mild tricuspid regurgitation. No tricuspid stenosis. Mild pulmonary hypertension with an estimated right ventricular systolic  pressure of 41 mmHg. Pulmonic Valve  Pulmonic valve is normal in structure and function. No pulmonic insufficiency. No evidence of pulmonic stenosis. Pericardial Effusion  No pericardial effusion. Miscellaneous  Normal aortic root dimension. E/E' average = 11.0. IVC normal diameter & inspiratory collapse indicating normal RA filling  pressure .     M-mode / 2D Measurements & Calculations:    LVIDd:4.9 cm(3.7 - 5.6 cm)       Diastolic JUSHEE:55.95 ml  LVIDs:3.8 cm(2.2 - 4.0 cm)       Systolic YNLQSZ:74.67 ml  IVSd:1.2 cm(0.6 - 1.1 cm)        Aortic Root:3.3 cm(2.0 - 3.7 cm)  LVPWd:0.8 cm(0.6 - 1.1 cm)       LA Dimension: 2.9 cm(1.9 - 4.0 cm)  Fractional Shortenin.45 %    LA volume/Index: 35.47 ml /19m^2  Calculated LVEF (%): 49.08 %     LVOT:2.2 cm  RVDd:3.5 cm    Mitral:                                 Aortic    Valve Area (P1/2-Time): 2.78 cm^2       Peak Velocity: 1.26 m/s  Peak E-Wave: 0.87 m/s                   Mean Velocity: 0.84 m/s  Peak A-Wave: 0.67 m/s                   Peak Gradient: 6.35 mmHg  E/A Ratio: 1.29                         Mean Gradient: 4 mmHg  Peak Gradient: 2.99 mmHg  Mean Gradient: 1 mmHg  Deceleration Time: 258 msec             Area (continuity): 2.33 cm^2  P1/2t: 79 msec                          AV VTI: 26.9 cm    Area (continuity): 3.32 cm^2  Mean Velocity: 0.49 m/s    Tricuspid:                              Pulmonic:    Estimated RVSP: 41 mmHg                 Peak Velocity: 0.71 m/s  Peak TR Velocity: 3.00 m/s              Peak Gradient: 1.99 mmHg  Peak TR Gradient: 36 mmHg  Estimated RA Pressure: 5 mmHg    Estimated PASP: 41 mmHg    Diastology / Tissue Doppler  Septal Wall E' velocity:0.06 m/s  Septal Wall E/E':14.7  Lateral Wall E' velocity:0.12 m/s  Lateral Wall E/E':7.3      Cardiac Catheterization:   No results found for this or any previous visit. ASSESSMENT AND PLAN     Assessment:    MV CAD, Plan for CABG   Bradycardia  Sinus pause lasting for 4.7 seconds 3/17/2022  COPD stage II on PFTs with severe reduction diffusion capacity  Centrilobular and paraseptal emphysema  Chronic smoker  Cocaine use  Chronic combined diastolic and systolic CHF, EF 41%. Previously 50-55%  Mild pulmonary hypertension  Right hip arthritis  Essential hypertension  CKD stage III    Plan:    He does have a stage II obstruction with postbronchodilator FEV1 of 56 with airway reversibility but does have significantly reduced diffusion capacity his RVSP was reported to be mild 40 on echocardiogram.  He does have dyspnea on mild exertion but some of that could also be related to multivessel coronary artery disease along with his COPD/emphysema. Depending upon his dyspnea and pulmonary function test he is at least moderate risk for surgery for postop complication including postop hypoxia/postop respiratory failure/atelectasis but surgery is not contraindicated currently is maintaining saturation on room air will optimize bronchodilator therapy as he has multivessel coronary artery disease discussed the risk and benefit of surgery with patient and would recommend CT surgery to discuss with patient before proceeding with surgery  Patient remains hemodynamically stable and is currently saturating well on room air  Supplemental oxygen to keep oxygen saturation greater than 92% if needed  Continue Symbicort  Continue with the Spiriva. Albuterol to be used as needed.   Aspirin, statin, amlodipine, and heparin per cardiology  Encourage incentive spirometry, pulmonary toilet, aspiration precautions   Continue to monitor I/O with a goal of even/negative fluid balance  not on antibiotic  No need for steroid  DVT prophylaxis on therapeutic heparin  Physical/occupational/speech therapy; increase activity as tolerated    I updated the patient regarding the current clinical condition, provisional diagnosis and management plan. I addressed concerns and answered all questions to the best of my abilities. It was my pleasure to evaluate Deepak iWlkes today. We will continue to follow. I would like to thank you for allowing me to participate in the care of this patient. Please feel free to call with any further questions or concerns.     Bartolo Bentley MD  PGY-3, Internal Medicine Resident  [address] [facility]  3/20/2022 3:50 PM

## 2022-03-20 NOTE — PLAN OF CARE
Problem: Pain:  Goal: Pain level will decrease  Description: Pain level will decrease  3/19/2022 2330 by Eddie Dick RN  Outcome: Ongoing  3/19/2022 1646 by Cory Aviles RN  Outcome: Met This Shift  Goal: Control of acute pain  Description: Control of acute pain  3/19/2022 2330 by Eddie Dick RN  Outcome: Ongoing  3/19/2022 1646 by Cory Aviles RN  Outcome: Met This Shift  Goal: Control of chronic pain  Description: Control of chronic pain  3/19/2022 2330 by Eddie Dick RN  Outcome: Ongoing  3/19/2022 1646 by Cory Aviles RN  Outcome: Met This Shift     Problem: Skin Integrity:  Goal: Will show no infection signs and symptoms  Description: Will show no infection signs and symptoms  3/19/2022 2330 by Eddie Dick RN  Outcome: Ongoing  3/19/2022 1646 by Cory Aviles RN  Outcome: Met This Shift  Goal: Absence of new skin breakdown  Description: Absence of new skin breakdown  3/19/2022 2330 by Eddie Dick RN  Outcome: Ongoing  3/19/2022 1646 by Cory Aviles RN  Outcome: Met This Shift     Problem: Falls - Risk of:  Goal: Will remain free from falls  Description: Will remain free from falls  3/19/2022 2330 by Eddie Dick RN  Outcome: Ongoing  3/19/2022 1646 by Cory Aviles RN  Outcome: Met This Shift  Goal: Absence of physical injury  Description: Absence of physical injury  3/19/2022 2330 by Eddie Dick RN  Outcome: Ongoing  3/19/2022 1646 by Cory Aviles RN  Outcome: Met This Shift     Problem: Nutrition  Goal: Optimal nutrition therapy  3/19/2022 2330 by Eddie Dick RN  Outcome: Ongoing  3/19/2022 1646 by Cory Aviles RN  Outcome: Met This Shift     Problem: Cardiac:  Goal: Ability to maintain vital signs within normal range will improve  Description: Ability to maintain vital signs within normal range will improve  3/19/2022 2330 by Eddie Dick RN  Outcome: Ongoing  3/19/2022 1646 by Cory Aviles RN  Outcome: Met This Shift  Goal: Cardiovascular alteration will improve  Description: Cardiovascular alteration will improve  3/19/2022 2330 by Jose Roberto Ybarra RN  Outcome: Ongoing  3/19/2022 1646 by Pili Chen RN  Outcome: Met This Shift     Problem: Health Behavior:  Goal: Will modify at least one risk factor affecting health status  Description: Will modify at least one risk factor affecting health status  3/19/2022 2330 by Jose Roberto Ybarra RN  Outcome: Ongoing  3/19/2022 1646 by Pili Chen RN  Outcome: Met This Shift  Goal: Identification of resources available to assist in meeting health care needs will improve  Description: Identification of resources available to assist in meeting health care needs will improve  3/19/2022 2330 by Jose Roberto Ybarra RN  Outcome: Ongoing  3/19/2022 1646 by Pili Chen RN  Outcome: Met This Shift     Problem: Physical Regulation:  Goal: Complications related to the disease process, condition or treatment will be avoided or minimized  Description: Complications related to the disease process, condition or treatment will be avoided or minimized  3/19/2022 2330 by Jose Roberto Ybarra RN  Outcome: Ongoing  3/19/2022 1646 by Pili Chen RN  Outcome: Met This Shift

## 2022-03-20 NOTE — PROGRESS NOTES
Writer went into patients room because his telemetry alarms were going off- patient was removing his left upper ekg patch on his chest because it had started to itch him. Writer attempted to place ekg patch in a different spot on patients chest and patient began to yell at writer and refused ekg patches or wires on him and demanded to not be hooked up to telemetry. Writer called charge nurse in to try to speak with patient and patient still refused care and demanded we leave him alone. Esvin File Dr. Nga Ashley stating, \"FYI patient refusing telemetry and all medical care other than the continuation of heparin gtt to be run through his IV. \" Dr. Nga Ashley responded and said to \"pls document in note and  the pt thank you. \" Writer responded, \"attempted to  patient and patient became verbally aggressive toward writer and charge nurse. \" Patient also refusing 0400 vitals to be taken as well. Will continue to monitor.

## 2022-03-21 ENCOUNTER — APPOINTMENT (OUTPATIENT)
Dept: GENERAL RADIOLOGY | Age: 67
DRG: 233 | End: 2022-03-21
Attending: INTERNAL MEDICINE
Payer: MEDICARE

## 2022-03-21 ENCOUNTER — ANESTHESIA (OUTPATIENT)
Dept: OPERATING ROOM | Age: 67
DRG: 233 | End: 2022-03-21
Payer: MEDICARE

## 2022-03-21 VITALS
RESPIRATION RATE: 14 BRPM | OXYGEN SATURATION: 100 % | TEMPERATURE: 95.5 F | SYSTOLIC BLOOD PRESSURE: 139 MMHG | DIASTOLIC BLOOD PRESSURE: 89 MMHG

## 2022-03-21 LAB
ABSOLUTE EOS #: 0.53 K/UL (ref 0–0.44)
ABSOLUTE IMMATURE GRANULOCYTE: <0.03 K/UL (ref 0–0.3)
ABSOLUTE LYMPH #: 2.97 K/UL (ref 1.1–3.7)
ABSOLUTE MONO #: 0.94 K/UL (ref 0.1–1.2)
ALLEN TEST: ABNORMAL
ANION GAP SERPL CALCULATED.3IONS-SCNC: 21 MMOL/L (ref 9–17)
ANION GAP SERPL CALCULATED.3IONS-SCNC: 9 MMOL/L (ref 9–17)
ANION GAP: 14 MMOL/L (ref 7–16)
ANION GAP: 14 MMOL/L (ref 7–16)
ANION GAP: 15 MMOL/L (ref 7–16)
BASOPHILS # BLD: 1 % (ref 0–2)
BASOPHILS ABSOLUTE: 0.11 K/UL (ref 0–0.2)
BILIRUBIN URINE: NEGATIVE
BUN BLDV-MCNC: 14 MG/DL (ref 8–23)
BUN BLDV-MCNC: 15 MG/DL (ref 8–23)
CALCIUM IONIZED: 1.28 MMOL/L (ref 1.13–1.33)
CALCIUM SERPL-MCNC: 8.9 MG/DL (ref 8.6–10.4)
CALCIUM SERPL-MCNC: 9 MG/DL (ref 8.6–10.4)
CHLORIDE BLD-SCNC: 104 MMOL/L (ref 98–107)
CHLORIDE BLD-SCNC: 112 MMOL/L (ref 98–107)
CO2: 15 MMOL/L (ref 20–31)
CO2: 20 MMOL/L (ref 20–31)
COLOR: YELLOW
COMMENT UA: NORMAL
CREAT SERPL-MCNC: 0.76 MG/DL (ref 0.7–1.2)
CREAT SERPL-MCNC: 1.26 MG/DL (ref 0.7–1.2)
EOSINOPHILS RELATIVE PERCENT: 7 % (ref 1–4)
FIO2: 100
FIO2: 40
FIO2: 55
FIO2: 55
FIO2: 60
GFR AFRICAN AMERICAN: >60 ML/MIN
GFR AFRICAN AMERICAN: >60 ML/MIN
GFR NON-AFRICAN AMERICAN: 57 ML/MIN
GFR NON-AFRICAN AMERICAN: >60 ML/MIN
GFR SERPL CREATININE-BSD FRML MDRD: >60 ML/MIN
GFR SERPL CREATININE-BSD FRML MDRD: >60 ML/MIN
GFR SERPL CREATININE-BSD FRML MDRD: ABNORMAL ML/MIN/{1.73_M2}
GFR SERPL CREATININE-BSD FRML MDRD: ABNORMAL ML/MIN/{1.73_M2}
GFR SERPL CREATININE-BSD FRML MDRD: NORMAL ML/MIN/{1.73_M2}
GFR SERPL CREATININE-BSD FRML MDRD: NORMAL ML/MIN/{1.73_M2}
GLUCOSE BLD-MCNC: 105 MG/DL (ref 74–100)
GLUCOSE BLD-MCNC: 127 MG/DL (ref 74–100)
GLUCOSE BLD-MCNC: 131 MG/DL (ref 74–100)
GLUCOSE BLD-MCNC: 132 MG/DL (ref 74–100)
GLUCOSE BLD-MCNC: 161 MG/DL (ref 74–100)
GLUCOSE BLD-MCNC: 165 MG/DL (ref 70–99)
GLUCOSE BLD-MCNC: 74 MG/DL (ref 74–100)
GLUCOSE BLD-MCNC: 77 MG/DL (ref 74–100)
GLUCOSE BLD-MCNC: 81 MG/DL (ref 74–100)
GLUCOSE BLD-MCNC: 86 MG/DL (ref 74–100)
GLUCOSE BLD-MCNC: 90 MG/DL (ref 70–99)
GLUCOSE URINE: NEGATIVE
HCT VFR BLD CALC: 22 % (ref 40.7–50.3)
HCT VFR BLD CALC: 27.5 % (ref 40.7–50.3)
HCT VFR BLD CALC: 28.2 % (ref 40.7–50.3)
HCT VFR BLD CALC: 40 % (ref 40.7–50.3)
HEMOGLOBIN: 11.9 G/DL (ref 13–17)
HEMOGLOBIN: 6.6 G/DL (ref 13–17)
HEMOGLOBIN: 8.4 G/DL (ref 13–17)
HEMOGLOBIN: 8.6 G/DL (ref 13–17)
IMMATURE GRANULOCYTES: 0 %
INR BLD: 1.5
KETONES, URINE: NEGATIVE
LEUKOCYTE ESTERASE, URINE: NEGATIVE
LYMPHOCYTES # BLD: 38 % (ref 24–43)
MAGNESIUM: 2.1 MG/DL (ref 1.6–2.6)
MAGNESIUM: 2.5 MG/DL (ref 1.6–2.6)
MCH RBC QN AUTO: 26.9 PG (ref 25.2–33.5)
MCH RBC QN AUTO: 27.4 PG (ref 25.2–33.5)
MCH RBC QN AUTO: 27.8 PG (ref 25.2–33.5)
MCHC RBC AUTO-ENTMCNC: 29.8 G/DL (ref 28.4–34.8)
MCHC RBC AUTO-ENTMCNC: 30.5 G/DL (ref 28.4–34.8)
MCHC RBC AUTO-ENTMCNC: 30.5 G/DL (ref 28.4–34.8)
MCV RBC AUTO: 89.8 FL (ref 82.6–102.9)
MCV RBC AUTO: 90.3 FL (ref 82.6–102.9)
MCV RBC AUTO: 91.1 FL (ref 82.6–102.9)
MODE: ABNORMAL
MONOCYTES # BLD: 12 % (ref 3–12)
NEGATIVE BASE EXCESS, ART: 1 (ref 0–2)
NEGATIVE BASE EXCESS, ART: 3 (ref 0–2)
NEGATIVE BASE EXCESS, ART: 4 (ref 0–2)
NEGATIVE BASE EXCESS, ART: 4 (ref 0–2)
NEGATIVE BASE EXCESS, ART: 6 (ref 0–2)
NEGATIVE BASE EXCESS, ART: 9 (ref 0–2)
NITRITE, URINE: NEGATIVE
NRBC AUTOMATED: 0 PER 100 WBC
O2 DEVICE/FLOW/%: ABNORMAL
PARTIAL THROMBOPLASTIN TIME: 27.7 SEC (ref 20.5–30.5)
PARTIAL THROMBOPLASTIN TIME: 45.1 SEC (ref 20.5–30.5)
PATIENT TEMP: 37
PDW BLD-RTO: 15.9 % (ref 11.8–14.4)
PDW BLD-RTO: 15.9 % (ref 11.8–14.4)
PDW BLD-RTO: 16.2 % (ref 11.8–14.4)
PH UA: 7 (ref 5–8)
PLATELET # BLD: 294 K/UL (ref 138–453)
PLATELET # BLD: ABNORMAL K/UL (ref 138–453)
PLATELET # BLD: ABNORMAL K/UL (ref 138–453)
PLATELET, FLUORESCENCE: 127 K/UL (ref 138–453)
PLATELET, FLUORESCENCE: 135 K/UL (ref 138–453)
PLATELET, IMMATURE FRACTION: 2.8 % (ref 1.1–10.3)
PLATELET, IMMATURE FRACTION: 4.8 % (ref 1.1–10.3)
PMV BLD AUTO: 10.8 FL (ref 8.1–13.5)
POC BUN: 13 MG/DL (ref 8–26)
POC BUN: 14 MG/DL (ref 8–26)
POC CHLORIDE: 106 MMOL/L (ref 98–107)
POC CHLORIDE: 108 MMOL/L (ref 98–107)
POC CHLORIDE: 114 MMOL/L (ref 98–107)
POC CREATININE: 0.99 MG/DL (ref 0.51–1.19)
POC CREATININE: 1.05 MG/DL (ref 0.51–1.19)
POC HCO3: 16.8 MMOL/L (ref 21–28)
POC HCO3: 18.5 MMOL/L (ref 21–28)
POC HCO3: 21.5 MMOL/L (ref 21–28)
POC HCO3: 21.6 MMOL/L (ref 21–28)
POC HCO3: 21.8 MMOL/L (ref 21–28)
POC HCO3: 22.2 MMOL/L (ref 21–28)
POC HCO3: 22.3 MMOL/L (ref 21–28)
POC HCO3: 23 MMOL/L (ref 21–28)
POC HCO3: 23.7 MMOL/L (ref 21–28)
POC HEMATOCRIT: 20 % (ref 41–53)
POC HEMATOCRIT: 23 % (ref 41–53)
POC HEMATOCRIT: 23 % (ref 41–53)
POC HEMATOCRIT: 26 % (ref 41–53)
POC HEMATOCRIT: 32 % (ref 41–53)
POC HEMATOCRIT: 36 % (ref 41–53)
POC HEMOGLOBIN: 10.9 G/DL (ref 13.5–17.5)
POC HEMOGLOBIN: 12.1 G/DL (ref 13.5–17.5)
POC HEMOGLOBIN: 6.8 G/DL (ref 13.5–17.5)
POC HEMOGLOBIN: 7.8 G/DL (ref 13.5–17.5)
POC HEMOGLOBIN: 7.9 G/DL (ref 13.5–17.5)
POC HEMOGLOBIN: 9 G/DL (ref 13.5–17.5)
POC IONIZED CALCIUM: 1.08 MMOL/L (ref 1.15–1.33)
POC IONIZED CALCIUM: 1.21 MMOL/L (ref 1.15–1.33)
POC IONIZED CALCIUM: 1.21 MMOL/L (ref 1.15–1.33)
POC IONIZED CALCIUM: 1.34 MMOL/L (ref 1.15–1.33)
POC IONIZED CALCIUM: 1.36 MMOL/L (ref 1.15–1.33)
POC IONIZED CALCIUM: 1.49 MMOL/L (ref 1.15–1.33)
POC LACTIC ACID: 1.87 MMOL/L (ref 0.56–1.39)
POC LACTIC ACID: 3.32 MMOL/L (ref 0.56–1.39)
POC O2 SATURATION: 100 % (ref 94–98)
POC O2 SATURATION: 92 % (ref 94–98)
POC O2 SATURATION: 95 % (ref 94–98)
POC O2 SATURATION: 95 % (ref 94–98)
POC O2 SATURATION: 96 % (ref 94–98)
POC O2 SATURATION: 98 % (ref 94–98)
POC PCO2: 32.1 MM HG (ref 35–48)
POC PCO2: 34 MM HG (ref 35–48)
POC PCO2: 35.8 MM HG (ref 35–48)
POC PCO2: 36.1 MM HG (ref 35–48)
POC PCO2: 37.7 MM HG (ref 35–48)
POC PCO2: 38.1 MM HG (ref 35–48)
POC PCO2: 40.9 MM HG (ref 35–48)
POC PCO2: 44 MM HG (ref 35–48)
POC PCO2: 51.5 MM HG (ref 35–48)
POC PH: 7.27 (ref 7.35–7.45)
POC PH: 7.28 (ref 7.35–7.45)
POC PH: 7.33 (ref 7.35–7.45)
POC PH: 7.33 (ref 7.35–7.45)
POC PH: 7.34 (ref 7.35–7.45)
POC PH: 7.37 (ref 7.35–7.45)
POC PH: 7.38 (ref 7.35–7.45)
POC PH: 7.38 (ref 7.35–7.45)
POC PH: 7.45 (ref 7.35–7.45)
POC PO2: 116.4 MM HG (ref 83–108)
POC PO2: 228.9 MM HG (ref 83–108)
POC PO2: 230.1 MM HG (ref 83–108)
POC PO2: 327.7 MM HG (ref 83–108)
POC PO2: 469 MM HG (ref 83–108)
POC PO2: 70.5 MM HG (ref 83–108)
POC PO2: 75.7 MM HG (ref 83–108)
POC PO2: 80.9 MM HG (ref 83–108)
POC PO2: 88.2 MM HG (ref 83–108)
POC POTASSIUM: 3.8 MMOL/L (ref 3.5–4.5)
POC POTASSIUM: 3.8 MMOL/L (ref 3.5–4.5)
POC POTASSIUM: 3.9 MMOL/L (ref 3.5–4.5)
POC POTASSIUM: 4.1 MMOL/L (ref 3.5–4.5)
POC POTASSIUM: 4.2 MMOL/L (ref 3.5–4.5)
POC POTASSIUM: 4.6 MMOL/L (ref 3.5–4.5)
POC SODIUM: 141 MMOL/L (ref 138–146)
POC SODIUM: 144 MMOL/L (ref 138–146)
POC SODIUM: 147 MMOL/L (ref 138–146)
POC TCO2: 20 MMOL/L (ref 22–30)
POC TCO2: 23 MMOL/L (ref 22–30)
POTASSIUM SERPL-SCNC: 3.9 MMOL/L (ref 3.7–5.3)
POTASSIUM SERPL-SCNC: 4.1 MMOL/L (ref 3.7–5.3)
POTASSIUM SERPL-SCNC: 4.6 MMOL/L (ref 3.7–5.3)
PROTEIN UA: NEGATIVE
PROTHROMBIN TIME: 15.6 SEC (ref 9.1–12.3)
RBC # BLD: 3.02 M/UL (ref 4.21–5.77)
RBC # BLD: 3.14 M/UL (ref 4.21–5.77)
RBC # BLD: 4.43 M/UL (ref 4.21–5.77)
RBC # BLD: ABNORMAL 10*6/UL
SAMPLE SITE: ABNORMAL
SEG NEUTROPHILS: 42 % (ref 36–65)
SEGMENTED NEUTROPHILS ABSOLUTE COUNT: 3.2 K/UL (ref 1.5–8.1)
SODIUM BLD-SCNC: 133 MMOL/L (ref 135–144)
SODIUM BLD-SCNC: 148 MMOL/L (ref 135–144)
SPECIFIC GRAVITY UA: 1.01 (ref 1–1.03)
TURBIDITY: CLEAR
URINE HGB: NEGATIVE
UROBILINOGEN, URINE: NORMAL
WBC # BLD: 19.1 K/UL (ref 3.5–11.3)
WBC # BLD: 4.1 K/UL (ref 3.5–11.3)
WBC # BLD: 7.8 K/UL (ref 3.5–11.3)

## 2022-03-21 PROCEDURE — 2500000003 HC RX 250 WO HCPCS: Performed by: THORACIC SURGERY (CARDIOTHORACIC VASCULAR SURGERY)

## 2022-03-21 PROCEDURE — 82947 ASSAY GLUCOSE BLOOD QUANT: CPT

## 2022-03-21 PROCEDURE — B24BZZ4 ULTRASONOGRAPHY OF HEART WITH AORTA, TRANSESOPHAGEAL: ICD-10-PCS | Performed by: THORACIC SURGERY (CARDIOTHORACIC VASCULAR SURGERY)

## 2022-03-21 PROCEDURE — P9041 ALBUMIN (HUMAN),5%, 50ML: HCPCS | Performed by: NURSE PRACTITIONER

## 2022-03-21 PROCEDURE — 85018 HEMOGLOBIN: CPT

## 2022-03-21 PROCEDURE — 33517 CABG ARTERY-VEIN SINGLE: CPT | Performed by: THORACIC SURGERY (CARDIOTHORACIC VASCULAR SURGERY)

## 2022-03-21 PROCEDURE — 85384 FIBRINOGEN ACTIVITY: CPT

## 2022-03-21 PROCEDURE — 2700000000 HC OXYGEN THERAPY PER DAY

## 2022-03-21 PROCEDURE — 94761 N-INVAS EAR/PLS OXIMETRY MLT: CPT

## 2022-03-21 PROCEDURE — 86901 BLOOD TYPING SEROLOGIC RH(D): CPT

## 2022-03-21 PROCEDURE — 5A1945Z RESPIRATORY VENTILATION, 24-96 CONSECUTIVE HOURS: ICD-10-PCS | Performed by: THORACIC SURGERY (CARDIOTHORACIC VASCULAR SURGERY)

## 2022-03-21 PROCEDURE — 81003 URINALYSIS AUTO W/O SCOPE: CPT

## 2022-03-21 PROCEDURE — 85014 HEMATOCRIT: CPT

## 2022-03-21 PROCEDURE — 80051 ELECTROLYTE PANEL: CPT

## 2022-03-21 PROCEDURE — 36430 TRANSFUSION BLD/BLD COMPNT: CPT

## 2022-03-21 PROCEDURE — 3600000008 HC SURGERY OHS BASE: Performed by: THORACIC SURGERY (CARDIOTHORACIC VASCULAR SURGERY)

## 2022-03-21 PROCEDURE — 82330 ASSAY OF CALCIUM: CPT

## 2022-03-21 PROCEDURE — C9113 INJ PANTOPRAZOLE SODIUM, VIA: HCPCS | Performed by: NURSE PRACTITIONER

## 2022-03-21 PROCEDURE — 2100000001 HC CVICU R&B

## 2022-03-21 PROCEDURE — 6360000002 HC RX W HCPCS: Performed by: PHYSICIAN ASSISTANT

## 2022-03-21 PROCEDURE — 2500000003 HC RX 250 WO HCPCS: Performed by: NURSE PRACTITIONER

## 2022-03-21 PROCEDURE — 3700000001 HC ADD 15 MINUTES (ANESTHESIA): Performed by: THORACIC SURGERY (CARDIOTHORACIC VASCULAR SURGERY)

## 2022-03-21 PROCEDURE — P9016 RBC LEUKOCYTES REDUCED: HCPCS

## 2022-03-21 PROCEDURE — 6370000000 HC RX 637 (ALT 250 FOR IP): Performed by: NURSE PRACTITIONER

## 2022-03-21 PROCEDURE — 80048 BASIC METABOLIC PNL TOTAL CA: CPT

## 2022-03-21 PROCEDURE — 33533 CABG ARTERIAL SINGLE: CPT | Performed by: THORACIC SURGERY (CARDIOTHORACIC VASCULAR SURGERY)

## 2022-03-21 PROCEDURE — 82435 ASSAY OF BLOOD CHLORIDE: CPT

## 2022-03-21 PROCEDURE — 6360000002 HC RX W HCPCS: Performed by: NURSE ANESTHETIST, CERTIFIED REGISTERED

## 2022-03-21 PROCEDURE — 37799 UNLISTED PX VASCULAR SURGERY: CPT

## 2022-03-21 PROCEDURE — 86850 RBC ANTIBODY SCREEN: CPT

## 2022-03-21 PROCEDURE — 2720000010 HC SURG SUPPLY STERILE: Performed by: THORACIC SURGERY (CARDIOTHORACIC VASCULAR SURGERY)

## 2022-03-21 PROCEDURE — 06BP4ZZ EXCISION OF RIGHT SAPHENOUS VEIN, PERCUTANEOUS ENDOSCOPIC APPROACH: ICD-10-PCS | Performed by: THORACIC SURGERY (CARDIOTHORACIC VASCULAR SURGERY)

## 2022-03-21 PROCEDURE — 84132 ASSAY OF SERUM POTASSIUM: CPT

## 2022-03-21 PROCEDURE — 3600000018 HC SURGERY OHS ADDTL 15MIN: Performed by: THORACIC SURGERY (CARDIOTHORACIC VASCULAR SURGERY)

## 2022-03-21 PROCEDURE — A4216 STERILE WATER/SALINE, 10 ML: HCPCS | Performed by: NURSE PRACTITIONER

## 2022-03-21 PROCEDURE — 85025 COMPLETE CBC W/AUTO DIFF WBC: CPT

## 2022-03-21 PROCEDURE — 02100Z9 BYPASS CORONARY ARTERY, ONE ARTERY FROM LEFT INTERNAL MAMMARY, OPEN APPROACH: ICD-10-PCS | Performed by: THORACIC SURGERY (CARDIOTHORACIC VASCULAR SURGERY)

## 2022-03-21 PROCEDURE — 85055 RETICULATED PLATELET ASSAY: CPT

## 2022-03-21 PROCEDURE — 2580000003 HC RX 258: Performed by: NURSE ANESTHETIST, CERTIFIED REGISTERED

## 2022-03-21 PROCEDURE — 85576 BLOOD PLATELET AGGREGATION: CPT

## 2022-03-21 PROCEDURE — 82803 BLOOD GASES ANY COMBINATION: CPT

## 2022-03-21 PROCEDURE — 2500000003 HC RX 250 WO HCPCS: Performed by: NURSE ANESTHETIST, CERTIFIED REGISTERED

## 2022-03-21 PROCEDURE — 85610 PROTHROMBIN TIME: CPT

## 2022-03-21 PROCEDURE — 36415 COLL VENOUS BLD VENIPUNCTURE: CPT

## 2022-03-21 PROCEDURE — 84520 ASSAY OF UREA NITROGEN: CPT

## 2022-03-21 PROCEDURE — 86900 BLOOD TYPING SEROLOGIC ABO: CPT

## 2022-03-21 PROCEDURE — 71045 X-RAY EXAM CHEST 1 VIEW: CPT

## 2022-03-21 PROCEDURE — 021009W BYPASS CORONARY ARTERY, ONE ARTERY FROM AORTA WITH AUTOLOGOUS VENOUS TISSUE, OPEN APPROACH: ICD-10-PCS | Performed by: THORACIC SURGERY (CARDIOTHORACIC VASCULAR SURGERY)

## 2022-03-21 PROCEDURE — 85347 COAGULATION TIME ACTIVATED: CPT

## 2022-03-21 PROCEDURE — 93005 ELECTROCARDIOGRAM TRACING: CPT | Performed by: INTERNAL MEDICINE

## 2022-03-21 PROCEDURE — 3700000000 HC ANESTHESIA ATTENDED CARE: Performed by: THORACIC SURGERY (CARDIOTHORACIC VASCULAR SURGERY)

## 2022-03-21 PROCEDURE — 86920 COMPATIBILITY TEST SPIN: CPT

## 2022-03-21 PROCEDURE — 6370000000 HC RX 637 (ALT 250 FOR IP): Performed by: PHYSICIAN ASSISTANT

## 2022-03-21 PROCEDURE — 94640 AIRWAY INHALATION TREATMENT: CPT

## 2022-03-21 PROCEDURE — 85027 COMPLETE CBC AUTOMATED: CPT

## 2022-03-21 PROCEDURE — 2709999900 HC NON-CHARGEABLE SUPPLY: Performed by: THORACIC SURGERY (CARDIOTHORACIC VASCULAR SURGERY)

## 2022-03-21 PROCEDURE — 85390 FIBRINOLYSINS SCREEN I&R: CPT

## 2022-03-21 PROCEDURE — 7100000001 HC PACU RECOVERY - ADDTL 15 MIN

## 2022-03-21 PROCEDURE — P9045 ALBUMIN (HUMAN), 5%, 250 ML: HCPCS | Performed by: NURSE ANESTHETIST, CERTIFIED REGISTERED

## 2022-03-21 PROCEDURE — 33508 ENDOSCOPIC VEIN HARVEST: CPT | Performed by: THORACIC SURGERY (CARDIOTHORACIC VASCULAR SURGERY)

## 2022-03-21 PROCEDURE — 6360000002 HC RX W HCPCS: Performed by: THORACIC SURGERY (CARDIOTHORACIC VASCULAR SURGERY)

## 2022-03-21 PROCEDURE — 6360000002 HC RX W HCPCS: Performed by: NURSE PRACTITIONER

## 2022-03-21 PROCEDURE — 2580000003 HC RX 258: Performed by: THORACIC SURGERY (CARDIOTHORACIC VASCULAR SURGERY)

## 2022-03-21 PROCEDURE — 82565 ASSAY OF CREATININE: CPT

## 2022-03-21 PROCEDURE — 7100000000 HC PACU RECOVERY - FIRST 15 MIN

## 2022-03-21 PROCEDURE — 2580000003 HC RX 258: Performed by: NURSE PRACTITIONER

## 2022-03-21 PROCEDURE — 83735 ASSAY OF MAGNESIUM: CPT

## 2022-03-21 PROCEDURE — 83605 ASSAY OF LACTIC ACID: CPT

## 2022-03-21 PROCEDURE — 85730 THROMBOPLASTIN TIME PARTIAL: CPT

## 2022-03-21 PROCEDURE — 99291 CRITICAL CARE FIRST HOUR: CPT | Performed by: INTERNAL MEDICINE

## 2022-03-21 PROCEDURE — 5A1221Z PERFORMANCE OF CARDIAC OUTPUT, CONTINUOUS: ICD-10-PCS | Performed by: THORACIC SURGERY (CARDIOTHORACIC VASCULAR SURGERY)

## 2022-03-21 PROCEDURE — 94002 VENT MGMT INPAT INIT DAY: CPT

## 2022-03-21 PROCEDURE — 84295 ASSAY OF SERUM SODIUM: CPT

## 2022-03-21 RX ORDER — SODIUM CHLORIDE 9 MG/ML
25 INJECTION, SOLUTION INTRAVENOUS PRN
Status: DISCONTINUED | OUTPATIENT
Start: 2022-03-21 | End: 2022-03-21

## 2022-03-21 RX ORDER — OXYCODONE HYDROCHLORIDE AND ACETAMINOPHEN 5; 325 MG/1; MG/1
2 TABLET ORAL EVERY 4 HOURS PRN
Status: DISCONTINUED | OUTPATIENT
Start: 2022-03-21 | End: 2022-04-08 | Stop reason: HOSPADM

## 2022-03-21 RX ORDER — NOREPINEPHRINE BIT/0.9 % NACL 16MG/250ML
INFUSION BOTTLE (ML) INTRAVENOUS CONTINUOUS PRN
Status: DISCONTINUED | OUTPATIENT
Start: 2022-03-21 | End: 2022-03-21 | Stop reason: SDUPTHER

## 2022-03-21 RX ORDER — SODIUM CHLORIDE 9 MG/ML
INJECTION, SOLUTION INTRAVENOUS PRN
Status: DISCONTINUED | OUTPATIENT
Start: 2022-03-21 | End: 2022-04-08 | Stop reason: HOSPADM

## 2022-03-21 RX ORDER — CALCIUM CHLORIDE 100 MG/ML
INJECTION INTRAVENOUS; INTRAVENTRICULAR PRN
Status: DISCONTINUED | OUTPATIENT
Start: 2022-03-21 | End: 2022-03-21 | Stop reason: SDUPTHER

## 2022-03-21 RX ORDER — PHENYLEPHRINE HCL IN 0.9% NACL 1 MG/10 ML
SYRINGE (ML) INTRAVENOUS PRN
Status: DISCONTINUED | OUTPATIENT
Start: 2022-03-21 | End: 2022-03-21 | Stop reason: SDUPTHER

## 2022-03-21 RX ORDER — SENNA AND DOCUSATE SODIUM 50; 8.6 MG/1; MG/1
1 TABLET, FILM COATED ORAL 2 TIMES DAILY
Status: DISCONTINUED | OUTPATIENT
Start: 2022-03-21 | End: 2022-04-08 | Stop reason: HOSPADM

## 2022-03-21 RX ORDER — NITROGLYCERIN 20 MG/100ML
INJECTION INTRAVENOUS PRN
Status: DISCONTINUED | OUTPATIENT
Start: 2022-03-21 | End: 2022-03-21 | Stop reason: SDUPTHER

## 2022-03-21 RX ORDER — VANCOMYCIN HYDROCHLORIDE 1 G/20ML
INJECTION, POWDER, LYOPHILIZED, FOR SOLUTION INTRAVENOUS
Status: DISCONTINUED
Start: 2022-03-21 | End: 2022-03-21

## 2022-03-21 RX ORDER — SODIUM CHLORIDE 9 MG/ML
INJECTION, SOLUTION INTRAVENOUS CONTINUOUS PRN
Status: DISCONTINUED | OUTPATIENT
Start: 2022-03-21 | End: 2022-03-21 | Stop reason: SDUPTHER

## 2022-03-21 RX ORDER — ONDANSETRON 4 MG/1
4 TABLET, ORALLY DISINTEGRATING ORAL EVERY 8 HOURS PRN
Status: DISCONTINUED | OUTPATIENT
Start: 2022-03-21 | End: 2022-04-08 | Stop reason: HOSPADM

## 2022-03-21 RX ORDER — ALBUMIN, HUMAN INJ 5% 5 %
25 SOLUTION INTRAVENOUS PRN
Status: DISCONTINUED | OUTPATIENT
Start: 2022-03-21 | End: 2022-04-08 | Stop reason: HOSPADM

## 2022-03-21 RX ORDER — SODIUM CHLORIDE, SODIUM LACTATE, POTASSIUM CHLORIDE, CALCIUM CHLORIDE 600; 310; 30; 20 MG/100ML; MG/100ML; MG/100ML; MG/100ML
INJECTION, SOLUTION INTRAVENOUS CONTINUOUS PRN
Status: DISCONTINUED | OUTPATIENT
Start: 2022-03-21 | End: 2022-03-21 | Stop reason: SDUPTHER

## 2022-03-21 RX ORDER — PROPOFOL 10 MG/ML
INJECTION, EMULSION INTRAVENOUS PRN
Status: DISCONTINUED | OUTPATIENT
Start: 2022-03-21 | End: 2022-03-21 | Stop reason: SDUPTHER

## 2022-03-21 RX ORDER — ALBUMIN, HUMAN INJ 5% 5 %
SOLUTION INTRAVENOUS
Status: COMPLETED
Start: 2022-03-21 | End: 2022-03-21

## 2022-03-21 RX ORDER — EPINEPHRINE 0.1 MG/ML
SYRINGE (ML) INJECTION
Status: DISPENSED
Start: 2022-03-21 | End: 2022-03-22

## 2022-03-21 RX ORDER — SODIUM CHLORIDE 0.9 % (FLUSH) 0.9 %
10 SYRINGE (ML) INJECTION PRN
Status: DISCONTINUED | OUTPATIENT
Start: 2022-03-21 | End: 2022-04-08 | Stop reason: HOSPADM

## 2022-03-21 RX ORDER — PROPOFOL 10 MG/ML
10 INJECTION, EMULSION INTRAVENOUS CONTINUOUS
Status: DISCONTINUED | OUTPATIENT
Start: 2022-03-21 | End: 2022-03-27

## 2022-03-21 RX ORDER — VANCOMYCIN HYDROCHLORIDE 1 G/20ML
INJECTION, POWDER, LYOPHILIZED, FOR SOLUTION INTRAVENOUS PRN
Status: DISCONTINUED | OUTPATIENT
Start: 2022-03-21 | End: 2022-03-21 | Stop reason: HOSPADM

## 2022-03-21 RX ORDER — DEXTROSE MONOHYDRATE 50 MG/ML
100 INJECTION, SOLUTION INTRAVENOUS PRN
Status: DISCONTINUED | OUTPATIENT
Start: 2022-03-21 | End: 2022-04-08 | Stop reason: HOSPADM

## 2022-03-21 RX ORDER — AMIODARONE HYDROCHLORIDE 200 MG/1
200 TABLET ORAL 3 TIMES DAILY
Status: DISCONTINUED | OUTPATIENT
Start: 2022-03-21 | End: 2022-03-26

## 2022-03-21 RX ORDER — METOPROLOL TARTRATE 5 MG/5ML
2.5 INJECTION INTRAVENOUS EVERY 10 MIN PRN
Status: DISCONTINUED | OUTPATIENT
Start: 2022-03-21 | End: 2022-04-08 | Stop reason: HOSPADM

## 2022-03-21 RX ORDER — PROPOFOL 10 MG/ML
INJECTION, EMULSION INTRAVENOUS
Status: COMPLETED
Start: 2022-03-21 | End: 2022-03-21

## 2022-03-21 RX ORDER — HEPARIN SODIUM 1000 [USP'U]/ML
INJECTION, SOLUTION INTRAVENOUS; SUBCUTANEOUS PRN
Status: DISCONTINUED | OUTPATIENT
Start: 2022-03-21 | End: 2022-03-21 | Stop reason: SDUPTHER

## 2022-03-21 RX ORDER — ATORVASTATIN CALCIUM 80 MG/1
80 TABLET, FILM COATED ORAL NIGHTLY
Status: DISCONTINUED | OUTPATIENT
Start: 2022-03-22 | End: 2022-04-08 | Stop reason: HOSPADM

## 2022-03-21 RX ORDER — FENTANYL CITRATE 50 UG/ML
25 INJECTION, SOLUTION INTRAMUSCULAR; INTRAVENOUS
Status: DISCONTINUED | OUTPATIENT
Start: 2022-03-21 | End: 2022-04-08 | Stop reason: HOSPADM

## 2022-03-21 RX ORDER — MEPERIDINE HYDROCHLORIDE 50 MG/ML
25 INJECTION INTRAMUSCULAR; INTRAVENOUS; SUBCUTANEOUS
Status: ACTIVE | OUTPATIENT
Start: 2022-03-21 | End: 2022-03-21

## 2022-03-21 RX ORDER — PAPAVERINE HYDROCHLORIDE 30 MG/ML
INJECTION INTRAMUSCULAR; INTRAVENOUS
Status: DISCONTINUED
Start: 2022-03-21 | End: 2022-03-21

## 2022-03-21 RX ORDER — SODIUM CHLORIDE 0.9 % (FLUSH) 0.9 %
10 SYRINGE (ML) INJECTION EVERY 12 HOURS SCHEDULED
Status: DISCONTINUED | OUTPATIENT
Start: 2022-03-21 | End: 2022-04-08 | Stop reason: HOSPADM

## 2022-03-21 RX ORDER — CLOPIDOGREL BISULFATE 75 MG/1
75 TABLET ORAL DAILY
Status: DISCONTINUED | OUTPATIENT
Start: 2022-03-22 | End: 2022-04-08 | Stop reason: HOSPADM

## 2022-03-21 RX ORDER — SODIUM CHLORIDE 9 MG/ML
25 INJECTION, SOLUTION INTRAVENOUS PRN
Status: DISCONTINUED | OUTPATIENT
Start: 2022-03-21 | End: 2022-04-08 | Stop reason: HOSPADM

## 2022-03-21 RX ORDER — SODIUM CHLORIDE 0.9 % (FLUSH) 0.9 %
5-40 SYRINGE (ML) INJECTION EVERY 12 HOURS SCHEDULED
Status: DISCONTINUED | OUTPATIENT
Start: 2022-03-21 | End: 2022-03-21

## 2022-03-21 RX ORDER — PROTAMINE SULFATE 10 MG/ML
INJECTION, SOLUTION INTRAVENOUS
Status: COMPLETED
Start: 2022-03-21 | End: 2022-03-21

## 2022-03-21 RX ORDER — FENTANYL CITRATE 50 UG/ML
INJECTION, SOLUTION INTRAMUSCULAR; INTRAVENOUS PRN
Status: DISCONTINUED | OUTPATIENT
Start: 2022-03-21 | End: 2022-03-21 | Stop reason: SDUPTHER

## 2022-03-21 RX ORDER — IPRATROPIUM BROMIDE AND ALBUTEROL SULFATE 2.5; .5 MG/3ML; MG/3ML
1 SOLUTION RESPIRATORY (INHALATION)
Status: DISCONTINUED | OUTPATIENT
Start: 2022-03-21 | End: 2022-03-22

## 2022-03-21 RX ORDER — ONDANSETRON 2 MG/ML
4 INJECTION INTRAMUSCULAR; INTRAVENOUS EVERY 6 HOURS PRN
Status: DISCONTINUED | OUTPATIENT
Start: 2022-03-21 | End: 2022-04-08 | Stop reason: HOSPADM

## 2022-03-21 RX ORDER — CHLORHEXIDINE GLUCONATE 4 G/100ML
SOLUTION TOPICAL SEE ADMIN INSTRUCTIONS
Status: DISCONTINUED | OUTPATIENT
Start: 2022-03-21 | End: 2022-03-21 | Stop reason: HOSPADM

## 2022-03-21 RX ORDER — MAGNESIUM SULFATE IN WATER 40 MG/ML
2000 INJECTION, SOLUTION INTRAVENOUS PRN
Status: DISCONTINUED | OUTPATIENT
Start: 2022-03-21 | End: 2022-04-08 | Stop reason: HOSPADM

## 2022-03-21 RX ORDER — PROTAMINE SULFATE 10 MG/ML
INJECTION, SOLUTION INTRAVENOUS PRN
Status: DISCONTINUED | OUTPATIENT
Start: 2022-03-21 | End: 2022-03-21 | Stop reason: SDUPTHER

## 2022-03-21 RX ORDER — SODIUM CHLORIDE 9 MG/ML
INJECTION, SOLUTION INTRAVENOUS CONTINUOUS
Status: DISCONTINUED | OUTPATIENT
Start: 2022-03-22 | End: 2022-03-21

## 2022-03-21 RX ORDER — DIPHENHYDRAMINE HCL 25 MG
25 TABLET ORAL NIGHTLY PRN
Status: DISCONTINUED | OUTPATIENT
Start: 2022-03-22 | End: 2022-04-08 | Stop reason: HOSPADM

## 2022-03-21 RX ORDER — CHLORHEXIDINE GLUCONATE 0.12 MG/ML
15 RINSE ORAL ONCE
Status: COMPLETED | OUTPATIENT
Start: 2022-03-21 | End: 2022-03-21

## 2022-03-21 RX ORDER — ISOFLURANE 1 ML/ML
LIQUID RESPIRATORY (INHALATION)
Status: DISPENSED
Start: 2022-03-21 | End: 2022-03-21

## 2022-03-21 RX ORDER — DEXTROSE MONOHYDRATE 25 G/50ML
12.5 INJECTION, SOLUTION INTRAVENOUS PRN
Status: DISCONTINUED | OUTPATIENT
Start: 2022-03-21 | End: 2022-04-08 | Stop reason: HOSPADM

## 2022-03-21 RX ORDER — INSULIN GLARGINE 100 [IU]/ML
0.15 INJECTION, SOLUTION SUBCUTANEOUS NIGHTLY
Status: DISCONTINUED | OUTPATIENT
Start: 2022-03-22 | End: 2022-03-30

## 2022-03-21 RX ORDER — PROTAMINE SULFATE 10 MG/ML
50 INJECTION, SOLUTION INTRAVENOUS
Status: ACTIVE | OUTPATIENT
Start: 2022-03-21 | End: 2022-03-21

## 2022-03-21 RX ORDER — FENTANYL CITRATE 50 UG/ML
50 INJECTION, SOLUTION INTRAMUSCULAR; INTRAVENOUS
Status: DISCONTINUED | OUTPATIENT
Start: 2022-03-21 | End: 2022-04-08 | Stop reason: HOSPADM

## 2022-03-21 RX ORDER — NICOTINE POLACRILEX 4 MG
15 LOZENGE BUCCAL PRN
Status: DISCONTINUED | OUTPATIENT
Start: 2022-03-21 | End: 2022-04-08 | Stop reason: HOSPADM

## 2022-03-21 RX ORDER — OXYCODONE HYDROCHLORIDE AND ACETAMINOPHEN 5; 325 MG/1; MG/1
1 TABLET ORAL EVERY 4 HOURS PRN
Status: DISCONTINUED | OUTPATIENT
Start: 2022-03-21 | End: 2022-04-08 | Stop reason: HOSPADM

## 2022-03-21 RX ORDER — POTASSIUM CHLORIDE 29.8 MG/ML
20 INJECTION INTRAVENOUS PRN
Status: DISCONTINUED | OUTPATIENT
Start: 2022-03-21 | End: 2022-04-08 | Stop reason: HOSPADM

## 2022-03-21 RX ORDER — MAGNESIUM HYDROXIDE 1200 MG/15ML
LIQUID ORAL CONTINUOUS PRN
Status: COMPLETED | OUTPATIENT
Start: 2022-03-21 | End: 2022-03-21

## 2022-03-21 RX ORDER — HYDRALAZINE HYDROCHLORIDE 20 MG/ML
5 INJECTION INTRAMUSCULAR; INTRAVENOUS EVERY 5 MIN PRN
Status: DISCONTINUED | OUTPATIENT
Start: 2022-03-21 | End: 2022-04-08 | Stop reason: HOSPADM

## 2022-03-21 RX ORDER — NOREPINEPHRINE BIT/0.9 % NACL 16MG/250ML
.01-.07 INFUSION BOTTLE (ML) INTRAVENOUS CONTINUOUS PRN
Status: DISCONTINUED | OUTPATIENT
Start: 2022-03-21 | End: 2022-03-29

## 2022-03-21 RX ORDER — ASPIRIN 81 MG/1
81 TABLET ORAL DAILY
Status: DISCONTINUED | OUTPATIENT
Start: 2022-03-21 | End: 2022-03-27

## 2022-03-21 RX ORDER — VANCOMYCIN HYDROCHLORIDE 1 G/200ML
INJECTION, SOLUTION INTRAVENOUS PRN
Status: DISCONTINUED | OUTPATIENT
Start: 2022-03-21 | End: 2022-03-21 | Stop reason: SDUPTHER

## 2022-03-21 RX ORDER — ALBUMIN, HUMAN INJ 5% 5 %
SOLUTION INTRAVENOUS PRN
Status: DISCONTINUED | OUTPATIENT
Start: 2022-03-21 | End: 2022-03-21 | Stop reason: SDUPTHER

## 2022-03-21 RX ORDER — ASPIRIN 81 MG/1
81 TABLET ORAL
Status: COMPLETED | OUTPATIENT
Start: 2022-03-21 | End: 2022-03-21

## 2022-03-21 RX ORDER — PANTOPRAZOLE SODIUM 40 MG/1
40 TABLET, DELAYED RELEASE ORAL DAILY
Status: DISCONTINUED | OUTPATIENT
Start: 2022-03-21 | End: 2022-04-08 | Stop reason: HOSPADM

## 2022-03-21 RX ORDER — VANCOMYCIN HYDROCHLORIDE 1 G/200ML
1000 INJECTION, SOLUTION INTRAVENOUS EVERY 12 HOURS
Status: DISCONTINUED | OUTPATIENT
Start: 2022-03-21 | End: 2022-03-21

## 2022-03-21 RX ORDER — LIDOCAINE HYDROCHLORIDE 10 MG/ML
INJECTION, SOLUTION EPIDURAL; INFILTRATION; INTRACAUDAL; PERINEURAL PRN
Status: DISCONTINUED | OUTPATIENT
Start: 2022-03-21 | End: 2022-03-21 | Stop reason: SDUPTHER

## 2022-03-21 RX ORDER — HEPARIN SODIUM 1000 [USP'U]/ML
INJECTION, SOLUTION INTRAVENOUS; SUBCUTANEOUS
Status: DISCONTINUED
Start: 2022-03-21 | End: 2022-03-21

## 2022-03-21 RX ORDER — MIDAZOLAM HYDROCHLORIDE 1 MG/ML
INJECTION INTRAMUSCULAR; INTRAVENOUS PRN
Status: DISCONTINUED | OUTPATIENT
Start: 2022-03-21 | End: 2022-03-21 | Stop reason: SDUPTHER

## 2022-03-21 RX ORDER — ROCURONIUM BROMIDE 10 MG/ML
INJECTION, SOLUTION INTRAVENOUS PRN
Status: DISCONTINUED | OUTPATIENT
Start: 2022-03-21 | End: 2022-03-21 | Stop reason: SDUPTHER

## 2022-03-21 RX ORDER — SODIUM CHLORIDE 0.9 % (FLUSH) 0.9 %
10 SYRINGE (ML) INJECTION PRN
Status: DISCONTINUED | OUTPATIENT
Start: 2022-03-21 | End: 2022-03-21

## 2022-03-21 RX ADMIN — MIDAZOLAM HYDROCHLORIDE 2 MG: 1 INJECTION, SOLUTION INTRAMUSCULAR; INTRAVENOUS at 08:35

## 2022-03-21 RX ADMIN — MUPIROCIN: 20 OINTMENT TOPICAL at 20:36

## 2022-03-21 RX ADMIN — HEPARIN SODIUM 5000 UNITS: 1000 INJECTION INTRAVENOUS; SUBCUTANEOUS at 10:40

## 2022-03-21 RX ADMIN — PROTAMINE SULFATE 250 MG: 10 INJECTION, SOLUTION INTRAVENOUS at 12:06

## 2022-03-21 RX ADMIN — VASOPRESSIN 0.04 UNITS/MIN: 20 INJECTION PARENTERAL at 20:20

## 2022-03-21 RX ADMIN — ROCURONIUM BROMIDE 50 MG: 10 INJECTION INTRAVENOUS at 08:41

## 2022-03-21 RX ADMIN — SODIUM CHLORIDE, POTASSIUM CHLORIDE, SODIUM LACTATE AND CALCIUM CHLORIDE: 600; 310; 30; 20 INJECTION, SOLUTION INTRAVENOUS at 12:26

## 2022-03-21 RX ADMIN — PROPOFOL 25 MCG/KG/MIN: 10 INJECTION, EMULSION INTRAVENOUS at 13:03

## 2022-03-21 RX ADMIN — ALBUMIN (HUMAN) 25 G: 12.5 INJECTION, SOLUTION INTRAVENOUS at 13:12

## 2022-03-21 RX ADMIN — CEFAZOLIN SODIUM 2000 MG: 10 INJECTION, POWDER, FOR SOLUTION INTRAVENOUS at 09:24

## 2022-03-21 RX ADMIN — SODIUM BICARBONATE 50 MEQ: 84 INJECTION, SOLUTION INTRAVENOUS at 23:02

## 2022-03-21 RX ADMIN — MIDAZOLAM HYDROCHLORIDE 2 MG: 1 INJECTION, SOLUTION INTRAMUSCULAR; INTRAVENOUS at 11:48

## 2022-03-21 RX ADMIN — ASPIRIN 81 MG: 81 TABLET, COATED ORAL at 08:14

## 2022-03-21 RX ADMIN — FENTANYL CITRATE 50 MCG: 50 INJECTION INTRAMUSCULAR; INTRAVENOUS at 17:20

## 2022-03-21 RX ADMIN — AMIODARONE HYDROCHLORIDE 200 MG: 200 TABLET ORAL at 14:40

## 2022-03-21 RX ADMIN — PROPOFOL 30 MCG/KG/MIN: 10 INJECTION, EMULSION INTRAVENOUS at 23:01

## 2022-03-21 RX ADMIN — OXYCODONE HYDROCHLORIDE AND ACETAMINOPHEN 2 TABLET: 5; 325 TABLET ORAL at 17:20

## 2022-03-21 RX ADMIN — CHLORHEXIDINE GLUCONATE 15 ML: 1.2 RINSE ORAL at 06:13

## 2022-03-21 RX ADMIN — AMIODARONE HYDROCHLORIDE 200 MG: 200 TABLET ORAL at 20:36

## 2022-03-21 RX ADMIN — SODIUM CHLORIDE, POTASSIUM CHLORIDE, SODIUM LACTATE AND CALCIUM CHLORIDE: 600; 310; 30; 20 INJECTION, SOLUTION INTRAVENOUS at 12:56

## 2022-03-21 RX ADMIN — MUPIROCIN: 20 OINTMENT TOPICAL at 08:14

## 2022-03-21 RX ADMIN — FENTANYL CITRATE 50 MCG: 50 INJECTION INTRAMUSCULAR; INTRAVENOUS at 13:22

## 2022-03-21 RX ADMIN — Medication 0.04 MCG/KG/MIN: at 09:30

## 2022-03-21 RX ADMIN — ALBUMIN (HUMAN) 25 G: 12.5 INJECTION, SOLUTION INTRAVENOUS at 17:40

## 2022-03-21 RX ADMIN — SUFENTANIL CITRATE 0.5 MCG/KG/HR: 50 INJECTION EPIDURAL; INTRAVENOUS at 09:13

## 2022-03-21 RX ADMIN — SODIUM BICARBONATE 50 MEQ: 84 INJECTION, SOLUTION INTRAVENOUS at 22:52

## 2022-03-21 RX ADMIN — SODIUM CHLORIDE, POTASSIUM CHLORIDE, SODIUM LACTATE AND CALCIUM CHLORIDE: 600; 310; 30; 20 INJECTION, SOLUTION INTRAVENOUS at 08:34

## 2022-03-21 RX ADMIN — NITROGLYCERIN 20 MCG: 20 INJECTION INTRAVENOUS at 09:55

## 2022-03-21 RX ADMIN — POTASSIUM CHLORIDE 20 MEQ: 400 INJECTION, SOLUTION INTRAVENOUS at 13:26

## 2022-03-21 RX ADMIN — SODIUM BICARBONATE 50 MEQ: 84 INJECTION, SOLUTION INTRAVENOUS at 13:21

## 2022-03-21 RX ADMIN — ALBUMIN (HUMAN) 25 G: 12.5 INJECTION, SOLUTION INTRAVENOUS at 15:02

## 2022-03-21 RX ADMIN — OXYCODONE HYDROCHLORIDE AND ACETAMINOPHEN 1 TABLET: 5; 325 TABLET ORAL at 22:21

## 2022-03-21 RX ADMIN — Medication 50 MCG: at 10:50

## 2022-03-21 RX ADMIN — LIDOCAINE HYDROCHLORIDE 50 MG: 10 INJECTION, SOLUTION EPIDURAL; INFILTRATION; INTRACAUDAL; PERINEURAL at 08:41

## 2022-03-21 RX ADMIN — ASPIRIN 81 MG: 81 TABLET, COATED ORAL at 14:40

## 2022-03-21 RX ADMIN — IPRATROPIUM BROMIDE AND ALBUTEROL SULFATE 1 AMPULE: .5; 3 SOLUTION RESPIRATORY (INHALATION) at 22:49

## 2022-03-21 RX ADMIN — CHLORHEXIDINE GLUCONATE: 213 SOLUTION TOPICAL at 06:23

## 2022-03-21 RX ADMIN — SODIUM BICARBONATE 100 MEQ: 84 INJECTION, SOLUTION INTRAVENOUS at 17:40

## 2022-03-21 RX ADMIN — ROCURONIUM BROMIDE 50 MG: 10 INJECTION INTRAVENOUS at 12:06

## 2022-03-21 RX ADMIN — POTASSIUM CHLORIDE 20 MEQ: 400 INJECTION, SOLUTION INTRAVENOUS at 15:09

## 2022-03-21 RX ADMIN — AMINOCAPROIC ACID 10 G/HR: 250 INJECTION, SOLUTION INTRAVENOUS at 09:13

## 2022-03-21 RX ADMIN — PROTAMINE SULFATE 50 MG: 10 INJECTION, SOLUTION INTRAVENOUS at 12:34

## 2022-03-21 RX ADMIN — Medication 0.06 MCG/KG/MIN: at 13:03

## 2022-03-21 RX ADMIN — VANCOMYCIN HYDROCHLORIDE 1 G: 1 INJECTION, SOLUTION INTRAVENOUS at 09:40

## 2022-03-21 RX ADMIN — SODIUM CHLORIDE, PRESERVATIVE FREE 40 MG: 5 INJECTION INTRAVENOUS at 14:40

## 2022-03-21 RX ADMIN — SODIUM BICARBONATE 50 MEQ: 84 INJECTION, SOLUTION INTRAVENOUS at 19:20

## 2022-03-21 RX ADMIN — ROCURONIUM BROMIDE 50 MG: 10 INJECTION INTRAVENOUS at 09:15

## 2022-03-21 RX ADMIN — Medication 50 MCG: at 10:53

## 2022-03-21 RX ADMIN — Medication 200 MCG: at 09:18

## 2022-03-21 RX ADMIN — ALBUMIN (HUMAN) 12.5 G: 12.5 INJECTION, SOLUTION INTRAVENOUS at 12:10

## 2022-03-21 RX ADMIN — ALBUMIN (HUMAN) 25 G: 12.5 INJECTION, SOLUTION INTRAVENOUS at 13:55

## 2022-03-21 RX ADMIN — HEPARIN SODIUM 23000 UNITS: 1000 INJECTION INTRAVENOUS; SUBCUTANEOUS at 10:30

## 2022-03-21 RX ADMIN — ROCURONIUM BROMIDE 50 MG: 10 INJECTION INTRAVENOUS at 09:52

## 2022-03-21 RX ADMIN — MUPIROCIN: 20 OINTMENT TOPICAL at 14:46

## 2022-03-21 RX ADMIN — DEXTROSE MONOHYDRATE 2000 MG: 50 INJECTION, SOLUTION INTRAVENOUS at 16:55

## 2022-03-21 RX ADMIN — ALBUMIN (HUMAN) 12.5 G: 12.5 INJECTION, SOLUTION INTRAVENOUS at 09:57

## 2022-03-21 RX ADMIN — SODIUM CHLORIDE, PRESERVATIVE FREE 10 ML: 5 INJECTION INTRAVENOUS at 20:36

## 2022-03-21 RX ADMIN — STANDARDIZED SENNA CONCENTRATE AND DOCUSATE SODIUM 1 TABLET: 8.6; 5 TABLET ORAL at 20:36

## 2022-03-21 RX ADMIN — ALBUMIN (HUMAN) 12.5 G: 12.5 INJECTION, SOLUTION INTRAVENOUS at 10:13

## 2022-03-21 RX ADMIN — CALCIUM CHLORIDE 0.5 G: 100 INJECTION INTRAVENOUS; INTRAVENTRICULAR at 12:10

## 2022-03-21 RX ADMIN — FENTANYL CITRATE 250 MCG: 50 INJECTION, SOLUTION INTRAMUSCULAR; INTRAVENOUS at 08:41

## 2022-03-21 RX ADMIN — PROPOFOL 30 MCG/KG/MIN: 10 INJECTION, EMULSION INTRAVENOUS at 12:29

## 2022-03-21 RX ADMIN — VANCOMYCIN HYDROCHLORIDE 1000 MG: 1 INJECTION, POWDER, LYOPHILIZED, FOR SOLUTION INTRAVENOUS at 21:51

## 2022-03-21 RX ADMIN — Medication 100 MCG: at 10:12

## 2022-03-21 RX ADMIN — IPRATROPIUM BROMIDE AND ALBUTEROL SULFATE 1 AMPULE: .5; 3 SOLUTION RESPIRATORY (INHALATION) at 15:55

## 2022-03-21 RX ADMIN — SODIUM CHLORIDE, POTASSIUM CHLORIDE, SODIUM LACTATE AND CALCIUM CHLORIDE: 600; 310; 30; 20 INJECTION, SOLUTION INTRAVENOUS at 09:43

## 2022-03-21 RX ADMIN — ROCURONIUM BROMIDE 50 MG: 10 INJECTION INTRAVENOUS at 10:41

## 2022-03-21 RX ADMIN — PROPOFOL 200 MG: 10 INJECTION, EMULSION INTRAVENOUS at 08:41

## 2022-03-21 RX ADMIN — CALCIUM CHLORIDE 0.5 G: 100 INJECTION INTRAVENOUS; INTRAVENTRICULAR at 12:15

## 2022-03-21 RX ADMIN — EPINEPHRINE 0.04 MCG/KG/MIN: 1 INJECTION PARENTERAL at 17:54

## 2022-03-21 RX ADMIN — SODIUM CHLORIDE: 9 INJECTION, SOLUTION INTRAVENOUS at 09:13

## 2022-03-21 RX ADMIN — PROTAMINE SULFATE 50 MG: 10 INJECTION, SOLUTION INTRAVENOUS at 12:43

## 2022-03-21 RX ADMIN — OXYCODONE HYDROCHLORIDE AND ACETAMINOPHEN 2 TABLET: 5; 325 TABLET ORAL at 13:21

## 2022-03-21 ASSESSMENT — PULMONARY FUNCTION TESTS
PIF_VALUE: 13
PIF_VALUE: 13
PIF_VALUE: 1
PIF_VALUE: 15
PIF_VALUE: 15
PIF_VALUE: 11
PIF_VALUE: 12
PIF_VALUE: 0
PIF_VALUE: 15
PIF_VALUE: 0
PIF_VALUE: 16
PIF_VALUE: 15
PIF_VALUE: 15
PIF_VALUE: 13
PIF_VALUE: 0
PIF_VALUE: 14
PIF_VALUE: 16
PIF_VALUE: 1
PIF_VALUE: 14
PIF_VALUE: 15
PIF_VALUE: 12
PIF_VALUE: 13
PIF_VALUE: 13
PIF_VALUE: 1
PIF_VALUE: 0
PIF_VALUE: 15
PIF_VALUE: 13
PIF_VALUE: 13
PIF_VALUE: 15
PIF_VALUE: 0
PIF_VALUE: 13
PIF_VALUE: 12
PIF_VALUE: 13
PIF_VALUE: 0
PIF_VALUE: 0
PIF_VALUE: 16
PIF_VALUE: 14
PIF_VALUE: 16
PIF_VALUE: 13
PIF_VALUE: 5
PIF_VALUE: 13
PIF_VALUE: 14
PIF_VALUE: 15
PIF_VALUE: 13
PIF_VALUE: 13
PIF_VALUE: 12
PIF_VALUE: 12
PIF_VALUE: 0
PIF_VALUE: 1
PIF_VALUE: 15
PIF_VALUE: 17
PIF_VALUE: 14
PIF_VALUE: 0
PIF_VALUE: 14
PIF_VALUE: 1
PIF_VALUE: 13
PIF_VALUE: 1
PIF_VALUE: 1
PIF_VALUE: 13
PIF_VALUE: 12
PIF_VALUE: 1
PIF_VALUE: 13
PIF_VALUE: 14
PIF_VALUE: 15
PIF_VALUE: 0
PIF_VALUE: 13
PIF_VALUE: 14
PIF_VALUE: 13
PIF_VALUE: 15
PIF_VALUE: 0
PIF_VALUE: 14
PIF_VALUE: 13
PIF_VALUE: 0
PIF_VALUE: 15
PIF_VALUE: 15
PIF_VALUE: 0
PIF_VALUE: 1
PIF_VALUE: 14
PIF_VALUE: 15
PIF_VALUE: 14
PIF_VALUE: 14
PIF_VALUE: 2
PIF_VALUE: 14
PIF_VALUE: 13
PIF_VALUE: 15
PIF_VALUE: 13
PIF_VALUE: 15
PIF_VALUE: 24
PIF_VALUE: 14
PIF_VALUE: 13
PIF_VALUE: 16
PIF_VALUE: 15
PIF_VALUE: 13
PIF_VALUE: 13
PIF_VALUE: 14
PIF_VALUE: 12
PIF_VALUE: 15
PIF_VALUE: 0
PIF_VALUE: 15
PIF_VALUE: 1
PIF_VALUE: 15
PIF_VALUE: 15
PIF_VALUE: 16
PIF_VALUE: 13
PIF_VALUE: 14
PIF_VALUE: 13
PIF_VALUE: 14
PIF_VALUE: 15
PIF_VALUE: 15
PIF_VALUE: 0
PIF_VALUE: 2
PIF_VALUE: 14
PIF_VALUE: 15
PIF_VALUE: 17
PIF_VALUE: 16
PIF_VALUE: 13
PIF_VALUE: 0
PIF_VALUE: 14
PIF_VALUE: 18
PIF_VALUE: 15
PIF_VALUE: 14
PIF_VALUE: 1
PIF_VALUE: 15
PIF_VALUE: 14
PIF_VALUE: 14
PIF_VALUE: 0
PIF_VALUE: 15
PIF_VALUE: 14
PIF_VALUE: 12
PIF_VALUE: 0
PIF_VALUE: 23
PIF_VALUE: 15
PIF_VALUE: 0
PIF_VALUE: 12
PIF_VALUE: 14
PIF_VALUE: 1
PIF_VALUE: 6
PIF_VALUE: 13
PIF_VALUE: 16
PIF_VALUE: 0
PIF_VALUE: 0
PIF_VALUE: 13
PIF_VALUE: 15
PIF_VALUE: 13
PIF_VALUE: 13
PIF_VALUE: 15
PIF_VALUE: 0
PIF_VALUE: 14
PIF_VALUE: 15
PIF_VALUE: 4
PIF_VALUE: 16
PIF_VALUE: 0
PIF_VALUE: 0
PIF_VALUE: 16
PIF_VALUE: 15
PIF_VALUE: 13
PIF_VALUE: 14
PIF_VALUE: 12
PIF_VALUE: 15
PIF_VALUE: 13
PIF_VALUE: 12
PIF_VALUE: 5
PIF_VALUE: 15
PIF_VALUE: 15
PIF_VALUE: 12
PIF_VALUE: 0
PIF_VALUE: 14
PIF_VALUE: 0
PIF_VALUE: 0
PIF_VALUE: 18
PIF_VALUE: 14
PIF_VALUE: 0
PIF_VALUE: 0
PIF_VALUE: 13
PIF_VALUE: 13
PIF_VALUE: 0
PIF_VALUE: 17
PIF_VALUE: 0
PIF_VALUE: 15
PIF_VALUE: 14
PIF_VALUE: 0
PIF_VALUE: 14
PIF_VALUE: 15
PIF_VALUE: 15
PIF_VALUE: 12
PIF_VALUE: 13
PIF_VALUE: 15
PIF_VALUE: 1
PIF_VALUE: 15
PIF_VALUE: 1
PIF_VALUE: 15
PIF_VALUE: 15
PIF_VALUE: 1
PIF_VALUE: 14
PIF_VALUE: 16
PIF_VALUE: 15
PIF_VALUE: 14
PIF_VALUE: 15
PIF_VALUE: 0
PIF_VALUE: 14
PIF_VALUE: 14
PIF_VALUE: 15
PIF_VALUE: 15
PIF_VALUE: 1
PIF_VALUE: 1
PIF_VALUE: 15
PIF_VALUE: 16
PIF_VALUE: 11
PIF_VALUE: 16
PIF_VALUE: 14
PIF_VALUE: 12
PIF_VALUE: 13
PIF_VALUE: 14
PIF_VALUE: 15
PIF_VALUE: 17
PIF_VALUE: 16
PIF_VALUE: 16
PIF_VALUE: 13
PIF_VALUE: 13
PIF_VALUE: 0
PIF_VALUE: 15
PIF_VALUE: 1
PIF_VALUE: 14
PIF_VALUE: 15
PIF_VALUE: 14
PIF_VALUE: 0
PIF_VALUE: 19
PIF_VALUE: 15
PIF_VALUE: 1
PIF_VALUE: 0
PIF_VALUE: 14
PIF_VALUE: 0
PIF_VALUE: 0
PIF_VALUE: 14
PIF_VALUE: 15
PIF_VALUE: 1
PIF_VALUE: 36
PIF_VALUE: 0
PIF_VALUE: 13
PIF_VALUE: 15
PIF_VALUE: 14
PIF_VALUE: 14

## 2022-03-21 NOTE — ANESTHESIA PRE PROCEDURE
Department of Anesthesiology  Preprocedure Note       Name:  Evonne Block   Age:  74 y.o.  :  1955                                          MRN:  [id number]         Date:  3/21/2022      Surgeon: Peter Joaquin):  Olya Osuna MD    Procedure: Procedure(s):  CABG CORONARY ARTERY BYPASS X3, ON PUMP SWAN JORGE, MELI    Medications prior to admission:   Prior to Admission medications    Medication Sig Start Date End Date Taking? Authorizing Provider   nitroGLYCERIN (NITROSTAT) 0.3 MG SL tablet Place 0.3 mg under the tongue every 5 minutes as needed for Chest pain up to max of 3 total doses. If no relief after 1 dose, call 911.    Yes Historical Provider, MD   ibuprofen (ADVIL;MOTRIN) 800 MG tablet Take 1 tablet by mouth every 8 hours as needed for Pain 18   Luly Perez MD   lisinopril (PRINIVIL;ZESTRIL) 10 MG tablet Take 1 tablet by mouth daily 3/12/18   Serena Jimenes MD   pravastatin (PRAVACHOL) 20 MG tablet Take 1 tablet by mouth every evening 3/11/18   Serena Jimenes MD   am[facility]ODIP[facility]) 5 MG tablet Take 1 tablet by mouth daily 3/12/18   Serena Jimenes MD   aspirin 81 MG EC tablet Take 1 tablet by mouth daily 3/12/18   Serena Jimenes MD   tamsulosin [facility]) 0.4 MG capsule Take 1 capsule by mouth daily 3/12/18   Serena Jimenes MD       Current medications:    Current Facility-Administered Medications   Medication Dose Route Frequency Provider Last Rate Last Admin    [START ON 3/22/2022] 0.9 % sodium chloride infusion   IntraVENous Continuous Rafal Marsh, PA        sodium chloride flush 0.9 % injection 5-40 mL  5-40 mL IntraVENous 2 times per day WillSAEID Jackson        sodium chloride flush 0.9 % injection 10 mL  10 mL IntraVENous PRN SAEID Vilchis        0.9 % sodium chloride infusion  25 mL IntraVENous PRN ASEID Vilchis        ceFAZolin (ANCEF) 2000 mg in dextrose 5 % 50 mL IVPB  2,000 mg IntraVENous On Call to 77SAEID Patel   Held at 22 0730    metoprolol tartrate (LOPRESSOR) tablet 12.5 mg  12.5 mg Oral Once SAEID Rosas        mupirocin [facility]) 2 % ointment   Nasal BID SAEID Rosas   Given at 03/21/22 2285    chlorhexidine (HIBICLENS) 4 % liquid   Topical See Admin Instructions SAEID Rosas   Given at 03/21/22 6597    heparin (porcine) 1000 UNIT/ML injection             papaverine 30 MG/ML injection             vancomycin (VANCOCIN) 1 g injection             sterile water injection             SUFentanil citrate 300 mcg in sodium chloride 0.9 % 300 mL infusion  0.25 mcg/kg/hr IntraVENous Once JACKELYN Ledesma CRNA        sodium chloride 0.9 % irrigation    Continuous PRN Meliton Holliday MD   3,000 mL at 03/21/22 6526    magnesium sulfate 2000 mg in 50 mL IVPB premix  2,000 mg IntraVENous PRN JACKELYN Guerrero - NP        potassium chloride (KLOR-CON M) extended release tablet 40 mEq  40 mEq Oral PRN JACKELYN Guerrero NP        Or    potassium bicarb-citric acid (EFFER-K) effervescent tablet 40 mEq  40 mEq Oral PRN JACKELYN Guerrero NP        Or    potassium chloride 10 mEq/100 mL IVPB (Peripheral Line)  10 mEq IntraVENous PRN JACKELYN Guerrero NP        amLODIPine (NORVASC) tablet 10 mg  10 mg Oral Daily JACKELYN Leslie NP   10 mg at 03/19/22 0824    budesonide-formoterol (SYMBICORT) 160-4.5 MCG/ACT inhaler 2 puff  2 puff Inhalation BID Ally John MD   2 puff at 03/19/22 2227    tiotropium (SPIRIVA RESPIMAT) 2.5 MCG/ACT inhaler 2 puff  2 puff Inhalation Daily Ally John MD   2 puff at 03/19/22 0818    albuterol (PROVENTIL) nebulizer solution 2.5 mg  2.5 mg Nebulization Q6H PRN Ally John MD        [Held by provider] metoprolol tartrate (LOPRESSOR) tablet 12.5 mg  12.5 mg Oral BID JACKELYN Leslie NP   12.5 mg at 03/17/22 2055    tamsulosin (FLOMAX) capsule 0.4 mg  0.4 mg Oral Daily JACKELNY Leslie NP   0.4 mg at 03/19/22 6852    aspirin EC tablet 81 mg  81 mg Oral Daily Mukesh Chery MD   81 mg at 03/19/22 0824    atorvastatin (LIPITOR) tablet 40 mg  40 mg Oral Nightly Mukesh Chery MD   40 mg at 03/20/22 1945    sodium chloride flush 0.9 % injection 5-40 mL  5-40 mL IntraVENous 2 times per day Mukesh Chery MD   10 mL at 03/20/22 1945    sodium chloride flush 0.9 % injection 5-40 mL  5-40 mL IntraVENous PRN Mukesh Chery MD        0.9 % sodium chloride infusion  25 mL IntraVENous PRN Mukesh Chery MD        acetaminophen (TYLENOL) tablet 650 mg  650 mg Oral Q4H PRN Mukesh Chery MD        heparin (porcine) injection 3,960 Units  60 Units/kg IntraVENous PRN Mukesh Chery MD        heparin (porcine) injection 1,980 Units  30 Units/kg IntraVENous PRN Mukesh Chery MD   1,980 Units at 03/17/22 0853    heparin 25,000 units in 0.9% sodium chloride 250 mL infusion  5-30 Units/kg/hr IntraVENous Continuous Mukesh Chery MD 9.2 mL/hr at 03/20/22 1918 14 Units/kg/hr at 03/20/22 1918    acetaminophen (TYLENOL) tablet 650 mg  650 mg Oral Q4H PRN Юлия Aggarwal MD   650 mg at 03/14/22 2050    nitroGLYCERIN (NITROSTAT) SL tablet 0.4 mg  0.4 mg SubLINGual Q5 Min PRN Юлия Aggarwal MD   0.4 mg at 03/19/22 6794       Allergies:  No Known Allergies    Problem List:    Patient Active Problem List   Diagnosis Code    CKD (chronic kidney disease) stage 3, GFR 30-59 ml/min ([facility]) N18.30    Essential hypertension I10    Arthritis of right hip M16.11    Chronic systolic congestive heart failure (HCC) I50.22    Benign prostatic hyperplasia with urinary obstruction N40.1, N13.8    Pure hypercholesterolemia E78.00    Abdominal pain R10.9    Chest pain R07.9    Chronic combined systolic and diastolic congestive heart failure (HCC) I50.42    S/P cardiac catheterization Z98.890    3-vessel CAD I25.10       Past Medical History:        Diagnosis Date    3-vessel CAD 3/11/2022    Alcohol abuse 6/23/2015    Arthritis     Chronic kidney disease     Full dentures upper and lower full    Hypertension     Pure hypercholesterolemia 99/9/2200    Systolic CHF ([address]     Wears glasses        Past Surgical History:        Procedure Laterality Date    APPENDECTOMY      HIP FRACTURE SURGERY Left     W/ HARDWARE    MT EGD TRANSORAL BIOPSY SINGLE/MULTIPLE  6/19/2017    EGD BIOPSY performed by Ursula Snider DO at [facility]       Social History:    Social History     Tobacco Use    Smoking status: Light Tobacco Smoker     Packs/day: 0.00     Types: Cigarettes    Smokeless tobacco: Never Used    Tobacco comment: \"only smokes when he drinks, 1 packs lasts him a month\"   Substance Use Topics    Alcohol use: Yes     Alcohol/week: 0.0 standard drinks     Comment:  \" drinks a pint on the weekend\" off and on                                Ready to quit: Not Answered  Counseling given: Not Answered  Comment: Tano Porter smokes when he drinks, 1 packs lasts him a month\"      Vital Signs (Current):   Vitals:    03/20/22 1945 03/21/22 0003 03/21/22 0005 03/21/22 0520   BP: 115/68  (!) 150/78 119/64   Pulse: 64 73 66 64   Resp: 21 22 21   Temp:   97.4 °F (36.3 °C) 97.8 °F (36.6 °C)   TempSrc:   Oral Oral   SpO2: 96%  95% 95%   Weight:       Height:                                                  BP Readings from Last 3 Encounters:   03/21/22 119/64   03/11/22 132/72   11/29/18 (!) 172/96       NPO Status:                                                                                 BMI:   Wt Readings from Last 3 Encounters:   03/18/22 146 lb 4.8 oz (66.4 kg)   03/10/22 145 lb 8.1 oz (66 kg)   11/29/18 158 lb (71.7 kg)     Body mass index is 18.29 kg/m².     CBC:   Lab Results   Component Value Date    WBC 7.8 03/21/2022    RBC 4.43 03/21/2022    HGB 11.9 03/21/2022    HCT 40.0 03/21/2022    MCV 90.3 03/21/2022    RDW 15.9 03/21/2022     03/21/2022       CMP:   Lab Results   Component Value Date     03/19/2022    K 4.0 03/19/2022     03/19/2022    CO2 21 03/19/2022 BUN 17 03/19/2022    CREATININE 1.00 03/19/2022    GFRAA >60 03/19/2022    LABGLOM >60 03/19/2022    GLUCOSE 113 03/19/2022    PROT 7.1 03/09/2022    CALCIUM 8.7 03/19/2022    BILITOT 0.59 03/09/2022    ALKPHOS 98 03/09/2022    AST 18 03/09/2022    ALT 10 03/09/2022       POC Tests: No results for input(s): POCGLU, POCNA, POCK, POCCL, POCBUN, POCHEMO, POCHCT in the last 72 hours. Coags:   Lab Results   Component Value Date    PROTIME 10.8 05/21/2014    INR 1.0 05/21/2014    APTT 54.1 03/20/2022       HCG (If Applicable): No results found for: PREGTESTUR, PREGSERUM, HCG, HCGQUANT     ABGs: No results found for: PHART, PO2ART, BJH9ZWC, IUB9AXX, BEART, [id number]     Type & Screen (If Applicable):  No results found for: LABABO, LABRH    Drug/Infectious Status (If Applicable):  Lab Results   Component Value Date    HEPCAB NONREACTIVE 02/24/2017       COVID-19 Screening (If Applicable):   Lab Results   Component Value Date    COVID19 Not Detected 03/09/2022       3/11/2022- cardiac cath      LMCA: has heavy calcification with mid 60% stenosis. LAD: has heavy calcification in the proximal segment with 40% stenosis. The   mid segment has 85% stenosis. LCx: has heavy calcification with ostial 99% stenosis. The OM1 has 100%   occlusion with left to left collaterals. The OM2 has 100% occlusion with   left to left collaterals. The LPDA is large and is normal.     RCA: is a non-dominant vessel and has proximal 90% stenosis.      Coronary Tree        Dominance: Right       LV Analysis   LV function assessed as:Abnormal.   Ejection Fraction  30%      Procedure Summar       Three vessel disease with left main involvement.    Moderate LV systolic dysfunction. LVEF 30%.            Nuclear Stress 3/10/22:        Significant stress-induced ischemia in the lateral wall and septum.       Infarct in the apex encroaching into the apical inferior wall and also in the   basal inferior wall.       LVEF 34%       Risk stratification: High risk.      Echo 3/13//2022  Global left ventricular systolic function is normal. Estimated EF 16-14%  Normal diastolic filling. Normal right ventricular size and function. Mild mitral regurgitation. Mild tricuspid regurgitation. Mild pulmonary hypertension with an estimated right ventricular systolic  pressure of 41 mmHg. No pericardial effusion.              Anesthesia Evaluation    Airway: Mallampati: II  TM distance: >3 FB   Neck ROM: full  Mouth opening: > = 3 FB Dental:    (+) other and edentulous      Pulmonary:Negative Pulmonary ROS and normal exam                               Cardiovascular:    (+) hypertension:, CAD:, CHF:,                   Neuro/Psych:   (+) psychiatric history:             ROS comment: History of alcohol abuse GI/Hepatic/Renal: Neg GI/Hepatic/Renal ROS            Endo/Other: Negative Endo/Other ROS                    Abdominal:             Vascular: negative vascular ROS. Other Findings:           Anesthesia Plan      general     ASA 4     (Chart review)  Induction: intravenous. arterial line, central line, BIS, CVP and MELI  MIPS: Postoperative opioids intended and Postoperative ventilation. Anesthetic plan and risks discussed with patient. Use of blood products discussed with patient whom consented to blood products. Plan discussed with CRNA.                 Marcial Huerta MD   3/21/2022

## 2022-03-21 NOTE — ANESTHESIA PROCEDURE NOTES
Central Venous Line:    A central venous line was placed using ultrasound guidance, in the OR for the following indication(s): central venous access and CVP monitoring.  3/21/2022 9:00 AM3/21/2022 9:10 AM    Sterility preparation included the following: hand hygiene performed prior to procedure, maximum sterile barriers used and sterile technique used to drape from head to toe. The patient was placed in Trendelenburg position. The right internal jugular vein was prepped. The site was prepped with Chloraprep. A 9 Fr (size), 10 (length), introducer slick was placed. During the procedure, the following specific steps were taken: target vein identified, needle advanced into vein and blood aspirated and guidewire advanced into vein. Intravenous verification was obtained by ultrasound and venous blood return. Post insertion care included: all ports aspirated, all ports flushed easily, guidewire removed intact, Biopatch applied, line sutured in place and dressing applied. During the procedure the patient experienced: patient tolerated procedure well with no complications and EBL < 5mL.       Insertion site scrubbed per usage guidelines?: Yes  Skin prep agent dried for 3 minutes prior to procedure?:yes  Anesthesia type: general..No  Staffing  Performed: Resident/CRNA and Other   Anesthesiologist: Laverne Mitchell MD  Resident/CRNA: JACKELYN Hawley - CRNA  Other anesthesia staff: Triston Rivera RN  Preanesthetic Checklist  Completed: patient identified, IV checked, site marked, risks and benefits discussed, surgical consent, monitors and equipment checked, pre-op evaluation, timeout performed, anesthesia consent given, oxygen available and patient being monitored

## 2022-03-21 NOTE — PROGRESS NOTES
PULMONARY & CRITICAL CARE MEDICINE PROGRESS NOTE     Patient:  Toi Montalvo  MRN: [id number]  6 [facility] date: 3/11/2022  Primary Care Physician: Markie Crane MD  Consulting Physician: Marci Martínez MD  CODE Status: Prior  LOS: 10    SUBJECTIVE     CHIEF COMPLAINT/REASON FOR INITIAL CONSULT:    COPD/chronic cough/preoperative evaluation    BRIEF HOSPITAL COURSE:  The patient is a 74 y.o. male history of hypertension, history of mild systolic dysfunction admitted with chest pain initially to [facility] on 03/09/2022 non-ST elevation MI ruled out by troponins in 20s had a stress test done because of high suspicion which shows ischemia of lateral septal wall with ejection fraction of 34%.  He does have history of cocaine use and apparently he had used cocaine before admission to [facility].  He was transferred to [facility] for cardiac catheterization which shows multivessel coronary artery disease with EF of 30% including left main disease.  He remains on a statin and heparin.  CT surgery was consulted for CABG.    Patient had pulmonary function test done which shows FEV1 of 46% with significance positive bronchodilator FEV1 of 56% with severe reduction in diffusion capacity of less than 30% with air trapping/hyperinflation. CT scan of the chest shows areas of blebs and bulla medially and centrilobular and paraseptal emphysema on CT scan.     Patient does complain of shortness of breath on mild activity and exertion.  He does have chronic cough without no change denies increased wheezing currently denies chest pain.  He has history of cocaine use.  Does have history of smoking patient is not able to tell me that how many cigarettes he used to smoke but he claimed that now he smoke 1 pack/week and had been smoking for almost 50 years. INTERVAL HISTORY:  03/21/22  Patient is seen in CVICU on ventilator after patient returned from CABG.   He underwent CABG this afternoon and was brought in from our on ventilator and kept on ventilator. When I saw the patient patient was on propofol drip was not arousable currently did not follow commands. He is on low-dose of Levophed on returning from the OR. On ventilator he was initially 100% and weaned down to 60% currently he is on 60% FiO2 when I saw him. Ventilator setting PRVC/16/600/5/60 percent and ABG 7.3 6/38/70 5.7/21.8. Chest x-ray showed endotracheal tube in position right basilar atelectasis present mild pulmonary congestion    REVIEW OF SYSTEMS:  Unobtainable from patient due to sedation/mechanical ventilation    OBJECTIVE     Ventilator Settings:  Vent Information  $Ventilation: $Initial Day  Vent Type: Servo i  Vent Mode: SIMV/PRVC  Vt Ordered: 600 mL  Rate Set: 16 bmp  Pressure Support: 6 cmH20  FiO2 : (S) 40 %  SpO2: 100 %  SpO2/FiO2 ratio: 250  Sensitivity: 5  PEEP/CPAP: 5  I Time/ I Time %: 0.9 s  Humidification Source: [facility]    VITAL SIGNS:   LAST-  /67   Pulse 87   Temp 97.8 °F (36.6 °C) (Oral)   Resp 16   Ht 6' 3\" (1.905 m)   Wt 146 lb 4.8 oz (66.4 kg)   SpO2 100%   BMI 18.29 kg/m²   8-24 HR RANGE-  TEMP Temp  Av.3 °F (35.2 °C)  Min: 91.9 °F (33.3 °C)  Max: 98.7 °F (93.6 °C)   BP Systolic (43TWW), OMM:446 , Min:74 , QKS:786      Diastolic (16KAJ), ULJ:33, Min:56, Max:90     PULSE Pulse  Av.6  Min: 64  Max: 94   RR Resp  Avg: 10.4  Min: 0  Max: 32   O2 SAT SpO2  Av.5 %  Min: 87 %  Max: 100 %   OXYGEN DELIVERY No data recorded     SYSTEMIC EXAMINATION:   General appearance - Mechanically ventilated, chronically ill-appearing  Mental status - sedated on propofol drip  Eyes - pupils equal and reactive sluggish, sclera anicteric  Mouth -oral endotracheal tube present  Neck - supple, no significant adenopathy, carotids upstroke normal bilaterally, no bruits  Chest - Breath sounds bilaterally were dimnished to auscultation at bases. There were no wheezes, rhonchi or rales.    Heart -positive blood present normal rate, regular rhythm, normal S1, S2, no murmurs, clicks or gallops  Abdomen - soft, nontender, nondistended, no masses or organomegaly  Neurological - DTR's decreased and plantars equivocal, motor and sensory cannot be done as patient is sedated on ventilator  Extremities - peripheral pulses normal, no pedal edema, no clubbing or cyanosis  Skin - normal coloration and turgor, no rashes, no suspicious skin lesions noted     DATA REVIEW     Medications: Current Inpatient  Scheduled Meds:   sodium chloride flush  10 mL IntraVENous 2 times per day    aspirin  81 mg Oral Daily    [START ON 3/22/2022] clopidogrel  75 mg Oral Daily    amiodarone  200 mg Oral TID    mupirocin   Nasal BID    sennosides-docusate sodium  1 tablet Oral BID    metoprolol tartrate  25 mg Oral BID    [START ON 3/22/2022] atorvastatin  80 mg Oral Nightly    pantoprazole  40 mg Oral Daily    pantoprazole (PROTONIX) 40 mg injection  40 mg IntraVENous Daily    ceFAZolin (ANCEF) IVPB  2,000 mg IntraVENous Q8H    ipratropium-albuterol  1 ampule Inhalation Q4H WA    [START ON 3/22/2022] insulin glargine  0.15 Units/kg SubCUTAneous Nightly    isoflurane        vancomycin 1000mg in  mL  1,000 mg IntraVENous Q12H    budesonide-formoterol  2 puff Inhalation BID    tiotropium  2 puff Inhalation Daily    tamsulosin  0.4 mg Oral Daily     Continuous Infusions:   sodium chloride      propofol 30 mcg/kg/min (03/21/22 1515)    norepinephrine 0.02 mcg/kg/min (03/21/22 1515)    EPINEPHrine      insulin Stopped (03/21/22 1330)    dextrose       INPUT/OUTPUT:  In: 2464.6 [I.V.:1910.8; Blood:500]  Out: 2900 [Urine:1850]  Date 03/21/22 0000 - 03/21/22 2359   Shift 0351-1033 0200-5798 0279-5194 24 Hour Total   INTAKE   I.V.(mL/kg)  1683. 4(25.4)  1683. 4(25.4)   Blood(mL/kg)  500(7.5)  500(7.5)   IV Piggyback(mL/kg)  53.8(0.8)  53.8(0.8)   Shift Total(mL/kg)  2237. 2(33.7)  2237. 2(33.7)   OUTPUT   Urine(mL/kg/hr) 400(0.8) 650  1050 Blood(mL/kg)  1050(15.8)  8334(47.6)   Shift Total(mL/kg) 400(6) 1700(25.6)  2100(31.6)   Weight (kg) 66.4 66.4 66.4 66.4       LABS:-  ABG:   Recent Labs     03/21/22  1039 03/21/22  1121 03/21/22  1235 03/21/22  1314 03/21/22  1424   POCPH 7.271* 7.450 7.383 7.330* 7.365   POCPCO2 51.5* 32.1* 36.1 40.9 38.1   POCPO2 228.9* 469.0* 327.7* 116.4* 75.7*   POCHCO3 23.7 22.3 21.5 21.6 21.8   YQPQ1WHN 100* 100* 100* 98 95     CBC:   Recent Labs     03/21/22  0722 03/21/22  1313   WBC 7.8 4.1   HGB 11.9* 8.6*   HCT 40.0* 28.2*   MCV 90.3 89.8    See Reflexed IPF Result   LYMPHOPCT 38  --    RBC 4.43 3.14*   MCH 26.9 27.4   MCHC 29.8 30.5   RDW 15.9* 15.9*     BMP:   Recent Labs     03/19/22  1434 03/21/22  1313 03/21/22  1314   * 133*  --    K 4.0 3.9  --    * 104  --    CO2 21 20  --    BUN 17 14  --    CREATININE 1.00 0.76 0.99   GLUCOSE 113* 90  --      Liver Function Test:   No results for input(s): PROT, LABALBU, ALT, AST, GGT, ALKPHOS, BILITOT in the last 72 hours. Amylase/Lipase:  No results for input(s): AMYLASE, LIPASE in the last 72 hours. Coagulation Profile:   Recent Labs     03/20/22  0958 03/21/22  0722 03/21/22  1313   INR  --   --  1.5   PROTIME  --   --  15.6*   APTT 54.1* 45.1* 27.7     Cardiac Enzymes:  No results for input(s): CKTOTAL, CKMB, CKMBINDEX, TROPONINI in the last 72 hours.   Lactic Acid:  Lab Results   Component Value Date    LACTA NOT REPORTED 03/11/2018    LACTA NOT REPORTED 03/10/2018    LACTA NOT REPORTED 03/10/2018     BNP:   Lab Results   Component Value Date    BNP 70 09/13/2013     D-Dimer:  No results found for: DDIMER  Others:   No results found for: TSH, G1CFLFR, O7BVQVR, THYROIDAB, FT3, T4FREE  Lab Results   Component Value Date    SEDRATE 20 (H) 05/21/2014    CRP 15.2 (H) 05/21/2014     No results found for: Derenda Bilberry  No results found for: IRON, TIBC, FERRITIN  No results found for: SPEP, UPEP  Lab Results   Component Value Date    PSA 1.11 11/04/2016       Microbiology:  No results for input(s): SPECDESC, SPECDESC, SPECIAL, CULTURE, CULTURE, STATUS, ORG, CDIFFTOXPCR, CAMPYLOBPCR, SALMONELLAPC, SHIGAPCR, SHIGELLAPCR, MPNEUG, MPNEUM, LACTOQL in the last 72 hours. Pathology:    Radiology Reports:  XR CHEST PORTABLE   Final Result   All tubes and catheters are in good position. No evidence for pneumothorax. Bilateral basal atelectasis noted. VL DUP UPPER EXTREMITY ARTERIES BILATERAL   Final Result      VL Vein Mapping Lower Bilateral   Final Result      VL DUP CAROTID BILATERAL   Final Result      CT CHEST WO CONTRAST   Final Result   1. Extensive centrilobular and paraseptal emphysema. 2. Extensive atherosclerotic disease, particularly along the coronary   arteries. 3. There are areas of atelectasis in the lingula. There is collapse of the   right middle lobe   4. No focal lung infiltrate. XR CHEST PORTABLE    (Results Pending)       Echocardiogram:   Results for orders placed during the hospital encounter of 03/11/22    Echocardiogram Complete 2D w Doppler w Color    Narrative  Transthoracic Echocardiography Report (TTE)    Patient Name Maude Currie       Date of Study           03/13/2022  [place]    Date of      1955  Gender                  Male  Birth    Age          74 year(s)  Race                    Black    Room Number  2107        Height:                 75 inch, 190.5 cm    Corporate ID [id number]    Weight:                 136 pounds, 61.7 kg  #    Patient Acct [de-identified]   BSA:        1.86 m^2    BMI:        17 kg/m^2  #    MR #         [id number]      Sonographer             Dmitri Butch    Accession #  [id number]  Interpreting Physician  Vinh Chau    Fellow                   Referring Nurse  Practitioner    Interpreting             Referring Physician     Karri Longo,  Fellow                                           APRN-NP    Type of Study    TTE procedure:2D Echocardiogram, M-Mode, Doppler, Color Doppler. Procedure Date  Date: 03/13/2022 Start: 12:17 PM    Study Location: [facility]  Technical Quality: Fair visualization    Indications:Multi vessel CAD and Pre-Op CABG. History / Tech. Comments:  Procedure explained to patient. Study done at the bedside. STAT call in    Patient Status: Inpatient    Height: 75 inches Weight: 136 pounds BSA: 1.86 m^2 BMI: 17 kg/m^2    Allergies  - *No Known Allergies. CONCLUSIONS    Summary  Global left ventricular systolic function is normal. Estimated EF 33-11%  Normal diastolic filling. Normal right ventricular size and function. Mild mitral regurgitation. Mild tricuspid regurgitation. Mild pulmonary hypertension with an estimated right ventricular systolic  pressure of 41 mmHg. No pericardial effusion. Signature  ----------------------------------------------------------------------------  Electronically signed by Vesna Powell(Sonographer) on 03/13/2022  12:51 PM  ----------------------------------------------------------------------------    ----------------------------------------------------------------------------  Electronically signed by Vinh Chau(Interpreting physician) on  03/14/2022 09:19 AM  ----------------------------------------------------------------------------  FINDINGS  Left Atrium  Left atrium is normal in size. Left Ventricle  Left ventricle is normal in size. Global left ventricular systolic function  is normal. Calculated EF via Parks's method is 48 %. Mild septal hypertrophy. Normal diastolic filling. Right Atrium  Right atrium is normal in size. Right Ventricle  Normal right ventricular size and function. Mitral Valve  Normal mitral valve structure. Mild mitral regurgitation. No mitral stenosis. Aortic Valve  Aortic valve structure and function normal.  Aortic valve is trileaflet. No aortic insufficiency. No aortic stenosis. Tricuspid Valve  Normal tricuspid valve leaflets.   Mild tricuspid regurgitation. No tricuspid stenosis. Mild pulmonary hypertension with an estimated right ventricular systolic  pressure of 41 mmHg. Pulmonic Valve  Pulmonic valve is normal in structure and function. No pulmonic insufficiency. No evidence of pulmonic stenosis. Pericardial Effusion  No pericardial effusion. Miscellaneous  Normal aortic root dimension. E/E' average = 11.0. IVC normal diameter & inspiratory collapse indicating normal RA filling  pressure .     M-mode / 2D Measurements & Calculations:    LVIDd:4.9 cm(3.7 - 5.6 cm)       Diastolic LYYMAR:98.97 ml  LVIDs:3.8 cm(2.2 - 4.0 cm)       Systolic QNVOEC:15.77 ml  IVSd:1.2 cm(0.6 - 1.1 cm)        Aortic Root:3.3 cm(2.0 - 3.7 cm)  LVPWd:0.8 cm(0.6 - 1.1 cm)       LA Dimension: 2.9 cm(1.9 - 4.0 cm)  Fractional Shortenin.45 %    LA volume/Index: 35.47 ml /19m^2  Calculated LVEF (%): 49.08 %     LVOT:2.2 cm  RVDd:3.5 cm    Mitral:                                 Aortic    Valve Area (P1/2-Time): 2.78 cm^2       Peak Velocity: 1.26 m/s  Peak E-Wave: 0.87 m/s                   Mean Velocity: 0.84 m/s  Peak A-Wave: 0.67 m/s                   Peak Gradient: 6.35 mmHg  E/A Ratio: 1.29                         Mean Gradient: 4 mmHg  Peak Gradient: 2.99 mmHg  Mean Gradient: 1 mmHg  Deceleration Time: 258 msec             Area (continuity): 2.33 cm^2  P1/2t: 79 msec                          AV VTI: 26.9 cm    Area (continuity): 3.32 cm^2  Mean Velocity: 0.49 m/s    Tricuspid:                              Pulmonic:    Estimated RVSP: 41 mmHg                 Peak Velocity: 0.71 m/s  Peak TR Velocity: 3.00 m/s              Peak Gradient: 1.99 mmHg  Peak TR Gradient: 36 mmHg  Estimated RA Pressure: 5 mmHg    Estimated PASP: 41 mmHg    Diastology / Tissue Doppler  Septal Wall E' velocity:0.06 m/s  Septal Wall E/E':14.7  Lateral Wall E' velocity:0.12 m/s  Lateral Wall E/E':7.3      Cardiac Catheterization:   No results found for this or any previous visit.      ASSESSMENT AND PLAN     Assessment:    MV CAD, Plan for CABG   Bradycardia  Sinus pause lasting for 4.7 seconds 3/17/2022  COPD stage II on PFTs with severe reduction diffusion capacity  Centrilobular and paraseptal emphysema  Chronic smoker  Cocaine use  Chronic combined diastolic and systolic CHF, EF 88%.  Previously 50-55%  Mild pulmonary hypertension  Right hip arthritis  Essential hypertension  CKD stage III       Plan:    I personally interviewed/examined the patient; reviewed interval history, interpreted all available radiographic and laboratory data at the time of service. Patient currently sedated with propofol drip  Patient is currently on hemodynamic support with small dose of Levophed postoperatively. Ventilator setting PRVC/16/600/5/60 percent. Wean FiO2 to keep saturation above 92%. Continue antiplatelet, anticoagulation, statins, and beta blockers as per cardiology  Continue lung protective mechanical ventilation, appropriate changes made in ventilator settings  Chest tube management as per CT surgery  Continue pulmonary toilet, aspiration precautions and bronchodilators  On Symbicort and Spiriva will hold Spiriva and start on DuoNeb aerosol while on ventilator  Diuresis per cardiology/CT surgery postoperatively  Continue to monitor I/O with a goal of even fluid balance  Continue to monitor CBC, coagulation profile, transfuse as indicated  Chemical DVT prophylaxis postoperatively when okay with CT surgery  Antimicrobials reviewed; currently not on antibiotic  Glycemic control appropriate  Physical/occupational/speech therapy    Discussed with nursing staff, treatment and plan discussed  Discussed with respiratory therapist pain      The patient is/remains critically ill with illness/injury that acutely impairs one or more vital organ systems, such that there is a high probability of imminent or life threatening deterioration in the patient's condition.  Critical care time of greater than 35 minutes was spent (excluding procedures), in coordination of care during bedside rounds and discussion of patient care in detail, and recommendations of the team were adopted in the plan. Necessity of all invasive devices was also confirmed. Stephon Murphy MD, M.D. Pulmonary and Critical Care Medicine           3/21/2022, 3:39 PM    Please note that this chart was generated using voice recognition Dragon dictation software. Although every effort was made to ensure the accuracy of this automated transcription, some errors in transcription may have occurred.

## 2022-03-21 NOTE — ANESTHESIA POSTPROCEDURE EVALUATION
Department of Anesthesiology  Postprocedure Note    Patient: Gloria Davidson  MRN: [id number]  [place]: 1955  Date of evaluation: 3/21/2022  Time:  3:15 PM     Procedure Summary     Date: 03/21/22 Room / Location: [address] [place] [address]    Anesthesia Start: 8758 Anesthesia Stop: 5447    Procedure: CABG X 2 , EVH RT LEG , MELI (N/A ) Diagnosis: (MULTI-VESSEL CAD)    Surgeons: Raheel Buckley MD Responsible Provider: Laverne Mitchell MD    Anesthesia Type: general ASA Status: 4          Anesthesia Type: general    Amy Phase I:      Amy Phase II:      Last vitals: Reviewed and per EMR flowsheets.    POST-OP ANESTHESIA NOTE       BP (!) 84/56   Pulse 87   Temp 97.8 °F (36.6 °C) (Oral)   Resp 16   Ht 6' 3\" (1.905 m)   Wt 146 lb 4.8 oz (66.4 kg)   SpO2 100%   BMI 18.29 kg/m²    Pain Assessment: 0-10  Pain Level:  (pt vented/sedated, immed post op, comfort care under NMB)           Anesthesia Post Evaluation    Patient location during evaluation: ICU  Patient participation: complete - patient cannot participate  Level of consciousness: sedated and ventilated  Pain score: 0  Airway patency: patent  Nausea & Vomiting: no vomiting and no nausea  Complications: no  Cardiovascular status: hemodynamically unstable and vasoactive/inotropes  Respiratory status: intubated and ventilator

## 2022-03-21 NOTE — PROGRESS NOTES
Pt taken to CVOR from room 1007 via bed for scheduled CABG with Dr Yolanda Angulo at this time. Pt accompanied to CVOR by 3 RNs.

## 2022-03-21 NOTE — PROGRESS NOTES
[facility]  Progress Note                   Date:   3/21/2022  Patient name: Rayne Cool  Date of admission:  3/11/2022  7:15 PM  MRN:   [id number]  YOB: 1955  PCP: Madyson Arzola MD    Reason for Admission: Chest pain [R07.9]  S/P cardiac catheterization [Z98.890]  3-vessel CAD [I25.10]    Subjective:       Clinical Changes /Abnormalities:   No acute events overnight. On heparin gtt. Sinus rhythm  No CP/SOB. Medications:   Scheduled Meds:   sodium chloride flush  5-40 mL IntraVENous 2 times per day    ceFAZolin (ANCEF) IVPB  2,000 mg IntraVENous On Call to OR    metoprolol tartrate  12.5 mg Oral Once    aspirin  81 mg Oral On Call to OR    mupirocin   Nasal BID    chlorhexidine   Topical See Admin Instructions    heparin (porcine)        papaverine        vancomycin        sterile water        sufentanil (SUFENTA) 1 mcg/mL infusion  0.25 mcg/kg/hr IntraVENous Once    amLODIPine  10 mg Oral Daily    budesonide-formoterol  2 puff Inhalation BID    tiotropium  2 puff Inhalation Daily    [Held by provider] metoprolol tartrate  12.5 mg Oral BID    tamsulosin  0.4 mg Oral Daily    aspirin  81 mg Oral Daily    atorvastatin  40 mg Oral Nightly    sodium chloride flush  5-40 mL IntraVENous 2 times per day     Continuous Infusions:   [START ON 3/22/2022] sodium chloride      sodium chloride      sodium chloride      heparin (PORCINE) Infusion 14 Units/kg/hr (03/20/22 1918)     CBC:   No results for input(s): WBC, HGB, PLT in the last 72 hours. BMP:    Recent Labs     03/18/22  1412 03/19/22  1434    145*   K 4.4 4.0   * 113*   CO2 21 21   BUN 18 17   CREATININE 0.99 1.00   GLUCOSE 126* 113*     Hepatic:  No results for input(s): AST, ALT, ALB, BILITOT, ALKPHOS in the last 72 hours. Troponin:   No results for input(s): TROPHS in the last 72 hours. BNP: No results for input(s): BNP in the last 72 hours.   Lipids:   No results for input(s): CHOL, HDL in the last 72 hours. Invalid input(s): LDLCALCU  INR: No results for input(s): INR in the last 72 hours. 3/11/2022- cardiac cath     Findings:   Angiographic Findings        Cardiac Arteries and Lesion Findings       LMCA: has heavy calcification with mid 60% stenosis. LAD: has heavy calcification in the proximal segment with 40% stenosis. The   mid segment has 85% stenosis. LCx: has heavy calcification with ostial 99% stenosis. The OM1 has 100%   occlusion with left to left collaterals. The OM2 has 100% occlusion with   left to left collaterals. The LPDA is large and is normal.     RCA: is a non-dominant vessel and has proximal 90% stenosis.      Coronary Tree        Dominance: Right       LV Analysis   LV function assessed as:Abnormal.   Ejection Fraction   +----------------------------------------------------------------------+---+   ! Method                                                                !EF%! +----------------------------------------------------------------------+---+   ! LV gram                                                               !30 !   +----------------------------------------------------------------------+---+       Procedure Summary        Three vessel disease with left main involvement.    Moderate LV systolic dysfunction. LVEF 30%.      Recommendations        Medical therapy as needed.    Risk factor modification.  CABG evaluation by CT surgery. Nuclear Stress 3/10/22:  Impression       Significant stress-induced ischemia in the lateral wall and septum.       Infarct in the apex encroaching into the apical inferior wall and also in the   basal inferior wall.       LVEF 34%       Risk stratification: High risk. Echo 3/13//2022     Summary  Global left ventricular systolic function is normal. Estimated EF 07-99%  Normal diastolic filling. Normal right ventricular size and function. Mild mitral regurgitation.   Mild tricuspid regurgitation. Mild pulmonary hypertension with an estimated right ventricular systolic  pressure of 41 mmHg. No pericardial effusion.     Signature  ----------------------------------------------------------------------------   Electronically signed by Vesna Powell(Sonographer) on 03/13/2022   12:51 PM  ----------------------------------------------------------------------------     ----------------------------------------------------------------------------   Electronically signed by Vinh Chau(Interpreting physician) on   03/14/2022 09:19 AM    Objective:   Vitals: /64   Pulse 64   Temp 97.8 °F (36.6 °C) (Oral)   Resp 21   Ht 6' 3\" (1.905 m)   Wt 146 lb 4.8 oz (66.4 kg)   SpO2 95%   BMI 18.29 kg/m²   General appearance: alert and cooperative with exam  HEENT: Head: Normocephalic, no lesions, without obvious abnormality. Neck:no JVD, trachea midline, no adenopathy  Lungs: Clear to auscultation throughout. RA without distress  Heart: Regular rate and rhythm, s1/s2 auscultated, no murmurs. SB   Abdomen: soft, non-tender, bowel sounds active  Extremities: no edema  Neurologic: not done        Assessment / Acute Cardiac Problems:   - CP- concern for angina  - Abnormal stress test 3/10/22- ischemia in lateral and septal walls- with EF 34%  - MV CAD on Cath  - HTN  - DL  - cocaine use      Plan of Treatment:   1. Chest pain free  2. Plan for CABG this AM.   3. Continue heparin gtt, asa, statin.         Electronically signed by Derek Rivera MD on 3/21/2022 at 7:46 AM  [address]  [phone]

## 2022-03-21 NOTE — ANESTHESIA PROCEDURE NOTES
Procedure Performed: MELI      Start Time:  3/21/2022 9:17 AM       End Time:      Preanesthesia Checklist:  Patient identified, IV assessed, risks and benefits discussed, monitors and equipment assessed, procedure being performed at surgeon's request and anesthesia consent obtained. General Procedure Information  Diagnostic Indications for Echo:  hemodynamic monitoring and assessment of valve function  Physician Requesting Echo: Jhon Panda MD  Location performed:  OR  Intubated  Heart visualized  Probe Type:  3D  Modalities:  2D only, color flow mapping, continuous wave Doppler and M-mode                     Procedure (Scheduled):  MELI  Requested by Surgeon: Dr. Dominick Estrada  Performed by Dr. Angelo Ac    Structures:  LA: Normal  RA: Normal  RV: Normal size and function  LV: Global hypokinesia, mild to moderate. Marked basilar inferior wall hypokinesia. . Estimated LVEF 40 %  LV apex: No LV apical thrombus identified  Aorta: Mild atheromatous disease Asc Aorta, arch and descending Aorta  Percardium: No pericardial effusion  NONI: No appendage thrombus identified  Septum: No intracardiac shunt via color Doppler. Valves:  Mitral Valve: Structurally normal.  Mild regurgitation is identified. Aortic Valve: The aortic valve is trileaflet and opens adequately. No stenosis is identified. No regurgitation is identified. Tricuspid valve: Structurally normal. Mild regurgitation is identified. Pulmonary valve: Normal. No significant regurgitation  No valvular vegetations or thrombus identified. Summary:     1. A MELI was performed without complications. 2. LVEF 40 % preop. 3. Pre-op Valvular abnormalities:  Mild mitral regurgitation, trivial tricuspid insufficiency  4. No Aortic dissection  5. Significant findings were communicated to CTS. After separation from CPB, the following findings were obtained:  1. Moderate but significant improvement in LV contractility, with EF estimated at 45%.   Mild

## 2022-03-21 NOTE — ANESTHESIA PROCEDURE NOTES
Arterial Line:    An arterial line was placed using ultrasound guidance and surface landmarks, in the OR for the following indication(s): continuous blood pressure monitoring and blood sampling needed. A 20 gauge (size), 1 and 3/4 inch (length), Arrow (type) catheter was placed, Seldinger technique used, into the left radial artery, secured by Tegaderm and tape. Anesthesia type: General    Events:  patient tolerated procedure well with no complications and EBL < 5mL. 3/21/2022 8:45 AM3/21/2022 8:48 AM  Anesthesiologist: Shankar Sheriff MD  Resident/CRNA: Lito Peters APRN - CRNA  Other anesthesia staff: Julee Renee RN  Performed:  Other anesthesia staff and Anesthesiologist   Preanesthetic Checklist  Completed: patient identified, IV checked, site marked, risks and benefits discussed, surgical consent, monitors and equipment checked, pre-op evaluation, timeout performed, anesthesia consent given, oxygen available and patient being monitored

## 2022-03-22 ENCOUNTER — APPOINTMENT (OUTPATIENT)
Dept: GENERAL RADIOLOGY | Age: 67
DRG: 233 | End: 2022-03-22
Attending: INTERNAL MEDICINE
Payer: MEDICARE

## 2022-03-22 LAB
ABO/RH: NORMAL
ALLEN TEST: ABNORMAL
ALLEN TEST: NORMAL
ANION GAP SERPL CALCULATED.3IONS-SCNC: 20 MMOL/L (ref 9–17)
ANION GAP: 15 MMOL/L (ref 7–16)
ANTIBODY SCREEN: NEGATIVE
ARM BAND NUMBER: NORMAL
BLD PROD TYP BPU: NORMAL
BLD PROD TYP BPU: NORMAL
BUN BLDV-MCNC: 14 MG/DL (ref 8–23)
CALCIUM SERPL-MCNC: 8.6 MG/DL (ref 8.6–10.4)
CHLORIDE BLD-SCNC: 109 MMOL/L (ref 98–107)
CO2: 17 MMOL/L (ref 20–31)
CREAT SERPL-MCNC: 1.32 MG/DL (ref 0.7–1.2)
CROSSMATCH RESULT: NORMAL
CROSSMATCH RESULT: NORMAL
DISPENSE STATUS BLOOD BANK: NORMAL
DISPENSE STATUS BLOOD BANK: NORMAL
EKG ATRIAL RATE: 93 BPM
EKG P AXIS: 90 DEGREES
EKG P-R INTERVAL: 168 MS
EKG Q-T INTERVAL: 390 MS
EKG QRS DURATION: 98 MS
EKG QTC CALCULATION (BAZETT): 484 MS
EKG R AXIS: 39 DEGREES
EKG T AXIS: 102 DEGREES
EKG VENTRICULAR RATE: 93 BPM
EXPIRATION DATE: NORMAL
FIO2: 55
FIO2: 65
GFR AFRICAN AMERICAN: >60 ML/MIN
GFR NON-AFRICAN AMERICAN: 54 ML/MIN
GFR NON-AFRICAN AMERICAN: 58 ML/MIN
GFR SERPL CREATININE-BSD FRML MDRD: >60 ML/MIN
GFR SERPL CREATININE-BSD FRML MDRD: ABNORMAL ML/MIN/{1.73_M2}
GFR SERPL CREATININE-BSD FRML MDRD: ABNORMAL ML/MIN/{1.73_M2}
GLUCOSE BLD-MCNC: 100 MG/DL (ref 75–110)
GLUCOSE BLD-MCNC: 111 MG/DL (ref 74–100)
GLUCOSE BLD-MCNC: 116 MG/DL (ref 75–110)
GLUCOSE BLD-MCNC: 138 MG/DL (ref 75–110)
GLUCOSE BLD-MCNC: 154 MG/DL (ref 75–110)
GLUCOSE BLD-MCNC: 162 MG/DL (ref 75–110)
GLUCOSE BLD-MCNC: 184 MG/DL (ref 75–110)
GLUCOSE BLD-MCNC: 200 MG/DL (ref 74–100)
GLUCOSE BLD-MCNC: 203 MG/DL (ref 70–99)
GLUCOSE BLD-MCNC: 56 MG/DL (ref 75–110)
GLUCOSE BLD-MCNC: 63 MG/DL (ref 75–110)
GLUCOSE BLD-MCNC: 92 MG/DL (ref 75–110)
GLUCOSE BLD-MCNC: 93 MG/DL (ref 75–110)
GLUCOSE BLD-MCNC: 96 MG/DL (ref 75–110)
GLUCOSE BLD-MCNC: 98 MG/DL (ref 75–110)
HCT VFR BLD CALC: 29.1 % (ref 40.7–50.3)
HEMOGLOBIN: 9 G/DL (ref 13–17)
INR BLD: 1.5
MAGNESIUM: 2.1 MG/DL (ref 1.6–2.6)
MCH RBC QN AUTO: 28 PG (ref 25.2–33.5)
MCHC RBC AUTO-ENTMCNC: 30.9 G/DL (ref 28.4–34.8)
MCV RBC AUTO: 90.7 FL (ref 82.6–102.9)
MODE: ABNORMAL
MODE: NORMAL
NEGATIVE BASE EXCESS, ART: 1 (ref 0–2)
NEGATIVE BASE EXCESS, ART: 7 (ref 0–2)
NRBC AUTOMATED: 0 PER 100 WBC
O2 DEVICE/FLOW/%: ABNORMAL
O2 DEVICE/FLOW/%: NORMAL
PDW BLD-RTO: 16.1 % (ref 11.8–14.4)
PLATELET # BLD: 147 K/UL (ref 138–453)
PMV BLD AUTO: 10.6 FL (ref 8.1–13.5)
POC ANGLE TEG W HEP: 69.9 DEG (ref 59–74)
POC ANGLE TEG W HEP: 71.6 DEG (ref 59–74)
POC ANGLE TEG: 46.3 DEG (ref 59–74)
POC ANGLE TEG: 72 DEG (ref 59–74)
POC BUN: 15 MG/DL (ref 8–26)
POC CHLORIDE: 113 MMOL/L (ref 98–107)
POC CREATININE: 1.24 MG/DL (ref 0.51–1.19)
POC EPL TEG W/HEP: 1.2 % (ref 0–15)
POC HCO3: 18.7 MMOL/L (ref 21–28)
POC HCO3: 24 MMOL/L (ref 21–28)
POC HEMATOCRIT: 27 % (ref 41–53)
POC HEMOGLOBIN: 9.1 G/DL (ref 13.5–17.5)
POC IONIZED CALCIUM: 1.18 MMOL/L (ref 1.15–1.33)
POC KINETICS TEG W HEP: 1.1 MIN (ref 1–3)
POC KINETICS TEG W HEP: 1.5 MIN (ref 1–3)
POC KINETICS TEG: 1.2 MIN (ref 1–3)
POC KINETICS TEG: 2 MIN (ref 1–3)
POC LACTIC ACID: 5.12 MMOL/L (ref 0.56–1.39)
POC LY30(LYSIS) TEG W HEP: 1.2 % (ref 0–8)
POC MA(MAX CLOT) TEG: 62.6 MM (ref 55–74)
POC MA(MAX CLOT) TEG: 68.9 MM (ref 55–74)
POC MAX CLOT TEG W HEP: 61.2 MM (ref 55–74)
POC MAX CLOT TEG W HEP: 73.7 MM (ref 55–74)
POC O2 SATURATION: 96 % (ref 94–98)
POC O2 SATURATION: 97 % (ref 94–98)
POC PCO2: 35.8 MM HG (ref 35–48)
POC PCO2: 41.8 MM HG (ref 35–48)
POC PH: 7.33 (ref 7.35–7.45)
POC PH: 7.37 (ref 7.35–7.45)
POC PO2: 85.7 MM HG (ref 83–108)
POC PO2: 91.1 MM HG (ref 83–108)
POC POTASSIUM: 4.1 MMOL/L (ref 3.5–4.5)
POC REACTION TIME TEG W HEP: 4.2 MIN (ref 4–9)
POC REACTION TIME TEG W HEP: 4.6 MIN (ref 4–9)
POC REACTION TIME TEG: 4.7 MIN (ref 4–9)
POC REACTION TIME TEG: 7.9 MIN (ref 4–9)
POC SODIUM: 152 MMOL/L (ref 138–146)
POC TCO2: 25 MMOL/L (ref 22–30)
POTASSIUM SERPL-SCNC: 4.3 MMOL/L (ref 3.7–5.3)
PROTHROMBIN TIME: 15.7 SEC (ref 9.1–12.3)
RBC # BLD: 3.21 M/UL (ref 4.21–5.77)
SAMPLE SITE: ABNORMAL
SAMPLE SITE: NORMAL
SODIUM BLD-SCNC: 146 MMOL/L (ref 135–144)
TRANSFUSION STATUS: NORMAL
TRANSFUSION STATUS: NORMAL
UNIT DIVISION: 0
UNIT DIVISION: 0
UNIT NUMBER: NORMAL
UNIT NUMBER: NORMAL
WBC # BLD: 21.9 K/UL (ref 3.5–11.3)

## 2022-03-22 PROCEDURE — 87070 CULTURE OTHR SPECIMN AEROBIC: CPT

## 2022-03-22 PROCEDURE — 2700000000 HC OXYGEN THERAPY PER DAY

## 2022-03-22 PROCEDURE — 99024 POSTOP FOLLOW-UP VISIT: CPT | Performed by: NURSE PRACTITIONER

## 2022-03-22 PROCEDURE — 87205 SMEAR GRAM STAIN: CPT

## 2022-03-22 PROCEDURE — P9047 ALBUMIN (HUMAN), 25%, 50ML: HCPCS | Performed by: NURSE PRACTITIONER

## 2022-03-22 PROCEDURE — 6370000000 HC RX 637 (ALT 250 FOR IP): Performed by: NURSE PRACTITIONER

## 2022-03-22 PROCEDURE — C9113 INJ PANTOPRAZOLE SODIUM, VIA: HCPCS | Performed by: NURSE PRACTITIONER

## 2022-03-22 PROCEDURE — 80048 BASIC METABOLIC PNL TOTAL CA: CPT

## 2022-03-22 PROCEDURE — 94761 N-INVAS EAR/PLS OXIMETRY MLT: CPT

## 2022-03-22 PROCEDURE — 2500000003 HC RX 250 WO HCPCS: Performed by: THORACIC SURGERY (CARDIOTHORACIC VASCULAR SURGERY)

## 2022-03-22 PROCEDURE — 82803 BLOOD GASES ANY COMBINATION: CPT

## 2022-03-22 PROCEDURE — 85014 HEMATOCRIT: CPT

## 2022-03-22 PROCEDURE — 99291 CRITICAL CARE FIRST HOUR: CPT | Performed by: INTERNAL MEDICINE

## 2022-03-22 PROCEDURE — 84520 ASSAY OF UREA NITROGEN: CPT

## 2022-03-22 PROCEDURE — 2100000001 HC CVICU R&B

## 2022-03-22 PROCEDURE — 2580000003 HC RX 258: Performed by: NURSE PRACTITIONER

## 2022-03-22 PROCEDURE — 37799 UNLISTED PX VASCULAR SURGERY: CPT

## 2022-03-22 PROCEDURE — 6360000002 HC RX W HCPCS: Performed by: NURSE PRACTITIONER

## 2022-03-22 PROCEDURE — 83605 ASSAY OF LACTIC ACID: CPT

## 2022-03-22 PROCEDURE — 94003 VENT MGMT INPAT SUBQ DAY: CPT

## 2022-03-22 PROCEDURE — 71045 X-RAY EXAM CHEST 1 VIEW: CPT

## 2022-03-22 PROCEDURE — 80051 ELECTROLYTE PANEL: CPT

## 2022-03-22 PROCEDURE — 82330 ASSAY OF CALCIUM: CPT

## 2022-03-22 PROCEDURE — 94640 AIRWAY INHALATION TREATMENT: CPT

## 2022-03-22 PROCEDURE — 85027 COMPLETE CBC AUTOMATED: CPT

## 2022-03-22 PROCEDURE — 82565 ASSAY OF CREATININE: CPT

## 2022-03-22 PROCEDURE — 6370000000 HC RX 637 (ALT 250 FOR IP): Performed by: THORACIC SURGERY (CARDIOTHORACIC VASCULAR SURGERY)

## 2022-03-22 PROCEDURE — 2580000003 HC RX 258: Performed by: THORACIC SURGERY (CARDIOTHORACIC VASCULAR SURGERY)

## 2022-03-22 PROCEDURE — 85610 PROTHROMBIN TIME: CPT

## 2022-03-22 PROCEDURE — 93010 ELECTROCARDIOGRAM REPORT: CPT | Performed by: INTERNAL MEDICINE

## 2022-03-22 PROCEDURE — 89220 SPUTUM SPECIMEN COLLECTION: CPT

## 2022-03-22 PROCEDURE — APPSS45 APP SPLIT SHARED TIME 31-45 MINUTES: Performed by: NURSE PRACTITIONER

## 2022-03-22 PROCEDURE — 2500000003 HC RX 250 WO HCPCS: Performed by: NURSE PRACTITIONER

## 2022-03-22 PROCEDURE — 83735 ASSAY OF MAGNESIUM: CPT

## 2022-03-22 RX ORDER — ALBUMIN (HUMAN) 12.5 G/50ML
25 SOLUTION INTRAVENOUS ONCE
Status: COMPLETED | OUTPATIENT
Start: 2022-03-22 | End: 2022-03-22

## 2022-03-22 RX ORDER — DEXMEDETOMIDINE HYDROCHLORIDE 4 UG/ML
.1-1.5 INJECTION, SOLUTION INTRAVENOUS CONTINUOUS
Status: DISPENSED | OUTPATIENT
Start: 2022-03-22 | End: 2022-03-27

## 2022-03-22 RX ORDER — DEXMEDETOMIDINE HYDROCHLORIDE 4 UG/ML
.1-1.5 INJECTION, SOLUTION INTRAVENOUS CONTINUOUS
Status: DISCONTINUED | OUTPATIENT
Start: 2022-03-22 | End: 2022-03-22

## 2022-03-22 RX ORDER — IPRATROPIUM BROMIDE AND ALBUTEROL SULFATE 2.5; .5 MG/3ML; MG/3ML
1 SOLUTION RESPIRATORY (INHALATION) EVERY 6 HOURS
Status: DISCONTINUED | OUTPATIENT
Start: 2022-03-22 | End: 2022-03-26

## 2022-03-22 RX ADMIN — SODIUM CHLORIDE, PRESERVATIVE FREE 40 MG: 5 INJECTION INTRAVENOUS at 09:45

## 2022-03-22 RX ADMIN — MUPIROCIN: 20 OINTMENT TOPICAL at 20:26

## 2022-03-22 RX ADMIN — IPRATROPIUM BROMIDE AND ALBUTEROL SULFATE 1 AMPULE: .5; 2.5 SOLUTION RESPIRATORY (INHALATION) at 19:59

## 2022-03-22 RX ADMIN — AMIODARONE HYDROCHLORIDE 200 MG: 200 TABLET ORAL at 09:45

## 2022-03-22 RX ADMIN — MAGNESIUM SULFATE HEPTAHYDRATE 2000 MG: 40 INJECTION, SOLUTION INTRAVENOUS at 09:36

## 2022-03-22 RX ADMIN — TAMSULOSIN HYDROCHLORIDE 0.4 MG: 0.4 CAPSULE ORAL at 09:45

## 2022-03-22 RX ADMIN — SODIUM BICARBONATE 50 MEQ: 84 INJECTION, SOLUTION INTRAVENOUS at 03:15

## 2022-03-22 RX ADMIN — DEXTROSE MONOHYDRATE 2000 MG: 50 INJECTION, SOLUTION INTRAVENOUS at 09:53

## 2022-03-22 RX ADMIN — STANDARDIZED SENNA CONCENTRATE AND DOCUSATE SODIUM 1 TABLET: 8.6; 5 TABLET ORAL at 20:26

## 2022-03-22 RX ADMIN — VASOPRESSIN 0.04 UNITS/MIN: 20 INJECTION PARENTERAL at 04:56

## 2022-03-22 RX ADMIN — SODIUM CHLORIDE 3.06 UNITS/HR: 9 INJECTION, SOLUTION INTRAVENOUS at 01:34

## 2022-03-22 RX ADMIN — MUPIROCIN: 20 OINTMENT TOPICAL at 09:45

## 2022-03-22 RX ADMIN — CLOPIDOGREL 75 MG: 75 TABLET, FILM COATED ORAL at 09:45

## 2022-03-22 RX ADMIN — ATORVASTATIN CALCIUM 80 MG: 80 TABLET, FILM COATED ORAL at 21:18

## 2022-03-22 RX ADMIN — SODIUM CHLORIDE, PRESERVATIVE FREE 10 ML: 5 INJECTION INTRAVENOUS at 09:45

## 2022-03-22 RX ADMIN — IPRATROPIUM BROMIDE AND ALBUTEROL SULFATE 1 AMPULE: .5; 3 SOLUTION RESPIRATORY (INHALATION) at 08:37

## 2022-03-22 RX ADMIN — SODIUM CHLORIDE, PRESERVATIVE FREE 10 ML: 5 INJECTION INTRAVENOUS at 20:26

## 2022-03-22 RX ADMIN — OXYCODONE HYDROCHLORIDE AND ACETAMINOPHEN 2 TABLET: 5; 325 TABLET ORAL at 23:28

## 2022-03-22 RX ADMIN — Medication 1000 MG: at 21:24

## 2022-03-22 RX ADMIN — ALBUMIN (HUMAN) 25 G: 0.25 INJECTION, SOLUTION INTRAVENOUS at 07:54

## 2022-03-22 RX ADMIN — OXYCODONE HYDROCHLORIDE AND ACETAMINOPHEN 2 TABLET: 5; 325 TABLET ORAL at 18:57

## 2022-03-22 RX ADMIN — SODIUM CHLORIDE, PRESERVATIVE FREE 10 ML: 5 INJECTION INTRAVENOUS at 09:46

## 2022-03-22 RX ADMIN — FENTANYL CITRATE 50 MCG: 50 INJECTION INTRAMUSCULAR; INTRAVENOUS at 11:41

## 2022-03-22 RX ADMIN — ASPIRIN 81 MG: 81 TABLET, COATED ORAL at 09:45

## 2022-03-22 RX ADMIN — DEXTROSE MONOHYDRATE 2000 MG: 50 INJECTION, SOLUTION INTRAVENOUS at 18:55

## 2022-03-22 RX ADMIN — OXYCODONE HYDROCHLORIDE AND ACETAMINOPHEN 2 TABLET: 5; 325 TABLET ORAL at 07:54

## 2022-03-22 RX ADMIN — PROPOFOL 35 MCG/KG/MIN: 10 INJECTION, EMULSION INTRAVENOUS at 06:10

## 2022-03-22 RX ADMIN — Medication 1000 MG: at 10:36

## 2022-03-22 RX ADMIN — AMIODARONE HYDROCHLORIDE 200 MG: 200 TABLET ORAL at 14:30

## 2022-03-22 RX ADMIN — IPRATROPIUM BROMIDE AND ALBUTEROL SULFATE 1 AMPULE: .5; 2.5 SOLUTION RESPIRATORY (INHALATION) at 15:03

## 2022-03-22 RX ADMIN — PROPOFOL 40 MCG/KG/MIN: 10 INJECTION, EMULSION INTRAVENOUS at 19:31

## 2022-03-22 RX ADMIN — DEXTROSE MONOHYDRATE 2000 MG: 50 INJECTION, SOLUTION INTRAVENOUS at 01:22

## 2022-03-22 RX ADMIN — OXYCODONE HYDROCHLORIDE AND ACETAMINOPHEN 2 TABLET: 5; 325 TABLET ORAL at 11:40

## 2022-03-22 RX ADMIN — STANDARDIZED SENNA CONCENTRATE AND DOCUSATE SODIUM 1 TABLET: 8.6; 5 TABLET ORAL at 09:45

## 2022-03-22 RX ADMIN — OXYCODONE HYDROCHLORIDE AND ACETAMINOPHEN 2 TABLET: 5; 325 TABLET ORAL at 15:40

## 2022-03-22 RX ADMIN — AMIODARONE HYDROCHLORIDE 200 MG: 200 TABLET ORAL at 20:26

## 2022-03-22 RX ADMIN — PROPOFOL 15 MCG/KG/MIN: 10 INJECTION, EMULSION INTRAVENOUS at 15:01

## 2022-03-22 RX ADMIN — DEXMEDETOMIDINE HYDROCHLORIDE 0.2 MCG/KG/HR: 4 INJECTION, SOLUTION INTRAVENOUS at 13:28

## 2022-03-22 ASSESSMENT — PULMONARY FUNCTION TESTS
PIF_VALUE: 20
PIF_VALUE: 20
PIF_VALUE: 13
PIF_VALUE: 23
PIF_VALUE: 15
PIF_VALUE: 11

## 2022-03-22 NOTE — PROGRESS NOTES
Dr Fabiola Cullen updated by RN via telephone on pt repeat ABG and hemodynamics. Pt intrinsic HR returned, - NSR, pacer set as backup at rate 60. Pt current SBP 100s with MAP high 60s, epi and levo infusions remain at same rate, and pt current CVP 12. No additional orders recieved at this time; physician requested to relay to night shift RN that he does not want epi or levo any higher in rate than where they are currently (epi 0.04/levo 0.08), should pt BP decrease, telephone him.

## 2022-03-22 NOTE — RT PROTOCOL NOTE
Ventilator Bronchodilator assessment    Post op CABG  History of COPD, cocaine abuse. On home respiratory medications of Spiriva. Pulmonary Consult for bronchodilators while on ventilator. Spiriva on hold. Symbicort discontinued. Dr. Waller Ahr requests to return to [facility] when able. Not on on home oxygen. Will place on Aerosol Protocol and continue to asses and treat. Breath sounds: clear  Inspiratory Pressure: 23  Plateau Pressure: 20    Patient assessed at level 2 per MD request          [x]    Bronchodilator Assessment    BRONCHODILATOR ASSESSMENT SCORE  Score 0 (Home) 1 2 3 4   Breath Sounds   []  Chronic Ventilator: Patient at baseline []  Mild Wheezes/ Clear [x]  Intermittent wheezes with good air entry []  Bilateral/unilateral wheezing with diminished air entry []  Insp/Exp wheeze and/or poor aeration   Ventilator Pressures   []  Chronic Ventilator []  Insp. Pressure less than 25 cm H20 [x]  Insp. Pressure less than 25 cm H20 []  Insp. Pressure exceeds 25 cm H20 []  Insp.  Pressure exceeds 30 cm H20   Plateau Pressure []  NA   [x]  Plateau Pressure less than 4  [x]  Plateau Pressure less than or equal to 5 []  Plateau Pressure greater than or equal to 6 []  Plateau Pressure greater than or equal to 8       VICTORINO ASHTON RCP  10:56 AM

## 2022-03-22 NOTE — PROGRESS NOTES
Updated given to Dr. Mirza Kirkland. No new orders at this time. Will keep team updated of any changes.

## 2022-03-22 NOTE — PROGRESS NOTES
PULMONARY & CRITICAL CARE MEDICINE PROGRESS NOTE     Patient:  Amador Spence  MRN: [id number]  6 [facility] date: 3/11/2022  Primary Care Physician: Tracy Robert MD  Consulting Physician: Johanna Elizabeth MD  CODE Status: Prior  LOS: 11    SUBJECTIVE     CHIEF COMPLAINT/REASON FOR INITIAL CONSULT:    COPD/chronic cough/preoperative evaluation    BRIEF HOSPITAL COURSE:  The patient is a 74 y.o. male history of hypertension, history of mild systolic dysfunction admitted with chest pain initially to [facility] on 03/09/2022 non-ST elevation MI ruled out by troponins in 20s had a stress test done because of high suspicion which shows ischemia of lateral septal wall with ejection fraction of 34%.  He does have history of cocaine use and apparently he had used cocaine before admission to [facility].  He was transferred to [facility] for cardiac catheterization which shows multivessel coronary artery disease with EF of 30% including left main disease.  He remains on a statin and heparin.  CT surgery was consulted for CABG.    Patient had pulmonary function test done which shows FEV1 of 46% with significance positive bronchodilator FEV1 of 56% with severe reduction in diffusion capacity of less than 30% with air trapping/hyperinflation. CT scan of the chest shows areas of blebs and bulla medially and centrilobular and paraseptal emphysema on CT scan.     Patient does complain of shortness of breath on mild activity and exertion.  He does have chronic cough without no change denies increased wheezing currently denies chest pain.  He has history of cocaine use.  Does have history of smoking patient is not able to tell me that how many cigarettes he used to smoke but he claimed that now he smoke 1 pack/week and had been smoking for almost 50 years. INTERVAL HISTORY:  03/22/22  Patient seen in cardiovascular ICU events noted chart seen labs and arterial blood gases seen.   Patient remained intubated overnight he had bradycardia overnight had become more hypotensive required addition of epinephrine because of hypotension and bradycardia per CT surgery and was also on vasopressin. He is on propofol drip 20 mcg and effort to reduce the propofol according to nursing staff results in more agitation and because of hemodynamic instability not on spontaneous breathing trial currently. He was try to wean off of the pressor but back on vasopressin, epi drip and Levophed drip. He is also on insulin drip  He remains on 40% FiO2 with PEEP of 5 endotracheal secretions reported to be mild. Ventilator setting PRVC/16/600/5/40 percent and ABG 7.3 /  His urine output also has been dropping creatinine increased to 1.32 bicarbonate is 17. WBC count is 21.9 hemoglobin 9 and platelet count is 370  Chest x-ray this 2021 shows endotracheal tube possibly at veronique although endotracheal tube is at the same 23 cm barbara. Cardiomegaly left basilar atelectasis and mild bilateral increased interstitial marking with right basilar atelectasis.       REVIEW OF SYSTEMS:  Unobtainable from patient due to sedation/mechanical ventilation    OBJECTIVE     Ventilator Settings:  Vent Information  $Ventilation: $Subsequent Day  Vent Type: Servo i  Vent Mode: (S) SIMV/PRVC  Vt Ordered: 600 mL  Rate Set: 16 bmp  Pressure Support: (S) 8 cmH20  FiO2 : 40 %  SpO2: 98 %  SpO2/FiO2 ratio: 245  Sensitivity: 5  PEEP/CPAP: 5  I Time/ I Time %: 0.9 s  Humidification Source: HME  Nitric Oxide/Epoprostenol In Use?: No    VITAL SIGNS:   LAST-  BP 96/72   Pulse 88   Temp 97 °F (36.1 °C)   Resp 19   Ht 6' 3\" (1.905 m)   Wt 156 lb 15.5 oz (71.2 kg)   SpO2 98%   BMI 19.62 kg/m²   8-24 HR RANGE-  TEMP Temp  Av °F (36.1 °C)  Min: 96.4 °F (35.8 °C)  Max: 97.5 °F (93.2 °C)   BP Systolic (63BFF), RLW:618 , Min:66 , LEE:153      Diastolic (65MPI), SKS:29, Min:50, Max:84     PULSE Pulse  Av.3  Min: 42  Max: 102   RR Resp  Av.4 Min: 13  Max: 26   O2 SAT SpO2  Av.1 %  Min: 93 %  Max: 100 %   OXYGEN DELIVERY No data recorded     SYSTEMIC EXAMINATION:   General appearance - Mechanically ventilated, chronically ill-appearing  Mental status - sedated on propofol drip respond to name intermittently by eyes opening did not follow commands  Eyes - pupils equal and reactive sluggish, sclera anicteric  Mouth -oral endotracheal tube present  Neck - supple, no significant adenopathy, carotids upstroke normal bilaterally, no bruits  Chest - Breath sounds bilaterally were dimnished to auscultation at bases. There were scattered rhonchi present with distant breath sound slightly diminished breath sounds on left as compared to right. Heart -positive blood present normal rate, regular rhythm, normal S1, S2, no murmurs, clicks or gallops  Abdomen - soft, nontender, nondistended, no masses or organomegaly  Neurological - DTR's decreased and plantars upgoing, spontaneously moves did not follow command.   Extremities - peripheral pulses normal, no pedal edema, no clubbing or cyanosis  Skin - normal coloration and turgor, no rashes, no suspicious skin lesions noted     DATA REVIEW     Medications: Current Inpatient  Scheduled Meds:   ipratropium-albuterol  1 ampule Inhalation Q6H    sodium chloride flush  10 mL IntraVENous 2 times per day    aspirin  81 mg Oral Daily    clopidogrel  75 mg Oral Daily    amiodarone  200 mg Oral TID    mupirocin   Nasal BID    sennosides-docusate sodium  1 tablet Oral BID    [Held by provider] metoprolol tartrate  25 mg Oral BID    atorvastatin  80 mg Oral Nightly    pantoprazole  40 mg Oral Daily    ceFAZolin (ANCEF) IVPB  2,000 mg IntraVENous Q8H    insulin glargine  0.15 Units/kg SubCUTAneous Nightly    vancomycin  1,000 mg IntraVENous Q12H    [Held by provider] tiotropium  2 puff Inhalation Daily    tamsulosin  0.4 mg Oral Daily     Continuous Infusions:   dexmedetomidine 0.2 mcg/kg/hr (22 1330)  sodium chloride      propofol Stopped (03/22/22 1326)    norepinephrine 0.04 mcg/kg/min (03/22/22 1330)    EPINEPHrine 0.04 mcg/kg/min (03/22/22 1330)    insulin 0.12 Units/hr (03/22/22 1339)    dextrose      vasopressin (Septic Shock) infusion 0.03 Units/min (03/22/22 1330)    sodium chloride      sodium chloride       INPUT/OUTPUT:  In: 6630.5 [I.V.:5134.6; Blood:500; NG/GT:100]  Out: 3744 [Urine:3230]  Date 03/22/22 0000 - 03/22/22 2359   Shift 1988-5233 0768-3319 2978-7584 24 Hour Total   INTAKE   I.V.(mL/kg) 1772. 3(24.9) 236.6(3.3)  7889.4(94.8)   IV Piggyback(mL/kg) 50(0.7) 446. 6(6.3)  496.6(7)   Shift Total(mL/kg) 4481. 3(25.6) 683. 2(9.6)  2505. 5(35.2)   OUTPUT   Urine(mL/kg/hr) 625(1.1) 90  715   Emesis/NG output(mL/kg) 300(4.2)   300(4.2)   Chest Tube 350 90  440   Shift Total(mL/kg) 1275(17.9) 180(2.5)  1455(20.4)   Weight (kg) 71.2 71.2 71.2 71.2       LABS:-  ABG:   Recent Labs     03/21/22  1748 03/21/22  1903 03/21/22  2249 03/22/22  0305 03/22/22  0638   POCPH 7.344* 7.325* 7.279* 7.327* 7.366   POCPCO2 34.0* 44.0 35.8 35.8 41.8   POCPO2 80.9* 88.2 70.5* 85.7 91.1   POCHCO3 18.5* 23.0 16.8* 18.7* 24.0   NKSQ5VZN 95 96 92* 96 97     CBC:   Recent Labs     03/21/22  0722 03/21/22  0722 03/21/22  1313 03/21/22  1313 03/21/22  1800 03/21/22 2250 03/22/22 0308   WBC 7.8   < > 4.1  --   --  19.1* 21.9*   HGB 11.9*   < > 8.6*   < > 6.6* 8.4* 9.0*   HCT 40.0*   < > 28.2*   < > 22.0* 27.5* 29.1*   MCV 90.3   < > 89.8  --   --  91.1 90.7      < > See Reflexed IPF Result  --   --  See Reflexed IPF Result 147   LYMPHOPCT 38  --   --   --   --   --   --    RBC 4.43   < > 3.14*  --   --  3.02* 3.21*   MCH 26.9   < > 27.4  --   --  27.8 28.0   MCHC 29.8   < > 30.5  --   --  30.5 30.9   RDW 15.9*   < > 15.9*  --   --  16.2* 16.1*    < > = values in this interval not displayed.      BMP:   Recent Labs     03/21/22  1313 03/21/22  1313 03/21/22  1314 03/21/22  1800 03/21/22 2250 03/22/22 0308 03/22/22  0638   *  --   --   --  148* 146*  --    K 3.9  --    < > 4.1 4.6 4.3  --      --   --   --  112* 109*  --    CO2 20  --   --   --  15* 17*  --    BUN 14  --   --   --  15 14  --    CREATININE 0.76   < >   < >  --  1.26* 1.32* 1.24*   GLUCOSE 90  --   --   --  165* 203*  --     < > = values in this interval not displayed. Liver Function Test:   No results for input(s): PROT, LABALBU, ALT, AST, GGT, ALKPHOS, BILITOT in the last 72 hours. Amylase/Lipase:  No results for input(s): AMYLASE, LIPASE in the last 72 hours. Coagulation Profile:   Recent Labs     03/20/22  0958 03/21/22  0722 03/21/22  1313 03/22/22  0308   INR  --   --  1.5 1.5   PROTIME  --   --  15.6* 15.7*   APTT 54.1* 45.1* 27.7  --      Cardiac Enzymes:  No results for input(s): CKTOTAL, CKMB, CKMBINDEX, TROPONINI in the last 72 hours. Lactic Acid:  Lab Results   Component Value Date    LACTA NOT REPORTED 03/11/2018    LACTA NOT REPORTED 03/10/2018    LACTA NOT REPORTED 03/10/2018     BNP:   Lab Results   Component Value Date    BNP 70 09/13/2013     D-Dimer:  No results found for: DDIMER  Others:   No results found for: TSH, U2NTUZG, B0YSYIZ, THYROIDAB, FT3, T4FREE  Lab Results   Component Value Date    SEDRATE 20 (H) 05/21/2014    CRP 15.2 (H) 05/21/2014     No results found for: Ted Danker  No results found for: IRON, TIBC, FERRITIN  No results found for: SPEP, UPEP  Lab Results   Component Value Date    PSA 1.11 11/04/2016       Microbiology:  No results for input(s): SPECDESC, SPECDESC, SPECIAL, CULTURE, CULTURE, STATUS, ORG, CDIFFTOXPCR, CAMPYLOBPCR, SALMONELLAPC, SHIGAPCR, SHIGELLAPCR, MPNEUG, MPNEUM, LACTOQL in the last 72 hours. Pathology:    Radiology Reports:  XR CHEST PORTABLE   Preliminary Result   1. Endotracheal tube is low in position, approximately 1 cm above the   veronique. This should be pulled back approximately 3 cm. 2.  Other lines and tubes are unchanged in position.       3.  New small right pleural effusion. 4.  Unchanged left basilar opacity. XR CHEST PORTABLE   Final Result   1. Lines and tubes are unchanged in position. 2. Linear bibasilar opacities are similar prior study, atelectasis versus   pneumonia. XR CHEST PORTABLE   Final Result   All tubes and catheters are in good position. No evidence for pneumothorax. Bilateral basal atelectasis noted. VL DUP UPPER EXTREMITY ARTERIES BILATERAL   Final Result      VL Vein Mapping Lower Bilateral   Final Result      VL DUP CAROTID BILATERAL   Final Result      CT CHEST WO CONTRAST   Final Result   1. Extensive centrilobular and paraseptal emphysema. 2. Extensive atherosclerotic disease, particularly along the coronary   arteries. 3. There are areas of atelectasis in the lingula. There is collapse of the   right middle lobe   4. No focal lung infiltrate. XR CHEST PORTABLE    (Results Pending)   XR CHEST PORTABLE    (Results Pending)       Echocardiogram:   Results for orders placed during the hospital encounter of 03/11/22    Echocardiogram Complete 2D w Doppler w Color    Narrative  Transthoracic Echocardiography Report (TTE)    Patient Name Imer Mccann       Date of Study           03/13/2022  [place]    Date of      1955  Gender                  Male  Birth    Age          74 year(s)  Race                    Black    Room Number  2107        Height:                 75 inch, 190.5 cm    Corporate ID [id number]    Weight:                 136 pounds, 61.7 kg  #    Patient Acct [de-identified]   BSA:        1.86 m^2    BMI:        17 kg/m^2  #    MR #         [id number]      Sonographer             Reyes Marrero    Accession #  [id number]  Interpreting Physician  Vinh Chau    Fellow                   Referring Nurse  Practitioner    Interpreting             Referring Physician     Rolf Hayes,  Fellow                                           APRN-NP    Type of Study    TTE procedure:2D Echocardiogram, M-Mode, Doppler, Color Doppler. Procedure Date  Date: 03/13/2022 Start: 12:17 PM    Study Location: [facility]  Technical Quality: Fair visualization    Indications:Multi vessel CAD and Pre-Op CABG. History / Tech. Comments:  Procedure explained to patient. Study done at the bedside. STAT call in    Patient Status: Inpatient    Height: 75 inches Weight: 136 pounds BSA: 1.86 m^2 BMI: 17 kg/m^2    Allergies  - *No Known Allergies. CONCLUSIONS    Summary  Global left ventricular systolic function is normal. Estimated EF 35-28%  Normal diastolic filling. Normal right ventricular size and function. Mild mitral regurgitation. Mild tricuspid regurgitation. Mild pulmonary hypertension with an estimated right ventricular systolic  pressure of 41 mmHg. No pericardial effusion. Signature  ----------------------------------------------------------------------------  Electronically signed by Vesna Sagastume(Sonographer) on 03/13/2022  12:51 PM  ----------------------------------------------------------------------------    ----------------------------------------------------------------------------  Electronically signed by Vinh Chau(Interpreting physician) on  03/14/2022 09:19 AM  ----------------------------------------------------------------------------  FINDINGS  Left Atrium  Left atrium is normal in size. Left Ventricle  Left ventricle is normal in size. Global left ventricular systolic function  is normal. Calculated EF via Parks's method is 48 %. Mild septal hypertrophy. Normal diastolic filling. Right Atrium  Right atrium is normal in size. Right Ventricle  Normal right ventricular size and function. Mitral Valve  Normal mitral valve structure. Mild mitral regurgitation. No mitral stenosis. Aortic Valve  Aortic valve structure and function normal.  Aortic valve is trileaflet. No aortic insufficiency. No aortic stenosis.   Tricuspid Valve  Normal tricuspid valve leaflets. Mild tricuspid regurgitation. No tricuspid stenosis. Mild pulmonary hypertension with an estimated right ventricular systolic  pressure of 41 mmHg. Pulmonic Valve  Pulmonic valve is normal in structure and function. No pulmonic insufficiency. No evidence of pulmonic stenosis. Pericardial Effusion  No pericardial effusion. Miscellaneous  Normal aortic root dimension. E/E' average = 11.0. IVC normal diameter & inspiratory collapse indicating normal RA filling  pressure .     M-mode / 2D Measurements & Calculations:    LVIDd:4.9 cm(3.7 - 5.6 cm)       Diastolic NDOWON:55.64 ml  LVIDs:3.8 cm(2.2 - 4.0 cm)       Systolic TBSXOH:49.67 ml  IVSd:1.2 cm(0.6 - 1.1 cm)        Aortic Root:3.3 cm(2.0 - 3.7 cm)  LVPWd:0.8 cm(0.6 - 1.1 cm)       LA Dimension: 2.9 cm(1.9 - 4.0 cm)  Fractional Shortenin.45 %    LA volume/Index: 35.47 ml /19m^2  Calculated LVEF (%): 49.08 %     LVOT:2.2 cm  RVDd:3.5 cm    Mitral:                                 Aortic    Valve Area (P1/2-Time): 2.78 cm^2       Peak Velocity: 1.26 m/s  Peak E-Wave: 0.87 m/s                   Mean Velocity: 0.84 m/s  Peak A-Wave: 0.67 m/s                   Peak Gradient: 6.35 mmHg  E/A Ratio: 1.29                         Mean Gradient: 4 mmHg  Peak Gradient: 2.99 mmHg  Mean Gradient: 1 mmHg  Deceleration Time: 258 msec             Area (continuity): 2.33 cm^2  P1/2t: 79 msec                          AV VTI: 26.9 cm    Area (continuity): 3.32 cm^2  Mean Velocity: 0.49 m/s    Tricuspid:                              Pulmonic:    Estimated RVSP: 41 mmHg                 Peak Velocity: 0.71 m/s  Peak TR Velocity: 3.00 m/s              Peak Gradient: 1.99 mmHg  Peak TR Gradient: 36 mmHg  Estimated RA Pressure: 5 mmHg    Estimated PASP: 41 mmHg    Diastology / Tissue Doppler  Septal Wall E' velocity:0.06 m/s  Septal Wall E/E':14.7  Lateral Wall E' velocity:0.12 m/s  Lateral Wall E/E':7.3      Cardiac Catheterization:   No results found for this or any previous visit. ASSESSMENT AND PLAN     Assessment:    MV CAD, Plan for CABG   Bradycardia  Sinus pause lasting for 4.7 seconds 3/17/2022  COPD stage II on PFTs with severe reduction diffusion capacity  Centrilobular and paraseptal emphysema  Chronic smoker  Cocaine use  Chronic combined diastolic and systolic CHF, EF 87%.  Previously 50-55%  Mild pulmonary hypertension  Right hip arthritis  Essential hypertension  CKD stage III  Leukocytosis    Plan:    I personally interviewed/examined the patient; reviewed interval history, interpreted all available radiographic and laboratory data at the time of service. Patient currently sedated with propofol drip. Agree with change to Precedex drip and wean off propofol drip but need to monitor heart rate as he was bradycardic. Patient is currently on Levophed, epinephrine drip and vasopressin drip per CT surgery  Ventilator setting PRVC/16/600/5/40 percent. Wean FiO2 to keep saturation above 92%. Once hemodynamic improve and low-dose of pressors will start spontaneous breathing trial  Continue antiplatelet, anticoagulation, statins, and beta blockers as per cardiology/CT surgery  Continue lung protective mechanical ventilation, appropriate changes made in ventilator settings  Chest tube management as per CT surgery  On amiodarone, statin, aspirin, Plavix. Continue pulmonary toilet, aspiration precautions and bronchodilators  Continue with DuoNeb aerosol  Diuresis per cardiology/CT surgery postoperatively  Continue to monitor I/O with a goal of even fluid balance  Continue to monitor CBC, coagulation profile, transfuse as indicated  Chemical DVT prophylaxis postoperatively when okay with CT surgery  Antimicrobials reviewed; currently not on vancomycin per CT surgery  Will request respiratory culture.   Glycemic control appropriate on Lantus  Physical/occupational therapy    Discussed with nursing staff, treatment and plan discussed  Discussed with respiratory therapist pain      The patient is/remains critically ill with illness/injury that acutely impairs one or more vital organ systems, such that there is a high probability of imminent or life threatening deterioration in the patient's condition. Critical care time of greater than 35 minutes was spent (excluding procedures), in coordination of care during bedside rounds and discussion of patient care in detail, and recommendations of the team were adopted in the plan. Necessity of all invasive devices was also confirmed. Jene Simmonds, MD.   Pulmonary and Critical Care Medicine           3/22/2022, 2:06 PM    Please note that this chart was generated using voice recognition Dragon dictation software. Although every effort was made to ensure the accuracy of this automated transcription, some errors in transcription may have occurred.

## 2022-03-22 NOTE — PROGRESS NOTES
Call placed to Dr. Regina Lester regarding patient increased pressor support and low BP. Previous interventions from off-going Rn and writer unsuccessful in bringing BP up. Current Vital signs given to Dr. Regina Lester, Verbal orders received from MD for additional pressor. . will keep MD updated on patient status.  Orders placed for STAT

## 2022-03-22 NOTE — PROGRESS NOTES
Dr Sloane Jin updated via telephone by RN on pt status after previous orders provided carried out. Pt continues VVI pacing at rate of 80 with underlying HR 40s-CHB, SBP 90s with MAP low 60s, CVP 23, and currently on 0.04mcg/kg/min of epi and 0.08mcg/kg/min of levo. Labs still pending. Surgeon ok with above, continue plan of care, no additional orders received at this time. Will update physician again here shortly.

## 2022-03-22 NOTE — PLAN OF CARE
Problem: OXYGENATION/RESPIRATORY FUNCTION  Goal: Patient will maintain patent airway  3/22/2022 1054 by Marianne Jacobs RCP  Outcome: Ongoing     Problem: OXYGENATION/RESPIRATORY FUNCTION  Goal: Patient will achieve/maintain normal respiratory rate/effort  Description: Respiratory rate and effort will be within normal limits for the patient  3/22/2022 1054 by Marianne Jacobs RCP  Outcome: Ongoing  Note:   PROVIDE ADEQUATE OXYGENATION WITH ACCEPTABLE SP02/ABG'S    [x]  IDENTIFY APPROPRIATE OXYGEN THERAPY  [x]   MONITOR SP02/ABG'S AS NEEDED   [x]   PATIENT EDUCATION AS NEEDED        Problem: MECHANICAL VENTILATION  Goal: Patient will maintain patent airway  3/22/2022 1054 by Marianne Jacobs RCP  Outcome: Ongoing  Note: MECHANICAL VENTILATION     [x]  PROVIDE OPTIMAL VENTILATION  [x]   ASSESS FOR EXTUBATION READINESS  [x]   ASSESS FOR WEANING READINESS  [x]  EXTUBATE AS TOLERATED  [x]  IMPLEMENT ADULT MECHANICAL VENTILATION PROTOCOL  [x]  MAINTAIN ADEQUATE OXYGENATION  [x]  PERFORM SPONTANEOUS WEANING TRIAL AS TOLERATED       Problem: MECHANICAL VENTILATION  Goal: Oral health is maintained or improved  3/22/2022 1054 by Marianne Jacobs RCP  Outcome: Ongoing     Problem: MECHANICAL VENTILATION  Goal: ET tube will be managed safely  3/22/2022 1054 by Marianne Jacobs RCP  Outcome: Ongoing     Problem: MECHANICAL VENTILATION  Goal: Ability to express needs and understand communication  3/22/2022 1054 by Marianne Jacobs RCP  Outcome: Ongoing     Problem: SKIN INTEGRITY  Goal: Skin integrity is maintained or improved  3/22/2022 1054 by Marianne Jacobs RCP  Outcome: Ongoing

## 2022-03-22 NOTE — PROGRESS NOTES
Pt arrival to room 1007 from CV[facility] via bed S/P CABG x 2 with Dr Rabia Jain at this time. Pt accompanied to unit from [place] [id number] by 2 RNs, perfusionist, CRNA, SRNA and anesthesiologist. Pt placed on monitor, all invasive lines connected, and rates of medications verified. Report received from [address] via telephone prior to pt arrival on unit, as well as bedside at from CRNA upon arrival to unit. All questions answered and concerns addressed.

## 2022-03-22 NOTE — PROGRESS NOTES
Homer Arango NP with CTS at bedside for update on pt status and sequence events post OR through this morning. Reviewed pt morning labs, I/Os, rates of pressors infusing, current hemodynamic status. Noted pt decreased urine output and increased BUN/Creat on labs. Will attempt CPAP and extubation as pt tolerates as that will presumably be DiBardino's plan. Orders received for 25g 25% albumin and restraint renewal, will attempt to wean pressors as BP tolerates.

## 2022-03-22 NOTE — CARE COORDINATION
Spoke with pt's daughter, Lucy Power, transition plans discussed and per Damari Calzada, pt would likely need to \"go somewhere\" for rehab as no one is available to assist him 24/7 at home. Writer discussed that pt would be evaluated by therapy, when able. Writer provided hospital and insurance SNF lists, need choices.

## 2022-03-22 NOTE — PROGRESS NOTES
[facility]  Daily Progress Note    Surgeon: Dr. Randy Louie   S/P : CABG X 2   POD#: 1  EF: 45%     Subjective:  Mr. Porsche Buchanan remains sedated and on vent. Patient requires continued pressure support. Physical Exam  Vital Signs: /72   Pulse 94   Temp 97 °F (36.1 °C)   Resp 15   Ht 6' 3\" (1.905 m)   Wt 156 lb 15.5 oz (71.2 kg)   SpO2 98%   BMI 19.62 kg/m²  O2 Flow Rate (L/min): 0 L/min   Admit Weight: Weight: 141 lb 5 oz (64.1 kg)   WEIGHTWeight: 156 lb 15.5 oz (71.2 kg)     General: Sedated and on vent. Heart: Normal S1 and S2.  Regular rhythm. No murmurs, gallops, or rubs. Pacing Wires: Yes -To be clipped prior to discharge  Lungs: clear to auscultation bilaterally and diminished breath sounds bibasilar Chest tubes: Yes, Air Leak: No  Abdomen: soft, non tender, non distended, BS x4  Extremities: negative  Wounds: clean and dry, healing appropriately.      Sternum: Healing  SVG sites: Healing    Scheduled Meds:    albumin human  25 g IntraVENous Once    sodium chloride flush  10 mL IntraVENous 2 times per day    aspirin  81 mg Oral Daily    clopidogrel  75 mg Oral Daily    amiodarone  200 mg Oral TID    mupirocin   Nasal BID    sennosides-docusate sodium  1 tablet Oral BID    [Held by provider] metoprolol tartrate  25 mg Oral BID    atorvastatin  80 mg Oral Nightly    pantoprazole  40 mg Oral Daily    pantoprazole (PROTONIX) 40 mg injection  40 mg IntraVENous Daily    ceFAZolin (ANCEF) IVPB  2,000 mg IntraVENous Q8H    ipratropium-albuterol  1 ampule Inhalation Q4H WA    insulin glargine  0.15 Units/kg SubCUTAneous Nightly    vancomycin  1,000 mg IntraVENous Q12H    budesonide-formoterol  2 puff Inhalation BID    [Held by provider] tiotropium  2 puff Inhalation Daily    tamsulosin  0.4 mg Oral Daily     Continuous Infusions:    sodium chloride      propofol 35 mcg/kg/min (03/22/22 0610)    norepinephrine 0.04 mcg/kg/min (03/22/22 0610)    EPINEPHrine 0.04 mcg/kg/min (03/22/22 0610)    insulin 1.6 Units/hr (03/22/22 0700)    dextrose      vasopressin (Septic Shock) infusion 0.04 Units/min (03/22/22 0610)    sodium chloride      sodium chloride         Data:  CBC:   Recent Labs     03/21/22  1313 03/21/22  1313 03/21/22  1800 03/21/22  2250 03/22/22  0308   WBC 4.1  --   --  19.1* 21.9*   HGB 8.6*   < > 6.6* 8.4* 9.0*   HCT 28.2*   < > 22.0* 27.5* 29.1*   MCV 89.8  --   --  91.1 90.7   PLT See Reflexed IPF Result  --   --  See Reflexed IPF Result 147    < > = values in this interval not displayed. BMP:   Recent Labs     03/21/22  1313 03/21/22  1313 03/21/22  1314 03/21/22  1800 03/21/22 2250 03/22/22  0308 03/22/22  0638   *  --   --   --  148* 146*  --    K 3.9  --    < > 4.1 4.6 4.3  --      --   --   --  112* 109*  --    CO2 20  --   --   --  15* 17*  --    BUN 14  --   --   --  15 14  --    CREATININE 0.76   < >   < >  --  1.26* 1.32* 1.24*    < > = values in this interval not displayed. PT/INR:   Recent Labs     03/21/22  1313 03/22/22  0308   PROTIME 15.6* 15.7*   INR 1.5 1.5     APTT:   Recent Labs     03/20/22  0958 03/21/22  0722 03/21/22 1313   APTT 54.1* 45.1* 27.7       Chest X-Ray: Image reviewed. Awaiting official report. I/O:  I/O last 3 completed shifts: In: 6003.8 [I.V.:4954.5; Blood:500; NG/GT:100; IV Piggyback:449.3]  Out: 9338 [Urine:4340; Emesis/NG output:300; Blood:1050; Chest Tube:790]    Assessment/Plan:      Diagnosis Date    3-vessel CAD 3/11/2022    Alcohol abuse 6/23/2015    Arthritis     Chronic kidney disease     Full dentures     upper and lower full    Hypertension     Pure hypercholesterolemia 27/3/8124    Systolic CHF ([address]     Wears glasses       Neuro: Sedated and on vent   Lungs: Diminished in the bases   HD: On Epi, Levo and vaso.   MAP 71   Edema: None noted   GI: Active bowel sounds X4   : Good urine output    Beta-Blocker: yes- with holding parameters  ASA: yes  Plavix: yes  GI: yes  Statin: yes  Coumadin: no  ACE-I: no  EF: 45%     Oxygen as needed to maintain SpO2 > 92%  o Wean as tolerated  o Pulmonology already following- can assist with extubation   Chest x-ray daily   Give albumin X 1   Wean off vasopressors as tolerated   Continue on back up pacemaker at a rate of 60 BPM  o May evaluate switching sedated over to precedex pending heart rate   Keep Chest Tubes to wall suction   Encourage incentive spirometry, acapella and ambulation    Replace electrolytes as needed per sliding scale and recheck per policy   Case Management consult for discharge planning      The above recommendations including medications and orders were discussed and agreed upon with Dr. Sophie Earl, the attending on service for the cardiothoracic surgery group today. Electronically signed by JACKELYN Russo CNP on 3/22/2022 at 7:42 AM    On this date 3/22/2022 I have spent 35 minutes reviewing previous notes, test results and face to face with the patient discussing the diagnosis and importance of compliance with the treatment plan as well as documenting on the day of the visit. At least 50% of the time documented was spent with the patient to provide counseling and/or coordination of care. This note was created with the assistance of a speech-recognition program.  Although the intention is to generate a document that actually reflects the content of the visit, no guarantees can be provided that every mistake has been identified and corrected by editing. Note was updated later by me after  physical examination and  completion of the assessment.

## 2022-03-22 NOTE — PROGRESS NOTES
Dr. Tatiana Pineda updated on patient current vitals, vasopressin getting started and current urine output. vasopressin started at physician prescribed rate. Per Dr. Tatiana Pineda start vaso and then wean other pressors as tolerated.

## 2022-03-22 NOTE — PROGRESS NOTES
Pt had remained fairly hemodynamically stable for throughout the morning, despite still requiring 3 low dose pressors; after albumin infusion, RN was able to wean pressor support some. Pt was noted to have vasovagal response to ETT suctioning with subsequent drop in BP, but would recover within few minutes. Respiratory status has remained and continued to remain stable; ABG WDL this am, SpO2 maintaining greater than 95% at FiO2 40% Peep 5. Propofol has been slowly titrated down to allow pt to be more alert for vent weaning. At 1115ish pt was more alert/following commands, just noted to be slightly anxious, however maintaining BP. Decision made at 1145 to attempt vent wean, pt changed from SIMV (PC) PS to CPAP. Pt tolerated initially, with in 30 min pt became more agitated, thrashing head side to side, was un consolable , RR increased to 30s, and SBP continued to decline with SBP 70s MAPs 50s requiring pressor support to be increased back to initial rates at start of shift. At 1215 pt was placed back on propofol and full ventilator support; allowing him to rest and re stabilize hemodynamically.

## 2022-03-22 NOTE — PROGRESS NOTES
Dr Gerber Score at bedside for morning rounds. RN updated physician on pt status, reviewed pt morning labs, I/Os (chest tube, urine out put, CVP in 14-18), rates of pressors infusing (pt remains on epi/levo/vaso), and current hemodynamic status (sbp 90s, MAP 60s). Noted pt decreased urine output and increased BUN/Creat on labs. No additional orders received at this time; surgeon requests to attempt to CPAP and extubate around lunch time. RN will do so as pt tolerates and update care team as needed.

## 2022-03-22 NOTE — PROGRESS NOTES
Occupational [address]  Occupational Therapy Not Seen Note    DATE: 3/22/2022    NAME: Homero Bailey  MRN: [id number]   : 1955      Patient not seen this date for Occupational Therapy due to:    Patient is not appropriate for active participation in OT evaluation/treatment at this time d/t intubation, RN states likely will be a tomorrow checkback before medically appopriate for OOB activity. Next Scheduled Treatment: Attempt on 3/23 as appropriate.     Electronically signed by Davey Leach OT on 3/22/2022 at 1:02 PM

## 2022-03-22 NOTE — PROGRESS NOTES
[facility]  Progress Note                   Date:   3/22/2022  Patient name: Estrella Cummins  Date of admission:  3/11/2022  7:15 PM  MRN:   [id number]  YOB: 1955  PCP: Hiren Reese MD    Reason for Admission: Chest pain [R07.9]  S/P cardiac catheterization [Z98.890]  3-vessel CAD [I25.10]    Subjective:       Clinical Changes /Abnormalities:   POD1 CABG. Intubated, sedated  On epi, vaso and levophed  Sinus rhythm  CVP 14  Overnight 1 unit prbc transfused for Hb <7      Medications:   Scheduled Meds:   sodium chloride flush  10 mL IntraVENous 2 times per day    aspirin  81 mg Oral Daily    clopidogrel  75 mg Oral Daily    amiodarone  200 mg Oral TID    mupirocin   Nasal BID    sennosides-docusate sodium  1 tablet Oral BID    metoprolol tartrate  25 mg Oral BID    atorvastatin  80 mg Oral Nightly    pantoprazole  40 mg Oral Daily    pantoprazole (PROTONIX) 40 mg injection  40 mg IntraVENous Daily    ceFAZolin (ANCEF) IVPB  2,000 mg IntraVENous Q8H    ipratropium-albuterol  1 ampule Inhalation Q4H WA    insulin glargine  0.15 Units/kg SubCUTAneous Nightly    vancomycin  1,000 mg IntraVENous Q12H    budesonide-formoterol  2 puff Inhalation BID    [Held by provider] tiotropium  2 puff Inhalation Daily    tamsulosin  0.4 mg Oral Daily     Continuous Infusions:   sodium chloride      propofol 35 mcg/kg/min (03/22/22 0610)    norepinephrine 0.04 mcg/kg/min (03/22/22 0610)    EPINEPHrine 0.04 mcg/kg/min (03/22/22 0610)    insulin 1.6 Units/hr (03/22/22 0700)    dextrose      vasopressin (Septic Shock) infusion 0.04 Units/min (03/22/22 0610)    sodium chloride      sodium chloride       CBC:   Recent Labs     03/21/22  1313 03/21/22  1313 03/21/22  1800 03/21/22  2250 03/22/22  0308   WBC 4.1  --   --  19.1* 21.9*   HGB 8.6*   < > 6.6* 8.4* 9.0*   PLT See Reflexed IPF Result  --   --  See Reflexed IPF Result 147    < > = values in this interval not displayed.      BMP:    Recent Labs 03/21/22  1313 03/21/22  1313 03/21/22  1314 03/21/22  1800 03/21/22  2250 03/22/22  0308 03/22/22  0638   *  --   --   --  148* 146*  --    K 3.9  --    < > 4.1 4.6 4.3  --      --   --   --  112* 109*  --    CO2 20  --   --   --  15* 17*  --    BUN 14  --   --   --  15 14  --    CREATININE 0.76   < >   < >  --  1.26* 1.32* 1.24*   GLUCOSE 90  --   --   --  165* 203*  --     < > = values in this interval not displayed. Hepatic:  No results for input(s): AST, ALT, ALB, BILITOT, ALKPHOS in the last 72 hours. Troponin:   No results for input(s): TROPHS in the last 72 hours. BNP: No results for input(s): BNP in the last 72 hours. Lipids:   No results for input(s): CHOL, HDL in the last 72 hours. Invalid input(s): LDLCALCU  INR:   Recent Labs     03/21/22  1313 03/22/22  0308   INR 1.5 1.5         3/11/2022- cardiac cath     Findings:   Angiographic Findings        Cardiac Arteries and Lesion Findings       LMCA: has heavy calcification with mid 60% stenosis. LAD: has heavy calcification in the proximal segment with 40% stenosis. The   mid segment has 85% stenosis. LCx: has heavy calcification with ostial 99% stenosis. The OM1 has 100%   occlusion with left to left collaterals. The OM2 has 100% occlusion with   left to left collaterals. The LPDA is large and is normal.     RCA: is a non-dominant vessel and has proximal 90% stenosis. Coronary Tree        Dominance: Right       LV Analysis   LV function assessed as:Abnormal.   Ejection Fraction   +----------------------------------------------------------------------+---+   ! Method                                                                ! EF%! +----------------------------------------------------------------------+---+   ! LV gram                                                               !30 !   +----------------------------------------------------------------------+---+       Procedure Summary        Three vessel disease with left main involvement. Moderate LV systolic dysfunction. LVEF 30%. Recommendations        Medical therapy as needed. Risk factor modification. CABG evaluation by CT surgery. Nuclear Stress 3/10/22:  Impression       Significant stress-induced ischemia in the lateral wall and septum. Infarct in the apex encroaching into the apical inferior wall and also in the   basal inferior wall. LVEF 34%       Risk stratification: High risk. Echo 3/13//2022     Summary  Global left ventricular systolic function is normal. Estimated EF 10-18%  Normal diastolic filling. Normal right ventricular size and function. Mild mitral regurgitation. Mild tricuspid regurgitation. Mild pulmonary hypertension with an estimated right ventricular systolic  pressure of 41 mmHg. No pericardial effusion. Signature  ----------------------------------------------------------------------------   Electronically signed by Vesna Pastrana(Sonographer) on 03/13/2022   12:51 PM  ----------------------------------------------------------------------------     ----------------------------------------------------------------------------   Electronically signed by Vinh Chau(Interpreting physician) on   03/14/2022 09:19 AM    Objective:   Vitals: /72   Pulse 94   Temp 97 °F (36.1 °C)   Resp 15   Ht 6' 3\" (1.905 m)   Wt 156 lb 15.5 oz (71.2 kg)   SpO2 98%   BMI 19.62 kg/m²   General appearance: alert and cooperative with exam  HEENT: Head: Normocephalic, no lesions, without obvious abnormality. Neck:no JVD, trachea midline, no adenopathy  Lungs: Clear to auscultation throughout. RA without distress  Heart: Regular rate and rhythm, s1/s2 auscultated, no murmurs.  SB   Abdomen: soft, non-tender, bowel sounds active  Extremities: no edema  Neurologic: not done        Assessment / Acute Cardiac Problems:   - Chest pain and Abnormal stress test 3/10/22- ischemia in lateral and septal walls- with EF 34%  - Preserved EF on echo  - MV CAD s/p CABGx2 on 3/21/22 SLIMA to LAD, RSVG1 to OM, confirmed NO PDA (no RPDA or LPDA, diminutive non dominant RCA)   - HTN  - DL  - ARABELLA  - cocaine use      Plan of Treatment:   Wean pressors as tolerated. Monitor H/H. S/p 1 unit prbc overnight. Monitor renal function and urine output. If continues to worsen consider nephrology input  Continue amio, asa, plavix, statin. Hold lopressor while on pressors. Post op care per CTS. Vent management per CTS. Electronically signed by Teressa Velez MD on 3/22/2022 at 7:35 AM  [address]  [phone]      Attending Physician Statement  I have discussed the case of Toi Montalvo including pertinent history and exam findings with the resident. I have seen and examined the patient and the key elements of the encounter have been performed by me. I agree with the assessment, plan and orders as documented by the resident With changes made to the note.      Electronically signed by Marylee Borg, MD on 3/22/2022 at 2:30 PM.    [facility]      [phone]

## 2022-03-22 NOTE — OP NOTE
[facility]                                OPERATIVE REPORT    PATIENT NAME: Lazaro Wilson                      :        1955  MED REC NO:   [id number]                             ROOM:       1450  ACCOUNT NO:   [de-identified]                           ADMIT DATE: 2022  PROVIDER:     Silvestre Narayan MD    DATE OF PROCEDURE:  2022    PRIMARY ATTENDING SURGEON:  Silvestre Narayan MD    OTHER ASSISTANT:  Included Adamaris Magallanes. Guerline Carmen RN, RFA    PREOPERATIVE DIAGNOSES:  Multivessel coronary artery disease status post  non-STEMI event, CHF, volume overload, substance abuse disorder  including cocaine use, recent cocaine use. POSTOPERATIVE DIAGNOSES:  Multivessel coronary artery disease status  post non-STEMI event, CHF, volume overload, substance abuse disorder  including cocaine use, recent cocaine use. OPERATIONS PERFORMED:  Median sternotomy, aorto-right atrial  cardiopulmonary bypass, MELI, endoscopic vein harvest, CABG x2 with  skeletonized LIMA to LAD and reversed saphenous vein graft 1 to OM. COMPLICATIONS:  None. CONDITION:  Stable. DISPOSITION:  To CVICU. ESTIMATED BLOOD LOSS:  Not applicable. ANESTHESIA:  General endotracheal.    CARDIOPULMONARY BYPASS TIME:  64 minutes. CROSS-CLAMP TIME:  46 minutes. INDICATIONS FOR SURGERY:  The patient is a 20-year-old man with the  above-mentioned diagnoses, who was found to be an appropriate surgical  candidate upon our consultation and was taken to surgery with the  consent of he and his family on 2022. FINDINGS AT SURGERY:  There was good quality skeletonized left internal  mammary artery and there was good quality reversed saphenous vein graft. This allowed CABG x2 as described above.   There were palpable pulses in  both the grafts with no EKG or echo evidence of any ischemia at the  conclusion of the case.    OPERATIVE PROCEDURE:  The patient was identified in his bed in the  CVICU, was transported to the operating room where he was induced for  general endotracheal anesthesia without difficulty. This included an  uneventful endotracheal intubation and the appropriate placement of  lines and access for cardiac surgery. He was prepped and draped in a  normal sterile fashion and a time-out was performed and documented. The  operation then began with the performance of a midline median sternotomy  through which the skeletonized left internal mammary artery was taken  down. Simultaneously, an endoscopic vein harvest was completed. Once  all the conduit was taken and deemed appropriate, then a pericardial  well was created. A preliminary dissection was performed in order to  achieve aorto-right atrial cardiopulmonary bypass, which was achieved  with excellent flows and drainage after appropriate heparinization. I  placed the ascending aorta cardioplegia needle in the ascending aorta. A cross-clamp was applied and a dose of cold [place] solution was given  to achieve an adequate diastolic arrest.  Topical ice was applied. I  thus conducted the operation by first performing the distal anastomosis  of the reversed saphenous vein graft to the OM. I then performed the  skeletonized LIMA to LAD anastomosis and lastly connected the proximal  portion of the reversed saphenous vein graft to its takeoffs at the  ascending aorta. The heart was de-aired, the cross-clamp was removed,  and there was the eventual return of normal sinus rhythm. A ventricular  wire was placed. I was able then to wean the patient from  cardiopulmonary bypass with good hemodynamics on minimal inotropic  support. Satisfied with this, I thus decannulated and administered  protamine. Hemostasis was achieved and verified, and chest tubes were  placed.   Ultimately, his chest was closed with my double stainless steel  wire technique and the remainder of the incisions were closed in the  usual layered fashion. The patient tolerated the procedure well and was  transported to the CVICU in stable condition. There were no adequately trained or available residents for assistance  in this operation, and the presence of Mariana Escalera was critical for  the independent performance of the endoscopic vein harvest, but also the  first assistance necessary to complete the operation. TASHIA Villa Ra, MD    D: 03/22/2022 10:42:35       T: 03/22/2022 12:50:48     [email or web address]  Job#: [id number]     Doc#: [id number]    CC:

## 2022-03-22 NOTE — PROGRESS NOTES
RN noticed around 1730 pt BP decreasing despite no changes being made; at 1735 pt HR dropped to 20s to a CHB and SBP 40s. Pt connected to epicardial pacer VVI rate 80bpm and levophed increased to 0.1mcg/kg/min at this time. STAT chest xray, H/H, and ABG obtained. Pt BP after changes SBP 70s, Vpaced rate 80. Dr Sloane Jin telephoned by RN at this time for update on pt status, notification of recent events, and additional orders. Orders received to start epinephrine infusion at 0.04 mcg/kg/min, wean levo to 0.08 mcg/kg/min, administer 2 amps Na Bicarb and repeat ABG in 1 hr. RN to update surgeon on pt after the above orders completed.

## 2022-03-22 NOTE — PROGRESS NOTES
Physical Therapy        Physical Therapy Cancel Note      DATE: 3/22/2022    NAME: Esterlla Cummins  MRN: [id number]   : 1955      Patient not seen this date for Physical Therapy due to:    Patient is not appropriate for PT evaluation/treatment at this time d/t intubated/sedated s/p CABG 3/21; check status 3/23 and evaluate as appropriate      Electronically signed by Belinda Leyva PT on 3/22/2022 at 1:19 PM

## 2022-03-22 NOTE — PROGRESS NOTES
RN telephoned Dr Juvenal Carrera once again, as pt BP has begun decreasing with SBP 70s, despite no changes in pressor support. Physician wanted notified if such occurred, epi remains at 0.04/levo at 0.08. STAT H/H sent and pending, other hemodynamics reviewed. Telephone order received to administer 100-200mcg bolus of neosynephrine, see how pt responds and have night shift RN update on pt progress shortly. Night shift RN Dae Chopra) present at bedside during events and conversation-message relayed.

## 2022-03-22 NOTE — PLAN OF CARE
Problem: Pain:  Goal: Pain level will decrease  Description: Pain level will decrease  3/21/2022 2147 by Ana Maria Sam RN  Outcome: Ongoing  3/21/2022 1328 by Breanna Palencia RCP  Outcome: Ongoing  Goal: Control of acute pain  Description: Control of acute pain  3/21/2022 2147 by Ana Maria Sam RN  Outcome: Ongoing  3/21/2022 1328 by Breanna Palencia RCP  Outcome: Ongoing  Goal: Control of chronic pain  Description: Control of chronic pain  3/21/2022 2147 by Ana Maria Sam RN  Outcome: Ongoing  3/21/2022 1328 by Breanna Palencia RCP  Outcome: Ongoing     Problem: Skin Integrity:  Goal: Will show no infection signs and symptoms  Description: Will show no infection signs and symptoms  3/21/2022 2147 by Ana Maria Sam RN  Outcome: Ongoing  3/21/2022 1328 by Breanna Palencia RCP  Outcome: Ongoing  Goal: Absence of new skin breakdown  Description: Absence of new skin breakdown  3/21/2022 2147 by Ana Maria Sam RN  Outcome: Ongoing  3/21/2022 1328 by Breanna Palencia RCP  Outcome: Ongoing     Problem: Falls - Risk of:  Goal: Will remain free from falls  Description: Will remain free from falls  3/21/2022 2147 by Ana Maria Sam RN  Outcome: Ongoing  3/21/2022 1328 by Breanna Palencia RCP  Outcome: Ongoing  Goal: Absence of physical injury  Description: Absence of physical injury  3/21/2022 2147 by Ana Maria Sam RN  Outcome: Ongoing  3/21/2022 1328 by Breanna Palencia RCP  Outcome: Ongoing     Problem: Nutrition  Goal: Optimal nutrition therapy  3/21/2022 2147 by Ana Maria Sam RN  Outcome: Ongoing  3/21/2022 1328 by Breanna Palencia RCP  Outcome: Ongoing     Problem: Cardiac:  Goal: Ability to maintain vital signs within normal range will improve  Description: Ability to maintain vital signs within normal range will improve  3/21/2022 2147 by Ana Maria Sam RN  Outcome: Ongoing  3/21/2022 1328 by Breanna Palencia RCP  Outcome: Ongoing  Goal: Cardiovascular alteration will improve  Description: Cardiovascular alteration will improve  3/21/2022 2147 by Mariella August RN  Outcome: Ongoing  3/21/2022 1328 by Anne Butler RCP  Outcome: Ongoing     Problem: Health Behavior:  Goal: Will modify at least one risk factor affecting health status  Description: Will modify at least one risk factor affecting health status  3/21/2022 2147 by Mariella August RN  Outcome: Ongoing  3/21/2022 1328 by Anne Butler RCP  Outcome: Ongoing  Goal: Identification of resources available to assist in meeting health care needs will improve  Description: Identification of resources available to assist in meeting health care needs will improve  3/21/2022 2147 by Mariella August RN  Outcome: Ongoing  3/21/2022 1328 by Anne Butler RCP  Outcome: Ongoing     Problem: Physical Regulation:  Goal: Complications related to the disease process, condition or treatment will be avoided or minimized  Description: Complications related to the disease process, condition or treatment will be avoided or minimized  3/21/2022 2147 by Mariella August RN  Outcome: Ongoing  3/21/2022 1328 by Anne Butler RCP  Outcome: Ongoing     Problem: Non-Violent Restraints  Goal: Removal from restraints as soon as assessed to be safe  3/21/2022 2147 by Mariella August RN  Outcome: Ongoing  3/21/2022 1328 by Anne Butler RCP  Outcome: Ongoing  Goal: No harm/injury to patient while restraints in use  3/21/2022 2147 by Mariella August RN  Outcome: Ongoing  3/21/2022 1328 by Anne Butler RCP  Outcome: Ongoing  Goal: Patient's dignity will be maintained  3/21/2022 2147 by Mariella August RN  Outcome: Ongoing  3/21/2022 1328 by Anne Butler RCP  Outcome: Ongoing     Problem: OXYGENATION/RESPIRATORY FUNCTION  Goal: Patient will maintain patent airway  3/21/2022 2147 by Mariella August RN  Outcome: Ongoing  3/21/2022 1328 by Anne Butler RCP  Outcome: Ongoing  Goal: Patient will achieve/maintain normal respiratory rate/effort  Description: Respiratory rate and effort will be within normal limits for the patient  3/21/2022 2147 by Zaki So RN  Outcome: Ongoing  3/21/2022 1328 by Kim Steele RCP  Outcome: Ongoing     Problem: MECHANICAL VENTILATION  Goal: Patient will maintain patent airway  3/21/2022 2147 by Zaki So RN  Outcome: Ongoing  3/21/2022 1328 by Kim Steele RCP  Outcome: Ongoing  Goal: Oral health is maintained or improved  3/21/2022 2147 by Zaki So RN  Outcome: Ongoing  3/21/2022 1328 by Kim Steele RCP  Outcome: Ongoing  Goal: ET tube will be managed safely  3/21/2022 2147 by Zaki So RN  Outcome: Ongoing  3/21/2022 1328 by Kim Steele RCP  Outcome: Ongoing  Goal: Ability to express needs and understand communication  3/21/2022 2147 by Zaki So RN  Outcome: Ongoing  3/21/2022 1328 by Kim Steele RCP  Outcome: Ongoing  Goal: Mobility/activity is maintained at optimum level for patient  3/21/2022 2147 by Zaki So RN  Outcome: Ongoing  3/21/2022 1328 by Kim Steele RCP  Outcome: Ongoing     Problem: SKIN INTEGRITY  Goal: Skin integrity is maintained or improved  3/21/2022 2147 by Zaki So RN  Outcome: Ongoing  3/21/2022 1328 by Kim Steele RCP  Outcome: Ongoing

## 2022-03-23 ENCOUNTER — APPOINTMENT (OUTPATIENT)
Dept: GENERAL RADIOLOGY | Age: 67
DRG: 233 | End: 2022-03-23
Attending: INTERNAL MEDICINE
Payer: MEDICARE

## 2022-03-23 LAB
ANION GAP SERPL CALCULATED.3IONS-SCNC: 12 MMOL/L (ref 9–17)
BUN BLDV-MCNC: 21 MG/DL (ref 8–23)
CALCIUM SERPL-MCNC: 8.2 MG/DL (ref 8.6–10.4)
CHLORIDE BLD-SCNC: 110 MMOL/L (ref 98–107)
CO2: 22 MMOL/L (ref 20–31)
CREAT SERPL-MCNC: 1.26 MG/DL (ref 0.7–1.2)
FIO2: 45
GFR AFRICAN AMERICAN: >60 ML/MIN
GFR NON-AFRICAN AMERICAN: 57 ML/MIN
GFR SERPL CREATININE-BSD FRML MDRD: ABNORMAL ML/MIN/{1.73_M2}
GLUCOSE BLD-MCNC: 127 MG/DL (ref 75–110)
GLUCOSE BLD-MCNC: 136 MG/DL (ref 75–110)
GLUCOSE BLD-MCNC: 138 MG/DL (ref 75–110)
GLUCOSE BLD-MCNC: 145 MG/DL (ref 75–110)
GLUCOSE BLD-MCNC: 156 MG/DL (ref 70–99)
GLUCOSE BLD-MCNC: 157 MG/DL (ref 74–100)
HCT VFR BLD CALC: 28.1 % (ref 40.7–50.3)
HEMOGLOBIN: 8.6 G/DL (ref 13–17)
INR BLD: 1.5
MAGNESIUM: 2.4 MG/DL (ref 1.6–2.6)
MCH RBC QN AUTO: 27.5 PG (ref 25.2–33.5)
MCHC RBC AUTO-ENTMCNC: 30.6 G/DL (ref 28.4–34.8)
MCV RBC AUTO: 89.8 FL (ref 82.6–102.9)
MODE: ABNORMAL
NEGATIVE BASE EXCESS, ART: 1 (ref 0–2)
NRBC AUTOMATED: 0 PER 100 WBC
O2 DEVICE/FLOW/%: ABNORMAL
PATIENT TEMP: 37
PDW BLD-RTO: 16.6 % (ref 11.8–14.4)
PLATELET # BLD: ABNORMAL K/UL (ref 138–453)
PLATELET, FLUORESCENCE: 129 K/UL (ref 138–453)
PLATELET, IMMATURE FRACTION: 6.5 % (ref 1.1–10.3)
POC HCO3: 23.5 MMOL/L (ref 21–28)
POC O2 SATURATION: 89 % (ref 94–98)
POC PCO2: 36.5 MM HG (ref 35–48)
POC PH: 7.42 (ref 7.35–7.45)
POC PO2: 54.8 MM HG (ref 83–108)
POTASSIUM SERPL-SCNC: 4.2 MMOL/L (ref 3.7–5.3)
PROTHROMBIN TIME: 15.3 SEC (ref 9.1–12.3)
RBC # BLD: 3.13 M/UL (ref 4.21–5.77)
SODIUM BLD-SCNC: 144 MMOL/L (ref 135–144)
WBC # BLD: 12.3 K/UL (ref 3.5–11.3)

## 2022-03-23 PROCEDURE — 83735 ASSAY OF MAGNESIUM: CPT

## 2022-03-23 PROCEDURE — APPSS30 APP SPLIT SHARED TIME 16-30 MINUTES: Performed by: NURSE PRACTITIONER

## 2022-03-23 PROCEDURE — 85027 COMPLETE CBC AUTOMATED: CPT

## 2022-03-23 PROCEDURE — 2500000003 HC RX 250 WO HCPCS: Performed by: THORACIC SURGERY (CARDIOTHORACIC VASCULAR SURGERY)

## 2022-03-23 PROCEDURE — 99024 POSTOP FOLLOW-UP VISIT: CPT | Performed by: NURSE PRACTITIONER

## 2022-03-23 PROCEDURE — 94003 VENT MGMT INPAT SUBQ DAY: CPT

## 2022-03-23 PROCEDURE — 99291 CRITICAL CARE FIRST HOUR: CPT | Performed by: INTERNAL MEDICINE

## 2022-03-23 PROCEDURE — 6360000002 HC RX W HCPCS: Performed by: NURSE PRACTITIONER

## 2022-03-23 PROCEDURE — 2500000003 HC RX 250 WO HCPCS: Performed by: NURSE PRACTITIONER

## 2022-03-23 PROCEDURE — 94640 AIRWAY INHALATION TREATMENT: CPT

## 2022-03-23 PROCEDURE — 85055 RETICULATED PLATELET ASSAY: CPT

## 2022-03-23 PROCEDURE — 6370000000 HC RX 637 (ALT 250 FOR IP): Performed by: THORACIC SURGERY (CARDIOTHORACIC VASCULAR SURGERY)

## 2022-03-23 PROCEDURE — 2580000003 HC RX 258: Performed by: THORACIC SURGERY (CARDIOTHORACIC VASCULAR SURGERY)

## 2022-03-23 PROCEDURE — 2700000000 HC OXYGEN THERAPY PER DAY

## 2022-03-23 PROCEDURE — 82803 BLOOD GASES ANY COMBINATION: CPT

## 2022-03-23 PROCEDURE — 6370000000 HC RX 637 (ALT 250 FOR IP): Performed by: NURSE PRACTITIONER

## 2022-03-23 PROCEDURE — 37799 UNLISTED PX VASCULAR SURGERY: CPT

## 2022-03-23 PROCEDURE — 94761 N-INVAS EAR/PLS OXIMETRY MLT: CPT

## 2022-03-23 PROCEDURE — 85610 PROTHROMBIN TIME: CPT

## 2022-03-23 PROCEDURE — 80048 BASIC METABOLIC PNL TOTAL CA: CPT

## 2022-03-23 PROCEDURE — 2580000003 HC RX 258: Performed by: NURSE PRACTITIONER

## 2022-03-23 PROCEDURE — P9047 ALBUMIN (HUMAN), 25%, 50ML: HCPCS | Performed by: NURSE PRACTITIONER

## 2022-03-23 PROCEDURE — 2100000001 HC CVICU R&B

## 2022-03-23 PROCEDURE — 71045 X-RAY EXAM CHEST 1 VIEW: CPT

## 2022-03-23 PROCEDURE — 82947 ASSAY GLUCOSE BLOOD QUANT: CPT

## 2022-03-23 RX ORDER — ALBUMIN (HUMAN) 12.5 G/50ML
25 SOLUTION INTRAVENOUS ONCE
Status: COMPLETED | OUTPATIENT
Start: 2022-03-23 | End: 2022-03-23

## 2022-03-23 RX ORDER — FUROSEMIDE 10 MG/ML
20 INJECTION INTRAMUSCULAR; INTRAVENOUS ONCE
Status: COMPLETED | OUTPATIENT
Start: 2022-03-23 | End: 2022-03-23

## 2022-03-23 RX ADMIN — PROPOFOL 35 MCG/KG/MIN: 10 INJECTION, EMULSION INTRAVENOUS at 01:30

## 2022-03-23 RX ADMIN — STANDARDIZED SENNA CONCENTRATE AND DOCUSATE SODIUM 1 TABLET: 8.6; 5 TABLET ORAL at 20:16

## 2022-03-23 RX ADMIN — AMIODARONE HYDROCHLORIDE 200 MG: 200 TABLET ORAL at 09:16

## 2022-03-23 RX ADMIN — IPRATROPIUM BROMIDE AND ALBUTEROL SULFATE 1 AMPULE: .5; 2.5 SOLUTION RESPIRATORY (INHALATION) at 03:38

## 2022-03-23 RX ADMIN — PROPOFOL 35 MCG/KG/MIN: 10 INJECTION, EMULSION INTRAVENOUS at 07:29

## 2022-03-23 RX ADMIN — TAMSULOSIN HYDROCHLORIDE 0.4 MG: 0.4 CAPSULE ORAL at 09:16

## 2022-03-23 RX ADMIN — VASOPRESSIN 0.02 UNITS/MIN: 20 INJECTION PARENTERAL at 09:03

## 2022-03-23 RX ADMIN — PANTOPRAZOLE SODIUM 40 MG: 40 TABLET, DELAYED RELEASE ORAL at 09:16

## 2022-03-23 RX ADMIN — OXYCODONE HYDROCHLORIDE AND ACETAMINOPHEN 2 TABLET: 5; 325 TABLET ORAL at 14:18

## 2022-03-23 RX ADMIN — AMIODARONE HYDROCHLORIDE 200 MG: 200 TABLET ORAL at 20:16

## 2022-03-23 RX ADMIN — ASPIRIN 81 MG: 81 TABLET, COATED ORAL at 09:17

## 2022-03-23 RX ADMIN — DEXTROSE MONOHYDRATE 2000 MG: 50 INJECTION, SOLUTION INTRAVENOUS at 02:57

## 2022-03-23 RX ADMIN — ATORVASTATIN CALCIUM 80 MG: 80 TABLET, FILM COATED ORAL at 20:16

## 2022-03-23 RX ADMIN — CLOPIDOGREL 75 MG: 75 TABLET, FILM COATED ORAL at 09:16

## 2022-03-23 RX ADMIN — OXYCODONE HYDROCHLORIDE AND ACETAMINOPHEN 2 TABLET: 5; 325 TABLET ORAL at 03:50

## 2022-03-23 RX ADMIN — IPRATROPIUM BROMIDE AND ALBUTEROL SULFATE 1 AMPULE: .5; 2.5 SOLUTION RESPIRATORY (INHALATION) at 15:15

## 2022-03-23 RX ADMIN — DEXMEDETOMIDINE HYDROCHLORIDE 0.2 MCG/KG/HR: 4 INJECTION, SOLUTION INTRAVENOUS at 08:15

## 2022-03-23 RX ADMIN — AMIODARONE HYDROCHLORIDE 200 MG: 200 TABLET ORAL at 14:11

## 2022-03-23 RX ADMIN — EPINEPHRINE 0.02 MCG/KG/MIN: 1 INJECTION PARENTERAL at 01:40

## 2022-03-23 RX ADMIN — STANDARDIZED SENNA CONCENTRATE AND DOCUSATE SODIUM 1 TABLET: 8.6; 5 TABLET ORAL at 09:17

## 2022-03-23 RX ADMIN — SODIUM CHLORIDE, PRESERVATIVE FREE 10 ML: 5 INJECTION INTRAVENOUS at 20:16

## 2022-03-23 RX ADMIN — DEXMEDETOMIDINE HYDROCHLORIDE 0.4 MCG/KG/HR: 4 INJECTION, SOLUTION INTRAVENOUS at 16:40

## 2022-03-23 RX ADMIN — MUPIROCIN: 20 OINTMENT TOPICAL at 20:16

## 2022-03-23 RX ADMIN — MUPIROCIN: 20 OINTMENT TOPICAL at 09:17

## 2022-03-23 RX ADMIN — OXYCODONE HYDROCHLORIDE AND ACETAMINOPHEN 2 TABLET: 5; 325 TABLET ORAL at 18:57

## 2022-03-23 RX ADMIN — FUROSEMIDE 20 MG: 10 INJECTION, SOLUTION INTRAMUSCULAR; INTRAVENOUS at 23:38

## 2022-03-23 RX ADMIN — FENTANYL CITRATE 50 MCG: 50 INJECTION INTRAMUSCULAR; INTRAVENOUS at 01:30

## 2022-03-23 RX ADMIN — IPRATROPIUM BROMIDE AND ALBUTEROL SULFATE 1 AMPULE: .5; 2.5 SOLUTION RESPIRATORY (INHALATION) at 19:35

## 2022-03-23 RX ADMIN — IPRATROPIUM BROMIDE AND ALBUTEROL SULFATE 1 AMPULE: .5; 2.5 SOLUTION RESPIRATORY (INHALATION) at 07:38

## 2022-03-23 RX ADMIN — ALBUMIN (HUMAN) 25 G: 0.25 INJECTION, SOLUTION INTRAVENOUS at 22:47

## 2022-03-23 RX ADMIN — FENTANYL CITRATE 50 MCG: 50 INJECTION INTRAMUSCULAR; INTRAVENOUS at 16:38

## 2022-03-23 RX ADMIN — SODIUM CHLORIDE, PRESERVATIVE FREE 10 ML: 5 INJECTION INTRAVENOUS at 09:17

## 2022-03-23 ASSESSMENT — PULMONARY FUNCTION TESTS
PIF_VALUE: 21
PIF_VALUE: 21
PIF_VALUE: 13
PIF_VALUE: 26
PIF_VALUE: 20
PIF_VALUE: 23

## 2022-03-23 NOTE — PROGRESS NOTES
Michelle Evans NP updated via telephone on failed attempt at CPAP and extubation due to hemodynamic instability, as DiBardino is in OR. Telephone order received to change pt sedation from propofol to precedex, in attempts to help with pt agitation and BP. Once pt stabilizes hemodynamically and appears more comfortable, attempt vent wean and extubation again if able. RN will update team as needed.

## 2022-03-23 NOTE — PROGRESS NOTES
Physical Therapy        Physical Therapy Cancel Note      DATE: 3/23/2022    NAME: Farida Meade  MRN: [id number]   : 1955      Patient not seen this date for Physical Therapy due to:    Patient is not appropriate for PT evaluation/treatment at this time d/t intubated/sedated post-op. PT will continue to follow for post-op needs.        Electronically signed by Dionicio Deluca PT on 3/23/2022 at 9:07 AM

## 2022-03-23 NOTE — PROGRESS NOTES
PULMONARY & CRITICAL CARE MEDICINE PROGRESS NOTE     Patient:  Pat Conrad  MRN: [id number]  Admit date: 3/11/2022  Primary Care Physician: Keyona Pryor MD  Consulting Physician: Ruddy Ross MD  CODE Status: Full Code  LOS: 12    SUBJECTIVE     CHIEF COMPLAINT/REASON FOR INITIAL CONSULT:    COPD/chronic cough/preoperative evaluation    BRIEF HOSPITAL COURSE:  The patient is a 74 y.o. male history of hypertension, history of mild systolic dysfunction admitted with chest pain initially to [facility] on 03/09/2022 non-ST elevation MI ruled out by troponins in 20s had a stress test done because of high suspicion which shows ischemia of lateral septal wall with ejection fraction of 34%.  He does have history of cocaine use and apparently he had used cocaine before admission to [facility].  He was transferred to [facility] for cardiac catheterization which shows multivessel coronary artery disease with EF of 30% including left main disease.  He remains on a statin and heparin.  CT surgery was consulted for CABG.    Patient had pulmonary function test done which shows FEV1 of 46% with significance positive bronchodilator FEV1 of 56% with severe reduction in diffusion capacity of less than 30% with air trapping/hyperinflation. CT scan of the chest shows areas of blebs and bulla medially and centrilobular and paraseptal emphysema on CT scan.     Patient does complain of shortness of breath on mild activity and exertion.  He does have chronic cough without no change denies increased wheezing currently denies chest pain.  He has history of cocaine use.  Does have history of smoking patient is not able to tell me that how many cigarettes he used to smoke but he claimed that now he smoke 1 pack/week and had been smoking for almost 50 years. INTERVAL HISTORY:  03/23/22  Patient seen in cardiovascular ICU events noted chart seen labs and arterial blood gases seen.   Patient remained intubated and remains on pressors currently off Levophed on epinephrine drip and vasopressin drip. He was started apparently on Precedex yesterday and apparently he had bradycardia and was stopped overnight he remained on propofol drip propofol apparently increased from 20 mcg to 35 mcg overnight. This morning he is restarted back on Precedex drip and on propofol drip which is in the process of weaning down. Patient was not arousable did not follow commands on stimulation mild eyes blinking and did not cough. He had desaturation overnight/early morning PO2 was 50 this morning on 50% FiO2 his FiO2 was increased to 60% and then wean down to 55%. Endotracheal secretions reported to be mild but more thicker this morning. Ventilator setting PRVC/16/600/5/50 percent and ABG was 7.4 1/36/55/23. Currently he is on CPAP/pressure support 8/5 tidal volumes are between 506 100 respiratory rate is 22 but saturating between 88% to 90% on 50% FiO2  Chest x-ray shows bilateral effusion/right pleural effusion/increased right pleural effusion as compared to chest x-ray on 03/22/2022. Labs shows creatinine is 1.26 BUN is 21 bicarb 22 sodium 144 potassium 4.2. WBC count is 12.3 hemoglobin 8.6 platelet count 815. Urine output 800 mL in last 24-hour intake and output has been positive. Urine output is slightly better this morning. Respiratory cultures sent yesterday so far negative.       REVIEW OF SYSTEMS:  Unobtainable from patient due to sedation/mechanical ventilation    OBJECTIVE     Ventilator Settings:  Vent Information  $Ventilation: $Subsequent Day  Skin Assessment: Clean, dry, & intact  Equipment Changed: HME  Vent Type: Servo i  Vent Mode: (S)  (CPAP/PS)  Vt Ordered: 600 mL  Rate Set: 16 bmp  Pressure Support: (S) 8 cmH20  FiO2 : (S) 55 %  SpO2: 90 %  SpO2/FiO2 ratio: 163.64  Sensitivity: 5  PEEP/CPAP: (S) 5  I Time/ I Time %: 0.9 s  Humidification Source: HME  Nitric Oxide/Epoprostenol In Use?: No    VITAL SIGNS:  pantoprazole  40 mg Oral Daily    insulin glargine  0.15 Units/kg SubCUTAneous Nightly    [Held by provider] tiotropium  2 puff Inhalation Daily    tamsulosin  0.4 mg Oral Daily     Continuous Infusions:   dexmedetomidine 0.4 mcg/kg/hr (03/23/22 0912)    sodium chloride      propofol 10 mcg/kg/min (03/23/22 0945)    norepinephrine Stopped (03/22/22 1821)    EPINEPHrine 0.03 mcg/kg/min (03/23/22 0940)    insulin Stopped (03/22/22 1544)    dextrose      vasopressin (Septic Shock) infusion 0.02 Units/min (03/23/22 0903)    sodium chloride      sodium chloride       INPUT/OUTPUT:  In: 2440.2 [I.V.:1468. 6; NG/GT:175]  Out: 1250 [Urine:900]  Date 03/23/22 0000 - 03/23/22 2359   Shift 7499-8064 6667-1241 7072-7492 24 Hour Total   INTAKE   I.V.(mL/kg) 196.8(2.5)   196.8(2.5)   NG/GT(mL/kg) 50(0.6) 75(1)  125(1.6)   IV Piggyback(mL/kg) 50(0.6)   50(0.6)   Shift Total(mL/kg) 296.8(3.8) 75(1)  371.8(4.8)   OUTPUT   Urine(mL/kg/hr) 205(0.3) 100  305   Chest Tube 90   90   Shift Total(mL/kg) 295(3.8) 100(1.3)  395(5.1)   Weight (kg) 77.2 77.2 77.2 77.2       LABS:-  ABG:   Recent Labs     03/21/22  1903 03/21/22  2249 03/22/22  0305 03/22/22  0638 03/23/22  0345   POCPH 7.325* 7.279* 7.327* 7.366 7.417   POCPCO2 44.0 35.8 35.8 41.8 36.5   POCPO2 88.2 70.5* 85.7 91.1 54.8*   POCHCO3 23.0 16.8* 18.7* 24.0 23.5   IJXT5BAF 96 92* 96 97 89*     CBC:   Recent Labs     03/21/22  0722 03/21/22  1313 03/21/22  2250 03/22/22  0308 03/23/22  0347   WBC 7.8   < > 19.1* 21.9* 12.3*   HGB 11.9*   < > 8.4* 9.0* 8.6*   HCT 40.0*   < > 27.5* 29.1* 28.1*   MCV 90.3   < > 91.1 90.7 89.8      < > See Reflexed IPF Result 147 See Reflexed IPF Result   LYMPHOPCT 38  --   --   --   --    RBC 4.43   < > 3.02* 3.21* 3.13*   MCH 26.9   < > 27.8 28.0 27.5   MCHC 29.8   < > 30.5 30.9 30.6   RDW 15.9*   < > 16.2* 16.1* 16.6*    < > = values in this interval not displayed.      BMP:   Recent Labs     03/21/22 2250 03/21/22 2250 03/22/22  0308 03/22/22  9079 03/23/22  0347   *  --  146*  --  144   K 4.6  --  4.3  --  4.2   *  --  109*  --  110*   CO2 15*  --  17*  --  22   BUN 15  --  14  --  21   CREATININE 1.26*   < > 1.32* 1.24* 1.26*   GLUCOSE 165*  --  203*  --  156*    < > = values in this interval not displayed. Liver Function Test:   No results for input(s): PROT, LABALBU, ALT, AST, GGT, ALKPHOS, BILITOT in the last 72 hours. Amylase/Lipase:  No results for input(s): AMYLASE, LIPASE in the last 72 hours. Coagulation Profile:   Recent Labs     03/21/22  0722 03/21/22  1313 03/22/22  0308 03/23/22  0347   INR  --  1.5 1.5 1.5   PROTIME  --  15.6* 15.7* 15.3*   APTT 45.1* 27.7  --   --      Cardiac Enzymes:  No results for input(s): CKTOTAL, CKMB, CKMBINDEX, TROPONINI in the last 72 hours. Lactic Acid:  Lab Results   Component Value Date    LACTA NOT REPORTED 03/11/2018    LACTA NOT REPORTED 03/10/2018    LACTA NOT REPORTED 03/10/2018     BNP:   Lab Results   Component Value Date    BNP 70 09/13/2013     D-Dimer:  No results found for: DDIMER  Others:   No results found for: TSH, U4VTNGD, N0CCNAK, THYROIDAB, FT3, T4FREE  Lab Results   Component Value Date    SEDRATE 20 (H) 05/21/2014    CRP 15.2 (H) 05/21/2014     No results found for: Juluis Nadine  No results found for: IRON, TIBC, FERRITIN  No results found for: SPEP, UPEP  Lab Results   Component Value Date    PSA 1.11 11/04/2016       Microbiology:  Recent Labs     03/22/22  2030   [facility] . TRACHEAL ASPIRATE   CULTURE PENDING       Pathology:    Radiology Reports:  XR CHEST PORTABLE   Final Result   Stable chest (suspect right pleural effusion in addition to left effusion   and/or atelectasis) with line and tube placements as detailed above. XR CHEST PORTABLE   Final Result   As compared to prior examination, the ET tube now is in satisfactory   position. Examination otherwise is unchanged. XR CHEST PORTABLE   Final Result   1. Endotracheal tube is low in position, approximately 1 cm above the   veronique. This should be pulled back approximately 3 cm. 2.  Other lines and tubes are unchanged in position. 3.  New small right pleural effusion. 4.  Unchanged left basilar opacity. XR CHEST PORTABLE   Final Result   1. Lines and tubes are unchanged in position. 2. Linear bibasilar opacities are similar prior study, atelectasis versus   pneumonia. XR CHEST PORTABLE   Final Result   All tubes and catheters are in good position. No evidence for pneumothorax. Bilateral basal atelectasis noted. VL DUP UPPER EXTREMITY ARTERIES BILATERAL   Final Result      VL Vein Mapping Lower Bilateral   Final Result      VL DUP CAROTID BILATERAL   Final Result      CT CHEST WO CONTRAST   Final Result   1. Extensive centrilobular and paraseptal emphysema. 2. Extensive atherosclerotic disease, particularly along the coronary   arteries. 3. There are areas of atelectasis in the lingula. There is collapse of the   right middle lobe   4. No focal lung infiltrate.          XR CHEST PORTABLE    (Results Pending)       Echocardiogram:   Results for orders placed during the hospital encounter of 03/11/22    Echocardiogram Complete 2D w Doppler w Color    Narrative  Transthoracic Echocardiography Report (TTE)    Patient Name Mar Herita       Date of Study           03/13/2022  [place]    Date of      1955  Gender                  Male  Birth    Age          74 year(s)  Race                    Black    Room Number  2107        Height:                 75 inch, 190.5 cm    Corporate ID [id number]    Weight:                 136 pounds, 61.7 kg  #    Patient Acct [de-identified]   BSA:        1.86 m^2    BMI:        17 kg/m^2  #    MR #         [id number]      Sonographer             Briana Anglin    Accession #  [id number]  Interpreting Physician  Vinh Chau    Fellow                   Referring Nurse  Practitioner    Interpreting Referring Physician     Michele Castro. Erica Mcgregor,  Fellow                                           APRN-NP    Type of Study    TTE procedure:2D Echocardiogram, M-Mode, Doppler, Color Doppler. Procedure Date  Date: 03/13/2022 Start: 12:17 PM    Study Location: [facility]  Technical Quality: Fair visualization    Indications:Multi vessel CAD and Pre-Op CABG. History / Tech. Comments:  Procedure explained to patient. Study done at the bedside. STAT call in    Patient Status: Inpatient    Height: 75 inches Weight: 136 pounds BSA: 1.86 m^2 BMI: 17 kg/m^2    Allergies  - *No Known Allergies. CONCLUSIONS    Summary  Global left ventricular systolic function is normal. Estimated EF 69-22%  Normal diastolic filling. Normal right ventricular size and function. Mild mitral regurgitation. Mild tricuspid regurgitation. Mild pulmonary hypertension with an estimated right ventricular systolic  pressure of 41 mmHg. No pericardial effusion. Signature  ----------------------------------------------------------------------------  Electronically signed by Vesna Mims(Sonographer) on 03/13/2022  12:51 PM  ----------------------------------------------------------------------------    ----------------------------------------------------------------------------  Electronically signed by Vinh Chau(Interpreting physician) on  03/14/2022 09:19 AM  ----------------------------------------------------------------------------  FINDINGS  Left Atrium  Left atrium is normal in size. Left Ventricle  Left ventricle is normal in size. Global left ventricular systolic function  is normal. Calculated EF via Parks's method is 48 %. Mild septal hypertrophy. Normal diastolic filling. Right Atrium  Right atrium is normal in size. Right Ventricle  Normal right ventricular size and function. Mitral Valve  Normal mitral valve structure. Mild mitral regurgitation. No mitral stenosis.   Aortic Valve  Aortic valve structure and function normal.  Aortic valve is trileaflet. No aortic insufficiency. No aortic stenosis. Tricuspid Valve  Normal tricuspid valve leaflets. Mild tricuspid regurgitation. No tricuspid stenosis. Mild pulmonary hypertension with an estimated right ventricular systolic  pressure of 41 mmHg. Pulmonic Valve  Pulmonic valve is normal in structure and function. No pulmonic insufficiency. No evidence of pulmonic stenosis. Pericardial Effusion  No pericardial effusion. Miscellaneous  Normal aortic root dimension. E/E' average = 11.0. IVC normal diameter & inspiratory collapse indicating normal RA filling  pressure .     M-mode / 2D Measurements & Calculations:    LVIDd:4.9 cm(3.7 - 5.6 cm)       Diastolic IYCHSF:86.07 ml  LVIDs:3.8 cm(2.2 - 4.0 cm)       Systolic GSIKOE:28.68 ml  IVSd:1.2 cm(0.6 - 1.1 cm)        Aortic Root:3.3 cm(2.0 - 3.7 cm)  LVPWd:0.8 cm(0.6 - 1.1 cm)       LA Dimension: 2.9 cm(1.9 - 4.0 cm)  Fractional Shortenin.45 %    LA volume/Index: 35.47 ml /19m^2  Calculated LVEF (%): 49.08 %     LVOT:2.2 cm  RVDd:3.5 cm    Mitral:                                 Aortic    Valve Area (P1/2-Time): 2.78 cm^2       Peak Velocity: 1.26 m/s  Peak E-Wave: 0.87 m/s                   Mean Velocity: 0.84 m/s  Peak A-Wave: 0.67 m/s                   Peak Gradient: 6.35 mmHg  E/A Ratio: 1.29                         Mean Gradient: 4 mmHg  Peak Gradient: 2.99 mmHg  Mean Gradient: 1 mmHg  Deceleration Time: 258 msec             Area (continuity): 2.33 cm^2  P1/2t: 79 msec                          AV VTI: 26.9 cm    Area (continuity): 3.32 cm^2  Mean Velocity: 0.49 m/s    Tricuspid:                              Pulmonic:    Estimated RVSP: 41 mmHg                 Peak Velocity: 0.71 m/s  Peak TR Velocity: 3.00 m/s              Peak Gradient: 1.99 mmHg  Peak TR Gradient: 36 mmHg  Estimated RA Pressure: 5 mmHg    Estimated PASP: 41 mmHg    Diastology / Tissue Doppler  Septal Wall E' velocity:0.06 m/s  Septal Wall E/E':14.7  Lateral Wall E' velocity:0.12 m/s  Lateral Wall E/E':7.3      Cardiac Catheterization:   No results found for this or any previous visit. ASSESSMENT AND PLAN     Assessment:    MV CAD, Plan for CABG   Bradycardia  Sinus pause lasting for 4.7 seconds 3/17/2022  COPD stage II on PFTs with severe reduction diffusion capacity  Centrilobular and paraseptal emphysema  Chronic smoker  Cocaine use  Chronic combined diastolic and systolic CHF, EF 70%.  Previously 50-55%  Mild pulmonary hypertension  Right hip arthritis  Essential hypertension  CKD stage III  Leukocytosis    Plan:    I personally interviewed/examined the patient; reviewed interval history, interpreted all available radiographic and laboratory data at the time of service. Patient currently sedated with propofol drip. Agree with Precedex drip and wean off propofol drip continue to monitor heart rate for bradycardia. Patient is currently on and off epinephrine drip and vasopressin drip per CT surgery  Ventilator setting PRVC/16/600/5/55 percent. Wean FiO2 to keep saturation above 92%. Currently on spontaneous breathing trial volumes are good but requiring FiO2 55%. Will wean FiO2. Patient does not get extubated today vent will increase PEEP to 8. Continue lung protective mechanical ventilation, appropriate changes made in ventilator settings  Continue antiplatelet, anticoagulation, statins, and beta blockers as per cardiology/CT surgery  Continue to monitor I/O with a goal of even fluid balance suggest low-dose of Lasix as he is developing bilateral pleural effusion. Diuresis per cardiology/CT surgery postoperatively  Chest tube management as per CT surgery  On amiodarone, statin, aspirin, Plavix.   Continue pulmonary toilet, aspiration precautions and bronchodilators  Continue with DuoNeb aerosol  Continue to monitor CBC, coagulation profile, transfuse as indicated  Chemical DVT prophylaxis

## 2022-03-23 NOTE — PLAN OF CARE
BRONCHOSPASM/BRONCHOCONSTRICTION     [x]         IMPROVE AERATION/BREATH SOUNDS  [x]   ADMINISTER BRONCHODILATOR THERAPY AS APPROPRIATE  [x]   ASSESS BREATH SOUNDS  []   IMPLEMENT AEROSOL/MDI PROTOCOL  [x]   PATIENT EDUCATION AS NEEDED   MECHANICAL VENTILATION     [x]  PROVIDE OPTIMAL VENTILATION  [x]   ASSESS FOR EXTUBATION READINESS  [x]   ASSESS FOR WEANING READINESS  [x]  EXTUBATE AS TOLERATED  []  IMPLEMENT ADULT MECHANICAL VENTILATION PROTOCOL  [x]  MAINTAIN ADEQUATE OXYGENATION  [x]  PERFORM SPONTANEOUS WEANING TRIAL AS TOLERATED

## 2022-03-23 NOTE — PLAN OF CARE
Problem: Non-Violent Restraints  Goal: Removal from restraints as soon as assessed to be safe  3/23/2022 0849 by Estefanía James RN  Outcome: Ongoing  3/22/2022 2235 by Jason Major RN  Outcome: Ongoing     Problem: Non-Violent Restraints  Goal: No harm/injury to patient while restraints in use  3/23/2022 0849 by Estefanía James RN  Outcome: Ongoing  3/22/2022 2235 by Jason Major RN  Outcome: Ongoing     Problem: Non-Violent Restraints  Goal: Patient's dignity will be maintained  3/23/2022 0849 by Estefanía James RN  Outcome: Ongoing  3/22/2022 2235 by Jason Major RN  Outcome: Ongoing

## 2022-03-23 NOTE — PROGRESS NOTES
Pt changed over from propofol to precedex for sedation at 1330. Despite gradual titration up of precedex to this point; pt remains agitated, hemodynamically unstable and not appropriate to attempt CPAP trial on. RN stopped precedex infusion around 1500 and placed pt back on propofol. After doing so, pt appeared more relaxed/comfortable and BP improved without titrating pressor support up. RN contacted Jonnie Sun NP with [facility] via telephone to notify her of such. NP in agree ance, no additional orders received. RN will update Dr Aby Martinez after he returns to unit post OR.

## 2022-03-23 NOTE — PROGRESS NOTES
Dr Hiren Kline and Dr Perez Record with cardiology at bedside to review case. RN provided team with an update on pt status, and notified of sequence of events post OR that has resulted in pt progress to this point. Pt labs, I/O, hemodynamics, medications reviewed. Plan of care, as determined by Jose this morning on rounds, discussed. No new orders received at this time. Will continue to monitor pt and update care team as needed.

## 2022-03-23 NOTE — PROGRESS NOTES
Dr Codi Schaeffer with pulm at bedside to review case and obtain update on pt status and sequence of events for pt since yesterday's rounds. Pt remains intubated due to hemodynamic instability and heart rhythm issues last night. Reviewed morning labs, ABG and chest xray; discussed rates of medications infusing (pressors and sedation). Notified of failed attempt to CPAP at [phone] and how pt responded hemodynamically and neurologically. Order received just prior to his arrival to attempt precedex from Josh Saleem, and writer was just preparing to do so and attempt vent wean again if possible. Dr Codi Schaeffer agreeable with CTS (primary), no new orders received. RN to update physician if needed.

## 2022-03-23 NOTE — PROGRESS NOTES
[facility]  Progress Note                   Date:   3/23/2022  Patient name: Bella Diaz  Date of admission:  3/11/2022  7:15 PM  MRN:   [id number]  YOB: 1955  PCP: Jeffrey Lawson MD    Reason for Admission: Chest pain [R07.9]  S/P cardiac catheterization [Z98.890]  3-vessel CAD [I25.10]    Subjective:       Clinical Changes /Abnormalities:   POD2 CABG. Intubated, sedated  On epi, vaso.  Off levophed  Sinus rhythm  CVP 16      Medications:   Scheduled Meds:   ipratropium-albuterol  1 ampule Inhalation Q6H    sodium chloride flush  10 mL IntraVENous 2 times per day    aspirin  81 mg Oral Daily    clopidogrel  75 mg Oral Daily    amiodarone  200 mg Oral TID    mupirocin   Nasal BID    sennosides-docusate sodium  1 tablet Oral BID    [Held by provider] metoprolol tartrate  25 mg Oral BID    atorvastatin  80 mg Oral Nightly    pantoprazole  40 mg Oral Daily    insulin glargine  0.15 Units/kg SubCUTAneous Nightly    [Held by provider] tiotropium  2 puff Inhalation Daily    tamsulosin  0.4 mg Oral Daily     Continuous Infusions:   dexmedetomidine 0.4 mcg/kg/hr (03/23/22 0912)    sodium chloride      propofol 20 mcg/kg/min (03/23/22 0913)    norepinephrine Stopped (03/22/22 1821)    EPINEPHrine 0.02 mcg/kg/min (03/23/22 0825)    insulin Stopped (03/22/22 1544)    dextrose      vasopressin (Septic Shock) infusion 0.02 Units/min (03/23/22 0903)    sodium chloride      sodium chloride       CBC:   Recent Labs     03/21/22 2250 03/22/22  0308 03/23/22  0347   WBC 19.1* 21.9* 12.3*   HGB 8.4* 9.0* 8.6*   PLT See Reflexed IPF Result 147 See Reflexed IPF Result     BMP:    Recent Labs     03/21/22  2250 03/21/22  2250 03/22/22  0308 03/22/22  0638 03/23/22  0347   *  --  146*  --  144   K 4.6  --  4.3  --  4.2   *  --  109*  --  110*   CO2 15*  --  17*  --  22   BUN 15  --  14  --  21   CREATININE 1.26*   < > 1.32* 1.24* 1.26*   GLUCOSE 165*  --  203*  -- 156*    < > = values in this interval not displayed. Hepatic:  No results for input(s): AST, ALT, ALB, BILITOT, ALKPHOS in the last 72 hours. Troponin:   No results for input(s): TROPHS in the last 72 hours. BNP: No results for input(s): BNP in the last 72 hours. Lipids:   No results for input(s): CHOL, HDL in the last 72 hours. Invalid input(s): LDLCALCU  INR:   Recent Labs     03/21/22  1313 03/22/22  0308 03/23/22  0347   INR 1.5 1.5 1.5         3/11/2022- cardiac cath     Findings:   Angiographic Findings        Cardiac Arteries and Lesion Findings       LMCA: has heavy calcification with mid 60% stenosis. LAD: has heavy calcification in the proximal segment with 40% stenosis. The   mid segment has 85% stenosis. LCx: has heavy calcification with ostial 99% stenosis. The OM1 has 100%   occlusion with left to left collaterals. The OM2 has 100% occlusion with   left to left collaterals. The LPDA is large and is normal.     RCA: is a non-dominant vessel and has proximal 90% stenosis. Coronary Tree        Dominance: Right       LV Analysis   LV function assessed as:Abnormal.   Ejection Fraction   +----------------------------------------------------------------------+---+   ! Method                                                                ! EF%! +----------------------------------------------------------------------+---+   ! LV gram                                                               !30 !   +----------------------------------------------------------------------+---+       Procedure Summary        Three vessel disease with left main involvement. Moderate LV systolic dysfunction. LVEF 30%. Recommendations        Medical therapy as needed. Risk factor modification. CABG evaluation by CT surgery. Nuclear Stress 3/10/22:  Impression       Significant stress-induced ischemia in the lateral wall and septum.        Infarct in the apex encroaching into the apical inferior wall and also in the   basal inferior wall. LVEF 34%       Risk stratification: High risk. Echo 3/13//2022     Summary  Global left ventricular systolic function is normal. Estimated EF 85-47%  Normal diastolic filling. Normal right ventricular size and function. Mild mitral regurgitation. Mild tricuspid regurgitation. Mild pulmonary hypertension with an estimated right ventricular systolic  pressure of 41 mmHg. No pericardial effusion. Signature  ----------------------------------------------------------------------------   Electronically signed by Vesna Garcia(Sonographer) on 03/13/2022   12:51 PM  ----------------------------------------------------------------------------     ----------------------------------------------------------------------------   Electronically signed by Vinh Chau(Interpreting physician) on   03/14/2022 09:19 AM    Objective:   Vitals: BP 95/66   Pulse 77   Temp 97.5 °F (36.4 °C) (Bladder)   Resp 20   Ht 6' 3\" (1.905 m)   Wt 170 lb 3.1 oz (77.2 kg)   SpO2 90%   BMI 21.27 kg/m²   General appearance: alert and cooperative with exam  HEENT: Head: Normocephalic, no lesions, without obvious abnormality. Neck:no JVD, trachea midline, no adenopathy  Lungs: Clear to auscultation throughout. RA without distress  Heart: Regular rate and rhythm, s1/s2 auscultated, no murmurs. SB   Abdomen: soft, non-tender, bowel sounds active  Extremities: no edema  Neurologic: not done        Assessment / Acute Cardiac Problems:   - Chest pain and Abnormal stress test 3/10/22- ischemia in lateral and septal walls- with EF 34%  - Preserved EF on echo  - MV CAD s/p CABGx2 on 3/21/22 SLIMA to LAD, RSVG1 to OM, confirmed NO PDA (no RPDA or LPDA, diminutive non dominant RCA)   - HTN  - DL  - ARABELLA  - cocaine use      Plan of Treatment:   1. Wean pressors as tolerated. In sinus rhythm. 2. Monitor H/H. Hb stable for now. 3. Renal function improving.    4. Continue amio, asa, plavix, statin. 5. Hold lopressor while on pressors. 6. Post op care per CTS. Vent management per CTS.       Electronically signed by Lucille Montes MD on 3/23/2022 at 9:20 [address] [facility].  [phone]

## 2022-03-23 NOTE — PLAN OF CARE
Problem: Pain:  Goal: Pain level will decrease  Description: Pain level will decrease  3/22/2022 2235 by Roger Clark RN  Outcome: Ongoing  3/22/2022 1808 by Sonya Camp RN  Outcome: Met This Shift  Goal: Control of acute pain  Description: Control of acute pain  3/22/2022 2235 by Roger Clark RN  Outcome: Ongoing  3/22/2022 1808 by Sonya Camp RN  Outcome: Met This Shift  Goal: Control of chronic pain  Description: Control of chronic pain  3/22/2022 2235 by Roger Clark RN  Outcome: Ongoing  3/22/2022 1808 by Sonya Camp RN  Outcome: Met This Shift     Problem: Skin Integrity:  Goal: Will show no infection signs and symptoms  Description: Will show no infection signs and symptoms  3/22/2022 2235 by Roger Clark RN  Outcome: Ongoing  3/22/2022 1808 by Sonya Camp RN  Outcome: Ongoing  Goal: Absence of new skin breakdown  Description: Absence of new skin breakdown  3/22/2022 2235 by Roger Clark RN  Outcome: Ongoing  3/22/2022 1808 by Sonya Camp RN  Outcome: Ongoing     Problem: Falls - Risk of:  Goal: Will remain free from falls  Description: Will remain free from falls  3/22/2022 2235 by Roger Clark RN  Outcome: Ongoing  3/22/2022 1808 by Sonya Camp RN  Outcome: Met This Shift  Goal: Absence of physical injury  Description: Absence of physical injury  3/22/2022 2235 by Roger Clark RN  Outcome: Ongoing  3/22/2022 1808 by Sonya Camp RN  Outcome: Met This Shift     Problem: Nutrition  Goal: Optimal nutrition therapy  Outcome: Ongoing     Problem: Cardiac:  Goal: Ability to maintain vital signs within normal range will improve  Description: Ability to maintain vital signs within normal range will improve  3/22/2022 2235 by Roger Clark RN  Outcome: Ongoing  3/22/2022 1808 by Sonya Camp RN  Outcome: Ongoing  Goal: Cardiovascular alteration will improve  Description: Cardiovascular alteration will improve  3/22/2022 2235 by Apolonia Victoria CHEIKH Salgado  Outcome: Ongoing  3/22/2022 1808 by Manny Parsons RN  Outcome: Ongoing     Problem: Health Behavior:  Goal: Will modify at least one risk factor affecting health status  Description: Will modify at least one risk factor affecting health status  Outcome: Ongoing  Goal: Identification of resources available to assist in meeting health care needs will improve  Description: Identification of resources available to assist in meeting health care needs will improve  Outcome: Ongoing     Problem: Physical Regulation:  Goal: Complications related to the disease process, condition or treatment will be avoided or minimized  Description: Complications related to the disease process, condition or treatment will be avoided or minimized  3/22/2022 2235 by Brian Avila RN  Outcome: Ongoing  3/22/2022 1808 by Manny Parsons RN  Outcome: Ongoing     Problem: Non-Violent Restraints  Goal: Removal from restraints as soon as assessed to be safe  3/22/2022 2235 by Brian Avila RN  Outcome: Ongoing  3/22/2022 1808 by Manny Parsons RN  Outcome: Ongoing  Goal: No harm/injury to patient while restraints in use  3/22/2022 2235 by Brian Avila RN  Outcome: Ongoing  3/22/2022 1808 by Manny Parsons RN  Outcome: Met This Shift  Goal: Patient's dignity will be maintained  3/22/2022 2235 by Brian Avila RN  Outcome: Ongoing  3/22/2022 1808 by Manny Parsons RN  Outcome: Met This Shift     Problem: OXYGENATION/RESPIRATORY FUNCTION  Goal: Patient will maintain patent airway  3/22/2022 2235 by Brian Avila RN  Outcome: Ongoing  3/22/2022 1808 by Manny Parsons RN  Outcome: Ongoing  3/22/2022 1054 by Kj Canseco RCP  Outcome: Ongoing  Goal: Patient will achieve/maintain normal respiratory rate/effort  Description: Respiratory rate and effort will be within normal limits for the patient  3/22/2022 2235 by Brian Avila RN  Outcome: Ongoing  3/22/2022 1808 by Manny Parsons RN  Outcome: Ongoing  3/22/2022 1054 by Silke Foss RCP  Outcome: Ongoing  Note:   PROVIDE ADEQUATE OXYGENATION WITH ACCEPTABLE SP02/ABG'S    [x]  IDENTIFY APPROPRIATE OXYGEN THERAPY  [x]   MONITOR SP02/ABG'S AS NEEDED   [x]   PATIENT EDUCATION AS NEEDED        Problem: MECHANICAL VENTILATION  Goal: Patient will maintain patent airway  3/22/2022 2235 by Zaki So RN  Outcome: Ongoing  3/22/2022 1808 by Trang Smith RN  Outcome: Ongoing  3/22/2022 1054 by Silke Foss RCP  Outcome: Ongoing  Note: MECHANICAL VENTILATION     [x]  PROVIDE OPTIMAL VENTILATION  [x]   ASSESS FOR EXTUBATION READINESS  [x]   ASSESS FOR WEANING READINESS  [x]  EXTUBATE AS TOLERATED  [x]  IMPLEMENT ADULT MECHANICAL VENTILATION PROTOCOL  [x]  MAINTAIN ADEQUATE OXYGENATION  [x]  PERFORM SPONTANEOUS WEANING TRIAL AS TOLERATED     Goal: Oral health is maintained or improved  3/22/2022 2235 by Zaki So RN  Outcome: Ongoing  3/22/2022 1808 by Trang Smith RN  Outcome: Ongoing  3/22/2022 1054 by Silke Foss RCP  Outcome: Ongoing  Goal: ET tube will be managed safely  3/22/2022 2235 by Zaki So RN  Outcome: Ongoing  3/22/2022 1808 by Trang Smith RN  Outcome: Met This Shift  3/22/2022 1054 by Silke Foss RCP  Outcome: Ongoing  Goal: Ability to express needs and understand communication  3/22/2022 2235 by Zaki So RN  Outcome: Ongoing  3/22/2022 1808 by Trang Smith RN  Outcome: Ongoing  3/22/2022 1054 by Silke Foss RCP  Outcome: Ongoing  Goal: Mobility/activity is maintained at optimum level for patient  3/22/2022 2235 by Zaki So RN  Outcome: Ongoing  3/22/2022 1808 by Trang Simth RN  Outcome: Ongoing     Problem: SKIN INTEGRITY  Goal: Skin integrity is maintained or improved  3/22/2022 2235 by Zaki So RN  Outcome: Ongoing  3/22/2022 1808 by Trang Smith RN  Outcome: Ongoing  3/22/2022 1054 by Silke Foss RCP  Outcome: Ongoing

## 2022-03-23 NOTE — PROGRESS NOTES
Physician Progress Note      PATIENT:               Alesha More  CSN #:                  [id number]  :                       1955  ADMIT DATE:       3/11/2022 7:15 PM  [address] [place] DATE:  RESPONDING  PROVIDER #:        Andreina Escobar          QUERY TEXT:    Pt admitted with CAD s/p Cardiac Cath and CABG. Pt noted to have Hgb drop from   11.7- 8.6>6.6>9.0 . If possible, please document in the progress notes and   discharge summary if you are evaluating and/or treating any of the following: The medical record reflects the following:  Risk Factors: CAD s/p CABG  Clinical Indicators: Hgb 8.6>6.6>9.0, HCT 22.0>27.5>29.1, S/P CABGx2 3/21. Per   3/22 PN Cardiology Overnight 1 unit prbc transfused for Hb <7  Treatment: 1U PRBC overnight 3/22, Cardiac cath 3/11, CABG x2 3/21, Consults   Cardiothoracic surgery/Pulmonary, labs, ICU monitoring,  Ventilator support   post -op, Levophed, IV fluids/Bolus  Options provided:  -- Acute blood loss anemia  -- Acute on chronic blood loss anemia  -- Postoperative acute blood loss anemia  -- Dilutional anemia  -- Precipitous drop in Hemoglobin and Hematocrit  -- Other - I will add my own diagnosis  -- Disagree - Not applicable / Not valid  -- Disagree - Clinically unable to determine / Unknown  -- Refer to Clinical Documentation Reviewer    PROVIDER RESPONSE TEXT:    This patient has acute blood loss anemia. Query created by:  Tramaine Segal on 3/22/2022 11:53 AM      Electronically signed by:  Andreina Escobar 3/23/2022 9:38 AM

## 2022-03-23 NOTE — PROGRESS NOTES
[facility] [address] [employer]  Occupational Therapy Not Seen Note    DATE: 3/23/2022    NAME: Chloe Allison  MRN: [id number]   : 1955      Patient not seen this date for Occupational Therapy due to:    Patient is not appropriate for active participation in OT evaluation/treatment at this time d/t intubation and medical concerns preventing OOB activities this date. Next Scheduled Treatment: Attempt on 3/24 as appropriate.     Electronically signed by Conrad Leone OT on 3/23/2022 at 11:30 AM

## 2022-03-23 NOTE — PROGRESS NOTES
Comprehensive Nutrition Assessment    Type and Reason for Visit:  Reassess    Nutrition Recommendations/Plan: Continue NPO. Monitor for possible extubation. If pt remains intubated and nutrition support requested, suggest Tube Feedings of Standard without Fiber (Osmolite 1.2 ) goal rate 55 mL/hr = 1584 kcals, 73 gm pro/day. Monitor labs, weights, and plan of care. Nutrition Assessment:  Pt s/p CABG on 3/21. Currently remains intubated and sedated - weaning as tolerated. Propofol running at 4 mL/hr - being weaned down. Will monitor for possible extubation. Weight fluctuations noted. Labs reviewed: Glucose 127-156 mg/dL. Meds reviewed. Malnutrition Assessment:  Malnutrition Status: At risk for malnutrition    Context:  Acute Illness     Findings of the 6 clinical characteristics of malnutrition:  Energy Intake:  No significant decrease in energy intake  Weight Loss:  No significant weight loss - weight fluctuations since admission noted     Body Fat Loss:  1 - Mild body fat loss Orbital   Muscle Mass Loss:  Unable to assess - Pt covered by blankets at visit. Fluid Accumulation:  No significant fluid accumulation   Strength:  Not Performed    Estimated Daily Nutrient Needs:  Energy (kcal):  22-25 kcal/kg = 8464-5348 kcals/day; Weight Used for Energy Requirements:  Admission     Protein (g):  1.2-1.5 gm/kg =  gm pro/day; Weight Used for Protein Requirements:  Current    Fluid (ml/day):  25 mL/kg = 2901-5330 mL/day or per MD; Method Used for Fluid Requirements:  ml/Kg      Nutrition Related Findings:  Labs/Meds reviewed. Last BM 3/20. Wounds:  Surgical Incision,Multiple       Current Nutrition Therapies:    No diet orders on file  Additional Calorie Sources:   Propofol running at 4 mL/hr = 106 kcal/day - being weaned off.     Anthropometric Measures:  · Height: 6' 3\" (190.5 cm)  · Current Body Weight: 170 lb 3.1 oz (77.2 kg)   · Admission Body Weight: 141 lb 5 oz (64.1 kg)    · Usual Body Weight: 145 lb (65.8 kg)     · Ideal Body Weight: 196 lbs; % Ideal Body Weight 86.8 %   · BMI: 21.3  · BMI Categories: Normal Weight (BMI 18.5-24. 9)       Nutrition Diagnosis:   · Inadequate oral intake related to impaired respiratory function as evidenced by NPO or clear liquid status due to medical condition    Nutrition Interventions:   Food and/or Nutrient Delivery:  Continue NPO (Monitor for extubation vs need for nutrition support.)  Nutrition Education/Counseling:  No recommendation at this time   Coordination of Nutrition Care:  Continue to monitor while inpatient    Goals:  Meet % of estimated nutrition needs. Nutrition Monitoring and Evaluation:   Behavioral-Environmental Outcomes:  None Identified   Food/Nutrient Intake Outcomes:  Diet Advancement/Tolerance  Physical Signs/Symptoms Outcomes:  Biochemical Data,GI Status,Fluid Status or Edema,Hemodynamic Status,Nutrition Focused Physical Findings,Skin,Weight     Discharge Planning:     Too soon to determine     Electronically signed by Rigo Cameron RD, LD on 3/23/22 at 12:09 PM EDT    Contact: [phone]

## 2022-03-23 NOTE — PROGRESS NOTES
[facility]  Daily Progress Note    Surgeon: Dr. Cristin Tavares   S/P : CABG X 2   POD#: 2  EF: 45%     Subjective:  Mr. Satinder Amaya remains sedated and on vent. Physical Exam  Vital Signs: /69   Pulse 75   Temp 97 °F (36.1 °C)   Resp 19   Ht 6' 3\" (1.905 m)   Wt 170 lb 3.1 oz (77.2 kg)   SpO2 92%   BMI 21.27 kg/m²  O2 Flow Rate (L/min): 0 L/min   Admit Weight: Weight: 141 lb 5 oz (64.1 kg)   WEIGHTWeight: 170 lb 3.1 oz (77.2 kg)     General: Sedated and on vent. Heart: Normal S1 and S2.  Regular rhythm. No murmurs, gallops, or rubs. Pacing Wires: Yes -To be clipped prior to discharge  Lungs: clear to auscultation bilaterally and diminished breath sounds bibasilar Chest tubes: Yes, Air Leak: No  Abdomen: soft, non tender, non distended, BS x4  Extremities: negative  Wounds: clean and dry, healing appropriately.      Sternum: Healing  SVG sites: Healing    Scheduled Meds:    ipratropium-albuterol  1 ampule Inhalation Q6H    sodium chloride flush  10 mL IntraVENous 2 times per day    aspirin  81 mg Oral Daily    clopidogrel  75 mg Oral Daily    amiodarone  200 mg Oral TID    mupirocin   Nasal BID    sennosides-docusate sodium  1 tablet Oral BID    [Held by provider] metoprolol tartrate  25 mg Oral BID    atorvastatin  80 mg Oral Nightly    pantoprazole  40 mg Oral Daily    insulin glargine  0.15 Units/kg SubCUTAneous Nightly    [Held by provider] tiotropium  2 puff Inhalation Daily    tamsulosin  0.4 mg Oral Daily     Continuous Infusions:    dexmedetomidine Stopped (03/22/22 1457)    sodium chloride      propofol 35 mcg/kg/min (03/23/22 0700)    norepinephrine Stopped (03/22/22 1821)    EPINEPHrine 0.02 mcg/kg/min (03/23/22 0700)    insulin Stopped (03/22/22 1544)    dextrose      vasopressin (Septic Shock) infusion 0.02 Units/min (03/23/22 0700)    sodium chloride      sodium chloride         Data:  CBC:   Recent Labs     03/21/22  2250 03/22/22  0308 03/23/22  0347   WBC 19.1* 21.9* 12.3*   HGB 8.4* 9.0* 8.6*   HCT 27.5* 29.1* 28.1*   MCV 91.1 90.7 89.8   PLT See Reflexed IPF Result 147 See Reflexed IPF Result     BMP:   Recent Labs     03/21/22  2250 03/21/22  2250 03/22/22  0308 03/22/22  0638 03/23/22  0347   *  --  146*  --  144   K 4.6  --  4.3  --  4.2   *  --  109*  --  110*   CO2 15*  --  17*  --  22   BUN 15  --  14  --  21   CREATININE 1.26*   < > 1.32* 1.24* 1.26*    < > = values in this interval not displayed. PT/INR:   Recent Labs     03/21/22  1313 03/22/22  0308 03/23/22  0347   PROTIME 15.6* 15.7* 15.3*   INR 1.5 1.5 1.5     APTT:   Recent Labs     03/20/22  0958 03/21/22  0722 03/21/22  1313   APTT 54.1* 45.1* 27.7       Chest X-Ray: Image reviewed. Awaiting official report. I/O:  I/O last 3 completed shifts: In: 5900.7 [I.V.:4654.1; NG/GT:150; IV Piggyback:1096.6]  Out: 1 [Urine:1710; Emesis/NG output:300; Chest Tube:810]    Assessment/Plan:      Diagnosis Date    3-vessel CAD 03/11/2022    Alcohol abuse 06/23/2015    Arthritis     Chronic kidney disease     Full dentures     upper and lower full    Hypertension     Other emphysema ([address]     COPD stage II on PFTs with severe reduction diffusion capacity    Pure hypercholesterolemia 82/84/6023    Systolic CHF ([address]     Wears glasses       Neuro: Sedated and on vent   Lungs: Diminished in the bases   HD: On Epi, Levo and vaso.   MAP 71   Edema: None noted   GI: Active bowel sounds X4   : Good urine output    Beta-Blocker: yes- with holding parameters  ASA: yes  Plavix: yes  GI: yes  Statin: yes  Coumadin: no  ACE-I: no  EF: 45%     Oxygen as needed to maintain SpO2 > 92%  o Wean as tolerated  o Pulmonology already following- can assist with extubation   Chest x-ray daily   Wean off vasopressors as tolerated   Sputum sent per pulm    Continue on back up pacemaker at a rate of 60 BPM  o Switch sedated over to precedex pending heart rate   Keep Chest Tubes to wall suction   Encourage incentive spirometry, acapella and ambulation    Replace electrolytes as needed per sliding scale and recheck per policy   Case Management consult for discharge planning    The above recommendations including medications and orders were discussed and agreed upon with Dr. John Tamayo, the attending on service for the cardiothoracic surgery group today. Electronically signed by JACKELYN Freitas CNP on 3/23/2022 at 7:28 AM    On this date 3/23/2022 I have spent 29 minutes reviewing previous notes, test results and face to face with the patient discussing the diagnosis and importance of compliance with the treatment plan as well as documenting on the day of the visit. At least 50% of the time documented was spent with the patient to provide counseling and/or coordination of care. This note was created with the assistance of a speech-recognition program.  Although the intention is to generate a document that actually reflects the content of the visit, no guarantees can be provided that every mistake has been identified and corrected by editing. Note was updated later by me after  physical examination and  completion of the assessment.

## 2022-03-24 ENCOUNTER — APPOINTMENT (OUTPATIENT)
Dept: GENERAL RADIOLOGY | Age: 67
DRG: 233 | End: 2022-03-24
Attending: INTERNAL MEDICINE
Payer: MEDICARE

## 2022-03-24 LAB
ALLEN TEST: ABNORMAL
ANION GAP SERPL CALCULATED.3IONS-SCNC: 11 MMOL/L (ref 9–17)
BUN BLDV-MCNC: 24 MG/DL (ref 8–23)
CALCIUM SERPL-MCNC: 8.4 MG/DL (ref 8.6–10.4)
CHLORIDE BLD-SCNC: 113 MMOL/L (ref 98–107)
CO2: 22 MMOL/L (ref 20–31)
CREAT SERPL-MCNC: 0.99 MG/DL (ref 0.7–1.2)
CULTURE: ABNORMAL
DIRECT EXAM: ABNORMAL
FIO2: 4
FIO2: 40
FIO2: 40
FIO2: 8
GFR AFRICAN AMERICAN: >60 ML/MIN
GFR NON-AFRICAN AMERICAN: >60 ML/MIN
GFR SERPL CREATININE-BSD FRML MDRD: ABNORMAL ML/MIN/{1.73_M2}
GLUCOSE BLD-MCNC: 101 MG/DL (ref 74–100)
GLUCOSE BLD-MCNC: 108 MG/DL (ref 74–100)
GLUCOSE BLD-MCNC: 111 MG/DL (ref 74–100)
GLUCOSE BLD-MCNC: 114 MG/DL (ref 70–99)
GLUCOSE BLD-MCNC: 61 MG/DL (ref 75–110)
GLUCOSE BLD-MCNC: 82 MG/DL (ref 74–100)
GLUCOSE BLD-MCNC: 87 MG/DL (ref 75–110)
HCT VFR BLD CALC: 28.3 % (ref 40.7–50.3)
HCT VFR BLD CALC: 29.3 % (ref 40.7–50.3)
HEMOGLOBIN: 8.8 G/DL (ref 13–17)
HEMOGLOBIN: 9.1 G/DL (ref 13–17)
INR BLD: 1.3
MAGNESIUM: 2.4 MG/DL (ref 1.6–2.6)
MAGNESIUM: 2.6 MG/DL (ref 1.6–2.6)
MCH RBC QN AUTO: 27.6 PG (ref 25.2–33.5)
MCHC RBC AUTO-ENTMCNC: 31.1 G/DL (ref 28.4–34.8)
MCV RBC AUTO: 88.8 FL (ref 82.6–102.9)
MODE: ABNORMAL
MODE: ABNORMAL
NEGATIVE BASE EXCESS, ART: 1 (ref 0–2)
NEGATIVE BASE EXCESS, ART: 2 (ref 0–2)
NRBC AUTOMATED: 0 PER 100 WBC
O2 DEVICE/FLOW/%: ABNORMAL
PATIENT TEMP: 36
PDW BLD-RTO: 16.3 % (ref 11.8–14.4)
PLATELET # BLD: 119 K/UL (ref 138–453)
PMV BLD AUTO: 12.4 FL (ref 8.1–13.5)
POC HCO3: 20.7 MMOL/L (ref 21–28)
POC HCO3: 23.1 MMOL/L (ref 21–28)
POC HCO3: 23.8 MMOL/L (ref 21–28)
POC HCO3: 24.8 MMOL/L (ref 21–28)
POC LACTIC ACID: 1.31 MMOL/L (ref 0.56–1.39)
POC LACTIC ACID: 1.36 MMOL/L (ref 0.56–1.39)
POC LACTIC ACID: 1.49 MMOL/L (ref 0.56–1.39)
POC O2 SATURATION: 87 % (ref 94–98)
POC O2 SATURATION: 91 % (ref 94–98)
POC O2 SATURATION: 93 % (ref 94–98)
POC O2 SATURATION: 95 % (ref 94–98)
POC PCO2: 28.7 MM HG (ref 35–48)
POC PCO2: 35.1 MM HG (ref 35–48)
POC PCO2: 35.6 MM HG (ref 35–48)
POC PCO2: 40.5 MM HG (ref 35–48)
POC PH: 7.39 (ref 7.35–7.45)
POC PH: 7.43 (ref 7.35–7.45)
POC PH: 7.43 (ref 7.35–7.45)
POC PH: 7.47 (ref 7.35–7.45)
POC PO2: 48.3 MM HG (ref 83–108)
POC PO2: 62 MM HG (ref 83–108)
POC PO2: 64.5 MM HG (ref 83–108)
POC PO2: 73.2 MM HG (ref 83–108)
POSITIVE BASE EXCESS, ART: 0 (ref 0–3)
POSITIVE BASE EXCESS, ART: 0 (ref 0–3)
POTASSIUM SERPL-SCNC: 4 MMOL/L (ref 3.7–5.3)
POTASSIUM SERPL-SCNC: 4 MMOL/L (ref 3.7–5.3)
PROTHROMBIN TIME: 13.5 SEC (ref 9.1–12.3)
RBC # BLD: 3.3 M/UL (ref 4.21–5.77)
SAMPLE SITE: ABNORMAL
SODIUM BLD-SCNC: 146 MMOL/L (ref 135–144)
SPECIMEN DESCRIPTION: ABNORMAL
WBC # BLD: 5.8 K/UL (ref 3.5–11.3)

## 2022-03-24 PROCEDURE — 82803 BLOOD GASES ANY COMBINATION: CPT

## 2022-03-24 PROCEDURE — 6360000002 HC RX W HCPCS: Performed by: NURSE PRACTITIONER

## 2022-03-24 PROCEDURE — 85014 HEMATOCRIT: CPT

## 2022-03-24 PROCEDURE — 6370000000 HC RX 637 (ALT 250 FOR IP): Performed by: NURSE PRACTITIONER

## 2022-03-24 PROCEDURE — 2100000001 HC CVICU R&B

## 2022-03-24 PROCEDURE — 80048 BASIC METABOLIC PNL TOTAL CA: CPT

## 2022-03-24 PROCEDURE — 84132 ASSAY OF SERUM POTASSIUM: CPT

## 2022-03-24 PROCEDURE — 2500000003 HC RX 250 WO HCPCS: Performed by: NURSE PRACTITIONER

## 2022-03-24 PROCEDURE — 6360000002 HC RX W HCPCS: Performed by: INTERNAL MEDICINE

## 2022-03-24 PROCEDURE — 2580000003 HC RX 258: Performed by: NURSE PRACTITIONER

## 2022-03-24 PROCEDURE — 6360000002 HC RX W HCPCS

## 2022-03-24 PROCEDURE — 99291 CRITICAL CARE FIRST HOUR: CPT | Performed by: INTERNAL MEDICINE

## 2022-03-24 PROCEDURE — 6370000000 HC RX 637 (ALT 250 FOR IP): Performed by: THORACIC SURGERY (CARDIOTHORACIC VASCULAR SURGERY)

## 2022-03-24 PROCEDURE — 85610 PROTHROMBIN TIME: CPT

## 2022-03-24 PROCEDURE — 2700000000 HC OXYGEN THERAPY PER DAY

## 2022-03-24 PROCEDURE — 94660 CPAP INITIATION&MGMT: CPT

## 2022-03-24 PROCEDURE — 85018 HEMOGLOBIN: CPT

## 2022-03-24 PROCEDURE — 85027 COMPLETE CBC AUTOMATED: CPT

## 2022-03-24 PROCEDURE — 82947 ASSAY GLUCOSE BLOOD QUANT: CPT

## 2022-03-24 PROCEDURE — 99024 POSTOP FOLLOW-UP VISIT: CPT | Performed by: NURSE PRACTITIONER

## 2022-03-24 PROCEDURE — 94640 AIRWAY INHALATION TREATMENT: CPT

## 2022-03-24 PROCEDURE — 37799 UNLISTED PX VASCULAR SURGERY: CPT

## 2022-03-24 PROCEDURE — 94761 N-INVAS EAR/PLS OXIMETRY MLT: CPT

## 2022-03-24 PROCEDURE — 83735 ASSAY OF MAGNESIUM: CPT

## 2022-03-24 PROCEDURE — 6360000002 HC RX W HCPCS: Performed by: STUDENT IN AN ORGANIZED HEALTH CARE EDUCATION/TRAINING PROGRAM

## 2022-03-24 PROCEDURE — 94003 VENT MGMT INPAT SUBQ DAY: CPT

## 2022-03-24 PROCEDURE — 71045 X-RAY EXAM CHEST 1 VIEW: CPT

## 2022-03-24 PROCEDURE — 83605 ASSAY OF LACTIC ACID: CPT

## 2022-03-24 PROCEDURE — APPSS30 APP SPLIT SHARED TIME 16-30 MINUTES: Performed by: NURSE PRACTITIONER

## 2022-03-24 PROCEDURE — 2580000003 HC RX 258: Performed by: STUDENT IN AN ORGANIZED HEALTH CARE EDUCATION/TRAINING PROGRAM

## 2022-03-24 RX ORDER — FUROSEMIDE 10 MG/ML
20 INJECTION INTRAMUSCULAR; INTRAVENOUS ONCE
Status: COMPLETED | OUTPATIENT
Start: 2022-03-24 | End: 2022-03-24

## 2022-03-24 RX ORDER — POLYETHYLENE GLYCOL 3350 17 G/17G
17 POWDER, FOR SOLUTION ORAL 2 TIMES DAILY
Status: DISCONTINUED | OUTPATIENT
Start: 2022-03-25 | End: 2022-04-08 | Stop reason: HOSPADM

## 2022-03-24 RX ORDER — FUROSEMIDE 10 MG/ML
INJECTION INTRAMUSCULAR; INTRAVENOUS
Status: COMPLETED
Start: 2022-03-24 | End: 2022-03-24

## 2022-03-24 RX ORDER — FUROSEMIDE 10 MG/ML
20 INJECTION INTRAMUSCULAR; INTRAVENOUS DAILY
Status: DISCONTINUED | OUTPATIENT
Start: 2022-03-24 | End: 2022-03-26

## 2022-03-24 RX ADMIN — MUPIROCIN: 20 OINTMENT TOPICAL at 07:54

## 2022-03-24 RX ADMIN — FUROSEMIDE 20 MG: 10 INJECTION, SOLUTION INTRAMUSCULAR; INTRAVENOUS at 17:45

## 2022-03-24 RX ADMIN — STANDARDIZED SENNA CONCENTRATE AND DOCUSATE SODIUM 1 TABLET: 8.6; 5 TABLET ORAL at 07:55

## 2022-03-24 RX ADMIN — DEXMEDETOMIDINE HYDROCHLORIDE 0.2 MCG/KG/HR: 4 INJECTION, SOLUTION INTRAVENOUS at 19:47

## 2022-03-24 RX ADMIN — FENTANYL CITRATE 25 MCG: 50 INJECTION, SOLUTION INTRAMUSCULAR; INTRAVENOUS at 18:01

## 2022-03-24 RX ADMIN — ASPIRIN 81 MG: 81 TABLET, COATED ORAL at 07:53

## 2022-03-24 RX ADMIN — Medication 500000 UNITS: at 20:30

## 2022-03-24 RX ADMIN — FENTANYL CITRATE 50 MCG: 50 INJECTION INTRAMUSCULAR; INTRAVENOUS at 20:29

## 2022-03-24 RX ADMIN — IPRATROPIUM BROMIDE AND ALBUTEROL SULFATE 1 AMPULE: .5; 2.5 SOLUTION RESPIRATORY (INHALATION) at 15:36

## 2022-03-24 RX ADMIN — Medication 12.5 G: at 17:52

## 2022-03-24 RX ADMIN — SODIUM CHLORIDE, PRESERVATIVE FREE 10 ML: 5 INJECTION INTRAVENOUS at 07:54

## 2022-03-24 RX ADMIN — AMIODARONE HYDROCHLORIDE 150 MG: 150 INJECTION, SOLUTION INTRAVENOUS at 17:42

## 2022-03-24 RX ADMIN — FUROSEMIDE 20 MG: 10 INJECTION, SOLUTION INTRAMUSCULAR; INTRAVENOUS at 08:40

## 2022-03-24 RX ADMIN — FENTANYL CITRATE 25 MCG: 50 INJECTION, SOLUTION INTRAMUSCULAR; INTRAVENOUS at 08:03

## 2022-03-24 RX ADMIN — AMIODARONE HYDROCHLORIDE 1 MG/MIN: 50 INJECTION, SOLUTION INTRAVENOUS at 17:58

## 2022-03-24 RX ADMIN — DEXMEDETOMIDINE HYDROCHLORIDE 0.5 MCG/KG/HR: 4 INJECTION, SOLUTION INTRAVENOUS at 06:49

## 2022-03-24 RX ADMIN — SODIUM CHLORIDE, PRESERVATIVE FREE 10 ML: 5 INJECTION INTRAVENOUS at 20:30

## 2022-03-24 RX ADMIN — OXYCODONE HYDROCHLORIDE AND ACETAMINOPHEN 2 TABLET: 5; 325 TABLET ORAL at 09:00

## 2022-03-24 RX ADMIN — TAMSULOSIN HYDROCHLORIDE 0.4 MG: 0.4 CAPSULE ORAL at 07:54

## 2022-03-24 RX ADMIN — MUPIROCIN: 20 OINTMENT TOPICAL at 20:30

## 2022-03-24 RX ADMIN — CLOPIDOGREL 75 MG: 75 TABLET, FILM COATED ORAL at 07:53

## 2022-03-24 RX ADMIN — Medication 500000 UNITS: at 14:08

## 2022-03-24 RX ADMIN — OXYCODONE HYDROCHLORIDE AND ACETAMINOPHEN 2 TABLET: 5; 325 TABLET ORAL at 00:12

## 2022-03-24 RX ADMIN — IPRATROPIUM BROMIDE AND ALBUTEROL SULFATE 1 AMPULE: .5; 2.5 SOLUTION RESPIRATORY (INHALATION) at 07:33

## 2022-03-24 RX ADMIN — ATORVASTATIN CALCIUM 80 MG: 80 TABLET, FILM COATED ORAL at 20:30

## 2022-03-24 RX ADMIN — AMIODARONE HYDROCHLORIDE 200 MG: 200 TABLET ORAL at 07:53

## 2022-03-24 RX ADMIN — FENTANYL CITRATE 50 MCG: 50 INJECTION INTRAMUSCULAR; INTRAVENOUS at 03:26

## 2022-03-24 RX ADMIN — STANDARDIZED SENNA CONCENTRATE AND DOCUSATE SODIUM 1 TABLET: 8.6; 5 TABLET ORAL at 20:30

## 2022-03-24 ASSESSMENT — PULMONARY FUNCTION TESTS
PIF_VALUE: 23
PIF_VALUE: 21
PIF_VALUE: 22
PIF_VALUE: 16
PIF_VALUE: 16
PIF_VALUE: 32

## 2022-03-24 ASSESSMENT — PAIN DESCRIPTION - DESCRIPTORS: DESCRIPTORS: ACHING

## 2022-03-24 ASSESSMENT — PAIN SCALES - GENERAL
PAINLEVEL_OUTOF10: 7
PAINLEVEL_OUTOF10: 7

## 2022-03-24 ASSESSMENT — PAIN DESCRIPTION - LOCATION: LOCATION: GENERALIZED

## 2022-03-24 ASSESSMENT — PAIN DESCRIPTION - PAIN TYPE: TYPE: ACUTE PAIN

## 2022-03-24 NOTE — PLAN OF CARE
Problem: Pain:  Goal: Pain level will decrease  Description: Pain level will decrease  Outcome: Ongoing  Goal: Control of acute pain  Description: Control of acute pain  Outcome: Ongoing  Goal: Control of chronic pain  Description: Control of chronic pain  Outcome: Ongoing     Problem: Skin Integrity:  Goal: Will show no infection signs and symptoms  Description: Will show no infection signs and symptoms  Outcome: Ongoing  Goal: Absence of new skin breakdown  Description: Absence of new skin breakdown  Outcome: Ongoing     Problem: Falls - Risk of:  Goal: Will remain free from falls  Description: Will remain free from falls  Outcome: Ongoing  Goal: Absence of physical injury  Description: Absence of physical injury  Outcome: Ongoing     Problem: Nutrition  Goal: Optimal nutrition therapy  Outcome: Ongoing     Problem: Cardiac:  Goal: Ability to maintain vital signs within normal range will improve  Description: Ability to maintain vital signs within normal range will improve  Outcome: Ongoing  Goal: Cardiovascular alteration will improve  Description: Cardiovascular alteration will improve  Outcome: Ongoing     Problem: Health Behavior:  Goal: Will modify at least one risk factor affecting health status  Description: Will modify at least one risk factor affecting health status  Outcome: Ongoing  Goal: Identification of resources available to assist in meeting health care needs will improve  Description: Identification of resources available to assist in meeting health care needs will improve  Outcome: Ongoing     Problem: Physical Regulation:  Goal: Complications related to the disease process, condition or treatment will be avoided or minimized  Description: Complications related to the disease process, condition or treatment will be avoided or minimized  Outcome: Ongoing     Problem: Non-Violent Restraints  Goal: Removal from restraints as soon as assessed to be safe  3/23/2022 2037 by Jose Lagunas RN  Outcome: Ongoing  3/23/2022 0849 by Nick Chavez RN  Outcome: Ongoing  Goal: No harm/injury to patient while restraints in use  3/23/2022 2037 by Peyton Thomas RN  Outcome: Ongoing  3/23/2022 0849 by Nick Chavez RN  Outcome: Ongoing  Goal: Patient's dignity will be maintained  3/23/2022 2037 by Peyton Thomas RN  Outcome: Ongoing  3/23/2022 0849 by Nick Chavez RN  Outcome: Ongoing     Problem: OXYGENATION/RESPIRATORY FUNCTION  Goal: Patient will maintain patent airway  Outcome: Ongoing  Goal: Patient will achieve/maintain normal respiratory rate/effort  Description: Respiratory rate and effort will be within normal limits for the patient  Outcome: Ongoing     Problem: MECHANICAL VENTILATION  Goal: Patient will maintain patent airway  Outcome: Ongoing  Goal: Oral health is maintained or improved  Outcome: Ongoing  Goal: ET tube will be managed safely  Outcome: Ongoing  Goal: Ability to express needs and understand communication  Outcome: Ongoing  Goal: Mobility/activity is maintained at optimum level for patient  Outcome: Ongoing     Problem: SKIN INTEGRITY  Goal: Skin integrity is maintained or improved  Outcome: Ongoing

## 2022-03-24 NOTE — PROGRESS NOTES
Per Dr. Jacobs Primes- place patient back onto [facility] mode of ventilator.

## 2022-03-24 NOTE — PROGRESS NOTES
03/24/22 0805   Vent Information   Vent Mode CPAP  (SBT)   Pressure Support 8 cmH20   FiO2  40 %   SpO2 96 %   SpO2/FiO2 ratio 240   Sensitivity 5   PEEP/CPAP 8

## 2022-03-24 NOTE — CARE COORDINATION
[facility] Flow/Interdisciplinary Rounds Progress Note    Quality Flow Rounds held on March 24, 2022 at [address] Attending:  Bedside Nurse,  and Nursing Unit Leadership    Barriers to Discharge: extubated today - maintain appropriate SPO2     Anticipated Discharge Date:  Expected Discharge Date: 03/18/22    Anticipated Discharge Disposition: SNF    Readmission Risk              Risk of Unplanned Readmission:  18           Discussed patient goal for the day, patient clinical progression, and barriers to discharge.   The following Goal(s) of the Day/Commitment(s) have been identified:  Choice - Obtain Post-Acute Preference(s)      Leny Barker RN  March 24, 2022

## 2022-03-24 NOTE — PROGRESS NOTES
[facility]  Progress Note                   Date:   3/24/2022  Patient name: Tutu Bautista  Date of admission:  3/11/2022  7:15 PM  MRN:   [id number]  YOB: 1955  PCP: Greg Hernandez MD    Reason for Admission: Chest pain [R07.9]  S/P cardiac catheterization [Z98.890]  3-vessel CAD [I25.10]    Subjective:       Clinical Changes /Abnormalities:   POD3 CABG. Intubated  Off epi. On low dose vaso. Sinus rhythm  CVP 18      Medications:   Scheduled Meds:   ipratropium-albuterol  1 ampule Inhalation Q6H    sodium chloride flush  10 mL IntraVENous 2 times per day    aspirin  81 mg Oral Daily    clopidogrel  75 mg Oral Daily    amiodarone  200 mg Oral TID    mupirocin   Nasal BID    sennosides-docusate sodium  1 tablet Oral BID    [Held by provider] metoprolol tartrate  25 mg Oral BID    atorvastatin  80 mg Oral Nightly    pantoprazole  40 mg Oral Daily    insulin glargine  0.15 Units/kg SubCUTAneous Nightly    [Held by provider] tiotropium  2 puff Inhalation Daily    tamsulosin  0.4 mg Oral Daily     Continuous Infusions:   dexmedetomidine 0.5 mcg/kg/hr (03/24/22 0650)    sodium chloride      propofol Stopped (03/23/22 1455)    norepinephrine Stopped (03/22/22 1821)    EPINEPHrine 0.0047 mcg/kg/min (03/24/22 0650)    insulin Stopped (03/22/22 1544)    dextrose      vasopressin (Septic Shock) infusion 0.01 Units/min (03/24/22 0650)    sodium chloride      sodium chloride       CBC:   Recent Labs     03/22/22  0308 03/23/22  0347 03/24/22  0515   WBC 21.9* 12.3* 5.8   HGB 9.0* 8.6* 9.1*    See Reflexed IPF Result 119*     BMP:    Recent Labs     03/21/22  1748 03/22/22  0308 03/22/22  0638 03/23/22  0347 03/24/22  0515   NA  --  146*  --  144 146*   K  --  4.3  --  4.2 4.0   CL  --  109*  --  110* 113*   CO2  --  17*  --  22 22   BUN  --  14  --  21 24*   CREATININE   < > 1.32* 1.24* 1.26* 0.99   GLUCOSE  --  203*  --  156* 114*    < > = values in this interval not displayed. Hepatic:  No results for input(s): AST, ALT, ALB, BILITOT, ALKPHOS in the last 72 hours. Troponin:   No results for input(s): TROPHS in the last 72 hours. BNP: No results for input(s): BNP in the last 72 hours. Lipids:   No results for input(s): CHOL, HDL in the last 72 hours. Invalid input(s): LDLCALCU  INR:   Recent Labs     03/22/22  0308 03/23/22  0347 03/24/22  0515   INR 1.5 1.5 1.3         3/11/2022- cardiac cath     Findings:   Angiographic Findings        Cardiac Arteries and Lesion Findings       LMCA: has heavy calcification with mid 60% stenosis. LAD: has heavy calcification in the proximal segment with 40% stenosis. The   mid segment has 85% stenosis. LCx: has heavy calcification with ostial 99% stenosis. The OM1 has 100%   occlusion with left to left collaterals. The OM2 has 100% occlusion with   left to left collaterals. The LPDA is large and is normal.     RCA: is a non-dominant vessel and has proximal 90% stenosis. Coronary Tree        Dominance: Right       LV Analysis   LV function assessed as:Abnormal.   Ejection Fraction   +----------------------------------------------------------------------+---+   ! Method                                                                ! EF%! +----------------------------------------------------------------------+---+   ! LV gram                                                               !30 !   +----------------------------------------------------------------------+---+       Procedure Summary        Three vessel disease with left main involvement. Moderate LV systolic dysfunction. LVEF 30%. Recommendations        Medical therapy as needed. Risk factor modification. CABG evaluation by CT surgery. Nuclear Stress 3/10/22:  Impression       Significant stress-induced ischemia in the lateral wall and septum. Infarct in the apex encroaching into the apical inferior wall and also in the   basal inferior wall. LVEF 34%       Risk stratification: High risk. Echo 3/13//2022     Summary  Global left ventricular systolic function is normal. Estimated EF 82-77%  Normal diastolic filling. Normal right ventricular size and function. Mild mitral regurgitation. Mild tricuspid regurgitation. Mild pulmonary hypertension with an estimated right ventricular systolic  pressure of 41 mmHg. No pericardial effusion. Signature  ----------------------------------------------------------------------------   Electronically signed by Vesna Martinez(Sonographer) on 03/13/2022   12:51 PM  ----------------------------------------------------------------------------     ----------------------------------------------------------------------------   Electronically signed by Vinh Chau(Interpreting physician) on   03/14/2022 09:19 AM    Objective:   Vitals: /79   Pulse 70   Temp 97 °F (36.1 °C) (Oral)   Resp 26   Ht 6' 3\" (1.905 m)   Wt 163 lb 12.8 oz (74.3 kg)   SpO2 94%   BMI 20.47 kg/m²   General appearance: alert and cooperative with exam  HEENT: Head: Normocephalic, no lesions, without obvious abnormality. Neck:no JVD, trachea midline, no adenopathy  Lungs: Clear to auscultation throughout. RA without distress  Heart: Regular rate and rhythm, s1/s2 auscultated, no murmurs. SB   Abdomen: soft, non-tender, bowel sounds active  Extremities: no edema  Neurologic: not done        Assessment / Acute Cardiac Problems:   - Chest pain and Abnormal stress test 3/10/22- ischemia in lateral and septal walls- with EF 34%  - Preserved EF on echo  - MV CAD s/p CABGx2 on 3/21/22 SLIMA to LAD, RSVG1 to OM, confirmed NO PDA (no RPDA or LPDA, diminutive non dominant RCA)   - HTN  - DL  - ARABELLA  - cocaine use      Plan of Treatment:   Weaning pressors. In sinus rhythm. Monitor H/H. Hb stable for now. Renal function improving. Daily IV lasix 20 mg. -1L since admit  Continue amio, asa, plavix, statin.   Hold lopressor while on pressors. Post op care per CTS. Vent management per pulm. Possible extubation today. Electronically signed by Jennifer Acevedo MD on 3/24/2022 at 7:20 [address] [facility].  [phone]    Attending Cardiologist Addendum: I have reviewed and performed the history, physical, subjective, objective, assessment, and plan with the student/resident/fellow/APN and agree with the note. I performed the history and physical personally. I have made changes to the note above as needed. Now extubated  On small doses of levo and epi  Opens eyes and responds    Thank you for allowing me to participate in the care of this patient, please do not hesitate to call if you have any questions. [place], DO, [address], [address], [address] [facility]  [facility]. IXcellerate  [phone]

## 2022-03-24 NOTE — CARE COORDINATION
Met with patient's daughter Efrain Martinez at the bedside to obtain choices. She stated she is awaiting feedback from her aunt and will give choices tomorrow morning.

## 2022-03-24 NOTE — PROGRESS NOTES
Order obtain for extubation. SpO2 of 97 on 40% FiO2. Patient extubated and placed on 4 liters/min via nasal cannula. Post extubation SpO2 is 97% with HR 75 bpm and RR 18 breaths/min. Patient had a weak cough that was non-productive. Extubation Well tolerated by patient. .   Breath Sounds: diminished    Marytyra Morgan, RCP   9:08 AM

## 2022-03-24 NOTE — PROGRESS NOTES
Spoke to Tommy on the phone updated him on pt U/O, CVP, mental status (pt seeing ants), oxygen saturation, ABGs, A fib with RVR, fluid volume status. Received verbal order to give IV lasix and place pt on NIV.

## 2022-03-24 NOTE — PROGRESS NOTES
Physical Therapy        Physical Therapy Cancel Note      DATE: 3/24/2022    NAME: Theo Santos  MRN: [id number]   : 1955      Patient not seen this date for Physical Therapy due to: Other: plan to extubate this AM. PT will check back as time allows this PM or 3/25/22.       Electronically signed by Krish Hernandez PT on 3/24/2022 at 1:25 PM

## 2022-03-24 NOTE — PROGRESS NOTES
RCP Messi White to bedside. Patient following commands, able to move spontaneously and initiating breathing on the vent. P Messi White switched vent setting to CPAP trial and began weaning process. Will get ABG in approx 1 hour, consult with pulm and attempt to extubate if indicated.

## 2022-03-24 NOTE — PROGRESS NOTES
Spoke with [facility] CTS, NP regarding patient low urine output, blood pressure, current gtts and patient vagal responses with any stimulation. Orders received  for a 50mL 25% albumin and  20mg IV lasix 1x. Will continually monitor patient vitals and keep team updated of any changes. Per Fredick Closs, if this intervention does not increase U/O just to monitor hourly still. If patient becomes anuric to let jaz know, but if patient is still making urine then its OK.

## 2022-03-24 NOTE — PROGRESS NOTES
[facility]  Daily Progress Note    Surgeon: Dr. Mack Roger   S/P : CABG X 2   POD#: 3  EF: 45%     Subjective:  Mr. Sisi Bentley remains sedated and on vent. Follows commands and nods head appropriately    Physical Exam  Vital Signs: /79   Pulse 70   Temp 97 °F (36.1 °C) (Oral)   Resp 26   Ht 6' 3\" (1.905 m)   Wt 163 lb 12.8 oz (74.3 kg)   SpO2 94%   BMI 20.47 kg/m²  O2 Flow Rate (L/min): 0 L/min   Admit Weight: Weight: 141 lb 5 oz (64.1 kg)   WEIGHTWeight: 163 lb 12.8 oz (74.3 kg)     General: Sedated and on vent. Heart: Normal S1 and S2.  Regular rhythm. No murmurs, gallops, or rubs. Pacing Wires: Yes -To be clipped prior to discharge  Lungs: clear to auscultation bilaterally and diminished breath sounds bibasilar Chest tubes: Yes, Air Leak: No  Abdomen: soft, non tender, non distended, BS x4  Extremities: negative  Wounds: clean and dry, healing appropriately.      Sternum: Healing  SVG sites: Healing    Scheduled Meds:    furosemide  20 mg IntraVENous Daily    ipratropium-albuterol  1 ampule Inhalation Q6H    sodium chloride flush  10 mL IntraVENous 2 times per day    aspirin  81 mg Oral Daily    clopidogrel  75 mg Oral Daily    amiodarone  200 mg Oral TID    mupirocin   Nasal BID    sennosides-docusate sodium  1 tablet Oral BID    [Held by provider] metoprolol tartrate  25 mg Oral BID    atorvastatin  80 mg Oral Nightly    pantoprazole  40 mg Oral Daily    insulin glargine  0.15 Units/kg SubCUTAneous Nightly    [Held by provider] tiotropium  2 puff Inhalation Daily    tamsulosin  0.4 mg Oral Daily     Continuous Infusions:    dexmedetomidine 0.5 mcg/kg/hr (03/24/22 0650)    sodium chloride      propofol Stopped (03/23/22 1455)    norepinephrine Stopped (03/22/22 1821)    EPINEPHrine 0.0047 mcg/kg/min (03/24/22 0650)    insulin Stopped (03/22/22 1544)    dextrose      vasopressin (Septic Shock) infusion 0.01 Units/min (03/24/22 0650)    sodium chloride  sodium chloride         Data:  CBC:   Recent Labs     03/22/22  0308 03/23/22  0347 03/24/22  0515   WBC 21.9* 12.3* 5.8   HGB 9.0* 8.6* 9.1*   HCT 29.1* 28.1* 29.3*   MCV 90.7 89.8 88.8    See Reflexed IPF Result 119*     BMP:   Recent Labs     03/21/22  1748 03/22/22  0308 03/22/22  0638 03/23/22  0347 03/24/22  0515   NA  --  146*  --  144 146*   K  --  4.3  --  4.2 4.0   CL  --  109*  --  110* 113*   CO2  --  17*  --  22 22   BUN  --  14  --  21 24*   CREATININE   < > 1.32* 1.24* 1.26* 0.99    < > = values in this interval not displayed. PT/INR:   Recent Labs     03/22/22  0308 03/23/22 0347 03/24/22  0515   PROTIME 15.7* 15.3* 13.5*   INR 1.5 1.5 1.3     APTT:   Recent Labs     03/21/22  1313   APTT 27.7       Chest X-Ray: Image reviewed. Awaiting official report. I/O:  I/O last 3 completed shifts: In: 2883.3 [I.V.:2052. 9; NG/GT:390; IV Piggyback:440.4]  Out: 2415 [Urine:1605; Emesis/NG output:300; Chest Tube:510]    Assessment/Plan:      Diagnosis Date    3-vessel CAD 03/11/2022    Alcohol abuse 06/23/2015    Arthritis     Chronic kidney disease     Full dentures     upper and lower full    Hypertension     Other emphysema ([address]     COPD stage II on PFTs with severe reduction diffusion capacity    Pure hypercholesterolemia 57/20/4101    Systolic CHF ([address]     Wears glasses       Neuro: Sedated and on vent   Lungs: Diminished in the bases   HD: On Vaso.     Edema: None noted   GI: Active bowel sounds X4   : Good urine output    Beta-Blocker: yes  ASA: yes  Plavix: yes  GI: yes  Statin: yes  Coumadin: no  ACE-I: no  EF: 45%     Oxygen as needed to maintain SpO2 > 92%  o Wean as tolerated  o Pulmonology already following- can assist with extubation  o Passed his CPAP trial this AM- told by pulm to keep intubated   Chest x-ray daily   Wean off vasopressors as tolerated   Continue sedation over to precedex    Keep Chest Tubes to wall suction   Encourage incentive spirometry, acapella and ambulation    Replace electrolytes as needed per sliding scale and recheck per policy   Case Management consult for discharge planning    The above recommendations including medications and orders were discussed and agreed upon with Dr. Rabia Jain, the attending on service for the cardiothoracic surgery group today. Electronically signed by JACKELYN Lincoln CNP on 3/24/2022 at 7:32 AM    On this date 3/24/2022 I have spent 27 minutes reviewing previous notes, test results and face to face with the patient discussing the diagnosis and importance of compliance with the treatment plan as well as documenting on the day of the visit. At least 50% of the time documented was spent with the patient to provide counseling and/or coordination of care. This note was created with the assistance of a speech-recognition program.  Although the intention is to generate a document that actually reflects the content of the visit, no guarantees can be provided that every mistake has been identified and corrected by editing. Note was updated later by me after  physical examination and  completion of the assessment.

## 2022-03-24 NOTE — PROGRESS NOTES
PULMONARY & CRITICAL CARE MEDICINE PROGRESS NOTE     Patient:  Helen العلي  MRN: [id number]  Admit date: 3/11/2022  Primary Care Physician: Manuel Zaragoza MD  Consulting Physician: Roberto Valencia MD  CODE Status: Full Code  LOS: 13    SUBJECTIVE     CHIEF COMPLAINT/REASON FOR INITIAL CONSULT:    COPD/chronic cough/preoperative evaluation    BRIEF HOSPITAL COURSE:  The patient is a 74 y.o. male history of hypertension, history of mild systolic dysfunction admitted with chest pain initially to [facility] on 03/09/2022 non-ST elevation MI ruled out by troponins in 20s had a stress test done because of high suspicion which shows ischemia of lateral septal wall with ejection fraction of 34%.  He does have history of cocaine use and apparently he had used cocaine before admission to [facility].  He was transferred to [facility] for cardiac catheterization which shows multivessel coronary artery disease with EF of 30% including left main disease.  He remains on a statin and heparin.  CT surgery was consulted for CABG.    Patient had pulmonary function test done which shows FEV1 of 46% with significance positive bronchodilator FEV1 of 56% with severe reduction in diffusion capacity of less than 30% with air trapping/hyperinflation. CT scan of the chest shows areas of blebs and bulla medially and centrilobular and paraseptal emphysema on CT scan.     Patient does complain of shortness of breath on mild activity and exertion.  He does have chronic cough without no change denies increased wheezing currently denies chest pain.  He has history of cocaine use.  Does have history of smoking patient is not able to tell me that how many cigarettes he used to smoke but he claimed that now he smoke 1 pack/week and had been smoking for almost 50 years.     INTERVAL HISTORY:  03/24/22  Patient seen in cardiovascular ICU this morning, overnight events noted, labs and arterial blood gases and ventilator support this morning was seen. Overnight he was weaned off epinephrine drip and on vasopressin drip this morning. He is hypotensive and continued vasopressin. No bradycardia was reported overnight heart rate is in 60s to 70s. This morning he was on 40% FiO2 and PEEP of 8. Ventilator setting SIMV/16/600/8/40 percent. ABG 7.3 940/62. He was on CPAP/pressure support 8/8/40 percent tolerated this morning breathing trial and extubated. Currently on 4 L nasal cannula maintaining saturation above 92% lethargic but arousable. He was on Precedex drip this morning and weaned off Precedex drip. Urine output 1185 in last 24 hours he had received 1 dose of Lasix 20 mg this morning. Labs show sodium 146 potassium 4.0 BUN 24 creatinine 0.99 bicarbonate 22 WBC 5.8 hemoglobin 9.1 platelet count 248. Chest x-ray 2022 shows bilateral small effusion bibasilar atelectasis possible right lower lobe infiltrate/atelectasis cardiomegaly pulmonary venous congestion. Respiratory culture sent on 2022 showed no growth so far.     REVIEW OF SYSTEMS:  Unobtainable from patient due to sedation/mechanical ventilation  Patient seen after extubation was lethargic unable to provide review of system but arousable with eyes open    OBJECTIVE     Ventilator Settings:  Vent Information  $Ventilation: $Subsequent Day  Skin Assessment: Clean, dry, & intact  Equipment Changed: HME  Vent Type: Servo i  Vent Mode: (S) CPAP (SBT)  Vt Ordered: 600 mL  Rate Set: 16 bmp  Pressure Support: 8 cmH20  FiO2 : 40 %  SpO2: 95 %  SpO2/FiO2 ratio: 240  Sensitivity: 5  PEEP/CPAP: 8  I Time/ I Time %: 0.9 s  Humidification Source: HME  Nitric Oxide/Epoprostenol In Use?: No    VITAL SIGNS:   LAST-  BP (!) 73/57   Pulse 70   Temp 97.5 °F (36.4 °C) (Axillary)   Resp 21   Ht 6' 3\" (1.905 m)   Wt 163 lb 12.8 oz (74.3 kg)   SpO2 95%   BMI 20.47 kg/m²   8-24 HR RANGE-  TEMP Temp  Av.4 °F (36.3 °C)  Min: 97 °F (36.1 °C)  Max: 97.7 °F (18.6 °C)   BP Systolic (06IPR), XAC:374 , Min:73 , OFN:667      Diastolic (93CLZ), SSD:56, Min:57, Max:85     PULSE Pulse  Av.9  Min: 67  Max: 78   RR Resp  Av.6  Min: 15  Max: 28   O2 SAT SpO2  Av.2 %  Min: 93 %  Max: 97 %   OXYGEN DELIVERY O2 Flow Rate (L/min)  Av L/min  Min: 4 L/min  Max: 4 L/min     SYSTEMIC EXAMINATION:   General appearance -lethargic eyes opening, chronically ill-appearing follows some command intermittently  Mental status -on Precedex drip this morning low-dose  Eyes - pupils equal and reactive sluggish, sclera anicteric  Mouth -oral mucosa look moist  Neck - supple, no significant adenopathy, carotids upstroke normal bilaterally, no bruits  Chest - Breath sounds bilaterally were dimnished to auscultation at bases. There were scattered rhonchi present with distant breath sound slightly diminished breath sounds on left as compared to right. Heart -regular S1-S2, regular rhythm, normal S1, S2, no murmurs, clicks or gallops  Abdomen - soft, nontender, nondistended, no masses or organomegaly  Neurological - DTR's decreased and plantars upgoing, spontaneously moves did not follow command.   Extremities - peripheral pulses normal, no pedal edema, no clubbing or cyanosis  Skin - normal coloration and turgor, no rashes, no suspicious skin lesions noted     DATA REVIEW     Medications: Current Inpatient  Scheduled Meds:   furosemide  20 mg IntraVENous Daily    [START ON 3/25/2022] polyethylene glycol  17 g Oral BID    nystatin  5 mL Oral 4x Daily    ipratropium-albuterol  1 ampule Inhalation Q6H    sodium chloride flush  10 mL IntraVENous 2 times per day    aspirin  81 mg Oral Daily    clopidogrel  75 mg Oral Daily    amiodarone  200 mg Oral TID    mupirocin   Nasal BID    sennosides-docusate sodium  1 tablet Oral BID    metoprolol tartrate  25 mg Oral BID    atorvastatin  80 mg Oral Nightly    pantoprazole  40 mg Oral Daily    insulin glargine  0.15 Units/kg SubCUTAneous Nightly    [Held by provider] tiotropium  2 puff Inhalation Daily    tamsulosin  0.4 mg Oral Daily     Continuous Infusions:   dexmedetomidine Stopped (03/24/22 0910)    sodium chloride      propofol Stopped (03/23/22 1455)    norepinephrine Stopped (03/22/22 1821)    EPINEPHrine 0.0047 mcg/kg/min (03/24/22 0650)    insulin Stopped (03/22/22 1544)    dextrose      vasopressin (Septic Shock) infusion Stopped (03/24/22 1037)    sodium chloride      sodium chloride       INPUT/OUTPUT:  In: 2129.3 [I.V.:1748. 9; NG/GT:290]  Out: 1910 [Urine:1230]  Date 03/24/22 0000 - 03/24/22 2359   Shift 5173-7223 4983-2710 0856-7005 24 Hour Total   INTAKE   I.V.(mL/kg) 99.5(1.3) 17.1(0.2)  116.6(1.6)   Shift Total(mL/kg) 99.5(1.3) 17.1(0.2)  116.6(1.6)   OUTPUT   Urine(mL/kg/hr) 765(1.3)   765   Chest Tube 70   70   Shift Total(mL/kg) 835(11.2)   835(11.2)   Weight (kg) 74.3 74.3 74.3 74.3       LABS:-  ABG:   Recent Labs     03/22/22  0638 03/23/22  0345 03/24/22  0626 03/24/22  0849 03/24/22  1158   POCPH 7.366 7.417 7.395 7.434 7.427   POCPCO2 41.8 36.5 40.5 35.6 35.1   POCPO2 91.1 54.8* 62.0* 64.5* 73.2*   POCHCO3 24.0 23.5 24.8 23.8 23.1   JCCV1KMP 97 89* 91* 93* 95     CBC:   Recent Labs     03/22/22  0308 03/22/22  0308 03/23/22  0347 03/24/22  0515 03/24/22  1209   WBC 21.9*  --  12.3* 5.8  --    HGB 9.0*   < > 8.6* 9.1* 8.8*   HCT 29.1*   < > 28.1* 29.3* 28.3*   MCV 90.7  --  89.8 88.8  --      --  See Reflexed IPF Result 119*  --    RBC 3.21*  --  3.13* 3.30*  --    MCH 28.0  --  27.5 27.6  --    MCHC 30.9  --  30.6 31.1  --    RDW 16.1*  --  16.6* 16.3*  --     < > = values in this interval not displayed.      BMP:   Recent Labs     03/21/22  1748 03/22/22  0308 03/22/22  0308 03/22/22  9281 03/23/22  0347 03/24/22  0515 03/24/22  1209   NA  --  146*  --   --  144 146*  --    K  --  4.3   < >  --  4.2 4.0 4.0   CL  --  109*  --   --  110* 113*  --    CO2  --  17*  --   --  22 22  --    BUN  -- 14  --   --  21 24*  --    CREATININE   < > 1.32*  --  1.24* 1.26* 0.99  --    GLUCOSE  --  203*  --   --  156* 114*  --     < > = values in this interval not displayed. Liver Function Test:   No results for input(s): PROT, LABALBU, ALT, AST, GGT, ALKPHOS, BILITOT in the last 72 hours. Amylase/Lipase:  No results for input(s): AMYLASE, LIPASE in the last 72 hours. Coagulation Profile:   Recent Labs     03/21/22  1313 03/21/22  1313 03/22/22  0308 03/23/22  0347 03/24/22  0515   INR 1.5   < > 1.5 1.5 1.3   PROTIME 15.6*   < > 15.7* 15.3* 13.5*   APTT 27.7  --   --   --   --     < > = values in this interval not displayed. Cardiac Enzymes:  No results for input(s): CKTOTAL, CKMB, CKMBINDEX, TROPONINI in the last 72 hours. Lactic Acid:  Lab Results   Component Value Date    LACTA NOT REPORTED 03/11/2018    LACTA NOT REPORTED 03/10/2018    LACTA NOT REPORTED 03/10/2018     BNP:   Lab Results   Component Value Date    BNP 70 09/13/2013     D-Dimer:  No results found for: DDIMER  Others:   No results found for: TSH, F1YNKVD, R8POOTW, THYROIDAB, FT3, T4FREE  Lab Results   Component Value Date    SEDRATE 20 (H) 05/21/2014    CRP 15.2 (H) 05/21/2014     No results found for: Jose Ponjohn  No results found for: IRON, TIBC, FERRITIN  No results found for: SPEP, UPEP  Lab Results   Component Value Date    PSA 1.11 11/04/2016       Microbiology:  Recent Labs     03/22/22  2030   [address] [facility] . TRACHEAL ASPIRATE   CULTURE NORMAL RESPIRATORY KYLAH LIGHT GROWTH       Pathology:    Radiology Reports:  XR CHEST PORTABLE   Preliminary Result   1. Support lines and tubes remain in place. 2.  Bibasilar opacities persist, slightly increased on the right, suggesting   layering pleural effusions and atelectasis. 3.  Mildly increased vascular congestion, possibly accentuated by supine   positioning.          XR CHEST PORTABLE   Final Result   Stable chest (suspect right pleural effusion in addition to left effusion and/or atelectasis) with line and tube placements as detailed above. XR CHEST PORTABLE   Final Result   As compared to prior examination, the ET tube now is in satisfactory   position. Examination otherwise is unchanged. XR CHEST PORTABLE   Final Result   1. Endotracheal tube is low in position, approximately 1 cm above the   veronique. This should be pulled back approximately 3 cm. 2.  Other lines and tubes are unchanged in position. 3.  New small right pleural effusion. 4.  Unchanged left basilar opacity. XR CHEST PORTABLE   Final Result   1. Lines and tubes are unchanged in position. 2. Linear bibasilar opacities are similar prior study, atelectasis versus   pneumonia. XR CHEST PORTABLE   Final Result   All tubes and catheters are in good position. No evidence for pneumothorax. Bilateral basal atelectasis noted. VL DUP UPPER EXTREMITY ARTERIES BILATERAL   Final Result      VL Vein Mapping Lower Bilateral   Final Result      VL DUP CAROTID BILATERAL   Final Result      CT CHEST WO CONTRAST   Final Result   1. Extensive centrilobular and paraseptal emphysema. 2. Extensive atherosclerotic disease, particularly along the coronary   arteries. 3. There are areas of atelectasis in the lingula. There is collapse of the   right middle lobe   4. No focal lung infiltrate.          XR CHEST PORTABLE    (Results Pending)       Echocardiogram:   Results for orders placed during the hospital encounter of 03/11/22    Echocardiogram Complete 2D w Doppler w Color    Narrative  Transthoracic Echocardiography Report (TTE)    Patient Name Yuliana France       Date of Study           03/13/2022  [facility]    Date of      1955  Gender                  Male  Birth    Age          74 year(s)  Race                    Black    Room Number  2107        Height:                 75 inch, 190.5 cm    Corporate ID [id number]    Weight:                 136 pounds, 61.7 kg  #    Patient Acct [de-identified]   BSA:        1.86 m^2    BMI:        17 kg/m^2  #    MR #         [id number]      Sonographer             Donovan Marrufo    Accession #  [id number]  Interpreting Physician  Vinh Chau    Fellow                   Referring Nurse  Practitioner    Interpreting             Referring Physician     Laurita Castro. Gaudencio Lambert,  Fellow                                           JACKELYN-NP    Type of Study    TTE procedure:2D Echocardiogram, M-Mode, Doppler, Color Doppler. Procedure Date  Date: 03/13/2022 Start: 12:17 PM    Study Location: [facility]  Technical Quality: Fair visualization    Indications:Multi vessel CAD and Pre-Op CABG. History / Tech. Comments:  Procedure explained to patient. Study done at the bedside. STAT call in    Patient Status: Inpatient    Height: 75 inches Weight: 136 pounds BSA: 1.86 m^2 BMI: 17 kg/m^2    Allergies  - *No Known Allergies. CONCLUSIONS    Summary  Global left ventricular systolic function is normal. Estimated EF 40-34%  Normal diastolic filling. Normal right ventricular size and function. Mild mitral regurgitation. Mild tricuspid regurgitation. Mild pulmonary hypertension with an estimated right ventricular systolic  pressure of 41 mmHg. No pericardial effusion. Signature  ----------------------------------------------------------------------------  Electronically signed by Vesna Martinez(Sonographer) on 03/13/2022  12:51 PM  ----------------------------------------------------------------------------    ----------------------------------------------------------------------------  Electronically signed by Tony ChauInterpreting physician) on  03/14/2022 09:19 AM  ----------------------------------------------------------------------------  FINDINGS  Left Atrium  Left atrium is normal in size. Left Ventricle  Left ventricle is normal in size.  Global left ventricular systolic function  is normal. Calculated EF via Parks's method is 48 %. Mild septal hypertrophy. Normal diastolic filling. Right Atrium  Right atrium is normal in size. Right Ventricle  Normal right ventricular size and function. Mitral Valve  Normal mitral valve structure. Mild mitral regurgitation. No mitral stenosis. Aortic Valve  Aortic valve structure and function normal.  Aortic valve is trileaflet. No aortic insufficiency. No aortic stenosis. Tricuspid Valve  Normal tricuspid valve leaflets. Mild tricuspid regurgitation. No tricuspid stenosis. Mild pulmonary hypertension with an estimated right ventricular systolic  pressure of 41 mmHg. Pulmonic Valve  Pulmonic valve is normal in structure and function. No pulmonic insufficiency. No evidence of pulmonic stenosis. Pericardial Effusion  No pericardial effusion. Miscellaneous  Normal aortic root dimension. E/E' average = 11.0. IVC normal diameter & inspiratory collapse indicating normal RA filling  pressure .     M-mode / 2D Measurements & Calculations:    LVIDd:4.9 cm(3.7 - 5.6 cm)       Diastolic FJHRYC:96.82 ml  LVIDs:3.8 cm(2.2 - 4.0 cm)       Systolic DAHHEM:46.75 ml  IVSd:1.2 cm(0.6 - 1.1 cm)        Aortic Root:3.3 cm(2.0 - 3.7 cm)  LVPWd:0.8 cm(0.6 - 1.1 cm)       LA Dimension: 2.9 cm(1.9 - 4.0 cm)  Fractional Shortenin.45 %    LA volume/Index: 35.47 ml /19m^2  Calculated LVEF (%): 49.08 %     LVOT:2.2 cm  RVDd:3.5 cm    Mitral:                                 Aortic    Valve Area (P1/2-Time): 2.78 cm^2       Peak Velocity: 1.26 m/s  Peak E-Wave: 0.87 m/s                   Mean Velocity: 0.84 m/s  Peak A-Wave: 0.67 m/s                   Peak Gradient: 6.35 mmHg  E/A Ratio: 1.29                         Mean Gradient: 4 mmHg  Peak Gradient: 2.99 mmHg  Mean Gradient: 1 mmHg  Deceleration Time: 258 msec             Area (continuity): 2.33 cm^2  P1/2t: 79 msec                          AV VTI: 26.9 cm    Area (continuity): 3.32 cm^2  Mean Velocity: 0.49 m/s    Tricuspid: Pulmonic:    Estimated RVSP: 41 mmHg                 Peak Velocity: 0.71 m/s  Peak TR Velocity: 3.00 m/s              Peak Gradient: 1.99 mmHg  Peak TR Gradient: 36 mmHg  Estimated RA Pressure: 5 mmHg    Estimated PASP: 41 mmHg    Diastology / Tissue Doppler  Septal Wall E' velocity:0.06 m/s  Septal Wall E/E':14.7  Lateral Wall E' velocity:0.12 m/s  Lateral Wall E/E':7.3      Cardiac Catheterization:   No results found for this or any previous visit. ASSESSMENT AND PLAN     Assessment:    MV CAD, Plan for CABG   Bradycardia  Sinus pause lasting for 4.7 seconds 3/17/2022  COPD stage II on PFTs with severe reduction diffusion capacity  Centrilobular and paraseptal emphysema  Chronic smoker  Cocaine use  Chronic combined diastolic and systolic CHF, EF 95%.  Previously 50-55%  Mild pulmonary hypertension  Right hip arthritis  Essential hypertension  CKD stage III  Leukocytosis    Plan:    I personally interviewed/examined the patient; reviewed interval history, interpreted all available radiographic and laboratory data at the time of service. Patient is currently off Precedex drip lethargic but arousable follows some command looks weak. Has been extubated and on nasal cannula maintaining saturation currently. We will use noninvasive ventilation if needed. ABG post extubation showed 7.4 2/35/73/23 on nasal cannula. He is currently on vasopressin drip and off epinephrine drip. Continue DuoNeb aerosol  Encourage incentive spirometry deep breathing cough avoid sedation. Continue antiplatelet, anticoagulation, statins, and beta blockers as per cardiology/CT surgery  Continue to monitor I/O with a goal of even fluid balance suggest intermittent low-dose of Lasix if you tolerate as far as blood pressure is concerned as he is developing bilateral pleural effusion. Chest tube management as per CT surgery  On amiodarone, statin, aspirin, Plavix.   Continue pulmonary toilet, aspiration precautions and bronchodilators  Continue with DuoNeb aerosol  Continue to monitor CBC, coagulation profile, transfuse as indicated  Chemical DVT prophylaxis postoperatively when okay with CT surgery  Antimicrobials reviewed; currently not on antibiotics  Glycemic control appropriate on Lantus  Physical/occupational therapy  Increase ambulation/physical therapy out of bed to chair. Discussed with nursing staff, treatment and plan discussed  Discussed with respiratory therapist pain      The patient is/remains critically ill with illness/injury that acutely impairs one or more vital organ systems, such that there is a high probability of imminent or life threatening deterioration in the patient's condition. Critical care time of greater than 35 minutes was spent (excluding procedures), in coordination of care during bedside rounds and discussion of patient care in detail, and recommendations of the team were adopted in the plan. Necessity of all invasive devices was also confirmed. Galindo Greene MD.   Pulmonary and Critical Care Medicine           3/24/2022, 12:57 PM    Please note that this chart was generated using voice recognition Dragon dictation software. Although every effort was made to ensure the accuracy of this automated transcription, some errors in transcription may have occurred.

## 2022-03-24 NOTE — PROGRESS NOTES
Dr. Mumtaz Floyd to bedside, pt in Afib RVR, verbal orders to start amio gtt, bolus. Closure 2 Information: This tab is for additional flaps and grafts, including complex repair and grafts and complex repair and flaps. You can also specify a different location for the additional defect, if the location is the same you do not need to select a new one. We will insert the automated text for the repair you select below just as we do for solitary flaps and grafts. Please note that at this time if you select a location with a different insurance zone you will need to override the ICD10 and CPT if appropriate.

## 2022-03-24 NOTE — PROGRESS NOTES
Physician Progress Note      PATIENT:               Marley Gomez  CSN #:                  [id number]  :                       1955  ADMIT DATE:       3/11/2022 7:15 PM  [facility] DATE:  RESPONDING  PROVIDER #:        Linnette Lopez          QUERY TEXT:    Pt admitted with CAD. Pt noted to have hypotension s/p CABG requiring   escalation and >24hr use of vasopressors. If possible, please document in the progress notes and discharge summary if   you are evaluating and/or treating any of the following: The medical record reflects the following:  Risk Factors: s/p CABG, hx COCAINE USE, CHF, Pulmonary HTN  Clinical Indicators:  3/22 HR 90s sbp 80s/50s requiring increase in   vasopressor gtts, HGB drop from 11.7-6.6  Treatment:  escalation and titration >24hrs of   epinephrine/levophed/vasopressin gtts, transfusion RBC, albumin 25%    Thank you, please contact me for any questions! Walt Felder RN CDI Supervisor   [phone]  Options provided:  -- Cardiogenic Shock  -- Hemorrhagic Shock  -- Hypovolemic Shock  -- Other - I will add my own diagnosis  -- Disagree - Not applicable / Not valid  -- Disagree - Clinically unable to determine / Unknown  -- Refer to Clinical Documentation Reviewer    PROVIDER RESPONSE TEXT:    Provider disagreed with this query. Query created by: Bernarda Morris on 3/23/2022 9:03 AM      QUERY TEXT:    Pt admitted with CAD s/p CABG with new dx COPD/ emphysema. Noted patient   remains intubated >24hr postop. Please document in progress notes and discharge summary if you are   evaluating/treating any of the following:     The medical record reflects the following:  Risk Factors: newly dx COPD/ emphysema  Clinical Indicators: CT scan of the chest shows areas of blebs and bulla   medially and centrilobular and paraseptal emphysema, Patient remained   intubated overnight he had bradycardia overnight had become more hypotensive   required addition of epinephrine because of hypotension and bradycardia per CT   surgery and was also on vasopressin. Agitation, ABG 7.3 6/41/91/24 3/22 CXR   Cardiomegaly left basilar atelectasis and mild bilateral increased   interstitial marking with right basilar atelectasis. RR 20-26, Spo2 90-96%    Treatment: Pulmonary consult, 40% FIO2 mechanical ventilator ongoing, CXR, ABG   monitoring    Thank you, please contact me for any questions! Papi Kamara RN CDI Supervisor   [phone]  Options provided:  -- Respiratory failure is due to chronic underlying condition of   COPD/emphysema and is not a complication of the procedure  -- Respiratory failure?is not a complication of the procedure but was due to,   Please specify. -- Acute? Postoperative Pulmonary Insufficiency due to COPD/emphysema  -- intubated postop for airway protection due to hypotension and bradycardia   unrelated to pulmonary condition  -- Other - I will add my own diagnosis  -- Disagree - Not applicable / Not valid  -- Disagree - Clinically unable to determine / Unknown  -- Refer to Clinical Documentation Reviewer    PROVIDER RESPONSE TEXT:    Patient with respiratory failure due to chronic underlying condition of COPD /   emphysema and is not a complication of the procedure.     Query created by: Evan Bowser on 3/23/2022 9:18 AM      Electronically signed by:  Tiffanie Anderson 3/24/2022 8:08 AM

## 2022-03-24 NOTE — PROGRESS NOTES
HEVER Rebollar to bedside:  AM ABG obtained and results sent to Dr. Heidy Baker. Writer asked Dr. Heidy Baker if he would like patient extubated or flipped back to [facility] mode. Patient following all commands and able to lift head off bed. Patient currently on 0.3 precedex. Will await Dr. Heidy Baker response.

## 2022-03-24 NOTE — PROGRESS NOTES
Occupational [address]  Occupational Therapy Not Seen Note    DATE: 3/24/2022    NAME: Ji Contreras  MRN: [id number]   : 1955      Patient not seen this date for Occupational Therapy due to:    Patient is not appropriate for active participation in OT evaluation/treatment at this time d/t intubated and not appropriate this AM per RN, possible extubation today. Next Scheduled Treatment: Check back PM post extubation or 3/25/22.      Electronically signed by Carla Dong on 3/24/2022 at 7:58 AM

## 2022-03-24 NOTE — PLAN OF CARE
Problem: OXYGENATION/RESPIRATORY FUNCTION  Goal: Patient will maintain patent airway  Outcome: Ongoing     Problem: OXYGENATION/RESPIRATORY FUNCTION  Goal: Patient will achieve/maintain normal respiratory rate/effort  Description: Respiratory rate and effort will be within normal limits for the patient  Outcome: Ongoing     Problem: OXYGENATION/RESPIRATORY FUNCTION  Goal: Patient will achieve/maintain normal respiratory rate/effort  Description: Respiratory rate and effort will be within normal limits for the patient  Outcome: Ongoing     Problem: MECHANICAL VENTILATION  Goal: Patient will maintain patent airway  Outcome: Completed  Goal: Oral health is maintained or improved  Outcome: Completed  Goal: ET tube will be managed safely  Outcome: Completed  Goal: Ability to express needs and understand communication  Outcome: Completed  Goal: Mobility/activity is maintained at optimum level for patient  Outcome: Completed

## 2022-03-24 NOTE — PROGRESS NOTES
Comprehensive Nutrition Assessment    Type and Reason for Visit:  Reassess    Nutrition Recommendations/Plan:   - Continue NPO. Monitor for start of oral diet as able/appropriate - will provide Ensure Enlive oral supplements with meals as able. - If unable to start oral diet, suggest Tube Feedings of Standard without Fiber formula goal rate 60 mL/hr = 1728 kcals, 80 gm pro/day. - Will monitor labs, weights, and plan of care. Nutrition Assessment:  Pt extubated this morning. Remains NPO currently. Will monitor for start of oral diet as able/appropriate and provide oral supplements with meals. Last BM 3/20. Weight fluctuations noted. Labs reviewed. Meds include: Lasix, Senokot. Malnutrition Assessment:  Malnutrition Status: Moderate malnutrition    Context:  Acute Illness     Findings of the 6 clinical characteristics of malnutrition:  Energy Intake:  Mild decrease in energy intake  Weight Loss:  No significant weight loss per chart review; weight gain since admission noted     Body Fat Loss:  1 - Mild body fat loss Orbital,Buccal region   Muscle Mass Loss:  1 - Mild muscle mass loss Temples (temporalis),Clavicles (pectoralis & deltoids)  Fluid Accumulation:  No significant fluid accumulation   Strength:  Not Performed    Estimated Daily Nutrient Needs:  Energy (kcal):  25-30 kcal/kg = 8591-8707 kcals/day; Weight Used for Energy Requirements:  Admission     Protein (g):  1.2-1.5 gm/kg = 75-95 gm pro/day; Weight Used for Protein Requirements:  Admission    Fluid (ml/day):  25 mL/kg = 1824-1848 mL/day or per MD; Method Used for Fluid Requirements:  ml/Kg      Nutrition Related Findings:  Labs/Meds reviewed. Hypoactive bowel sounds. Last BM 3/20.       Wounds:  Surgical Incision,Multiple       Current Nutrition Therapies:    No diet orders on file    Anthropometric Measures:  · Height: 6' 3\" (190.5 cm)  · Current Body Weight: 163 lb 12.8 oz (74.3 kg)   · Admission Body Weight: 141 lb 5 oz (64.1

## 2022-03-25 ENCOUNTER — APPOINTMENT (OUTPATIENT)
Dept: GENERAL RADIOLOGY | Age: 67
DRG: 233 | End: 2022-03-25
Attending: INTERNAL MEDICINE
Payer: MEDICARE

## 2022-03-25 LAB
ANION GAP SERPL CALCULATED.3IONS-SCNC: 13 MMOL/L (ref 9–17)
ANION GAP SERPL CALCULATED.3IONS-SCNC: 16 MMOL/L (ref 9–17)
BUN BLDV-MCNC: 32 MG/DL (ref 8–23)
BUN BLDV-MCNC: 43 MG/DL (ref 8–23)
CALCIUM SERPL-MCNC: 8.3 MG/DL (ref 8.6–10.4)
CALCIUM SERPL-MCNC: 8.5 MG/DL (ref 8.6–10.4)
CHLORIDE BLD-SCNC: 105 MMOL/L (ref 98–107)
CHLORIDE BLD-SCNC: 109 MMOL/L (ref 98–107)
CO2: 20 MMOL/L (ref 20–31)
CO2: 21 MMOL/L (ref 20–31)
CREAT SERPL-MCNC: 1.32 MG/DL (ref 0.7–1.2)
CREAT SERPL-MCNC: 1.79 MG/DL (ref 0.7–1.2)
GFR AFRICAN AMERICAN: 46 ML/MIN
GFR AFRICAN AMERICAN: >60 ML/MIN
GFR NON-AFRICAN AMERICAN: 38 ML/MIN
GFR NON-AFRICAN AMERICAN: 54 ML/MIN
GFR SERPL CREATININE-BSD FRML MDRD: ABNORMAL ML/MIN/{1.73_M2}
GFR SERPL CREATININE-BSD FRML MDRD: ABNORMAL ML/MIN/{1.73_M2}
GLUCOSE BLD-MCNC: 102 MG/DL (ref 70–99)
GLUCOSE BLD-MCNC: 154 MG/DL (ref 75–110)
GLUCOSE BLD-MCNC: 87 MG/DL (ref 75–110)
GLUCOSE BLD-MCNC: 89 MG/DL (ref 75–110)
GLUCOSE BLD-MCNC: 93 MG/DL (ref 70–99)
GLUCOSE BLD-MCNC: 98 MG/DL (ref 75–110)
HCT VFR BLD CALC: 31.3 % (ref 40.7–50.3)
HEMOGLOBIN: 8.9 G/DL (ref 13–17)
INR BLD: 1.2
MAGNESIUM: 2.5 MG/DL (ref 1.6–2.6)
MCH RBC QN AUTO: 27.5 PG (ref 25.2–33.5)
MCHC RBC AUTO-ENTMCNC: 28.4 G/DL (ref 28.4–34.8)
MCV RBC AUTO: 96.6 FL (ref 82.6–102.9)
NRBC AUTOMATED: 0.3 PER 100 WBC
PDW BLD-RTO: 16.3 % (ref 11.8–14.4)
PLATELET # BLD: 152 K/UL (ref 138–453)
PMV BLD AUTO: 12.2 FL (ref 8.1–13.5)
POTASSIUM SERPL-SCNC: 3.9 MMOL/L (ref 3.7–5.3)
POTASSIUM SERPL-SCNC: 4 MMOL/L (ref 3.7–5.3)
PROTHROMBIN TIME: 12.7 SEC (ref 9.1–12.3)
RBC # BLD: 3.24 M/UL (ref 4.21–5.77)
SODIUM BLD-SCNC: 142 MMOL/L (ref 135–144)
SODIUM BLD-SCNC: 142 MMOL/L (ref 135–144)
WBC # BLD: 7.8 K/UL (ref 3.5–11.3)

## 2022-03-25 PROCEDURE — 6370000000 HC RX 637 (ALT 250 FOR IP): Performed by: NURSE PRACTITIONER

## 2022-03-25 PROCEDURE — 83735 ASSAY OF MAGNESIUM: CPT

## 2022-03-25 PROCEDURE — 85610 PROTHROMBIN TIME: CPT

## 2022-03-25 PROCEDURE — 36415 COLL VENOUS BLD VENIPUNCTURE: CPT

## 2022-03-25 PROCEDURE — 80048 BASIC METABOLIC PNL TOTAL CA: CPT

## 2022-03-25 PROCEDURE — 2580000003 HC RX 258: Performed by: NURSE PRACTITIONER

## 2022-03-25 PROCEDURE — 99024 POSTOP FOLLOW-UP VISIT: CPT | Performed by: NURSE PRACTITIONER

## 2022-03-25 PROCEDURE — 6360000002 HC RX W HCPCS: Performed by: NURSE PRACTITIONER

## 2022-03-25 PROCEDURE — 94660 CPAP INITIATION&MGMT: CPT

## 2022-03-25 PROCEDURE — P9041 ALBUMIN (HUMAN),5%, 50ML: HCPCS | Performed by: NURSE PRACTITIONER

## 2022-03-25 PROCEDURE — 71045 X-RAY EXAM CHEST 1 VIEW: CPT

## 2022-03-25 PROCEDURE — 6370000000 HC RX 637 (ALT 250 FOR IP): Performed by: INTERNAL MEDICINE

## 2022-03-25 PROCEDURE — 2580000003 HC RX 258: Performed by: STUDENT IN AN ORGANIZED HEALTH CARE EDUCATION/TRAINING PROGRAM

## 2022-03-25 PROCEDURE — 6360000002 HC RX W HCPCS: Performed by: STUDENT IN AN ORGANIZED HEALTH CARE EDUCATION/TRAINING PROGRAM

## 2022-03-25 PROCEDURE — 85027 COMPLETE CBC AUTOMATED: CPT

## 2022-03-25 PROCEDURE — 99291 CRITICAL CARE FIRST HOUR: CPT | Performed by: INTERNAL MEDICINE

## 2022-03-25 PROCEDURE — 82947 ASSAY GLUCOSE BLOOD QUANT: CPT

## 2022-03-25 PROCEDURE — 2100000001 HC CVICU R&B

## 2022-03-25 PROCEDURE — 2500000003 HC RX 250 WO HCPCS: Performed by: NURSE PRACTITIONER

## 2022-03-25 PROCEDURE — 94640 AIRWAY INHALATION TREATMENT: CPT

## 2022-03-25 PROCEDURE — 94761 N-INVAS EAR/PLS OXIMETRY MLT: CPT

## 2022-03-25 PROCEDURE — 2700000000 HC OXYGEN THERAPY PER DAY

## 2022-03-25 PROCEDURE — 6370000000 HC RX 637 (ALT 250 FOR IP): Performed by: THORACIC SURGERY (CARDIOTHORACIC VASCULAR SURGERY)

## 2022-03-25 RX ORDER — FUROSEMIDE 10 MG/ML
20 INJECTION INTRAMUSCULAR; INTRAVENOUS ONCE
Status: COMPLETED | OUTPATIENT
Start: 2022-03-25 | End: 2022-03-25

## 2022-03-25 RX ORDER — MIDODRINE HYDROCHLORIDE 5 MG/1
10 TABLET ORAL
Status: DISCONTINUED | OUTPATIENT
Start: 2022-03-25 | End: 2022-03-28

## 2022-03-25 RX ADMIN — SODIUM CHLORIDE, PRESERVATIVE FREE 10 ML: 5 INJECTION INTRAVENOUS at 20:35

## 2022-03-25 RX ADMIN — PANTOPRAZOLE SODIUM 40 MG: 40 TABLET, DELAYED RELEASE ORAL at 08:18

## 2022-03-25 RX ADMIN — MIDODRINE HYDROCHLORIDE 10 MG: 5 TABLET ORAL at 18:00

## 2022-03-25 RX ADMIN — FUROSEMIDE 20 MG: 10 INJECTION, SOLUTION INTRAMUSCULAR; INTRAVENOUS at 18:42

## 2022-03-25 RX ADMIN — IPRATROPIUM BROMIDE AND ALBUTEROL SULFATE 1 AMPULE: .5; 2.5 SOLUTION RESPIRATORY (INHALATION) at 08:05

## 2022-03-25 RX ADMIN — Medication 500000 UNITS: at 18:00

## 2022-03-25 RX ADMIN — TAMSULOSIN HYDROCHLORIDE 0.4 MG: 0.4 CAPSULE ORAL at 08:18

## 2022-03-25 RX ADMIN — ASPIRIN 81 MG: 81 TABLET, COATED ORAL at 08:17

## 2022-03-25 RX ADMIN — CLOPIDOGREL 75 MG: 75 TABLET, FILM COATED ORAL at 08:18

## 2022-03-25 RX ADMIN — Medication 0.03 MCG/KG/MIN: at 23:48

## 2022-03-25 RX ADMIN — ATORVASTATIN CALCIUM 80 MG: 80 TABLET, FILM COATED ORAL at 20:34

## 2022-03-25 RX ADMIN — MIDODRINE HYDROCHLORIDE 10 MG: 5 TABLET ORAL at 13:00

## 2022-03-25 RX ADMIN — FUROSEMIDE 20 MG: 10 INJECTION, SOLUTION INTRAMUSCULAR; INTRAVENOUS at 08:17

## 2022-03-25 RX ADMIN — FENTANYL CITRATE 50 MCG: 50 INJECTION INTRAMUSCULAR; INTRAVENOUS at 09:27

## 2022-03-25 RX ADMIN — Medication 500000 UNITS: at 08:17

## 2022-03-25 RX ADMIN — MUPIROCIN: 20 OINTMENT TOPICAL at 20:34

## 2022-03-25 RX ADMIN — FUROSEMIDE 20 MG: 10 INJECTION, SOLUTION INTRAMUSCULAR; INTRAVENOUS at 20:34

## 2022-03-25 RX ADMIN — STANDARDIZED SENNA CONCENTRATE AND DOCUSATE SODIUM 1 TABLET: 8.6; 5 TABLET ORAL at 20:34

## 2022-03-25 RX ADMIN — SODIUM CHLORIDE, PRESERVATIVE FREE 10 ML: 5 INJECTION INTRAVENOUS at 08:18

## 2022-03-25 RX ADMIN — ALBUMIN (HUMAN) 25 G: 12.5 INJECTION, SOLUTION INTRAVENOUS at 08:04

## 2022-03-25 RX ADMIN — MUPIROCIN: 20 OINTMENT TOPICAL at 08:18

## 2022-03-25 RX ADMIN — Medication 500000 UNITS: at 20:35

## 2022-03-25 RX ADMIN — DEXMEDETOMIDINE HYDROCHLORIDE 0.2 MCG/KG/HR: 4 INJECTION, SOLUTION INTRAVENOUS at 18:06

## 2022-03-25 RX ADMIN — IPRATROPIUM BROMIDE AND ALBUTEROL SULFATE 1 AMPULE: .5; 2.5 SOLUTION RESPIRATORY (INHALATION) at 14:21

## 2022-03-25 RX ADMIN — Medication 500000 UNITS: at 13:16

## 2022-03-25 RX ADMIN — AMIODARONE HYDROCHLORIDE 1 MG/MIN: 50 INJECTION, SOLUTION INTRAVENOUS at 00:17

## 2022-03-25 RX ADMIN — APIXABAN 5 MG: 5 TABLET, FILM COATED ORAL at 13:00

## 2022-03-25 RX ADMIN — APIXABAN 5 MG: 5 TABLET, FILM COATED ORAL at 20:34

## 2022-03-25 RX ADMIN — ALBUMIN (HUMAN) 25 G: 12.5 INJECTION, SOLUTION INTRAVENOUS at 20:42

## 2022-03-25 RX ADMIN — STANDARDIZED SENNA CONCENTRATE AND DOCUSATE SODIUM 1 TABLET: 8.6; 5 TABLET ORAL at 08:17

## 2022-03-25 ASSESSMENT — PAIN SCALES - GENERAL
PAINLEVEL_OUTOF10: 0
PAINLEVEL_OUTOF10: 10
PAINLEVEL_OUTOF10: 0
PAINLEVEL_OUTOF10: 0

## 2022-03-25 NOTE — PROGRESS NOTES
norepinephrine 0.06 mcg/kg/min (03/25/22 0253)    EPINEPHrine Stopped (03/24/22 1610)    insulin Stopped (03/22/22 1544)    dextrose      vasopressin (Septic Shock) infusion 0.02 Units/min (03/24/22 2030)    sodium chloride      sodium chloride         Data:  CBC:   Recent Labs     03/23/22 0347 03/23/22 0347 03/24/22  0515 03/24/22  1209 03/25/22  0711   WBC 12.3*  --  5.8  --  7.8   HGB 8.6*   < > 9.1* 8.8* 8.9*   HCT 28.1*   < > 29.3* 28.3* 31.3*   MCV 89.8  --  88.8  --  96.6   PLT See Reflexed IPF Result  --  119*  --  152    < > = values in this interval not displayed. BMP:   Recent Labs     03/23/22 0347 03/23/22  0347 03/24/22  0515 03/24/22  1209 03/25/22  0711     --  146*  --  142   K 4.2   < > 4.0 4.0 3.9   *  --  113*  --  109*   CO2 22  --  22  --  20   BUN 21  --  24*  --  32*   CREATININE 1.26*  --  0.99  --  1.32*    < > = values in this interval not displayed. PT/INR:   Recent Labs     03/23/22 0347 03/24/22  0515 03/25/22  0711   PROTIME 15.3* 13.5* 12.7*   INR 1.5 1.3 1.2     APTT:   No results for input(s): APTT in the last 72 hours. Chest X-Ray: Image reviewed. Awaiting official report. I/O:  I/O last 3 completed shifts: In: 2897.4 [P.O.:850; I.V.:1745; NG/GT:80; IV Piggyback:222.4]  Out: 2860 [Urine:1665; Chest Tube:1195]    Assessment/Plan:      Diagnosis Date    3-vessel CAD 03/11/2022    Alcohol abuse 06/23/2015    Arthritis     Chronic kidney disease     Full dentures     upper and lower full    Hypertension     Other emphysema ([address]     COPD stage II on PFTs with severe reduction diffusion capacity    Pure hypercholesterolemia 53/30/9068    Systolic CHF ([address]     Wears glasses       Neuro: Sedated and on vent   Lungs: Diminished in the bases   HD: On Vaso.     Edema: None noted   GI: Active bowel sounds X4   : Good urine output    Beta-Blocker: yes-hold today  ASA: yes  Plavix: yes  GI: yes  Statin: yes  Coumadin: no  ACE-I: no  EF: 45%     Oxygen as needed to maintain SpO2 > 92%  o Wean as tolerated  o Pulmonology already following- can assist with extubation  o Passed his CPAP trial this AM- told by pulm to keep intubated   Chest x-ray daily-   Please wean off levophed today-maintain CVP of 16 start midodrine 10mg TID   Optimize lung function with resp. Treatment around the clock, aggressive Bipap and IS use.  Continue sedation over to precedex    Keep Chest Tubes to wall suction still good outpt   Encourage incentive spirometry, acapella and ambulation    Replace electrolytes as needed per sliding scale and recheck per policy   Case Management consult for discharge planning await choices for facility today SNF    The above recommendations including medications and orders were discussed and agreed upon with Dr. Isma Lyle, the attending on service for the cardiothoracic surgery group today. Electronically signed by JACKELYN Hartman NP on 3/25/2022 at 8:37 AM    On this date 3/24/2022 I have spent 27 minutes reviewing previous notes, test results and face to face with the patient discussing the diagnosis and importance of compliance with the treatment plan as well as documenting on the day of the visit. At least 50% of the time documented was spent with the patient to provide counseling and/or coordination of care. This note was created with the assistance of a speech-recognition program.  Although the intention is to generate a document that actually reflects the content of the visit, no guarantees can be provided that every mistake has been identified and corrected by editing. Note was updated later by me after  physical examination and  completion of the assessment.

## 2022-03-25 NOTE — PROGRESS NOTES
Physical Therapy        Physical Therapy Cancel Note      DATE: 3/25/2022    NAME: Mindi Rowell  MRN: [id number]   : 1955      Patient not seen this date for Physical Therapy due to: Other: temporary pacemaker in place, PT will check back 3/26/22.        Electronically signed by Heather Smith PT on 3/25/2022 at 12:13 PM

## 2022-03-25 NOTE — PROGRESS NOTES
PULMONARY & CRITICAL CARE MEDICINE PROGRESS NOTE     Patient:  Trihsa Jarquin  MRN: [id number]  Admit date: 3/11/2022  Primary Care Physician: Sun Franco MD  Consulting Physician: Otilia Elias MD  CODE Status: Full Code  LOS: 14    SUBJECTIVE     CHIEF COMPLAINT/REASON FOR INITIAL CONSULT:    COPD/chronic cough/preoperative evaluation    BRIEF HOSPITAL COURSE:  The patient is a 74 y.o. male history of hypertension, history of mild systolic dysfunction admitted with chest pain initially to [facility] on 03/09/2022 non-ST elevation MI ruled out by troponins in 20s had a stress test done because of high suspicion which shows ischemia of lateral septal wall with ejection fraction of 34%.  He does have history of cocaine use and apparently he had used cocaine before admission to [facility].  He was transferred to [place] for cardiac catheterization which shows multivessel coronary artery disease with EF of 30% including left main disease.  He remains on a statin and heparin.  CT surgery was consulted for CABG.    Patient had pulmonary function test done which shows FEV1 of 46% with significance positive bronchodilator FEV1 of 56% with severe reduction in diffusion capacity of less than 30% with air trapping/hyperinflation. CT scan of the chest shows areas of blebs and bulla medially and centrilobular and paraseptal emphysema on CT scan.     Patient does complain of shortness of breath on mild activity and exertion.  He does have chronic cough without no change denies increased wheezing currently denies chest pain.  He has history of cocaine use.  Does have history of smoking patient is not able to tell me that how many cigarettes he used to smoke but he claimed that now he smoke 1 pack/week and had been smoking for almost 50 years.     INTERVAL HISTORY:  03/25/22  Patient seen in cardiovascular ICU this morning, overnight events noted, labs and arterial blood gases and ventilator support this morning was seen. Overnight he was weaned off epinephrine drip and on vasopressin drip this morning. He is hypotensive and continued vasopressin. No bradycardia was reported overnight heart rate is in 60s to 70s. This morning he was on 40% FiO2 and PEEP of 8. Ventilator setting SIMV/16/600/8/40 percent. ABG 7.3 940/62. He was on CPAP/pressure support 8/8/40 percent tolerated this morning breathing trial and extubated. Currently on 4 L nasal cannula maintaining saturation above 92% lethargic but arousable. He was on Precedex drip this morning and weaned off Precedex drip. Urine output 1185 in last 24 hours he had received 1 dose of Lasix 20 mg this morning. Labs show sodium 146 potassium 4.0 BUN 24 creatinine 0.99 bicarbonate 22 WBC 5.8 hemoglobin 9.1 platelet count 151. Chest x-ray 2022 shows bilateral small effusion bibasilar atelectasis possible right lower lobe infiltrate/atelectasis cardiomegaly pulmonary venous congestion. Respiratory culture sent on 2022 showed no growth so far.     REVIEW OF SYSTEMS:  Unobtainable from patient due to sedation/mechanical ventilation  Patient seen after extubation was lethargic unable to provide review of system but arousable with eyes open    OBJECTIVE     Ventilator Settings:  Vent Information  $Ventilation: $Subsequent Day  Skin Assessment: Clean, dry, & intact  Equipment Changed: HME  Vent Type: Servo i  Vent Mode: (S) CPAP (SBT)  Vt Ordered: 600 mL  Rate Set: 16 bmp  Pressure Support: 8 cmH20  FiO2 : 75 %  SpO2: 98 %  SpO2/FiO2 ratio: 121.67  Sensitivity: 5  PEEP/CPAP: 8  I Time/ I Time %: 0.9 s  Humidification Source: HME  Nitric Oxide/Epoprostenol In Use?: No  Mask Type: Full face mask  Mask Size: Medium    VITAL SIGNS:   LAST-  BP 93/65   Pulse 64   Temp 99.3 °F (37.4 °C) (Axillary)   Resp (!) 38   Ht 6' 3\" (1.905 m)   Wt 163 lb 12.8 oz (74.3 kg)   SpO2 98%   BMI 20.47 kg/m²   8-24 HR RANGE-  TEMP Temp  Av.7 °F (37.1 °C)  Min: 97.5 °F (36.4 °C)  Max: 99.3 °F (82.7 °C)   BP Systolic (36LYR), XJM:431 , Min:73 , TZZ:610      Diastolic (10SSH), UND:02, Min:53, Max:91     PULSE Pulse  Av  Min: 64  Max: 124   RR Resp  Av.3  Min: 26  Max: 38   O2 SAT SpO2  Av.9 %  Min: 93 %  Max: 100 %   OXYGEN DELIVERY O2 Flow Rate (L/min)  Avg: 10 L/min  Min: 10 L/min  Max: 10 L/min     SYSTEMIC EXAMINATION:   General appearance -lethargic eyes opening, chronically ill-appearing follows some command intermittently  Mental status -on Precedex drip this morning low-dose  Eyes - pupils equal and reactive sluggish, sclera anicteric  Mouth -oral mucosa look moist  Neck - supple, no significant adenopathy, carotids upstroke normal bilaterally, no bruits  Chest - Breath sounds bilaterally were dimnished to auscultation at bases. There were scattered rhonchi present with distant breath sound slightly diminished breath sounds on left as compared to right. Heart -regular S1-S2, regular rhythm, normal S1, S2, no murmurs, clicks or gallops  Abdomen - soft, nontender, nondistended, no masses or organomegaly  Neurological - DTR's decreased and plantars upgoing, spontaneously moves did not follow command.   Extremities - peripheral pulses normal, no pedal edema, no clubbing or cyanosis  Skin - normal coloration and turgor, no rashes, no suspicious skin lesions noted     DATA REVIEW     Medications: Current Inpatient  Scheduled Meds:   midodrine  10 mg Oral TID     apixaban  5 mg Oral BID    furosemide  20 mg IntraVENous Daily    polyethylene glycol  17 g Oral BID    nystatin  5 mL Oral 4x Daily    ipratropium-albuterol  1 ampule Inhalation Q6H    sodium chloride flush  10 mL IntraVENous 2 times per day    aspirin  81 mg Oral Daily    clopidogrel  75 mg Oral Daily    [Held by provider] amiodarone  200 mg Oral TID    mupirocin   Nasal BID    sennosides-docusate sodium  1 tablet Oral BID    [Held by provider] metoprolol tartrate  25 mg Oral BID    atorvastatin  80 mg Oral Nightly    pantoprazole  40 mg Oral Daily    insulin glargine  0.15 Units/kg SubCUTAneous Nightly    [Held by provider] tiotropium  2 puff Inhalation Daily    tamsulosin  0.4 mg Oral Daily     Continuous Infusions:   amiodarone 450mg/250ml D5W infusion 0.5 mg/min (03/25/22 0018)    amiodarone 450mg/250ml D5W infusion      dexmedetomidine 0.2 mcg/kg/hr (03/24/22 1947)    sodium chloride      propofol Stopped (03/23/22 1455)    norepinephrine 0.06 mcg/kg/min (03/25/22 0253)    EPINEPHrine Stopped (03/24/22 1610)    insulin Stopped (03/22/22 1544)    dextrose      vasopressin (Septic Shock) infusion 0.02 Units/min (03/24/22 2030)    sodium chloride      sodium chloride       INPUT/OUTPUT:  In: 2305.3 [P.O.:850; I.V.:1323.3]  Out: 1875 [Urine:840]  Date 03/25/22 0000 - 03/25/22 2359   Shift 1523-5139 0769-1938 7488-2858 24 Hour Total   INTAKE   P.O.(mL/kg/hr) 100(0.2)   100   I. V.(mL/kg) 431(5.8)   431(5.8)   Shift Total(mL/kg) 531(7.1)   531(7.1)   OUTPUT   Urine(mL/kg/hr) 165(0.3)   165   Chest Tube 275   275   Shift Total(mL/kg) 440(5.9)   440(5.9)   Weight (kg) 74.3 74.3 74.3 74.3       LABS:-  ABG:   Recent Labs     03/23/22  0345 03/24/22  0626 03/24/22  0849 03/24/22  1158 03/24/22  1727   POCPH 7.417 7.395 7.434 7.427 7.467*   POCPCO2 36.5 40.5 35.6 35.1 28.7*   POCPO2 54.8* 62.0* 64.5* 73.2* 48.3*   POCHCO3 23.5 24.8 23.8 23.1 20.7*   ZFLX4TRK 89* 91* 93* 95 87*     CBC:   Recent Labs     03/23/22  0347 03/23/22  0347 03/24/22  0515 03/24/22  1209 03/25/22  0711   WBC 12.3*  --  5.8  --  7.8   HGB 8.6*   < > 9.1* 8.8* 8.9*   HCT 28.1*   < > 29.3* 28.3* 31.3*   MCV 89.8  --  88.8  --  96.6   PLT See Reflexed IPF Result  --  119*  --  152   RBC 3.13*  --  3.30*  --  3.24*   MCH 27.5  --  27.6  --  27.5   MCHC 30.6  --  31.1  --  28.4   RDW 16.6*  --  16.3*  --  16.3*    < > = values in this interval not displayed.      BMP:   Recent Labs 03/23/22  0347 03/23/22  0347 03/24/22  0515 03/24/22  1209 03/25/22  0711     --  146*  --  142   K 4.2   < > 4.0 4.0 3.9   *  --  113*  --  109*   CO2 22  --  22  --  20   BUN 21  --  24*  --  32*   CREATININE 1.26*  --  0.99  --  1.32*   GLUCOSE 156*  --  114*  --  93    < > = values in this interval not displayed. Liver Function Test:   No results for input(s): PROT, LABALBU, ALT, AST, GGT, ALKPHOS, BILITOT in the last 72 hours. Amylase/Lipase:  No results for input(s): AMYLASE, LIPASE in the last 72 hours. Coagulation Profile:   Recent Labs     03/23/22 0347 03/24/22  0515 03/25/22  0711   INR 1.5 1.3 1.2   PROTIME 15.3* 13.5* 12.7*     Cardiac Enzymes:  No results for input(s): CKTOTAL, CKMB, CKMBINDEX, TROPONINI in the last 72 hours. Lactic Acid:  Lab Results   Component Value Date    LACTA NOT REPORTED 03/11/2018    LACTA NOT REPORTED 03/10/2018    LACTA NOT REPORTED 03/10/2018     BNP:   Lab Results   Component Value Date    BNP 70 09/13/2013     D-Dimer:  No results found for: DDIMER  Others:   No results found for: TSH, T1ELMSN, S0QZAEK, THYROIDAB, FT3, T4FREE  Lab Results   Component Value Date    SEDRATE 20 (H) 05/21/2014    CRP 15.2 (H) 05/21/2014     No results found for: Idania Kast  No results found for: IRON, TIBC, FERRITIN  No results found for: SPEP, UPEP  Lab Results   Component Value Date    PSA 1.11 11/04/2016       Microbiology:  Recent Labs     03/22/22  2030   [facility] . TRACHEAL ASPIRATE   CULTURE NORMAL RESPIRATORY KYLAH MODERATE GROWTH       Pathology:    Radiology Reports:  XR CHEST PORTABLE   Preliminary Result   Overall improvement seen in the aeration lung bases. The remainder of lines   and tubes in stool are stable. No definite pneumothorax on the left. XR CHEST PORTABLE   Final Result   1. Lines and tubes as described above. 2.  Increasing right pleural effusion and right basilar airspace disease. Stable left pleural effusion.          XR CHEST PORTABLE   Final Result   1. Support lines and tubes remain in place. 2.  Bibasilar opacities persist, slightly increased on the right, suggesting   layering pleural effusions and atelectasis. 3.  Mildly increased vascular congestion, possibly accentuated by supine   positioning. XR CHEST PORTABLE   Final Result   Stable chest (suspect right pleural effusion in addition to left effusion   and/or atelectasis) with line and tube placements as detailed above. XR CHEST PORTABLE   Final Result   As compared to prior examination, the ET tube now is in satisfactory   position. Examination otherwise is unchanged. XR CHEST PORTABLE   Final Result   1. Endotracheal tube is low in position, approximately 1 cm above the   veronique. This should be pulled back approximately 3 cm. 2.  Other lines and tubes are unchanged in position. 3.  New small right pleural effusion. 4.  Unchanged left basilar opacity. XR CHEST PORTABLE   Final Result   1. Lines and tubes are unchanged in position. 2. Linear bibasilar opacities are similar prior study, atelectasis versus   pneumonia. XR CHEST PORTABLE   Final Result   All tubes and catheters are in good position. No evidence for pneumothorax. Bilateral basal atelectasis noted. VL DUP UPPER EXTREMITY ARTERIES BILATERAL   Final Result      VL Vein Mapping Lower Bilateral   Final Result      VL DUP CAROTID BILATERAL   Final Result      CT CHEST WO CONTRAST   Final Result   1. Extensive centrilobular and paraseptal emphysema. 2. Extensive atherosclerotic disease, particularly along the coronary   arteries. 3. There are areas of atelectasis in the lingula. There is collapse of the   right middle lobe   4. No focal lung infiltrate.          XR CHEST PORTABLE    (Results Pending)       Echocardiogram:   Results for orders placed during the hospital encounter of 03/11/22    Echocardiogram Complete 2D w Doppler w Color    Narrative  Transthoracic Echocardiography Report (TTE)    Patient Name Rich Obregon       Date of Study           03/13/2022  [facility]    Date of      1955  Gender                  Male  Birth    Age          74 year(s)  Race                    Black    Room Number  2107        Height:                 75 inch, 190.5 cm    Corporate ID [id number]    Weight:                 136 pounds, 61.7 kg  #    Patient Acct [de-identified]   BSA:        1.86 m^2    BMI:        17 kg/m^2  #    MR #         [id number]      Sonographer             Ian Deleon    Accession #  [id number]  Interpreting Physician  Vinh Chau    Fellow                   Referring Nurse  Practitioner    Interpreting             Referring Physician     Axel Arrington. Tuyet Heart,  Fellow                                           APRN-NP    Type of Study    TTE procedure:2D Echocardiogram, M-Mode, Doppler, Color Doppler. Procedure Date  Date: 03/13/2022 Start: 12:17 PM    Study Location: [facility]  Technical Quality: Fair visualization    Indications:Multi vessel CAD and Pre-Op CABG. History / Tech. Comments:  Procedure explained to patient. Study done at the bedside. STAT call in    Patient Status: Inpatient    Height: 75 inches Weight: 136 pounds BSA: 1.86 m^2 BMI: 17 kg/m^2    Allergies  - *No Known Allergies. CONCLUSIONS    Summary  Global left ventricular systolic function is normal. Estimated EF 76-87%  Normal diastolic filling. Normal right ventricular size and function. Mild mitral regurgitation. Mild tricuspid regurgitation. Mild pulmonary hypertension with an estimated right ventricular systolic  pressure of 41 mmHg. No pericardial effusion.     Signature  ----------------------------------------------------------------------------  Electronically signed by Vesna Saini(Sonographer) on 03/13/2022  12:51 PM  ----------------------------------------------------------------------------    ----------------------------------------------------------------------------  Electronically signed by Vinh Chau(Interpreting physician) on  2022 09:19 AM  ----------------------------------------------------------------------------  FINDINGS  Left Atrium  Left atrium is normal in size. Left Ventricle  Left ventricle is normal in size. Global left ventricular systolic function  is normal. Calculated EF via Parks's method is 48 %. Mild septal hypertrophy. Normal diastolic filling. Right Atrium  Right atrium is normal in size. Right Ventricle  Normal right ventricular size and function. Mitral Valve  Normal mitral valve structure. Mild mitral regurgitation. No mitral stenosis. Aortic Valve  Aortic valve structure and function normal.  Aortic valve is trileaflet. No aortic insufficiency. No aortic stenosis. Tricuspid Valve  Normal tricuspid valve leaflets. Mild tricuspid regurgitation. No tricuspid stenosis. Mild pulmonary hypertension with an estimated right ventricular systolic  pressure of 41 mmHg. Pulmonic Valve  Pulmonic valve is normal in structure and function. No pulmonic insufficiency. No evidence of pulmonic stenosis. Pericardial Effusion  No pericardial effusion. Miscellaneous  Normal aortic root dimension. E/E' average = 11.0. IVC normal diameter & inspiratory collapse indicating normal RA filling  pressure .     M-mode / 2D Measurements & Calculations:    LVIDd:4.9 cm(3.7 - 5.6 cm)       Diastolic ROJABB:87.17 ml  LVIDs:3.8 cm(2.2 - 4.0 cm)       Systolic GDMUKF:52.96 ml  IVSd:1.2 cm(0.6 - 1.1 cm)        Aortic Root:3.3 cm(2.0 - 3.7 cm)  LVPWd:0.8 cm(0.6 - 1.1 cm)       LA Dimension: 2.9 cm(1.9 - 4.0 cm)  Fractional Shortenin.45 %    LA volume/Index: 35.47 ml /19m^2  Calculated LVEF (%): 49.08 %     LVOT:2.2 cm  RVDd:3.5 cm    Mitral: Aortic    Valve Area (P1/2-Time): 2.78 cm^2       Peak Velocity: 1.26 m/s  Peak E-Wave: 0.87 m/s                   Mean Velocity: 0.84 m/s  Peak A-Wave: 0.67 m/s                   Peak Gradient: 6.35 mmHg  E/A Ratio: 1.29                         Mean Gradient: 4 mmHg  Peak Gradient: 2.99 mmHg  Mean Gradient: 1 mmHg  Deceleration Time: 258 msec             Area (continuity): 2.33 cm^2  P1/2t: 79 msec                          AV VTI: 26.9 cm    Area (continuity): 3.32 cm^2  Mean Velocity: 0.49 m/s    Tricuspid:                              Pulmonic:    Estimated RVSP: 41 mmHg                 Peak Velocity: 0.71 m/s  Peak TR Velocity: 3.00 m/s              Peak Gradient: 1.99 mmHg  Peak TR Gradient: 36 mmHg  Estimated RA Pressure: 5 mmHg    Estimated PASP: 41 mmHg    Diastology / Tissue Doppler  Septal Wall E' velocity:0.06 m/s  Septal Wall E/E':14.7  Lateral Wall E' velocity:0.12 m/s  Lateral Wall E/E':7.3      Cardiac Catheterization:   No results found for this or any previous visit. ASSESSMENT AND PLAN     Assessment:    MV CAD, Plan for CABG   Bradycardia  Sinus pause lasting for 4.7 seconds 3/17/2022  COPD stage II on PFTs with severe reduction diffusion capacity  Centrilobular and paraseptal emphysema  Chronic smoker  Cocaine use  Chronic combined diastolic and systolic CHF, EF 29%.  Previously 50-55%  Mild pulmonary hypertension  Right hip arthritis  Essential hypertension  CKD stage III  Leukocytosis    Plan:    I personally interviewed/examined the patient; reviewed interval history, interpreted all available radiographic and laboratory data at the time of service. Patient is currently off Precedex drip lethargic but arousable follows some command looks weak. Has been extubated and on nasal cannula maintaining saturation currently. We will use noninvasive ventilation if needed. ABG post extubation showed 7.4 2/35/73/23 on nasal cannula.   He is currently on vasopressin drip and off epinephrine drip.  Continue DuoNeb aerosol  Encourage incentive spirometry deep breathing cough avoid sedation. Continue antiplatelet, anticoagulation, statins, and beta blockers as per cardiology/CT surgery  Continue to monitor I/O with a goal of even fluid balance suggest intermittent low-dose of Lasix if you tolerate as far as blood pressure is concerned as he is developing bilateral pleural effusion. Chest tube management as per CT surgery  On amiodarone, statin, aspirin, Plavix. Continue pulmonary toilet, aspiration precautions and bronchodilators  Continue with DuoNeb aerosol  Continue to monitor CBC, coagulation profile, transfuse as indicated  Chemical DVT prophylaxis postoperatively when okay with CT surgery  Antimicrobials reviewed; currently not on antibiotics  Glycemic control appropriate on Lantus  Physical/occupational therapy  Increase ambulation/physical therapy out of bed to chair. Discussed with nursing staff, treatment and plan discussed  Discussed with respiratory therapist pain      The patient is/remains critically ill with illness/injury that acutely impairs one or more vital organ systems, such that there is a high probability of imminent or life threatening deterioration in the patient's condition. Critical care time of greater than 35 minutes was spent (excluding procedures), in coordination of care during bedside rounds and discussion of patient care in detail, and recommendations of the team were adopted in the plan. Necessity of all invasive devices was also confirmed. Marinell Boeck, MD.   Pulmonary and Critical Care Medicine           3/25/2022, 11:27 AM    Please note that this chart was generated using voice recognition Dragon dictation software. Although every effort was made to ensure the accuracy of this automated transcription, some errors in transcription may have occurred.     PULMONARY & CRITICAL CARE MEDICINE PROGRESS NOTE     Patient:  Amador Spence  MRN: [id number]  Admit date: 3/11/2022  Primary Care Physician: Madyson Arzola MD  Consulting Physician: Angel Saleem MD  CODE Status: Full Code  LOS: 14    SUBJECTIVE     CHIEF COMPLAINT/REASON FOR INITIAL CONSULT:    COPD/chronic cough/preoperative evaluation    BRIEF HOSPITAL COURSE:  The patient is a 74 y.o. male history of hypertension, history of mild systolic dysfunction admitted with chest pain initially to [facility] on 03/09/2022 non-ST elevation MI ruled out by troponins in 20s had a stress test done because of high suspicion which shows ischemia of lateral septal wall with ejection fraction of 34%.  He does have history of cocaine use and apparently he had used cocaine before admission to [facility].  He was transferred to [place] for cardiac catheterization which shows multivessel coronary artery disease with EF of 30% including left main disease.  He remains on a statin and heparin.  CT surgery was consulted for CABG.    Patient had pulmonary function test done which shows FEV1 of 46% with significance positive bronchodilator FEV1 of 56% with severe reduction in diffusion capacity of less than 30% with air trapping/hyperinflation. CT scan of the chest shows areas of blebs and bulla medially and centrilobular and paraseptal emphysema on CT scan.     Patient does complain of shortness of breath on mild activity and exertion.  He does have chronic cough without no change denies increased wheezing currently denies chest pain.  He has history of cocaine use.  Does have history of smoking patient is not able to tell me that how many cigarettes he used to smoke but he claimed that now he smoke 1 pack/week and had been smoking for almost 50 years. INTERVAL HISTORY:  03/25/22  Patient seen in cardiovascular ICU this morning, overnight events noted, labs and arterial blood gases and ventilator support this morning was seen.   Overnight he was weaned off epinephrine drip and on vasopressin drip this morning. He is hypotensive and continued vasopressin. No bradycardia was reported overnight heart rate is in 60s to 70s. This morning he was on 40% FiO2 and PEEP of 8. Ventilator setting SIMV/16/600/8/40 percent. ABG 7.3 /62. He was on CPAP/pressure support 8/8/40 percent tolerated this morning breathing trial and extubated. Currently on 4 L nasal cannula maintaining saturation above 92% lethargic but arousable. He was on Precedex drip this morning and weaned off Precedex drip. Urine output 1185 in last 24 hours he had received 1 dose of Lasix 20 mg this morning. Labs show sodium 146 potassium 4.0 BUN 24 creatinine 0.99 bicarbonate 22 WBC 5.8 hemoglobin 9.1 platelet count 292. Chest x-ray 2022 shows bilateral small effusion bibasilar atelectasis possible right lower lobe infiltrate/atelectasis cardiomegaly pulmonary venous congestion. Respiratory culture sent on 2022 showed no growth so far.     REVIEW OF SYSTEMS:  Unobtainable from patient due to sedation/mechanical ventilation  Patient seen after extubation was lethargic unable to provide review of system but arousable with eyes open    OBJECTIVE     Ventilator Settings:  Vent Information  $Ventilation: $Subsequent Day  Skin Assessment: Clean, dry, & intact  Equipment Changed: HME  Vent Type: Servo i  Vent Mode: (S) CPAP (SBT)  Vt Ordered: 600 mL  Rate Set: 16 bmp  Pressure Support: 8 cmH20  FiO2 : 75 %  SpO2: 98 %  SpO2/FiO2 ratio: 121.67  Sensitivity: 5  PEEP/CPAP: 8  I Time/ I Time %: 0.9 s  Humidification Source: HME  Nitric Oxide/Epoprostenol In Use?: No  Mask Type: Full face mask  Mask Size: Medium    VITAL SIGNS:   LAST-  BP 93/65   Pulse 64   Temp 99.3 °F (37.4 °C) (Axillary)   Resp (!) 38   Ht 6' 3\" (1.905 m)   Wt 163 lb 12.8 oz (74.3 kg)   SpO2 98%   BMI 20.47 kg/m²   8-24 HR RANGE-  TEMP Temp  Av.7 °F (37.1 °C)  Min: 97.5 °F (36.4 °C)  Max: 99.3 °F (09.4 °C)   BP Systolic (29HSL), PIF:882 , Min:73 , Max:131 Diastolic (47LVX), BQW:85, Min:53, Max:91     PULSE Pulse  Av  Min: 64  Max: 124   RR Resp  Av.3  Min: 26  Max: 38   O2 SAT SpO2  Av.9 %  Min: 93 %  Max: 100 %   OXYGEN DELIVERY O2 Flow Rate (L/min)  Avg: 10 L/min  Min: 10 L/min  Max: 10 L/min     SYSTEMIC EXAMINATION:   General appearance -lethargic eyes opening, chronically ill-appearing follows some command intermittently  Mental status -on Precedex drip this morning low-dose  Eyes - pupils equal and reactive sluggish, sclera anicteric  Mouth -oral mucosa look moist  Neck - supple, no significant adenopathy, carotids upstroke normal bilaterally, no bruits  Chest - Breath sounds bilaterally were dimnished to auscultation at bases. There were scattered rhonchi present with distant breath sound slightly diminished breath sounds on left as compared to right. Heart -regular S1-S2, regular rhythm, normal S1, S2, no murmurs, clicks or gallops  Abdomen - soft, nontender, nondistended, no masses or organomegaly  Neurological - DTR's decreased and plantars upgoing, spontaneously moves did not follow command.   Extremities - peripheral pulses normal, no pedal edema, no clubbing or cyanosis  Skin - normal coloration and turgor, no rashes, no suspicious skin lesions noted     DATA REVIEW     Medications: Current Inpatient  Scheduled Meds:   midodrine  10 mg Oral TID WC    apixaban  5 mg Oral BID    furosemide  20 mg IntraVENous Daily    polyethylene glycol  17 g Oral BID    nystatin  5 mL Oral 4x Daily    ipratropium-albuterol  1 ampule Inhalation Q6H    sodium chloride flush  10 mL IntraVENous 2 times per day    aspirin  81 mg Oral Daily    clopidogrel  75 mg Oral Daily    [Held by provider] amiodarone  200 mg Oral TID    mupirocin   Nasal BID    sennosides-docusate sodium  1 tablet Oral BID    [Held by provider] metoprolol tartrate  25 mg Oral BID    atorvastatin  80 mg Oral Nightly    pantoprazole  40 mg Oral Daily    insulin glargine 0.15 Units/kg SubCUTAneous Nightly    [Held by provider] tiotropium  2 puff Inhalation Daily    tamsulosin  0.4 mg Oral Daily     Continuous Infusions:   amiodarone 450mg/250ml D5W infusion 0.5 mg/min (03/25/22 0018)    amiodarone 450mg/250ml D5W infusion      dexmedetomidine 0.2 mcg/kg/hr (03/24/22 1947)    sodium chloride      propofol Stopped (03/23/22 1455)    norepinephrine 0.06 mcg/kg/min (03/25/22 0253)    EPINEPHrine Stopped (03/24/22 1610)    insulin Stopped (03/22/22 1544)    dextrose      vasopressin (Septic Shock) infusion 0.02 Units/min (03/24/22 2030)    sodium chloride      sodium chloride       INPUT/OUTPUT:  In: 2305.3 [P.O.:850; I.V.:1323.3]  Out: 1875 [Urine:840]  Date 03/25/22 0000 - 03/25/22 2359   Shift 6257-4228 3330-4084 3081-8610 24 Hour Total   INTAKE   P.O.(mL/kg/hr) 100(0.2)   100   I. V.(mL/kg) 431(5.8)   431(5.8)   Shift Total(mL/kg) 531(7.1)   531(7.1)   OUTPUT   Urine(mL/kg/hr) 165(0.3)   165   Chest Tube 275   275   Shift Total(mL/kg) 440(5.9)   440(5.9)   Weight (kg) 74.3 74.3 74.3 74.3       LABS:-  ABG:   Recent Labs     03/23/22  0345 03/24/22  0626 03/24/22  0849 03/24/22  1158 03/24/22  1727   POCPH 7.417 7.395 7.434 7.427 7.467*   POCPCO2 36.5 40.5 35.6 35.1 28.7*   POCPO2 54.8* 62.0* 64.5* 73.2* 48.3*   POCHCO3 23.5 24.8 23.8 23.1 20.7*   GDLD9CHB 89* 91* 93* 95 87*     CBC:   Recent Labs     03/23/22  0347 03/23/22 0347 03/24/22  0515 03/24/22  1209 03/25/22  0711   WBC 12.3*  --  5.8  --  7.8   HGB 8.6*   < > 9.1* 8.8* 8.9*   HCT 28.1*   < > 29.3* 28.3* 31.3*   MCV 89.8  --  88.8  --  96.6   PLT See Reflexed IPF Result  --  119*  --  152   RBC 3.13*  --  3.30*  --  3.24*   MCH 27.5  --  27.6  --  27.5   MCHC 30.6  --  31.1  --  28.4   RDW 16.6*  --  16.3*  --  16.3*    < > = values in this interval not displayed.      BMP:   Recent Labs     03/23/22 0347 03/23/22 0347 03/24/22  0515 03/24/22  1209 03/25/22  0711     --  146*  --  142   K 4.2   < > 4.0 4.0 3.9   *  --  113*  --  109*   CO2 22  --  22  --  20   BUN 21  --  24*  --  32*   CREATININE 1.26*  --  0.99  --  1.32*   GLUCOSE 156*  --  114*  --  93    < > = values in this interval not displayed. Liver Function Test:   No results for input(s): PROT, LABALBU, ALT, AST, GGT, ALKPHOS, BILITOT in the last 72 hours. Amylase/Lipase:  No results for input(s): AMYLASE, LIPASE in the last 72 hours. Coagulation Profile:   Recent Labs     03/23/22  0347 03/24/22  0515 03/25/22  0711   INR 1.5 1.3 1.2   PROTIME 15.3* 13.5* 12.7*     Cardiac Enzymes:  No results for input(s): CKTOTAL, CKMB, CKMBINDEX, TROPONINI in the last 72 hours. Lactic Acid:  Lab Results   Component Value Date    LACTA NOT REPORTED 03/11/2018    LACTA NOT REPORTED 03/10/2018    LACTA NOT REPORTED 03/10/2018     BNP:   Lab Results   Component Value Date    BNP 70 09/13/2013     D-Dimer:  No results found for: DDIMER  Others:   No results found for: TSH, N2QIASG, N6FTZGS, THYROIDAB, FT3, T4FREE  Lab Results   Component Value Date    SEDRATE 20 (H) 05/21/2014    CRP 15.2 (H) 05/21/2014     No results found for: Jose Scrivener  No results found for: IRON, TIBC, FERRITIN  No results found for: SPEP, UPEP  Lab Results   Component Value Date    PSA 1.11 11/04/2016       Microbiology:  Recent Labs     03/22/22  2030   [facility] . TRACHEAL ASPIRATE   CULTURE NORMAL RESPIRATORY KYLAH MODERATE GROWTH       Pathology:    Radiology Reports:  XR CHEST PORTABLE   Preliminary Result   Overall improvement seen in the aeration lung bases. The remainder of lines   and tubes in stool are stable. No definite pneumothorax on the left. XR CHEST PORTABLE   Final Result   1. Lines and tubes as described above. 2.  Increasing right pleural effusion and right basilar airspace disease. Stable left pleural effusion. XR CHEST PORTABLE   Final Result   1. Support lines and tubes remain in place.       2.  Bibasilar opacities persist, slightly increased on the right, suggesting   layering pleural effusions and atelectasis. 3.  Mildly increased vascular congestion, possibly accentuated by supine   positioning. XR CHEST PORTABLE   Final Result   Stable chest (suspect right pleural effusion in addition to left effusion   and/or atelectasis) with line and tube placements as detailed above. XR CHEST PORTABLE   Final Result   As compared to prior examination, the ET tube now is in satisfactory   position. Examination otherwise is unchanged. XR CHEST PORTABLE   Final Result   1. Endotracheal tube is low in position, approximately 1 cm above the   veronique. This should be pulled back approximately 3 cm. 2.  Other lines and tubes are unchanged in position. 3.  New small right pleural effusion. 4.  Unchanged left basilar opacity. XR CHEST PORTABLE   Final Result   1. Lines and tubes are unchanged in position. 2. Linear bibasilar opacities are similar prior study, atelectasis versus   pneumonia. XR CHEST PORTABLE   Final Result   All tubes and catheters are in good position. No evidence for pneumothorax. Bilateral basal atelectasis noted. VL DUP UPPER EXTREMITY ARTERIES BILATERAL   Final Result      VL Vein Mapping Lower Bilateral   Final Result      VL DUP CAROTID BILATERAL   Final Result      CT CHEST WO CONTRAST   Final Result   1. Extensive centrilobular and paraseptal emphysema. 2. Extensive atherosclerotic disease, particularly along the coronary   arteries. 3. There are areas of atelectasis in the lingula. There is collapse of the   right middle lobe   4. No focal lung infiltrate.          XR CHEST PORTABLE    (Results Pending)       Echocardiogram:   Results for orders placed during the hospital encounter of 03/11/22    Echocardiogram Complete 2D w Doppler w Color    Narrative  Transthoracic Echocardiography Report (TTE)    Patient Name Ekaterina Reinoso       Date of Study physician) on  2022 09:19 AM  ----------------------------------------------------------------------------  FINDINGS  Left Atrium  Left atrium is normal in size. Left Ventricle  Left ventricle is normal in size. Global left ventricular systolic function  is normal. Calculated EF via Parks's method is 48 %. Mild septal hypertrophy. Normal diastolic filling. Right Atrium  Right atrium is normal in size. Right Ventricle  Normal right ventricular size and function. Mitral Valve  Normal mitral valve structure. Mild mitral regurgitation. No mitral stenosis. Aortic Valve  Aortic valve structure and function normal.  Aortic valve is trileaflet. No aortic insufficiency. No aortic stenosis. Tricuspid Valve  Normal tricuspid valve leaflets. Mild tricuspid regurgitation. No tricuspid stenosis. Mild pulmonary hypertension with an estimated right ventricular systolic  pressure of 41 mmHg. Pulmonic Valve  Pulmonic valve is normal in structure and function. No pulmonic insufficiency. No evidence of pulmonic stenosis. Pericardial Effusion  No pericardial effusion. Miscellaneous  Normal aortic root dimension. E/E' average = 11.0. IVC normal diameter & inspiratory collapse indicating normal RA filling  pressure .     M-mode / 2D Measurements & Calculations:    LVIDd:4.9 cm(3.7 - 5.6 cm)       Diastolic BONUMI:45.55 ml  LVIDs:3.8 cm(2.2 - 4.0 cm)       Systolic ZCFBOE:72.03 ml  IVSd:1.2 cm(0.6 - 1.1 cm)        Aortic Root:3.3 cm(2.0 - 3.7 cm)  LVPWd:0.8 cm(0.6 - 1.1 cm)       LA Dimension: 2.9 cm(1.9 - 4.0 cm)  Fractional Shortenin.45 %    LA volume/Index: 35.47 ml /19m^2  Calculated LVEF (%): 49.08 %     LVOT:2.2 cm  RVDd:3.5 cm    Mitral:                                 Aortic    Valve Area (P1/2-Time): 2.78 cm^2       Peak Velocity: 1.26 m/s  Peak E-Wave: 0.87 m/s                   Mean Velocity: 0.84 m/s  Peak A-Wave: 0.67 m/s                   Peak Gradient: 6.35 mmHg  E/A Ratio: 1.29 Mean Gradient: 4 mmHg  Peak Gradient: 2.99 mmHg  Mean Gradient: 1 mmHg  Deceleration Time: 258 msec             Area (continuity): 2.33 cm^2  P1/2t: 79 msec                          AV VTI: 26.9 cm    Area (continuity): 3.32 cm^2  Mean Velocity: 0.49 m/s    Tricuspid:                              Pulmonic:    Estimated RVSP: 41 mmHg                 Peak Velocity: 0.71 m/s  Peak TR Velocity: 3.00 m/s              Peak Gradient: 1.99 mmHg  Peak TR Gradient: 36 mmHg  Estimated RA Pressure: 5 mmHg    Estimated PASP: 41 mmHg    Diastology / Tissue Doppler  Septal Wall E' velocity:0.06 m/s  Septal Wall E/E':14.7  Lateral Wall E' velocity:0.12 m/s  Lateral Wall E/E':7.3      Cardiac Catheterization:   No results found for this or any previous visit. ASSESSMENT AND PLAN     Assessment:    MV CAD, Plan for CABG   Bradycardia  Sinus pause lasting for 4.7 seconds 3/17/2022  COPD stage II on PFTs with severe reduction diffusion capacity  Centrilobular and paraseptal emphysema  Chronic smoker  Cocaine use  Chronic combined diastolic and systolic CHF, EF 84%.  Previously 50-55%  Mild pulmonary hypertension  Right hip arthritis  Essential hypertension  CKD stage III  Leukocytosis    Plan:    I personally interviewed/examined the patient; reviewed interval history, interpreted all available radiographic and laboratory data at the time of service. Patient is currently off Precedex drip lethargic but arousable follows some command looks weak. Has been extubated and on nasal cannula maintaining saturation currently. We will use noninvasive ventilation if needed. ABG post extubation showed 7.4 2/35/73/23 on nasal cannula. He is currently on vasopressin drip and off epinephrine drip. Continue DuoNeb aerosol  Encourage incentive spirometry deep breathing cough avoid sedation.   Continue antiplatelet, anticoagulation, statins, and beta blockers as per cardiology/CT surgery  Continue to monitor I/O with a goal of even fluid balance suggest intermittent low-dose of Lasix if you tolerate as far as blood pressure is concerned as he is developing bilateral pleural effusion. Chest tube management as per CT surgery  On amiodarone, statin, aspirin, Plavix. Continue pulmonary toilet, aspiration precautions and bronchodilators  Continue with DuoNeb aerosol  Continue to monitor CBC, coagulation profile, transfuse as indicated  Chemical DVT prophylaxis postoperatively when okay with CT surgery  Antimicrobials reviewed; currently not on antibiotics  Glycemic control appropriate on Lantus  Physical/occupational therapy  Increase ambulation/physical therapy out of bed to chair. Discussed with nursing staff, treatment and plan discussed  Discussed with respiratory therapist pain      The patient is/remains critically ill with illness/injury that acutely impairs one or more vital organ systems, such that there is a high probability of imminent or life threatening deterioration in the patient's condition. Critical care time of greater than 35 minutes was spent (excluding procedures), in coordination of care during bedside rounds and discussion of patient care in detail, and recommendations of the team were adopted in the plan. Necessity of all invasive devices was also confirmed. Nelida Noriega MD.   Pulmonary and Critical Care Medicine           3/25/2022, 11:27 AM    Please note that this chart was generated using voice recognition Dragon dictation software. Although every effort was made to ensure the accuracy of this automated transcription, some errors in transcription may have occurred.     PULMONARY & CRITICAL CARE MEDICINE PROGRESS NOTE     Patient:  Lara Edward  MRN: [id number]  Admit date: 3/11/2022  Primary Care Physician: Concetta Crigler, MD  Consulting Physician: Sagar Peng MD  CODE Status: Full Code  LOS: 14    SUBJECTIVE     CHIEF COMPLAINT/REASON FOR INITIAL CONSULT:    COPD/chronic cough/preoperative evaluation    BRIEF HOSPITAL COURSE:  The patient is a 74 y.o. male history of hypertension, history of mild systolic dysfunction admitted with chest pain initially to [facility] on 03/09/2022 non-ST elevation MI ruled out by troponins in 20s had a stress test done because of high suspicion which shows ischemia of lateral septal wall with ejection fraction of 34%.  He does have history of cocaine use and apparently he had used cocaine before admission to [facility].  He was transferred to [facility] for cardiac catheterization which shows multivessel coronary artery disease with EF of 30% including left main disease.  He remains on a statin and heparin.  CT surgery was consulted for CABG.    Patient had pulmonary function test done which shows FEV1 of 46% with significance positive bronchodilator FEV1 of 56% with severe reduction in diffusion capacity of less than 30% with air trapping/hyperinflation. CT scan of the chest shows areas of blebs and bulla medially and centrilobular and paraseptal emphysema on CT scan.     Patient does complain of shortness of breath on mild activity and exertion.  He does have chronic cough without no change denies increased wheezing currently denies chest pain.  He has history of cocaine use.  Does have history of smoking patient is not able to tell me that how many cigarettes he used to smoke but he claimed that now he smoke 1 pack/week and had been smoking for almost 50 years. INTERVAL HISTORY:  03/25/22  Patient seen in cardiovascular ICU this morning overnight events noted labs and BiPAP/CPAP settings seen. Overnight patient required back to be on Levophed and vasopressin currently he is just taken off vasopressin and on Levophed drip. He was having desaturating overnight he also had atrial fibrillation with rapid ventricular rate started on amiodarone drip. He was on NIV overnight and currently on CPAP at 10 cm 75%.   According to nursing staff he was taken off NIV this morning and on nasal cannula he was desaturating increased work of breathing and he was placed back on CPAP currently saturating 100% on CPAP of 10 cm. Urine output reported to be 840 mg 24 hours he is on Lasix 20 mg daily. Arterial blood gas done last evening was 7.4 48/. Chest x-ray this morning as compared to chest x-ray on 2025 shows better aeration of lower lung bilateral effusion bibasilar atelectasis present. When I saw him he was on CPAP mask he was lethargic but arousable not able to provide much information. Patient is on low-dose of Precedex drip      REVIEW OF SYSTEMS:  Unobtainable from patient due to being on CPAP mask/respiratory status and on Precedex drip.     OBJECTIVE     Ventilator Settings:  Vent Information  $Ventilation: $Subsequent Day  Skin Assessment: Clean, dry, & intact  Equipment Changed: HME  Vent Type: Servo i  Vent Mode: (S) CPAP (SBT)  Vt Ordered: 600 mL  Rate Set: 16 bmp  Pressure Support: 8 cmH20  FiO2 : 75 %  SpO2: 98 %  SpO2/FiO2 ratio: 121.67  Sensitivity: 5  PEEP/CPAP: 8  I Time/ I Time %: 0.9 s  Humidification Source: HME  Nitric Oxide/Epoprostenol In Use?: No  Mask Type: Full face mask  Mask Size: Medium    VITAL SIGNS:   LAST-  BP 93/65   Pulse 64   Temp 99.3 °F (37.4 °C) (Axillary)   Resp (!) 38   Ht 6' 3\" (1.905 m)   Wt 163 lb 12.8 oz (74.3 kg)   SpO2 98%   BMI 20.47 kg/m²   8-24 HR RANGE-  TEMP Temp  Av.7 °F (37.1 °C)  Min: 97.5 °F (36.4 °C)  Max: 99.3 °F (47.4 °C)   BP Systolic (07UCS), LNS:622 , Min:73 , HLV:041      Diastolic (57KHZ), PWK:38, Min:53, Max:91     PULSE Pulse  Av  Min: 64  Max: 124   RR Resp  Av.3  Min: 26  Max: 38   O2 SAT SpO2  Av.9 %  Min: 93 %  Max: 100 %   OXYGEN DELIVERY O2 Flow Rate (L/min)  Avg: 10 L/min  Min: 10 L/min  Max: 10 L/min     SYSTEMIC EXAMINATION:   General appearance -lethargic eyes opening, chronically ill-appearing follows some command intermittently, mild distress and tachypneic on BiPAP currently  Mental status -on low-dose Precedex drip this morning,   Eyes - pupils equal and reactive sluggish, sclera anicteric  Mouth -on BiPAP mask  Neck - supple, no significant adenopathy, carotids upstroke normal bilaterally, no bruits  Chest - Breath sounds bilaterally were dimnished to auscultation at bases. There were scattered rhonchi present with distant breath sound slightly diminished breath sounds on left as compared to right. Heart -regular S1-S2, regular rhythm, normal S1, S2, no murmurs, clicks or gallops  Abdomen - soft, nontender, nondistended, no masses or organomegaly  Neurological - DTR's decreased and plantars downgoing spontaneously moves and follows command.   Extremities - peripheral pulses normal, no pedal edema, no clubbing or cyanosis  Skin - normal coloration and turgor, no rashes, no suspicious skin lesions noted     DATA REVIEW     Medications: Current Inpatient  Scheduled Meds:   midodrine  10 mg Oral TID WC    apixaban  5 mg Oral BID    furosemide  20 mg IntraVENous Daily    polyethylene glycol  17 g Oral BID    nystatin  5 mL Oral 4x Daily    ipratropium-albuterol  1 ampule Inhalation Q6H    sodium chloride flush  10 mL IntraVENous 2 times per day    aspirin  81 mg Oral Daily    clopidogrel  75 mg Oral Daily    [Held by provider] amiodarone  200 mg Oral TID    mupirocin   Nasal BID    sennosides-docusate sodium  1 tablet Oral BID    [Held by provider] metoprolol tartrate  25 mg Oral BID    atorvastatin  80 mg Oral Nightly    pantoprazole  40 mg Oral Daily    insulin glargine  0.15 Units/kg SubCUTAneous Nightly    [Held by provider] tiotropium  2 puff Inhalation Daily    tamsulosin  0.4 mg Oral Daily     Continuous Infusions:   amiodarone 450mg/250ml D5W infusion 0.5 mg/min (03/25/22 0018)    amiodarone 450mg/250ml D5W infusion      dexmedetomidine 0.2 mcg/kg/hr (03/24/22 1947)    sodium chloride      propofol Stopped (03/23/22 1455)    norepinephrine 0.06 mcg/kg/min (03/25/22 0253)    EPINEPHrine Stopped (03/24/22 1610)    insulin Stopped (03/22/22 1544)    dextrose      vasopressin (Septic Shock) infusion 0.02 Units/min (03/24/22 2030)    sodium chloride      sodium chloride       INPUT/OUTPUT:  In: 2305.3 [P.O.:850; I.V.:1323.3]  Out: 1875 [Urine:840]  Date 03/25/22 0000 - 03/25/22 2359   Shift 3614-4607 6467-6628 8513-0866 24 Hour Total   INTAKE   P.O.(mL/kg/hr) 100(0.2)   100   I. V.(mL/kg) 431(5.8)   431(5.8)   Shift Total(mL/kg) 531(7.1)   531(7.1)   OUTPUT   Urine(mL/kg/hr) 165(0.3)   165   Chest Tube 275   275   Shift Total(mL/kg) 440(5.9)   440(5.9)   Weight (kg) 74.3 74.3 74.3 74.3       LABS:-  ABG:   Recent Labs     03/23/22  0345 03/24/22  0626 03/24/22  0849 03/24/22  1158 03/24/22  1727   POCPH 7.417 7.395 7.434 7.427 7.467*   POCPCO2 36.5 40.5 35.6 35.1 28.7*   POCPO2 54.8* 62.0* 64.5* 73.2* 48.3*   POCHCO3 23.5 24.8 23.8 23.1 20.7*   KZPF1MAN 89* 91* 93* 95 87*     CBC:   Recent Labs     03/23/22  0347 03/23/22  0347 03/24/22  0515 03/24/22  1209 03/25/22  0711   WBC 12.3*  --  5.8  --  7.8   HGB 8.6*   < > 9.1* 8.8* 8.9*   HCT 28.1*   < > 29.3* 28.3* 31.3*   MCV 89.8  --  88.8  --  96.6   PLT See Reflexed IPF Result  --  119*  --  152   RBC 3.13*  --  3.30*  --  3.24*   MCH 27.5  --  27.6  --  27.5   MCHC 30.6  --  31.1  --  28.4   RDW 16.6*  --  16.3*  --  16.3*    < > = values in this interval not displayed. BMP:   Recent Labs     03/23/22  0347 03/23/22  0347 03/24/22  0515 03/24/22  1209 03/25/22  0711     --  146*  --  142   K 4.2   < > 4.0 4.0 3.9   *  --  113*  --  109*   CO2 22  --  22  --  20   BUN 21  --  24*  --  32*   CREATININE 1.26*  --  0.99  --  1.32*   GLUCOSE 156*  --  114*  --  93    < > = values in this interval not displayed. Liver Function Test:   No results for input(s): PROT, LABALBU, ALT, AST, GGT, ALKPHOS, BILITOT in the last 72 hours. Amylase/Lipase:   No results for input(s): AMYLASE, LIPASE in the last 72 hours. Coagulation Profile:   Recent Labs     03/23/22  0347 03/24/22  0515 03/25/22  0711   INR 1.5 1.3 1.2   PROTIME 15.3* 13.5* 12.7*     Cardiac Enzymes:  No results for input(s): CKTOTAL, CKMB, CKMBINDEX, TROPONINI in the last 72 hours. Lactic Acid:  Lab Results   Component Value Date    LACTA NOT REPORTED 03/11/2018    LACTA NOT REPORTED 03/10/2018    LACTA NOT REPORTED 03/10/2018     BNP:   Lab Results   Component Value Date    BNP 70 09/13/2013     D-Dimer:  No results found for: DDIMER  Others:   No results found for: TSH, G3BQMOP, S9QOSCN, THYROIDAB, FT3, T4FREE  Lab Results   Component Value Date    SEDRATE 20 (H) 05/21/2014    CRP 15.2 (H) 05/21/2014     No results found for: Ted Danker  No results found for: IRON, TIBC, FERRITIN  No results found for: SPEP, UPEP  Lab Results   Component Value Date    PSA 1.11 11/04/2016       Microbiology:  Recent Labs     03/22/22  2030   [facility] . TRACHEAL ASPIRATE   CULTURE NORMAL RESPIRATORY KYLAH MODERATE GROWTH       Pathology:    Radiology Reports:  XR CHEST PORTABLE   Preliminary Result   Overall improvement seen in the aeration lung bases. The remainder of lines   and tubes in stool are stable. No definite pneumothorax on the left. XR CHEST PORTABLE   Final Result   1. Lines and tubes as described above. 2.  Increasing right pleural effusion and right basilar airspace disease. Stable left pleural effusion. XR CHEST PORTABLE   Final Result   1. Support lines and tubes remain in place. 2.  Bibasilar opacities persist, slightly increased on the right, suggesting   layering pleural effusions and atelectasis. 3.  Mildly increased vascular congestion, possibly accentuated by supine   positioning.          XR CHEST PORTABLE   Final Result   Stable chest (suspect right pleural effusion in addition to left effusion   and/or atelectasis) with line and tube placements as detailed above. XR CHEST PORTABLE   Final Result   As compared to prior examination, the ET tube now is in satisfactory   position. Examination otherwise is unchanged. XR CHEST PORTABLE   Final Result   1. Endotracheal tube is low in position, approximately 1 cm above the   veronique. This should be pulled back approximately 3 cm. 2.  Other lines and tubes are unchanged in position. 3.  New small right pleural effusion. 4.  Unchanged left basilar opacity. XR CHEST PORTABLE   Final Result   1. Lines and tubes are unchanged in position. 2. Linear bibasilar opacities are similar prior study, atelectasis versus   pneumonia. XR CHEST PORTABLE   Final Result   All tubes and catheters are in good position. No evidence for pneumothorax. Bilateral basal atelectasis noted. VL DUP UPPER EXTREMITY ARTERIES BILATERAL   Final Result      VL Vein Mapping Lower Bilateral   Final Result      VL DUP CAROTID BILATERAL   Final Result      CT CHEST WO CONTRAST   Final Result   1. Extensive centrilobular and paraseptal emphysema. 2. Extensive atherosclerotic disease, particularly along the coronary   arteries. 3. There are areas of atelectasis in the lingula. There is collapse of the   right middle lobe   4. No focal lung infiltrate.          XR CHEST PORTABLE    (Results Pending)       Echocardiogram:   Results for orders placed during the hospital encounter of 03/11/22    Echocardiogram Complete 2D w Doppler w Color    Narrative  Transthoracic Echocardiography Report (TTE)    Patient Name Guillermo Menard       Date of Study           03/13/2022  [place]    Date of      1955  Gender                  Male  Birth    Age          74 year(s)  Race                    Black    Room Number  2107        Height:                 75 inch, 190.5 cm    Corporate ID [id number]    Weight:                 136 pounds, 61.7 kg  #    Patient Acct [de-identified]   BSA:        1.86 m^2    BMI:        17 kg/m^2  #    MR #         [id number]      Sonographer             Ian Deleon    Accession #  [id number]  Interpreting Physician  Vinh Chau    Fellow                   Referring Nurse  Practitioner    Interpreting             Referring Physician     Pearle Schwab A. [facility],  Fellow                                           APRN-NP    Type of Study    TTE procedure:2D Echocardiogram, M-Mode, Doppler, Color Doppler. Procedure Date  Date: 03/13/2022 Start: 12:17 PM    Study Location: [facility]  Technical Quality: Fair visualization    Indications:Multi vessel CAD and Pre-Op CABG. History / Tech. Comments:  Procedure explained to patient. Study done at the bedside. STAT call in    Patient Status: Inpatient    Height: 75 inches Weight: 136 pounds BSA: 1.86 m^2 BMI: 17 kg/m^2    Allergies  - *No Known Allergies. CONCLUSIONS    Summary  Global left ventricular systolic function is normal. Estimated EF 01-15%  Normal diastolic filling. Normal right ventricular size and function. Mild mitral regurgitation. Mild tricuspid regurgitation. Mild pulmonary hypertension with an estimated right ventricular systolic  pressure of 41 mmHg. No pericardial effusion. Signature  ----------------------------------------------------------------------------  Electronically signed by Vesna Saini(Sonographer) on 03/13/2022  12:51 PM  ----------------------------------------------------------------------------    ----------------------------------------------------------------------------  Electronically signed by Vinh Chau(Interpreting physician) on  03/14/2022 09:19 AM  ----------------------------------------------------------------------------  FINDINGS  Left Atrium  Left atrium is normal in size. Left Ventricle  Left ventricle is normal in size. Global left ventricular systolic function  is normal. Calculated EF via Parks's method is 48 %. Mild septal hypertrophy.   Normal diastolic filling. Right Atrium  Right atrium is normal in size. Right Ventricle  Normal right ventricular size and function. Mitral Valve  Normal mitral valve structure. Mild mitral regurgitation. No mitral stenosis. Aortic Valve  Aortic valve structure and function normal.  Aortic valve is trileaflet. No aortic insufficiency. No aortic stenosis. Tricuspid Valve  Normal tricuspid valve leaflets. Mild tricuspid regurgitation. No tricuspid stenosis. Mild pulmonary hypertension with an estimated right ventricular systolic  pressure of 41 mmHg. Pulmonic Valve  Pulmonic valve is normal in structure and function. No pulmonic insufficiency. No evidence of pulmonic stenosis. Pericardial Effusion  No pericardial effusion. Miscellaneous  Normal aortic root dimension. E/E' average = 11.0. IVC normal diameter & inspiratory collapse indicating normal RA filling  pressure .     M-mode / 2D Measurements & Calculations:    LVIDd:4.9 cm(3.7 - 5.6 cm)       Diastolic ODMZHB:02.97 ml  LVIDs:3.8 cm(2.2 - 4.0 cm)       Systolic GQGGOW:91.74 ml  IVSd:1.2 cm(0.6 - 1.1 cm)        Aortic Root:3.3 cm(2.0 - 3.7 cm)  LVPWd:0.8 cm(0.6 - 1.1 cm)       LA Dimension: 2.9 cm(1.9 - 4.0 cm)  Fractional Shortenin.45 %    LA volume/Index: 35.47 ml /19m^2  Calculated LVEF (%): 49.08 %     LVOT:2.2 cm  RVDd:3.5 cm    Mitral:                                 Aortic    Valve Area (P1/2-Time): 2.78 cm^2       Peak Velocity: 1.26 m/s  Peak E-Wave: 0.87 m/s                   Mean Velocity: 0.84 m/s  Peak A-Wave: 0.67 m/s                   Peak Gradient: 6.35 mmHg  E/A Ratio: 1.29                         Mean Gradient: 4 mmHg  Peak Gradient: 2.99 mmHg  Mean Gradient: 1 mmHg  Deceleration Time: 258 msec             Area (continuity): 2.33 cm^2  P1/2t: 79 msec                          AV VTI: 26.9 cm    Area (continuity): 3.32 cm^2  Mean Velocity: 0.49 m/s    Tricuspid:                              Pulmonic:    Estimated RVSP: 41 mmHg

## 2022-03-25 NOTE — CARE COORDINATION
[facility] Flow/Interdisciplinary Rounds Progress Note    Quality Flow Rounds held on March 25, 2022 at [address] Attending:  Bedside Nurse,  and Nursing Unit Leadership    Barriers to Discharge:Levo/amnio gtss, needs SNF placement    Anticipated Discharge Date:  Expected Discharge Date: 03/18/22    Anticipated Discharge Disposition:    Readmission Risk              Risk of Unplanned Readmission:  18           Discussed patient goal for the day, patient clinical progression, and barriers to discharge. The following Goal(s) of the Day/Commitment(s) have been identified:      Spoke with pt's daughter Anjana Pearce for SNF choices while in pt's room and she said she  and her great Auntie were discussing. OK to call pt's aunt, Jessica Baron, for choice. Called Jessica Baron [phone] and phone number is blocked as spam. Orysia Mortimer as she has left pt's room [phone] and her VM is full. Called another pt contact Erin Boyle [phone] and left VM    [phone] Casandra calls from [facility]. She took CM number down and will have Jessica Cancer call me.     Agnes Mckinnon RN  March 25, 2022

## 2022-03-25 NOTE — PLAN OF CARE
BRONCHOSPASM/BRONCHOCONSTRICTION     [x]         IMPROVE AERATION/BREATH SOUNDS  [x]   ADMINISTER BRONCHODILATOR THERAPY AS APPROPRIATE  [x]   ASSESS BREATH SOUNDS  []   IMPLEMENT AEROSOL/MDI PROTOCOL  [x]   PATIENT EDUCATION AS NEEDED   NON INVASIVE VENTILATION  PROVIDE OPTIMAL VENTILATION/ACCEPTABLE SP02  IMPLEMENT NON INVASIVE VENTILATION PROTOCOL  ASSESSMENT SKIN INTEGRITY  PATIENT EDUCATION AS NEEDED  BIPAP AS NEEDED

## 2022-03-25 NOTE — PROGRESS NOTES
[facility]  Progress Note                   Date:   3/25/2022  Patient name: Estrella Cummins  Date of admission:  3/11/2022  7:15 PM  MRN:   [id number]  YOB: 1955  PCP: Hiren Reese MD    Reason for Admission: Chest pain [R07.9]  S/P cardiac catheterization [Z98.890]  3-vessel CAD [I25.10]    Subjective:       Clinical Changes /Abnormalities:   POD4 CABG. Extubated yesterday. On BIPAP  On low dose levo and vsao  Afib rvr yesterday - started on amio gtt, converted to sinus overnight.       Medications:   Scheduled Meds:   furosemide  20 mg IntraVENous Daily    polyethylene glycol  17 g Oral BID    nystatin  5 mL Oral 4x Daily    ipratropium-albuterol  1 ampule Inhalation Q6H    sodium chloride flush  10 mL IntraVENous 2 times per day    aspirin  81 mg Oral Daily    clopidogrel  75 mg Oral Daily    amiodarone  200 mg Oral TID    mupirocin   Nasal BID    sennosides-docusate sodium  1 tablet Oral BID    metoprolol tartrate  25 mg Oral BID    atorvastatin  80 mg Oral Nightly    pantoprazole  40 mg Oral Daily    insulin glargine  0.15 Units/kg SubCUTAneous Nightly    [Held by provider] tiotropium  2 puff Inhalation Daily    tamsulosin  0.4 mg Oral Daily     Continuous Infusions:   amiodarone 450mg/250ml D5W infusion 0.5 mg/min (03/25/22 0018)    amiodarone 450mg/250ml D5W infusion      dexmedetomidine 0.2 mcg/kg/hr (03/24/22 1947)    sodium chloride      propofol Stopped (03/23/22 1455)    norepinephrine 0.06 mcg/kg/min (03/25/22 0253)    EPINEPHrine Stopped (03/24/22 1610)    insulin Stopped (03/22/22 1544)    dextrose      vasopressin (Septic Shock) infusion 0.02 Units/min (03/24/22 2030)    sodium chloride      sodium chloride       CBC:   Recent Labs     03/23/22 0347 03/24/22  0515 03/24/22  1209   WBC 12.3* 5.8  --    HGB 8.6* 9.1* 8.8*   PLT See Reflexed IPF Result 119*  --      BMP:    Recent Labs     03/23/22 0347 03/24/22  0515 03/24/22  1209   NA 144 146*  --    K 4.2 4.0 4.0   * 113*  --    CO2 22 22  --    BUN 21 24*  --    CREATININE 1.26* 0.99  --    GLUCOSE 156* 114*  --      INR:   Recent Labs     03/23/22  0347 03/24/22  0515   INR 1.5 1.3         3/11/2022- cardiac cath     Findings:   Angiographic Findings        Cardiac Arteries and Lesion Findings       LMCA: has heavy calcification with mid 60% stenosis. LAD: has heavy calcification in the proximal segment with 40% stenosis. The   mid segment has 85% stenosis. LCx: has heavy calcification with ostial 99% stenosis. The OM1 has 100%   occlusion with left to left collaterals. The OM2 has 100% occlusion with   left to left collaterals. The LPDA is large and is normal.     RCA: is a non-dominant vessel and has proximal 90% stenosis. Coronary Tree        Dominance: Right       LV Analysis   LV function assessed as:Abnormal.   Ejection Fraction   +----------------------------------------------------------------------+---+   ! Method                                                                ! EF%! +----------------------------------------------------------------------+---+   ! LV gram                                                               !30 !   +----------------------------------------------------------------------+---+       Procedure Summary        Three vessel disease with left main involvement. Moderate LV systolic dysfunction. LVEF 30%. Recommendations        Medical therapy as needed. Risk factor modification. CABG evaluation by CT surgery. Nuclear Stress 3/10/22:  Impression       Significant stress-induced ischemia in the lateral wall and septum. Infarct in the apex encroaching into the apical inferior wall and also in the   basal inferior wall. LVEF 34%       Risk stratification: High risk. Echo 3/13//2022     Summary  Global left ventricular systolic function is normal. Estimated EF 14-90%  Normal diastolic filling.   Normal right ventricular size and function. Mild mitral regurgitation. Mild tricuspid regurgitation. Mild pulmonary hypertension with an estimated right ventricular systolic  pressure of 41 mmHg. No pericardial effusion. Signature  ----------------------------------------------------------------------------   Electronically signed by Vesna Mims(Sonographer) on 03/13/2022   12:51 PM  ----------------------------------------------------------------------------     ----------------------------------------------------------------------------   Electronically signed by Vinh Chau(Interpreting physician) on   03/14/2022 09:19 AM    Objective:   Vitals: BP 93/65   Pulse 64   Temp 98.8 °F (37.1 °C) (Axillary)   Resp 27   Ht 6' 3\" (1.905 m)   Wt 163 lb 12.8 oz (74.3 kg)   SpO2 100%   BMI 20.47 kg/m²   General appearance: alert and cooperative with exam  HEENT: Head: Normocephalic, no lesions, without obvious abnormality. Neck:no JVD, trachea midline, no adenopathy  Lungs: Clear to auscultation throughout. RA without distress  Heart: Regular rate and rhythm, s1/s2 auscultated, no murmurs. SB   Abdomen: soft, non-tender, bowel sounds active  Extremities: no edema  Neurologic: not done        Assessment / Acute Cardiac Problems:   - Chest pain and Abnormal stress test 3/10/22- ischemia in lateral and septal walls  - Preserved EF on echo  - MV CAD s/p CABGx2 on 3/21/22 SLIMA to LAD, RSVG1 to OM, confirmed NO PDA (no RPDA or LPDA, diminutive non dominant RCA)   - Paroxysmal Afib - new diagnosis  - HTN  - DL  - ARABELLA  - cocaine use      Plan of Treatment:   1. Wean pressors. In sinus rhythm. Continue IV amio today. 2. Monitor H/H. Hb stable for now. 3. Renal function improving. Daily IV lasix 20 mg  4. Continue amio, asa, plavix, statin. 5. Hold lopressor while on pressors. 6. Post op care per CTS.        Electronically signed by Fior Smith MD on 3/25/2022 at 7:29 [facility]

## 2022-03-25 NOTE — SIGNIFICANT EVENT
Pt daughter here to visit patient. Asked patient if she could use his bank card for gas money, pt agreed.

## 2022-03-25 NOTE — PROGRESS NOTES
Occupational [facility] [employer]  Occupational Therapy Not Seen Note    DATE: 3/25/2022    NAME: Pavel Giraldo  MRN: [id number]   : 1955      Patient not seen this date for Occupational Therapy due to: Other: Temporary pacemaker in place, OT will check back 3/26/22.     Electronically signed by JOSE Spears on 3/25/2022 at 5:00 PM

## 2022-03-25 NOTE — PLAN OF CARE
Anil Alfred RCP  Outcome: Ongoing  3/24/2022 1829 by Kym Anderson RCP  Outcome: Ongoing     Problem: SKIN INTEGRITY  Goal: Skin integrity is maintained or improved  Outcome: Ongoing

## 2022-03-26 ENCOUNTER — APPOINTMENT (OUTPATIENT)
Dept: GENERAL RADIOLOGY | Age: 67
DRG: 233 | End: 2022-03-26
Attending: INTERNAL MEDICINE
Payer: MEDICARE

## 2022-03-26 ENCOUNTER — APPOINTMENT (OUTPATIENT)
Dept: ULTRASOUND IMAGING | Age: 67
DRG: 233 | End: 2022-03-26
Attending: INTERNAL MEDICINE
Payer: MEDICARE

## 2022-03-26 LAB
-: ABNORMAL
ANION GAP SERPL CALCULATED.3IONS-SCNC: 13 MMOL/L (ref 9–17)
ANION GAP SERPL CALCULATED.3IONS-SCNC: 16 MMOL/L (ref 9–17)
BILIRUBIN URINE: ABNORMAL
BUN BLDV-MCNC: 46 MG/DL (ref 8–23)
CALCIUM SERPL-MCNC: 8.3 MG/DL (ref 8.6–10.4)
CASTS UA: ABNORMAL /LPF (ref 0–2)
CASTS UA: ABNORMAL /LPF (ref 0–2)
CHLORIDE BLD-SCNC: 108 MMOL/L (ref 98–107)
CHLORIDE BLD-SCNC: 112 MMOL/L (ref 98–107)
CO2: 17 MMOL/L (ref 20–31)
CO2: 18 MMOL/L (ref 20–31)
COLOR: ABNORMAL
COMPLEMENT C3: 67 MG/DL (ref 90–180)
COMPLEMENT C4: 10 MG/DL (ref 10–40)
CREAT SERPL-MCNC: 1.97 MG/DL (ref 0.7–1.2)
CREATININE URINE: 226.4 MG/DL (ref 39–259)
EPITHELIAL CELLS UA: ABNORMAL /HPF (ref 0–5)
FREE KAPPA/LAMBDA RATIO: 1.76 (ref 0.26–1.65)
GFR AFRICAN AMERICAN: 41 ML/MIN
GFR NON-AFRICAN AMERICAN: 34 ML/MIN
GFR SERPL CREATININE-BSD FRML MDRD: ABNORMAL ML/MIN/{1.73_M2}
GLUCOSE BLD-MCNC: 136 MG/DL (ref 75–110)
GLUCOSE BLD-MCNC: 98 MG/DL (ref 70–99)
GLUCOSE URINE: NEGATIVE
HAV IGM SER IA-ACNC: NONREACTIVE
HCT VFR BLD CALC: 29.4 % (ref 40.7–50.3)
HEMOGLOBIN: 9.1 G/DL (ref 13–17)
HEPATITIS B CORE IGM ANTIBODY: NONREACTIVE
HEPATITIS B SURFACE ANTIGEN: NONREACTIVE
HEPATITIS C ANTIBODY: NONREACTIVE
INR BLD: 1.2
KAPPA FREE LIGHT CHAINS QNT: 5.74 MG/DL (ref 0.37–1.94)
KETONES, URINE: ABNORMAL
LAMBDA FREE LIGHT CHAINS QNT: 3.27 MG/DL (ref 0.57–2.63)
LEUKOCYTE ESTERASE, URINE: ABNORMAL
MAGNESIUM: 2.6 MG/DL (ref 1.6–2.6)
MCH RBC QN AUTO: 27.2 PG (ref 25.2–33.5)
MCHC RBC AUTO-ENTMCNC: 31 G/DL (ref 28.4–34.8)
MCV RBC AUTO: 88 FL (ref 82.6–102.9)
MUCUS: ABNORMAL
NITRITE, URINE: NEGATIVE
NRBC AUTOMATED: 0.3 PER 100 WBC
PDW BLD-RTO: 16 % (ref 11.8–14.4)
PH UA: 5 (ref 5–8)
PLATELET # BLD: 161 K/UL (ref 138–453)
PMV BLD AUTO: 12 FL (ref 8.1–13.5)
POTASSIUM SERPL-SCNC: 4 MMOL/L (ref 3.7–5.3)
POTASSIUM SERPL-SCNC: 4 MMOL/L (ref 3.7–5.3)
PROTEIN UA: ABNORMAL
PROTHROMBIN TIME: 12.9 SEC (ref 9.1–12.3)
RBC # BLD: 3.34 M/UL (ref 4.21–5.77)
RBC UA: ABNORMAL /HPF (ref 0–2)
SODIUM BLD-SCNC: 141 MMOL/L (ref 135–144)
SODIUM BLD-SCNC: 143 MMOL/L (ref 135–144)
SODIUM,UR: 20 MMOL/L
SPECIFIC GRAVITY UA: 1.02 (ref 1–1.03)
TOTAL PROTEIN, URINE: 66 MG/DL
TURBIDITY: CLEAR
URINE HGB: ABNORMAL
UROBILINOGEN, URINE: NORMAL
WBC # BLD: 10.7 K/UL (ref 3.5–11.3)
WBC UA: ABNORMAL /HPF (ref 0–5)

## 2022-03-26 PROCEDURE — 99222 1ST HOSP IP/OBS MODERATE 55: CPT | Performed by: INTERNAL MEDICINE

## 2022-03-26 PROCEDURE — 85027 COMPLETE CBC AUTOMATED: CPT

## 2022-03-26 PROCEDURE — 94660 CPAP INITIATION&MGMT: CPT

## 2022-03-26 PROCEDURE — 99024 POSTOP FOLLOW-UP VISIT: CPT | Performed by: NURSE PRACTITIONER

## 2022-03-26 PROCEDURE — 80074 ACUTE HEPATITIS PANEL: CPT

## 2022-03-26 PROCEDURE — 6370000000 HC RX 637 (ALT 250 FOR IP): Performed by: THORACIC SURGERY (CARDIOTHORACIC VASCULAR SURGERY)

## 2022-03-26 PROCEDURE — 76770 US EXAM ABDO BACK WALL COMP: CPT

## 2022-03-26 PROCEDURE — 6370000000 HC RX 637 (ALT 250 FOR IP): Performed by: NURSE PRACTITIONER

## 2022-03-26 PROCEDURE — 71045 X-RAY EXAM CHEST 1 VIEW: CPT

## 2022-03-26 PROCEDURE — 80048 BASIC METABOLIC PNL TOTAL CA: CPT

## 2022-03-26 PROCEDURE — 84156 ASSAY OF PROTEIN URINE: CPT

## 2022-03-26 PROCEDURE — 6360000002 HC RX W HCPCS: Performed by: NURSE PRACTITIONER

## 2022-03-26 PROCEDURE — 2100000001 HC CVICU R&B

## 2022-03-26 PROCEDURE — 94640 AIRWAY INHALATION TREATMENT: CPT

## 2022-03-26 PROCEDURE — 85610 PROTHROMBIN TIME: CPT

## 2022-03-26 PROCEDURE — 86334 IMMUNOFIX E-PHORESIS SERUM: CPT

## 2022-03-26 PROCEDURE — 86160 COMPLEMENT ANTIGEN: CPT

## 2022-03-26 PROCEDURE — 2580000003 HC RX 258: Performed by: STUDENT IN AN ORGANIZED HEALTH CARE EDUCATION/TRAINING PROGRAM

## 2022-03-26 PROCEDURE — 94761 N-INVAS EAR/PLS OXIMETRY MLT: CPT

## 2022-03-26 PROCEDURE — 2500000003 HC RX 250 WO HCPCS: Performed by: NURSE PRACTITIONER

## 2022-03-26 PROCEDURE — 2580000003 HC RX 258: Performed by: INTERNAL MEDICINE

## 2022-03-26 PROCEDURE — 6370000000 HC RX 637 (ALT 250 FOR IP): Performed by: INTERNAL MEDICINE

## 2022-03-26 PROCEDURE — 2700000000 HC OXYGEN THERAPY PER DAY

## 2022-03-26 PROCEDURE — 80051 ELECTROLYTE PANEL: CPT

## 2022-03-26 PROCEDURE — 82947 ASSAY GLUCOSE BLOOD QUANT: CPT

## 2022-03-26 PROCEDURE — 81001 URINALYSIS AUTO W/SCOPE: CPT

## 2022-03-26 PROCEDURE — 99291 CRITICAL CARE FIRST HOUR: CPT | Performed by: INTERNAL MEDICINE

## 2022-03-26 PROCEDURE — 6360000002 HC RX W HCPCS: Performed by: STUDENT IN AN ORGANIZED HEALTH CARE EDUCATION/TRAINING PROGRAM

## 2022-03-26 PROCEDURE — P9041 ALBUMIN (HUMAN),5%, 50ML: HCPCS | Performed by: NURSE PRACTITIONER

## 2022-03-26 PROCEDURE — 6360000002 HC RX W HCPCS: Performed by: INTERNAL MEDICINE

## 2022-03-26 PROCEDURE — 83735 ASSAY OF MAGNESIUM: CPT

## 2022-03-26 PROCEDURE — 36415 COLL VENOUS BLD VENIPUNCTURE: CPT

## 2022-03-26 PROCEDURE — 2580000003 HC RX 258: Performed by: NURSE PRACTITIONER

## 2022-03-26 PROCEDURE — 83883 ASSAY NEPHELOMETRY NOT SPEC: CPT

## 2022-03-26 PROCEDURE — 82570 ASSAY OF URINE CREATININE: CPT

## 2022-03-26 PROCEDURE — 84300 ASSAY OF URINE SODIUM: CPT

## 2022-03-26 RX ORDER — ALBUTEROL SULFATE 2.5 MG/3ML
2.5 SOLUTION RESPIRATORY (INHALATION) EVERY 6 HOURS PRN
Status: DISCONTINUED | OUTPATIENT
Start: 2022-03-26 | End: 2022-04-08 | Stop reason: HOSPADM

## 2022-03-26 RX ORDER — IPRATROPIUM BROMIDE AND ALBUTEROL SULFATE 2.5; .5 MG/3ML; MG/3ML
1 SOLUTION RESPIRATORY (INHALATION) 4 TIMES DAILY
Status: DISCONTINUED | OUTPATIENT
Start: 2022-03-26 | End: 2022-03-28

## 2022-03-26 RX ORDER — FUROSEMIDE 10 MG/ML
20 INJECTION INTRAMUSCULAR; INTRAVENOUS 2 TIMES DAILY
Status: DISCONTINUED | OUTPATIENT
Start: 2022-03-26 | End: 2022-03-26

## 2022-03-26 RX ORDER — AMIODARONE HYDROCHLORIDE 200 MG/1
200 TABLET ORAL DAILY
Status: DISCONTINUED | OUTPATIENT
Start: 2022-03-27 | End: 2022-04-08 | Stop reason: HOSPADM

## 2022-03-26 RX ADMIN — APIXABAN 5 MG: 5 TABLET, FILM COATED ORAL at 10:00

## 2022-03-26 RX ADMIN — FUROSEMIDE 5 MG/HR: 10 INJECTION, SOLUTION INTRAMUSCULAR; INTRAVENOUS at 16:06

## 2022-03-26 RX ADMIN — SODIUM CHLORIDE, PRESERVATIVE FREE 10 ML: 5 INJECTION INTRAVENOUS at 08:45

## 2022-03-26 RX ADMIN — INSULIN GLARGINE 10 UNITS: 100 INJECTION, SOLUTION SUBCUTANEOUS at 20:38

## 2022-03-26 RX ADMIN — ALBUMIN (HUMAN) 25 G: 12.5 INJECTION, SOLUTION INTRAVENOUS at 10:35

## 2022-03-26 RX ADMIN — STANDARDIZED SENNA CONCENTRATE AND DOCUSATE SODIUM 1 TABLET: 8.6; 5 TABLET ORAL at 20:37

## 2022-03-26 RX ADMIN — FUROSEMIDE 20 MG: 10 INJECTION, SOLUTION INTRAMUSCULAR; INTRAVENOUS at 10:00

## 2022-03-26 RX ADMIN — ASPIRIN 81 MG: 81 TABLET, COATED ORAL at 11:41

## 2022-03-26 RX ADMIN — ATORVASTATIN CALCIUM 80 MG: 80 TABLET, FILM COATED ORAL at 20:37

## 2022-03-26 RX ADMIN — MIDODRINE HYDROCHLORIDE 10 MG: 5 TABLET ORAL at 12:43

## 2022-03-26 RX ADMIN — SODIUM CHLORIDE, PRESERVATIVE FREE 10 ML: 5 INJECTION INTRAVENOUS at 20:38

## 2022-03-26 RX ADMIN — APIXABAN 5 MG: 5 TABLET, FILM COATED ORAL at 20:37

## 2022-03-26 RX ADMIN — IPRATROPIUM BROMIDE AND ALBUTEROL SULFATE 1 AMPULE: .5; 3 SOLUTION RESPIRATORY (INHALATION) at 20:36

## 2022-03-26 RX ADMIN — AMIODARONE HYDROCHLORIDE 0.5 MG/MIN: 50 INJECTION, SOLUTION INTRAVENOUS at 04:13

## 2022-03-26 RX ADMIN — DEXMEDETOMIDINE HYDROCHLORIDE 0.2 MCG/KG/HR: 4 INJECTION, SOLUTION INTRAVENOUS at 21:05

## 2022-03-26 RX ADMIN — Medication 500000 UNITS: at 20:37

## 2022-03-26 RX ADMIN — FENTANYL CITRATE 50 MCG: 50 INJECTION INTRAMUSCULAR; INTRAVENOUS at 09:30

## 2022-03-26 RX ADMIN — FENTANYL CITRATE 25 MCG: 50 INJECTION, SOLUTION INTRAMUSCULAR; INTRAVENOUS at 20:58

## 2022-03-26 RX ADMIN — IPRATROPIUM BROMIDE AND ALBUTEROL SULFATE 1 AMPULE: .5; 2.5 SOLUTION RESPIRATORY (INHALATION) at 14:03

## 2022-03-26 RX ADMIN — IPRATROPIUM BROMIDE AND ALBUTEROL SULFATE 1 AMPULE: .5; 2.5 SOLUTION RESPIRATORY (INHALATION) at 08:29

## 2022-03-26 RX ADMIN — ALBUMIN (HUMAN) 25 G: 12.5 INJECTION, SOLUTION INTRAVENOUS at 05:11

## 2022-03-26 RX ADMIN — CLOPIDOGREL 75 MG: 75 TABLET, FILM COATED ORAL at 11:41

## 2022-03-26 ASSESSMENT — PAIN SCALES - GENERAL
PAINLEVEL_OUTOF10: 9
PAINLEVEL_OUTOF10: 0
PAINLEVEL_OUTOF10: 10
PAINLEVEL_OUTOF10: 6
PAINLEVEL_OUTOF10: 0

## 2022-03-26 NOTE — CONSULTS
Nephrology Consult Note    Reason for Consult: ARABELLA   Requesting Physician:  Bianca Acosta CNP    Chief Complaint: chest pain    History Obtained From: patient, EMR, RN    History of Present Illness:    Mr. Alanna Lam is a 74 y.o. male history of hypertension, BPH,  history of mild systolic dysfunction admitted with chest pain initially to [facility] on 03/09/2022 non-ST elevation MI ruled out by troponins in 20s had a stress test done because of high suspicion which shows ischemia of lateral septal wall with ejection fraction of 34%.  He does have history of cocaine use and apparently he had used cocaine before admission to [facility]. Hx of heavy etoh, smoke and crack cocaine use. He was transferred to [facility] for cardiac catheterization which shows multivessel coronary artery disease with EF of 30% including left main disease.  He remains on a statin and heparin.  CT surgery was consulted for CABG. He is currently POD5 from CABG, he was extubated on 3/24, he is on combo of BiPAP and currently HiFlow 5LPM O2 60%. He is on low dose Levophed, vasopressin was recently weaned off. He was on amiodarone gtt until this am. Appears to be in NSR currently. Nephrology has been consulted today for ARABELLA. CKD III  Cr at admission at baseline did climb to 1.3 down to 0.99 on 3/24/22 then has risen to 1.97 today. U/O decreasing past 3 days, around 100 today, about 5 cc's in chamber 100 in bag, tyson colored. Received 2 dose of Lasix 20mg each yesterday evening. No edema on exam.   PO intake is marginal.     There is no history of blood or bone marrow disorders. There is no hx of jaundice or hepatitis or sexually transmitted disease. Pt has no hx of collagen vascular disease or vasculitis. No history of dysuria or frequency. No recent procedures involving IV contrast.   There is no hx of paraprotein disease. No hx of recurrent UTI , incontinence or recurrent nephrolithiasis. Medication review shows use of ACE, and Motrin TID.    Past Medical History:        Diagnosis Date    3-vessel CAD 03/11/2022    Alcohol abuse 06/23/2015    Arthritis     Chronic kidney disease     Full dentures     upper and lower full    Hypertension     Other emphysema ([address]     COPD stage II on PFTs with severe reduction diffusion capacity    Pure hypercholesterolemia 17/16/1459    Systolic CHF ([address]     Wears glasses        Past Surgical History:        Procedure Laterality Date    APPENDECTOMY      CORONARY ARTERY BYPASS GRAFT N/A 3/21/2022    CABG X 2 , EVH RT LEG , MELI performed by Carter Valerio MD at [facility] Left     W/ HARDWARE    [place] EGD TRANSORAL BIOPSY SINGLE/MULTIPLE  6/19/2017    EGD BIOPSY performed by Brigette Paris DO at [facility]       Current Medications:    furosemide (LASIX) injection 20 mg, BID  [START ON 3/27/2022] amiodarone (CORDARONE) tablet 200 mg, Daily  midodrine (PROAMATINE) tablet 10 mg, TID WC  apixaban (ELIQUIS) tablet 5 mg, BID  furosemide (LASIX) injection 20 mg, Daily  polyethylene glycol (GLYCOLAX) packet 17 g, BID  bisacodyl (DULCOLAX) EC tablet 5 mg, Daily PRN  nystatin (MYCOSTATIN) 606500 UNIT/ML suspension 500,000 Units, 4x Daily  ipratropium-albuterol (DUONEB) nebulizer solution 1 ampule, Q6H  dexmedetomidine (PRECEDEX) 400 mcg in sodium chloride 0.9 % 100 mL infusion, Continuous  sodium chloride flush 0.9 % injection 10 mL, 2 times per day  sodium chloride flush 0.9 % injection 10 mL, PRN  0.9 % sodium chloride infusion, PRN  ondansetron (ZOFRAN-ODT) disintegrating tablet 4 mg, Q8H PRN   Or  ondansetron (ZOFRAN) injection 4 mg, Q6H PRN  aspirin EC tablet 81 mg, Daily  clopidogrel (PLAVIX) tablet 75 mg, Daily  oxyCODONE-acetaminophen (PERCOCET) 5-325 MG per tablet 1 tablet, Q4H PRN   Or  oxyCODONE-acetaminophen (PERCOCET) 5-325 MG per tablet 2 tablet, Q4H PRN  fentaNYL (SUBLIMAZE) injection 25 mcg, Q1H PRN   Or  fentaNYL (SUBLIMAZE) injection 50 mcg, Q1H PRN  hydrALAZINE (APRESOLINE) injection 5 mg, Q5 Min PRN  metoprolol (LOPRESSOR) injection 2.5 mg, Q10 Min PRN  propofol injection, Continuous  diphenhydrAMINE (BENADRYL) tablet 25 mg, Nightly PRN  sennosides-docusate sodium (SENOKOT-S) 8.6-50 MG tablet 1 tablet, BID  [Held by provider] metoprolol tartrate (LOPRESSOR) tablet 25 mg, BID  atorvastatin (LIPITOR) tablet 80 mg, Nightly  pantoprazole (PROTONIX) tablet 40 mg, Daily  potassium chloride 20 mEq/50 mL IVPB (Central Line), PRN  magnesium sulfate 2000 mg in 50 mL IVPB premix, PRN  albumin human 5 % IV solution 25 g, PRN  norepinephrine (LEVOPHED) 16 mg in sodium chloride 0.9 % 250 mL infusion (weight-based), Continuous PRN  EPINEPHrine (EPINEPHrine HCL) 5 mg in dextrose 5 % 250 mL infusion, Continuous PRN  insulin regular (HUMULIN R;NOVOLIN R) 100 Units in sodium chloride 0.9 % 100 mL infusion, Continuous  insulin glargine (LANTUS) injection vial 10 Units, Nightly  glucose (GLUTOSE) 40 % oral gel 15 g, PRN  dextrose 50 % IV solution, PRN  glucagon (rDNA) injection 1 mg, PRN  dextrose 5 % solution, PRN  vasopressin 20 Units in dextrose 5 % 100 mL infusion, Continuous  0.9 % sodium chloride infusion, PRN  0.9 % sodium chloride infusion, PRN  [Held by provider] tiotropium (SPIRIVA RESPIMAT) 2.5 MCG/ACT inhaler 2 puff, Daily  tamsulosin (FLOMAX) capsule 0.4 mg, Daily        Allergies:  Patient has no known allergies.     Social History:   Social History     Socioeconomic History    Marital status: Single     Spouse name: Not on file    Number of children: Not on file    Years of education: Not on file    Highest education level: Not on file   Occupational History    Not on file   Tobacco Use    Smoking status: Light Tobacco Smoker     Packs/day: 0.00     Types: Cigarettes    Smokeless tobacco: Never Used    Tobacco comment: Dann Holder smokes when he drinks, 1 packs lasts him a month\"   Vaping Use    Vaping Use: Never used dyspnea, orthopnea or PND. Chest:              + cough, phlegm or wheezing. Abdomen:  No abdominal pain, nausea or vomiting. Neuro:  No focal weakness, abnormal movements orseizure like activity. Skin:   No rashes, no itching. :   No hematuria, no pyuria, no dysuria, no flank pain. Extremities:  No swelling or joint pains.       Objective:  CURRENT TEMPERATURE:  Temp: 97.2 °F (36.2 °C)  MAXIMUM TEMPERATURE OVER 24HRS:  Temp (24hrs), Av.5 °F (36.4 °C), Min:97.2 °F (36.2 °C), Max:97.7 °F (36.5 °C)    CURRENT RESPIRATORY RATE:  Resp: 20  CURRENT PULSE:  Pulse: 59  CURRENT BLOOD PRESSURE:  BP: 109/72  24HR BLOOD PRESSURE RANGE:  Systolic (61UXL), PGD:24 , Min:67 , NOR:876   ; Diastolic (54GZN), UHA:53, Min:43, Max:108    24HR INTAKE/OUTPUT:      Intake/Output Summary (Last 24 hours) at 3/26/2022 1354  Last data filed at 3/26/2022 1100  Gross per 24 hour   Intake 1688.51 ml   Output 980 ml   Net 708.51 ml     Patient Vitals for the past 96 hrs (Last 3 readings):   Weight   22 0559 163 lb 12.8 oz (74.3 kg)   22 0600 170 lb 3.1 oz (77.2 kg)     Physical Exam:  General appearance:Awake, alert, in no acute distress  Skin: warm and dry, no rash or erythema  Eyes: conjunctivae normal and sclera anicteric  ENT: :no thrush no pharyngeal congestion    Neck: no carotid bruit ,no  JVD,no carotid Lymphadenopathy, noThyromegaly Pulmonary: diminished, +chest tube  Cardiovascular: Normal S1 & S2,  No S3 or  S4, no Pericardial Rub no Murmur   Abdomen: soft distended, NT +bs, no fluid wave  Extremities:no cyanosis, clubbing or edema    Labs:   CBC:  Recent Labs     22  0515 22  0515 22  1209 22  0711 22  0341   WBC 5.8  --   --  7.8 10.7   RBC 3.30*  --   --  3.24* 3.34*   HGB 9.1*   < > 8.8* 8.9* 9.1*   HCT 29.3*   < > 28.3* 31.3* 29.4*   MCV 88.8  --   --  96.6 88.0   MCH 27.6  --   --  27.5 27.2   MCHC 31.1  --   --  28.4 31.0   RDW 16.3*  --   --  16.3* 16.0*   *  --   -- 152 161   MPV 12.4  --   --  12.2 12.0    < > = values in this interval not displayed. BMP:   Recent Labs     03/25/22  0711 03/25/22  2238 03/26/22  0341    142 141   K 3.9 4.0 4.0   * 105 108*   CO2 20 21 17*   BUN 32* 43* 46*   CREATININE 1.32* 1.79* 1.97*   GLUCOSE 93 102* 98   CALCIUM 8.3* 8.5* 8.3*        Phosphorus:  No results for input(s): PHOS in the last 72 hours. Magnesium:   Recent Labs     03/24/22  1209 03/25/22  0711 03/26/22  0341   MG 2.4 2.5 2.6     Albumin: No results for input(s): LABALBU in the last 72 hours. IRON:  No results found for: IRON  Iron Saturation:  No components found for: PERCENTFE  TIBC:  No results found for: TIBC  FERRITIN:  No results found for: FERRITIN  SPEP:   Lab Results   Component Value Date    PROT 7.1 03/09/2022     UPEP: No results found for: TPU     C3: No results found for: C3  C4: No results found for: C4  MPO ANCA:  No results found for: MPO . PR3 ANCA:  No results found for: PR3  Urine Sodium:  No results found for: SELENA   Urine Creatinine:  No results found for: LABCREA  Urine Eosinophils: No results found for: UREO  Urine Protein:  No results found for: TPU  Urine osm : No results found for: OSMOU  Urinalysis:  U/A:   Lab Results   Component Value Date    NITRU NEGATIVE 03/21/2022    COLORU Yellow 03/21/2022    PHUR 7.0 03/21/2022    CLARITYU clear 10/27/2016    SPECGRAV 1.012 03/21/2022    LEUKOCYTESUR NEGATIVE 03/21/2022    UROBILINOGEN Normal 03/21/2022    BILIRUBINUR NEGATIVE 03/21/2022    BILIRUBINUR neg 10/27/2016    BLOODU neg 10/27/2016    GLUCOSEU NEGATIVE 03/21/2022    [address] NEGATIVE 03/21/2022         Radiology:  Reviewed as available. Assessment:  1. ARABELLA secondary to ischemic ATN from hypoperfusion (post CABG and arrthymia) - Evolving  Baseline creatinine 1  2. Status post CABG  3. Essential HTN  4. COPD stage II  5. Chronic combined diastolic and systolic CHF, EF 09% previously 50-55%  6. Mild pulmonary HTN  7.  Cocaine use, heavy etoh, smoker          Plan:  1. Will Check Renal Ultrasound to r/o element of obstruction and to assess the kidney size/echotexture. 2. Urine studies. 3. Start Lasix gtt at 5mg/hr. 4. Strict I/O daily weights, avoid nephrotoxins. 5. Following   Will discuss with Dr. Xavier Kuhn. Thank you for the consultation. Please do not hesitate to call with questions. Electronically signed by TIKI Zamora on 3/26/2022 at 1:54 PM  Attending Physician Statement  I have discussed the care of [facility], including pertinent history and exam findings,  with the CNP. I have reviewed the key elements of all parts of the encounter with the CNP. I agree with the assessment, plan and orders as documented.     Michell Duffy MD MD, MRCP Antoine Valverde, FACP   3/26/2022 3:29 PM    Nephrology [address] [place] [address]

## 2022-03-26 NOTE — FLOWSHEET NOTE
Physical Therapy Cancel Note      DATE: 3/26/2022    NAME: Asya Bajwa  MRN: [id number]   : 1955      Patient not seen this date for Physical Therapy due to:     Other: Temporary pacer in place, per RN not appropriate for PT this date      Electronically signed by Vladimir Argueta PT on 3/26/2022 at 10:36 AM

## 2022-03-26 NOTE — PROGRESS NOTES
[address] [facility] [employer]  Speech Language Pathology    Date: 3/26/2022  Patient Name: Deepak Wilkes  YOB: 1955   AGE: 74 y.o. MRN: [id number]        Patient Not Available for Speech Therapy     Due to:  [] Testing  [] Hemodialysis  [] Cancelled by RN  [] Surgery   [x] Intubation/Sedation/Pain Medication  [] Medical instability  [] Other:    Next scheduled treatment:  As medical condition allows    Completed by: Brigette Angel SLP, M. Ed.  AIDA-SLP

## 2022-03-26 NOTE — RT PROTOCOL NOTE
[facility]INO ASHTON, [facility]. Chest pain [R07.9]  S/P cardiac catheterization [Z98.890]  3-vessel CAD [I25.10] . Vitals:    03/26/22 1400   BP: 114/75   Pulse: 58   Resp: 19   Temp:    SpO2: 100%   . Patients home meds are   Prior to Admission medications    Medication Sig Start Date End Date Taking? Authorizing Provider   nitroGLYCERIN (NITROSTAT) 0.3 MG SL tablet Place 0.3 mg under the tongue every 5 minutes as needed for Chest pain up to max of 3 total doses. If no relief after 1 dose, call 911. Yes Historical Provider, MD   ibuprofen (ADVIL;MOTRIN) 800 MG tablet Take 1 tablet by mouth every 8 hours as needed for Pain 4/25/18   Pam Ayoub MD   lisinopril (PRINIVIL;ZESTRIL) 10 MG tablet Take 1 tablet by mouth daily 3/12/18   Christine Wray MD   pravastatin (PRAVACHOL) 20 MG tablet Take 1 tablet by mouth every evening 3/11/18   Christine Wray MD   amLODIPine [facility]) 5 MG tablet Take 1 tablet by mouth daily 3/12/18   Christine Wray MD   aspirin 81 MG EC tablet Take 1 tablet by mouth daily 3/12/18   Christine Wray MD   tamsulosin [facility]) 0.4 MG capsule Take 1 capsule by mouth daily 3/12/18   Christine Wray MD   .  Assessment     Post op CABG  History of COPD. No home respiratory medications. Currently on Spiriva and Duo Neb Q6. No issue with wheezing. Has Stg 2 COPD per PFT. Pulmonary Consults. Dr. Danell Dakins wants treatments with Duo Neb QID. Will discontinue ordered Spiriva while on Duo Neb. Bipap as needed.      RR 24    Breath Sounds: diminished       · Bronchodilator assessment at level  3  · Hyperinflation assessment at level   · Secretion Management assessment at level    ·   · [x]    Bronchodilator Assessment  BRONCHODILATOR ASSESSMENT SCORE  Score 0 1 2 3 4 5   Breath Sounds   []  Patient Baseline []  No Wheeze good aeration []  Faint, scattered wheezing, good aeration [x]  Expiratory Wheezing and or moderately diminished []  Insp/Exp wheeze and/or very diminished []  Insp/Exp and/ or marked distress   Respiratory Rate   []  Patient Baseline []  Less than 20 []  Less than 20 [x]  20-25 []  Greater than 25 []  Greater than 25   Peak flow % of Pred or PB [x]  NA   []  Greater than 90%  []  81-90% []  71-80% []  Less than or equal to 70%  or unable to perform []  Unable due to Respiratory Distress   Dyspnea re []  Patient Baseline [x]  No SOB [x]  No SOB []  SOB on exertion []  SOB min activity []  At rest/acute   e FEV% Predicted       [x]  NA []  Above 69%  []  Unable []  Above 60-69%  []  Unable []  Above 50-59%  []  Unable []  Above 35-49%  []  Unable []  Less than 35%  []  Unable                    VICTORINO ASHTON RCP  2:53 PM

## 2022-03-26 NOTE — PROGRESS NOTES
[facility]  Progress Note                   Date:   3/26/2022  Patient name: Elana Pagan  Date of admission:  3/11/2022  7:15 PM  MRN:   [id number]  YOB: 1955  PCP: Anuj Jaffe MD    Reason for Admission: Chest pain [R07.9]  S/P cardiac catheterization [Z98.890]  3-vessel CAD [I25.10]    Subjective:       Clinical Changes /Abnormalities:   POD5 CABG. Extubated 03/24. On BIPAP  On low dose levo. Vaso weaned off. Had one episode of Afib rvr 03/24- started on amio gtt, currently in sinus. Requiring intermittent pacing. Will DC IV amio.        Medications:   Scheduled Meds:   midodrine  10 mg Oral TID WC    apixaban  5 mg Oral BID    furosemide  20 mg IntraVENous Daily    polyethylene glycol  17 g Oral BID    nystatin  5 mL Oral 4x Daily    ipratropium-albuterol  1 ampule Inhalation Q6H    sodium chloride flush  10 mL IntraVENous 2 times per day    aspirin  81 mg Oral Daily    clopidogrel  75 mg Oral Daily    [Held by provider] amiodarone  200 mg Oral TID    sennosides-docusate sodium  1 tablet Oral BID    [Held by provider] metoprolol tartrate  25 mg Oral BID    atorvastatin  80 mg Oral Nightly    pantoprazole  40 mg Oral Daily    insulin glargine  0.15 Units/kg SubCUTAneous Nightly    [Held by provider] tiotropium  2 puff Inhalation Daily    tamsulosin  0.4 mg Oral Daily     Continuous Infusions:   amiodarone 450mg/250ml D5W infusion 0.5 mg/min (03/25/22 1500)    amiodarone 450mg/250ml D5W infusion 0.5 mg/min (03/26/22 0413)    dexmedetomidine 0.2 mcg/kg/hr (03/25/22 1806)    sodium chloride      propofol Stopped (03/23/22 1455)    norepinephrine 0.03 mcg/kg/min (03/25/22 2348)    EPINEPHrine Stopped (03/24/22 1610)    insulin Stopped (03/22/22 1544)    dextrose      vasopressin (Septic Shock) infusion 0.02 Units/min (03/24/22 2030)    sodium chloride      sodium chloride       CBC:   Recent Labs     03/24/22  0515 03/24/22  0515 03/24/22  1209 03/25/22  0711 03/26/22  0341   WBC 5.8  --   --  7.8 10.7   HGB 9.1*   < > 8.8* 8.9* 9.1*   *  --   --  152 161    < > = values in this interval not displayed. BMP:    Recent Labs     03/25/22  0711 03/25/22  2238 03/26/22  0341    142 141   K 3.9 4.0 4.0   * 105 108*   CO2 20 21 17*   BUN 32* 43* 46*   CREATININE 1.32* 1.79* 1.97*   GLUCOSE 93 102* 98     INR:   Recent Labs     03/24/22  0515 03/25/22  0711 03/26/22  0341   INR 1.3 1.2 1.2         3/11/2022- cardiac cath     Findings:   Angiographic Findings        Cardiac Arteries and Lesion Findings       LMCA: has heavy calcification with mid 60% stenosis. LAD: has heavy calcification in the proximal segment with 40% stenosis. The   mid segment has 85% stenosis. LCx: has heavy calcification with ostial 99% stenosis. The OM1 has 100%   occlusion with left to left collaterals. The OM2 has 100% occlusion with   left to left collaterals. The LPDA is large and is normal.     RCA: is a non-dominant vessel and has proximal 90% stenosis. Coronary Tree        Dominance: Right       LV Analysis   LV function assessed as:Abnormal.   Ejection Fraction   +----------------------------------------------------------------------+---+   ! Method                                                                ! EF%! +----------------------------------------------------------------------+---+   ! LV gram                                                               !30 !   +----------------------------------------------------------------------+---+       Procedure Summary        Three vessel disease with left main involvement. Moderate LV systolic dysfunction. LVEF 30%. Recommendations        Medical therapy as needed. Risk factor modification. CABG evaluation by CT surgery. Nuclear Stress 3/10/22:  Impression       Significant stress-induced ischemia in the lateral wall and septum.        Infarct in the apex encroaching into the apical inferior wall and also in the   basal inferior wall. LVEF 34%       Risk stratification: High risk. Echo 3/13//2022     Summary  Global left ventricular systolic function is normal. Estimated EF 52-49%  Normal diastolic filling. Normal right ventricular size and function. Mild mitral regurgitation. Mild tricuspid regurgitation. Mild pulmonary hypertension with an estimated right ventricular systolic  pressure of 41 mmHg. No pericardial effusion. Signature  ----------------------------------------------------------------------------   Electronically signed by Vesna Mar(Sonographer) on 03/13/2022   12:51 PM  ----------------------------------------------------------------------------     ----------------------------------------------------------------------------   Electronically signed by Vinh Chau(Interpreting physician) on   03/14/2022 09:19 AM    Objective:   Vitals: /71   Pulse 60   Temp 97.7 °F (36.5 °C) (Axillary)   Resp 16   Ht 6' 3\" (1.905 m)   Wt 163 lb 12.8 oz (74.3 kg)   SpO2 100%   BMI 20.47 kg/m²   General appearance: alert and cooperative with exam  HEENT: Head: Normocephalic, no lesions, without obvious abnormality. Neck:no JVD, trachea midline, no adenopathy  Lungs: Clear to auscultation throughout. RA without distress  Heart: Regular rate and rhythm, s1/s2 auscultated, no murmurs. SB   Abdomen: soft, non-tender, bowel sounds active  Extremities: no edema  Neurologic: not done        Assessment / Acute Cardiac Problems:   - Chest pain and Abnormal stress test 3/10/22- ischemia in lateral and septal walls  - Preserved EF on echo  - MV CAD s/p CABGx2 on 3/21/22 SLIMA to LAD, RSVG1 to OM, confirmed NO PDA (no RPDA or LPDA, diminutive non dominant RCA)   - Paroxysmal Afib - new diagnosis  - HTN  - DL  - ARABELLA  - cocaine use      Plan of Treatment:   1. Wean pressors. In sinus rhythm. Will change amnio to p.o. today.   2. Monitor H/H. Hb stable for now. 3. Renal function worsening. Will hold lasix. 4. Continue amio, asa, plavix, statin. 5. Hold lopressor while on pressors. 6. Post op care per CTS. Unknown Kehr, MD       Cardiovascular Fellow PGY-4  3/26/2022, 6:53 AM      Attending Physician Statement  I have discussed the case of Shanice Hernandez including pertinent history and exam findings with the resident. I have seen and examined the patient and the key elements of the encounter have been performed by me. I agree with the assessment, plan and orders as documented by the resident With changes made to the note.      Electronically signed by Sara Pulido MD on 3/26/2022 at 2:51 PM.    [facility]      [phone]

## 2022-03-26 NOTE — PROGRESS NOTES
PULMONARY & CRITICAL CARE MEDICINE PROGRESS NOTE     Patient:  Tera Calles  MRN: [id number]  Admit date: 3/11/2022  Primary Care Physician: Marixa Michel MD  Consulting Physician: Aria Canchola MD  CODE Status: Full Code  LOS: 15    SUBJECTIVE     CHIEF COMPLAINT/REASON FOR INITIAL CONSULT:    COPD/chronic cough/preoperative evaluation    BRIEF HOSPITAL COURSE:  The patient is a 74 y.o. male history of hypertension, history of mild systolic dysfunction admitted with chest pain initially to [facility] on 03/09/2022 non-ST elevation MI ruled out by troponins in 20s had a stress test done because of high suspicion which shows ischemia of lateral septal wall with ejection fraction of 34%.  He does have history of cocaine use and apparently he had used cocaine before admission to [facility].  He was transferred to [place] for cardiac catheterization which shows multivessel coronary artery disease with EF of 30% including left main disease.  He remains on a statin and heparin.  CT surgery was consulted for CABG.    Patient had pulmonary function test done which shows FEV1 of 46% with significance positive bronchodilator FEV1 of 56% with severe reduction in diffusion capacity of less than 30% with air trapping/hyperinflation. CT scan of the chest shows areas of blebs and bulla medially and centrilobular and paraseptal emphysema on CT scan.     Patient does complain of shortness of breath on mild activity and exertion.  He does have chronic cough without no change denies increased wheezing currently denies chest pain.  He has history of cocaine use.  Does have history of smoking patient is not able to tell me that how many cigarettes he used to smoke but he claimed that now he smoke 1 pack/week and had been smoking for almost 50 years.     INTERVAL HISTORY:  03/26/22  Patient seen in cardiovascular ICU this morning, overnight events noted, labs and arterial blood gases and ventilator support this morning was seen. Overnight he was weaned off epinephrine drip and on vasopressin drip this morning. He is hypotensive and continued vasopressin. No bradycardia was reported overnight heart rate is in 60s to 70s. This morning he was on 40% FiO2 and PEEP of 8. Ventilator setting SIMV/16/600/8/40 percent. ABG 7.3 /62. He was on CPAP/pressure support 8/8/40 percent tolerated this morning breathing trial and extubated. Currently on 4 L nasal cannula maintaining saturation above 92% lethargic but arousable. He was on Precedex drip this morning and weaned off Precedex drip. Urine output 1185 in last 24 hours he had received 1 dose of Lasix 20 mg this morning. Labs show sodium 146 potassium 4.0 BUN 24 creatinine 0.99 bicarbonate 22 WBC 5.8 hemoglobin 9.1 platelet count 613. Chest x-ray 2022 shows bilateral small effusion bibasilar atelectasis possible right lower lobe infiltrate/atelectasis cardiomegaly pulmonary venous congestion. Respiratory culture sent on 2022 showed no growth so far. REVIEW OF SYSTEMS:  On precedex and bipap   Only suellen high flow for short period .     OBJECTIVE     Ventilator Settings:  Vent Information  $Ventilation: $Subsequent Day  Skin Assessment: Clean, dry, & intact  Equipment Changed: HME  Vent Type: Servo i  Vent Mode: (S) CPAP (SBT)  Vt Ordered: 600 mL  Rate Set: 16 bmp  Pressure Support: 8 cmH20  FiO2 : 60 %  SpO2: 100 %  SpO2/FiO2 ratio: 166.67  Sensitivity: 5  PEEP/CPAP: 8  I Time/ I Time %: 0.9 s  Humidification Source: HME  Nitric Oxide/Epoprostenol In Use?: No  Mask Type: Full face mask  Mask Size: Medium    VITAL SIGNS:   LAST-  /71   Pulse 60   Temp 97.7 °F (36.5 °C) (Axillary)   Resp 18   Ht 6' 3\" (1.905 m)   Wt 163 lb 12.8 oz (74.3 kg)   SpO2 100%   BMI 20.47 kg/m²   8-24 HR RANGE-  TEMP Temp  Av.9 °F (36.6 °C)  Min: 97.5 °F (36.4 °C)  Max: 98.4 °F (69.9 °C)   BP Systolic (09TUY), HEM:86 , Min:67 , Max:219 Diastolic (43ACL), EVN:78, Min:43, Max:108     PULSE Pulse  Av.7  Min: 56  Max: 84   RR Resp  Av.6  Min: 15  Max: 25   O2 SAT SpO2  Av.8 %  Min: 98 %  Max: 100 %   OXYGEN DELIVERY O2 Flow Rate (L/min)  Av L/min  Min: 30 L/min  Max: 30 L/min     SYSTEMIC EXAMINATION:   General appearance -lethargic eyes opening, chronically ill-appearing follows some command intermittently  Mental status -on Precedex drip this morning low-dose  Eyes - pupils equal and reactive sluggish, sclera anicteric  Mouth -oral mucosa look moist  Neck - supple, no significant adenopathy, carotids upstroke normal bilaterally, no bruits  Chest - Breath sounds bilaterally were dimnished to auscultation at bases. There were scattered rhonchi present with distant breath sound slightly diminished breath sounds on left as compared to right. Heart -regular S1-S2, regular rhythm, normal S1, S2, no murmurs, clicks or gallops  Abdomen - soft, nontender, nondistended, no masses or organomegaly  Neurological - DTR's decreased and plantars upgoing, spontaneously moves did not follow command.   Extremities - peripheral pulses normal, no pedal edema, no clubbing or cyanosis  Skin - normal coloration and turgor, no rashes, no suspicious skin lesions noted     DATA REVIEW     Medications: Current Inpatient  Scheduled Meds:   midodrine  10 mg Oral TID WC    apixaban  5 mg Oral BID    furosemide  20 mg IntraVENous Daily    polyethylene glycol  17 g Oral BID    nystatin  5 mL Oral 4x Daily    ipratropium-albuterol  1 ampule Inhalation Q6H    sodium chloride flush  10 mL IntraVENous 2 times per day    aspirin  81 mg Oral Daily    clopidogrel  75 mg Oral Daily    [Held by provider] amiodarone  200 mg Oral TID    sennosides-docusate sodium  1 tablet Oral BID    [Held by provider] metoprolol tartrate  25 mg Oral BID    atorvastatin  80 mg Oral Nightly    pantoprazole  40 mg Oral Daily    insulin glargine  0.15 Units/kg SubCUTAneous Nightly    [Held by provider] tiotropium  2 puff Inhalation Daily    tamsulosin  0.4 mg Oral Daily     Continuous Infusions:   dexmedetomidine 0.2 mcg/kg/hr (03/25/22 1806)    sodium chloride      propofol Stopped (03/23/22 1455)    norepinephrine 0.03 mcg/kg/min (03/25/22 2348)    EPINEPHrine Stopped (03/24/22 1610)    insulin Stopped (03/22/22 1544)    dextrose      vasopressin (Septic Shock) infusion 0.02 Units/min (03/24/22 2030)    sodium chloride      sodium chloride       INPUT/OUTPUT:  In: 1688.5 [P.O.:250; I.V.:438.5]  Out: 990 [Urine:545]  Date 03/26/22 0000 - 03/26/22 2359   Shift 8381-7333 9472-9770 7573-1118 24 Hour Total   INTAKE   P.O.(mL/kg/hr) 250(0.4)   250   I. V.(mL/kg) 223(3)   223(3)   IV Piggyback(mL/kg) 1000(13.5)   1000(13.5)   Shift Total(mL/kg) 2532(85.9)   5581(75.1)   OUTPUT   Urine(mL/kg/hr) 205(0.3)   205   Chest Tube 65   65   Shift Total(mL/kg) 270(3.6)   270(3.6)   Weight (kg) 74.3 74.3 74.3 74.3       LABS:-  ABG:   Recent Labs     03/24/22  0626 03/24/22  0849 03/24/22  1158 03/24/22  1727   POCPH 7.395 7.434 7.427 7.467*   POCPCO2 40.5 35.6 35.1 28.7*   POCPO2 62.0* 64.5* 73.2* 48.3*   POCHCO3 24.8 23.8 23.1 20.7*   VRWH0OMS 91* 93* 95 87*     CBC:   Recent Labs     03/24/22  0515 03/24/22  0515 03/24/22  1209 03/25/22  0711 03/26/22  0341   WBC 5.8  --   --  7.8 10.7   HGB 9.1*   < > 8.8* 8.9* 9.1*   HCT 29.3*   < > 28.3* 31.3* 29.4*   MCV 88.8  --   --  96.6 88.0   *  --   --  152 161   RBC 3.30*  --   --  3.24* 3.34*   MCH 27.6  --   --  27.5 27.2   MCHC 31.1  --   --  28.4 31.0   RDW 16.3*  --   --  16.3* 16.0*    < > = values in this interval not displayed.      BMP:   Recent Labs     03/25/22  0711 03/25/22  2238 03/26/22  0341    142 141   K 3.9 4.0 4.0   * 105 108*   CO2 20 21 17*   BUN 32* 43* 46*   CREATININE 1.32* 1.79* 1.97*   GLUCOSE 93 102* 98     Liver Function Test:   No results for input(s): PROT, LABALBU, ALT, AST, GGT, ALKPHOS, BILITOT in the last 72 hours. Amylase/Lipase:  No results for input(s): AMYLASE, LIPASE in the last 72 hours. Coagulation Profile:   Recent Labs     03/24/22  0515 03/25/22  0711 03/26/22  0341   INR 1.3 1.2 1.2   PROTIME 13.5* 12.7* 12.9*     Cardiac Enzymes:  No results for input(s): CKTOTAL, CKMB, CKMBINDEX, TROPONINI in the last 72 hours. Lactic Acid:  Lab Results   Component Value Date    LACTA NOT REPORTED 03/11/2018    LACTA NOT REPORTED 03/10/2018    LACTA NOT REPORTED 03/10/2018     BNP:   Lab Results   Component Value Date    BNP 70 09/13/2013     D-Dimer:  No results found for: DDIMER  Others:   No results found for: TSH, A4GDDJV, I5EYOCE, THYROIDAB, FT3, T4FREE  Lab Results   Component Value Date    SEDRATE 20 (H) 05/21/2014    CRP 15.2 (H) 05/21/2014     No results found for: Malen Cake  No results found for: IRON, TIBC, FERRITIN  No results found for: SPEP, UPEP  Lab Results   Component Value Date    PSA 1.11 11/04/2016       Microbiology:  No results for input(s): SPECDESC, SPECDESC, SPECIAL, CULTURE, CULTURE, STATUS, ORG, CDIFFTOXPCR, CAMPYLOBPCR, SALMONELLAPC, SHIGAPCR, SHIGELLAPCR, MPNEUG, MPNEUM, LACTOQL in the last 72 hours. Pathology:    Radiology Reports:  XR CHEST PORTABLE   Final Result   Bibasilar atelectasis and pleural effusion more pronounced on the left. No   significant change. Stable support tubes and line. XR CHEST PORTABLE   Final Result   Overall improvement seen in the aeration lung bases. The remainder of lines   and tubes in stool are stable. No definite pneumothorax on the left. XR CHEST PORTABLE   Final Result   1. Lines and tubes as described above. 2.  Increasing right pleural effusion and right basilar airspace disease. Stable left pleural effusion. XR CHEST PORTABLE   Final Result   1. Support lines and tubes remain in place.       2.  Bibasilar opacities persist, slightly increased on the right, suggesting   layering pleural effusions and atelectasis. 3.  Mildly increased vascular congestion, possibly accentuated by supine   positioning. XR CHEST PORTABLE   Final Result   Stable chest (suspect right pleural effusion in addition to left effusion   and/or atelectasis) with line and tube placements as detailed above. XR CHEST PORTABLE   Final Result   As compared to prior examination, the ET tube now is in satisfactory   position. Examination otherwise is unchanged. XR CHEST PORTABLE   Final Result   1. Endotracheal tube is low in position, approximately 1 cm above the   veronique. This should be pulled back approximately 3 cm. 2.  Other lines and tubes are unchanged in position. 3.  New small right pleural effusion. 4.  Unchanged left basilar opacity. XR CHEST PORTABLE   Final Result   1. Lines and tubes are unchanged in position. 2. Linear bibasilar opacities are similar prior study, atelectasis versus   pneumonia. XR CHEST PORTABLE   Final Result   All tubes and catheters are in good position. No evidence for pneumothorax. Bilateral basal atelectasis noted. VL DUP UPPER EXTREMITY ARTERIES BILATERAL   Final Result      VL Vein Mapping Lower Bilateral   Final Result      VL DUP CAROTID BILATERAL   Final Result      CT CHEST WO CONTRAST   Final Result   1. Extensive centrilobular and paraseptal emphysema. 2. Extensive atherosclerotic disease, particularly along the coronary   arteries. 3. There are areas of atelectasis in the lingula. There is collapse of the   right middle lobe   4. No focal lung infiltrate.          XR CHEST PORTABLE    (Results Pending)       Echocardiogram:   Results for orders placed during the hospital encounter of 03/11/22    Echocardiogram Complete 2D w Doppler w Color    Narrative  Transthoracic Echocardiography Report (TTE)    Patient Name Anny Ards       Date of Study           03/13/2022  [facility]    Date of      1955  Gender Male  Birth    Age          74 year(s)  Race                    Black    Room Number  2107        Height:                 75 inch, 190.5 cm    Corporate ID [id number]    Weight:                 136 pounds, 61.7 kg  #    Patient Acct [de-identified]   BSA:        1.86 m^2    BMI:        17 kg/m^2  #    MR #         [id number]      Sonographer             Magalys Bravo    Accession #  [id number]  Interpreting Physician  Vinh Chau    Fellow                   Referring Nurse  Practitioner    Interpreting             Referring Physician     Johnny Arnold. Charlee Davila,  Fellow                                           JACKELYN-NP    Type of Study    TTE procedure:2D Echocardiogram, M-Mode, Doppler, Color Doppler. Procedure Date  Date: 03/13/2022 Start: 12:17 PM    Study Location: [facility]  Technical Quality: Fair visualization    Indications:Multi vessel CAD and Pre-Op CABG. History / Tech. Comments:  Procedure explained to patient. Study done at the bedside. STAT call in    Patient Status: Inpatient    Height: 75 inches Weight: 136 pounds BSA: 1.86 m^2 BMI: 17 kg/m^2    Allergies  - *No Known Allergies. CONCLUSIONS    Summary  Global left ventricular systolic function is normal. Estimated EF 66-45%  Normal diastolic filling. Normal right ventricular size and function. Mild mitral regurgitation. Mild tricuspid regurgitation. Mild pulmonary hypertension with an estimated right ventricular systolic  pressure of 41 mmHg. No pericardial effusion.     Signature  ----------------------------------------------------------------------------  Electronically signed by Vesna Thompson(Sonographer) on 03/13/2022  12:51 PM  ----------------------------------------------------------------------------    ----------------------------------------------------------------------------  Electronically signed by Vinh Chau(Interpreting physician) on  03/14/2022 09:19 AM  ----------------------------------------------------------------------------  FINDINGS  Left Atrium  Left atrium is normal in size. Left Ventricle  Left ventricle is normal in size. Global left ventricular systolic function  is normal. Calculated EF via Parks's method is 48 %. Mild septal hypertrophy. Normal diastolic filling. Right Atrium  Right atrium is normal in size. Right Ventricle  Normal right ventricular size and function. Mitral Valve  Normal mitral valve structure. Mild mitral regurgitation. No mitral stenosis. Aortic Valve  Aortic valve structure and function normal.  Aortic valve is trileaflet. No aortic insufficiency. No aortic stenosis. Tricuspid Valve  Normal tricuspid valve leaflets. Mild tricuspid regurgitation. No tricuspid stenosis. Mild pulmonary hypertension with an estimated right ventricular systolic  pressure of 41 mmHg. Pulmonic Valve  Pulmonic valve is normal in structure and function. No pulmonic insufficiency. No evidence of pulmonic stenosis. Pericardial Effusion  No pericardial effusion. Miscellaneous  Normal aortic root dimension. E/E' average = 11.0. IVC normal diameter & inspiratory collapse indicating normal RA filling  pressure .     M-mode / 2D Measurements & Calculations:    LVIDd:4.9 cm(3.7 - 5.6 cm)       Diastolic AFFWO.80 ml  LVIDs:3.8 cm(2.2 - 4.0 cm)       Systolic YJEDRN:91.80 ml  IVSd:1.2 cm(0.6 - 1.1 cm)        Aortic Root:3.3 cm(2.0 - 3.7 cm)  LVPWd:0.8 cm(0.6 - 1.1 cm)       LA Dimension: 2.9 cm(1.9 - 4.0 cm)  Fractional Shortenin.45 %    LA volume/Index: 35.47 ml /19m^2  Calculated LVEF (%): 49.08 %     LVOT:2.2 cm  RVDd:3.5 cm    Mitral:                                 Aortic    Valve Area (P1/2-Time): 2.78 cm^2       Peak Velocity: 1.26 m/s  Peak E-Wave: 0.87 m/s                   Mean Velocity: 0.84 m/s  Peak A-Wave: 0.67 m/s                   Peak Gradient: 6.35 mmHg  E/A Ratio: 1.29                         Mean Gradient: 4 mmHg  Peak Gradient: 2.99 mmHg  Mean Gradient: 1 mmHg  Deceleration Time: 258 msec             Area (continuity): 2.33 cm^2  P1/2t: 79 msec                          AV VTI: 26.9 cm    Area (continuity): 3.32 cm^2  Mean Velocity: 0.49 m/s    Tricuspid:                              Pulmonic:    Estimated RVSP: 41 mmHg                 Peak Velocity: 0.71 m/s  Peak TR Velocity: 3.00 m/s              Peak Gradient: 1.99 mmHg  Peak TR Gradient: 36 mmHg  Estimated RA Pressure: 5 mmHg    Estimated PASP: 41 mmHg    Diastology / Tissue Doppler  Septal Wall E' velocity:0.06 m/s  Septal Wall E/E':14.7  Lateral Wall E' velocity:0.12 m/s  Lateral Wall E/E':7.3      Cardiac Catheterization:   No results found for this or any previous visit. ASSESSMENT AND PLAN     Assessment:    MV CAD, Plan for CABG   Bradycardia  Sinus pause lasting for 4.7 seconds 3/17/2022  COPD stage II on PFTs with severe reduction diffusion capacity  Centrilobular and paraseptal emphysema  Chronic smoker  Cocaine use  Chronic combined diastolic and systolic CHF, EF 44%.  Previously 50-55%  Mild pulmonary hypertension  Right hip arthritis  Essential hypertension  CKD stage III  Leukocytosis    Plan:    I personally interviewed/examined the patient; reviewed interval history, interpreted all available radiographic and laboratory data at the time of service. Patient is currently off Precedex drip lethargic but arousable follows some command looks weak. Has been extubated and on nasal cannula maintaining saturation currently. We will use noninvasive ventilation if needed. ABG post extubation showed 7.4 2/35/73/23 on nasal cannula. He is currently on vasopressin drip and off epinephrine drip. Continue DuoNeb aerosol  Encourage incentive spirometry deep breathing cough avoid sedation.   Continue antiplatelet, anticoagulation, statins, and beta blockers as per cardiology/CT surgery  Continue to monitor I/O with a goal of even fluid balance suggest intermittent low-dose of Lasix if you tolerate as far as blood pressure is concerned as he is developing bilateral pleural effusion. Chest tube management as per CT surgery  On amiodarone, statin, aspirin, Plavix. Continue pulmonary toilet, aspiration precautions and bronchodilators  Continue with DuoNeb aerosol  Continue to monitor CBC, coagulation profile, transfuse as indicated  Chemical DVT prophylaxis postoperatively when okay with CT surgery  Antimicrobials reviewed; currently not on antibiotics  Glycemic control appropriate on Lantus  Physical/occupational therapy  Increase ambulation/physical therapy out of bed to chair. Discussed with nursing staff, treatment and plan discussed  Discussed with respiratory therapist pain      The patient is/remains critically ill with illness/injury that acutely impairs one or more vital organ systems, such that there is a high probability of imminent or life threatening deterioration in the patient's condition. Critical care time of greater than 35 minutes was spent (excluding procedures), in coordination of care during bedside rounds and discussion of patient care in detail, and recommendations of the team were adopted in the plan. Necessity of all invasive devices was also confirmed. Fco Tyler MD.   Pulmonary and Critical Care Medicine           3/26/2022, 10:25 AM    Please note that this chart was generated using voice recognition Dragon dictation software. Although every effort was made to ensure the accuracy of this automated transcription, some errors in transcription may have occurred.     PULMONARY & CRITICAL CARE MEDICINE PROGRESS NOTE     Patient:  Tera Calles  MRN: [id number]  Admit date: 3/11/2022  Primary Care Physician: Marixa Michel MD  Consulting Physician: Aria Canchola MD  CODE Status: Full Code  LOS: 15    SUBJECTIVE     CHIEF COMPLAINT/REASON FOR INITIAL CONSULT:    COPD/chronic cough/preoperative evaluation    [address] [facility] CPAP/pressure support 40 percent tolerated this morning breathing trial and extubated. Currently on 4 L nasal cannula maintaining saturation above 92% lethargic but arousable. He was on Precedex drip this morning and weaned off Precedex drip. Urine output 1185 in last 24 hours he had received 1 dose of Lasix 20 mg this morning. Labs show sodium 146 potassium 4.0 BUN 24 creatinine 0.99 bicarbonate 22 WBC 5.8 hemoglobin 9.1 platelet count 174. Chest x-ray 2022 shows bilateral small effusion bibasilar atelectasis possible right lower lobe infiltrate/atelectasis cardiomegaly pulmonary venous congestion. Respiratory culture sent on 2022 showed no growth so far.     REVIEW OF SYSTEMS:  Unobtainable from patient due to sedation/mechanical ventilation  Patient seen after extubation was lethargic unable to provide review of system but arousable with eyes open    OBJECTIVE     Ventilator Settings:  Vent Information  $Ventilation: $Subsequent Day  Skin Assessment: Clean, dry, & intact  Equipment Changed: HME  Vent Type: Servo i  Vent Mode: (S) CPAP (SBT)  Vt Ordered: 600 mL  Rate Set: 16 bmp  Pressure Support: 8 cmH20  FiO2 : 60 %  SpO2: 100 %  SpO2/FiO2 ratio: 166.67  Sensitivity: 5  PEEP/CPAP: 8  I Time/ I Time %: 0.9 s  Humidification Source: HME  Nitric Oxide/Epoprostenol In Use?: No  Mask Type: Full face mask  Mask Size: Medium    VITAL SIGNS:   LAST-  /71   Pulse 60   Temp 97.7 °F (36.5 °C) (Axillary)   Resp 18   Ht 6' 3\" (1.905 m)   Wt 163 lb 12.8 oz (74.3 kg)   SpO2 100%   BMI 20.47 kg/m²   8-24 HR RANGE-  TEMP Temp  Av.9 °F (36.6 °C)  Min: 97.5 °F (36.4 °C)  Max: 98.4 °F (96.4 °C)   BP Systolic (21VDY), ASJ:78 , Min:67 , RICARDO:204      Diastolic (80OIY), XKC:39, Min:43, Max:108     PULSE Pulse  Av.7  Min: 56  Max: 84   RR Resp  Av.6  Min: 15  Max: 25   O2 SAT SpO2  Av.8 %  Min: 98 %  Max: 100 %   OXYGEN DELIVERY O2 Flow Rate (L/min)  Av L/min  Min: 30 L/min Max: 30 L/min     SYSTEMIC EXAMINATION:   General appearance -lethargic eyes opening, chronically ill-appearing follows some command intermittently  Mental status -on Precedex drip this morning low-dose  Eyes - pupils equal and reactive sluggish, sclera anicteric  Mouth -oral mucosa look moist  Neck - supple, no significant adenopathy, carotids upstroke normal bilaterally, no bruits  Chest - Breath sounds bilaterally were dimnished to auscultation at bases. There were scattered rhonchi present with distant breath sound slightly diminished breath sounds on left as compared to right. Heart -regular S1-S2, regular rhythm, normal S1, S2, no murmurs, clicks or gallops  Abdomen - soft, nontender, nondistended, no masses or organomegaly  Neurological - DTR's decreased and plantars upgoing, spontaneously moves did not follow command.   Extremities - peripheral pulses normal, no pedal edema, no clubbing or cyanosis  Skin - normal coloration and turgor, no rashes, no suspicious skin lesions noted     DATA REVIEW     Medications: Current Inpatient  Scheduled Meds:   midodrine  10 mg Oral TID WC    apixaban  5 mg Oral BID    furosemide  20 mg IntraVENous Daily    polyethylene glycol  17 g Oral BID    nystatin  5 mL Oral 4x Daily    ipratropium-albuterol  1 ampule Inhalation Q6H    sodium chloride flush  10 mL IntraVENous 2 times per day    aspirin  81 mg Oral Daily    clopidogrel  75 mg Oral Daily    [Held by provider] amiodarone  200 mg Oral TID    sennosides-docusate sodium  1 tablet Oral BID    [Held by provider] metoprolol tartrate  25 mg Oral BID    atorvastatin  80 mg Oral Nightly    pantoprazole  40 mg Oral Daily    insulin glargine  0.15 Units/kg SubCUTAneous Nightly    [Held by provider] tiotropium  2 puff Inhalation Daily    tamsulosin  0.4 mg Oral Daily     Continuous Infusions:   dexmedetomidine 0.2 mcg/kg/hr (03/25/22 8621)    sodium chloride      propofol Stopped (03/23/22 7094)    norepinephrine 0.03 mcg/kg/min (03/25/22 2348)    EPINEPHrine Stopped (03/24/22 1610)    insulin Stopped (03/22/22 1544)    dextrose      vasopressin (Septic Shock) infusion 0.02 Units/min (03/24/22 2030)    sodium chloride      sodium chloride       INPUT/OUTPUT:  In: 1688.5 [P.O.:250; I.V.:438.5]  Out: 990 [Urine:545]  Date 03/26/22 0000 - 03/26/22 2359   Shift 4361-4084 8543-4605 1589-6108 24 Hour Total   INTAKE   P.O.(mL/kg/hr) 250(0.4)   250   I. V.(mL/kg) 223(3)   223(3)   IV Piggyback(mL/kg) 1000(13.5)   1000(13.5)   Shift Total(mL/kg) 5953(52.2)   4009(28.6)   OUTPUT   Urine(mL/kg/hr) 205(0.3)   205   Chest Tube 65   65   Shift Total(mL/kg) 270(3.6)   270(3.6)   Weight (kg) 74.3 74.3 74.3 74.3       LABS:-  ABG:   Recent Labs     03/24/22  0626 03/24/22  0849 03/24/22  1158 03/24/22  1727   POCPH 7.395 7.434 7.427 7.467*   POCPCO2 40.5 35.6 35.1 28.7*   POCPO2 62.0* 64.5* 73.2* 48.3*   POCHCO3 24.8 23.8 23.1 20.7*   QOFP0VWJ 91* 93* 95 87*     CBC:   Recent Labs     03/24/22  0515 03/24/22  0515 03/24/22  1209 03/25/22  0711 03/26/22  0341   WBC 5.8  --   --  7.8 10.7   HGB 9.1*   < > 8.8* 8.9* 9.1*   HCT 29.3*   < > 28.3* 31.3* 29.4*   MCV 88.8  --   --  96.6 88.0   *  --   --  152 161   RBC 3.30*  --   --  3.24* 3.34*   MCH 27.6  --   --  27.5 27.2   MCHC 31.1  --   --  28.4 31.0   RDW 16.3*  --   --  16.3* 16.0*    < > = values in this interval not displayed. BMP:   Recent Labs     03/25/22  0711 03/25/22 2238 03/26/22 0341    142 141   K 3.9 4.0 4.0   * 105 108*   CO2 20 21 17*   BUN 32* 43* 46*   CREATININE 1.32* 1.79* 1.97*   GLUCOSE 93 102* 98     Liver Function Test:   No results for input(s): PROT, LABALBU, ALT, AST, GGT, ALKPHOS, BILITOT in the last 72 hours. Amylase/Lipase:  No results for input(s): AMYLASE, LIPASE in the last 72 hours.   Coagulation Profile:   Recent Labs     03/24/22  0515 03/25/22  0711 03/26/22  0341   INR 1.3 1.2 1.2   PROTIME 13.5* 12.7* 12.9* Cardiac Enzymes:  No results for input(s): CKTOTAL, CKMB, CKMBINDEX, TROPONINI in the last 72 hours. Lactic Acid:  Lab Results   Component Value Date    LACTA NOT REPORTED 03/11/2018    LACTA NOT REPORTED 03/10/2018    LACTA NOT REPORTED 03/10/2018     BNP:   Lab Results   Component Value Date    BNP 70 09/13/2013     D-Dimer:  No results found for: DDIMER  Others:   No results found for: TSH, O2EDMYJ, B9FCFFB, THYROIDAB, FT3, T4FREE  Lab Results   Component Value Date    SEDRATE 20 (H) 05/21/2014    CRP 15.2 (H) 05/21/2014     No results found for: Ley Lanius  No results found for: IRON, TIBC, FERRITIN  No results found for: SPEP, UPEP  Lab Results   Component Value Date    PSA 1.11 11/04/2016       Microbiology:  No results for input(s): SPECDESC, SPECDESC, SPECIAL, CULTURE, CULTURE, STATUS, ORG, CDIFFTOXPCR, CAMPYLOBPCR, SALMONELLAPC, SHIGAPCR, SHIGELLAPCR, MPNEUG, MPNEUM, LACTOQL in the last 72 hours. Pathology:    Radiology Reports:  XR CHEST PORTABLE   Final Result   Bibasilar atelectasis and pleural effusion more pronounced on the left. No   significant change. Stable support tubes and line. XR CHEST PORTABLE   Final Result   Overall improvement seen in the aeration lung bases. The remainder of lines   and tubes in stool are stable. No definite pneumothorax on the left. XR CHEST PORTABLE   Final Result   1. Lines and tubes as described above. 2.  Increasing right pleural effusion and right basilar airspace disease. Stable left pleural effusion. XR CHEST PORTABLE   Final Result   1. Support lines and tubes remain in place. 2.  Bibasilar opacities persist, slightly increased on the right, suggesting   layering pleural effusions and atelectasis. 3.  Mildly increased vascular congestion, possibly accentuated by supine   positioning.          XR CHEST PORTABLE   Final Result   Stable chest (suspect right pleural effusion in addition to left effusion and/or atelectasis) with line and tube placements as detailed above. XR CHEST PORTABLE   Final Result   As compared to prior examination, the ET tube now is in satisfactory   position. Examination otherwise is unchanged. XR CHEST PORTABLE   Final Result   1. Endotracheal tube is low in position, approximately 1 cm above the   veronique. This should be pulled back approximately 3 cm. 2.  Other lines and tubes are unchanged in position. 3.  New small right pleural effusion. 4.  Unchanged left basilar opacity. XR CHEST PORTABLE   Final Result   1. Lines and tubes are unchanged in position. 2. Linear bibasilar opacities are similar prior study, atelectasis versus   pneumonia. XR CHEST PORTABLE   Final Result   All tubes and catheters are in good position. No evidence for pneumothorax. Bilateral basal atelectasis noted. VL DUP UPPER EXTREMITY ARTERIES BILATERAL   Final Result      VL Vein Mapping Lower Bilateral   Final Result      VL DUP CAROTID BILATERAL   Final Result      CT CHEST WO CONTRAST   Final Result   1. Extensive centrilobular and paraseptal emphysema. 2. Extensive atherosclerotic disease, particularly along the coronary   arteries. 3. There are areas of atelectasis in the lingula. There is collapse of the   right middle lobe   4. No focal lung infiltrate.          XR CHEST PORTABLE    (Results Pending)       Echocardiogram:   Results for orders placed during the hospital encounter of 03/11/22    Echocardiogram Complete 2D w Doppler w Color    Narrative  Transthoracic Echocardiography Report (TTE)    Patient Name David Has       Date of Study           03/13/2022  [place]    Date of      1955  Gender                  Male  Birth    Age          74 year(s)  Race                    Black    Room Number  2107        Height:                 75 inch, 190.5 cm    Corporate ID [id number]    Weight:                 136 pounds, 61.7 kg  #    Patient Acct [de-identified]   BSA:        1.86 m^2    BMI:        17 kg/m^2  #    MR #         [id number]      Sonographer             Tasia Allen    Accession #  [id number]  Interpreting Physician  Vinh Chau    Fellow                   Referring Nurse  Practitioner    Interpreting             Referring Physician     Omi Cruz. Brando Cameron,  Fellow                                           APRN-NP    Type of Study    TTE procedure:2D Echocardiogram, M-Mode, Doppler, Color Doppler. Procedure Date  Date: 03/13/2022 Start: 12:17 PM    Study Location: [facility]  Technical Quality: Fair visualization    Indications:Multi vessel CAD and Pre-Op CABG. History / Tech. Comments:  Procedure explained to patient. Study done at the bedside. STAT call in    Patient Status: Inpatient    Height: 75 inches Weight: 136 pounds BSA: 1.86 m^2 BMI: 17 kg/m^2    Allergies  - *No Known Allergies. CONCLUSIONS    Summary  Global left ventricular systolic function is normal. Estimated EF 96-78%  Normal diastolic filling. Normal right ventricular size and function. Mild mitral regurgitation. Mild tricuspid regurgitation. Mild pulmonary hypertension with an estimated right ventricular systolic  pressure of 41 mmHg. No pericardial effusion. Signature  ----------------------------------------------------------------------------  Electronically signed by Vesna Rider(Sonographer) on 03/13/2022  12:51 PM  ----------------------------------------------------------------------------    ----------------------------------------------------------------------------  Electronically signed by Vinh Chau(Interpreting physician) on  03/14/2022 09:19 AM  ----------------------------------------------------------------------------  FINDINGS  Left Atrium  Left atrium is normal in size. Left Ventricle  Left ventricle is normal in size.  Global left ventricular systolic function  is normal. Calculated EF via Parks's method is 48 %. Mild septal hypertrophy. Normal diastolic filling. Right Atrium  Right atrium is normal in size. Right Ventricle  Normal right ventricular size and function. Mitral Valve  Normal mitral valve structure. Mild mitral regurgitation. No mitral stenosis. Aortic Valve  Aortic valve structure and function normal.  Aortic valve is trileaflet. No aortic insufficiency. No aortic stenosis. Tricuspid Valve  Normal tricuspid valve leaflets. Mild tricuspid regurgitation. No tricuspid stenosis. Mild pulmonary hypertension with an estimated right ventricular systolic  pressure of 41 mmHg. Pulmonic Valve  Pulmonic valve is normal in structure and function. No pulmonic insufficiency. No evidence of pulmonic stenosis. Pericardial Effusion  No pericardial effusion. Miscellaneous  Normal aortic root dimension. E/E' average = 11.0. IVC normal diameter & inspiratory collapse indicating normal RA filling  pressure .     M-mode / 2D Measurements & Calculations:    LVIDd:4.9 cm(3.7 - 5.6 cm)       Diastolic FEPZUF:65.46 ml  LVIDs:3.8 cm(2.2 - 4.0 cm)       Systolic KIXWS.66 ml  IVSd:1.2 cm(0.6 - 1.1 cm)        Aortic Root:3.3 cm(2.0 - 3.7 cm)  LVPWd:0.8 cm(0.6 - 1.1 cm)       LA Dimension: 2.9 cm(1.9 - 4.0 cm)  Fractional Shortenin.45 %    LA volume/Index: 35.47 ml /19m^2  Calculated LVEF (%): 49.08 %     LVOT:2.2 cm  RVDd:3.5 cm    Mitral:                                 Aortic    Valve Area (P1/2-Time): 2.78 cm^2       Peak Velocity: 1.26 m/s  Peak E-Wave: 0.87 m/s                   Mean Velocity: 0.84 m/s  Peak A-Wave: 0.67 m/s                   Peak Gradient: 6.35 mmHg  E/A Ratio: 1.29                         Mean Gradient: 4 mmHg  Peak Gradient: 2.99 mmHg  Mean Gradient: 1 mmHg  Deceleration Time: 258 msec             Area (continuity): 2.33 cm^2  P1/2t: 79 msec                          AV VTI: 26.9 cm    Area (continuity): 3.32 cm^2  Mean Velocity: 0.49 m/s    Tricuspid: Pulmonic:    Estimated RVSP: 41 mmHg                 Peak Velocity: 0.71 m/s  Peak TR Velocity: 3.00 m/s              Peak Gradient: 1.99 mmHg  Peak TR Gradient: 36 mmHg  Estimated RA Pressure: 5 mmHg    Estimated PASP: 41 mmHg    Diastology / Tissue Doppler  Septal Wall E' velocity:0.06 m/s  Septal Wall E/E':14.7  Lateral Wall E' velocity:0.12 m/s  Lateral Wall E/E':7.3      Cardiac Catheterization:   No results found for this or any previous visit. ASSESSMENT AND PLAN     Assessment:    MV CAD, Plan for CABG   Bradycardia  Sinus pause lasting for 4.7 seconds 3/17/2022  COPD stage II on PFTs with severe reduction diffusion capacity  Centrilobular and paraseptal emphysema  Chronic smoker  Cocaine use  Chronic combined diastolic and systolic CHF, EF 94%.  Previously 50-55%  Mild pulmonary hypertension  Right hip arthritis  Essential hypertension  CKD stage III  Leukocytosis    Plan:    I personally interviewed/examined the patient; reviewed interval history, interpreted all available radiographic and laboratory data at the time of service. Patient is currently off Precedex drip lethargic but arousable follows some command looks weak. Has been extubated and on nasal cannula maintaining saturation currently. We will use noninvasive ventilation if needed. ABG post extubation showed 7.4 2/35/73/23 on nasal cannula. He is currently on vasopressin drip and off epinephrine drip. Continue DuoNeb aerosol  Encourage incentive spirometry deep breathing cough avoid sedation. Continue antiplatelet, anticoagulation, statins, and beta blockers as per cardiology/CT surgery  Continue to monitor I/O with a goal of even fluid balance suggest intermittent low-dose of Lasix if you tolerate as far as blood pressure is concerned as he is developing bilateral pleural effusion. Chest tube management as per CT surgery  On amiodarone, statin, aspirin, Plavix.   Continue pulmonary toilet, aspiration precautions and bronchodilators  Continue with DuoNeb aerosol  Continue to monitor CBC, coagulation profile, transfuse as indicated  Chemical DVT prophylaxis postoperatively when okay with CT surgery  Antimicrobials reviewed; currently not on antibiotics  Glycemic control appropriate on Lantus  Physical/occupational therapy  Increase ambulation/physical therapy out of bed to chair. Discussed with nursing staff, treatment and plan discussed  Discussed with respiratory therapist pain      The patient is/remains critically ill with illness/injury that acutely impairs one or more vital organ systems, such that there is a high probability of imminent or life threatening deterioration in the patient's condition. Critical care time of greater than 35 minutes was spent (excluding procedures), in coordination of care during bedside rounds and discussion of patient care in detail, and recommendations of the team were adopted in the plan. Necessity of all invasive devices was also confirmed. Berry Bamberger, MD.   Pulmonary and Critical Care Medicine           3/26/2022, 10:25 AM    Please note that this chart was generated using voice recognition Dragon dictation software. Although every effort was made to ensure the accuracy of this automated transcription, some errors in transcription may have occurred.     PULMONARY & CRITICAL CARE MEDICINE PROGRESS NOTE     Patient:  Boo Kerr  MRN: [id number]  Admit date: 3/11/2022  Primary Care Physician: Cee Ballard MD  Consulting Physician: Royal Reid MD  CODE Status: Full Code  LOS: 15    SUBJECTIVE     CHIEF COMPLAINT/REASON FOR INITIAL CONSULT:    COPD/chronic cough/preoperative evaluation    BRIEF HOSPITAL COURSE:  The patient is a 74 y.o. male history of hypertension, history of mild systolic dysfunction admitted with chest pain initially to [facility] on 03/09/2022 non-ST elevation MI ruled out by troponins in 20s had a stress test done because of high suspicion which shows ischemia of lateral septal wall with ejection fraction of 34%.  He does have history of cocaine use and apparently he had used cocaine before admission to [facility].  He was transferred to [facility] for cardiac catheterization which shows multivessel coronary artery disease with EF of 30% including left main disease.  He remains on a statin and heparin.  CT surgery was consulted for CABG.    Patient had pulmonary function test done which shows FEV1 of 46% with significance positive bronchodilator FEV1 of 56% with severe reduction in diffusion capacity of less than 30% with air trapping/hyperinflation. CT scan of the chest shows areas of blebs and bulla medially and centrilobular and paraseptal emphysema on CT scan.     Patient does complain of shortness of breath on mild activity and exertion.  He does have chronic cough without no change denies increased wheezing currently denies chest pain.  He has history of cocaine use.  Does have history of smoking patient is not able to tell me that how many cigarettes he used to smoke but he claimed that now he smoke 1 pack/week and had been smoking for almost 50 years. INTERVAL HISTORY:  03/26/22  Patient seen in cardiovascular ICU this morning overnight events noted labs and BiPAP/CPAP settings seen. Overnight patient required back to be on Levophed and vasopressin currently he is just taken off vasopressin and on Levophed drip. He was having desaturating overnight he also had atrial fibrillation with rapid ventricular rate started on amiodarone drip. He was on NIV overnight and currently on CPAP at 10 cm 75%. According to nursing staff he was taken off NIV this morning and on nasal cannula he was desaturating increased work of breathing and he was placed back on CPAP currently saturating 100% on CPAP of 10 cm. Urine output reported to be 840 mg 24 hours he is on Lasix 20 mg daily.   Arterial blood gas done last evening was 7.4 /. Chest x-ray this morning as compared to chest x-ray on 2025 shows better aeration of lower lung bilateral effusion bibasilar atelectasis present. When I saw him he was on CPAP mask he was lethargic but arousable not able to provide much information. Patient is on low-dose of Precedex drip      REVIEW OF SYSTEMS:  Unobtainable from patient due to being on CPAP mask/respiratory status and on Precedex drip.     OBJECTIVE     Ventilator Settings:  Vent Information  $Ventilation: $Subsequent Day  Skin Assessment: Clean, dry, & intact  Equipment Changed: HME  Vent Type: Servo i  Vent Mode: (S) CPAP (SBT)  Vt Ordered: 600 mL  Rate Set: 16 bmp  Pressure Support: 8 cmH20  FiO2 : 60 %  SpO2: 100 %  SpO2/FiO2 ratio: 166.67  Sensitivity: 5  PEEP/CPAP: 8  I Time/ I Time %: 0.9 s  Humidification Source: HME  Nitric Oxide/Epoprostenol In Use?: No  Mask Type: Full face mask  Mask Size: Medium    VITAL SIGNS:   LAST-  /71   Pulse 60   Temp 97.7 °F (36.5 °C) (Axillary)   Resp 18   Ht 6' 3\" (1.905 m)   Wt 163 lb 12.8 oz (74.3 kg)   SpO2 100%   BMI 20.47 kg/m²   8-24 HR RANGE-  TEMP Temp  Av.9 °F (36.6 °C)  Min: 97.5 °F (36.4 °C)  Max: 98.4 °F (98.0 °C)   BP Systolic (26JCV), ZC , Min:67 , BZY:153      Diastolic (06MNB), NTS:01, Min:43, Max:108     PULSE Pulse  Av.7  Min: 56  Max: 84   RR Resp  Av.6  Min: 15  Max: 25   O2 SAT SpO2  Av.8 %  Min: 98 %  Max: 100 %   OXYGEN DELIVERY O2 Flow Rate (L/min)  Av L/min  Min: 30 L/min  Max: 30 L/min     SYSTEMIC EXAMINATION:   General appearance -lethargic eyes opening, chronically ill-appearing follows some command intermittently, mild distress and tachypneic on BiPAP currently  Mental status -on low-dose Precedex drip this morning,   Eyes - pupils equal and reactive sluggish, sclera anicteric  Mouth -on BiPAP mask  Neck - supple, no significant adenopathy, carotids upstroke normal bilaterally, no bruits  Chest - Breath sounds bilaterally were dimnished to auscultation at bases. There were scattered rhonchi present with distant breath sound slightly diminished breath sounds on left as compared to right. Heart -regular S1-S2, regular rhythm, normal S1, S2, no murmurs, clicks or gallops  Abdomen - soft, nontender, nondistended, no masses or organomegaly  Neurological - DTR's decreased and plantars downgoing spontaneously moves and follows command. Extremities - peripheral pulses normal, no pedal edema, no clubbing or cyanosis  Skin - normal coloration and turgor, no rashes, no suspicious skin lesions noted     DATA REVIEW     Medications: Current Inpatient  Scheduled Meds:   midodrine  10 mg Oral TID WC    apixaban  5 mg Oral BID    furosemide  20 mg IntraVENous Daily    polyethylene glycol  17 g Oral BID    nystatin  5 mL Oral 4x Daily    ipratropium-albuterol  1 ampule Inhalation Q6H    sodium chloride flush  10 mL IntraVENous 2 times per day    aspirin  81 mg Oral Daily    clopidogrel  75 mg Oral Daily    [Held by provider] amiodarone  200 mg Oral TID    sennosides-docusate sodium  1 tablet Oral BID    [Held by provider] metoprolol tartrate  25 mg Oral BID    atorvastatin  80 mg Oral Nightly    pantoprazole  40 mg Oral Daily    insulin glargine  0.15 Units/kg SubCUTAneous Nightly    [Held by provider] tiotropium  2 puff Inhalation Daily    tamsulosin  0.4 mg Oral Daily     Continuous Infusions:   dexmedetomidine 0.2 mcg/kg/hr (03/25/22 1806)    sodium chloride      propofol Stopped (03/23/22 1455)    norepinephrine 0.03 mcg/kg/min (03/25/22 2348)    EPINEPHrine Stopped (03/24/22 1610)    insulin Stopped (03/22/22 1544)    dextrose      vasopressin (Septic Shock) infusion 0.02 Units/min (03/24/22 2030)    sodium chloride      sodium chloride       INPUT/OUTPUT:  In: 1688.5 [P.O.:250;  I.V.:438.5]  Out: 990 [Urine:545]  Date 03/26/22 0000 - 03/26/22 2359   Shift 5579-4041 0130-9890 6716-4409 24 Hour Total INTAKE   P.O.(mL/kg/hr) 250(0.4)   250   I. V.(mL/kg) 223(3)   223(3)   IV Piggyback(mL/kg) 1000(13.5)   1000(13.5)   Shift Total(mL/kg) 2974(86.6)   8223(12.1)   OUTPUT   Urine(mL/kg/hr) 205(0.3)   205   Chest Tube 65   65   Shift Total(mL/kg) 270(3.6)   270(3.6)   Weight (kg) 74.3 74.3 74.3 74.3       LABS:-  ABG:   Recent Labs     03/24/22  0626 03/24/22  0849 03/24/22  1158 03/24/22  1727   POCPH 7.395 7.434 7.427 7.467*   POCPCO2 40.5 35.6 35.1 28.7*   POCPO2 62.0* 64.5* 73.2* 48.3*   POCHCO3 24.8 23.8 23.1 20.7*   GPMA1VKO 91* 93* 95 87*     CBC:   Recent Labs     03/24/22  0515 03/24/22  0515 03/24/22  1209 03/25/22  0711 03/26/22  0341   WBC 5.8  --   --  7.8 10.7   HGB 9.1*   < > 8.8* 8.9* 9.1*   HCT 29.3*   < > 28.3* 31.3* 29.4*   MCV 88.8  --   --  96.6 88.0   *  --   --  152 161   RBC 3.30*  --   --  3.24* 3.34*   MCH 27.6  --   --  27.5 27.2   MCHC 31.1  --   --  28.4 31.0   RDW 16.3*  --   --  16.3* 16.0*    < > = values in this interval not displayed. BMP:   Recent Labs     03/25/22  0711 03/25/22  2238 03/26/22  0341    142 141   K 3.9 4.0 4.0   * 105 108*   CO2 20 21 17*   BUN 32* 43* 46*   CREATININE 1.32* 1.79* 1.97*   GLUCOSE 93 102* 98     Liver Function Test:   No results for input(s): PROT, LABALBU, ALT, AST, GGT, ALKPHOS, BILITOT in the last 72 hours. Amylase/Lipase:  No results for input(s): AMYLASE, LIPASE in the last 72 hours. Coagulation Profile:   Recent Labs     03/24/22  0515 03/25/22  0711 03/26/22  0341   INR 1.3 1.2 1.2   PROTIME 13.5* 12.7* 12.9*     Cardiac Enzymes:  No results for input(s): CKTOTAL, CKMB, CKMBINDEX, TROPONINI in the last 72 hours.   Lactic Acid:  Lab Results   Component Value Date    LACTA NOT REPORTED 03/11/2018    LACTA NOT REPORTED 03/10/2018    LACTA NOT REPORTED 03/10/2018     BNP:   Lab Results   Component Value Date    BNP 70 09/13/2013     D-Dimer:  No results found for: DDIMER  Others:   No results found for: TSH, X2XKPVB, M8ULHKC, THYROIDAB, FT3, T4FREE  Lab Results   Component Value Date    SEDRATE 20 (H) 05/21/2014    CRP 15.2 (H) 05/21/2014     No results found for: [facility]  No results found for: IRON, TIBC, FERRITIN  No results found for: SPEP, UPEP  Lab Results   Component Value Date    PSA 1.11 11/04/2016       Microbiology:  No results for input(s): SPECDESC, SPECDESC, SPECIAL, CULTURE, CULTURE, STATUS, ORG, CDIFFTOXPCR, CAMPYLOBPCR, SALMONELLAPC, SHIGAPCR, SHIGELLAPCR, MPNEUG, MPNEUM, LACTOQL in the last 72 hours. Pathology:    Radiology Reports:  XR CHEST PORTABLE   Final Result   Bibasilar atelectasis and pleural effusion more pronounced on the left. No   significant change. Stable support tubes and line. XR CHEST PORTABLE   Final Result   Overall improvement seen in the aeration lung bases. The remainder of lines   and tubes in stool are stable. No definite pneumothorax on the left. XR CHEST PORTABLE   Final Result   1. Lines and tubes as described above. 2.  Increasing right pleural effusion and right basilar airspace disease. Stable left pleural effusion. XR CHEST PORTABLE   Final Result   1. Support lines and tubes remain in place. 2.  Bibasilar opacities persist, slightly increased on the right, suggesting   layering pleural effusions and atelectasis. 3.  Mildly increased vascular congestion, possibly accentuated by supine   positioning. XR CHEST PORTABLE   Final Result   Stable chest (suspect right pleural effusion in addition to left effusion   and/or atelectasis) with line and tube placements as detailed above. XR CHEST PORTABLE   Final Result   As compared to prior examination, the ET tube now is in satisfactory   position. Examination otherwise is unchanged. XR CHEST PORTABLE   Final Result   1. Endotracheal tube is low in position, approximately 1 cm above the   veronique. This should be pulled back approximately 3 cm.       2. Other lines and tubes are unchanged in position. 3.  New small right pleural effusion. 4.  Unchanged left basilar opacity. XR CHEST PORTABLE   Final Result   1. Lines and tubes are unchanged in position. 2. Linear bibasilar opacities are similar prior study, atelectasis versus   pneumonia. XR CHEST PORTABLE   Final Result   All tubes and catheters are in good position. No evidence for pneumothorax. Bilateral basal atelectasis noted. VL DUP UPPER EXTREMITY ARTERIES BILATERAL   Final Result      VL Vein Mapping Lower Bilateral   Final Result      VL DUP CAROTID BILATERAL   Final Result      CT CHEST WO CONTRAST   Final Result   1. Extensive centrilobular and paraseptal emphysema. 2. Extensive atherosclerotic disease, particularly along the coronary   arteries. 3. There are areas of atelectasis in the lingula. There is collapse of the   right middle lobe   4. No focal lung infiltrate. XR CHEST PORTABLE    (Results Pending)       Echocardiogram:   Results for orders placed during the hospital encounter of 03/11/22    Echocardiogram Complete 2D w Doppler w Color    Narrative  Transthoracic Echocardiography Report (TTE)    Patient Name David Has       Date of Study           03/13/2022  [place]    Date of      1955  Gender                  Male  Birth    Age          74 year(s)  Race                    Black    Room Number  2107        Height:                 75 inch, 190.5 cm    Corporate ID [id number]    Weight:                 136 pounds, 61.7 kg  #    Patient Acct [de-identified]   BSA:        1.86 m^2    BMI:        17 kg/m^2  #    MR #         [id number]      Sonographer             Tri Hester    Accession #  [id number]  Interpreting Physician  Vinh Chau    Fellow                   Referring Nurse  Practitioner    Interpreting             Referring Physician     Syed Womack.  Esme Arita,  Fellow                                           APRN-NP    Type of Study    TTE procedure:2D Echocardiogram, M-Mode, Doppler, Color Doppler. Procedure Date  Date: 03/13/2022 Start: 12:17 PM    Study Location: [facility]  Technical Quality: Fair visualization    Indications:Multi vessel CAD and Pre-Op CABG. History / Tech. Comments:  Procedure explained to patient. Study done at the bedside. STAT call in    Patient Status: Inpatient    Height: 75 inches Weight: 136 pounds BSA: 1.86 m^2 BMI: 17 kg/m^2    Allergies  - *No Known Allergies. CONCLUSIONS    Summary  Global left ventricular systolic function is normal. Estimated EF 64-49%  Normal diastolic filling. Normal right ventricular size and function. Mild mitral regurgitation. Mild tricuspid regurgitation. Mild pulmonary hypertension with an estimated right ventricular systolic  pressure of 41 mmHg. No pericardial effusion. Signature  ----------------------------------------------------------------------------  Electronically signed by Vesna Pastrana(Sonographer) on 03/13/2022  12:51 PM  ----------------------------------------------------------------------------    ----------------------------------------------------------------------------  Electronically signed by Vinh Chau(Interpreting physician) on  03/14/2022 09:19 AM  ----------------------------------------------------------------------------  FINDINGS  Left Atrium  Left atrium is normal in size. Left Ventricle  Left ventricle is normal in size. Global left ventricular systolic function  is normal. Calculated EF via Parks's method is 48 %. Mild septal hypertrophy. Normal diastolic filling. Right Atrium  Right atrium is normal in size. Right Ventricle  Normal right ventricular size and function. Mitral Valve  Normal mitral valve structure. Mild mitral regurgitation. No mitral stenosis. Aortic Valve  Aortic valve structure and function normal.  Aortic valve is trileaflet. No aortic insufficiency.   No aortic stenosis. Tricuspid Valve  Normal tricuspid valve leaflets. Mild tricuspid regurgitation. No tricuspid stenosis. Mild pulmonary hypertension with an estimated right ventricular systolic  pressure of 41 mmHg. Pulmonic Valve  Pulmonic valve is normal in structure and function. No pulmonic insufficiency. No evidence of pulmonic stenosis. Pericardial Effusion  No pericardial effusion. Miscellaneous  Normal aortic root dimension. E/E' average = 11.0. IVC normal diameter & inspiratory collapse indicating normal RA filling  pressure .     M-mode / 2D Measurements & Calculations:    LVIDd:4.9 cm(3.7 - 5.6 cm)       Diastolic FKBFIO:09.69 ml  LVIDs:3.8 cm(2.2 - 4.0 cm)       Systolic TGHFAR:10.37 ml  IVSd:1.2 cm(0.6 - 1.1 cm)        Aortic Root:3.3 cm(2.0 - 3.7 cm)  LVPWd:0.8 cm(0.6 - 1.1 cm)       LA Dimension: 2.9 cm(1.9 - 4.0 cm)  Fractional Shortenin.45 %    LA volume/Index: 35.47 ml /19m^2  Calculated LVEF (%): 49.08 %     LVOT:2.2 cm  RVDd:3.5 cm    Mitral:                                 Aortic    Valve Area (P1/2-Time): 2.78 cm^2       Peak Velocity: 1.26 m/s  Peak E-Wave: 0.87 m/s                   Mean Velocity: 0.84 m/s  Peak A-Wave: 0.67 m/s                   Peak Gradient: 6.35 mmHg  E/A Ratio: 1.29                         Mean Gradient: 4 mmHg  Peak Gradient: 2.99 mmHg  Mean Gradient: 1 mmHg  Deceleration Time: 258 msec             Area (continuity): 2.33 cm^2  P1/2t: 79 msec                          AV VTI: 26.9 cm    Area (continuity): 3.32 cm^2  Mean Velocity: 0.49 m/s    Tricuspid:                              Pulmonic:    Estimated RVSP: 41 mmHg                 Peak Velocity: 0.71 m/s  Peak TR Velocity: 3.00 m/s              Peak Gradient: 1.99 mmHg  Peak TR Gradient: 36 mmHg  Estimated RA Pressure: 5 mmHg    Estimated PASP: 41 mmHg    Diastology / Tissue Doppler  Septal Wall E' velocity:0.06 m/s  Septal Wall E/E':14.7  Lateral Wall E' velocity:0.12 m/s  Lateral Wall E/E':7.3      Cardiac Catheterization:   No results found for this or any previous visit. ASSESSMENT AND PLAN     Assessment:    Multivessel coronary artery disease  Bradycardia  Sinus pause lasting for 4.7 seconds 3/17/2022  COPD stage II on PFTs with severe reduction diffusion capacity  Centrilobular and paraseptal emphysema  Chronic smoker  Cocaine use  Chronic combined diastolic and systolic CHF, EF 75%.  Previously 50-55%  Mild pulmonary hypertension  Right hip arthritis  Essential hypertension  CKD stage III  Leukocytosis    Plan:    I personally interviewed/examined the patient; reviewed interval history, interpreted all available radiographic and laboratory data at the time of service. Wean precedex gtt as suellen   Cont Bipap . High risk for re intubation   We will continue with BiPAP/NIV at night and also during the daytime intermittently alternating with nasal cannula / high flow nasal cannula. He will need high flow nasal cannula off BiPAP  Avoid sedation and narcotics. When he is off BiPAP encourage incentive spirometry deep breathing cough if possible  Has been extubated and on nasal cannula maintaining saturation currently. He is currently on Levophed drip   He is on amiodarone drip currently he is on amiodarone drip currently  Continue DuoNeb aerosol. Continue antiplatelet, anticoagulation, statins, and beta blockers as per cardiology/CT surgery. On Plavix, Eliquis and Lipitor  Continue to monitor I/O with a goal of even fluid balance suggest to continue Lasix. Chest tube management as per CT surgery  Continue pulmonary toilet, aspiration precautions and bronchodilators  Continue to monitor CBC, coagulation profile, transfuse as indicated  Chemical DVT prophylaxis p on Eliquis currently  Antimicrobials reviewed; currently not on antibiotics  Glycemic control appropriate on Lantus  Physical/occupational therapy  Increase ambulation/physical therapy out of bed to chair if possible.     Discussed with nursing staff, treatment and plan discussed  Discussed with respiratory therapist       The patient is/remains critically ill with illness/injury that acutely impairs one or more vital organ systems, such that there is a high probability of imminent or life threatening deterioration in the patient's condition. Critical care time of greater than 35 minutes was spent (excluding procedures), in coordination of care during bedside rounds and discussion of patient care in detail, and recommendations of the team were adopted in the plan. Necessity of all invasive devices was also confirmed. Sharon De La Torre MD.   Pulmonary and Critical Care Medicine           3/26/2022, 10:25 AM    Please note that this chart was generated using voice recognition Dragon dictation software. Although every effort was made to ensure the accuracy of this automated transcription, some errors in transcription may have occurred.

## 2022-03-26 NOTE — PROGRESS NOTES
[facility]  Daily Progress Note    Surgeon: Dr. Clark Ron   S/P : CABG X 2   POD#: 4  EF: 45%     Subjective:  Mr. Alanna Lam   Resting on bipap. No distress. Noncompliance noted often. A&0x4. Physical Exam  Vital Signs: /71   Pulse 60   Temp 97.7 °F (36.5 °C) (Axillary)   Resp 18   Ht 6' 3\" (1.905 m)   Wt 163 lb 12.8 oz (74.3 kg)   SpO2 100%   BMI 20.47 kg/m²  O2 Flow Rate (L/min): 30 L/min   Admit Weight: Weight: 141 lb 5 oz (64.1 kg)   WEIGHTWeight: 163 lb 12.8 oz (74.3 kg)     General: Sedated and on vent. Heart: Normal S1 and S2.  Regular rhythm. No murmurs, gallops, or rubs. Pacing Wires: Yes -To be clipped prior to discharge  Lungs: clear to auscultation bilaterally and diminished breath sounds bibasilar Chest tubes: Yes, Air Leak: No  Abdomen: soft, non tender, non distended, BS x4  Extremities: negative  Wounds: clean and dry, healing appropriately. Sternum: Healing  SVG sites: Healing  CXR-no appreciated effusions noted. Diffuse consolidation. No pneumothorax noted.      Scheduled Meds:    midodrine  10 mg Oral TID WC    apixaban  5 mg Oral BID    furosemide  20 mg IntraVENous Daily    polyethylene glycol  17 g Oral BID    nystatin  5 mL Oral 4x Daily    ipratropium-albuterol  1 ampule Inhalation Q6H    sodium chloride flush  10 mL IntraVENous 2 times per day    aspirin  81 mg Oral Daily    clopidogrel  75 mg Oral Daily    [Held by provider] amiodarone  200 mg Oral TID    sennosides-docusate sodium  1 tablet Oral BID    [Held by provider] metoprolol tartrate  25 mg Oral BID    atorvastatin  80 mg Oral Nightly    pantoprazole  40 mg Oral Daily    insulin glargine  0.15 Units/kg SubCUTAneous Nightly    [Held by provider] tiotropium  2 puff Inhalation Daily    tamsulosin  0.4 mg Oral Daily     Continuous Infusions:    dexmedetomidine 0.2 mcg/kg/hr (03/25/22 1806)    sodium chloride      propofol Stopped (03/23/22 4385)    norepinephrine 0.03 mcg/kg/min (03/25/22 2348)    EPINEPHrine Stopped (03/24/22 1610)    insulin Stopped (03/22/22 1544)    dextrose      vasopressin (Septic Shock) infusion 0.02 Units/min (03/24/22 2030)    sodium chloride      sodium chloride         Data:  CBC:   Recent Labs     03/24/22  0515 03/24/22  0515 03/24/22  1209 03/25/22  0711 03/26/22  0341   WBC 5.8  --   --  7.8 10.7   HGB 9.1*   < > 8.8* 8.9* 9.1*   HCT 29.3*   < > 28.3* 31.3* 29.4*   MCV 88.8  --   --  96.6 88.0   *  --   --  152 161    < > = values in this interval not displayed. BMP:   Recent Labs     03/25/22  0711 03/25/22  2238 03/26/22  0341    142 141   K 3.9 4.0 4.0   * 105 108*   CO2 20 21 17*   BUN 32* 43* 46*   CREATININE 1.32* 1.79* 1.97*     PT/INR:   Recent Labs     03/24/22  0515 03/25/22  0711 03/26/22  0341   PROTIME 13.5* 12.7* 12.9*   INR 1.3 1.2 1.2     APTT:   No results for input(s): APTT in the last 72 hours. Chest X-Ray: Image reviewed. Awaiting official report. I/O:  I/O last 3 completed shifts: In: 2671.5 [P.O.:450;  I.V.:1089.5; IV Piggyback:1132]  Out: 56 [Urine:915; Chest Tube:845]    Assessment/Plan:      Diagnosis Date    3-vessel CAD 03/11/2022    Alcohol abuse 06/23/2015    Arthritis     Chronic kidney disease     Full dentures     upper and lower full    Hypertension     Other emphysema ([facility] [address]     COPD stage II on PFTs with severe reduction diffusion capacity    Pure hypercholesterolemia 15/52/5630    Systolic CHF ([address]     Wears glasses         Beta-Blocker: yes-hold today  ASA: yes  Plavix: yes  GI: yes  Statin: yes  Coumadin: no  ACE-I: no  EF: 45%    Swallow study this AM   Oxygen as needed to maintain SpO2 > 92%  o Wean as tolerated  o Pulmonology already following- can assist with extubation  o Passed his CPAP trial this AM- told by pulm to keep intubated   Chest x-ray daily-stable   Nephrology consulted   Please wean off levophed today-maintain CVP of 16 continue midodrine 10mg TID  o Titrate levo to a MAP 65  o Please infuse 25% albumin 100ml   - Continue light diuresis    Optimize lung function with resp. Treatment around the clock, aggressive Bipap and IS use.  o When off bipap please use HFNC  o Lungs are poor status.  Keep Chest Tubes to wall suction still good outpt 290/12hrs   Encourage incentive spirometry, acapella and ambulation    Replace electrolytes as needed per sliding scale and recheck per policy   Case Management consult for discharge planning await choices  o May likely need LTACH placement    The above recommendations including medications and orders were discussed and agreed upon with Dr. Qamar Osorio, the attending on service for the cardiothoracic surgery group today. Electronically signed by [address] [place] [address], APRN - NP on 3/26/2022 at 10:30 AM    On this date 3/24/2022 I have spent 27 minutes reviewing previous notes, test results and face to face with the patient discussing the diagnosis and importance of compliance with the treatment plan as well as documenting on the day of the visit. At least 50% of the time documented was spent with the patient to provide counseling and/or coordination of care. This note was created with the assistance of a speech-recognition program.  Although the intention is to generate a document that actually reflects the content of the visit, no guarantees can be provided that every mistake has been identified and corrected by editing. Note was updated later by me after  physical examination and  completion of the assessment.

## 2022-03-26 NOTE — PLAN OF CARE
normal respiratory rate/effort  Description: Respiratory rate and effort will be within normal limits for the patient  Outcome: Ongoing     Problem: SKIN INTEGRITY  Goal: Skin integrity is maintained or improved  Outcome: Ongoing

## 2022-03-27 ENCOUNTER — APPOINTMENT (OUTPATIENT)
Dept: GENERAL RADIOLOGY | Age: 67
DRG: 233 | End: 2022-03-27
Attending: INTERNAL MEDICINE
Payer: MEDICARE

## 2022-03-27 LAB
ANION GAP SERPL CALCULATED.3IONS-SCNC: 15 MMOL/L (ref 9–17)
ANION GAP SERPL CALCULATED.3IONS-SCNC: 15 MMOL/L (ref 9–17)
BUN BLDV-MCNC: 51 MG/DL (ref 8–23)
BUN BLDV-MCNC: 52 MG/DL (ref 8–23)
CALCIUM SERPL-MCNC: 8.3 MG/DL (ref 8.6–10.4)
CALCIUM SERPL-MCNC: 8.6 MG/DL (ref 8.6–10.4)
CHLORIDE BLD-SCNC: 105 MMOL/L (ref 98–107)
CHLORIDE BLD-SCNC: 106 MMOL/L (ref 98–107)
CO2: 18 MMOL/L (ref 20–31)
CO2: 19 MMOL/L (ref 20–31)
CREAT SERPL-MCNC: 1.88 MG/DL (ref 0.7–1.2)
CREAT SERPL-MCNC: 1.9 MG/DL (ref 0.7–1.2)
GFR AFRICAN AMERICAN: 43 ML/MIN
GFR AFRICAN AMERICAN: 43 ML/MIN
GFR NON-AFRICAN AMERICAN: 35 ML/MIN
GFR NON-AFRICAN AMERICAN: 36 ML/MIN
GFR SERPL CREATININE-BSD FRML MDRD: ABNORMAL ML/MIN/{1.73_M2}
GFR SERPL CREATININE-BSD FRML MDRD: ABNORMAL ML/MIN/{1.73_M2}
GLUCOSE BLD-MCNC: 112 MG/DL (ref 70–99)
GLUCOSE BLD-MCNC: 97 MG/DL (ref 70–99)
HCT VFR BLD CALC: 30.2 % (ref 40.7–50.3)
HEMOGLOBIN: 9.3 G/DL (ref 13–17)
INR BLD: 1.2
MAGNESIUM: 2.6 MG/DL (ref 1.6–2.6)
MCH RBC QN AUTO: 26.9 PG (ref 25.2–33.5)
MCHC RBC AUTO-ENTMCNC: 30.8 G/DL (ref 28.4–34.8)
MCV RBC AUTO: 87.3 FL (ref 82.6–102.9)
NRBC AUTOMATED: 0.7 PER 100 WBC
PDW BLD-RTO: 16.1 % (ref 11.8–14.4)
PLATELET # BLD: 192 K/UL (ref 138–453)
PMV BLD AUTO: 11.8 FL (ref 8.1–13.5)
POTASSIUM SERPL-SCNC: 3.5 MMOL/L (ref 3.7–5.3)
POTASSIUM SERPL-SCNC: 3.7 MMOL/L (ref 3.7–5.3)
PROTHROMBIN TIME: 12.7 SEC (ref 9.1–12.3)
RBC # BLD: 3.46 M/UL (ref 4.21–5.77)
SODIUM BLD-SCNC: 139 MMOL/L (ref 135–144)
SODIUM BLD-SCNC: 139 MMOL/L (ref 135–144)
WBC # BLD: 11.2 K/UL (ref 3.5–11.3)

## 2022-03-27 PROCEDURE — 97530 THERAPEUTIC ACTIVITIES: CPT

## 2022-03-27 PROCEDURE — 6370000000 HC RX 637 (ALT 250 FOR IP): Performed by: NURSE PRACTITIONER

## 2022-03-27 PROCEDURE — 71045 X-RAY EXAM CHEST 1 VIEW: CPT

## 2022-03-27 PROCEDURE — 99024 POSTOP FOLLOW-UP VISIT: CPT | Performed by: NURSE PRACTITIONER

## 2022-03-27 PROCEDURE — 2580000003 HC RX 258: Performed by: NURSE PRACTITIONER

## 2022-03-27 PROCEDURE — 97110 THERAPEUTIC EXERCISES: CPT

## 2022-03-27 PROCEDURE — 6370000000 HC RX 637 (ALT 250 FOR IP): Performed by: STUDENT IN AN ORGANIZED HEALTH CARE EDUCATION/TRAINING PROGRAM

## 2022-03-27 PROCEDURE — 6370000000 HC RX 637 (ALT 250 FOR IP): Performed by: INTERNAL MEDICINE

## 2022-03-27 PROCEDURE — 99291 CRITICAL CARE FIRST HOUR: CPT | Performed by: INTERNAL MEDICINE

## 2022-03-27 PROCEDURE — 92610 EVALUATE SWALLOWING FUNCTION: CPT

## 2022-03-27 PROCEDURE — 6370000000 HC RX 637 (ALT 250 FOR IP): Performed by: THORACIC SURGERY (CARDIOTHORACIC VASCULAR SURGERY)

## 2022-03-27 PROCEDURE — 6360000002 HC RX W HCPCS: Performed by: NURSE PRACTITIONER

## 2022-03-27 PROCEDURE — 85610 PROTHROMBIN TIME: CPT

## 2022-03-27 PROCEDURE — 94660 CPAP INITIATION&MGMT: CPT

## 2022-03-27 PROCEDURE — 85027 COMPLETE CBC AUTOMATED: CPT

## 2022-03-27 PROCEDURE — 97166 OT EVAL MOD COMPLEX 45 MIN: CPT

## 2022-03-27 PROCEDURE — 80048 BASIC METABOLIC PNL TOTAL CA: CPT

## 2022-03-27 PROCEDURE — 36415 COLL VENOUS BLD VENIPUNCTURE: CPT

## 2022-03-27 PROCEDURE — 83735 ASSAY OF MAGNESIUM: CPT

## 2022-03-27 PROCEDURE — 97535 SELF CARE MNGMENT TRAINING: CPT

## 2022-03-27 PROCEDURE — 97162 PT EVAL MOD COMPLEX 30 MIN: CPT

## 2022-03-27 PROCEDURE — 2100000001 HC CVICU R&B

## 2022-03-27 PROCEDURE — 2700000000 HC OXYGEN THERAPY PER DAY

## 2022-03-27 PROCEDURE — 94761 N-INVAS EAR/PLS OXIMETRY MLT: CPT

## 2022-03-27 PROCEDURE — 99232 SBSQ HOSP IP/OBS MODERATE 35: CPT | Performed by: INTERNAL MEDICINE

## 2022-03-27 PROCEDURE — 94640 AIRWAY INHALATION TREATMENT: CPT

## 2022-03-27 RX ORDER — LORAZEPAM 1 MG/1
1 TABLET ORAL EVERY 6 HOURS PRN
Status: DISCONTINUED | OUTPATIENT
Start: 2022-03-27 | End: 2022-04-08 | Stop reason: HOSPADM

## 2022-03-27 RX ORDER — POTASSIUM CHLORIDE 20 MEQ/1
40 TABLET, EXTENDED RELEASE ORAL ONCE
Status: COMPLETED | OUTPATIENT
Start: 2022-03-27 | End: 2022-03-27

## 2022-03-27 RX ADMIN — APIXABAN 5 MG: 5 TABLET, FILM COATED ORAL at 08:40

## 2022-03-27 RX ADMIN — Medication 500000 UNITS: at 21:19

## 2022-03-27 RX ADMIN — ATORVASTATIN CALCIUM 80 MG: 80 TABLET, FILM COATED ORAL at 21:19

## 2022-03-27 RX ADMIN — LORAZEPAM 1 MG: 1 TABLET ORAL at 21:19

## 2022-03-27 RX ADMIN — INSULIN GLARGINE 10 UNITS: 100 INJECTION, SOLUTION SUBCUTANEOUS at 21:19

## 2022-03-27 RX ADMIN — IPRATROPIUM BROMIDE AND ALBUTEROL SULFATE 1 AMPULE: .5; 3 SOLUTION RESPIRATORY (INHALATION) at 19:54

## 2022-03-27 RX ADMIN — IPRATROPIUM BROMIDE AND ALBUTEROL SULFATE 1 AMPULE: .5; 3 SOLUTION RESPIRATORY (INHALATION) at 11:27

## 2022-03-27 RX ADMIN — IPRATROPIUM BROMIDE AND ALBUTEROL SULFATE 1 AMPULE: .5; 3 SOLUTION RESPIRATORY (INHALATION) at 16:39

## 2022-03-27 RX ADMIN — MIDODRINE HYDROCHLORIDE 10 MG: 5 TABLET ORAL at 18:41

## 2022-03-27 RX ADMIN — CLOPIDOGREL 75 MG: 75 TABLET, FILM COATED ORAL at 08:40

## 2022-03-27 RX ADMIN — IPRATROPIUM BROMIDE AND ALBUTEROL SULFATE 1 AMPULE: .5; 3 SOLUTION RESPIRATORY (INHALATION) at 08:03

## 2022-03-27 RX ADMIN — MIDODRINE HYDROCHLORIDE 10 MG: 5 TABLET ORAL at 11:49

## 2022-03-27 RX ADMIN — STANDARDIZED SENNA CONCENTRATE AND DOCUSATE SODIUM 1 TABLET: 8.6; 5 TABLET ORAL at 08:40

## 2022-03-27 RX ADMIN — Medication 500000 UNITS: at 18:41

## 2022-03-27 RX ADMIN — PANTOPRAZOLE SODIUM 40 MG: 40 TABLET, DELAYED RELEASE ORAL at 08:40

## 2022-03-27 RX ADMIN — FENTANYL CITRATE 50 MCG: 50 INJECTION INTRAMUSCULAR; INTRAVENOUS at 20:10

## 2022-03-27 RX ADMIN — TAMSULOSIN HYDROCHLORIDE 0.4 MG: 0.4 CAPSULE ORAL at 08:40

## 2022-03-27 RX ADMIN — POTASSIUM CHLORIDE 40 MEQ: 1500 TABLET, EXTENDED RELEASE ORAL at 11:49

## 2022-03-27 RX ADMIN — STANDARDIZED SENNA CONCENTRATE AND DOCUSATE SODIUM 1 TABLET: 8.6; 5 TABLET ORAL at 21:19

## 2022-03-27 RX ADMIN — AMIODARONE HYDROCHLORIDE 200 MG: 200 TABLET ORAL at 08:40

## 2022-03-27 RX ADMIN — SODIUM CHLORIDE, PRESERVATIVE FREE 10 ML: 5 INJECTION INTRAVENOUS at 08:40

## 2022-03-27 RX ADMIN — APIXABAN 5 MG: 5 TABLET, FILM COATED ORAL at 21:19

## 2022-03-27 RX ADMIN — MIDODRINE HYDROCHLORIDE 10 MG: 5 TABLET ORAL at 08:40

## 2022-03-27 RX ADMIN — POLYETHYLENE GLYCOL 3350 17 G: 17 POWDER, FOR SOLUTION ORAL at 08:28

## 2022-03-27 RX ADMIN — Medication 500000 UNITS: at 08:40

## 2022-03-27 RX ADMIN — OXYCODONE HYDROCHLORIDE AND ACETAMINOPHEN 2 TABLET: 5; 325 TABLET ORAL at 21:19

## 2022-03-27 RX ADMIN — ASPIRIN 81 MG: 81 TABLET, COATED ORAL at 08:40

## 2022-03-27 RX ADMIN — OXYCODONE HYDROCHLORIDE AND ACETAMINOPHEN 2 TABLET: 5; 325 TABLET ORAL at 08:28

## 2022-03-27 ASSESSMENT — PAIN DESCRIPTION - LOCATION
LOCATION: CHEST
LOCATION: CHEST

## 2022-03-27 ASSESSMENT — PAIN SCALES - GENERAL
PAINLEVEL_OUTOF10: 7
PAINLEVEL_OUTOF10: 4
PAINLEVEL_OUTOF10: 7
PAINLEVEL_OUTOF10: 3
PAINLEVEL_OUTOF10: 0
PAINLEVEL_OUTOF10: 6
PAINLEVEL_OUTOF10: 9
PAINLEVEL_OUTOF10: 6
PAINLEVEL_OUTOF10: 8

## 2022-03-27 ASSESSMENT — ENCOUNTER SYMPTOMS
GASTROINTESTINAL NEGATIVE: 1
CHEST TIGHTNESS: 0
SHORTNESS OF BREATH: 1
COUGH: 1

## 2022-03-27 ASSESSMENT — PAIN DESCRIPTION - DESCRIPTORS
DESCRIPTORS: ACHING;DISCOMFORT
DESCRIPTORS: ACHING;DISCOMFORT;SORE;TENDER

## 2022-03-27 ASSESSMENT — PAIN DESCRIPTION - PAIN TYPE
TYPE: ACUTE PAIN
TYPE: ACUTE PAIN

## 2022-03-27 ASSESSMENT — PAIN - FUNCTIONAL ASSESSMENT: PAIN_FUNCTIONAL_ASSESSMENT: PREVENTS OR INTERFERES SOME ACTIVE ACTIVITIES AND ADLS

## 2022-03-27 ASSESSMENT — PAIN DESCRIPTION - ORIENTATION: ORIENTATION: MID

## 2022-03-27 ASSESSMENT — PAIN DESCRIPTION - FREQUENCY: FREQUENCY: CONTINUOUS

## 2022-03-27 NOTE — PROGRESS NOTES
Physical Therapy    Facility/Department: [facility]  Initial Assessment    NAME: Meg Lopez  : 1955  MRN: [id number]    Date of Service: 3/27/2022    Discharge Recommendations:  Patient would benefit from continued therapy after discharge      Assessment   Assessment: Pt is 73 y/o male with significant LE weakness and difficulty walking. Pt requires max A +2 for sit to stand transfers; will benefit from PT. Treatment Diagnosis: general weakness; difficulty walking  Prognosis: Good;Fair  Decision Making: High Complexity  Patient Education: PT eval and POC  REQUIRES PT FOLLOW UP: Yes  Activity Tolerance  Activity Tolerance: Patient limited by fatigue;Patient limited by endurance       Patient Diagnosis(es): The encounter diagnosis was S/P cardiac catheterization. has a past medical history of 3-vessel CAD, Alcohol abuse, Arthritis, Chronic kidney disease, Full dentures, Hypertension, Other emphysema ([address] Pure hypercholesterolemia, Systolic CHF ([address] and Wears glasses. has a past surgical history that includes Hip fracture surgery (Left); Appendectomy; pr egd transoral biopsy single/multiple (2017); and Coronary artery bypass graft (N/A, 3/21/2022).     Restrictions  Restrictions/Precautions  Restrictions/Precautions: Fall Risk,General Precautions,Cardiac  Required Braces or Orthoses?: Yes  Required Braces or Orthoses  Other: Heart Hugger Brace  Position Activity Restriction  Sternal Precautions: 5# Lifting Restrictions  Other position/activity restrictions: Amb pt, up in chair for meals, chest tubes, hi-flow entire session     Vision/Hearing  Vision Exceptions: Wears glasses for reading  Hearing: Within functional limits       Subjective  General  Chart Reviewed: Yes  Patient assessed for rehabilitation services?: Yes  Response To Previous Treatment: Not applicable  Family / Caregiver Present: Yes  Follows Commands: Within Functional Limits  Subjective  Subjective: Agrees to PT eval  Pain Screening  Patient Currently in Pain: Yes     Orientation  Orientation  Overall Orientation Status: Within Functional Limits     Social/Functional History  Social/Functional History  Lives With: Other (comment) (Cousin)  Type of Home: House  Home Layout: Two level,Bed/Bath upstairs,1/2 bath on main level  Home Access: Stairs to enter without rails  Entrance Stairs - Number of Steps: 4 MIKHAIL; 13 upstairs with HR right side  Bathroom Shower/Tub: Tub/Shower unit,Curtain,Shower chair with back  Bathroom Toilet: Handicap height  Bathroom Equipment: Hand-held shower  Bathroom Accessibility: Accessible  Home Equipment: [address] walker,4 wheeled walker  ADL Assistance: Independent  Homemaking Assistance: Needs assistance (Pt reports a cleaning-person comes in 1-2x weekly)  Homemaking Responsibilities: Yes  Ambulation Assistance: Independent  Transfer Assistance: Independent  Active : No  Patient's  Info: Pt sometimes uses w/c to get to store, friends/family also assist  Mode of Transportation: Family,Friends  Occupation: On disability  IADL Comments: Pt only ambulates household distances; states he uses RW and will also lean on wall  Additional Comments: Pt reports that cousin is home all the time and able to assist as needed .     Cognition   Cognition  Overall Cognitive Status: WFL    Objective  AROM RLE (degrees)  RLE AROM: WFL  AROM LLE (degrees)  LLE AROM : WFL     Strength RLE  Comment: grossly 3+/5  Strength LLE  Comment: grossly 3+/5        Bed mobility  Comment: Pt in chair at time of eval     Transfers  Sit to Stand: Maximum Assistance;2 Person Assistance  Stand to sit: Maximum Assistance;2 Person Assistance  Comment: Pt only able to maintain standing approx 20 seconds due to LE weakness; +2 A and RW to maintain standing     Ambulation 1  Surface: level tile  Device: Rolling Walker  Distance: Pt was unable to advance LE's in standing due to weakness     Balance  Sitting - Dynamic: Good  Standing - Static: Fair  Standing - Dynamic: Poor        Plan   Plan  Times per week: 7x week  Times per day:  (1-2 x day)  Current Treatment Recommendations: Strengthening,Balance Training,Endurance Training,Functional Mobility Training,Transfer Training,Gait Training,Stair training,Neuromuscular Re-education,Home Exercise Program,Safety Education & Training  Safety Devices  Type of devices: All fall risk precautions in place,Chair alarm in place      AM-PAC Score  AM-PAC Inpatient Mobility without Stair Climbing Raw Score : 10 (03/27/22 1149)  AM-PAC Inpatient without Stair Climbing T-Scale Score : 34.07 (03/27/22 1149)  Mobility Inpatient CMS 0-100% Score: 71.66 (03/27/22 1149)  Mobility Inpatient without Stair CMS G-Code Modifier : CL (03/27/22 1149)       Goals  Short term goals  Time Frame for Short term goals: 14 days  Short term goal 1: Pt to transfer sit to stand with min A +1. Short term goal 2: Pt to ambulate 75ft with RW and no LOB. Short term goal 3: Pt to demo good standing balance for decrease fall risk. Short term goal 4: Pt to tolerate 20-30 mins ther ex/act for improved strength and endurance.   Patient Goals   Patient goals : get stronger       Therapy Time   Individual Concurrent Group Co-treatment   Time In 0951         Time Out 1030         Minutes [address], PT , DPT, CMPT

## 2022-03-27 NOTE — PROGRESS NOTES
Occupational Therapy   Occupational Therapy Initial Assessment  Date: 3/27/2022   Patient Name: Meg Lopez  MRN: [id number]     : 1955    Date of Service: 3/27/2022  CABGx2 on 3/21/22    Discharge Recommendations:   Patient would benefit from continued therapy after discharge       Assessment   Performance deficits / Impairments: Decreased ADL status; Decreased functional mobility ; Decreased strength;Decreased safe awareness;Decreased endurance;Decreased balance;Decreased high-level IADLs;Decreased cognition  Prognosis: Good  Decision Making: Medium Complexity  OT Education: OT Role;Plan of Care;ADL Adaptive Strategies;Transfer Training;Precautions; Equipment  Patient Education: Jenana Cope return from pt  REQUIRES OT FOLLOW UP: Yes  Activity Tolerance  Activity Tolerance: Patient limited by fatigue;Patient limited by pain;Treatment limited secondary to decreased cognition  Safety Devices  Safety Devices in place: Yes  Type of devices: Call light within reach;Gait belt;Patient at risk for falls;Nurse notified; Left in chair;Chair alarm in place  Restraints  Initially in place: No           Patient Diagnosis(es): The encounter diagnosis was S/P cardiac catheterization. has a past medical history of 3-vessel CAD, Alcohol abuse, Arthritis, Chronic kidney disease, Full dentures, Hypertension, Other emphysema ([address] Pure hypercholesterolemia, Systolic CHF ([address] and Wears glasses. has a past surgical history that includes Hip fracture surgery (Left); Appendectomy; pr egd transoral biopsy single/multiple (2017); and Coronary artery bypass graft (N/A, 3/21/2022).            Restrictions  Restrictions/Precautions  Restrictions/Precautions: Fall Risk,General Precautions,Cardiac  Required Braces or Orthoses?: Yes  Required Braces or Orthoses  Other: Heart Hugger Brace  Position Activity Restriction  Sternal Precautions: 5# Lifting Restrictions  Other position/activity restrictions: Amb pt, up in chair for meals, chest tubes, hi-flow entire session    Subjective   General  Patient assessed for rehabilitation services?: Yes  Family / Caregiver Present: No  Diagnosis: CABGx2 on 3/21/22, NSTEMI, EF 34%, hx o0f ETOH/drug use  Patient Currently in Pain: Yes  Pain Assessment  Pain Assessment: 0-10  Pain Level: 4  Pain Type: Acute pain  Pain Location: Chest  Pain Orientation: Mid  Pain Descriptors: Aching;Discomfort;Sore;Tender  Pain Frequency: Continuous  Functional Pain Assessment: Prevents or interferes some active activities and ADLs  Non-Pharmaceutical Pain Intervention(s): Ambulation/Increased Activity; Distraction; Emotional support; Therapeutic presence  Response to Pain Intervention: Patient Satisfied  Vital Signs  Patient Currently in Pain: Yes  Social/Functional History  Social/Functional History  Lives With: Other (comment) (Cousin)  Type of Home: House  Home Layout: Two level,Bed/Bath upstairs,1/2 bath on main level  Home Access: Stairs to enter without rails  Entrance Stairs - Number of Steps: 4 MIKHAIL; 13 upstairs with HR right side  Bathroom Shower/Tub: Tub/Shower unit,Curtain,Shower chair with back  Bathroom Toilet: Handicap height  Bathroom Equipment: Hand-held shower  Bathroom Accessibility: Accessible  Home Equipment: [id number] walker,4 wheeled walker  ADL Assistance: Independent  Homemaking Assistance: Needs assistance (Pt reports a cleaning-person comes in 1-2x weekly)  Homemaking Responsibilities: Yes  Ambulation Assistance: Independent  Transfer Assistance: Independent  Active : No  Patient's  Info: Pt sometimes uses w/c to get to store, friends/family also assist  Mode of Transportation: Family,Friends  Occupation: On disability  Leisure & Hobbies: people-watching  Additional Comments: Pt reports that cousin is home all the time and able to assist as needed .      Objective   Vision: Impaired  Vision Exceptions: Wears glasses for reading  Hearing: Within functional limits Orientation  Overall Orientation Status: Within Functional Limits     Balance  Sitting Balance: Contact guard assistance (Some posterior lean at start of session, pt sat EOB for ~11 min this date, Heart hugger donned on pt in this position)  Standing Balance: Maximum assistance  Standing Balance  Time: 1 min  Activity: Pt stood bedside, took ~3 tiny steps to nearby recliner with adaptive stand>pivot style transfer  Functional Mobility  Functional - Mobility Device: Rolling Walker  Activity: Other  Assist Level: Maximum assistance  Functional Mobility Comments: Very poor ability to progress BLE's, major fall risk, desat's quickly despite hi-flow on, unable to steer RW effectively  ADL  Feeding: Supervision;Setup; Increased time to complete  Grooming: Setup; Increased time to complete;Stand by assistance  UE Bathing: Setup; Increased time to complete;Contact guard assistance  LE Bathing: Setup; Increased time to complete; Moderate assistance  UE Dressing: Increased time to complete;Setup;Minimal assistance  LE Dressing: Setup; Increased time to complete;Maximum assistance  Toileting: Setup; Increased time to complete;Maximum assistance  Additional Comments: Pt with poor LB flexibility for figure-4 attempt, pt limited by fatigue and chest pain, pt washed face sitting in recliner with setup, many lines and hi-flow on entire session  Tone RUE  RUE Tone: Normotonic  Tone LUE  LUE Tone: Normotonic  Coordination  Movements Are Fluid And Coordinated: No  Coordination and Movement description: Decreased speed;Gross motor impairments;Right UE;Left UE  Quality of Movement Other  Comment: Limited by chest pain     Bed mobility  Supine to Sit: Maximum assistance  Sit to Supine: Unable to assess  Scooting:  Moderate assistance  Comment: Pt supine in bed upon arrival, pt required assist for both trunk/BLE management, pt retired sitting in recliner with legs elevated and chair alarm on  Transfers  Sit to stand: Maximum assistance  Stand to sit: Maximum assistance  Transfer Comments: Flexed posture initially, RW used, pt plopping down unsafely into recliner, many lines/wires/tubes     Cognition  Overall Cognitive Status: Exceptions  Following Commands: Follows multistep commands with repitition; Follows multistep commands with increased time  Safety Judgement: Decreased awareness of need for assistance;Decreased awareness of need for safety  Insights: Decreased awareness of deficits  Initiation: Requires cues for some  Sequencing: Requires cues for some  Cognition Comment: Pt quiet at times during session, RN states pt on low dose of precedex, lethargic at times     Sensation  Overall Sensation Status: WFL      LUE AROM (degrees)  LUE AROM : Exceptions  L Shoulder Flexion 0-180: Observed to ~60 degrees  L Elbow Flexion 0-145: WFL  L Elbow Extension 145-0: WFL  RUE AROM (degrees)  RUE AROM : Exceptions  R Shoulder Flexion 0-180: Observed to ~60 degrees  R Elbow Flexion 0-145: WFL  R Elbow Extension 145-0: WFL  LUE Strength  Gross LUE Strength: Exceptions to [facility]  L Shoulder Flex: 3-/5  L Elbow Flex: NT  L Elbow Ext: NT  L Hand General: 4+/5  LUE Strength Comment: Not tested d/t sternal precautions  RUE Strength  Gross RUE Strength: Exceptions to [facility]  R Shoulder Flex: 3-/5  R Elbow Flex: NT  R Elbow Ext: NT  R Hand General: 5/5  RUE Strength Comment: Not tested d/t sternal precautions         Plan   Plan  Times per week: 4-6x (CABG)  Current Treatment Recommendations: Self-Care / ADL,Strengthening,Balance Training,Functional Mobility Training,Endurance Training,Pain Management,Safety Education & Training,Patient/Caregiver Education & Training,Equipment Evaluation, Education, & procurement,Home Management Training           AM-PAC Score        AM-PAC Inpatient Daily Activity Raw Score: 15 (03/27/22 1009)  AM-PAC Inpatient ADL T-Scale Score : 34.69 (03/27/22 1009)  ADL Inpatient CMS 0-100% Score: 56.46 (03/27/22 1009)  ADL Inpatient CMS G-Code Modifier : CK (03/27/22 1009)    Goals  Short term goals  Time Frame for Short term goals: Pt will by discharge  Short term goal 1: identify/maintain all sternal precautions with 1 vc  Short term goal 2: demo ADL UB bathing/dressing activity at SBA and increased time, including heart hugger  Short term goal 3: demo ADL LB bathing/dressing activity at min A and increased time, using sock-aid/reacher PRN  Short term goal 4: demo good safety awareness druing func mob around room at min A, RW PRN, and 1 cue  Short term goal 5: demo SBA for all bed mobility/func transfers using bed rails/LRD PRN     Therapy Time   Individual Concurrent Group Co-treatment   Time In 0819         Time Out 0855         Minutes 36         Timed Code Treatment Minutes: [address], OTR/L

## 2022-03-27 NOTE — PROGRESS NOTES
[id number] [facility]  Daily Progress Note    Surgeon: Dr. Yolanda Angulo   S/P : CABG X 2   POD#: 6  EF: 45%     Subjective:  Mr. Sharita Britton   Resting on bipap alternating with HFNC. No distress. Noncompliance noted often. A&0x4. Physical Exam  Vital Signs: /76   Pulse 59   Temp 97.4 °F (36.3 °C) (Axillary)   Resp 28   Ht 6' 3\" (1.905 m)   Wt 163 lb 12.8 oz (74.3 kg)   SpO2 99%   BMI 20.47 kg/m²  O2 Flow Rate (L/min): 30 L/min   Admit Weight: Weight: 141 lb 5 oz (64.1 kg)   WEIGHTWeight: 163 lb 12.8 oz (74.3 kg)     General: Sedated and on vent. Heart: Normal S1 and S2.  Regular rhythm. No murmurs, gallops, or rubs. Pacing Wires: Yes -To be clipped prior to discharge  Lungs: clear to auscultation bilaterally and diminished breath sounds bibasilar Chest tubes: Yes, Air Leak: No  Abdomen: soft, non tender, non distended, BS x4  Extremities: negative  Wounds: clean and dry, healing appropriately. Sternum: Healing  SVG sites: Healing  CXR-no appreciated effusions noted. Diffuse consolidation. No pneumothorax noted.      Scheduled Meds:    amiodarone  200 mg Oral Daily    ipratropium-albuterol  1 ampule Inhalation 4x daily    midodrine  10 mg Oral TID WC    apixaban  5 mg Oral BID    polyethylene glycol  17 g Oral BID    nystatin  5 mL Oral 4x Daily    sodium chloride flush  10 mL IntraVENous 2 times per day    aspirin  81 mg Oral Daily    clopidogrel  75 mg Oral Daily    sennosides-docusate sodium  1 tablet Oral BID    [Held by provider] metoprolol tartrate  25 mg Oral BID    atorvastatin  80 mg Oral Nightly    pantoprazole  40 mg Oral Daily    insulin glargine  0.15 Units/kg SubCUTAneous Nightly    tamsulosin  0.4 mg Oral Daily     Continuous Infusions:    furosemide (LASIX) 5mg/ml infusion 10 mg/hr (03/27/22 0553)    dexmedetomidine 0.2 mcg/kg/hr (03/27/22 0553)    sodium chloride      propofol Stopped (03/23/22 7002)    norepinephrine 0.025 mcg/kg/min (03/26/22 1505)    EPINEPHrine Stopped (03/24/22 1610)    insulin Stopped (03/22/22 1544)    dextrose      vasopressin (Septic Shock) infusion 0.02 Units/min (03/24/22 2030)    sodium chloride      sodium chloride         Data:  CBC:   Recent Labs     03/25/22  0711 03/26/22  0341 03/27/22  0615   WBC 7.8 10.7 11.2   HGB 8.9* 9.1* 9.3*   HCT 31.3* 29.4* 30.2*   MCV 96.6 88.0 87.3    161 192     BMP:   Recent Labs     03/25/22  2238 03/25/22  2238 03/26/22  0341 03/26/22  1602 03/27/22  0615      < > 141 143 139   K 4.0   < > 4.0 4.0 3.5*      < > 108* 112* 105   CO2 21   < > 17* 18* 19*   BUN 43*  --  46*  --  52*   CREATININE 1.79*  --  1.97*  --  1.88*    < > = values in this interval not displayed. PT/INR:   Recent Labs     03/25/22  0711 03/26/22 0341 03/27/22  0615   PROTIME 12.7* 12.9* 12.7*   INR 1.2 1.2 1.2     APTT:   No results for input(s): APTT in the last 72 hours. Chest X-Ray: Image reviewed. Awaiting official report. I/O:  I/O last 3 completed shifts: In: 2185.2 [P.O.:250; I.V.:435.2; IV Piggyback:1500]  Out: 2055 [Urine:1305;  Chest Tube:750]    Assessment/Plan:      Diagnosis Date    3-vessel CAD 03/11/2022    Alcohol abuse 06/23/2015    Arthritis     Chronic kidney disease     Full dentures     upper and lower full    Hypertension     Other emphysema ([address]     COPD stage II on PFTs with severe reduction diffusion capacity    Pure hypercholesterolemia 65/62/2601    Systolic CHF ([address]     Wears glasses        Beta-Blocker: yes-hold try and resume after HR improves  ASA: yes  Plavix: yes  GI: yes  Statin: yes  Coumadin: no  ACE-I: no  EF: 45%    Swallow study this AM   Oxygen as needed to maintain SpO2 > 92%  o Wean as tolerated  o Pulmonology already following- can assist with extubation  o Passed his CPAP trial this AM- told by pulm to keep intubated   Chest x-ray daily-stable  [facility] consulted  o Lasix gtt strict IS and Os today    Titrate off precedex to improve HR we can add some xanex or ativan PO now to assist with getting off precedex     Optimize lung function with resp. Treatment around the clock, aggressive Bipap and IS use.  o When off bipap please use HFNC  o Lungs are poor status.  Keep Chest Tubes to wall suction still good outpt 300/12hrs  o Small effusions-eval for removal later today or tomorrow AM   Encourage incentive spirometry, acapella and ambulation    Replace electrolytes as needed per sliding scale and recheck per policy   Case Management consult for discharge planning await choices  o May likely need LTACH placement    The above recommendations including medications and orders were discussed and agreed upon with Dr. Marilin Cavanaugh, the attending on service for the cardiothoracic surgery group today. Electronically signed by JACKELYN Villalobos NP on 3/27/2022 at 10:04 AM    On this date 3/24/2022 I have spent 27 minutes reviewing previous notes, test results and face to face with the patient discussing the diagnosis and importance of compliance with the treatment plan as well as documenting on the day of the visit. At least 50% of the time documented was spent with the patient to provide counseling and/or coordination of care. This note was created with the assistance of a speech-recognition program.  Although the intention is to generate a document that actually reflects the content of the visit, no guarantees can be provided that every mistake has been identified and corrected by editing. Note was updated later by me after  physical examination and  completion of the assessment.

## 2022-03-27 NOTE — PROGRESS NOTES
PULMONARY & CRITICAL CARE MEDICINE PROGRESS NOTE     Patient:  Pavel Giraldo  MRN: [id number]  Admit date: 3/11/2022  Primary Care Physician: Chiquis Harkins MD  Consulting Physician: Rich Oneil MD  CODE Status: Full Code  LOS: 16    SUBJECTIVE     CHIEF COMPLAINT/REASON FOR INITIAL CONSULT:    COPD/chronic cough/preoperative evaluation    BRIEF HOSPITAL COURSE:  The patient is a 74 y.o. male history of hypertension, history of mild systolic dysfunction admitted with chest pain initially to [facility] on 03/09/2022 non-ST elevation MI ruled out by troponins in 20s had a stress test done because of high suspicion which shows ischemia of lateral septal wall with ejection fraction of 34%.  He does have history of cocaine use and apparently he had used cocaine before admission to [facility].  He was transferred to [facility] for cardiac catheterization which shows multivessel coronary artery disease with EF of 30% including left main disease.  He remains on a statin and heparin.  CT surgery was consulted for CABG.    Patient had pulmonary function test done which shows FEV1 of 46% with significance positive bronchodilator FEV1 of 56% with severe reduction in diffusion capacity of less than 30% with air trapping/hyperinflation. CT scan of the chest shows areas of blebs and bulla medially and centrilobular and paraseptal emphysema on CT scan.     Patient does complain of shortness of breath on mild activity and exertion.  He does have chronic cough without no change denies increased wheezing currently denies chest pain.  He has history of cocaine use.  Does have history of smoking patient is not able to tell me that how many cigarettes he used to smoke but he claimed that now he smoke 1 pack/week and had been smoking for almost 50 years. INTERVAL HISTORY:  03/27/22     Patient seen and examined in his room. Extubated yesterday, patient tolerated extubation.   Saturating well on high flow 30 L/minute 60% FiO2  Overnight, afebrile and hemodynamically stable. Patient was on 0.02 of Precedex, 0.04 of Levophed this morning. Levophed weaned off. Heart rate in 60s overnight  Urine output: 1 L / 24 hours on Lasix drip increased from 5 to 10 this morning, weaned down to 5  Today. Has 2 chest tubes in place. 200 out of the first and 100 mm off of the second 1 overnight. Chest x-ray 2022 shows bilateral small effusion bibasilar atelectasis possible right lower lobe infiltrate/atelectasis cardiomegaly pulmonary venous congestion. Respiratory culture sent on 2022 showed no growth so far. Elevated kappa lambda ratio. REVIEW OF SYSTEMS:  Review of Systems   Constitutional: Positive for activity change and fatigue. Respiratory: Positive for cough and shortness of breath. Negative for chest tightness. Cardiovascular: Negative for chest pain and palpitations. Gastrointestinal: Negative. Genitourinary: Negative. Musculoskeletal: Negative. Neurological: Negative.         OBJECTIVE     Ventilator Settings:  Vent Information  $Ventilation: $Subsequent Day  Skin Assessment: Clean, dry, & intact  Equipment Changed: HME  Vent Type: Servo i  Vent Mode: (S) CPAP (SBT)  Vt Ordered: 600 mL  Rate Set: 16 bmp  Pressure Support: 8 cmH20  FiO2 : 50 %  SpO2: 93 %  SpO2/FiO2 ratio: 186  Sensitivity: 5  PEEP/CPAP: 8  I Time/ I Time %: 0.9 s  Humidification Source: HME  Humidification Temp: 34  Nitric Oxide/Epoprostenol In Use?: No  Mask Type: Full face mask  Mask Size: Medium    VITAL SIGNS:   LAST-  BP 93/65   Pulse 66   Temp 98.1 °F (36.7 °C) (Oral)   Resp 15   Ht 6' 3\" (1.905 m)   Wt 163 lb 12.8 oz (74.3 kg)   SpO2 93%   BMI 20.47 kg/m²   8-24 HR RANGE-  TEMP Temp  Av.5 °F (36.4 °C)  Min: 97.2 °F (36.2 °C)  Max: 98.1 °F (91.4 °C)   BP Systolic (91JIX), VWI:193 , Min:79 , DEVON:631      Diastolic (91MIG), GQH:46, Min:55, Max:82     PULSE Pulse  Av.1  Min: 54  Max: 72 RR Resp  Av.3  Min: 13  Max: 34   O2 SAT SpO2  Av.6 %  Min: 91 %  Max: 100 %   OXYGEN DELIVERY O2 Flow Rate (L/min)  Av L/min  Min: 25 L/min  Max: 30 L/min     SYSTEMIC EXAMINATION:   General appearance -lethargic eyes opening, chronically ill-appearing follows some command intermittently  Mental status -on Precedex drip this morning low-dose  Eyes - pupils equal and reactive sluggish, sclera anicteric  Mouth -oral mucosa look moist  Neck - supple, no significant adenopathy, carotids upstroke normal bilaterally, no bruits  Chest -decreased bibasilar breath sounds. Bibasilar crackles present. Heart -regular S1-S2, regular rhythm, normal S1, S2, no murmurs, clicks or gallops  Abdomen - soft, nontender, nondistended, no masses or organomegaly  Neurological - DTR's decreased and plantars upgoing, spontaneously moves did not follow command.   Extremities - peripheral pulses normal, no pedal edema, no clubbing or cyanosis  Skin - normal coloration and turgor, no rashes, no suspicious skin lesions noted     DATA REVIEW     Medications: Current Inpatient  Scheduled Meds:   amiodarone  200 mg Oral Daily    ipratropium-albuterol  1 ampule Inhalation 4x daily    midodrine  10 mg Oral TID WC    apixaban  5 mg Oral BID    polyethylene glycol  17 g Oral BID    nystatin  5 mL Oral 4x Daily    sodium chloride flush  10 mL IntraVENous 2 times per day    clopidogrel  75 mg Oral Daily    sennosides-docusate sodium  1 tablet Oral BID    [Held by provider] metoprolol tartrate  25 mg Oral BID    atorvastatin  80 mg Oral Nightly    pantoprazole  40 mg Oral Daily    insulin glargine  0.15 Units/kg SubCUTAneous Nightly    tamsulosin  0.4 mg Oral Daily     Continuous Infusions:   furosemide (LASIX) 5mg/ml infusion 5 mg/hr (22 1133)    dexmedetomidine 0 mcg/kg/hr (22 1132)    sodium chloride      propofol Stopped (22 1455)    norepinephrine 0.02 mcg/kg/min (22 1144)    EPINEPHrine Stopped (03/24/22 1610)    insulin Stopped (03/22/22 1544)    dextrose      vasopressin (Septic Shock) infusion 0.02 Units/min (03/24/22 2030)    sodium chloride      sodium chloride       INPUT/OUTPUT:  In: 752.3 [I.V.:252.3]  Out: 2050 [Urine:1455]  Date 03/27/22 0000 - 03/27/22 2359   Shift 9852-2051 2754-7922 0037-7645 24 Hour Total   INTAKE   I.V.(mL/kg) 48.4(0.7) 40.1(0.5)  88.6(1.2)   Shift Total(mL/kg) 48.4(0.7) 40.1(0.5)  88.6(1.2)   OUTPUT   Urine(mL/kg/hr) 790(1.3) 380  1170   Chest Tube 300   300   Shift Total(mL/kg) 1090(14.7) 380(5.1)  1470(19.8)   Weight (kg) 74.3 74.3 74.3 74.3       LABS:-  ABG:   Recent Labs     03/24/22  1727   POCPH 7.467*   POCPCO2 28.7*   POCPO2 48.3*   POCHCO3 20.7*   [id number] 87*     CBC:   Recent Labs     03/25/22  0711 03/26/22  0341 03/27/22  0615   WBC 7.8 10.7 11.2   HGB 8.9* 9.1* 9.3*   HCT 31.3* 29.4* 30.2*   MCV 96.6 88.0 87.3    161 192   RBC 3.24* 3.34* 3.46*   MCH 27.5 27.2 26.9   MCHC 28.4 31.0 30.8   RDW 16.3* 16.0* 16.1*     BMP:   Recent Labs     03/25/22 2238 03/25/22 2238 03/26/22  0341 03/26/22  1602 03/27/22  0615      < > 141 143 139   K 4.0   < > 4.0 4.0 3.5*      < > 108* 112* 105   CO2 21   < > 17* 18* 19*   BUN 43*  --  46*  --  52*   CREATININE 1.79*  --  1.97*  --  1.88*   GLUCOSE 102*  --  98  --  97    < > = values in this interval not displayed. Liver Function Test:   No results for input(s): PROT, LABALBU, ALT, AST, GGT, ALKPHOS, BILITOT in the last 72 hours. Amylase/Lipase:  No results for input(s): AMYLASE, LIPASE in the last 72 hours. Coagulation Profile:   Recent Labs     03/25/22  0711 03/26/22  0341 03/27/22  0615   INR 1.2 1.2 1.2   PROTIME 12.7* 12.9* 12.7*     Cardiac Enzymes:  No results for input(s): CKTOTAL, CKMB, CKMBINDEX, TROPONINI in the last 72 hours.   Lactic Acid:  Lab Results   Component Value Date    LACTA NOT REPORTED 03/11/2018    LACTA NOT REPORTED 03/10/2018    LACTA NOT REPORTED 03/10/2018     BNP:   Lab Results   Component Value Date    BNP 70 09/13/2013     D-Dimer:  No results found for: DDIMER  Others:   No results found for: TSH, L8AQMCN, U1EDMCU, THYROIDAB, FT3, T4FREE  Lab Results   Component Value Date    SEDRATE 20 (H) 05/21/2014    CRP 15.2 (H) 05/21/2014     No results found for: Elyssa Brandon  No results found for: IRON, TIBC, FERRITIN  No results found for: SPEP, UPEP  Lab Results   Component Value Date    PSA 1.11 11/04/2016       Microbiology:  No results for input(s): SPECDESC, SPECDESC, SPECIAL, CULTURE, CULTURE, STATUS, ORG, CDIFFTOXPCR, CAMPYLOBPCR, SALMONELLAPC, SHIGAPCR, SHIGELLAPCR, MPNEUG, MPNEUM, LACTOQL in the last 72 hours. Pathology:    Radiology Reports:  XR CHEST PORTABLE   Preliminary Result   1. Stable positions of support apparatus. 2. Stable bibasilar airspace disease, atelectasis favored over pneumonia. 3. Stable small bilateral pleural effusions. US RENAL COMPLETE   Final Result      1. Borderline small right kidney but otherwise sonographically unremarkable   appearing kidneys. 2.  Mild to moderate amount of ascites was noted. XR CHEST PORTABLE   Final Result   Bibasilar atelectasis and pleural effusion more pronounced on the left. No   significant change. Stable support tubes and line. XR CHEST PORTABLE   Final Result   Overall improvement seen in the aeration lung bases. The remainder of lines   and tubes in stool are stable. No definite pneumothorax on the left. XR CHEST PORTABLE   Final Result   1. Lines and tubes as described above. 2.  Increasing right pleural effusion and right basilar airspace disease. Stable left pleural effusion. XR CHEST PORTABLE   Final Result   1. Support lines and tubes remain in place. 2.  Bibasilar opacities persist, slightly increased on the right, suggesting   layering pleural effusions and atelectasis.       3.  Mildly increased vascular Black    Room Number  2107        Height:                 75 inch, 190.5 cm    Corporate ID [id number]    Weight:                 136 pounds, 61.7 kg  #    Patient Acct [de-identified]   BSA:        1.86 m^2    BMI:        17 kg/m^2  #    MR #         [id number]      Sonographer             Kristina Kaur    Accession #  [id number]  Interpreting Physician  Vinh Chau    Fellow                   Referring Nurse  Practitioner    Interpreting             Referring Physician     Per Rivera. Ellen Justice,  Fellow                                           APRN-NP    Type of Study    TTE procedure:2D Echocardiogram, M-Mode, Doppler, Color Doppler. Procedure Date  Date: 03/13/2022 Start: 12:17 PM    Study Location: [facility]  Technical Quality: Fair visualization    Indications:Multi vessel CAD and Pre-Op CABG. History / Tech. Comments:  Procedure explained to patient. Study done at the bedside. STAT call in    Patient Status: Inpatient    Height: 75 inches Weight: 136 pounds BSA: 1.86 m^2 BMI: 17 kg/m^2    Allergies  - *No Known Allergies. CONCLUSIONS    Summary  Global left ventricular systolic function is normal. Estimated EF 01-35%  Normal diastolic filling. Normal right ventricular size and function. Mild mitral regurgitation. Mild tricuspid regurgitation. Mild pulmonary hypertension with an estimated right ventricular systolic  pressure of 41 mmHg. No pericardial effusion.     Signature  ----------------------------------------------------------------------------  Electronically signed by Vesna Kaufman(Sonographer) on 03/13/2022  12:51 PM  ----------------------------------------------------------------------------    ----------------------------------------------------------------------------  Electronically signed by Tony [facility] physician) on  03/14/2022 09:19 AM  ----------------------------------------------------------------------------  FINDINGS  Left Atrium  Left atrium is normal in size. Left Ventricle  Left ventricle is normal in size. Global left ventricular systolic function  is normal. Calculated EF via Parks's method is 48 %. Mild septal hypertrophy. Normal diastolic filling. Right Atrium  Right atrium is normal in size. Right Ventricle  Normal right ventricular size and function. Mitral Valve  Normal mitral valve structure. Mild mitral regurgitation. No mitral stenosis. Aortic Valve  Aortic valve structure and function normal.  Aortic valve is trileaflet. No aortic insufficiency. No aortic stenosis. Tricuspid Valve  Normal tricuspid valve leaflets. Mild tricuspid regurgitation. No tricuspid stenosis. Mild pulmonary hypertension with an estimated right ventricular systolic  pressure of 41 mmHg. Pulmonic Valve  Pulmonic valve is normal in structure and function. No pulmonic insufficiency. No evidence of pulmonic stenosis. Pericardial Effusion  No pericardial effusion. Miscellaneous  Normal aortic root dimension. E/E' average = 11.0. IVC normal diameter & inspiratory collapse indicating normal RA filling  pressure .     M-mode / 2D Measurements & Calculations:    LVIDd:4.9 cm(3.7 - 5.6 cm)       Diastolic IYFBUL:41.53 ml  LVIDs:3.8 cm(2.2 - 4.0 cm)       Systolic CVBBFO:39.29 ml  IVSd:1.2 cm(0.6 - 1.1 cm)        Aortic Root:3.3 cm(2.0 - 3.7 cm)  LVPWd:0.8 cm(0.6 - 1.1 cm)       LA Dimension: 2.9 cm(1.9 - 4.0 cm)  Fractional Shortenin.45 %    LA volume/Index: 35.47 ml /19m^2  Calculated LVEF (%): 49.08 %     LVOT:2.2 cm  RVDd:3.5 cm    Mitral:                                 Aortic    Valve Area (P1/2-Time): 2.78 cm^2       Peak Velocity: 1.26 m/s  Peak E-Wave: 0.87 m/s                   Mean Velocity: 0.84 m/s  Peak A-Wave: 0.67 m/s                   Peak Gradient: 6.35 mmHg  E/A Ratio: 1.29                         Mean Gradient: 4 mmHg  Peak Gradient: 2.99 mmHg  Mean Gradient: 1 mmHg  Deceleration Time: 258 msec             Area (continuity): 2.33 cm^2  P1/2t: 79 msec                          AV VTI: 26.9 cm    Area (continuity): 3.32 cm^2  Mean Velocity: 0.49 m/s    Tricuspid:                              Pulmonic:    Estimated RVSP: 41 mmHg                 Peak Velocity: 0.71 m/s  Peak TR Velocity: 3.00 m/s              Peak Gradient: 1.99 mmHg  Peak TR Gradient: 36 mmHg  Estimated RA Pressure: 5 mmHg    Estimated PASP: 41 mmHg    Diastology / Tissue Doppler  Septal Wall E' velocity:0.06 m/s  Septal Wall E/E':14.7  Lateral Wall E' velocity:0.12 m/s  Lateral Wall E/E':7.3      Cardiac Catheterization:   No results found for this or any previous visit. ASSESSMENT AND PLAN     Assessment:    MV CAD, Plan for CABG   Bradycardia  Sinus pause lasting for 4.7 seconds 3/17/2022  COPD stage II on PFTs with severe reduction diffusion capacity  Centrilobular and paraseptal emphysema  Chronic smoker  Cocaine use  Chronic combined diastolic and systolic CHF, EF 51%.  Previously 50-55%  Mild pulmonary hypertension  Right hip arthritis  Essential hypertension  CKD stage III  Leukocytosis    Plan:    I personally interviewed/examined the patient; reviewed interval history, interpreted all available radiographic and laboratory data at the time of service. Patient is currently off Precedex drip lethargic but arousable follows some command looks weak. Has been extubated and on nasal cannula maintaining saturation currently. We will use noninvasive ventilation if needed. ABG post extubation showed 7.4 2/35/73/23 on nasal cannula. He is currently on vasopressin drip and off epinephrine drip. Continue DuoNeb aerosol  Encourage incentive spirometry deep breathing cough avoid sedation.   Continue antiplatelet, anticoagulation, statins, and beta blockers as per cardiology/CT surgery  Continue to monitor I/O with a goal of even fluid balance suggest intermittent low-dose of Lasix if you tolerate as far as blood pressure is concerned as he is developing bilateral pleural effusion. Chest tube management as per CT surgery  On amiodarone, statin, aspirin, Plavix. Continue pulmonary toilet, aspiration precautions and bronchodilators  Continue with DuoNeb aerosol  Continue to monitor CBC, coagulation profile, transfuse as indicated  Chemical DVT prophylaxis postoperatively when okay with CT surgery  Antimicrobials reviewed; currently not on antibiotics  Glycemic control appropriate on Lantus  Physical/occupational therapy  Increase ambulation/physical therapy out of bed to chair. Discussed with nursing staff, treatment and plan discussed  Discussed with respiratory therapist pain      The patient is/remains critically ill with illness/injury that acutely impairs one or more vital organ systems, such that there is a high probability of imminent or life threatening deterioration in the patient's condition. Critical care time of greater than 35 minutes was spent (excluding procedures), in coordination of care during bedside rounds and discussion of patient care in detail, and recommendations of the team were adopted in the plan. Necessity of all invasive devices was also confirmed. Dandy Rick MD.   Pulmonary and Critical Care Medicine           3/27/2022, 1:59 PM    Please note that this chart was generated using voice recognition Dragon dictation software. Although every effort was made to ensure the accuracy of this automated transcription, some errors in transcription may have occurred.     PULMONARY & CRITICAL CARE MEDICINE PROGRESS NOTE     Patient:  Ness Woody  MRN: [id number]  Admit date: 3/11/2022  Primary Care Physician: Sylvia Sun MD  Consulting Physician: Benedict Hightower MD  CODE Status: Full Code  LOS: 16    SUBJECTIVE     CHIEF COMPLAINT/REASON FOR INITIAL CONSULT:    COPD/chronic cough/preoperative evaluation    BRIEF HOSPITAL COURSE:  The patient is a 74 y.o. male history of hypertension, history of mild systolic dysfunction admitted with chest pain initially to [facility] on 03/09/2022 non-ST elevation MI ruled out by troponins in 20s had a stress test done because of high suspicion which shows ischemia of lateral septal wall with ejection fraction of 34%.  He does have history of cocaine use and apparently he had used cocaine before admission to [facility].  He was transferred to [facility] for cardiac catheterization which shows multivessel coronary artery disease with EF of 30% including left main disease.  He remains on a statin and heparin.  CT surgery was consulted for CABG.    Patient had pulmonary function test done which shows FEV1 of 46% with significance positive bronchodilator FEV1 of 56% with severe reduction in diffusion capacity of less than 30% with air trapping/hyperinflation. CT scan of the chest shows areas of blebs and bulla medially and centrilobular and paraseptal emphysema on CT scan.     Patient does complain of shortness of breath on mild activity and exertion.  He does have chronic cough without no change denies increased wheezing currently denies chest pain.  He has history of cocaine use.  Does have history of smoking patient is not able to tell me that how many cigarettes he used to smoke but he claimed that now he smoke 1 pack/week and had been smoking for almost 50 years. INTERVAL HISTORY:  03/27/22  Patient seen in cardiovascular ICU this morning, overnight events noted, labs and arterial blood gases and ventilator support this morning was seen. Overnight he was weaned off epinephrine drip and on vasopressin drip this morning. He is hypotensive and continued vasopressin. No bradycardia was reported overnight heart rate is in 60s to 70s. This morning he was on 40% FiO2 and PEEP of 8. Ventilator setting SIMV/16/600/8/40 percent. ABG 7.3 9/40/62. He was on CPAP/pressure support 8/8/40 percent tolerated this morning breathing trial and extubated.   Currently on 4 L nasal cannula maintaining saturation above 92% lethargic but arousable. He was on Precedex drip this morning and weaned off Precedex drip. Urine output 1185 in last 24 hours he had received 1 dose of Lasix 20 mg this morning. Labs show sodium 146 potassium 4.0 BUN 24 creatinine 0.99 bicarbonate 22 WBC 5.8 hemoglobin 9.1 platelet count 797. Chest x-ray 2022 shows bilateral small effusion bibasilar atelectasis possible right lower lobe infiltrate/atelectasis cardiomegaly pulmonary venous congestion. Respiratory culture sent on 2022 showed no growth so far.     REVIEW OF SYSTEMS:  Unobtainable from patient due to sedation/mechanical ventilation  Patient seen after extubation was lethargic unable to provide review of system but arousable with eyes open    OBJECTIVE     Ventilator Settings:  Vent Information  $Ventilation: $Subsequent Day  Skin Assessment: Clean, dry, & intact  Equipment Changed: HME  Vent Type: Servo i  Vent Mode: (S) CPAP (SBT)  Vt Ordered: 600 mL  Rate Set: 16 bmp  Pressure Support: 8 cmH20  FiO2 : 50 %  SpO2: 93 %  SpO2/FiO2 ratio: 186  Sensitivity: 5  PEEP/CPAP: 8  I Time/ I Time %: 0.9 s  Humidification Source: HME  Humidification Temp: 34  Nitric Oxide/Epoprostenol In Use?: No  Mask Type: Full face mask  Mask Size: Medium    VITAL SIGNS:   LAST-  BP 93/65   Pulse 66   Temp 98.1 °F (36.7 °C) (Oral)   Resp 15   Ht 6' 3\" (1.905 m)   Wt 163 lb 12.8 oz (74.3 kg)   SpO2 93%   BMI 20.47 kg/m²   8-24 HR RANGE-  TEMP Temp  Av.5 °F (36.4 °C)  Min: 97.2 °F (36.2 °C)  Max: 98.1 °F (65.9 °C)   BP Systolic (86FNP), QXO:091 , Min:79 , YJM:453      Diastolic (60VBC), YKP:69, Min:55, Max:82     PULSE Pulse  Av.1  Min: 54  Max: 72   RR Resp  Av.3  Min: 13  Max: 34   O2 SAT SpO2  Av.6 %  Min: 91 %  Max: 100 %   OXYGEN DELIVERY O2 Flow Rate (L/min)  Av L/min  Min: 25 L/min  Max: 30 L/min     SYSTEMIC EXAMINATION:   General appearance -lethargic eyes opening, chronically ill-appearing follows some command intermittently  Mental status -on Precedex drip this morning low-dose  Eyes - pupils equal and reactive sluggish, sclera anicteric  Mouth -oral mucosa look moist  Neck - supple, no significant adenopathy, carotids upstroke normal bilaterally, no bruits  Chest - Breath sounds bilaterally were dimnished to auscultation at bases. There were scattered rhonchi present with distant breath sound slightly diminished breath sounds on left as compared to right. Heart -regular S1-S2, regular rhythm, normal S1, S2, no murmurs, clicks or gallops  Abdomen - soft, nontender, nondistended, no masses or organomegaly  Neurological - DTR's decreased and plantars upgoing, spontaneously moves did not follow command.   Extremities - peripheral pulses normal, no pedal edema, no clubbing or cyanosis  Skin - normal coloration and turgor, no rashes, no suspicious skin lesions noted     DATA REVIEW     Medications: Current Inpatient  Scheduled Meds:   amiodarone  200 mg Oral Daily    ipratropium-albuterol  1 ampule Inhalation 4x daily    midodrine  10 mg Oral TID WC    apixaban  5 mg Oral BID    polyethylene glycol  17 g Oral BID    nystatin  5 mL Oral 4x Daily    sodium chloride flush  10 mL IntraVENous 2 times per day    clopidogrel  75 mg Oral Daily    sennosides-docusate sodium  1 tablet Oral BID    [Held by provider] metoprolol tartrate  25 mg Oral BID    atorvastatin  80 mg Oral Nightly    pantoprazole  40 mg Oral Daily    insulin glargine  0.15 Units/kg SubCUTAneous Nightly    tamsulosin  0.4 mg Oral Daily     Continuous Infusions:   furosemide (LASIX) 5mg/ml infusion 5 mg/hr (03/27/22 1133)    dexmedetomidine 0 mcg/kg/hr (03/27/22 1132)    sodium chloride      propofol Stopped (03/23/22 1455)    norepinephrine 0.02 mcg/kg/min (03/27/22 1144)    EPINEPHrine Stopped (03/24/22 1610)    insulin Stopped (03/22/22 1544)    dextrose      vasopressin (Septic Shock) infusion 0.02 Units/min (03/24/22 2030)    sodium chloride      sodium chloride       INPUT/OUTPUT:  In: 752.3 [I.V.:252.3]  Out: 2050 [Urine:1455]  Date 03/27/22 0000 - 03/27/22 2359   Shift 1597-1350 2424-8608 8844-2783 24 Hour Total   INTAKE   I.V.(mL/kg) 48.4(0.7) 40.1(0.5)  88.6(1.2)   Shift Total(mL/kg) 48.4(0.7) 40.1(0.5)  88.6(1.2)   OUTPUT   Urine(mL/kg/hr) 790(1.3) 380  1170   Chest Tube 300   300   Shift Total(mL/kg) 1090(14.7) 380(5.1)  1470(19.8)   Weight (kg) 74.3 74.3 74.3 74.3       LABS:-  ABG:   Recent Labs     03/24/22  1727   POCPH 7.467*   POCPCO2 28.7*   POCPO2 48.3*   POCHCO3 20.7*   ZVHQ3QLD 87*     CBC:   Recent Labs     03/25/22  0711 03/26/22  0341 03/27/22  0615   WBC 7.8 10.7 11.2   HGB 8.9* 9.1* 9.3*   HCT 31.3* 29.4* 30.2*   MCV 96.6 88.0 87.3    161 192   RBC 3.24* 3.34* 3.46*   MCH 27.5 27.2 26.9   MCHC 28.4 31.0 30.8   RDW 16.3* 16.0* 16.1*     BMP:   Recent Labs     03/25/22 2238 03/25/22  2238 03/26/22  0341 03/26/22  1602 03/27/22  0615      < > 141 143 139   K 4.0   < > 4.0 4.0 3.5*      < > 108* 112* 105   CO2 21   < > 17* 18* 19*   BUN 43*  --  46*  --  52*   CREATININE 1.79*  --  1.97*  --  1.88*   GLUCOSE 102*  --  98  --  97    < > = values in this interval not displayed. Liver Function Test:   No results for input(s): PROT, LABALBU, ALT, AST, GGT, ALKPHOS, BILITOT in the last 72 hours. Amylase/Lipase:  No results for input(s): AMYLASE, LIPASE in the last 72 hours. Coagulation Profile:   Recent Labs     03/25/22  0711 03/26/22  0341 03/27/22  0615   INR 1.2 1.2 1.2   PROTIME 12.7* 12.9* 12.7*     Cardiac Enzymes:  No results for input(s): CKTOTAL, CKMB, CKMBINDEX, TROPONINI in the last 72 hours.   Lactic Acid:  Lab Results   Component Value Date    LACTA NOT REPORTED 03/11/2018    LACTA NOT REPORTED 03/10/2018    LACTA NOT REPORTED 03/10/2018     BNP:   Lab Results   Component Value Date    BNP 70 09/13/2013     D-Dimer:  No results found for: DDIMER  Others: No results found for: TSH, B7MQLZX, M7YOVQZ, THYROIDAB, FT3, T4FREE  Lab Results   Component Value Date    SEDRATE 20 (H) 05/21/2014    CRP 15.2 (H) 05/21/2014     No results found for: Richelle Carreon  No results found for: IRON, TIBC, FERRITIN  No results found for: SPEP, UPEP  Lab Results   Component Value Date    PSA 1.11 11/04/2016       Microbiology:  No results for input(s): SPECDESC, SPECDESC, SPECIAL, CULTURE, CULTURE, STATUS, ORG, CDIFFTOXPCR, CAMPYLOBPCR, SALMONELLAPC, SHIGAPCR, SHIGELLAPCR, MPNEUG, MPNEUM, LACTOQL in the last 72 hours. Pathology:    Radiology Reports:  XR CHEST PORTABLE   Preliminary Result   1. Stable positions of support apparatus. 2. Stable bibasilar airspace disease, atelectasis favored over pneumonia. 3. Stable small bilateral pleural effusions. US RENAL COMPLETE   Final Result      1. Borderline small right kidney but otherwise sonographically unremarkable   appearing kidneys. 2.  Mild to moderate amount of ascites was noted. XR CHEST PORTABLE   Final Result   Bibasilar atelectasis and pleural effusion more pronounced on the left. No   significant change. Stable support tubes and line. XR CHEST PORTABLE   Final Result   Overall improvement seen in the aeration lung bases. The remainder of lines   and tubes in stool are stable. No definite pneumothorax on the left. XR CHEST PORTABLE   Final Result   1. Lines and tubes as described above. 2.  Increasing right pleural effusion and right basilar airspace disease. Stable left pleural effusion. XR CHEST PORTABLE   Final Result   1. Support lines and tubes remain in place. 2.  Bibasilar opacities persist, slightly increased on the right, suggesting   layering pleural effusions and atelectasis. 3.  Mildly increased vascular congestion, possibly accentuated by supine   positioning.          XR CHEST PORTABLE   Final Result   Stable chest (suspect right pleural effusion in addition to left effusion   and/or atelectasis) with line and tube placements as detailed above. XR CHEST PORTABLE   Final Result   As compared to prior examination, the ET tube now is in satisfactory   position. Examination otherwise is unchanged. XR CHEST PORTABLE   Final Result   1. Endotracheal tube is low in position, approximately 1 cm above the   veronique. This should be pulled back approximately 3 cm. 2.  Other lines and tubes are unchanged in position. 3.  New small right pleural effusion. 4.  Unchanged left basilar opacity. XR CHEST PORTABLE   Final Result   1. Lines and tubes are unchanged in position. 2. Linear bibasilar opacities are similar prior study, atelectasis versus   pneumonia. XR CHEST PORTABLE   Final Result   All tubes and catheters are in good position. No evidence for pneumothorax. Bilateral basal atelectasis noted. VL DUP UPPER EXTREMITY ARTERIES BILATERAL   Final Result      VL Vein Mapping Lower Bilateral   Final Result      VL DUP CAROTID BILATERAL   Final Result      CT CHEST WO CONTRAST   Final Result   1. Extensive centrilobular and paraseptal emphysema. 2. Extensive atherosclerotic disease, particularly along the coronary   arteries. 3. There are areas of atelectasis in the lingula. There is collapse of the   right middle lobe   4. No focal lung infiltrate.          XR CHEST PORTABLE    (Results Pending)       Echocardiogram:   Results for orders placed during the hospital encounter of 03/11/22    Echocardiogram Complete 2D w Doppler w Color    Narrative  Transthoracic Echocardiography Report (TTE)    Patient Name Ramsey Calle       Date of Study           03/13/2022  [facility]    Date of      1955  Gender                  Male  Birth    Age          74 year(s)  Race                    Black    Room Number  2107        Height:                 75 inch, 190.5 cm    Corporate ID [id number]    Weight: 136 pounds, 61.7 kg  #    Patient Acct [de-identified]   BSA:        1.86 m^2    BMI:        17 kg/m^2  #    MR #         [id number]      Sonographer             Apple Teixeira    Accession #  [id number]  Interpreting Physician  Vinh Chau    Fellow                   Referring Nurse  Practitioner    Interpreting             Referring Physician     Radha Thomas,  Fellow                                           APRELIO-NP    Type of Study    TTE procedure:2D Echocardiogram, M-Mode, Doppler, Color Doppler. Procedure Date  Date: 03/13/2022 Start: 12:17 PM    Study Location: [facility]  Technical Quality: Fair visualization    Indications:Multi vessel CAD and Pre-Op CABG. History / Tech. Comments:  Procedure explained to patient. Study done at the bedside. STAT call in    Patient Status: Inpatient    Height: 75 inches Weight: 136 pounds BSA: 1.86 m^2 BMI: 17 kg/m^2    Allergies  - *No Known Allergies. CONCLUSIONS    Summary  Global left ventricular systolic function is normal. Estimated EF 39-62%  Normal diastolic filling. Normal right ventricular size and function. Mild mitral regurgitation. Mild tricuspid regurgitation. Mild pulmonary hypertension with an estimated right ventricular systolic  pressure of 41 mmHg. No pericardial effusion. Signature  ----------------------------------------------------------------------------  Electronically signed by Vesna Soler(Sonographer) on 03/13/2022  12:51 PM  ----------------------------------------------------------------------------    ----------------------------------------------------------------------------  Electronically signed by Tony ChauInterpreting physician) on  03/14/2022 09:19 AM  ----------------------------------------------------------------------------  FINDINGS  Left Atrium  Left atrium is normal in size. Left Ventricle  Left ventricle is normal in size.  Global left ventricular systolic function  is normal. Calculated EF via Parks's method is 48 %. Mild septal hypertrophy. Normal diastolic filling. Right Atrium  Right atrium is normal in size. Right Ventricle  Normal right ventricular size and function. Mitral Valve  Normal mitral valve structure. Mild mitral regurgitation. No mitral stenosis. Aortic Valve  Aortic valve structure and function normal.  Aortic valve is trileaflet. No aortic insufficiency. No aortic stenosis. Tricuspid Valve  Normal tricuspid valve leaflets. Mild tricuspid regurgitation. No tricuspid stenosis. Mild pulmonary hypertension with an estimated right ventricular systolic  pressure of 41 mmHg. Pulmonic Valve  Pulmonic valve is normal in structure and function. No pulmonic insufficiency. No evidence of pulmonic stenosis. Pericardial Effusion  No pericardial effusion. Miscellaneous  Normal aortic root dimension. E/E' average = 11.0. IVC normal diameter & inspiratory collapse indicating normal RA filling  pressure .     M-mode / 2D Measurements & Calculations:    LVIDd:4.9 cm(3.7 - 5.6 cm)       Diastolic YHZSQA:23.78 ml  LVIDs:3.8 cm(2.2 - 4.0 cm)       Systolic EKFOBC:41.31 ml  IVSd:1.2 cm(0.6 - 1.1 cm)        Aortic Root:3.3 cm(2.0 - 3.7 cm)  LVPWd:0.8 cm(0.6 - 1.1 cm)       LA Dimension: 2.9 cm(1.9 - 4.0 cm)  Fractional Shortenin.45 %    LA volume/Index: 35.47 ml /19m^2  Calculated LVEF (%): 49.08 %     LVOT:2.2 cm  RVDd:3.5 cm    Mitral:                                 Aortic    Valve Area (P1/2-Time): 2.78 cm^2       Peak Velocity: 1.26 m/s  Peak E-Wave: 0.87 m/s                   Mean Velocity: 0.84 m/s  Peak A-Wave: 0.67 m/s                   Peak Gradient: 6.35 mmHg  E/A Ratio: 1.29                         Mean Gradient: 4 mmHg  Peak Gradient: 2.99 mmHg  Mean Gradient: 1 mmHg  Deceleration Time: 258 msec             Area (continuity): 2.33 cm^2  P1/2t: 79 msec                          AV VTI: 26.9 cm    Area (continuity): 3.32 cm^2  Mean Velocity: 0.49 m/s    Tricuspid:                              Pulmonic:    Estimated RVSP: 41 mmHg                 Peak Velocity: 0.71 m/s  Peak TR Velocity: 3.00 m/s              Peak Gradient: 1.99 mmHg  Peak TR Gradient: 36 mmHg  Estimated RA Pressure: 5 mmHg    Estimated PASP: 41 mmHg    Diastology / Tissue Doppler  Septal Wall E' velocity:0.06 m/s  Septal Wall E/E':14.7  Lateral Wall E' velocity:0.12 m/s  Lateral Wall E/E':7.3      Cardiac Catheterization:   No results found for this or any previous visit. ASSESSMENT AND PLAN     Assessment:    MV CAD, Plan for CABG   Bradycardia  Sinus pause lasting for 4.7 seconds 3/17/2022  COPD stage II on PFTs with severe reduction diffusion capacity  Centrilobular and paraseptal emphysema  Chronic smoker  Cocaine use  Chronic combined diastolic and systolic CHF, EF 96%.  Previously 50-55%  Mild pulmonary hypertension  Right hip arthritis  Essential hypertension  CKD stage III  Leukocytosis    Plan:    I personally interviewed/examined the patient; reviewed interval history, interpreted all available radiographic and laboratory data at the time of service. Patient is currently off Precedex drip lethargic but arousable follows some command looks weak. Has been extubated and on nasal cannula maintaining saturation currently. We will use noninvasive ventilation if needed. ABG post extubation showed 7.4 2/35/73/23 on nasal cannula. He is currently on vasopressin drip and off epinephrine drip. Continue DuoNeb aerosol  Encourage incentive spirometry deep breathing cough avoid sedation. Continue antiplatelet, anticoagulation, statins, and beta blockers as per cardiology/CT surgery  Continue to monitor I/O with a goal of even fluid balance suggest intermittent low-dose of Lasix if you tolerate as far as blood pressure is concerned as he is developing bilateral pleural effusion.   Chest tube management as per CT surgery  On amiodarone, statin, aspirin, Plavix. Continue pulmonary toilet, aspiration precautions and bronchodilators  Continue with DuoNeb aerosol  Continue to monitor CBC, coagulation profile, transfuse as indicated  Chemical DVT prophylaxis postoperatively when okay with CT surgery  Antimicrobials reviewed; currently not on antibiotics  Glycemic control appropriate on Lantus  Physical/occupational therapy  Increase ambulation/physical therapy out of bed to chair. Discussed with nursing staff, treatment and plan discussed  Discussed with respiratory therapist pain      The patient is/remains critically ill with illness/injury that acutely impairs one or more vital organ systems, such that there is a high probability of imminent or life threatening deterioration in the patient's condition. Critical care time of greater than 35 minutes was spent (excluding procedures), in coordination of care during bedside rounds and discussion of patient care in detail, and recommendations of the team were adopted in the plan. Necessity of all invasive devices was also confirmed. Noble Fuller MD.   Pulmonary and Critical Care Medicine           3/27/2022, 1:59 PM    Please note that this chart was generated using voice recognition Dragon dictation software. Although every effort was made to ensure the accuracy of this automated transcription, some errors in transcription may have occurred.     PULMONARY & CRITICAL CARE MEDICINE PROGRESS NOTE     Patient:  Joel Michael  MRN: [id number]  Admit date: 3/11/2022  Primary Care Physician: Lary Valentine MD  Consulting Physician: Trip Segovia MD  CODE Status: Full Code  LOS: 16    SUBJECTIVE     CHIEF COMPLAINT/REASON FOR INITIAL CONSULT:    COPD/chronic cough/preoperative evaluation    BRIEF HOSPITAL COURSE:  The patient is a 74 y.o. male history of hypertension, history of mild systolic dysfunction admitted with chest pain initially to [facility] on 03/09/2022 non-ST elevation MI ruled out by troponins in 20s had a stress test done because of high suspicion which shows ischemia of lateral septal wall with ejection fraction of 34%.  He does have history of cocaine use and apparently he had used cocaine before admission to [facility].  He was transferred to [place] for cardiac catheterization which shows multivessel coronary artery disease with EF of 30% including left main disease.  He remains on a statin and heparin.  CT surgery was consulted for CABG.    Patient had pulmonary function test done which shows FEV1 of 46% with significance positive bronchodilator FEV1 of 56% with severe reduction in diffusion capacity of less than 30% with air trapping/hyperinflation. CT scan of the chest shows areas of blebs and bulla medially and centrilobular and paraseptal emphysema on CT scan.     Patient does complain of shortness of breath on mild activity and exertion.  He does have chronic cough without no change denies increased wheezing currently denies chest pain.  He has history of cocaine use.  Does have history of smoking patient is not able to tell me that how many cigarettes he used to smoke but he claimed that now he smoke 1 pack/week and had been smoking for almost 50 years. INTERVAL HISTORY:  03/27/22  Patient seen in cardiovascular ICU this morning overnight events noted labs and BiPAP/CPAP settings seen. Overnight patient required back to be on Levophed and vasopressin currently he is just taken off vasopressin and on Levophed drip. He was having desaturating overnight he also had atrial fibrillation with rapid ventricular rate started on amiodarone drip. He was on NIV overnight and currently on CPAP at 10 cm 75%. According to nursing staff he was taken off NIV this morning and on nasal cannula he was desaturating increased work of breathing and he was placed back on CPAP currently saturating 100% on CPAP of 10 cm.   Urine output reported to be 840 mg 24 hours he is on Lasix 20 mg daily. Arterial blood gas done last evening was 7.4 //. Chest x-ray this morning as compared to chest x-ray on 2025 shows better aeration of lower lung bilateral effusion bibasilar atelectasis present. When I saw him he was on CPAP mask he was lethargic but arousable not able to provide much information. Patient is on low-dose of Precedex drip      REVIEW OF SYSTEMS:  Unobtainable from patient due to being on CPAP mask/respiratory status and on Precedex drip.     OBJECTIVE     Ventilator Settings:  Vent Information  $Ventilation: $Subsequent Day  Skin Assessment: Clean, dry, & intact  Equipment Changed: HME  Vent Type: Servo i  Vent Mode: (S) CPAP (SBT)  Vt Ordered: 600 mL  Rate Set: 16 bmp  Pressure Support: 8 cmH20  FiO2 : 50 %  SpO2: 93 %  SpO2/FiO2 ratio: 186  Sensitivity: 5  PEEP/CPAP: 8  I Time/ I Time %: 0.9 s  Humidification Source: HME  Humidification Temp: 34  Nitric Oxide/Epoprostenol In Use?: No  Mask Type: Full face mask  Mask Size: Medium    VITAL SIGNS:   LAST-  BP 93/65   Pulse 66   Temp 98.1 °F (36.7 °C) (Oral)   Resp 15   Ht 6' 3\" (1.905 m)   Wt 163 lb 12.8 oz (74.3 kg)   SpO2 93%   BMI 20.47 kg/m²   8-24 HR RANGE-  TEMP Temp  Av.5 °F (36.4 °C)  Min: 97.2 °F (36.2 °C)  Max: 98.1 °F (53.4 °C)   BP Systolic (91RPI), DAP:950 , Min:79 , BKF:546      Diastolic (35OFI), FDF:32, Min:55, Max:82     PULSE Pulse  Av.1  Min: 54  Max: 72   RR Resp  Av.3  Min: 13  Max: 34   O2 SAT SpO2  Av.6 %  Min: 91 %  Max: 100 %   OXYGEN DELIVERY O2 Flow Rate (L/min)  Av L/min  Min: 25 L/min  Max: 30 L/min     SYSTEMIC EXAMINATION:   General appearance -lethargic eyes opening, chronically ill-appearing follows some command intermittently, mild distress and tachypneic on BiPAP currently  Mental status -on low-dose Precedex drip this morning,   Eyes - pupils equal and reactive sluggish, sclera anicteric  Mouth -on BiPAP mask  Neck - supple, no significant adenopathy, carotids upstroke normal bilaterally, no bruits  Chest - Breath sounds bilaterally were dimnished to auscultation at bases. There were scattered rhonchi present with distant breath sound slightly diminished breath sounds on left as compared to right. Heart -regular S1-S2, regular rhythm, normal S1, S2, no murmurs, clicks or gallops  Abdomen - soft, nontender, nondistended, no masses or organomegaly  Neurological - DTR's decreased and plantars downgoing spontaneously moves and follows command.   Extremities - peripheral pulses normal, no pedal edema, no clubbing or cyanosis  Skin - normal coloration and turgor, no rashes, no suspicious skin lesions noted     DATA REVIEW     Medications: Current Inpatient  Scheduled Meds:   amiodarone  200 mg Oral Daily    ipratropium-albuterol  1 ampule Inhalation 4x daily    midodrine  10 mg Oral TID WC    apixaban  5 mg Oral BID    polyethylene glycol  17 g Oral BID    nystatin  5 mL Oral 4x Daily    sodium chloride flush  10 mL IntraVENous 2 times per day    clopidogrel  75 mg Oral Daily    sennosides-docusate sodium  1 tablet Oral BID    [Held by provider] metoprolol tartrate  25 mg Oral BID    atorvastatin  80 mg Oral Nightly    pantoprazole  40 mg Oral Daily    insulin glargine  0.15 Units/kg SubCUTAneous Nightly    tamsulosin  0.4 mg Oral Daily     Continuous Infusions:   furosemide (LASIX) 5mg/ml infusion 5 mg/hr (03/27/22 1133)    dexmedetomidine 0 mcg/kg/hr (03/27/22 1132)    sodium chloride      propofol Stopped (03/23/22 1455)    norepinephrine 0.02 mcg/kg/min (03/27/22 1144)    EPINEPHrine Stopped (03/24/22 1610)    insulin Stopped (03/22/22 1544)    dextrose      vasopressin (Septic Shock) infusion 0.02 Units/min (03/24/22 2030)    sodium chloride      sodium chloride       INPUT/OUTPUT:  In: 752.3 [I.V.:252.3]  Out: 2050 [DXGNQ:2563]  Date 03/27/22 0000 - 03/27/22 2359   Shift 9361-3470 3509-7745 2971-6793 24 Hour Total   INTAKE I.V.(mL/kg) 48.4(0.7) 40.1(0.5)  88.6(1.2)   Shift Total(mL/kg) 48.4(0.7) 40.1(0.5)  88.6(1.2)   OUTPUT   Urine(mL/kg/hr) 790(1.3) 380  1170   Chest Tube 300   300   Shift Total(mL/kg) 1090(14.7) 380(5.1)  1470(19.8)   Weight (kg) 74.3 74.3 74.3 74.3       LABS:-  ABG:   Recent Labs     03/24/22  1727   POCPH 7.467*   POCPCO2 28.7*   POCPO2 48.3*   POCHCO3 20.7*   WBGD5RSI 87*     CBC:   Recent Labs     03/25/22  0711 03/26/22  0341 03/27/22  0615   WBC 7.8 10.7 11.2   HGB 8.9* 9.1* 9.3*   HCT 31.3* 29.4* 30.2*   MCV 96.6 88.0 87.3    161 192   RBC 3.24* 3.34* 3.46*   MCH 27.5 27.2 26.9   MCHC 28.4 31.0 30.8   RDW 16.3* 16.0* 16.1*     BMP:   Recent Labs     03/25/22 2238 03/25/22  2238 03/26/22  0341 03/26/22  1602 03/27/22  0615      < > 141 143 139   K 4.0   < > 4.0 4.0 3.5*      < > 108* 112* 105   CO2 21   < > 17* 18* 19*   BUN 43*  --  46*  --  52*   CREATININE 1.79*  --  1.97*  --  1.88*   GLUCOSE 102*  --  98  --  97    < > = values in this interval not displayed. Liver Function Test:   No results for input(s): PROT, LABALBU, ALT, AST, GGT, ALKPHOS, BILITOT in the last 72 hours. Amylase/Lipase:  No results for input(s): AMYLASE, LIPASE in the last 72 hours. Coagulation Profile:   Recent Labs     03/25/22  0711 03/26/22  0341 03/27/22  0615   INR 1.2 1.2 1.2   PROTIME 12.7* 12.9* 12.7*     Cardiac Enzymes:  No results for input(s): CKTOTAL, CKMB, CKMBINDEX, TROPONINI in the last 72 hours.   Lactic Acid:  Lab Results   Component Value Date    LACTA NOT REPORTED 03/11/2018    LACTA NOT REPORTED 03/10/2018    LACTA NOT REPORTED 03/10/2018     BNP:   Lab Results   Component Value Date    BNP 70 09/13/2013     D-Dimer:  No results found for: DDIMER  Others:   No results found for: TSH, I7HSOOM, R2HRSWW, THYROIDAB, FT3, T4FREE  Lab Results   Component Value Date    SEDRATE 20 (H) 05/21/2014    CRP 15.2 (H) 05/21/2014     No results found for: Kristi Jaquez  No results found for: IRON, Examination otherwise is unchanged. XR CHEST PORTABLE   Final Result   1. Endotracheal tube is low in position, approximately 1 cm above the   veronique. This should be pulled back approximately 3 cm. 2.  Other lines and tubes are unchanged in position. 3.  New small right pleural effusion. 4.  Unchanged left basilar opacity. XR CHEST PORTABLE   Final Result   1. Lines and tubes are unchanged in position. 2. Linear bibasilar opacities are similar prior study, atelectasis versus   pneumonia. XR CHEST PORTABLE   Final Result   All tubes and catheters are in good position. No evidence for pneumothorax. Bilateral basal atelectasis noted. VL DUP UPPER EXTREMITY ARTERIES BILATERAL   Final Result      VL Vein Mapping Lower Bilateral   Final Result      VL DUP CAROTID BILATERAL   Final Result      CT CHEST WO CONTRAST   Final Result   1. Extensive centrilobular and paraseptal emphysema. 2. Extensive atherosclerotic disease, particularly along the coronary   arteries. 3. There are areas of atelectasis in the lingula. There is collapse of the   right middle lobe   4. No focal lung infiltrate.          XR CHEST PORTABLE    (Results Pending)       Echocardiogram:   Results for orders placed during the hospital encounter of 03/11/22    Echocardiogram Complete 2D w Doppler w Color    Narrative  Transthoracic Echocardiography Report (TTE)    Patient Name Jordan Wall       Date of Study           03/13/2022  [facility]    Date of      1955  Gender                  Male  Birth    Age          74 year(s)  Race                    Black    Room Number  2107        Height:                 75 inch, 190.5 cm    Corporate ID [id number]    Weight:                 136 pounds, 61.7 kg  #    Patient Acct [de-identified]   BSA:        1.86 m^2    BMI:        17 kg/m^2  #    MR #         [id number]      Sonographer             Angi Godinez    Accession #  [id number]  Interpreting Physician Vinh Chau    Fellow                   Referring Nurse  Practitioner    Interpreting             Referring Physician     Michele Castro. Erica Mcgregor,  Fellow                                           APRN-NP    Type of Study    TTE procedure:2D Echocardiogram, M-Mode, Doppler, Color Doppler. Procedure Date  Date: 03/13/2022 Start: 12:17 PM    Study Location: [facility]  Technical Quality: Fair visualization    Indications:Multi vessel CAD and Pre-Op CABG. History / Tech. Comments:  Procedure explained to patient. Study done at the bedside. STAT call in    Patient Status: Inpatient    Height: 75 inches Weight: 136 pounds BSA: 1.86 m^2 BMI: 17 kg/m^2    Allergies  - *No Known Allergies. CONCLUSIONS    Summary  Global left ventricular systolic function is normal. Estimated EF 64-28%  Normal diastolic filling. Normal right ventricular size and function. Mild mitral regurgitation. Mild tricuspid regurgitation. Mild pulmonary hypertension with an estimated right ventricular systolic  pressure of 41 mmHg. No pericardial effusion. Signature  ----------------------------------------------------------------------------  Electronically signed by Vesna Mims(Sonographer) on 03/13/2022  12:51 PM  ----------------------------------------------------------------------------    ----------------------------------------------------------------------------  Electronically signed by Vinh Chau(Interpreting physician) on  03/14/2022 09:19 AM  ----------------------------------------------------------------------------  FINDINGS  Left Atrium  Left atrium is normal in size. Left Ventricle  Left ventricle is normal in size. Global left ventricular systolic function  is normal. Calculated EF via Parks's method is 48 %. Mild septal hypertrophy. Normal diastolic filling. Right Atrium  Right atrium is normal in size. Right Ventricle  Normal right ventricular size and function.   Mitral Valve  Normal mitral valve structure. Mild mitral regurgitation. No mitral stenosis. Aortic Valve  Aortic valve structure and function normal.  Aortic valve is trileaflet. No aortic insufficiency. No aortic stenosis. Tricuspid Valve  Normal tricuspid valve leaflets. Mild tricuspid regurgitation. No tricuspid stenosis. Mild pulmonary hypertension with an estimated right ventricular systolic  pressure of 41 mmHg. Pulmonic Valve  Pulmonic valve is normal in structure and function. No pulmonic insufficiency. No evidence of pulmonic stenosis. Pericardial Effusion  No pericardial effusion. Miscellaneous  Normal aortic root dimension. E/E' average = 11.0. IVC normal diameter & inspiratory collapse indicating normal RA filling  pressure .     M-mode / 2D Measurements & Calculations:    LVIDd:4.9 cm(3.7 - 5.6 cm)       Diastolic GAIHGI:49.37 ml  LVIDs:3.8 cm(2.2 - 4.0 cm)       Systolic HRBWOC:10.55 ml  IVSd:1.2 cm(0.6 - 1.1 cm)        Aortic Root:3.3 cm(2.0 - 3.7 cm)  LVPWd:0.8 cm(0.6 - 1.1 cm)       LA Dimension: 2.9 cm(1.9 - 4.0 cm)  Fractional Shortenin.45 %    LA volume/Index: 35.47 ml /19m^2  Calculated LVEF (%): 49.08 %     LVOT:2.2 cm  RVDd:3.5 cm    Mitral:                                 Aortic    Valve Area (P1/2-Time): 2.78 cm^2       Peak Velocity: 1.26 m/s  Peak E-Wave: 0.87 m/s                   Mean Velocity: 0.84 m/s  Peak A-Wave: 0.67 m/s                   Peak Gradient: 6.35 mmHg  E/A Ratio: 1.29                         Mean Gradient: 4 mmHg  Peak Gradient: 2.99 mmHg  Mean Gradient: 1 mmHg  Deceleration Time: 258 msec             Area (continuity): 2.33 cm^2  P1/2t: 79 msec                          AV VTI: 26.9 cm    Area (continuity): 3.32 cm^2  Mean Velocity: 0.49 m/s    Tricuspid:                              Pulmonic:    Estimated RVSP: 41 mmHg                 Peak Velocity: 0.71 m/s  Peak TR Velocity: 3.00 m/s              Peak Gradient: 1.99 mmHg  Peak TR Gradient: 36 mmHg  Estimated RA Pressure: 5 mmHg    Estimated PASP: 41 mmHg    Diastology / Tissue Doppler  Septal Wall E' velocity:0.06 m/s  Septal Wall E/E':14.7  Lateral Wall E' velocity:0.12 m/s  Lateral Wall E/E':7.3      Cardiac Catheterization:   No results found for this or any previous visit. ASSESSMENT AND PLAN     Assessment:    Multivessel coronary artery disease s/p CABG x2    3/21/22  Episode of A. fib postop  Bradycardia  Sinus pause lasting for 4.7 seconds 3/17/2022  COPD stage II on PFTs with severe reduction diffusion capacity  Centrilobular and paraseptal emphysema  Chronic smoker  Cocaine use  Chronic combined diastolic and systolic CHF, EF 95%.  Previously 50-55%  Mild pulmonary hypertension  Right hip arthritis  Essential hypertension  CKD stage III  Leukocytosis    Plan:    I personally interviewed/examined the patient; reviewed interval history, interpreted all available radiographic and laboratory data at the time of service. Wean off Precedex as tolerated  Wean off high flow oxygen as tolerated. High risk for re intubation   We will continue with BiPAP/NIV at night and also during the daytime intermittently alternating with nasal cannula / high flow nasal cannula. He will need high flow nasal cannula off BiPAP  Avoid sedation and narcotics. When he is off BiPAP encourage incentive spirometry deep breathing cough if possible  Has been extubated 3/26/2022 and on nasal cannula maintaining saturation currently. He is currently off Levophed drip   Amiodarone drip switched to p.o. amiodarone today  Continue DuoNeb aerosol. Continue antiplatelet, anticoagulation, statins, and beta blockers as per cardiology/CT surgery. On Plavix, Eliquis and Lipitor  Continue to monitor I/O with a goal of even fluid balance suggest to continue Lasix.   Chest tube management as per CT surgery  Continue pulmonary toilet, aspiration precautions and bronchodilators  Continue to monitor CBC, coagulation profile, transfuse as indicated  Chemical DVT prophylaxis p on Eliquis currently  Antimicrobials reviewed; currently not on antibiotics  Glycemic control appropriate on Lantus  Physical/occupational therapy  Increase ambulation/physical therapy out of bed to chair if possible. Discussed with nursing staff, treatment and plan discussed  Discussed with respiratory therapist           Eusebio Escamilla MD  PGY-3, Internal Medicine Resident  [facility]  3/27/2022 1:59 PM    Please note that this chart was generated using voice recognition Dragon dictation software. Although every effort was made to ensure the accuracy of this automated transcription, some errors in transcription may have occurred. Attending Physician Statement  I have discussed the care of Grant Bishop, including pertinent history and exam findings,  with the resident. I have seen and examined the patient and the key elements of all parts of the encounter have been performed by me. I agree with the assessment, plan and orders as documented by the resident with additions . Sitting up in chair today. Tolerating high flow oxygen since this a.m. Continue diuresis  Continue antiatelectatic measures  Use BiPAP at night and as needed during the day and wean high flow oxygen keeping saturations greater than 92%       Total critical care time caring for this patient with life threatening, unstable organ failure, including direct patient contact, management of life support systems, review of data including imaging and labs, discussions with other team members and physicians at least 27   Min so far today, excluding procedures. Treatment plan Discussed with nursing staff in detail , all questions answered . Electronically signed by Nathalie Condon MD on   3/27/22 at 3:40 PM EDT    Please note that this chart was generated using voice recognition Dragon dictation software.   Although every effort was made to ensure the accuracy of this automated transcription, some errors in transcription may have occurred.

## 2022-03-27 NOTE — PLAN OF CARE
Problem: RESPIRATORY  Intervention: Administer treatments as ordered  Note: BRONCHOSPASM/BRONCHOCONSTRICTION     [x]         IMPROVE AERATION/BREATH SOUNDS  [x]   ADMINISTER BRONCHODILATOR THERAPY AS APPROPRIATE  [x]   ASSESS BREATH SOUNDS  [x]   IMPLEMENT AEROSOL/MDI PROTOCOL  [x]   PATIENT EDUCATION AS NEEDED     Intervention: Provide oxygen therapy  Note:   PROVIDE ADEQUATE OXYGENATION WITH ACCEPTABLE SP02/ABG'S    [x]  IDENTIFY APPROPRIATE OXYGEN THERAPY  [x]   MONITOR SP02/ABG'S AS NEEDED   [x]   PATIENT EDUCATION AS NEEDED     Intervention: PROVIDE CARE REGARDING CPT/SUCTIONING/POSITIONING  Note:   MOBILIZE SECRETIONS    [x]   ASSESS BREATH SOUNDS  [x]   ASSESS SPUTUM PRODUCTION  [x]   COUGH AND DEEP BREATHING  [x]  IMPLEMENT SECRETION MANAGEMENT PROTOCOL  [x]   PATIENT EDUCATION AS NEEDED

## 2022-03-27 NOTE — PROGRESS NOTES
Nephrology Progress Note    Sujective: Follow up for ARABELLA following CABG  Patient seen and examined, up in chair with leg elevated. HiFlo 30LPM at 55%Extubated   Lasix gtt was started yesterday and  titrated up yesterday evening, u/o 700 overnight, and gradually picking up. On 10 mg/hr   Renal function seems to be plateauing  Off pressors   No overnight acute issue    Renal US     1.  Borderline small right kidney but otherwise sonographically unremarkable   appearing kidneys.       2.  Mild to moderate amount of ascites was noted.        Objective:  CURRENT TEMPERATURE:  Temp: 97.4 °F (36.3 °C)  MAXIMUM TEMPERATURE OVER 24HRS:  Temp (24hrs), Av.3 °F (36.3 °C), Min:97.2 °F (36.2 °C), Max:97.4 °F (36.3 °C)    CURRENT RESPIRATORY RATE:  Resp: 25  CURRENT PULSE:  Pulse: 59  CURRENT BLOOD PRESSURE:  BP: 113/76  24HR BLOOD PRESSURE RANGE:  Systolic (75CUO), FSP:314 , Min:70 , GXW:040   ; Diastolic (41HHZ), QFQ:00, Min:50, Max:82    24HR INTAKE/OUTPUT:      Intake/Output Summary (Last 24 hours) at 3/27/2022 1111  Last data filed at 3/27/2022 1035  Gross per 24 hour   Intake 745.68 ml   Output 1375 ml   Net -629.32 ml     Patient Vitals for the past 96 hrs (Last 3 readings):   Weight   22 0559 163 lb 12.8 oz (74.3 kg)     Physical Exam:  General appearance:Awake, alert, in no acute distress  Skin: warm and dry, no rash or erythema  Eyes: conjunctivae normal and sclera anicteric  ENT: :no thrush no pharyngeal congestion    Neck: no obvious JVD  Pulmonary: diminished, +chest tubes, no leak, no crackles  Cardiovascular: Normal S1 & S2,  No S3   Abdomen: soft distended, NT +bs, +ascites, non-tense  Extremities:no cyanosis, clubbing or edema    Labs:   CBC:  Recent Labs     22  0711 22  0341 22  0615   WBC 7.8 10.7 11.2   RBC 3.24* 3.34* 3.46*   HGB 8.9* 9.1* 9.3*   HCT 31.3* 29.4* 30.2*   MCV 96.6 88.0 87.3   MCH 27.5 27.2 26.9   MCHC 28.4 31.0 30.8   RDW 16.3* 16.0* 16.1*    161 192   MPV 12.2 12.0 11.8      BMP:   Recent Labs     03/25/22  2238 03/25/22  2238 03/26/22  0341 03/26/22  1602 03/27/22  0615      < > 141 143 139   K 4.0   < > 4.0 4.0 3.5*      < > 108* 112* 105   CO2 21   < > 17* 18* 19*   BUN 43*  --  46*  --  52*   CREATININE 1.79*  --  1.97*  --  1.88*   GLUCOSE 102*  --  98  --  97   CALCIUM 8.5*  --  8.3*  --  8.6    < > = values in this interval not displayed. Phosphorus:  No results for input(s): PHOS in the last 72 hours. Magnesium:   Recent Labs     03/25/22  0711 03/26/22  0341 03/27/22  0615   MG 2.5 2.6 2.6     Albumin: No results for input(s): LABALBU in the last 72 hours. IRON:  No results found for: IRON  Iron Saturation:  No components found for: PERCENTFE  TIBC:  No results found for: TIBC  FERRITIN:  No results found for: FERRITIN  SPEP:   Lab Results   Component Value Date    PROT 7.1 03/09/2022     UPEP: No results found for: TPU     C3:   Lab Results   Component Value Date    C3 67 (L) 03/26/2022     C4:   Lab Results   Component Value Date    C4 10 03/26/2022     MPO ANCA:  No results found for: MPO .   PR3 ANCA:  No results found for: PR3  Urine Sodium:    Lab Results   Component Value Date    SELENA 20 03/26/2022      Urine Creatinine:    Lab Results   Component Value Date    LABCREA 226.4 03/26/2022     Urine Eosinophils: No results found for: UREO  Urine Protein:  No results found for: TPU  Urine osm : No results found for: OSMOU  Urinalysis:  U/A:   Lab Results   Component Value Date    NITRU NEGATIVE 03/26/2022    COLORU Dark Yellow 03/26/2022    PHUR 5.0 03/26/2022    WBCUA 0 TO 2 03/26/2022    RBCUA 10 TO 20 03/26/2022    MUCUS 1+ 03/26/2022    CLARITYU clear 10/27/2016    SPECGRAV 1.023 03/26/2022    LEUKOCYTESUR TRACE 03/26/2022    UROBILINOGEN Normal 03/26/2022    BILIRUBINUR NEGATIVE  Verified by ictotest. 03/26/2022    BILIRUBINUR neg 10/27/2016    BLOODU neg 10/27/2016    GLUCOSEU NEGATIVE 03/26/2022    KETUA TRACE 03/26/2022 Radiology:  Reviewed as available. Assessment:  1. ARABELLA secondary to ischemic ATN from hypoperfusion (post CABG and arrthymia) - with plateau. Baseline creatinine 1  2. Status post CABG  3. Essential HTN  4. COPD stage II  5. Chronic combined diastolic and systolic CHF, EF 52% previously 50-55%  6. Mild pulmonary HTN  7. Cocaine use, heavy etoh, smoker        Plan:  1. Continue Lasix infusion 10 mg/hr to keep -150 ml/hr   2. Strict I/O daily weights, avoid nephrotoxins. 3. Labs as ordered. Following  Will discuss with Dr. Anna Martinez. Please do not hesitate to call with questions. Electronically signed by TIKI Rojas on 3/27/2022 at 11:11 AM    Nephrology [address] [place] [address]  Attending Physician Statement  I have discussed the care of [facility], including pertinent history and exam findings,  with the CNP. I have reviewed the key elements of all parts of the encounter with the CNP. I agree with the assessment, plan and orders as documented.     Kannan Rodriguez MD MD, [facility] Jalen Blancas), FACP   3/27/2022 1:54 PM    Nephrology [address] [place] [address]

## 2022-03-27 NOTE — PLAN OF CARE
Completed  Goal: No harm/injury to patient while restraints in use  Outcome: Completed  Goal: Patient's dignity will be maintained  Outcome: Completed

## 2022-03-27 NOTE — FLOWSHEET NOTE
Pt refused use of bipap at this time. No complications and no signs of distress. Will continue to monitor.

## 2022-03-27 NOTE — PROGRESS NOTES
Speech Language Pathology  Facility/Department: [facility] CAR 1   CLINICAL BEDSIDE SWALLOW EVALUATION    NAME: Anmol Brooks  : 1955  MRN: [id number]    ADMISSION DATE: 3/11/2022  ADMITTING DIAGNOSIS: has CKD (chronic kidney disease) stage 3, GFR 30-59 ml/min ([facility] [address] Essential hypertension; Arthritis of right hip; Chronic systolic congestive heart failure ([facility] [address] Benign prostatic hyperplasia with urinary obstruction; Pure hypercholesterolemia; Abdominal pain; Chest pain; Chronic combined systolic and diastolic congestive heart failure (HCC); S/P cardiac catheterization; and 3-vessel CAD on their problem list.    Recent Chest Xray/CT of Chest: 3/24/2022    Impression   1.  Lines and tubes as described above.       2.  Increasing right pleural effusion and right basilar airspace disease. Stable left pleural effusion. Date of Eval: 3/27/2022  Evaluating Therapist: GARRY Camargo    Current Diet level:  Current Diet : NPO  Current Liquid Diet : NPO    Primary Complaint  The patient is a 74 y.o. Non- / non  male who presents withChest Pain (PT GIVEN  MG PER LS 2 / PT TOOK 1 NTG AT HOME - pt did cocaine yesterday , ETOH/ smokes cigarettes)   and he is admitted to the hospital for the management of chest pain.     Has prior history of CAD, HFrEF45%, CVA, hypertension, cocaine use. Current episode of chest pain started last night. Pain:  Pain Assessment  Pain Assessment: 0-10  Pain Level: 7    Reason for Referral  Anmol Brooks was referred for a bedside swallow evaluation to assess the efficiency of his swallow function, identify signs and symptoms of aspiration and make recommendations regarding safe dietary consistencies, effective compensatory strategies, and safe eating environment.     Impression  Patient presents with probable safe swallow for Dysphagia soft and bite/sized (Dysphagia III) diet with thin liquids as evidenced by no overt s/s of aspiration noted with consistencies (Dysphagia I); Nectar - straw; Thin - straw     Vision/Hearing  Vision  Vision: Impaired  Vision Exceptions: Wears glasses at all times  Hearing  Hearing: Within functional limits    Oral Motor Deficits  Oral/Motor  Oral Motor: Within functional limits    Oral Phase Dysfunction  Oral Phase  Oral Phase: WFL  Oral Phase  Oral Phase - Comment: +min-mod extended mastication w/ soft solid (pt w/ dentures in however dentures fit loosely, pt offered but politely declined  for dentures for PO trials, reports he will use it during meals). Indicators of Pharyngeal Phase Dysfunction   Pharyngeal Phase  Pharyngeal Phase: WFL  Pharyngeal Phase   Pharyngeal: No overt s/s of aspiration w/ all consistencies tested. Vocal quality clear and consistent t/o.     Prognosis  Prognosis  Prognosis for safe diet advancement: fair  Individuals consulted  Consulted and agree with results and recommendations: Patient;RN    Education  Patient Education: yes  Patient Education Response: Verbalizes understanding        Therapy Time  SLP Individual Minutes  Time In: 2247  Time Out: 7232  Minutes: [address], SLP  3/27/2022 9:28 AM

## 2022-03-28 ENCOUNTER — APPOINTMENT (OUTPATIENT)
Dept: GENERAL RADIOLOGY | Age: 67
DRG: 233 | End: 2022-03-28
Attending: INTERNAL MEDICINE
Payer: MEDICARE

## 2022-03-28 LAB
ABSOLUTE EOS #: 0.25 K/UL (ref 0–0.44)
ABSOLUTE IMMATURE GRANULOCYTE: 0.12 K/UL (ref 0–0.3)
ABSOLUTE LYMPH #: 1.64 K/UL (ref 1.1–3.7)
ABSOLUTE MONO #: 1.34 K/UL (ref 0.1–1.2)
ANION GAP SERPL CALCULATED.3IONS-SCNC: 15 MMOL/L (ref 9–17)
BASOPHILS # BLD: 1 % (ref 0–2)
BASOPHILS ABSOLUTE: 0.05 K/UL (ref 0–0.2)
BUN BLDV-MCNC: 45 MG/DL (ref 8–23)
CALCIUM SERPL-MCNC: 8.2 MG/DL (ref 8.6–10.4)
CHLORIDE BLD-SCNC: 102 MMOL/L (ref 98–107)
CO2: 20 MMOL/L (ref 20–31)
CREAT SERPL-MCNC: 1.64 MG/DL (ref 0.7–1.2)
EOSINOPHILS RELATIVE PERCENT: 2 % (ref 1–4)
GFR AFRICAN AMERICAN: 51 ML/MIN
GFR NON-AFRICAN AMERICAN: 42 ML/MIN
GFR SERPL CREATININE-BSD FRML MDRD: ABNORMAL ML/MIN/{1.73_M2}
GLUCOSE BLD-MCNC: 102 MG/DL (ref 70–99)
GLUCOSE BLD-MCNC: 106 MG/DL (ref 75–110)
GLUCOSE BLD-MCNC: 125 MG/DL (ref 75–110)
HCT VFR BLD CALC: 28.2 % (ref 40.7–50.3)
HEMOGLOBIN: 9 G/DL (ref 13–17)
IMMATURE GRANULOCYTES: 1 %
INR BLD: 1.3
LYMPHOCYTES # BLD: 16 % (ref 24–43)
MAGNESIUM: 2.4 MG/DL (ref 1.6–2.6)
MCH RBC QN AUTO: 27.4 PG (ref 25.2–33.5)
MCHC RBC AUTO-ENTMCNC: 31.9 G/DL (ref 28.4–34.8)
MCV RBC AUTO: 86 FL (ref 82.6–102.9)
MONOCYTES # BLD: 13 % (ref 3–12)
NRBC AUTOMATED: 0.7 PER 100 WBC
PATHOLOGIST: NORMAL
PDW BLD-RTO: 16 % (ref 11.8–14.4)
PLATELET # BLD: 188 K/UL (ref 138–453)
PMV BLD AUTO: 11.7 FL (ref 8.1–13.5)
POTASSIUM SERPL-SCNC: 3.3 MMOL/L (ref 3.7–5.3)
PROTHROMBIN TIME: 13.7 SEC (ref 9.1–12.3)
RBC # BLD: 3.28 M/UL (ref 4.21–5.77)
RBC # BLD: ABNORMAL 10*6/UL
SEG NEUTROPHILS: 68 % (ref 36–65)
SEGMENTED NEUTROPHILS ABSOLUTE COUNT: 7.07 K/UL (ref 1.5–8.1)
SERUM IFX INTERP: NORMAL
SODIUM BLD-SCNC: 137 MMOL/L (ref 135–144)
WBC # BLD: 10.5 K/UL (ref 3.5–11.3)

## 2022-03-28 PROCEDURE — 80048 BASIC METABOLIC PNL TOTAL CA: CPT

## 2022-03-28 PROCEDURE — 6370000000 HC RX 637 (ALT 250 FOR IP): Performed by: THORACIC SURGERY (CARDIOTHORACIC VASCULAR SURGERY)

## 2022-03-28 PROCEDURE — 6370000000 HC RX 637 (ALT 250 FOR IP): Performed by: INTERNAL MEDICINE

## 2022-03-28 PROCEDURE — 99291 CRITICAL CARE FIRST HOUR: CPT | Performed by: INTERNAL MEDICINE

## 2022-03-28 PROCEDURE — 2100000001 HC CVICU R&B

## 2022-03-28 PROCEDURE — 6370000000 HC RX 637 (ALT 250 FOR IP): Performed by: PHYSICIAN ASSISTANT

## 2022-03-28 PROCEDURE — 85025 COMPLETE CBC W/AUTO DIFF WBC: CPT

## 2022-03-28 PROCEDURE — 94640 AIRWAY INHALATION TREATMENT: CPT

## 2022-03-28 PROCEDURE — 97110 THERAPEUTIC EXERCISES: CPT

## 2022-03-28 PROCEDURE — 83735 ASSAY OF MAGNESIUM: CPT

## 2022-03-28 PROCEDURE — 6370000000 HC RX 637 (ALT 250 FOR IP): Performed by: STUDENT IN AN ORGANIZED HEALTH CARE EDUCATION/TRAINING PROGRAM

## 2022-03-28 PROCEDURE — 82947 ASSAY GLUCOSE BLOOD QUANT: CPT

## 2022-03-28 PROCEDURE — 6370000000 HC RX 637 (ALT 250 FOR IP): Performed by: NURSE PRACTITIONER

## 2022-03-28 PROCEDURE — 97530 THERAPEUTIC ACTIVITIES: CPT

## 2022-03-28 PROCEDURE — 99232 SBSQ HOSP IP/OBS MODERATE 35: CPT | Performed by: INTERNAL MEDICINE

## 2022-03-28 PROCEDURE — 2700000000 HC OXYGEN THERAPY PER DAY

## 2022-03-28 PROCEDURE — 94660 CPAP INITIATION&MGMT: CPT

## 2022-03-28 PROCEDURE — 97535 SELF CARE MNGMENT TRAINING: CPT

## 2022-03-28 PROCEDURE — 2500000003 HC RX 250 WO HCPCS: Performed by: NURSE PRACTITIONER

## 2022-03-28 PROCEDURE — 94761 N-INVAS EAR/PLS OXIMETRY MLT: CPT

## 2022-03-28 PROCEDURE — 36415 COLL VENOUS BLD VENIPUNCTURE: CPT

## 2022-03-28 PROCEDURE — 99024 POSTOP FOLLOW-UP VISIT: CPT | Performed by: PHYSICIAN ASSISTANT

## 2022-03-28 PROCEDURE — 2580000003 HC RX 258: Performed by: NURSE PRACTITIONER

## 2022-03-28 PROCEDURE — 71045 X-RAY EXAM CHEST 1 VIEW: CPT

## 2022-03-28 PROCEDURE — 85610 PROTHROMBIN TIME: CPT

## 2022-03-28 RX ORDER — POTASSIUM CHLORIDE 20 MEQ/1
40 TABLET, EXTENDED RELEASE ORAL ONCE
Status: COMPLETED | OUTPATIENT
Start: 2022-03-28 | End: 2022-03-28

## 2022-03-28 RX ORDER — SPIRONOLACTONE 25 MG/1
25 TABLET ORAL DAILY
Status: DISCONTINUED | OUTPATIENT
Start: 2022-03-28 | End: 2022-04-08 | Stop reason: HOSPADM

## 2022-03-28 RX ORDER — MIDODRINE HYDROCHLORIDE 5 MG/1
5 TABLET ORAL
Status: DISCONTINUED | OUTPATIENT
Start: 2022-03-28 | End: 2022-03-28

## 2022-03-28 RX ORDER — MIDODRINE HYDROCHLORIDE 5 MG/1
10 TABLET ORAL
Status: DISCONTINUED | OUTPATIENT
Start: 2022-03-28 | End: 2022-03-29

## 2022-03-28 RX ADMIN — ATORVASTATIN CALCIUM 80 MG: 80 TABLET, FILM COATED ORAL at 21:19

## 2022-03-28 RX ADMIN — Medication 500000 UNITS: at 17:33

## 2022-03-28 RX ADMIN — OXYCODONE HYDROCHLORIDE AND ACETAMINOPHEN 2 TABLET: 5; 325 TABLET ORAL at 21:19

## 2022-03-28 RX ADMIN — POLYETHYLENE GLYCOL 3350 17 G: 17 POWDER, FOR SOLUTION ORAL at 08:39

## 2022-03-28 RX ADMIN — DIPHENHYDRAMINE HCL 25 MG: 25 TABLET ORAL at 21:19

## 2022-03-28 RX ADMIN — STANDARDIZED SENNA CONCENTRATE AND DOCUSATE SODIUM 1 TABLET: 8.6; 5 TABLET ORAL at 21:19

## 2022-03-28 RX ADMIN — POTASSIUM CHLORIDE 40 MEQ: 1500 TABLET, EXTENDED RELEASE ORAL at 06:16

## 2022-03-28 RX ADMIN — MIDODRINE HYDROCHLORIDE 10 MG: 5 TABLET ORAL at 08:39

## 2022-03-28 RX ADMIN — APIXABAN 5 MG: 5 TABLET, FILM COATED ORAL at 21:19

## 2022-03-28 RX ADMIN — SODIUM CHLORIDE, PRESERVATIVE FREE 10 ML: 5 INJECTION INTRAVENOUS at 08:39

## 2022-03-28 RX ADMIN — CLOPIDOGREL 75 MG: 75 TABLET, FILM COATED ORAL at 08:39

## 2022-03-28 RX ADMIN — STANDARDIZED SENNA CONCENTRATE AND DOCUSATE SODIUM 1 TABLET: 8.6; 5 TABLET ORAL at 08:39

## 2022-03-28 RX ADMIN — INSULIN GLARGINE 10 UNITS: 100 INJECTION, SOLUTION SUBCUTANEOUS at 21:19

## 2022-03-28 RX ADMIN — Medication 500000 UNITS: at 08:39

## 2022-03-28 RX ADMIN — TAMSULOSIN HYDROCHLORIDE 0.4 MG: 0.4 CAPSULE ORAL at 08:39

## 2022-03-28 RX ADMIN — APIXABAN 5 MG: 5 TABLET, FILM COATED ORAL at 08:39

## 2022-03-28 RX ADMIN — PANTOPRAZOLE SODIUM 40 MG: 40 TABLET, DELAYED RELEASE ORAL at 08:39

## 2022-03-28 RX ADMIN — METOPROLOL TARTRATE 2.5 MG: 5 INJECTION INTRAVENOUS at 03:18

## 2022-03-28 RX ADMIN — AMIODARONE HYDROCHLORIDE 200 MG: 200 TABLET ORAL at 08:39

## 2022-03-28 RX ADMIN — MIDODRINE HYDROCHLORIDE 10 MG: 5 TABLET ORAL at 17:33

## 2022-03-28 RX ADMIN — IPRATROPIUM BROMIDE AND ALBUTEROL SULFATE 1 AMPULE: .5; 3 SOLUTION RESPIRATORY (INHALATION) at 08:15

## 2022-03-28 ASSESSMENT — ENCOUNTER SYMPTOMS
PHOTOPHOBIA: 0
SINUS PAIN: 0
EYE REDNESS: 0
VOICE CHANGE: 0
VOMITING: 0
ALLERGIC/IMMUNOLOGIC NEGATIVE: 1
DIARRHEA: 0
ABDOMINAL PAIN: 0
SHORTNESS OF BREATH: 1
SORE THROAT: 0
COUGH: 1
TROUBLE SWALLOWING: 0

## 2022-03-28 ASSESSMENT — PAIN SCALES - WONG BAKER: WONGBAKER_NUMERICALRESPONSE: 0

## 2022-03-28 ASSESSMENT — PAIN SCALES - GENERAL
PAINLEVEL_OUTOF10: 0
PAINLEVEL_OUTOF10: 10

## 2022-03-28 NOTE — PROGRESS NOTES
[facility] Cardiothoracic Surgical   Progress Note    3/28/2022 7:36 AM  Surgeon:  Catalina Parr          POD# 7    S/P :  Coronary artery bypass x 2  EF: 50-55 %    Subjective:  Mr. Christophe Bangura well, and has had no acute complaints or problems    Vital Signs: /82   Pulse 80   Temp 97.4 °F (36.3 °C) (Oral)   Resp 19   Ht 6' 3\" (1.905 m)   Wt 163 lb 12.8 oz (74.3 kg)   SpO2 92%   BMI 20.47 kg/m²  O2 Flow Rate (L/min): 30 L/min   Admit Weight: Weight: 141 lb 5 oz (64.1 kg)       Labs and Studies:  CBC: Recent Labs     03/26/22  0341 03/27/22  0615 03/28/22  0439   WBC 10.7 11.2 10.5   HGB 9.1* 9.3* 9.0*   HCT 29.4* 30.2* 28.2*   MCV 88.0 87.3 86.0    192 188     BMP:   Recent Labs     03/27/22  0615 03/27/22  1842 03/28/22  0439    139 137   K 3.5* 3.7 3.3*    106 102   CO2 19* 18* 20   BUN 52* 51* 45*   CREATININE 1.88* 1.90* 1.64*     PT/INR:   Recent Labs     03/26/22 0341 03/27/22 0615 03/28/22  0439   PROTIME 12.9* 12.7* 13.7*   INR 1.2 1.2 1.3     APTT: No results for input(s): APTT in the last 72 hours. I/O:  I/O last 3 completed shifts: In: 641.3 [P.O.:450; I.V.:191.3]  Out: 5085 [Urine:3955;  Chest Tube:1130]    Scheduled Meds:    amiodarone  200 mg Oral Daily    ipratropium-albuterol  1 ampule Inhalation 4x daily    midodrine  10 mg Oral TID WC    apixaban  5 mg Oral BID    polyethylene glycol  17 g Oral BID    nystatin  5 mL Oral 4x Daily    sodium chloride flush  10 mL IntraVENous 2 times per day    clopidogrel  75 mg Oral Daily    sennosides-docusate sodium  1 tablet Oral BID    [Held by provider] metoprolol tartrate  25 mg Oral BID    atorvastatin  80 mg Oral Nightly    pantoprazole  40 mg Oral Daily    insulin glargine  0.15 Units/kg SubCUTAneous Nightly    tamsulosin  0.4 mg Oral Daily     Continuous Infusions:    furosemide (LASIX) 5mg/ml infusion 5 mg/hr (03/28/22 0601)    sodium chloride      norepinephrine 0.02 mcg/kg/min (03/28/22 0601)    EPINEPHrine Stopped (03/24/22 1610)    dextrose      sodium chloride      sodium chloride       Assessment/Plan:   POD # 7, S/P CABG x 2  Neuro intact bland affect baseline  HD on epi no art line Cuff 117   Pulmonary CXR atelectasis. On BiPap management per pulmonary appreciate assistance  GI increase intake   creatine 1.64.    D/C IJ  D/C chest tubes  K 3.3 replenish  Repeat ECHO today  Ambulate      Montgomery Class, PA

## 2022-03-28 NOTE — CARE COORDINATION
Spoke to patient's daughter Martine Tobar for [employer]. She does not have a preference from the three and asked that referrals be made to all of them to see who is able to take her dad. Referrals sent to [facility], [facility] and [facility].   1400 Call from Isaac daniel at [facility]- [address] [facility]. They are out of network with the patient's insurance. [facility]. He has received, will look over the chart and call  tomorrow to check status of upcoming discharge. Fax to [facility] failed, refaxed. Shows completed.

## 2022-03-28 NOTE — PLAN OF CARE
[facility] Chest pain [R07.9]  S/P cardiac catheterization [Z98.890]  3-vessel CAD [I25.10] . Vitals:    03/28/22 0742   BP: 117/75   Pulse: 79   Resp: 21   Temp: 97.7 °F (36.5 °C)   SpO2: 98%   . Patients home meds are   Prior to Admission medications    Medication Sig Start Date End Date Taking? Authorizing Provider   nitroGLYCERIN (NITROSTAT) 0.3 MG SL tablet Place 0.3 mg under the tongue every 5 minutes as needed for Chest pain up to max of 3 total doses. If no relief after 1 dose, call 911.    Yes Historical Provider, MD   ibuprofen (ADVIL;MOTRIN) 800 MG tablet Take 1 tablet by mouth every 8 hours as needed for Pain 4/25/18   Eduar Hare MD   lisinopril (PRINIVIL;ZESTRIL) 10 MG tablet Take 1 tablet by mouth daily 3/12/18   Yadira Ahumada MD   pravastatin (PRAVACHOL) 20 MG tablet Take 1 tablet by mouth every evening 3/11/18   Yadira Ahumada MD   amLODIPine [facility]) 5 MG tablet Take 1 tablet by mouth daily 3/12/18   Yadira Ahumada MD   aspirin 81 MG EC tablet Take 1 tablet by mouth daily 3/12/18   Yadira Ahumada MD   tamsulosin [facility]) 0.4 MG capsule Take 1 capsule by mouth daily 3/12/18   Yadira Ahumada MD   .  Recent Surgical History: Thoracic or Pulmonary Resection = 4     Assessment     Peak Flow (asthma only)    Predicted: 554  Personal Best: NA  PEF   % Predicted   Peak Flow :     FEV1/FVC    FEV1 Predicted 3.70      FEV1 NA    FEV1 % Predicted   FVC   IS volume   IBW 84.5 kg    RR 16  Breath Sounds: clear      Bronchodilator assessment at level  1  Hyperinflation assessment at level   Secretion Management assessment at level      []    Bronchodilator Assessment  BRONCHODILATOR ASSESSMENT SCORE  Score 0 1 2 3 4 5   Breath Sounds   []  Patient Baseline [x]  No Wheeze good aeration []  Faint, scattered wheezing, good aeration []  Expiratory Wheezing and or moderately diminished []  Insp/Exp wheeze and/or very diminished []  Insp/Exp and/ or marked distress   Respiratory Rate   []  Patient Baseline [x]  Less than 20 [x]  Less than 20 []  20-25 []  Greater than 25 []  Greater than 25   Peak flow % of Pred or PB []  NA   []  Greater than 90%  []  81-90% []  71-80% []  Less than or equal to 70%  or unable to perform []  Unable due to Respiratory Distress   Dyspnea re []  Patient Baseline [x]  No SOB [x]  No SOB []  SOB on exertion []  SOB min activity []  At rest/acute   e FEV% Predicted       []  NA []  Above 69%  []  Unable []  Above 60-69%  []  Unable []  Above 50-59%  []  Unable []  Above 35-49%  []  Unable []  Less than 35%  []  Unable                 []  Hyperinflation Assessment  Score 1 2 3   CXR and Breath Sounds   []  Clear []  No atelectasis  Basilar aeration []  Atelectasis or absent basilar breath sounds   Incentive Spirometry Volume  (Per IBW)   []  Greater than or equal to 15ml/Kg []  less than 15ml/Kg []  less than 15ml/Kg   Surgery within last 2 weeks []  None or general   []  Abdominal or thoracic surgery  []  Abdominal or thoracic   Chronic Pulmonary Historyre []  No []  Yes []  Yes     []  Secretion Management Assessment  Score 1 2 3   Bilateral Breath Sounds   []  Occasional Rhonchi []  Scattered Rhonchi []  Course Rhonchi and/or poor aeration   Sputum    []  Small amount of thin secretions []  Moderate amount of viscous secretions []  Copius, Viscious Yellow/ Secretions   CXR as reported by physician []  clear  []  Unavailable []  Infiltrates and/or consolidation  []  Unavailable []  Mucus Plugging and or lobar consolidation  []  Unavailable   Cough []  Strong, productive cough []  Weak productive cough []  No cough or weak non-productive cough   HECTOR VILA RCP  8:21 AM                            FEMALE                                  MALE                            FEV1 Predicted Normal Values                        FEV1 Predicted Normal Values          Age                                     Height in Feet and Inches       Age 73 261 274 289 305 319 334 348 364 73 344 372 400 429 458 [phone] 573   80 253 266 282 296 312 327 342 356 80 335 362 390 419 448 [phone]

## 2022-03-28 NOTE — PLAN OF CARE
Problem: Pain:  Description: Pain management should include both nonpharmacologic and pharmacologic interventions.   Goal: Pain level will decrease  Description: Pain level will decrease  3/28/2022 1745 by Barbara Gomez RN  Outcome: Ongoing  3/28/2022 1745 by Barbara Gomez RN  Outcome: Ongoing  3/28/2022 0415 by Kim Valverde RN  Outcome: Ongoing  Goal: Control of acute pain  Description: Control of acute pain  3/28/2022 1745 by Barbara Gomez RN  Outcome: Ongoing  3/28/2022 1745 by Barbara Gomez RN  Outcome: Ongoing  3/28/2022 0415 by Kim Valverde RN  Outcome: Ongoing  Goal: Control of chronic pain  Description: Control of chronic pain  3/28/2022 1745 by Barbara Gomez RN  Outcome: Ongoing  3/28/2022 1745 by Barbara Gomez RN  Outcome: Ongoing  3/28/2022 0415 by Kim Valverde RN  Outcome: Ongoing     Problem: Skin Integrity:  Goal: Will show no infection signs and symptoms  Description: Will show no infection signs and symptoms  3/28/2022 1745 by Barbara Gomez RN  Outcome: Ongoing  3/28/2022 1745 by Barbara Gomez RN  Outcome: Ongoing  3/28/2022 0415 by Kim Valverde RN  Outcome: Ongoing  Goal: Absence of new skin breakdown  Description: Absence of new skin breakdown  3/28/2022 1745 by Barbara Gomez RN  Outcome: Ongoing  3/28/2022 1745 by Barbara Gomez RN  Outcome: Ongoing  3/28/2022 0415 by Kim Valverde RN  Outcome: Ongoing     Problem: Falls - Risk of:  Goal: Will remain free from falls  Description: Will remain free from falls  3/28/2022 1745 by Barbara Gomez RN  Outcome: Ongoing  3/28/2022 1745 by Barbara Gomez RN  Outcome: Ongoing  3/28/2022 0415 by Kim Valverde RN  Outcome: Ongoing  Goal: Absence of physical injury  Description: Absence of physical injury  3/28/2022 1745 by Barbara Gomez RN  Outcome: Ongoing  3/28/2022 1745 by Barbara Gomez RN  Outcome: Ongoing  3/28/2022 0415 by Jolly Abram, RN  Outcome: Ongoing     Problem: Nutrition  Goal: Optimal nutrition therapy  3/28/2022 1745 by Brenda Cortez RN  Outcome: Ongoing  3/28/2022 1745 by Brenda Cortez RN  Outcome: Ongoing     Problem: Musculor/Skeletal Functional Status  Goal: Highest potential functional level  3/28/2022 1745 by Brenda Cortez RN  Outcome: Ongoing  3/28/2022 1745 by Brenda Cortez RN  Outcome: Ongoing  Goal: Absence of falls  3/28/2022 1745 by Brenda Cortez RN  Outcome: Ongoing  3/28/2022 1745 by Brenda Cortez RN  Outcome: Ongoing     Problem: Cardiac:  Goal: Ability to maintain vital signs within normal range will improve  Description: Ability to maintain vital signs within normal range will improve  3/28/2022 1745 by Brenda Cortez RN  Outcome: Ongoing  3/28/2022 1745 by Brenad Cortez RN  Outcome: Ongoing  Goal: Cardiovascular alteration will improve  Description: Cardiovascular alteration will improve  3/28/2022 1745 by Brenda Cortez RN  Outcome: Ongoing  3/28/2022 1745 by Brenda Cortez RN  Outcome: Ongoing     Problem: Health Behavior:  Goal: Will modify at least one risk factor affecting health status  Description: Will modify at least one risk factor affecting health status  3/28/2022 1745 by Brenda Cortez RN  Outcome: Ongoing  3/28/2022 1745 by Brenda Cortez RN  Outcome: Ongoing  Goal: Identification of resources available to assist in meeting health care needs will improve  Description: Identification of resources available to assist in meeting health care needs will improve  3/28/2022 1745 by Brenda Cortez RN  Outcome: Ongoing  3/28/2022 1745 by Brenda Cortez RN  Outcome: Ongoing     Problem: Physical Regulation:  Goal: Complications related to the disease process, condition or treatment will be avoided or minimized  Description: Complications related to the disease process, condition or treatment will be avoided or minimized  3/28/2022 1745 by Brenda Cortez RN  Outcome: Ongoing  3/28/2022 1745 by Brenda Cortez RN  Outcome: Ongoing     Problem: Non-Violent Restraints  Goal: Removal from restraints as soon as assessed to be safe  Outcome: Ongoing  Goal: No harm/injury to patient while restraints in use  Outcome: Ongoing  Goal: Patient's dignity will be maintained  Outcome: Ongoing

## 2022-03-28 NOTE — PROGRESS NOTES
3/21/2022). Restrictions  Restrictions/Precautions  Restrictions/Precautions: Fall Risk,General Precautions,Cardiac  Required Braces or Orthoses?: Yes  Implants present? : Metal implants (left hip)  Required Braces or Orthoses  Other: Heart Hugger Brace  Position Activity Restriction  Sternal Precautions: 5# Lifting Restrictions,No Pushing,No Pulling  Other position/activity restrictions: Amb pt, up in chair for meals, chest tubes, hi-flow entire session  Subjective   General  Chart Reviewed: Yes  Response To Previous Treatment: Patient with no complaints from previous session. Family / Caregiver Present: Yes  Subjective  Subjective: Pt semi fowlers in bed, flat affect, RN and pt aggreable to PT/OT treatment, pt educated on sternal precautions, heart hugger donned, Hiflo nasal cannula donned. General Comment  Comments: Pt retired to bedside recliner, handed callight  Pain Screening  Patient Currently in Pain: Denies  Vital Signs  Patient Currently in Pain: Denies       Orientation  Orientation  Overall Orientation Status: Within Functional Limits  Cognition   Cognition  Overall Cognitive Status: WFL  Following Commands: Follows multistep commands with repitition; Follows multistep commands with increased time  Safety Judgement: Decreased awareness of need for assistance;Decreased awareness of need for safety  Insights: Decreased awareness of deficits  Initiation: Requires cues for some  Sequencing: Requires cues for some  Cognition Comment: Flat affect, not complisnt with verbal cues for hand placement, sternal , precautions, coughing etc, up to Max verbal and tactcle cues with poor return  Objective   Bed mobility  Supine to Sit: Maximum assistance;2 Person assistance  Sit to Supine: Unable to assess  Scooting: Maximal assistance  Comment: HOB raised, Max assist x2 for BLEs and trunk progression, right sided sway/lean when sitting EOB  Transfers  Sit to Stand: 2 Person Assistance; Moderate Assistance  Stand to sit: 2 Person Assistance; Moderate Assistance  Stand Pivot Transfers: Maximum Assistance;2 Person Assistance (utalized SS)  Comment: Increased cueing for BUE placement during transfers, stood ~30 sec, continual cues to tuck in trunk, and to use heart hugger, SS utalized for tx and 1 stand, RW for 1 stand  Ambulation  Ambulation?: No (SS utalized DT B LE weakness)     Balance  Posture: Good  Sitting - Static: Fair;-  Sitting - Dynamic: Poor  Standing - Static: Fair  Standing - Dynamic: Poor  Comments: RW and SS for standing balance, pt CGA and SBA when seated R Lat lean  Exercises  Core Strengthening: Pt sat EOB ~20 mins , R sided lean, states \"thats how he is\"  Comments: Pt performed supine ther ex with assist and active seated ther ex, flat affect, increased cues required duriung ther ex  Other exercises  Other exercises?: Yes  Other exercises 1: Seated LE exercise program: Long Arc Quads, hip abduction/adduction, heel/toe raises  Supine Exercises: Ankle Pumps, Gluteal sets, , Heel Slides, Hip ABD/ADD, Hip IR/ER,Reps: 15 w/  Active assist        AM-PAC Score  AM-PAC Inpatient Mobility Raw Score : 11 (03/28/22 1038)  AM-PAC Inpatient T-Scale Score : 33.86 (03/28/22 1038)  Mobility Inpatient CMS 0-100% Score: 72.57 (03/28/22 1038)  Mobility Inpatient CMS G-Code Modifier : CL (03/28/22 1038)          Goals  Short term goals  Time Frame for Short term goals: 14 days  Short term goal 1: Pt to transfer sit to stand with min A +1. Short term goal 2: Pt to ambulate 75ft with RW and no LOB. Short term goal 3: Pt to demo good standing balance for decrease fall risk. Short term goal 4: Pt to tolerate 20-30 mins ther ex/act for improved strength and endurance.   Patient Goals   Patient goals : get stronger    Plan    Plan  Times per week: 7x week  Times per day:  (1-2 x day)  Specific instructions for Next Treatment: endurance, Txs to chair, ambulation  Current Treatment Recommendations: Klaus Boards Training,Endurance Training,Functional Mobility Training,Transfer Training,Gait Training,Stair training,Neuromuscular Re-education,Home Exercise Program,Safety Education & Training  Safety Devices  Type of devices:  All fall risk precautions in place,Chair alarm in place,Call light within reach,Gait belt  Restraints  Initially in place: No     Therapy Time   Individual Concurrent Group Co-treatment   Time In 0905         Time Out 1006         Minutes 61         Timed Code Treatment Minutes: 45 Minutes ([place] with OT)       Mariela Mireles, PTA

## 2022-03-28 NOTE — PROGRESS NOTES
[facility]  Progress Note                   Date:   3/28/2022  Patient name: Pat Conrad  Date of admission:  3/11/2022  7:15 PM  MRN:   [id number]  YOB: 1955  PCP: Keyona Pryor MD    Reason for Admission: Chest pain [R07.9]  S/P cardiac catheterization [Z98.890]  3-vessel CAD [I25.10]    Subjective:       Clinical Changes /Abnormalities:   POD7 CABG. Extubated 03/24.   On low dose levo 0.02 and lasix gtt at 5   Sinus rhythm  HiFlo NIPPV      Medications:   Scheduled Meds:   amiodarone  200 mg Oral Daily    ipratropium-albuterol  1 ampule Inhalation 4x daily    midodrine  10 mg Oral TID WC    apixaban  5 mg Oral BID    polyethylene glycol  17 g Oral BID    nystatin  5 mL Oral 4x Daily    sodium chloride flush  10 mL IntraVENous 2 times per day    clopidogrel  75 mg Oral Daily    sennosides-docusate sodium  1 tablet Oral BID    [Held by provider] metoprolol tartrate  25 mg Oral BID    atorvastatin  80 mg Oral Nightly    pantoprazole  40 mg Oral Daily    insulin glargine  0.15 Units/kg SubCUTAneous Nightly    tamsulosin  0.4 mg Oral Daily     Continuous Infusions:   furosemide (LASIX) 5mg/ml infusion 5 mg/hr (03/28/22 0601)    sodium chloride      norepinephrine 0.02 mcg/kg/min (03/28/22 0601)    EPINEPHrine Stopped (03/24/22 1610)    dextrose      sodium chloride      sodium chloride       CBC:   Recent Labs     03/26/22  0341 03/27/22  0615 03/28/22  0439   WBC 10.7 11.2 10.5   HGB 9.1* 9.3* 9.0*    192 188     BMP:    Recent Labs     03/27/22  0615 03/27/22  1842 03/28/22  0439    139 137   K 3.5* 3.7 3.3*    106 102   CO2 19* 18* 20   BUN 52* 51* 45*   CREATININE 1.88* 1.90* 1.64*   GLUCOSE 97 112* 102*     INR:   Recent Labs     03/26/22  0341 03/27/22  0615 03/28/22  0439   INR 1.2 1.2 1.3         3/11/2022- cardiac cath     Findings:   Angiographic Findings        Cardiac Arteries and Lesion Findings       LMCA: has heavy calcification with mid 60% stenosis. LAD: has heavy calcification in the proximal segment with 40% stenosis. The   mid segment has 85% stenosis. LCx: has heavy calcification with ostial 99% stenosis. The OM1 has 100%   occlusion with left to left collaterals. The OM2 has 100% occlusion with   left to left collaterals. The LPDA is large and is normal.     RCA: is a non-dominant vessel and has proximal 90% stenosis. Coronary Tree        Dominance: Right       LV Analysis   LV function assessed as:Abnormal.   Ejection Fraction   +----------------------------------------------------------------------+---+   ! Method                                                                ! EF%! +----------------------------------------------------------------------+---+   ! LV gram                                                               !30 !   +----------------------------------------------------------------------+---+       Procedure Summary        Three vessel disease with left main involvement. Moderate LV systolic dysfunction. LVEF 30%. Recommendations        Medical therapy as needed. Risk factor modification. CABG evaluation by CT surgery. Nuclear Stress 3/10/22:  Impression       Significant stress-induced ischemia in the lateral wall and septum. Infarct in the apex encroaching into the apical inferior wall and also in the   basal inferior wall. LVEF 34%       Risk stratification: High risk. Echo 3/13//2022     Summary  Global left ventricular systolic function is normal. Estimated EF 97-06%  Normal diastolic filling. Normal right ventricular size and function. Mild mitral regurgitation. Mild tricuspid regurgitation. Mild pulmonary hypertension with an estimated right ventricular systolic  pressure of 41 mmHg. No pericardial effusion.      Signature  ----------------------------------------------------------------------------   Electronically signed by Lesly Mooney, Vesna(Sonographer) on 03/13/2022   12:51 PM  ----------------------------------------------------------------------------     ----------------------------------------------------------------------------   Electronically signed by Vinh Chau(Interpreting physician) on   03/14/2022 09:19 AM    Objective:   Vitals: /82   Pulse 80   Temp 97.4 °F (36.3 °C) (Oral)   Resp 19   Ht 6' 3\" (1.905 m)   Wt 163 lb 12.8 oz (74.3 kg)   SpO2 92%   BMI 20.47 kg/m²   General appearance: alert and cooperative with exam  HEENT: Head: Normocephalic, no lesions, without obvious abnormality. Neck:no JVD, trachea midline, no adenopathy  Lungs: Clear to auscultation throughout. RA without distress  Heart: Regular rate and rhythm, s1/s2 auscultated, no murmurs. SB   Abdomen: soft, non-tender, bowel sounds active  Extremities: no edema  Neurologic: not done        Assessment / Acute Cardiac Problems:   - Chest pain and Abnormal stress test 3/10/22- ischemia in lateral and septal walls  - Preserved EF on echo  - MV CAD s/p CABGx2 on 3/21/22 SLIMA to LAD, RSVG1 to OM, confirmed NO PDA (no RPDA or LPDA, diminutive non dominant RCA)   -1 episode of A. fib postop. Currently in NSR. Continue amiodarone p.o. Will do once a day due to bradycardia.  - HTN  - DL  - ARABELLA  - cocaine use      Plan of Treatment:   1. In sinus rhythm. Continue po amio. 2. Monitor H/H. Hb stable for now. 3. Renal function improving. On lasix gtt. -4L since admit. Nephro on board. 4. Wean HiFlo. 5. Continue eliquis and plavix, statin. 6. Wean levo as tolerated. On midodrine 10 mg td. Resume BB when off pressors. 7. Post op surgical care per CTS. Attending note,   Patient was seen and examined agree with above. Still feels short of breath. Continue diuresis with Lasix. PT OT. Continue current medications and routine post surgery orders.

## 2022-03-28 NOTE — PROGRESS NOTES
Renal Progress Note    Patient :  Shanice Hernandez; 74 y.o. MRN# [id number]  Location:  1007/1007-01  Attending:  Albert Amaya MD  Admit Date:  3/11/2022   Hospital Day: 17      Subjective:     Oral intake better. Is doing a good job with protein shakes. Protein supplements added to his diet yesterday. Urine output excellent exceeding 400 mL an hour with Lasix infusion. Hemodynamically stable has not required any ProAmatine. Creatinine down to 1.2 improved renal function with offloading the left ventricle. Ischemic ATN resolving as well. Hypokalemia persists being replaced. Patient somewhat uncooperative today if using medications, also resistant to examination. Which seems to be inaccurate, has gone up by 7 kg even though he is a negative fluid balance of almost 7 L  Intermittent confusion persists. Patient is status post coronary bypass graft 3/21/22, subsequently developed ischemic ATN with creatinine peaking about 1.9. Baseline creatinine normal.  Outpatient Medications:     Medications Prior to Admission: nitroGLYCERIN (NITROSTAT) 0.3 MG SL tablet, Place 0.3 mg under the tongue every 5 minutes as needed for Chest pain up to max of 3 total doses.  If no relief after 1 dose, call 911.  ibuprofen (ADVIL;MOTRIN) 800 MG tablet, Take 1 tablet by mouth every 8 hours as needed for Pain  lisinopril (PRINIVIL;ZESTRIL) 10 MG tablet, Take 1 tablet by mouth daily  pravastatin (PRAVACHOL) 20 MG tablet, Take 1 tablet by mouth every evening  amLODIPine (NORVASC) 5 MG tablet, Take 1 tablet by mouth daily  aspirin 81 MG EC tablet, Take 1 tablet by mouth daily  tamsulosin (FLOMAX) 0.4 MG capsule, Take 1 capsule by mouth daily  [DISCONTINUED] acetaminophen (TYLENOL 8 HOUR) 650 MG extended release tablet, Take 1 tablet by mouth every 8 hours as needed for Pain    Current Medications:     Scheduled Meds:    spironolactone  25 mg Oral Daily    midodrine  5 mg Oral TID WC    amiodarone  200 mg Oral Daily    apixaban  5 mg Oral BID    polyethylene glycol  17 g Oral BID    nystatin  5 mL Oral 4x Daily    sodium chloride flush  10 mL IntraVENous 2 times per day    clopidogrel  75 mg Oral Daily    sennosides-docusate sodium  1 tablet Oral BID    [Held by provider] metoprolol tartrate  25 mg Oral BID    atorvastatin  80 mg Oral Nightly    pantoprazole  40 mg Oral Daily    insulin glargine  0.15 Units/kg SubCUTAneous Nightly    tamsulosin  0.4 mg Oral Daily     Continuous Infusions:    furosemide (LASIX) 5mg/ml infusion 5 mg/hr (22 06)    sodium chloride      norepinephrine 0.02 mcg/kg/min (22 06)    EPINEPHrine Stopped (22 1610)    dextrose      sodium chloride      sodium chloride       PRN Meds:  LORazepam, albuterol, bisacodyl, sodium chloride flush, sodium chloride, ondansetron **OR** ondansetron, oxyCODONE-acetaminophen **OR** oxyCODONE-acetaminophen, fentanNYL **OR** fentanNYL, hydrALAZINE, metoprolol, diphenhydrAMINE, potassium chloride, magnesium sulfate, albumin human, norepinephrine, EPINEPHrine, glucose, dextrose, glucagon (rDNA), dextrose, sodium chloride, sodium chloride    Input/Output:       I/O last 3 completed shifts: In: 641.3 [P.O.:450; I.V.:191.3]  Out: 5085 [Urine:3955; Chest Tube:1130]. No data found. Vital Signs:   Temperature:  Temp: 97.7 °F (36.5 °C)  TMax:   Temp (24hrs), Av.7 °F (36.5 °C), Min:97.3 °F (36.3 °C), Max:98 °F (36.7 °C)    Respirations:  Resp: 22  Pulse:   Pulse: 86  BP:    BP: 105/65  BP Range: Systolic (40QZT), RSO:652 , Min:84 , LAYTON:871       Diastolic (61DZM), VMK:75, Min:58, Max:86      Physical Examination:     General:  Awake, follows commands, somewhat irritable, speaking in full sentences, no accessory muscle use. HEENT: Atraumatic, normocephalic, no throat congestion, moist mucosa. Eyes:   Pupils equal, round and reactive to light, EOMI. Neck:   No JVD, no thyromegaly, no lymphadenopathy.   Chest:  Bilateral vesicular breath sounds, basal crackles, cardiac:  S1 S2 RR, no murmurs, gallops or rubs, JVP not raised. Abdomen: Soft, non-tender, no masses or organomegaly, BS audible. :   No suprapubic or flank tenderness. Neuro:  AAO x 3, No FND. SKIN:  No rashes, good skin turgor. Extremities:  No edema, palpable peripheral pulses, no calf tenderness. Labs:       Recent Labs     03/26/22  0341 03/27/22  0615 03/28/22  0439   WBC 10.7 11.2 10.5   RBC 3.34* 3.46* 3.28*   HGB 9.1* 9.3* 9.0*   HCT 29.4* 30.2* 28.2*   MCV 88.0 87.3 86.0   MCH 27.2 26.9 27.4   MCHC 31.0 30.8 31.9   RDW 16.0* 16.1* 16.0*    192 188   MPV 12.0 11.8 11.7      BMP:   Recent Labs     03/27/22  0615 03/27/22  1842 03/28/22  0439    139 137   K 3.5* 3.7 3.3*    106 102   CO2 19* 18* 20   BUN 52* 51* 45*   CREATININE 1.88* 1.90* 1.64*   GLUCOSE 97 112* 102*   CALCIUM 8.6 8.3* 8.2*      Phosphorus:   No results for input(s): PHOS in the last 72 hours. Magnesium:    Recent Labs     03/26/22  0341 03/27/22  0615 03/28/22  0439   MG 2.6 2.6 2.4     Albumin:  No results for input(s): LABALBU in the last 72 hours.   BNP:      Lab Results   Component Value Date    BNP 70 09/13/2013     ANDREIA:    No results found for: ANDREIA  SPEP:  Lab Results   Component Value Date    PROT 7.1 03/09/2022     UPEP:   No results found for: LABPE  C3:     Lab Results   Component Value Date    C3 67 03/26/2022     C4:     Lab Results   Component Value Date    C4 10 03/26/2022     MPO ANCA:   No results found for: MPO  PR3 ANCA:   No results found for: PR3  Anti-GBM:   No results found for: GBMABIGG  Hep BsAg:         Lab Results   Component Value Date    HEPBSAG NONREACTIVE 03/26/2022     Hep C AB:          Lab Results   Component Value Date    HEPCAB NONREACTIVE 03/26/2022       Urinalysis/Chemistries:      Lab Results   Component Value Date    NITRU NEGATIVE 03/26/2022    COLORU Dark Yellow 03/26/2022    PHUR 5.0 03/26/2022    WBCUA 0 TO 2 03/26/2022    RBCUA 10 TO 20 03/26/2022 MUCUS 1+ 03/26/2022    CLARITYU clear 10/27/2016    SPECGRAV 1.023 03/26/2022    LEUKOCYTESUR TRACE 03/26/2022    UROBILINOGEN Normal 03/26/2022    BILIRUBINUR NEGATIVE  Verified by ictotest. 03/26/2022    BILIRUBINUR neg 10/27/2016    BLOODU neg 10/27/2016    GLUCOSEU NEGATIVE 03/26/2022    KETUA TRACE 03/26/2022     Urine Sodium:     Lab Results   Component Value Date    SELENA 20 03/26/2022     Urine Potassium:  No results found for: KUR  Urine Chloride:  No results found for: CLUR  Urine Osmolarity: No results found for: OSMOU  Urine Protein:   No components found for: TOTALPROTEIN, URINE   Urine Creatinine:     Lab Results   Component Value Date    LABCREA 226.4 03/26/2022     Urine Eosinophils:  No components found for: UEOS    Radiology:     CXR:     Assessment:     1. Acute Kidney Injury: Secondary to ischemic ATN from hypoperfusion post coronary bypass graft and arrhythmia post procedure. Creatinine plateaued at 1.9 now resolving down to 1.2, baseline 1.0  2. Decompensated left heart failure post coronary bypass graft and acute kidney injury resolving, preserved ejection fraction  3. Status post coronary bypass graft preserved ejection fraction  4. Pulmonary hypertension  5. History of substance abuse and heavy alcohol use    Plan:   1. Discontinue Lasix infusion, start Lasix 40 mg IV 3 times a day   2. Replace potassium, intravenously as patient is refusing oral potassium supplements  3. Continue Aldactone 25 daily  4. Continue ProAmatine to 5, 3 times daily and hold for systolic of more than 885  5. Follow renal function    Nutrition   Please ensure that patient is on a renal diet/TF. Avoid nephrotoxic drugs/contrast exposure. We will continue to follow along with you.

## 2022-03-28 NOTE — PROGRESS NOTES
PULMONARY & CRITICAL CARE MEDICINE PROGRESS NOTE     Patient:  Amador Spence  MRN: [id number]  Admit date: 3/11/2022  Primary Care Physician: Tracy Robert MD  Consulting Physician: Ernestine Hayden MD  CODE Status: Full Code  LOS: 17    SUBJECTIVE     CHIEF COMPLAINT/REASON FOR INITIAL CONSULT:    COPD/chronic cough/preoperative evaluation    BRIEF HOSPITAL COURSE:  The patient is a 74 y.o. male history of hypertension, history of mild systolic dysfunction admitted with chest pain initially to [facility] on 03/09/2022 non-ST elevation MI ruled out by troponins in 20s had a stress test done because of high suspicion which shows ischemia of lateral septal wall with ejection fraction of 34%.  He does have history of cocaine use and apparently he had used cocaine before admission to [facility].  He was transferred to [facility] for cardiac catheterization which shows multivessel coronary artery disease with EF of 30% including left main disease.  He remains on a statin and heparin.  CT surgery was consulted for CABG.    Patient had pulmonary function test done which shows FEV1 of 46% with significance positive bronchodilator FEV1 of 56% with severe reduction in diffusion capacity of less than 30% with air trapping/hyperinflation. CT scan of the chest shows areas of blebs and bulla medially and centrilobular and paraseptal emphysema on CT scan. Patient does complain of shortness of breath on mild activity and exertion.  He does have chronic cough without no change denies increased wheezing currently denies chest pain.  He has history of cocaine use.  Does have history of smoking patient is not able to tell me that how many cigarettes he used to smoke but he claimed that now he smoke 1 pack/week and had been smoking for almost 50 years. INTERVAL HISTORY:  03/28/22  Patient remained in cardiovascular ICU seen in cardiac ICU this morning.   Overnight he is afebrile T-max of 98 he is hemodynamically stable with systolic blood pressure mostly above 110 heart rate is in 70s to 80s and he is mildly tachypneic with respiratory rate of low to mid 20s. He remains on high flow nasal cannula 30 L and 50% unable to wean down saturating 92% and above on high flow. He is on BiPAP at night BiPAP setting 12/6/50 percent. He is on low-dose of Levophed drip. He remains very weak arousable when I saw him unable to sit up himself but did so with physical therapy. He is on Lasix drip 5 mg an hour currently per nephrology urine output is 3065 in last 24 hours    Creatinine is 1.64 BUN is 45 bicarbonate 20 sodium 137 potassium 3.3 WBC is 10.5 hemoglobin 9 and platelet count is 890. Chest x-ray 03/28/2022 shows chest tubes in place possible left pleural effusion/bibasilar atelectasis not much change in chest x-ray from 03/27/2022. REVIEW OF SYSTEMS:    Review of Systems   Constitutional: Positive for activity change, appetite change and fatigue. HENT: Negative for postnasal drip, sinus pain, sore throat, trouble swallowing and voice change. Eyes: Negative for photophobia, redness and visual disturbance. Respiratory: Positive for cough and shortness of breath. Cardiovascular: Positive for chest pain. Negative for leg swelling. Gastrointestinal: Negative for abdominal pain, diarrhea and vomiting. Endocrine: Negative. Genitourinary: Positive for frequency. Negative for dysuria and hematuria. Musculoskeletal: Negative. Allergic/Immunologic: Negative. Neurological: Negative for dizziness, syncope, speech difficulty and headaches. Hematological: Negative for adenopathy. Does not bruise/bleed easily. Psychiatric/Behavioral: Negative.           OBJECTIVE     Ventilator Settings:  Vent Information  $Ventilation: $Subsequent Day  Skin Assessment: Clean, dry, & intact  Equipment ID:  ([email or web address])  Equipment Changed: HME  Vent Type: Servo i  Vent Mode: (S) CPAP (SBT)  Vt Ordered: 600 Medications: Current Inpatient  Scheduled Meds:   amiodarone  200 mg Oral Daily    midodrine  10 mg Oral TID WC    apixaban  5 mg Oral BID    polyethylene glycol  17 g Oral BID    nystatin  5 mL Oral 4x Daily    sodium chloride flush  10 mL IntraVENous 2 times per day    clopidogrel  75 mg Oral Daily    sennosides-docusate sodium  1 tablet Oral BID    [Held by provider] metoprolol tartrate  25 mg Oral BID    atorvastatin  80 mg Oral Nightly    pantoprazole  40 mg Oral Daily    insulin glargine  0.15 Units/kg SubCUTAneous Nightly    tamsulosin  0.4 mg Oral Daily     Continuous Infusions:   furosemide (LASIX) 5mg/ml infusion 5 mg/hr (03/28/22 0601)    sodium chloride      norepinephrine 0.02 mcg/kg/min (03/28/22 0601)    EPINEPHrine Stopped (03/24/22 1610)    dextrose      sodium chloride      sodium chloride       INPUT/OUTPUT:  In: 543.8 [P.O.:450; I.V.:93.8]  Out: 9998 [Urine:3065]  Date 03/28/22 0000 - 03/28/22 2359   Shift 2199-7333 0894-6332 3529-2977 24 Hour Total   INTAKE   P.O.(mL/kg/hr) 450(0.8)   450   I. V.(mL/kg) 17.1(0.2)   17.1(0.2)   Shift Total(mL/kg) 467.1(6.3)   467.1(6.3)   OUTPUT   Urine(mL/kg/hr) 1175(2)   1175   Chest Tube 170   170   Shift Total(mL/kg) 1345(18.1)   1345(18.1)   Weight (kg) 74.3 74.3 74.3 74.3       LABS:-  ABG:   No results for input(s): POCPH, POCPCO2, POCPO2, POCHCO3, WDXP7QMS in the last 72 hours.   CBC:   Recent Labs     03/26/22  0341 03/27/22  0615 03/28/22  0439   WBC 10.7 11.2 10.5   HGB 9.1* 9.3* 9.0*   HCT 29.4* 30.2* 28.2*   MCV 88.0 87.3 86.0    192 188   LYMPHOPCT  --   --  16*   RBC 3.34* 3.46* 3.28*   MCH 27.2 26.9 27.4   MCHC 31.0 30.8 31.9   RDW 16.0* 16.1* 16.0*     BMP:   Recent Labs     03/27/22  0615 03/27/22  1842 03/28/22  0439    139 137   K 3.5* 3.7 3.3*    106 102   CO2 19* 18* 20   BUN 52* 51* 45*   CREATININE 1.88* 1.90* 1.64*   GLUCOSE 97 112* 102*     Liver Function Test:   No results for input(s): PROT, LABALBU, ALT, AST, GGT, ALKPHOS, BILITOT in the last 72 hours. Amylase/Lipase:  No results for input(s): AMYLASE, LIPASE in the last 72 hours. Coagulation Profile:   Recent Labs     03/26/22  0341 03/27/22  0615 03/28/22  0439   INR 1.2 1.2 1.3   PROTIME 12.9* 12.7* 13.7*     Cardiac Enzymes:  No results for input(s): CKTOTAL, CKMB, CKMBINDEX, TROPONINI in the last 72 hours. Lactic Acid:  Lab Results   Component Value Date    LACTA NOT REPORTED 03/11/2018    LACTA NOT REPORTED 03/10/2018    LACTA NOT REPORTED 03/10/2018     BNP:   Lab Results   Component Value Date    BNP 70 09/13/2013     D-Dimer:  No results found for: DDIMER  Others:   No results found for: TSH, O7WUHQD, T1IHARQ, THYROIDAB, FT3, T4FREE  Lab Results   Component Value Date    SEDRATE 20 (H) 05/21/2014    CRP 15.2 (H) 05/21/2014     No results found for: Maddie Cadet  No results found for: IRON, TIBC, FERRITIN  No results found for: SPEP, UPEP  Lab Results   Component Value Date    PSA 1.11 11/04/2016       Microbiology:  No results for input(s): SPECDESC, SPECDESC, SPECIAL, CULTURE, CULTURE, STATUS, ORG, CDIFFTOXPCR, CAMPYLOBPCR, SALMONELLAPC, SHIGAPCR, SHIGELLAPCR, MPNEUG, MPNEUM, LACTOQL in the last 72 hours. Pathology:    Radiology Reports:  XR CHEST PORTABLE   Final Result   Multiple tubes lines as above. Continued left effusion and basilar   atelectasis with vascular congestion. No extrapleural air. XR CHEST PORTABLE   Preliminary Result   1. Stable positions of support apparatus. 2. Stable bibasilar airspace disease, atelectasis favored over pneumonia. 3. Stable small bilateral pleural effusions. US RENAL COMPLETE   Final Result      1. Borderline small right kidney but otherwise sonographically unremarkable   appearing kidneys. 2.  Mild to moderate amount of ascites was noted. XR CHEST PORTABLE   Final Result   Bibasilar atelectasis and pleural effusion more pronounced on the left.   No significant change. Stable support tubes and line. XR CHEST PORTABLE   Final Result   Overall improvement seen in the aeration lung bases. The remainder of lines   and tubes in stool are stable. No definite pneumothorax on the left. XR CHEST PORTABLE   Final Result   1. Lines and tubes as described above. 2.  Increasing right pleural effusion and right basilar airspace disease. Stable left pleural effusion. XR CHEST PORTABLE   Final Result   1. Support lines and tubes remain in place. 2.  Bibasilar opacities persist, slightly increased on the right, suggesting   layering pleural effusions and atelectasis. 3.  Mildly increased vascular congestion, possibly accentuated by supine   positioning. XR CHEST PORTABLE   Final Result   Stable chest (suspect right pleural effusion in addition to left effusion   and/or atelectasis) with line and tube placements as detailed above. XR CHEST PORTABLE   Final Result   As compared to prior examination, the ET tube now is in satisfactory   position. Examination otherwise is unchanged. XR CHEST PORTABLE   Final Result   1. Endotracheal tube is low in position, approximately 1 cm above the   veronique. This should be pulled back approximately 3 cm. 2.  Other lines and tubes are unchanged in position. 3.  New small right pleural effusion. 4.  Unchanged left basilar opacity. XR CHEST PORTABLE   Final Result   1. Lines and tubes are unchanged in position. 2. Linear bibasilar opacities are similar prior study, atelectasis versus   pneumonia. XR CHEST PORTABLE   Final Result   All tubes and catheters are in good position. No evidence for pneumothorax. Bilateral basal atelectasis noted.          VL DUP UPPER EXTREMITY ARTERIES BILATERAL   Final Result      VL Vein Mapping Lower Bilateral   Final Result      VL DUP CAROTID BILATERAL   Final Result      CT CHEST WO CONTRAST   Final Result   1. Extensive centrilobular and paraseptal emphysema. 2. Extensive atherosclerotic disease, particularly along the coronary   arteries. 3. There are areas of atelectasis in the lingula. There is collapse of the   right middle lobe   4. No focal lung infiltrate. XR CHEST PORTABLE    (Results Pending)       Echocardiogram:   Results for orders placed during the hospital encounter of 03/11/22    Echocardiogram Complete 2D w Doppler w Color    Narrative  Transthoracic Echocardiography Report (TTE)    Patient Name Michael Roblero       Date of Study           03/13/2022  [facility]    Date of      1955  Gender                  Male  Birth    Age          74 year(s)  Race                    Black    Room Number  2107        Height:                 75 inch, 190.5 cm    Corporate ID [id number]    Weight:                 136 pounds, 61.7 kg  #    Patient Acct [de-identified]   BSA:        1.86 m^2    BMI:        17 kg/m^2  #    MR #         [id number]      Sonographer             Latoya Rickiens    Accession #  [id number]  Interpreting Physician  Vinh Chau    Fellow                   Referring Nurse  Practitioner    Interpreting             Referring Physician     Froylan Ramirez. Charlotte Elkins,  Fellow                                           APRN-NP    Type of Study    TTE procedure:2D Echocardiogram, M-Mode, Doppler, Color Doppler. Procedure Date  Date: 03/13/2022 Start: 12:17 PM    Study Location: [facility]  Technical Quality: Fair visualization    Indications:Multi vessel CAD and Pre-Op CABG. History / Tech. Comments:  Procedure explained to patient. Study done at the bedside. STAT call in    Patient Status: Inpatient    Height: 75 inches Weight: 136 pounds BSA: 1.86 m^2 BMI: 17 kg/m^2    Allergies  - *No Known Allergies. CONCLUSIONS    Summary  Global left ventricular systolic function is normal. Estimated EF 79-29%  Normal diastolic filling.   Normal right ventricular size and function. Mild mitral regurgitation. Mild tricuspid regurgitation. Mild pulmonary hypertension with an estimated right ventricular systolic  pressure of 41 mmHg. No pericardial effusion. Signature  ----------------------------------------------------------------------------  Electronically signed by Vesna Garcia(Sonographer) on 03/13/2022  12:51 PM  ----------------------------------------------------------------------------    ----------------------------------------------------------------------------  Electronically signed by Vinh Chau(Interpreting physician) on  03/14/2022 09:19 AM  ----------------------------------------------------------------------------  FINDINGS  Left Atrium  Left atrium is normal in size. Left Ventricle  Left ventricle is normal in size. Global left ventricular systolic function  is normal. Calculated EF via Parks's method is 48 %. Mild septal hypertrophy. Normal diastolic filling. Right Atrium  Right atrium is normal in size. Right Ventricle  Normal right ventricular size and function. Mitral Valve  Normal mitral valve structure. Mild mitral regurgitation. No mitral stenosis. Aortic Valve  Aortic valve structure and function normal.  Aortic valve is trileaflet. No aortic insufficiency. No aortic stenosis. Tricuspid Valve  Normal tricuspid valve leaflets. Mild tricuspid regurgitation. No tricuspid stenosis. Mild pulmonary hypertension with an estimated right ventricular systolic  pressure of 41 mmHg. Pulmonic Valve  Pulmonic valve is normal in structure and function. No pulmonic insufficiency. No evidence of pulmonic stenosis. Pericardial Effusion  No pericardial effusion. Miscellaneous  Normal aortic root dimension. E/E' average = 11.0. IVC normal diameter & inspiratory collapse indicating normal RA filling  pressure .     M-mode / 2D Measurements & Calculations:    LVIDd:4.9 cm(3.7 - 5.6 cm)       Diastolic WFYFDV:96.98 ml  LVIDs:3.8 cm(2.2 - 4.0 cm)       Systolic RYCUCX:62.73 ml  IVSd:1.2 cm(0.6 - 1.1 cm)        Aortic Root:3.3 cm(2.0 - 3.7 cm)  LVPWd:0.8 cm(0.6 - 1.1 cm)       LA Dimension: 2.9 cm(1.9 - 4.0 cm)  Fractional Shortenin.45 %    LA volume/Index: 35.47 ml /19m^2  Calculated LVEF (%): 49.08 %     LVOT:2.2 cm  RVDd:3.5 cm    Mitral:                                 Aortic    Valve Area (P1/2-Time): 2.78 cm^2       Peak Velocity: 1.26 m/s  Peak E-Wave: 0.87 m/s                   Mean Velocity: 0.84 m/s  Peak A-Wave: 0.67 m/s                   Peak Gradient: 6.35 mmHg  E/A Ratio: 1.29                         Mean Gradient: 4 mmHg  Peak Gradient: 2.99 mmHg  Mean Gradient: 1 mmHg  Deceleration Time: 258 msec             Area (continuity): 2.33 cm^2  P1/2t: 79 msec                          AV VTI: 26.9 cm    Area (continuity): 3.32 cm^2  Mean Velocity: 0.49 m/s    Tricuspid:                              Pulmonic:    Estimated RVSP: 41 mmHg                 Peak Velocity: 0.71 m/s  Peak TR Velocity: 3.00 m/s              Peak Gradient: 1.99 mmHg  Peak TR Gradient: 36 mmHg  Estimated RA Pressure: 5 mmHg    Estimated PASP: 41 mmHg    Diastology / Tissue Doppler  Septal Wall E' velocity:0.06 m/s  Septal Wall E/E':14.7  Lateral Wall E' velocity:0.12 m/s  Lateral Wall E/E':7.3      Cardiac Catheterization:   No results found for this or any previous visit. ASSESSMENT AND PLAN     Assessment:    MV CAD, Plan for CABG   Bradycardia  Sinus pause lasting for 4.7 seconds 3/17/2022  COPD stage II on PFTs with severe reduction diffusion capacity  Centrilobular and paraseptal emphysema  Chronic smoker  Cocaine use  Chronic combined diastolic and systolic CHF, EF 12%.  Previously 50-55%  Mild pulmonary hypertension  Essential hypertension  CKD stage III  Leukocytosis improved    Plan:    I personally interviewed/examined the patient; reviewed interval history, interpreted all available radiographic and laboratory data at the time of service.     Patient is currently off Precedex drip arousable and follows command but very weak  Continue require high flow nasal cannula during the daytime. Will use BiPAP overnight and intermittently during the daytime as needed  We will wean off Levophed drip. Continue midodrine  Continue DuoNeb aerosol  Encourage incentive spirometry deep breathing cough and avoid sedation. Continue antiplatelet, anticoagulation, statins, and beta blockers as per cardiology/CT surgery  Continue to monitor I/O with a goal of even/negative fluid balance. He is on Lasix drip per nephrology. Chest tube management as per CT surgery  On amiodarone, statin, aspirin, Plavix and Eliquis. Continue pulmonary toilet, aspiration precautions and bronchodilators  Continue with DuoNeb aerosol  Continue to monitor CBC, coagulation profile, transfuse as indicated  Chemical DVT prophylaxis on therapeutic Eliquis  Antimicrobials reviewed; currently not on antibiotics  Glycemic control appropriate on Lantus  Physical/occupational therapy  Increase ambulation/physical therapy out of bed to chair. Discussed with nursing staff, treatment and plan discussed  Discussed with respiratory therapist      The patient is/remains critically ill with illness/injury that acutely impairs one or more vital organ systems, such that there is a high probability of imminent or life threatening deterioration in the patient's condition. Critical care time of greater than 35 minutes was spent (excluding procedures), in coordination of care during bedside rounds and discussion of patient care in detail, and recommendations of the team were adopted in the plan. Necessity of all invasive devices was also confirmed. Vonnie Watts MD.   Pulmonary and Critical Care Medicine           3/28/2022, 10:21 AM    Please note that this chart was generated using voice recognition Dragon dictation software.   Although every effort was made to ensure the accuracy of this automated transcription, some errors in transcription may have occurred.

## 2022-03-28 NOTE — PROGRESS NOTES
0750-Pt non-compliant with oxygen use. Pt found to be on heated high flow nasal cannula this morning at bedside report, After writer left bedside pt removed O2 and desaturated into 70s. Nasal cannula replaced and pt was educated to need for oxygen and effects of hypoxia/lack or decrease in oxygen levels. Pt telemetry monitor alerted writer at [place] [id number] that pt was again hypoxic >76 <85. Pt found to be at 76%, heated high flow cannula was found on pt chest. Pt again educated to importance of oxygen, and what his telemetry readings meant. Pt argumentative saying, \"Well, that is enough oxygen for me. \" attempted to reword education, pt no longer acknowledging writers presence in room. Frequent checks in place to ensure pt safety    Derrell Perez 79- Writer called to room as pt becoming more agitated and aggressive with staff. Pt found to be without his oxygen and pulse-ox. Pt verbalizing difficulty in breathing and refusing to allow staff to replace oxygen. It was relayed to patient that his oxygen requirements had not changed, and that the consequences of not wearing the oxygen would be re-intubation to assist with breathing and to keep him safe as he is post CABG. Pt argumentative, thrashing head, spitting at staff, biting at staff and O2 tubing when attempting to replace. Pt has bedside sitter for safety concerns, this was discussed with Vinicius BREAUX. Pt restrained as he was becoming more verbally and expressively threatening to staff. 1445- Pt calm and resting in bed, he requested to be placed on BiPAP as he was feeling SOB. Writer explained that in order to be placed on BiPAP the restraints would come off, pt verbalized that he was not going to be threatening or aggressive with staff and he repeated previous education regarding importance of wearing his oxygen to remain safe and heal. Pt did state that he would leave the mask on.     1615- Pt continues on BiPAP, tolerating well.  VSS, urine output continues to be appropriate on lasix gtt. Denies pain or discomfort. Speaking appropriately at this time. Pt also verbalizes feeling much better wearing the BiPAP mask. SpO2 currently 98%. Sitter continues at bedside for safety.

## 2022-03-28 NOTE — PROGRESS NOTES
Occupational Therapy  Facility/Department: [facility] CAR 1  Daily Treatment Note  NAME: Trisha Jarquin  : 1955  MRN: [id number]    Date of Service: 3/28/2022    Discharge Recommendations:  Patient would benefit from continued therapy after discharge       Assessment   Performance deficits / Impairments: Decreased ADL status; Decreased functional mobility ; Decreased strength;Decreased safe awareness;Decreased endurance;Decreased balance;Decreased high-level IADLs;Decreased cognition  Assessment: Pt would benefit from continued acute care and post acute care OT to address above listed deficits. Treatment Diagnosis: CABG X2  Prognosis: Good  REQUIRES OT FOLLOW UP: Yes  Activity Tolerance  Activity Tolerance: Patient limited by fatigue;Treatment limited secondary to decreased cognition  Safety Devices  Safety Devices in place: Yes  Type of devices: Call light within reach; Chair alarm in place;Gait belt;Patient at risk for falls; Left in chair;Nurse notified         Patient Diagnosis(es): The encounter diagnosis was S/P cardiac catheterization. has a past medical history of 3-vessel CAD, Alcohol abuse, Arthritis, Chronic kidney disease, Full dentures, Hypertension, Other emphysema ([address] Pure hypercholesterolemia, Systolic CHF ([address] and Wears glasses. has a past surgical history that includes Hip fracture surgery (Left); Appendectomy; pr egd transoral biopsy single/multiple (2017); and Coronary artery bypass graft (N/A, 3/21/2022).     Restrictions  Restrictions/Precautions  Restrictions/Precautions: Fall Risk,General Precautions,Cardiac  Required Braces or Orthoses?: Yes  Implants present? : Metal implants (left hip)  Required Braces or Orthoses  Other: Heart Hugger Brace  Position Activity Restriction  Sternal Precautions: 5# Lifting Restrictions,No Pushing,No Pulling  Other position/activity restrictions: Amb pt, up in chair for meals, chest tubes, hi-flow entire session  Subjective   General  Patient assessed for rehabilitation services?: Yes  Family / Caregiver Present: Yes (Daughter)  Diagnosis: CABGx2 on 3/21/22, NSTEMI, EF 34%, hx of ETOH/drug use  Pain Assessment  Eaton-Taylor Pain Rating: No hurt  Response to Pain Intervention: Patient Satisfied  Vital Signs  Patient Currently in Pain: Denies   Orientation  Orientation  Overall Orientation Status: Within Functional Limits  Objective    ADL  Feeding: Stand by assistance;Setup; Dentures; Increased time to complete (assist w/opening some containers and able to feed self)  Grooming: Stand by assistance;Setup;Verbal cueing; Increased time to complete  Toileting: Unable to assess (padilla cath)      Pt in bed upon arrival. 2nd person required d/t multiple line management to transfer from sup to sit to EOB. Pt sat at EOB w/CGA w/R lateral lean. Pt setup to wash face w/vc's to wash each part of face. Pt demo poor activity tolerance and fatigue w/little activity. Pt on thania flow throughout tx w/vc's to take deep breaths for SOB. STS x2 w/RW at EOB and pt only able to tolerate standing approx 20-30 sec each time. 3rd attempt w/SS and bed elevated. Pt needed max vc's throughout for hand placement and to maintain sternal prec w/poor carryover. Pt stood in [address] [place] [address] Road and transferred to recliner. BLE elevated, all equipment reconnected and pt w/call light in reach. Pt is very quiet and only speaks when spoken w/short answers like \"yea\" throughout tx. Pt needs increased time and assist w/all tasks d/t fatigue, pain and limited ROM d/t sternal incision. Balance  Sitting Balance: Contact guard assistance (pt sat at EOB approx 25 min total w/right lateral lean pt states \"thats the way I sit\")  Standing Balance:  Moderate assistance (x2 assist, stood x2 w/RW and x1 in SS)  Standing Balance  Time: Pt stood for approx 3-4 min total   Activity: static and dynamic   Comment: w/RW x2 and then SS x1  Functional Mobility  Activity: Other  Functional Mobility Comments: Unable to complete func mob d/t BLE weakness, transfered to recliner w/SS  Bed mobility  Rolling to Left: Maximum assistance  Supine to Sit: 2 Person assistance;Maximum assistance  Sit to Supine: Unable to assess (left up in chair)  Scooting: Maximal assistance (using chux to scoot pt to EOB)  Transfers  Stand Step Transfers: Unable to assess  Sit to stand: Moderate assistance;2 Person assistance  Stand to sit: Moderate assistance;2 Person assistance  Transfer Comments: w/elevated bed surface X2 w/RW, x1 w/SS, vc's for hand placement and safety of transfers      Cognition  Overall Cognitive Status: WFL  Following Commands: Follows multistep commands with repitition; Follows multistep commands with increased time  Safety Judgement: Decreased awareness of need for assistance;Decreased awareness of need for safety  Insights: Decreased awareness of deficits  Initiation: Requires cues for some  Sequencing: Requires cues for some  Cognition Comment: Flat affect, not compliant with verbal cues for hand placement, sternal precautions, coughing etc, up to Max verbal and tactile cues with poor return      Plan   Plan  Times per week: 4-6x (CABG)  Current Treatment Recommendations: Self-Care / ADL,Strengthening,Balance Training,Functional Mobility Training,Endurance Training,Pain Management,Safety Education & Training,Patient/Caregiver Education & Training,Equipment Evaluation, Education, & procurement,Home Management Training    AM-PAC Score  AM-PAC Inpatient Daily Activity Raw Score: 15 (03/28/22 1030)  AM-PAC Inpatient ADL T-Scale Score : 34.69 (03/28/22 1030)  ADL Inpatient CMS 0-100% Score: 56.46 (03/28/22 1030)  ADL Inpatient CMS G-Code Modifier : CK (03/28/22 1030)    Goals  Short term goals  Time Frame for Short term goals: Pt will by discharge  Short term goal 1: identify/maintain all sternal precautions with 1 vc  Short term goal 2: demo ADL UB bathing/dressing activity at SBA and increased time, including heart hugger  Short term goal 3: demo ADL LB bathing/dressing activity at min A and increased time, using sock-aid/reacher PRN  Short term goal 4: demo good safety awareness druing func mob around room at min A, RW PRN, and 1 cue  Short term goal 5: demo SBA for all bed mobility/func transfers using bed rails/LRD PRN     Therapy Time   Individual Concurrent Group Co-treatment   Time In  0908         Time Out  1002         Minutes  30 total tx time      24 co-tx time           ADDIE RICHTER, SHMUEL/L

## 2022-03-29 ENCOUNTER — APPOINTMENT (OUTPATIENT)
Dept: GENERAL RADIOLOGY | Age: 67
DRG: 233 | End: 2022-03-29
Attending: INTERNAL MEDICINE
Payer: MEDICARE

## 2022-03-29 LAB
ABSOLUTE EOS #: 0.23 K/UL (ref 0–0.44)
ABSOLUTE IMMATURE GRANULOCYTE: 0.13 K/UL (ref 0–0.3)
ABSOLUTE LYMPH #: 1.92 K/UL (ref 1.1–3.7)
ABSOLUTE MONO #: 1.03 K/UL (ref 0.1–1.2)
ANION GAP SERPL CALCULATED.3IONS-SCNC: 14 MMOL/L (ref 9–17)
BASOPHILS # BLD: 0 % (ref 0–2)
BASOPHILS ABSOLUTE: 0.03 K/UL (ref 0–0.2)
BUN BLDV-MCNC: 26 MG/DL (ref 8–23)
CALCIUM SERPL-MCNC: 8.3 MG/DL (ref 8.6–10.4)
CHLORIDE BLD-SCNC: 99 MMOL/L (ref 98–107)
CO2: 25 MMOL/L (ref 20–31)
CREAT SERPL-MCNC: 1.23 MG/DL (ref 0.7–1.2)
EOSINOPHILS RELATIVE PERCENT: 2 % (ref 1–4)
GFR AFRICAN AMERICAN: >60 ML/MIN
GFR NON-AFRICAN AMERICAN: 59 ML/MIN
GFR SERPL CREATININE-BSD FRML MDRD: ABNORMAL ML/MIN/{1.73_M2}
GLUCOSE BLD-MCNC: 109 MG/DL (ref 75–110)
GLUCOSE BLD-MCNC: 116 MG/DL (ref 75–110)
GLUCOSE BLD-MCNC: 75 MG/DL (ref 75–110)
GLUCOSE BLD-MCNC: 81 MG/DL (ref 75–110)
GLUCOSE BLD-MCNC: 92 MG/DL (ref 70–99)
HCT VFR BLD CALC: 29 % (ref 40.7–50.3)
HEMOGLOBIN: 9.3 G/DL (ref 13–17)
IMMATURE GRANULOCYTES: 1 %
INR BLD: 1.3
LYMPHOCYTES # BLD: 18 % (ref 24–43)
MAGNESIUM: 1.8 MG/DL (ref 1.6–2.6)
MCH RBC QN AUTO: 27.4 PG (ref 25.2–33.5)
MCHC RBC AUTO-ENTMCNC: 32.1 G/DL (ref 28.4–34.8)
MCV RBC AUTO: 85.3 FL (ref 82.6–102.9)
MONOCYTES # BLD: 10 % (ref 3–12)
NRBC AUTOMATED: 0.3 PER 100 WBC
PDW BLD-RTO: 16.1 % (ref 11.8–14.4)
PLATELET # BLD: ABNORMAL K/UL (ref 138–453)
PLATELET, FLUORESCENCE: 288 K/UL (ref 138–453)
PLATELET, IMMATURE FRACTION: 7.8 % (ref 1.1–10.3)
POTASSIUM SERPL-SCNC: 3.3 MMOL/L (ref 3.7–5.3)
PROTHROMBIN TIME: 14 SEC (ref 9.1–12.3)
RBC # BLD: 3.4 M/UL (ref 4.21–5.77)
RBC # BLD: ABNORMAL 10*6/UL
SEG NEUTROPHILS: 69 % (ref 36–65)
SEGMENTED NEUTROPHILS ABSOLUTE COUNT: 7.45 K/UL (ref 1.5–8.1)
SODIUM BLD-SCNC: 138 MMOL/L (ref 135–144)
WBC # BLD: 10.8 K/UL (ref 3.5–11.3)

## 2022-03-29 PROCEDURE — 2700000000 HC OXYGEN THERAPY PER DAY

## 2022-03-29 PROCEDURE — 6360000002 HC RX W HCPCS: Performed by: NURSE PRACTITIONER

## 2022-03-29 PROCEDURE — 83735 ASSAY OF MAGNESIUM: CPT

## 2022-03-29 PROCEDURE — 6370000000 HC RX 637 (ALT 250 FOR IP): Performed by: NURSE PRACTITIONER

## 2022-03-29 PROCEDURE — 36415 COLL VENOUS BLD VENIPUNCTURE: CPT

## 2022-03-29 PROCEDURE — 80048 BASIC METABOLIC PNL TOTAL CA: CPT

## 2022-03-29 PROCEDURE — 85025 COMPLETE CBC W/AUTO DIFF WBC: CPT

## 2022-03-29 PROCEDURE — 85610 PROTHROMBIN TIME: CPT

## 2022-03-29 PROCEDURE — 94761 N-INVAS EAR/PLS OXIMETRY MLT: CPT

## 2022-03-29 PROCEDURE — 99291 CRITICAL CARE FIRST HOUR: CPT | Performed by: INTERNAL MEDICINE

## 2022-03-29 PROCEDURE — 2100000001 HC CVICU R&B

## 2022-03-29 PROCEDURE — 71045 X-RAY EXAM CHEST 1 VIEW: CPT

## 2022-03-29 PROCEDURE — 6360000002 HC RX W HCPCS: Performed by: INTERNAL MEDICINE

## 2022-03-29 PROCEDURE — 85055 RETICULATED PLATELET ASSAY: CPT

## 2022-03-29 PROCEDURE — 6370000000 HC RX 637 (ALT 250 FOR IP): Performed by: INTERNAL MEDICINE

## 2022-03-29 PROCEDURE — 6370000000 HC RX 637 (ALT 250 FOR IP): Performed by: STUDENT IN AN ORGANIZED HEALTH CARE EDUCATION/TRAINING PROGRAM

## 2022-03-29 PROCEDURE — 6360000002 HC RX W HCPCS

## 2022-03-29 PROCEDURE — 99024 POSTOP FOLLOW-UP VISIT: CPT | Performed by: NURSE PRACTITIONER

## 2022-03-29 PROCEDURE — 99232 SBSQ HOSP IP/OBS MODERATE 35: CPT | Performed by: INTERNAL MEDICINE

## 2022-03-29 PROCEDURE — 2580000003 HC RX 258: Performed by: NURSE PRACTITIONER

## 2022-03-29 RX ORDER — LORAZEPAM 2 MG/ML
INJECTION INTRAMUSCULAR
Status: COMPLETED
Start: 2022-03-29 | End: 2022-03-29

## 2022-03-29 RX ORDER — FUROSEMIDE 10 MG/ML
40 INJECTION INTRAMUSCULAR; INTRAVENOUS 3 TIMES DAILY
Status: DISCONTINUED | OUTPATIENT
Start: 2022-03-29 | End: 2022-03-30

## 2022-03-29 RX ORDER — POTASSIUM CHLORIDE 7.45 MG/ML
10 INJECTION INTRAVENOUS
Status: COMPLETED | OUTPATIENT
Start: 2022-03-29 | End: 2022-03-29

## 2022-03-29 RX ORDER — POTASSIUM CHLORIDE 20 MEQ/1
40 TABLET, EXTENDED RELEASE ORAL ONCE
Status: DISCONTINUED | OUTPATIENT
Start: 2022-03-29 | End: 2022-04-08 | Stop reason: HOSPADM

## 2022-03-29 RX ORDER — MIDODRINE HYDROCHLORIDE 5 MG/1
5 TABLET ORAL
Status: DISCONTINUED | OUTPATIENT
Start: 2022-03-29 | End: 2022-04-08 | Stop reason: HOSPADM

## 2022-03-29 RX ORDER — LORAZEPAM 2 MG/ML
0.5 INJECTION INTRAMUSCULAR ONCE
Status: COMPLETED | OUTPATIENT
Start: 2022-03-30 | End: 2022-03-29

## 2022-03-29 RX ADMIN — OXYCODONE HYDROCHLORIDE AND ACETAMINOPHEN 2 TABLET: 5; 325 TABLET ORAL at 23:54

## 2022-03-29 RX ADMIN — SPIRONOLACTONE 25 MG: 25 TABLET ORAL at 12:43

## 2022-03-29 RX ADMIN — MAGNESIUM SULFATE HEPTAHYDRATE 2000 MG: 40 INJECTION, SOLUTION INTRAVENOUS at 12:28

## 2022-03-29 RX ADMIN — APIXABAN 5 MG: 5 TABLET, FILM COATED ORAL at 21:19

## 2022-03-29 RX ADMIN — CLOPIDOGREL 75 MG: 75 TABLET, FILM COATED ORAL at 12:41

## 2022-03-29 RX ADMIN — POTASSIUM CHLORIDE 10 MEQ: 10 INJECTION, SOLUTION INTRAVENOUS at 15:29

## 2022-03-29 RX ADMIN — PANTOPRAZOLE SODIUM 40 MG: 40 TABLET, DELAYED RELEASE ORAL at 12:42

## 2022-03-29 RX ADMIN — METOPROLOL TARTRATE 12.5 MG: 25 TABLET ORAL at 21:19

## 2022-03-29 RX ADMIN — TAMSULOSIN HYDROCHLORIDE 0.4 MG: 0.4 CAPSULE ORAL at 12:44

## 2022-03-29 RX ADMIN — POTASSIUM CHLORIDE 10 MEQ: 10 INJECTION, SOLUTION INTRAVENOUS at 14:01

## 2022-03-29 RX ADMIN — AMIODARONE HYDROCHLORIDE 200 MG: 200 TABLET ORAL at 12:39

## 2022-03-29 RX ADMIN — APIXABAN 5 MG: 5 TABLET, FILM COATED ORAL at 12:40

## 2022-03-29 RX ADMIN — SODIUM CHLORIDE 25 ML: 9 INJECTION, SOLUTION INTRAVENOUS at 12:18

## 2022-03-29 RX ADMIN — FUROSEMIDE 40 MG: 10 INJECTION, SOLUTION INTRAMUSCULAR; INTRAVENOUS at 14:03

## 2022-03-29 RX ADMIN — LORAZEPAM 0.5 MG: 2 INJECTION INTRAMUSCULAR; INTRAVENOUS at 23:50

## 2022-03-29 RX ADMIN — SODIUM CHLORIDE, PRESERVATIVE FREE 10 ML: 5 INJECTION INTRAVENOUS at 21:21

## 2022-03-29 RX ADMIN — LORAZEPAM 0.5 MG: 2 INJECTION INTRAMUSCULAR at 23:50

## 2022-03-29 RX ADMIN — STANDARDIZED SENNA CONCENTRATE AND DOCUSATE SODIUM 1 TABLET: 8.6; 5 TABLET ORAL at 12:43

## 2022-03-29 RX ADMIN — POTASSIUM CHLORIDE 10 MEQ: 10 INJECTION, SOLUTION INTRAVENOUS at 12:20

## 2022-03-29 ASSESSMENT — PAIN SCALES - GENERAL
PAINLEVEL_OUTOF10: 0
PAINLEVEL_OUTOF10: 7

## 2022-03-29 ASSESSMENT — ENCOUNTER SYMPTOMS
COUGH: 1
EYE REDNESS: 0
SINUS PAIN: 0
SORE THROAT: 0
TROUBLE SWALLOWING: 0
PHOTOPHOBIA: 0
DIARRHEA: 0
ABDOMINAL PAIN: 0
ALLERGIC/IMMUNOLOGIC NEGATIVE: 1
VOICE CHANGE: 0
VOMITING: 0
SHORTNESS OF BREATH: 1

## 2022-03-29 ASSESSMENT — PAIN DESCRIPTION - DESCRIPTORS: DESCRIPTORS: ACHING

## 2022-03-29 ASSESSMENT — PAIN SCALES - WONG BAKER
WONGBAKER_NUMERICALRESPONSE: 0

## 2022-03-29 ASSESSMENT — PAIN DESCRIPTION - PAIN TYPE: TYPE: CHRONIC PAIN

## 2022-03-29 ASSESSMENT — PAIN DESCRIPTION - FREQUENCY: FREQUENCY: CONTINUOUS

## 2022-03-29 ASSESSMENT — PAIN - FUNCTIONAL ASSESSMENT: PAIN_FUNCTIONAL_ASSESSMENT: ACTIVITIES ARE NOT PREVENTED

## 2022-03-29 ASSESSMENT — PAIN DESCRIPTION - PROGRESSION: CLINICAL_PROGRESSION: NOT CHANGED

## 2022-03-29 ASSESSMENT — PAIN DESCRIPTION - ONSET: ONSET: ON-GOING

## 2022-03-29 NOTE — PROGRESS NOTES
PULMONARY & CRITICAL CARE MEDICINE PROGRESS NOTE     Patient:  Toi Montalvo  MRN: [id number]  Admit date: 3/11/2022  Primary Care Physician: Markie Crane MD  Consulting Physician: Mckay Clemente MD  CODE Status: Full Code  LOS: 18    SUBJECTIVE     CHIEF COMPLAINT/REASON FOR INITIAL CONSULT:    COPD/chronic cough/preoperative evaluation    BRIEF HOSPITAL COURSE:  The patient is a 74 y.o. male history of hypertension, history of mild systolic dysfunction admitted with chest pain initially to [facility] on 03/09/2022 non-ST elevation MI ruled out by troponins in 20s had a stress test done because of high suspicion which shows ischemia of lateral septal wall with ejection fraction of 34%.  He does have history of cocaine use and apparently he had used cocaine before admission to [facility].  He was transferred to [place] for cardiac catheterization which shows multivessel coronary artery disease with EF of 30% including left main disease.  He remains on a statin and heparin.  CT surgery was consulted for CABG.    Patient had pulmonary function test done which shows FEV1 of 46% with significance positive bronchodilator FEV1 of 56% with severe reduction in diffusion capacity of less than 30% with air trapping/hyperinflation. CT scan of the chest shows areas of blebs and bulla medially and centrilobular and paraseptal emphysema on CT scan. Patient does complain of shortness of breath on mild activity and exertion.  He does have chronic cough without no change denies increased wheezing currently denies chest pain.  He has history of cocaine use.  Does have history of smoking patient is not able to tell me that how many cigarettes he used to smoke but he claimed that now he smoke 1 pack/week and had been smoking for almost 50 years. INTERVAL HISTORY:  03/29/22     Patient seen and examined in his room. Resting comfortably in his bed and eating his lunch.   He had his chest tubes removed today  Afebrile hemodynamically stable  On high flow 80% FiO2, 30 L/minute  He mentioned that he did not use his BiPAP overnight  Working with physical therapy  Lasix drip switched to Lasix 40 mg p.o 3 times daily  Urine output: 4.5 L / 24 hours  Creatinine 1.64-1. 23. Chest x-ray 03/28/2022 shows chest tubes in place possible left pleural effusion/bibasilar atelectasis not much change in chest x-ray from 03/27/2022. REVIEW OF SYSTEMS:    Review of Systems   Constitutional: Positive for activity change and fatigue. Negative for appetite change. HENT: Negative for postnasal drip, sinus pain, sore throat, trouble swallowing and voice change. Eyes: Negative for photophobia, redness and visual disturbance. Respiratory: Positive for cough and shortness of breath. Cardiovascular: Positive for chest pain. Negative for leg swelling. Gastrointestinal: Negative for abdominal pain, diarrhea and vomiting. Endocrine: Negative. Genitourinary: Negative for dysuria and hematuria. Musculoskeletal: Negative. Allergic/Immunologic: Negative. Neurological: Negative for dizziness, syncope, speech difficulty and headaches. Hematological: Negative for adenopathy. Does not bruise/bleed easily. Psychiatric/Behavioral: Negative.           OBJECTIVE     Ventilator Settings:  Vent Information  $Ventilation: $Subsequent Day  Skin Assessment: Clean, dry, & intact  Equipment ID:  ([email or web address])  Equipment Changed: HME  Vent Type: Servo i  Vent Mode: (S) CPAP (SBT)  Vt Ordered: 600 mL  Rate Set: 16 bmp  Pressure Support: 8 cmH20  FiO2 : (S) 80 %  SpO2: (!) 85 %  SpO2/FiO2 ratio: 106.25  Sensitivity: 5  PEEP/CPAP: 8  I Time/ I Time %: 0.9 s  Humidification Source: Heated wire  Humidification Temp: 33  Humidification Temp Measured: 33  Circuit Condensation: Drained  Nitric Oxide/Epoprostenol In Use?: No  Mask Type: Full face mask  Mask Size: Medium    VITAL SIGNS:   LAST-  /73   Pulse 103   Temp 98.5 °F (36.9 °C) (Oral)   Resp 22   Ht 6' 3\" (1.905 m)   Wt 178 lb 9.2 oz (81 kg)   SpO2 (!) 85%   BMI 22.32 kg/m²   8-24 HR RANGE-  TEMP Temp  Av.1 °F (36.7 °C)  Min: 97.5 °F (36.4 °C)  Max: 98.5 °F (03.1 °C)   BP Systolic (44KLC), DUN:143 , Min:101 , QRF:274      Diastolic (38KCW), JESUS:77, Min:61, Max:99     PULSE Pulse  Av.8  Min: 77  Max: 103   RR Resp  Av  Min: 22  Max: 22   O2 SAT SpO2  Av %  Min: 85 %  Max: 85 %   OXYGEN DELIVERY O2 Flow Rate (L/min)  Av L/min  Min: 30 L/min  Max: 30 L/min     SYSTEMIC EXAMINATION:   General appearance -arousable and awake, chronically ill-appearing follows commands intermittently look very weak  Mental status -alert and awake and oriented  Eyes - pupils equal and reactive sluggish, sclera anicteric  Mouth -oral mucosa look moist  Neck - supple, no significant adenopathy, carotids upstroke normal bilaterally, no bruits  Chest -bilateral diminished breath sounds at the bases. Scattered rhonchi present. Diminished breath on the left compared to right. Heart -regular S1-S2, regular rhythm, normal S1, S2, no murmurs, clicks or gallops  Abdomen - soft, nontender, nondistended, no masses or organomegaly  Neurological - DTR's decreased and plantars upgoing, spontaneously follows command.   Extremities - peripheral pulses normal, no pedal edema, no clubbing or cyanosis  Skin - normal coloration and turgor, no rashes, no suspicious skin lesions noted     DATA REVIEW     Medications: Current Inpatient  Scheduled Meds:   potassium chloride  40 mEq Oral Once    metoprolol tartrate  12.5 mg Oral BID    furosemide  40 mg IntraVENous TID    midodrine  5 mg Oral TID     spironolactone  25 mg Oral Daily    amiodarone  200 mg Oral Daily    apixaban  5 mg Oral BID    polyethylene glycol  17 g Oral BID    nystatin  5 mL Oral 4x Daily    sodium chloride flush  10 mL IntraVENous 2 times per day    clopidogrel  75 mg Oral Daily    sennosides-docusate sodium  1 tablet Oral BID    atorvastatin  80 mg Oral Nightly    pantoprazole  40 mg Oral Daily    insulin glargine  0.15 Units/kg SubCUTAneous Nightly    tamsulosin  0.4 mg Oral Daily     Continuous Infusions:   sodium chloride 25 mL (03/29/22 1218)    dextrose      sodium chloride      sodium chloride       INPUT/OUTPUT:  In: 1577.3 [P.O.:1550; I.V.:27.3]  Out: 7165 [Urine:6500]  Date 03/29/22 0000 - 03/29/22 2359   Shift [id number] 4980-5187 24 Hour Total   INTAKE   P.O.(mL/kg/hr) 650(1)   650   I. V.(mL/kg) 8.9(0.1)   8.9(0.1)   Shift Total(mL/kg) 658. 9(8.1)   658. 9(8.1)   OUTPUT   Urine(mL/kg/hr) 1000(1.5) 2000(3.1)  3000   Chest Tube 120   120   Shift Total(mL/kg) 1120(13.8) 2000(24.7)  3120(38.5)   Weight (kg) 81 81 81 81       LABS:-  ABG:   No results for input(s): POCPH, POCPCO2, POCPO2, POCHCO3, BNCJ3UXX in the last 72 hours. CBC:   Recent Labs     03/27/22  0615 03/28/22  0439 03/29/22  0455   WBC 11.2 10.5 10.8   HGB 9.3* 9.0* 9.3*   HCT 30.2* 28.2* 29.0*   MCV 87.3 86.0 85.3    188 See Reflexed IPF Result   LYMPHOPCT  --  16* 18*   RBC 3.46* 3.28* 3.40*   MCH 26.9 27.4 27.4   MCHC 30.8 31.9 32.1   RDW 16.1* 16.0* 16.1*     BMP:   Recent Labs     03/27/22  1842 03/28/22  0439 03/29/22  0455    137 138   K 3.7 3.3* 3.3*    102 99   CO2 18* 20 25   BUN 51* 45* 26*   CREATININE 1.90* 1.64* 1.23*   GLUCOSE 112* 102* 92     Liver Function Test:   No results for input(s): PROT, LABALBU, ALT, AST, GGT, ALKPHOS, BILITOT in the last 72 hours. Amylase/Lipase:  No results for input(s): AMYLASE, LIPASE in the last 72 hours. Coagulation Profile:   Recent Labs     03/27/22  0615 03/28/22  0439 03/29/22  0455   INR 1.2 1.3 1.3   PROTIME 12.7* 13.7* 14.0*     Cardiac Enzymes:  No results for input(s): CKTOTAL, CKMB, CKMBINDEX, TROPONINI in the last 72 hours.   Lactic Acid:  Lab Results   Component Value Date    LACTA NOT REPORTED 03/11/2018    LACTA NOT REPORTED 03/10/2018    LACTA NOT REPORTED 03/10/2018     BNP:   Lab Results   Component Value Date    BNP 70 09/13/2013     D-Dimer:  No results found for: DDIMER  Others:   No results found for: TSH, L3WQKXQ, M9QMSLV, THYROIDAB, FT3, T4FREE  Lab Results   Component Value Date    SEDRATE 20 (H) 05/21/2014    CRP 15.2 (H) 05/21/2014     No results found for: Kristi Roulette  No results found for: IRON, TIBC, FERRITIN  No results found for: SPEP, UPEP  Lab Results   Component Value Date    PSA 1.11 11/04/2016       Microbiology:  No results for input(s): SPECDESC, SPECDESC, SPECIAL, CULTURE, CULTURE, STATUS, ORG, CDIFFTOXPCR, CAMPYLOBPCR, SALMONELLAPC, SHIGAPCR, SHIGELLAPCR, MPNEUG, MPNEUM, LACTOQL in the last 72 hours. Pathology:    Radiology Reports:  XR CHEST PORTABLE   Final Result   Little change from prior study. Postoperative changes. Support tubes and   lines as above. Small bilateral effusions and bibasilar atelectasis. XR CHEST PORTABLE   Final Result   Multiple tubes lines as above. Continued left effusion and basilar   atelectasis with vascular congestion. No extrapleural air. XR CHEST PORTABLE   Final Result   1. Stable positions of support apparatus. 2. Stable bibasilar airspace disease, atelectasis favored over pneumonia. 3. Stable small bilateral pleural effusions. US RENAL COMPLETE   Final Result      1. Borderline small right kidney but otherwise sonographically unremarkable   appearing kidneys. 2.  Mild to moderate amount of ascites was noted. XR CHEST PORTABLE   Final Result   Bibasilar atelectasis and pleural effusion more pronounced on the left. No   significant change. Stable support tubes and line. XR CHEST PORTABLE   Final Result   Overall improvement seen in the aeration lung bases. The remainder of lines   and tubes in stool are stable. No definite pneumothorax on the left. XR CHEST PORTABLE   Final Result   1.   Lines and tubes as described above.      2.  Increasing right pleural effusion and right basilar airspace disease. Stable left pleural effusion. XR CHEST PORTABLE   Final Result   1. Support lines and tubes remain in place. 2.  Bibasilar opacities persist, slightly increased on the right, suggesting   layering pleural effusions and atelectasis. 3.  Mildly increased vascular congestion, possibly accentuated by supine   positioning. XR CHEST PORTABLE   Final Result   Stable chest (suspect right pleural effusion in addition to left effusion   and/or atelectasis) with line and tube placements as detailed above. XR CHEST PORTABLE   Final Result   As compared to prior examination, the ET tube now is in satisfactory   position. Examination otherwise is unchanged. XR CHEST PORTABLE   Final Result   1. Endotracheal tube is low in position, approximately 1 cm above the   veronique. This should be pulled back approximately 3 cm. 2.  Other lines and tubes are unchanged in position. 3.  New small right pleural effusion. 4.  Unchanged left basilar opacity. XR CHEST PORTABLE   Final Result   1. Lines and tubes are unchanged in position. 2. Linear bibasilar opacities are similar prior study, atelectasis versus   pneumonia. XR CHEST PORTABLE   Final Result   All tubes and catheters are in good position. No evidence for pneumothorax. Bilateral basal atelectasis noted. VL DUP UPPER EXTREMITY ARTERIES BILATERAL   Final Result      VL Vein Mapping Lower Bilateral   Final Result      VL DUP CAROTID BILATERAL   Final Result      CT CHEST WO CONTRAST   Final Result   1. Extensive centrilobular and paraseptal emphysema. 2. Extensive atherosclerotic disease, particularly along the coronary   arteries. 3. There are areas of atelectasis in the lingula. There is collapse of the   right middle lobe   4. No focal lung infiltrate.          XR CHEST PORTABLE    (Results Pending) Echocardiogram:   Results for orders placed during the hospital encounter of 03/11/22    Echocardiogram Complete 2D w Doppler w Color    Narrative  Transthoracic Echocardiography Report (TTE)    Patient Name Mar Heritage       Date of Study           03/13/2022  [facility]    Date of      1955  Gender                  Male  Birth    Age          74 year(s)  Race                    Black    Room Number  2107        Height:                 75 inch, 190.5 cm    Corporate ID [id number]    Weight:                 136 pounds, 61.7 kg  #    Patient Acct [de-identified]   BSA:        1.86 m^2    BMI:        17 kg/m^2  #    MR #         [id number]      Sonographer             Briana Anglin    Accession #  [id number]  Interpreting Physician  Vinh Chau    Fellow                   Referring Nurse  Practitioner    Interpreting             Referring Physician     Minerva Perry. Sherron Rhodes,  Fellow                                           APRN-NP    Type of Study    TTE procedure:2D Echocardiogram, M-Mode, Doppler, Color Doppler. Procedure Date  Date: 03/13/2022 Start: 12:17 PM    Study Location: [facility]  Technical Quality: Fair visualization    Indications:Multi vessel CAD and Pre-Op CABG. History / Tech. Comments:  Procedure explained to patient. Study done at the bedside. STAT call in    Patient Status: Inpatient    Height: 75 inches Weight: 136 pounds BSA: 1.86 m^2 BMI: 17 kg/m^2    Allergies  - *No Known Allergies. CONCLUSIONS    Summary  Global left ventricular systolic function is normal. Estimated EF 72-11%  Normal diastolic filling. Normal right ventricular size and function. Mild mitral regurgitation. Mild tricuspid regurgitation. Mild pulmonary hypertension with an estimated right ventricular systolic  pressure of 41 mmHg. No pericardial effusion.     Signature  ----------------------------------------------------------------------------  Electronically signed by Mj Loo Vesna(Sonographer) on 2022  12:51 PM  ----------------------------------------------------------------------------    ----------------------------------------------------------------------------  Electronically signed by Vinh Chau(Interpreting physician) on  2022 09:19 AM  ----------------------------------------------------------------------------  FINDINGS  Left Atrium  Left atrium is normal in size. Left Ventricle  Left ventricle is normal in size. Global left ventricular systolic function  is normal. Calculated EF via Parks's method is 48 %. Mild septal hypertrophy. Normal diastolic filling. Right Atrium  Right atrium is normal in size. Right Ventricle  Normal right ventricular size and function. Mitral Valve  Normal mitral valve structure. Mild mitral regurgitation. No mitral stenosis. Aortic Valve  Aortic valve structure and function normal.  Aortic valve is trileaflet. No aortic insufficiency. No aortic stenosis. Tricuspid Valve  Normal tricuspid valve leaflets. Mild tricuspid regurgitation. No tricuspid stenosis. Mild pulmonary hypertension with an estimated right ventricular systolic  pressure of 41 mmHg. Pulmonic Valve  Pulmonic valve is normal in structure and function. No pulmonic insufficiency. No evidence of pulmonic stenosis. Pericardial Effusion  No pericardial effusion. Miscellaneous  Normal aortic root dimension. E/E' average = 11.0. IVC normal diameter & inspiratory collapse indicating normal RA filling  pressure .     M-mode / 2D Measurements & Calculations:    LVIDd:4.9 cm(3.7 - 5.6 cm)       Diastolic NXMAY.82 ml  LVIDs:3.8 cm(2.2 - 4.0 cm)       Systolic HGYMOI:41.72 ml  IVSd:1.2 cm(0.6 - 1.1 cm)        Aortic Root:3.3 cm(2.0 - 3.7 cm)  LVPWd:0.8 cm(0.6 - 1.1 cm)       LA Dimension: 2.9 cm(1.9 - 4.0 cm)  Fractional Shortenin.45 %    LA volume/Index: 35.47 ml /19m^2  Calculated LVEF (%): 49.08 %     LVOT:2.2 cm  RVDd:3.5 cm    Mitral: anticoagulation, statins, and beta blockers as per cardiology/CT surgery  Continue to monitor I/O with a goal of even/negative fluid balance. On p.o. Lasix 40 mg 3 times daily per nephrology. Chest tube removed today by CT surgery  On amiodarone, statin,Plavix and Eliquis. Continue pulmonary toilet, aspiration precautions and bronchodilators  Continue with DuoNeb aerosol  Continue to monitor CBC, coagulation profile, transfuse as indicated  Chemical DVT prophylaxis on therapeutic Eliquis  Antimicrobials reviewed; currently not on antibiotics  Glycemic control appropriate on Lantus  Physical/occupational therapy  Increase ambulation/physical therapy out of bed to chair. Robert Land MD  PGY-3, Internal Medicine Resident  [id number] [facility]  3/29/2022 4:33 PM    Please note that this chart was generated using voice recognition Dragon dictation software. Although every effort was made to ensure the accuracy of this automated transcription, some errors in transcription may have occurred. Attending Physician Statement  I have discussed the care of Shanice Hernandez, including pertinent history and exam findings with the resident. I have reviewed the key elements of all parts of the encounter with the resident. I have seen and examined the patient with the resident. I agree with the assessment and plan and status of the problem list as documented. I reviewed the chart, chart reviewed, labs in medications reviewed. Patient remains very weak not cooperative for physical therapy ambulation and also for incentive spirometry. He is afebrile last 24-hour T-max is 98. His cough remains weak he is hemodynamically stable heart rate is in 90s. Urine output reported to be 4500 mL in last 24 hours on Lasix 40 mg twice daily.   Labs shows creatinine of 1.23 BUN is 26 bicarbonate 25 WBC 10.8 hemoglobin 9.3 stable  Chest x-ray this morning did not show any change mediastinal/chest tube is in place x-ray shows slight better aeration left lower lung field. He remains on high flow nasal cannula and this morning was 30% and 60 L and was saturating above 92%. He did not use BiPAP overnight discussed with nursing staff continue to refuse BiPAP. We will continue with high flow nasal cannula and will try to wean high flow nasal cannula to keep saturation above 92%. Continue with bronchodilator continue aggressive physical therapy and effort to increase activity incentive spirometry deep breathing and cough. Chest tube management per CT surgery to continue. He is on Eliquis, amiodarone statin and Plavix along with low-dose of metoprolol. Discussed with nursing staff, treatment and plan discussed. Discussed with respiratory therapist.    Total critical care time caring for this patient with life threatening, unstable organ failure, including direct patient contact, management of life support systems, review of data including imaging and labs, discussions with other team members and physicians at least 27  Min so far today, excluding procedures. Please note that this chart was generated using voice recognition Dragon dictation software. Although every effort was made to ensure the accuracy of this automated transcription, some errors in transcription may have occurred.      Krystian Ziegler MD  3/29/2022 5:18 PM

## 2022-03-29 NOTE — PROGRESS NOTES
Unable to do Neuro assessment as patient is non cooperative. Pt will not answer questions or participate in assessment.

## 2022-03-29 NOTE — PROGRESS NOTES
Pt currently putting out 400ml/hr of urine. On lasix drip at 5mg/hr.  notified through [address] [place] [address].

## 2022-03-29 NOTE — PROGRESS NOTES
[facility]  Daily Progress Note    Surgeon: Dr. Sophie Earl   S/P : CABG X 2   POD#: 7  EF: 45%     Subjective:  Mr. Sven Kuo   Resting on bipap alternating with HFNC. No distress. Noncompliance noted often. A&0x4. Pt non compliant with staff. Refusing care. Refusing to walk. Refusing medications A&0x4. Physical Exam  Vital Signs: /73   Pulse 83   Temp 97.5 °F (36.4 °C) (Axillary)   Resp 13   Ht 6' 3\" (1.905 m)   Wt 178 lb 9.2 oz (81 kg)   SpO2 99%   BMI 22.32 kg/m²  O2 Flow Rate (L/min): 30 L/min   Admit Weight: Weight: 141 lb 5 oz (64.1 kg)   WEIGHTWeight: 178 lb 9.2 oz (81 kg)     General: Sedated and on vent. Heart: Normal S1 and S2.  Regular rhythm. No murmurs, gallops, or rubs. Pacing Wires: Yes -To be clipped prior to discharge  Lungs: clear to auscultation bilaterally and diminished breath sounds bibasilar Chest tubes: Yes, Air Leak: No  Abdomen: soft, non tender, non distended, BS x4  Extremities: negative  Wounds: clean and dry, healing appropriately. Sternum: Healing  SVG sites: Healing  CXR-no appreciated effusions noted. Diffuse consolidation. No pneumothorax noted.      Scheduled Meds:    potassium chloride  40 mEq Oral Once    spironolactone  25 mg Oral Daily    midodrine  10 mg Oral TID WC    amiodarone  200 mg Oral Daily    apixaban  5 mg Oral BID    polyethylene glycol  17 g Oral BID    nystatin  5 mL Oral 4x Daily    sodium chloride flush  10 mL IntraVENous 2 times per day    clopidogrel  75 mg Oral Daily    sennosides-docusate sodium  1 tablet Oral BID    [Held by provider] metoprolol tartrate  25 mg Oral BID    atorvastatin  80 mg Oral Nightly    pantoprazole  40 mg Oral Daily    insulin glargine  0.15 Units/kg SubCUTAneous Nightly    tamsulosin  0.4 mg Oral Daily     Continuous Infusions:    furosemide (LASIX) 5mg/ml infusion 5 mg/hr (03/29/22 2464)    sodium chloride      norepinephrine Stopped (03/28/22 0587)    EPINEPHrine Stopped (03/24/22 1610)    dextrose      sodium chloride      sodium chloride         Data:  CBC:   Recent Labs     03/27/22  0615 03/28/22 0439 03/29/22  0455   WBC 11.2 10.5 10.8   HGB 9.3* 9.0* 9.3*   HCT 30.2* 28.2* 29.0*   MCV 87.3 86.0 85.3    188 See Reflexed IPF Result     BMP:   Recent Labs     03/27/22  1842 03/28/22 0439 03/29/22  0455    137 138   K 3.7 3.3* 3.3*    102 99   CO2 18* 20 25   BUN 51* 45* 26*   CREATININE 1.90* 1.64* 1.23*     PT/INR:   Recent Labs     03/27/22  0615 03/28/22 0439 03/29/22 0455   PROTIME 12.7* 13.7* 14.0*   INR 1.2 1.3 1.3     APTT:   No results for input(s): APTT in the last 72 hours. Chest X-Ray: Image reviewed. Awaiting official report. I/O:  I/O last 3 completed shifts: In: 2056.7 [P.O.:2000; I.V.:56.7]  Out: 7258 [Urine:6595; Chest Tube:835]    Assessment/Plan:      Diagnosis Date    3-vessel CAD 03/11/2022    Alcohol abuse 06/23/2015    Arthritis     Chronic kidney disease     Full dentures     upper and lower full    Hypertension     Other emphysema ([address]     COPD stage II on PFTs with severe reduction diffusion capacity    Pure hypercholesterolemia 42/29/4215    Systolic CHF ([address]     Wears glasses        Beta-Blocker: yes  ASA: yes  Plavix: yes  GI: yes  Statin: yes  Coumadin: no  ACE-I: no  EF: 45%     Oxygen as needed to maintain SpO2 > 92%  o Wean as tolerated  o Pulmonology already following- can assist with extubation  o Passed his CPAP trial this AM- told by pulm to keep intubated   Chest x-ray daily-stable   Remove chest tubes today followed by CXR   DC sitter today    Document when not taking meds. -pt is A&0x4. [facility] consulted  o Lasix gtt strict IS and Os today -Eval. To DC lasix gtt today managed by nephrology   o Added aldactone     Optimize lung function with resp. Treatment around the clock, aggressive Bipap and IS use.  o When off bipap please use HFNC  o Lungs are poor status.     Encourage incentive spirometry, acapella and ambulation    Replace electrolytes as needed per sliding scale and recheck per policy   Case Management consult for discharge planning await choices  o May likely need LTACH,advanced specialty, [address] [place] [address]    The above recommendations including medications and orders were discussed and agreed upon with Dr. Yolanda Angulo, the attending on service for the cardiothoracic surgery group today. Electronically signed by [address] [place] [address], APRN - NP on 3/29/2022 at 7:31 AM    On this date 3/24/2022 I have spent 27 minutes reviewing previous notes, test results and face to face with the patient discussing the diagnosis and importance of compliance with the treatment plan as well as documenting on the day of the visit. At least 50% of the time documented was spent with the patient to provide counseling and/or coordination of care. This note was created with the assistance of a speech-recognition program.  Although the intention is to generate a document that actually reflects the content of the visit, no guarantees can be provided that every mistake has been identified and corrected by editing. Note was updated later by me after  physical examination and  completion of the assessment.

## 2022-03-29 NOTE — CARE COORDINATION
In preporation of Dc patient to facility please DC bedside sitter. Please use telesitter as he follows verbal commands. Pt is A&0x3, follows commands, but chooses noted to take medications or participate in rehab. Pending LTACH precert at this time.

## 2022-03-29 NOTE — PROGRESS NOTES
Pt refuses to wear pulse ox, Writer explained to pt that he is on a large amount of oxygen currently and we need to keep an eye on his oxygen saturation. \" Pt stated, I dont want it, leave me the fuck alone. \" Writer asked again if she could put on spo2 monitor and pt said \" I said leave me alone. \"

## 2022-03-29 NOTE — PROGRESS NOTES
[facility]  Progress Note                   Date:   3/29/2022  Patient name: Asya Bajwa  Date of admission:  3/11/2022  7:15 PM  MRN:   [id number]  YOB: 1955  PCP: Jez Cazares MD    Reason for Admission: Chest pain [R07.9]  S/P cardiac catheterization [Z98.890]  3-vessel CAD [I25.10]    Subjective:   No acute CV issues/concerns overnight. Between HFNC oxygen and BiPAP. Refusing to work with PTNikole Anderson. Refusing medications from stafff. Labs, vitals, & tele reviewed.      Medications:   Scheduled Meds:   potassium chloride  40 mEq Oral Once    metoprolol tartrate  12.5 mg Oral BID    potassium chloride  10 mEq IntraVENous Q1H    furosemide  40 mg IntraVENous TID    midodrine  5 mg Oral TID WC    spironolactone  25 mg Oral Daily    amiodarone  200 mg Oral Daily    apixaban  5 mg Oral BID    polyethylene glycol  17 g Oral BID    nystatin  5 mL Oral 4x Daily    sodium chloride flush  10 mL IntraVENous 2 times per day    clopidogrel  75 mg Oral Daily    sennosides-docusate sodium  1 tablet Oral BID    atorvastatin  80 mg Oral Nightly    pantoprazole  40 mg Oral Daily    insulin glargine  0.15 Units/kg SubCUTAneous Nightly    tamsulosin  0.4 mg Oral Daily     Continuous Infusions:   sodium chloride 25 mL (03/29/22 1218)    dextrose      sodium chloride      sodium chloride       CBC:   Recent Labs     03/27/22  0615 03/28/22  0439 03/29/22  0455   WBC 11.2 10.5 10.8   HGB 9.3* 9.0* 9.3*    188 See Reflexed IPF Result     BMP:    Recent Labs     03/27/22  1842 03/28/22  0439 03/29/22  0455    137 138   K 3.7 3.3* 3.3*    102 99   CO2 18* 20 25   BUN 51* 45* 26*   CREATININE 1.90* 1.64* 1.23*   GLUCOSE 112* 102* 92     INR:   Recent Labs     03/27/22  0615 03/28/22  0439 03/29/22  0455   INR 1.2 1.3 1.3         3/11/2022- cardiac cath     Findings:   Angiographic Findings        Cardiac Arteries and Lesion Findings       LMCA: has heavy calcification with mid 60% stenosis. LAD: has heavy calcification in the proximal segment with 40% stenosis. The   mid segment has 85% stenosis. LCx: has heavy calcification with ostial 99% stenosis. The OM1 has 100%   occlusion with left to left collaterals. The OM2 has 100% occlusion with   left to left collaterals. The LPDA is large and is normal.     RCA: is a non-dominant vessel and has proximal 90% stenosis. Coronary Tree        Dominance: Right       LV Analysis   LV function assessed as:Abnormal.   Ejection Fraction   +----------------------------------------------------------------------+---+   ! Method                                                                ! EF%! +----------------------------------------------------------------------+---+   ! LV gram                                                               !30 !   +----------------------------------------------------------------------+---+       Procedure Summary        Three vessel disease with left main involvement. Moderate LV systolic dysfunction. LVEF 30%. Recommendations        Medical therapy as needed. Risk factor modification. CABG evaluation by CT surgery. Nuclear Stress 3/10/22:  Impression       Significant stress-induced ischemia in the lateral wall and septum. Infarct in the apex encroaching into the apical inferior wall and also in the   basal inferior wall. LVEF 34%       Risk stratification: High risk. Echo 3/13//2022     Summary  Global left ventricular systolic function is normal. Estimated EF 10-73%  Normal diastolic filling. Normal right ventricular size and function. Mild mitral regurgitation. Mild tricuspid regurgitation. Mild pulmonary hypertension with an estimated right ventricular systolic  pressure of 41 mmHg. No pericardial effusion.      Signature  ----------------------------------------------------------------------------   Electronically signed by Mynor Ruiz, Vesna(Sonographer) on 03/13/2022   12:51 PM  ----------------------------------------------------------------------------     ----------------------------------------------------------------------------   Electronically signed by Vinh Chau(Interpreting physician) on   03/14/2022 09:19 AM    Objective:   Vitals: /73   Pulse 103   Temp 98.5 °F (36.9 °C) (Oral)   Resp 22   Ht 6' 3\" (1.905 m)   Wt 178 lb 9.2 oz (81 kg)   SpO2 (!) 85%   BMI 22.32 kg/m²   General appearance: alert and cooperative with exam  HEENT: Head: Normocephalic, no lesions, without obvious abnormality. Neck:no JVD, trachea midline, no adenopathy  Lungs: HFNC oxygen without distress. Dim  Heart: Regular rate and rhythm, s1/s2 auscultated, no murmurs. Tele SR 76  Abdomen: soft, non-tender, bowel sounds active  Extremities: generalized edema  Neurologic: not done        Assessment / Acute Cardiac Problems:   - Chest pain and Abnormal stress test 3/10/22- ischemia in lateral and septal walls  - Preserved EF on echo  - MV CAD s/p CABGx2 on 3/21/22 SLIMA to LAD, RSVG1 to OM, confirmed NO PDA (no RPDA or LPDA, diminutive non dominant RCA)   -1 episode of A. fib postop. Currently in NSR. Continue amiodarone p.o. Will do once a day due to bradycardia.  - HTN  - DL  - ARABELLA  - cocaine use      Plan of Treatment:   1. Stable. Remains in sinus rhythm. Continue po amio, Eliquis, & BB.   2. Monitor H/H. Remains stable 9.9 today  3. Renal function improving. Lasix gtt off as of this AM. Significant hourly UO noted. Nephrology managing  4. BP has been stable off pressors. Continue PO BB & Midodrine. No ACE/ARB/ARNI d/t CKD  5. Post op surgical care per CTS. D/C planning possibly to LTAC pending respiratory improvement. No objection to discharge once arrangements made.   6. Encourage PT/OT as patient will allow

## 2022-03-29 NOTE — PLAN OF CARE
Problem: Pain:  Goal: Pain level will decrease  Description: Pain level will decrease  3/29/2022 1934 by Aron Johnson RN  Outcome: Ongoing  3/29/2022 1129 by Meghan Paula RN  Outcome: Ongoing  Goal: Control of acute pain  Description: Control of acute pain  3/29/2022 1934 by Aron Johnson RN  Outcome: Ongoing  3/29/2022 1129 by Meghan Paula RN  Outcome: Ongoing  Goal: Control of chronic pain  Description: Control of chronic pain  3/29/2022 1934 by Aron Johnson RN  Outcome: Ongoing  3/29/2022 1129 by Meghan Paula RN  Outcome: Ongoing     Problem: Skin Integrity:  Goal: Will show no infection signs and symptoms  Description: Will show no infection signs and symptoms  3/29/2022 1934 by Aron Johnson RN  Outcome: Ongoing  3/29/2022 1129 by Meghan Paula RN  Outcome: Ongoing  Goal: Absence of new skin breakdown  Description: Absence of new skin breakdown  3/29/2022 1934 by Aron Johnson RN  Outcome: Ongoing  3/29/2022 1129 by Meghan Paula RN  Outcome: Ongoing     Problem: Falls - Risk of:  Goal: Will remain free from falls  Description: Will remain free from falls  3/29/2022 1934 by Aron Johnson RN  Outcome: Ongoing  3/29/2022 1129 by Meghan Paula RN  Outcome: Ongoing  Goal: Absence of physical injury  Description: Absence of physical injury  3/29/2022 1934 by Aron Johnson RN  Outcome: Ongoing  3/29/2022 1129 by Meghan Paula RN  Outcome: Ongoing     Problem: Nutrition  Goal: Optimal nutrition therapy  3/29/2022 1934 by Aron Johnson RN  Outcome: Ongoing  3/29/2022 1129 by Meghan Paula RN  Outcome: Ongoing     Problem: Cardiac:  Goal: Ability to maintain vital signs within normal range will improve  Description: Ability to maintain vital signs within normal range will improve  3/29/2022 1934 by Aron Johnson RN  Outcome: Ongoing  3/29/2022 1129 by Meghan Paula RN  Outcome: Ongoing  Goal: Cardiovascular alteration will improve  Description: Cardiovascular alteration will improve  3/29/2022 1934 by Aron Johnson RN  Outcome: Ongoing  3/29/2022 1129 by Anna Moore RN  Outcome: Ongoing     Problem: Health Behavior:  Goal: Will modify at least one risk factor affecting health status  Description: Will modify at least one risk factor affecting health status  3/29/2022 1934 by Rebecca Veliz RN  Outcome: Ongoing  3/29/2022 1129 by Anna Moore RN  Outcome: Ongoing  Goal: Identification of resources available to assist in meeting health care needs will improve  Description: Identification of resources available to assist in meeting health care needs will improve  3/29/2022 1934 by Rebecca Veliz RN  Outcome: Ongoing  3/29/2022 1129 by Anna Moore RN  Outcome: Ongoing     Problem: Physical Regulation:  Goal: Complications related to the disease process, condition or treatment will be avoided or minimized  Description: Complications related to the disease process, condition or treatment will be avoided or minimized  3/29/2022 1934 by Rebecca Veliz RN  Outcome: Ongoing  3/29/2022 1129 by Anna Moore RN  Outcome: Ongoing     Problem: Musculor/Skeletal Functional Status  Goal: Highest potential functional level  3/29/2022 1934 by Rebecca Veliz RN  Outcome: Ongoing  3/29/2022 1129 by Anna Moore RN  Outcome: Ongoing  Goal: Absence of falls  3/29/2022 1934 by Rebecca Veliz RN  Outcome: Ongoing  3/29/2022 1129 by Anna Moore RN  Outcome: Ongoing

## 2022-03-29 NOTE — PROGRESS NOTES
Pt refusing all medication. Writer explained all medications to pt and rationale for medications. Pt stated \" I dont want them, you take them. \" Derek Hernandez went and got another RN to try and talk to pt and to get him to take his meds pt continued to refuse. Aggie Melo with CT notified.

## 2022-03-29 NOTE — PROGRESS NOTES
Physical Therapy        Physical Therapy Cancel Note      DATE: 3/29/2022    NAME: Toi Montalvo  MRN: [id number]   : 1955      Patient not seen this date for Physical Therapy due to:    Patient Declined: Despite max encouragement provided . Pt adamantly declines every attempt ant tell writer \" good luck trying\" next scheduled for 3/30.       Electronically signed by Syeda Ruelas PTA on 3/29/2022 at 11:15 AM

## 2022-03-29 NOTE — CARE COORDINATION
Transitional planning. Hedy calls from [facility] and they accept pt. CM calls Luis Alberto Olsen at [facility] and made aware. [address] calls from [employer]. They can take bit needs to be off high basilio O2. [address] Flow/Interdisciplinary Rounds Progress Note    Quality Flow Rounds held on March 29, 2022 at [address] Attending:  Bedside Nurse,  and Nursing Unit Leadership    Barriers to Discharge: Precert, 8:9BBAULT    Anticipated Discharge Date:  Expected Discharge Date: 03/18/22    Anticipated Discharge Disposition:    Readmission Risk              Risk of Unplanned Readmission:  21           Discussed patient goal for the day, patient clinical progression, and barriers to discharge. The following Goal(s) of the Day/Commitment(s) have been identified:      [facility] accepted. Per bedside and charge RN, not a good candidate for ARU due to non-compliance and will not participate in therapy. refusing meds, etc.    [address] called at [facility]. Sitter needs to be off 24 hours but wrist restraints OK. Telesitter OK. 200 Sitter to be D/Siddharth after telesitter in place. Chely Weller will start precert.       Nuvia Pearce RN  March 29, 2022

## 2022-03-30 ENCOUNTER — APPOINTMENT (OUTPATIENT)
Dept: GENERAL RADIOLOGY | Age: 67
DRG: 233 | End: 2022-03-30
Attending: INTERNAL MEDICINE
Payer: MEDICARE

## 2022-03-30 LAB
ABSOLUTE EOS #: 0.13 K/UL (ref 0–0.44)
ABSOLUTE EOS #: 0.18 K/UL (ref 0–0.44)
ABSOLUTE IMMATURE GRANULOCYTE: 0.12 K/UL (ref 0–0.3)
ABSOLUTE IMMATURE GRANULOCYTE: 0.13 K/UL (ref 0–0.3)
ABSOLUTE LYMPH #: 1.35 K/UL (ref 1.1–3.7)
ABSOLUTE LYMPH #: 1.75 K/UL (ref 1.1–3.7)
ABSOLUTE MONO #: 0.8 K/UL (ref 0.1–1.2)
ABSOLUTE MONO #: 0.91 K/UL (ref 0.1–1.2)
ANION GAP SERPL CALCULATED.3IONS-SCNC: 11 MMOL/L (ref 9–17)
ANION GAP SERPL CALCULATED.3IONS-SCNC: 13 MMOL/L (ref 9–17)
BASOPHILS # BLD: 0 % (ref 0–2)
BASOPHILS # BLD: 0 % (ref 0–2)
BASOPHILS ABSOLUTE: 0.03 K/UL (ref 0–0.2)
BASOPHILS ABSOLUTE: 0.04 K/UL (ref 0–0.2)
BUN BLDV-MCNC: 21 MG/DL (ref 8–23)
BUN BLDV-MCNC: 22 MG/DL (ref 8–23)
CALCIUM SERPL-MCNC: 7.9 MG/DL (ref 8.6–10.4)
CALCIUM SERPL-MCNC: 8.4 MG/DL (ref 8.6–10.4)
CHLORIDE BLD-SCNC: 101 MMOL/L (ref 98–107)
CHLORIDE BLD-SCNC: 97 MMOL/L (ref 98–107)
CO2: 27 MMOL/L (ref 20–31)
CO2: 28 MMOL/L (ref 20–31)
CREAT SERPL-MCNC: 0.94 MG/DL (ref 0.7–1.2)
CREAT SERPL-MCNC: 1.13 MG/DL (ref 0.7–1.2)
EOSINOPHILS RELATIVE PERCENT: 1 % (ref 1–4)
EOSINOPHILS RELATIVE PERCENT: 1 % (ref 1–4)
GFR AFRICAN AMERICAN: >60 ML/MIN
GFR AFRICAN AMERICAN: >60 ML/MIN
GFR NON-AFRICAN AMERICAN: >60 ML/MIN
GFR NON-AFRICAN AMERICAN: >60 ML/MIN
GFR SERPL CREATININE-BSD FRML MDRD: ABNORMAL ML/MIN/{1.73_M2}
GFR SERPL CREATININE-BSD FRML MDRD: ABNORMAL ML/MIN/{1.73_M2}
GLUCOSE BLD-MCNC: 160 MG/DL (ref 75–110)
GLUCOSE BLD-MCNC: 56 MG/DL (ref 75–110)
GLUCOSE BLD-MCNC: 79 MG/DL (ref 70–99)
GLUCOSE BLD-MCNC: 87 MG/DL (ref 75–110)
GLUCOSE BLD-MCNC: 89 MG/DL (ref 70–99)
GLUCOSE BLD-MCNC: 93 MG/DL (ref 75–110)
GLUCOSE BLD-MCNC: 95 MG/DL (ref 75–110)
HCT VFR BLD CALC: 22.7 % (ref 40.7–50.3)
HCT VFR BLD CALC: 25.5 % (ref 40.7–50.3)
HEMOGLOBIN: 7.2 G/DL (ref 13–17)
HEMOGLOBIN: 7.9 G/DL (ref 13–17)
IMMATURE GRANULOCYTES: 1 %
IMMATURE GRANULOCYTES: 1 %
INR BLD: 1.6
LYMPHOCYTES # BLD: 11 % (ref 24–43)
LYMPHOCYTES # BLD: 13 % (ref 24–43)
MAGNESIUM: 1.7 MG/DL (ref 1.6–2.6)
MCH RBC QN AUTO: 27 PG (ref 25.2–33.5)
MCH RBC QN AUTO: 27.4 PG (ref 25.2–33.5)
MCHC RBC AUTO-ENTMCNC: 31 G/DL (ref 28.4–34.8)
MCHC RBC AUTO-ENTMCNC: 31.7 G/DL (ref 28.4–34.8)
MCV RBC AUTO: 86.3 FL (ref 82.6–102.9)
MCV RBC AUTO: 87 FL (ref 82.6–102.9)
MONOCYTES # BLD: 7 % (ref 3–12)
MONOCYTES # BLD: 7 % (ref 3–12)
NRBC AUTOMATED: 0 PER 100 WBC
NRBC AUTOMATED: 0 PER 100 WBC
PDW BLD-RTO: 15.9 % (ref 11.8–14.4)
PDW BLD-RTO: 15.9 % (ref 11.8–14.4)
PLATELET # BLD: 258 K/UL (ref 138–453)
PLATELET # BLD: 296 K/UL (ref 138–453)
PMV BLD AUTO: 10.9 FL (ref 8.1–13.5)
PMV BLD AUTO: 11 FL (ref 8.1–13.5)
POTASSIUM SERPL-SCNC: 2.8 MMOL/L (ref 3.7–5.3)
POTASSIUM SERPL-SCNC: 3.5 MMOL/L (ref 3.7–5.3)
PROTHROMBIN TIME: 16.4 SEC (ref 9.1–12.3)
RBC # BLD: 2.63 M/UL (ref 4.21–5.77)
RBC # BLD: 2.93 M/UL (ref 4.21–5.77)
RBC # BLD: ABNORMAL 10*6/UL
RBC # BLD: ABNORMAL 10*6/UL
SEG NEUTROPHILS: 78 % (ref 36–65)
SEG NEUTROPHILS: 80 % (ref 36–65)
SEGMENTED NEUTROPHILS ABSOLUTE COUNT: 10.42 K/UL (ref 1.5–8.1)
SEGMENTED NEUTROPHILS ABSOLUTE COUNT: 9.96 K/UL (ref 1.5–8.1)
SODIUM BLD-SCNC: 135 MMOL/L (ref 135–144)
SODIUM BLD-SCNC: 142 MMOL/L (ref 135–144)
WBC # BLD: 12.4 K/UL (ref 3.5–11.3)
WBC # BLD: 13.4 K/UL (ref 3.5–11.3)

## 2022-03-30 PROCEDURE — 6370000000 HC RX 637 (ALT 250 FOR IP): Performed by: INTERNAL MEDICINE

## 2022-03-30 PROCEDURE — 99232 SBSQ HOSP IP/OBS MODERATE 35: CPT | Performed by: INTERNAL MEDICINE

## 2022-03-30 PROCEDURE — 6370000000 HC RX 637 (ALT 250 FOR IP): Performed by: STUDENT IN AN ORGANIZED HEALTH CARE EDUCATION/TRAINING PROGRAM

## 2022-03-30 PROCEDURE — 99291 CRITICAL CARE FIRST HOUR: CPT | Performed by: INTERNAL MEDICINE

## 2022-03-30 PROCEDURE — 6370000000 HC RX 637 (ALT 250 FOR IP): Performed by: NURSE PRACTITIONER

## 2022-03-30 PROCEDURE — 6360000002 HC RX W HCPCS: Performed by: NURSE PRACTITIONER

## 2022-03-30 PROCEDURE — 6360000002 HC RX W HCPCS: Performed by: INTERNAL MEDICINE

## 2022-03-30 PROCEDURE — 94150 VITAL CAPACITY TEST: CPT

## 2022-03-30 PROCEDURE — 94664 DEMO&/EVAL PT USE INHALER: CPT

## 2022-03-30 PROCEDURE — 82947 ASSAY GLUCOSE BLOOD QUANT: CPT

## 2022-03-30 PROCEDURE — 2500000003 HC RX 250 WO HCPCS: Performed by: NURSE PRACTITIONER

## 2022-03-30 PROCEDURE — 94761 N-INVAS EAR/PLS OXIMETRY MLT: CPT

## 2022-03-30 PROCEDURE — 99024 POSTOP FOLLOW-UP VISIT: CPT | Performed by: NURSE PRACTITIONER

## 2022-03-30 PROCEDURE — 2500000003 HC RX 250 WO HCPCS: Performed by: STUDENT IN AN ORGANIZED HEALTH CARE EDUCATION/TRAINING PROGRAM

## 2022-03-30 PROCEDURE — 6360000002 HC RX W HCPCS

## 2022-03-30 PROCEDURE — 2700000000 HC OXYGEN THERAPY PER DAY

## 2022-03-30 PROCEDURE — 71045 X-RAY EXAM CHEST 1 VIEW: CPT

## 2022-03-30 PROCEDURE — 2100000001 HC CVICU R&B

## 2022-03-30 PROCEDURE — 85610 PROTHROMBIN TIME: CPT

## 2022-03-30 PROCEDURE — 80048 BASIC METABOLIC PNL TOTAL CA: CPT

## 2022-03-30 PROCEDURE — 83735 ASSAY OF MAGNESIUM: CPT

## 2022-03-30 PROCEDURE — 85025 COMPLETE CBC W/AUTO DIFF WBC: CPT

## 2022-03-30 PROCEDURE — 2580000003 HC RX 258: Performed by: NURSE PRACTITIONER

## 2022-03-30 RX ORDER — LORAZEPAM 2 MG/ML
0.5 INJECTION INTRAMUSCULAR ONCE
Status: COMPLETED | OUTPATIENT
Start: 2022-03-30 | End: 2022-03-30

## 2022-03-30 RX ORDER — DEXMEDETOMIDINE HYDROCHLORIDE 4 UG/ML
.1-1.5 INJECTION, SOLUTION INTRAVENOUS CONTINUOUS
Status: DISCONTINUED | OUTPATIENT
Start: 2022-03-30 | End: 2022-04-08 | Stop reason: HOSPADM

## 2022-03-30 RX ORDER — LORAZEPAM 2 MG/ML
INJECTION INTRAMUSCULAR
Status: COMPLETED
Start: 2022-03-30 | End: 2022-03-30

## 2022-03-30 RX ADMIN — SODIUM CHLORIDE, PRESERVATIVE FREE 10 ML: 5 INJECTION INTRAVENOUS at 10:32

## 2022-03-30 RX ADMIN — LORAZEPAM 0.5 MG: 2 INJECTION INTRAMUSCULAR; INTRAVENOUS at 01:42

## 2022-03-30 RX ADMIN — DEXMEDETOMIDINE HYDROCHLORIDE 0.2 MCG/KG/HR: 4 INJECTION, SOLUTION INTRAVENOUS at 09:06

## 2022-03-30 RX ADMIN — FUROSEMIDE 40 MG: 10 INJECTION, SOLUTION INTRAMUSCULAR; INTRAVENOUS at 08:22

## 2022-03-30 RX ADMIN — MAGNESIUM SULFATE HEPTAHYDRATE 2000 MG: 40 INJECTION, SOLUTION INTRAVENOUS at 19:31

## 2022-03-30 RX ADMIN — DEXMEDETOMIDINE HYDROCHLORIDE 0.1 MCG/KG/HR: 4 INJECTION, SOLUTION INTRAVENOUS at 15:26

## 2022-03-30 RX ADMIN — Medication 12.5 G: at 16:44

## 2022-03-30 RX ADMIN — SODIUM CHLORIDE, PRESERVATIVE FREE 10 ML: 5 INJECTION INTRAVENOUS at 20:49

## 2022-03-30 RX ADMIN — POTASSIUM CHLORIDE 20 MEQ: 400 INJECTION, SOLUTION INTRAVENOUS at 13:09

## 2022-03-30 RX ADMIN — LORAZEPAM 0.5 MG: 2 INJECTION INTRAMUSCULAR at 01:42

## 2022-03-30 RX ADMIN — POTASSIUM CHLORIDE 20 MEQ: 400 INJECTION, SOLUTION INTRAVENOUS at 14:21

## 2022-03-30 RX ADMIN — DEXMEDETOMIDINE HYDROCHLORIDE 0.3 MCG/KG/HR: 4 INJECTION, SOLUTION INTRAVENOUS at 11:21

## 2022-03-30 ASSESSMENT — PAIN SCALES - WONG BAKER
WONGBAKER_NUMERICALRESPONSE: 0

## 2022-03-30 ASSESSMENT — ENCOUNTER SYMPTOMS
COUGH: 1
SHORTNESS OF BREATH: 1
PHOTOPHOBIA: 0
TROUBLE SWALLOWING: 0
VOMITING: 0
SINUS PAIN: 0
DIARRHEA: 0
VOICE CHANGE: 0
ALLERGIC/IMMUNOLOGIC NEGATIVE: 1
EYE REDNESS: 0
ABDOMINAL PAIN: 0
SORE THROAT: 0

## 2022-03-30 ASSESSMENT — PAIN SCALES - GENERAL
PAINLEVEL_OUTOF10: 0
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
## 2022-03-30 NOTE — PROGRESS NOTES
Physical Therapy        Physical Therapy Cancel Note      DATE: 3/30/2022    NAME: Shanice Hernandez  MRN: [id number]   : 1955      Patient not seen this date for Physical Therapy due to: Other: Pt still uncooperative this date. On wrist restraints . will check back tomorrow.       Electronically signed by Rosa Lai PTA on 3/30/2022 at 4:24 PM

## 2022-03-30 NOTE — PLAN OF CARE
Recheck labs. NSR now, will hold off on AC. Replenish K. ? Neprhology if okay with condom SAEID Jane

## 2022-03-30 NOTE — PROGRESS NOTES
[facility]  Daily Progress Note    Surgeon: Dr. Randy Louie   S/P : CABG X 2   POD#: 8  EF: 45%     Subjective:  Mr. Porsche Buchanan   Resting on bipap alternating with HFNC. No distress. Noncompliance noted often. A&0x4. Pt non compliant with staff. Refusing care. Refusing to walk. Refusing medications A&0x4. Physical Exam  Vital Signs: /84   Pulse 82   Temp 98.5 °F (36.9 °C) (Oral)   Resp 22   Ht 6' 3\" (1.905 m)   Wt 176 lb (79.8 kg)   SpO2 100%   BMI 22.00 kg/m²  O2 Flow Rate (L/min): 30 L/min   Admit Weight: Weight: 141 lb 5 oz (64.1 kg)   WEIGHTWeight: 176 lb (79.8 kg)     General: noncompliant   Heart: Normal S1 and S2.  Regular rhythm. No murmurs, gallops, or rubs. Pacing Wires: Yes -To be clipped prior to discharge  Lungs: clear to auscultation bilaterally and diminished breath sounds bibasilar Chest tubes: Yes, Air Leak: No  Abdomen: soft, non tender, non distended, BS x4  Extremities: negative  Wounds: clean and dry, healing appropriately. Sternum: Healing  SVG sites: Healing  CXR-no appreciated effusions noted. Diffuse consolidation. No pneumothorax noted.      Scheduled Meds:    potassium chloride  40 mEq Oral Once    metoprolol tartrate  12.5 mg Oral BID    furosemide  40 mg IntraVENous TID    midodrine  5 mg Oral TID WC    spironolactone  25 mg Oral Daily    amiodarone  200 mg Oral Daily    apixaban  5 mg Oral BID    polyethylene glycol  17 g Oral BID    nystatin  5 mL Oral 4x Daily    sodium chloride flush  10 mL IntraVENous 2 times per day    clopidogrel  75 mg Oral Daily    sennosides-docusate sodium  1 tablet Oral BID    atorvastatin  80 mg Oral Nightly    pantoprazole  40 mg Oral Daily    tamsulosin  0.4 mg Oral Daily     Continuous Infusions:    sodium chloride Stopped (03/29/22 2019)    dextrose      sodium chloride      sodium chloride         Data:  CBC:   Recent Labs     03/28/22  0439 03/29/22  0455   WBC 10.5 10.8   HGB 9.0* 9.3* HCT 28.2* 29.0*   MCV 86.0 85.3    See Reflexed IPF Result     BMP:   Recent Labs     03/27/22  1842 03/28/22  0439 03/29/22  0455    137 138   K 3.7 3.3* 3.3*    102 99   CO2 18* 20 25   BUN 51* 45* 26*   CREATININE 1.90* 1.64* 1.23*     PT/INR:   Recent Labs     03/28/22  0439 03/29/22  0455   PROTIME 13.7* 14.0*   INR 1.3 1.3     APTT:   No results for input(s): APTT in the last 72 hours. Chest X-Ray: Image reviewed. Awaiting official report. I/O:  I/O last 3 completed shifts: In: 2090.5 [P.O.:1670; I.V.:70; IV Piggyback:350.5]  Out: 3486 [RGUYV:3083; Chest Tube:120]    Assessment/Plan:      Diagnosis Date    3-vessel CAD 03/11/2022    Alcohol abuse 06/23/2015    Arthritis     Chronic kidney disease     Full dentures     upper and lower full    Hypertension     Other emphysema ([address]     COPD stage II on PFTs with severe reduction diffusion capacity    Pure hypercholesterolemia 35/68/3555    Systolic CHF ([address]     Wears glasses        Beta-Blocker: yes  ASA: yes  Plavix: yes  GI: yes  Statin: yes  Coumadin: no  ACE-I: no  EF: 45%     Oxygen as needed to maintain SpO2 > 92%  o Keep HFNC in place   Chest x-ray daily-stable   Document when not taking meds. Or complaint with NC-pt is A&0x4. [facility] consulted  o Remove padilla and void trial before leaving [facility] today    Optimize lung function with resp. Treatment around the clock, aggressive Bipap and IS use.  o When off bipap please use HFNC  o Lungs are poor status.  Encourage incentive spirometry, acapella and ambulation    Replace electrolytes as needed per sliding scale and recheck per policy   Case Management consult for discharge planning await choices  o May likely need LTACH,advanced specialty, [address] [place] [address]    The above recommendations including medications and orders were discussed and agreed upon with Dr. Aby Martinez, the attending on service for the cardiothoracic surgery group today.      Electronically signed by JACKELYN Hawley NP on 3/30/2022 at 8:32 AM    On this date 3/24/2022 I have spent 27 minutes reviewing previous notes, test results and face to face with the patient discussing the diagnosis and importance of compliance with the treatment plan as well as documenting on the day of the visit. At least 50% of the time documented was spent with the patient to provide counseling and/or coordination of care. This note was created with the assistance of a speech-recognition program.  Although the intention is to generate a document that actually reflects the content of the visit, no guarantees can be provided that every mistake has been identified and corrected by editing. Note was updated later by me after  physical examination and  completion of the assessment.

## 2022-03-30 NOTE — PROGRESS NOTES
PATIENT REFUSES TO WEAR HEATED HIGH FLOW NASAL CANNULA     [x] Risks and benefits explained to patient   [x] Patient refuses to wear HHFNC stating \"I don't want that\"  [x] Patient verbalizes understanding of information presented. PATIENT REFUSES TO WEAR OXYGEN PROBE SENSOR     [x] Risks and benefits explained to patient   [x] Patient refuses to wear probe stating \"That's not where that goes, stop. \"  [x] Patient verbalizes understanding of information presented.     Robel Holbrook RCP 08:36 AM

## 2022-03-30 NOTE — PROGRESS NOTES
[facility]  Progress Note                   Date:   3/30/2022  Patient name: Elana Pagan  Date of admission:  3/11/2022  7:15 PM  MRN:   [id number]  YOB: 1955  PCP: Anuj Jaffe MD    Reason for Admission: Chest pain [R07.9]  S/P cardiac catheterization [Z98.890]  3-vessel CAD [I25.10]    Subjective:   No acute CV issues/concerns overnight. Remains on HFNC, no distress. Refused assessment by me, wants to sleep. Labs, vitals, & tele reviewed. Medications:   Scheduled Meds:   potassium chloride  40 mEq Oral Once    metoprolol tartrate  12.5 mg Oral BID    furosemide  40 mg IntraVENous TID    midodrine  5 mg Oral TID WC    spironolactone  25 mg Oral Daily    amiodarone  200 mg Oral Daily    apixaban  5 mg Oral BID    polyethylene glycol  17 g Oral BID    nystatin  5 mL Oral 4x Daily    sodium chloride flush  10 mL IntraVENous 2 times per day    clopidogrel  75 mg Oral Daily    sennosides-docusate sodium  1 tablet Oral BID    atorvastatin  80 mg Oral Nightly    pantoprazole  40 mg Oral Daily    tamsulosin  0.4 mg Oral Daily     Continuous Infusions:   dexmedetomidine 0.2 mcg/kg/hr (03/30/22 1200)    sodium chloride Stopped (03/29/22 1653)    dextrose      sodium chloride      sodium chloride       CBC:   Recent Labs     03/28/22 0439 03/29/22 0455 03/30/22  1007   WBC 10.5 10.8 12.4*   HGB 9.0* 9.3* 7.2*    See Reflexed IPF Result 258     BMP:    Recent Labs     03/28/22  0439 03/29/22  0455 03/30/22  1007    138 135   K 3.3* 3.3* 2.8*    99 97*   CO2 20 25 27   BUN 45* 26* 21   CREATININE 1.64* 1.23* 0.94   GLUCOSE 102* 92 89     INR:   Recent Labs     03/28/22 0439 03/29/22 0455 03/30/22  1007   INR 1.3 1.3 1.6         3/11/2022- cardiac cath     Findings:   Angiographic Findings        Cardiac Arteries and Lesion Findings       LMCA: has heavy calcification with mid 60% stenosis.      LAD: has heavy calcification in the proximal segment with 40% stenosis. The   mid segment has 85% stenosis. LCx: has heavy calcification with ostial 99% stenosis. The OM1 has 100%   occlusion with left to left collaterals. The OM2 has 100% occlusion with   left to left collaterals. The LPDA is large and is normal.     RCA: is a non-dominant vessel and has proximal 90% stenosis. Coronary Tree        Dominance: Right       LV Analysis   LV function assessed as:Abnormal.   Ejection Fraction   +----------------------------------------------------------------------+---+   ! Method                                                                ! EF%! +----------------------------------------------------------------------+---+   ! LV gram                                                               !30 !   +----------------------------------------------------------------------+---+       Procedure Summary        Three vessel disease with left main involvement. Moderate LV systolic dysfunction. LVEF 30%. Recommendations        Medical therapy as needed. Risk factor modification. CABG evaluation by CT surgery. Nuclear Stress 3/10/22:  Impression       Significant stress-induced ischemia in the lateral wall and septum. Infarct in the apex encroaching into the apical inferior wall and also in the   basal inferior wall. LVEF 34%       Risk stratification: High risk. Echo 3/13//2022     Summary  Global left ventricular systolic function is normal. Estimated EF 42-17%  Normal diastolic filling. Normal right ventricular size and function. Mild mitral regurgitation. Mild tricuspid regurgitation. Mild pulmonary hypertension with an estimated right ventricular systolic  pressure of 41 mmHg. No pericardial effusion.      Signature  ----------------------------------------------------------------------------   Electronically signed by Vesna Sagastume(Sonographer) on 03/13/2022   12:51 PM  ----------------------------------------------------------------------------     ----------------------------------------------------------------------------   Electronically signed by Vinh Chau(Interpreting physician) on   03/14/2022 09:19 AM    Objective:   Vitals: BP 94/67   Pulse 61   Temp 96.6 °F (35.9 °C) (Axillary)   Resp 16   Ht 6' 3\" (1.905 m)   Wt 176 lb (79.8 kg)   SpO2 100%   BMI 22.00 kg/m²   General appearance: alert and cooperative with exam  HEENT: Head: Normocephalic, no lesions, without obvious abnormality. Neck:no JVD, trachea midline, no adenopathy  Lungs: HFNC oxygen without distress. Dim  Heart: Regular rate and rhythm, s1/s2 auscultated, no murmurs. Tele SR 76  Abdomen: soft, non-tender, bowel sounds active  Extremities: generalized edema  Neurologic: not done        Assessment / Acute Cardiac Problems:   - Chest pain and Abnormal stress test 3/10/22- ischemia in lateral and septal walls  - Preserved EF on echo  - MV CAD s/p CABGx2 on 3/21/22 SLIMA to LAD, RSVG1 to OM, confirmed NO PDA (no RPDA or LPDA, diminutive non dominant RCA)   -1 episode of A. fib postop. Currently in NSR. Continue amiodarone p.o. Will do once a day due to bradycardia.  - HTN  - DL  - ARABELLA  - cocaine use      Plan of Treatment:   1. Stable. Remains in sinus rhythm. Continue po amio, Eliquis, & BB.   2. HGB 7.2 today. No active bleeding noted. 3. Renal function stable however K+ 2.8 today. Replaced. 4. BP has been stable off pressors. Continue PO BB & Midodrine. No ACE/ARB/ARNI d/t CKD  5. Post op surgical care per CTS. D/C planning possibly to LTAC pending respiratory improvement. No objection to discharge once arrangements made.   6. F/U as OP after discharge from [facility]

## 2022-03-30 NOTE — PROGRESS NOTES
03/30/22 1640   Oxygen Therapy/Pulse Ox   O2 Therapy Oxygen humidified   O2 Device Heated high flow cannula   O2 Flow Rate (L/min) 25 L/min   FiO2  60 %   Resp 16   SpO2 100 %     RN paged RRT to bedside per frequent SpO2 probe falling off    RRT placed probe on patient's left ear and secured with tape to reinforce. Patient relaxed and resting, SpO2 WNL, RR WNL.      Will continue to monitor

## 2022-03-30 NOTE — PROGRESS NOTES
[facility] [employer]  Occupational Therapy Not Seen Note    DATE: 3/30/2022    NAME: Kenney Nielson  MRN: [id number]   : 1955      Patient not seen this date for Occupational Therapy due to: Pt on Precedex and is restrtained.       Next Scheduled Treatment: 3/31/22      Electronically signed by JENNYFER Schmitt on 3/30/2022 at 10:22 AM

## 2022-03-30 NOTE — PROGRESS NOTES
03/30/22 0930   Oxygen Therapy/Pulse Ox   Blood Gas  Performed? No  (Patient refusing / combative. Dr. Agustín Vasquez requested cancel)     PROVIDE ADEQUATE OXYGENATION WITH ACCEPTABLE SP02/ABG'S    [x]  IDENTIFY APPROPRIATE OXYGEN THERAPY  [x]   MONITOR SP02/ABG'S AS NEEDED   [x]   PATIENT EDUCATION AS NEEDED    Patient refusing ABG. Dr. Angle Woodruff MD bedside and requested to cancel ABG. RN x 3 bedside and aware. Writer will continue to monitor.

## 2022-03-30 NOTE — PLAN OF CARE
Patient was pulling at his oxygen and ivs and interfering with his safety and well being. Dr ordered soft bilateral wrist restrains and precedex . Turning patient every two hours and doing ROM. Patient is sleeping. No injury or wounds on patient. Will continue to monitor patient. When bilateral restrains are no longer needed then they will be removed. But until then for his safety they will remain on.

## 2022-03-30 NOTE — PROGRESS NOTES
Comprehensive Nutrition Assessment    Type and Reason for Visit:  Reassess    Nutrition Recommendations/Plan: Continue current diet. Will provide Ensure Enlive oral supplements with meals. Encourage/monitor PO intakes as tolerated. Monitor labs, weights, and plan of care. Nutrition Assessment:  Pt sleeping at visit - using high flow nasal cannula and Bipap at times. Pt has been refusing medications and to use Bipap. Noted telesitter in room and pt in restraints. Noted reports pt has been drinking oral supplements and eating variable amounts of meals. Pt today refused breakfast.  Pt with agitation and confusion. Weight fluctuations noted. Labs reviewed: K 2.8 mmol/L. Meds include: Lasix, Klor-Con. Malnutrition Assessment:  Malnutrition Status: Moderate malnutrition    Context:  Acute Illness     Findings of the 6 clinical characteristics of malnutrition:  Energy Intake:  Mild decrease in energy intake  Weight Loss:  No significant weight loss per chart review; weight gain since admission noted     Body Fat Loss:  1 - Mild body fat loss Orbital,Buccal region   Muscle Mass Loss:  1 - Mild muscle mass loss Temples (temporalis),Clavicles (pectoralis & deltoids)  Fluid Accumulation:  No significant fluid accumulation   Strength:  Not Performed    Estimated Daily Nutrient Needs:  Energy (kcal):  25-30 kcal/kg = 8823-6851 kcals/day; Weight Used for Energy Requirements:  Admission     Protein (g):  1.2-1.5 gm/kg = 75-95 gm pro/day; Weight Used for Protein Requirements:  Admission    Fluid (ml/day):  25 mL/kg = 0233-3196 mL/day or per MD; Method Used for Fluid Requirements:  ml/Kg      Nutrition Related Findings:  Labs/Meds reviewed. Last BM 3/26. Wounds:  Surgical Incision,Multiple       Current Nutrition Therapies:    ADULT ORAL NUTRITION SUPPLEMENT; Breakfast, Lunch, Dinner; Clear Liquid Oral Supplement  ADULT DIET;  Dysphagia - Soft and Bite Sized    Anthropometric Measures:  · Height: 6' 3\" (190.5 cm)  · Current Body Weight: 176 lb (79.8 kg)   · Admission Body Weight: 141 lb 5 oz (64.1 kg)    · Usual Body Weight: 145 lb (65.8 kg)     · Ideal Body Weight: 196 lbs; % Ideal Body Weight 89.8 %   · BMI: 22  · BMI Categories: Normal Weight (BMI 18.5-24. 9)       Nutrition Diagnosis:   · Inadequate oral intake related to  (current condition; agitation; appetite) as evidenced by  (variable PO intakes; need for oral supplements)    Nutrition Interventions:   Food and/or Nutrient Delivery:  Continue Current Diet,Modify Oral Nutrition Supplement  Nutrition Education/Counseling:  No recommendation at this time   Coordination of Nutrition Care:  Continue to monitor while inpatient    Goals:  Meet % of estimated nutrition needs. Nutrition Monitoring and Evaluation:   Behavioral-Environmental Outcomes:  None Identified   Food/Nutrient Intake Outcomes:  Food and Nutrient Intake,Supplement Intake  Physical Signs/Symptoms Outcomes:  Biochemical Data,Chewing or Swallowing,GI Status,Fluid Status or Edema,Hemodynamic Status,Nutrition Focused Physical Findings,Skin,Weight     Discharge Planning:     Too soon to determine     Electronically signed by Lisha Flowers RD, LD on 3/30/22 at 12:38 PM EDT    Contact: [phone]

## 2022-03-30 NOTE — PROGRESS NOTES
03/30/22 0756   Treatment   Treatment Type IS   Incentive Spirometry Tx   Treatment Tolerance Poor  (patient not cooperative)   Incentive Spirometry Achieved (mL) 400 mL     Incentive Spirometry education and demonstration given by Respiratory Therapy.        Maria Luisa Mays RCP  07:56 AM
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
## 2022-03-30 NOTE — PROGRESS NOTES
1930 Pt is refusing to wear pulse ox or bp cuff. Pt is also refusing to let the RN assess him. 2000 Pt allowed RN to place pulse ox on and bp cuff but is refusing medications    2115 Pt only took 2 pills, Eliquis and Metoprolol. 2330 Pt very agitated and attempting to remove O2 and padilla.  Unable to reorient pt but pt does know he is in the hospital. Charge nurse at bedside with RN

## 2022-03-30 NOTE — PROGRESS NOTES
Attending Physician Statement  I have discussed the care of Valentin Large, including pertinent history and exam findings with the resident. I have reviewed the key elements of all parts of the encounter with the resident. I have seen and examined the patient with the resident. I agree with the assessment and plan and status of the problem list as documented. Please see full note by pulmonary resident Dr. Teri Adhikari    I have reviewed the chart discussed with nursing staff, overnight events noted. Patient has been very agitative very restless and had been refusing care refusing to ambulate not using BiPAP also taking off high flow nasal cannula/oxygen and desaturates when he does that not cooperative for any respiratory therapy and respiratory toilet. He has not been taking his oral medication per nursing staff did not take amiodarone and Lasix orally because of his severe agitation. To control his agitation Precedex drip is started so controlled with agitation delirium and to improve care. On Precedex drip he is somnolent on 0.6 mcg did not follow commands upper airway sounds were present bilateral breath sounds were present with scattered rhonchi and decreased breath sound at bases. Patient is currently on high flow nasal cannula 80% and 30 L and he is saturating well as high flow nasal cannula his chest restarted after agitation is controlled with Precedex drip. Urine output reported to be 2/8/2005 in last 24 hours. Labs were pending this morning as he had refused labs in agitative. Chest x-ray shows similar finding in bilateral mild pulmonary venous congestion bibasilar effusion/atelectasis not much change in chest x-ray. Patient is on Eliquis but he is not taking orally recommend to change to heparin drip or Lovenox while he is not taking currently. Continue try to pulmonary toilet manage secretions avoid aspiration. Wean down Precedex drip. Continue with high flow nasal cannula.   BiPAP/NIV if he is able to use it. Depending on the lab consider changing Lasix from oral to IV as he is not taking orally at this time. Discussed with nursing staff, treatment and plan discussed. Discussed with respiratory therapist.    Total critical care time caring for this patient with life threatening, unstable organ failure, including direct patient contact, management of life support systems, review of data including imaging and labs, discussions with other team members and physicians at least 28  Min so far today, excluding procedures. Please note that this chart was generated using voice recognition Dragon dictation software. Although every effort was made to ensure the accuracy of this automated transcription, some errors in transcription may have occurred.      Gregg López MD  3/30/2022 11:41 AM

## 2022-03-30 NOTE — PROGRESS NOTES
Renal Progress Note    Patient :  Clinton Josue; 74 y.o. MRN# [id number]  Location:  1007/1007-01  Attending:  Cassandra Joaquin MD  Admit Date:  3/11/2022   Hospital Day: 23      Subjective:     Patient remains combative. He is actually now on Precedex to help with relaxing sedation of the patient. He is on oxygen via a face mask. He refused labs this morning so we will have them drawn off a central line. He is on Lasix 40 mg IV 3 times daily. He was on a Lasix drip until he had a significant urine output causing it to be discontinued and switching to intermittent Lasix. Intake documented 513 mL and output documented 2,805 ML. if the patient has a significant drop further and his potassium levels we may need to put the Lasix on hold. He does have supplementation for potassium under sliding scale. He has had some hypotension and is on Midrin 5 mg 3 times daily with hold parameters. He also is on oral potassium daily  Creatinine down to 1.2 improved renal function with offloading the left ventricle. Ischemic ATN resolving as well. Hypokalemia he has been a persistent issue, despite replacement. Patient is status post coronary bypass graft 3/21/22, subsequently developed ischemic ATN with creatinine peaking about 1.9. Baseline creatinine normal.  Outpatient Medications:     Medications Prior to Admission: nitroGLYCERIN (NITROSTAT) 0.3 MG SL tablet, Place 0.3 mg under the tongue every 5 minutes as needed for Chest pain up to max of 3 total doses.  If no relief after 1 dose, call 911.  ibuprofen (ADVIL;MOTRIN) 800 MG tablet, Take 1 tablet by mouth every 8 hours as needed for Pain  lisinopril (PRINIVIL;ZESTRIL) 10 MG tablet, Take 1 tablet by mouth daily  pravastatin (PRAVACHOL) 20 MG tablet, Take 1 tablet by mouth every evening  amLODIPine (NORVASC) 5 MG tablet, Take 1 tablet by mouth daily  aspirin 81 MG EC tablet, Take 1 tablet by mouth daily  tamsulosin (FLOMAX) 0.4 MG capsule, Take 1 capsule by mouth daily  [DISCONTINUED] acetaminophen (TYLENOL 8 HOUR) 650 MG extended release tablet, Take 1 tablet by mouth every 8 hours as needed for Pain    Current Medications:     Scheduled Meds:    potassium chloride  40 mEq Oral Once    metoprolol tartrate  12.5 mg Oral BID    furosemide  40 mg IntraVENous TID    midodrine  5 mg Oral TID WC    spironolactone  25 mg Oral Daily    amiodarone  200 mg Oral Daily    apixaban  5 mg Oral BID    polyethylene glycol  17 g Oral BID    nystatin  5 mL Oral 4x Daily    sodium chloride flush  10 mL IntraVENous 2 times per day    clopidogrel  75 mg Oral Daily    sennosides-docusate sodium  1 tablet Oral BID    atorvastatin  80 mg Oral Nightly    pantoprazole  40 mg Oral Daily    tamsulosin  0.4 mg Oral Daily     Continuous Infusions:    dexmedetomidine 0.2 mcg/kg/hr (22 0906)    sodium chloride Stopped (22 1653)    dextrose      sodium chloride      sodium chloride       PRN Meds:  LORazepam, albuterol, bisacodyl, sodium chloride flush, sodium chloride, ondansetron **OR** ondansetron, oxyCODONE-acetaminophen **OR** oxyCODONE-acetaminophen, fentanNYL **OR** fentanNYL, hydrALAZINE, metoprolol, diphenhydrAMINE, potassium chloride, magnesium sulfate, albumin human, glucose, dextrose, glucagon (rDNA), dextrose, sodium chloride, sodium chloride    Input/Output:       I/O last 3 completed shifts: In: 2090.5 [P.O.:1670; I.V.:70; IV Piggyback:350.5]  Out: 2892 [HNJIX:6266; Chest Tube:120].       Patient Vitals for the past 96 hrs (Last 3 readings):   Weight   22 0600 176 lb (79.8 kg)   22 1200 178 lb 9.2 oz (81 kg)       Vital Signs:   Temperature:  Temp:  (pt refused)  TMax:   Temp (24hrs), Av.5 °F (36.9 °C), Min:98.5 °F (36.9 °C), Max:98.5 °F (36.9 °C)    Respirations:  Resp: 22  Pulse:   Pulse: 82  BP:    BP: 126/84  BP Range: Systolic (78OIA), FZW:787 , Min:77 , SGB:375       Diastolic (52HUI), ZUR:92, Min:51, Max:108      Physical Examination: General:  Sedated on Precedex, on oxygen by face mask  HEENT: Moist mucous membranes   Neck:   No JVD, no accessory muscle use noted  Chest:  Bilateral vesicular breath sounds, basal crackles   cardiac:  S1 S2 RR, no murmurs, gallops or rubs, JVP not raised. Abdomen: Soft, Not significantly distended, active bowel sounds. Neuro:  sedated  SKIN:  No rashes, good skin turgor. Extremities:  No edema, palpable peripheral pulses, diminished muscle mass    Labs:       Recent Labs     03/28/22 0439 03/29/22  0455   WBC 10.5 10.8   RBC 3.28* 3.40*   HGB 9.0* 9.3*   HCT 28.2* 29.0*   MCV 86.0 85.3   MCH 27.4 27.4   MCHC 31.9 32.1   RDW 16.0* 16.1*    See Reflexed IPF Result   MPV 11.7  --       BMP:   Recent Labs     03/27/22  1842 03/28/22 0439 03/29/22  0455    137 138   K 3.7 3.3* 3.3*    102 99   CO2 18* 20 25   BUN 51* 45* 26*   CREATININE 1.90* 1.64* 1.23*   GLUCOSE 112* 102* 92   CALCIUM 8.3* 8.2* 8.3*      Phosphorus:   No results for input(s): PHOS in the last 72 hours. Magnesium:    Recent Labs     03/28/22 0439 03/29/22  0455   MG 2.4 1.8     Albumin:  No results for input(s): LABALBU in the last 72 hours. BNP:      Lab Results   Component Value Date    BNP 70 09/13/2013     ANDREIA:    No results found for: ANDREIA  SPEP:  Lab Results   Component Value Date    PROT 7.1 03/09/2022    PATH ELECTRONICALLY SIGNED.  Harrison Arzola M.D. 03/26/2022     UPEP:   No results found for: LABPE  C3:     Lab Results   Component Value Date    C3 67 03/26/2022     C4:     Lab Results   Component Value Date    C4 10 03/26/2022     MPO ANCA:   No results found for: MPO  PR3 ANCA:   No results found for: PR3  Anti-GBM:   No results found for: GBMABIGG  Hep BsAg:         Lab Results   Component Value Date    HEPBSAG NONREACTIVE 03/26/2022     Hep C AB:          Lab Results   Component Value Date    HEPCAB NONREACTIVE 03/26/2022       Urinalysis/Chemistries:      Lab Results   Component Value Date    NITRU NEGATIVE 03/26/2022    COLORU Dark Yellow 03/26/2022    PHUR 5.0 03/26/2022    WBCUA 0 TO 2 03/26/2022    RBCUA 10 TO 20 03/26/2022    MUCUS 1+ 03/26/2022    CLARITYU clear 10/27/2016    SPECGRAV 1.023 03/26/2022    LEUKOCYTESUR TRACE 03/26/2022    UROBILINOGEN Normal 03/26/2022    BILIRUBINUR NEGATIVE  Verified by ictotest. 03/26/2022    BILIRUBINUR neg 10/27/2016    BLOODU neg 10/27/2016    GLUCOSEU NEGATIVE 03/26/2022    KETUA TRACE 03/26/2022     Urine Sodium:     Lab Results   Component Value Date    SELENA 20 03/26/2022     Urine Potassium:  No results found for: KUR  Urine Chloride:  No results found for: CLUR  Urine Osmolarity: No results found for: OSMOU  Urine Protein:   No components found for: TOTALPROTEIN, URINE   Urine Creatinine:     Lab Results   Component Value Date    LABCREA 226.4 03/26/2022     Urine Eosinophils:  No components found for: UEOS    Radiology:     CXR:     Assessment:     1. Acute Kidney Injury: Secondary to ischemic ATN from hypoperfusion post coronary bypass graft and arrhythmia post procedure. Creatinine plateaued at 1.9 now resolving down to 1.2, baseline 1.0  2. Decompensated left heart failure, status post coronary bypass graft and acute kidney injury resolving, preserved ejection fraction  3. Status post coronary bypass graft preserved ejection fraction  4. Pulmonary hypertension  5. History of substance abuse and heavy alcohol use  6. Hypokalemia, in part secondary to diuretic use, is on spironolactone    Plan:   1. Check labs this morning, if potassium remains low we will consider discontinuing the IV Lasix until the potassium stabilizes   2. Replace potassium, intravenously by scale as needed  3. Continue Aldactone 25 daily  4. Continue midodrine 5 mg oral three times daily and hold for systolic of more than 719  5. Follow renal function  6. Follow intake and output    Nutrition     Avoid nephrotoxic drugs/contrast exposure.     We will continue to follow along with you.     Wilfred Hodges MD  [facility]  3/30/2022

## 2022-03-31 ENCOUNTER — APPOINTMENT (OUTPATIENT)
Dept: GENERAL RADIOLOGY | Age: 67
DRG: 233 | End: 2022-03-31
Attending: INTERNAL MEDICINE
Payer: MEDICARE

## 2022-03-31 LAB
ABSOLUTE EOS #: 0.32 K/UL (ref 0–0.44)
ABSOLUTE IMMATURE GRANULOCYTE: 0.12 K/UL (ref 0–0.3)
ABSOLUTE LYMPH #: 2.02 K/UL (ref 1.1–3.7)
ABSOLUTE MONO #: 1 K/UL (ref 0.1–1.2)
ANION GAP SERPL CALCULATED.3IONS-SCNC: 12 MMOL/L (ref 9–17)
BASOPHILS # BLD: 0 % (ref 0–2)
BASOPHILS ABSOLUTE: 0.03 K/UL (ref 0–0.2)
BUN BLDV-MCNC: 22 MG/DL (ref 8–23)
CALCIUM SERPL-MCNC: 8.2 MG/DL (ref 8.6–10.4)
CHLORIDE BLD-SCNC: 98 MMOL/L (ref 98–107)
CO2: 26 MMOL/L (ref 20–31)
CREAT SERPL-MCNC: 0.97 MG/DL (ref 0.7–1.2)
EOSINOPHILS RELATIVE PERCENT: 3 % (ref 1–4)
GFR AFRICAN AMERICAN: >60 ML/MIN
GFR NON-AFRICAN AMERICAN: >60 ML/MIN
GFR SERPL CREATININE-BSD FRML MDRD: ABNORMAL ML/MIN/{1.73_M2}
GLUCOSE BLD-MCNC: 105 MG/DL (ref 75–110)
GLUCOSE BLD-MCNC: 108 MG/DL (ref 75–110)
GLUCOSE BLD-MCNC: 111 MG/DL (ref 75–110)
GLUCOSE BLD-MCNC: 123 MG/DL (ref 75–110)
GLUCOSE BLD-MCNC: 128 MG/DL (ref 70–99)
HCT VFR BLD CALC: 26.4 % (ref 40.7–50.3)
HEMOGLOBIN: 8.3 G/DL (ref 13–17)
IMMATURE GRANULOCYTES: 1 %
INR BLD: 1.3
LYMPHOCYTES # BLD: 16 % (ref 24–43)
MCH RBC QN AUTO: 26.9 PG (ref 25.2–33.5)
MCHC RBC AUTO-ENTMCNC: 31.4 G/DL (ref 28.4–34.8)
MCV RBC AUTO: 85.7 FL (ref 82.6–102.9)
MONOCYTES # BLD: 8 % (ref 3–12)
NRBC AUTOMATED: 0 PER 100 WBC
PDW BLD-RTO: 15.9 % (ref 11.8–14.4)
PLATELET # BLD: 319 K/UL (ref 138–453)
PMV BLD AUTO: 11 FL (ref 8.1–13.5)
POTASSIUM SERPL-SCNC: 3.9 MMOL/L (ref 3.7–5.3)
PROTHROMBIN TIME: 13.7 SEC (ref 9.1–12.3)
RBC # BLD: 3.08 M/UL (ref 4.21–5.77)
RBC # BLD: ABNORMAL 10*6/UL
SEG NEUTROPHILS: 72 % (ref 36–65)
SEGMENTED NEUTROPHILS ABSOLUTE COUNT: 9 K/UL (ref 1.5–8.1)
SODIUM BLD-SCNC: 136 MMOL/L (ref 135–144)
WBC # BLD: 12.5 K/UL (ref 3.5–11.3)

## 2022-03-31 PROCEDURE — 6370000000 HC RX 637 (ALT 250 FOR IP): Performed by: NURSE PRACTITIONER

## 2022-03-31 PROCEDURE — 2580000003 HC RX 258: Performed by: NURSE PRACTITIONER

## 2022-03-31 PROCEDURE — 85025 COMPLETE CBC W/AUTO DIFF WBC: CPT

## 2022-03-31 PROCEDURE — 85610 PROTHROMBIN TIME: CPT

## 2022-03-31 PROCEDURE — 97530 THERAPEUTIC ACTIVITIES: CPT

## 2022-03-31 PROCEDURE — 6360000002 HC RX W HCPCS: Performed by: NURSE PRACTITIONER

## 2022-03-31 PROCEDURE — 94761 N-INVAS EAR/PLS OXIMETRY MLT: CPT

## 2022-03-31 PROCEDURE — 2100000001 HC CVICU R&B

## 2022-03-31 PROCEDURE — 80048 BASIC METABOLIC PNL TOTAL CA: CPT

## 2022-03-31 PROCEDURE — 36415 COLL VENOUS BLD VENIPUNCTURE: CPT

## 2022-03-31 PROCEDURE — 6370000000 HC RX 637 (ALT 250 FOR IP): Performed by: INTERNAL MEDICINE

## 2022-03-31 PROCEDURE — 71045 X-RAY EXAM CHEST 1 VIEW: CPT

## 2022-03-31 PROCEDURE — 51702 INSERT TEMP BLADDER CATH: CPT

## 2022-03-31 PROCEDURE — 2500000003 HC RX 250 WO HCPCS: Performed by: STUDENT IN AN ORGANIZED HEALTH CARE EDUCATION/TRAINING PROGRAM

## 2022-03-31 PROCEDURE — 99291 CRITICAL CARE FIRST HOUR: CPT | Performed by: INTERNAL MEDICINE

## 2022-03-31 PROCEDURE — 97535 SELF CARE MNGMENT TRAINING: CPT

## 2022-03-31 PROCEDURE — 99232 SBSQ HOSP IP/OBS MODERATE 35: CPT | Performed by: INTERNAL MEDICINE

## 2022-03-31 PROCEDURE — 6370000000 HC RX 637 (ALT 250 FOR IP): Performed by: STUDENT IN AN ORGANIZED HEALTH CARE EDUCATION/TRAINING PROGRAM

## 2022-03-31 PROCEDURE — 51701 INSERT BLADDER CATHETER: CPT

## 2022-03-31 PROCEDURE — 2700000000 HC OXYGEN THERAPY PER DAY

## 2022-03-31 PROCEDURE — 82947 ASSAY GLUCOSE BLOOD QUANT: CPT

## 2022-03-31 RX ADMIN — AMIODARONE HYDROCHLORIDE 200 MG: 200 TABLET ORAL at 08:30

## 2022-03-31 RX ADMIN — Medication 500000 UNITS: at 19:49

## 2022-03-31 RX ADMIN — SODIUM CHLORIDE, PRESERVATIVE FREE 10 ML: 5 INJECTION INTRAVENOUS at 08:31

## 2022-03-31 RX ADMIN — DEXMEDETOMIDINE HYDROCHLORIDE 0.2 MCG/KG/HR: 4 INJECTION, SOLUTION INTRAVENOUS at 06:12

## 2022-03-31 RX ADMIN — Medication 500000 UNITS: at 12:32

## 2022-03-31 RX ADMIN — DIPHENHYDRAMINE HCL 25 MG: 25 TABLET ORAL at 19:49

## 2022-03-31 RX ADMIN — SODIUM CHLORIDE, PRESERVATIVE FREE 10 ML: 5 INJECTION INTRAVENOUS at 19:49

## 2022-03-31 RX ADMIN — PANTOPRAZOLE SODIUM 40 MG: 40 TABLET, DELAYED RELEASE ORAL at 08:31

## 2022-03-31 RX ADMIN — STANDARDIZED SENNA CONCENTRATE AND DOCUSATE SODIUM 1 TABLET: 8.6; 5 TABLET ORAL at 19:49

## 2022-03-31 RX ADMIN — POTASSIUM CHLORIDE 20 MEQ: 400 INJECTION, SOLUTION INTRAVENOUS at 09:33

## 2022-03-31 RX ADMIN — MIDODRINE HYDROCHLORIDE 5 MG: 5 TABLET ORAL at 18:33

## 2022-03-31 RX ADMIN — POLYETHYLENE GLYCOL 3350 17 G: 17 POWDER, FOR SOLUTION ORAL at 19:49

## 2022-03-31 RX ADMIN — POLYETHYLENE GLYCOL 3350 17 G: 17 POWDER, FOR SOLUTION ORAL at 08:31

## 2022-03-31 RX ADMIN — ATORVASTATIN CALCIUM 80 MG: 80 TABLET, FILM COATED ORAL at 19:48

## 2022-03-31 RX ADMIN — SPIRONOLACTONE 25 MG: 25 TABLET ORAL at 08:31

## 2022-03-31 RX ADMIN — MIDODRINE HYDROCHLORIDE 5 MG: 5 TABLET ORAL at 12:32

## 2022-03-31 RX ADMIN — CLOPIDOGREL 75 MG: 75 TABLET, FILM COATED ORAL at 08:31

## 2022-03-31 RX ADMIN — POTASSIUM CHLORIDE 20 MEQ: 400 INJECTION, SOLUTION INTRAVENOUS at 08:30

## 2022-03-31 RX ADMIN — MIDODRINE HYDROCHLORIDE 5 MG: 5 TABLET ORAL at 08:31

## 2022-03-31 RX ADMIN — STANDARDIZED SENNA CONCENTRATE AND DOCUSATE SODIUM 1 TABLET: 8.6; 5 TABLET ORAL at 08:31

## 2022-03-31 RX ADMIN — Medication 500000 UNITS: at 18:33

## 2022-03-31 ASSESSMENT — ENCOUNTER SYMPTOMS
ALLERGIC/IMMUNOLOGIC NEGATIVE: 1
COUGH: 0
DIARRHEA: 0
SINUS PAIN: 0
PHOTOPHOBIA: 0
VOMITING: 0
SHORTNESS OF BREATH: 0
ABDOMINAL PAIN: 0
SORE THROAT: 0
TROUBLE SWALLOWING: 0
VOICE CHANGE: 0
EYE REDNESS: 0
TACHYPNEA: 1

## 2022-03-31 ASSESSMENT — PAIN SCALES - WONG BAKER
WONGBAKER_NUMERICALRESPONSE: 0

## 2022-03-31 ASSESSMENT — PAIN SCALES - GENERAL
PAINLEVEL_OUTOF10: 0
PAINLEVEL_OUTOF10: 0

## 2022-03-31 ASSESSMENT — PAIN DESCRIPTION - PROGRESSION: CLINICAL_PROGRESSION: NOT CHANGED

## 2022-03-31 NOTE — PROGRESS NOTES
Physical Therapy  Facility/Department: [facility] 1  Daily Treatment Note  NAME: Meg Lopez  : 1955  MRN: [id number]  No chief complaint on file. Patient is status post coronary bypass graft 3/21/22, subsequently developed ischemic ATN with creatinine peaking about 1.9. Baseline creatinine normal.    Date of Service: 3/31/2022    Discharge Recommendations:  Patient would benefit from continued therapy after discharge        Assessment   Body structures, Functions, Activity limitations: Decreased strength;Decreased endurance;Decreased functional mobility ; Decreased balance; Increased pain;Decreased safe awareness  Assessment: Pt. required maxA x2 for supine<>sitting at EOB this date due to poor alert state, decreased strength, and poor activity tolerance. pt additionally required Prasanth to maintain static sitting due to demonstrated R sided lean. pt continues to show poor activity endurance, strength, and functional mobility, and was further limited this date by agitation in sitting as well as poor participation despite therapist cueing. pt will continue to benefit from skilled PT in order to address aforementioned deficits. PT Education: Goals; Home Exercise Program;General Safety;Transfer Training;Family Education  Patient Education: sternal precautions, HEP and breathing ex  REQUIRES PT FOLLOW UP: Yes  Activity Tolerance  Activity Tolerance: Patient limited by fatigue;Patient limited by endurance;Treatment limited secondary to agitation     Patient Diagnosis(es): The encounter diagnosis was S/P cardiac catheterization. has a past medical history of 3-vessel CAD, Alcohol abuse, Arthritis, Chronic kidney disease, Full dentures, Hypertension, Other emphysema ([address] Pure hypercholesterolemia, Systolic CHF ([address] and Wears glasses. has a past surgical history that includes Hip fracture surgery (Left);  Appendectomy; pr egd transoral biopsy single/multiple (2017); and Coronary artery bypass graft (N/A, state.)     Balance  Posture: Good  Sitting - Static: Fair;-  Sitting - Dynamic: Fair;-  Comments: standing balance not safe to assess this date. sitting balance required Prasanth throughout for safety and to prevent lateral LOB. Exercises  Core Strengthening: pt sat EOB ~5 minutes total. demonstrates L lateral lean through majority of exercise. moderate thoracic kyphosis throughout, and pt unable to self correct. Other exercises  Other exercises?: Yes                        AM-PAC Score  AM-PAC Inpatient Mobility Raw Score : 10 (03/31/22 1553)  AM-PAC Inpatient T-Scale Score : 32.29 (03/31/22 1553)  Mobility Inpatient CMS 0-100% Score: 76.75 (03/31/22 1553)  Mobility Inpatient CMS G-Code Modifier : CL (03/31/22 1553)          Goals  Short term goals  Time Frame for Short term goals: 14 days  Short term goal 1: Pt to transfer sit to stand with min A +1. Short term goal 2: Pt to ambulate 75ft with RW and no LOB. Short term goal 3: Pt to demo good standing balance for decrease fall risk. Short term goal 4: Pt to tolerate 20-30 mins ther ex/act for improved strength and endurance.   Patient Goals   Patient goals : get stronger    Plan    Plan  Times per week: 7x week  Times per day:  (1-2 x day)  Specific instructions for Next Treatment: endurance, Txs to chair, ambulation  Current Treatment Recommendations: Strengthening,Balance Training,Endurance Training,Functional Mobility Training,Transfer Training,Gait Training,Stair training,Neuromuscular Re-education,Home Exercise Program,Safety Education & Training  Safety Devices  Type of devices: Bed alarm in place,Call light within reach,Nurse notified,Gait belt,Patient at risk for falls,Left in bed,All fall risk precautions in place,Telesitter in use  Restraints  Initially in place: Yes  Restraints: x 4 bed rails up     Therapy Time   Individual Concurrent Group Co-treatment   Time In 0214         Time Out 0237         Minutes 23         Timed Code Treatment Minutes: 8 Minutes       LINO Palmer  This treatment/evaluation completed by signing SPT. Signing PT agrees with treatment and documentation.

## 2022-03-31 NOTE — CARE COORDINATION
I called the P2P numbers in chart given by case manement. I left MV regarding P2P and a call back number.

## 2022-03-31 NOTE — PROGRESS NOTES
[facility]  Daily Progress Note    Surgeon: Dr. Isma Lyle   S/P : CABG X 2   POD#: 9  EF: 45%     Subjective:  Mr. Eura Hammans   Resting on bipap alternating with HFNC. No distress. Noncompliance noted often. A&0x4. Pt non compliant with staff. Refusing care. Refusing to walk. Refusing medications A&0x4. Physical Exam  Vital Signs: BP (!) 100/59   Pulse 64   Temp 98.1 °F (36.7 °C) (Axillary)   Resp 24   Ht 6' 3\" (1.905 m)   Wt 169 lb 5 oz (76.8 kg)   SpO2 97%   BMI 21.16 kg/m²  O2 Flow Rate (L/min): 25 L/min   Admit Weight: Weight: 141 lb 5 oz (64.1 kg)   WEIGHTWeight: 169 lb 5 oz (76.8 kg)     General: noncompliant   Heart: Normal S1 and S2.  Regular rhythm. No murmurs, gallops, or rubs. Pacing Wires: Yes -To be clipped prior to discharge  Lungs: clear to auscultation bilaterally and diminished breath sounds bibasilar Chest tubes: Yes, Air Leak: No  Abdomen: soft, non tender, non distended, BS x4  Extremities: negative  Wounds: clean and dry, healing appropriately. Sternum: Healing  SVG sites: Healing  CXR-no appreciated effusions noted. Diffuse consolidation. No pneumothorax noted.      Scheduled Meds:    potassium chloride  40 mEq Oral Once    metoprolol tartrate  12.5 mg Oral BID    midodrine  5 mg Oral TID WC    spironolactone  25 mg Oral Daily    amiodarone  200 mg Oral Daily    polyethylene glycol  17 g Oral BID    nystatin  5 mL Oral 4x Daily    sodium chloride flush  10 mL IntraVENous 2 times per day    clopidogrel  75 mg Oral Daily    sennosides-docusate sodium  1 tablet Oral BID    atorvastatin  80 mg Oral Nightly    pantoprazole  40 mg Oral Daily    tamsulosin  0.4 mg Oral Daily     Continuous Infusions:    dexmedetomidine 0.2 mcg/kg/hr (03/31/22 0612)    sodium chloride Stopped (03/29/22 1653)    dextrose      sodium chloride      sodium chloride         Data:  CBC:   Recent Labs     03/30/22  1007 03/30/22  1247 03/31/22  0425   WBC 12.4* 13.4* 12.5*   HGB 7.2* 7.9* 8.3*   HCT 22.7* 25.5* 26.4*   MCV 86.3 87.0 85.7    296 319     BMP:   Recent Labs     03/30/22  1007 03/30/22  1248 03/31/22  0425    142 136   K 2.8* 3.5* 3.9   CL 97* 101 98   CO2 27 28 26   BUN 21 22 22   CREATININE 0.94 1.13 0.97     PT/INR:   Recent Labs     03/29/22  0455 03/30/22  1007 03/31/22  0425   PROTIME 14.0* 16.4* 13.7*   INR 1.3 1.6 1.3     APTT:   No results for input(s): APTT in the last 72 hours. Chest X-Ray: worsening atelectasis process to left thorax. I/O:  I/O last 3 completed shifts: In: 386.7 [P.O.:270; I.V.:66.5; IV Piggyback:50.2]  Out: 1670 [Urine:1595]    Assessment/Plan:      Diagnosis Date    3-vessel CAD 03/11/2022    Alcohol abuse 06/23/2015    Arthritis     Chronic kidney disease     Full dentures     upper and lower full    Hypertension     Other emphysema ([address]     COPD stage II on PFTs with severe reduction diffusion capacity    Pure hypercholesterolemia 44/34/6915    Systolic CHF ([address]     Wears glasses        Beta-Blocker: yes  ASA: yes  Plavix: yes  GI: yes  Statin: yes  Coumadin: no  ACE-I: no  EF: 45%     Oxygen as needed to maintain SpO2 > 92%  o Keep HFNC in place   Chest x-ray daily-stable   Document when not taking meds. Or complaint with NC-pt is A&0x4. [facility] consulted   Optimize lung function with resp. Treatment around the clock, aggressive Bipap and IS use.  Please encourage Bipap as his lungs worse on left side. Please encourage IS and acapella and metaneb treatment today   o When off bipap please use HFNC  o Lungs are poor status.     Encourage incentive spirometry, acapella and ambulation    Replace electrolytes as needed per sliding scale and recheck per policy   Case Management consult for discharge planning await choices  o precert  [facility],advanced specialty, [address] [place], [place] [address]    The above recommendations including medications and orders were discussed and agreed upon with Dr. Aj Alvares, the attending on service for the cardiothoracic surgery group today. Electronically signed by JACKELYN Jones NP on 3/31/2022 at 7:40 AM    On this date 3/24/2022 I have spent 27 minutes reviewing previous notes, test results and face to face with the patient discussing the diagnosis and importance of compliance with the treatment plan as well as documenting on the day of the visit. At least 50% of the time documented was spent with the patient to provide counseling and/or coordination of care. This note was created with the assistance of a speech-recognition program.  Although the intention is to generate a document that actually reflects the content of the visit, no guarantees can be provided that every mistake has been identified and corrected by editing. Note was updated later by me after  physical examination and  completion of the assessment.

## 2022-03-31 NOTE — PROGRESS NOTES
Multiple attempts to get patient to do incentive spirometer and acapella failed d/t patient refusal. Multiple physicians aware.

## 2022-03-31 NOTE — PLAN OF CARE
Problem: Pain:  Goal: Pain level will decrease  Description: Pain level will decrease  3/31/2022 1221 by Olimpia Roblero RN  Outcome: Ongoing  3/31/2022 0911 by Olimpia Roblero RN  Outcome: Ongoing  3/31/2022 0512 by Natalio Starkey  Outcome: Ongoing  Goal: Control of acute pain  Description: Control of acute pain  3/31/2022 1221 by Olimpia Roblero RN  Outcome: Ongoing  3/31/2022 0911 by Olimpia Roblero RN  Outcome: Ongoing  3/31/2022 0512 by Natalio Starkey  Outcome: Ongoing  Goal: Control of chronic pain  Description: Control of chronic pain  3/31/2022 1221 by Olimpia Roblero RN  Outcome: Ongoing  3/31/2022 0911 by Olimpia Roblero RN  Outcome: Ongoing  3/31/2022 0512 by Natalio Starkey  Outcome: Ongoing     Problem: Skin Integrity:  Goal: Will show no infection signs and symptoms  Description: Will show no infection signs and symptoms  3/31/2022 1221 by Olimpia Roblero RN  Outcome: Ongoing  3/31/2022 0911 by Olimpia Roblero RN  Outcome: Ongoing  3/31/2022 0512 by Natalio Starkey  Outcome: Ongoing  Goal: Absence of new skin breakdown  Description: Absence of new skin breakdown  3/31/2022 1221 by Olimpia Roblero RN  Outcome: Ongoing  3/31/2022 0911 by Olimpia Roblero RN  Outcome: Ongoing  3/31/2022 0512 by Natalio Starkey  Outcome: Ongoing     Problem: Falls - Risk of:  Goal: Will remain free from falls  Description: Will remain free from falls  3/31/2022 1221 by Olimpia Roblero RN  Outcome: Ongoing  3/31/2022 0911 by Olimpia Roblero RN  Outcome: Ongoing  3/31/2022 0512 by Natalio Starkey  Outcome: Ongoing  Goal: Absence of physical injury  Description: Absence of physical injury  3/31/2022 1221 by Olimpia Roblero RN  Outcome: Ongoing  3/31/2022 0911 by Olimpia Roblero RN  Outcome: Ongoing  3/31/2022 0512 by Natalio Starkey  Outcome: Ongoing     Problem: Nutrition  Goal: Optimal nutrition therapy  3/31/2022 1221 by Olimpia Roblero RN  Outcome: Ongoing  3/31/2022 0911 by Olimpia Rbolero RN  Outcome: Ongoing  3/31/2022 0512 by Natalio Starkey  Outcome: Ongoing Absence of falls  3/31/2022 1221 by Aki Renee RN  Outcome: Ongoing  3/31/2022 0911 by Aki Renee RN  Outcome: Ongoing  3/31/2022 0512 by Ousmanenorma Melo  Outcome: Ongoing

## 2022-03-31 NOTE — PROGRESS NOTES
Scheduled Meds:    potassium chloride  40 mEq Oral Once    metoprolol tartrate  12.5 mg Oral BID    midodrine  5 mg Oral TID WC    spironolactone  25 mg Oral Daily    amiodarone  200 mg Oral Daily    polyethylene glycol  17 g Oral BID    nystatin  5 mL Oral 4x Daily    sodium chloride flush  10 mL IntraVENous 2 times per day    clopidogrel  75 mg Oral Daily    sennosides-docusate sodium  1 tablet Oral BID    atorvastatin  80 mg Oral Nightly    pantoprazole  40 mg Oral Daily    tamsulosin  0.4 mg Oral Daily     Continuous Infusions:    dexmedetomidine 0.2 mcg/kg/hr (22 0612)    sodium chloride Stopped (22 1653)    dextrose      sodium chloride      sodium chloride       PRN Meds:  LORazepam, albuterol, bisacodyl, sodium chloride flush, sodium chloride, ondansetron **OR** ondansetron, oxyCODONE-acetaminophen **OR** oxyCODONE-acetaminophen, fentanNYL **OR** fentanNYL, hydrALAZINE, metoprolol, diphenhydrAMINE, potassium chloride, magnesium sulfate, albumin human, glucose, dextrose, glucagon (rDNA), dextrose, sodium chloride, sodium chloride    Input/Output:       I/O last 3 completed shifts: In: 386.7 [P.O.:270; I.V.:66.5; IV Piggyback:50.2]  Out: 1595 [Urine:1595].       Patient Vitals for the past 96 hrs (Last 3 readings):   Weight   22 0510 169 lb 5 oz (76.8 kg)   22 0600 176 lb (79.8 kg)   22 1200 178 lb 9.2 oz (81 kg)       Vital Signs:   Temperature:  Temp: 99 °F (37.2 °C)  TMax:   Temp (24hrs), Av °F (36.7 °C), Min:96.6 °F (35.9 °C), Max:99.1 °F (37.3 °C)    Respirations:  Resp: 24  Pulse:   Pulse: 58  BP:    BP: (!) 89/58  BP Range: Systolic (40QYK), ZS , Min:79 , ZU       Diastolic (10SFX), LZE:82, Min:51, Max:72      Physical Examination:     General:  Sedated on Precedex, on oxygen by face mask  HEENT: Moist mucous membranes   Neck:   No JVD, no accessory muscle use noted  Chest:  Bilateral vesicular breath sounds, basal crackles   cardiac:  S1 S2 RR, no murmurs, gallops or rubs, JVP not raised. Abdomen: Soft, Not significantly distended, active bowel sounds. Neuro:  sedated  SKIN:  No rashes, good skin turgor. Extremities:  No edema, palpable peripheral pulses, diminished muscle mass    Labs:       Recent Labs     03/30/22  1007 03/30/22  1247 03/31/22  0425   WBC 12.4* 13.4* 12.5*   RBC 2.63* 2.93* 3.08*   HGB 7.2* 7.9* 8.3*   HCT 22.7* 25.5* 26.4*   MCV 86.3 87.0 85.7   MCH 27.4 27.0 26.9   MCHC 31.7 31.0 31.4   RDW 15.9* 15.9* 15.9*    296 319   MPV 11.0 10.9 11.0      BMP:   Recent Labs     03/30/22  1007 03/30/22  1248 03/31/22  0425    142 136   K 2.8* 3.5* 3.9   CL 97* 101 98   CO2 27 28 26   BUN 21 22 22   CREATININE 0.94 1.13 0.97   GLUCOSE 89 79 128*   CALCIUM 7.9* 8.4* 8.2*      Phosphorus:   No results for input(s): PHOS in the last 72 hours. Magnesium:    Recent Labs     03/29/22  0455 03/30/22  1007   MG 1.8 1.7     Albumin:  No results for input(s): LABALBU in the last 72 hours. BNP:      Lab Results   Component Value Date    BNP 70 09/13/2013     ANDREIA:    No results found for: ANDREIA  SPEP:  Lab Results   Component Value Date    PROT 7.1 03/09/2022    PATH ELECTRONICALLY SIGNED.  Anabel Chang M.D. 03/26/2022     UPEP:   No results found for: LABPE  C3:     Lab Results   Component Value Date    C3 67 03/26/2022     C4:     Lab Results   Component Value Date    C4 10 03/26/2022     MPO ANCA:   No results found for: MPO  PR3 ANCA:   No results found for: PR3  Anti-GBM:   No results found for: GBMABIGG  Hep BsAg:         Lab Results   Component Value Date    HEPBSAG NONREACTIVE 03/26/2022     Hep C AB:          Lab Results   Component Value Date    HEPCAB NONREACTIVE 03/26/2022       Urinalysis/Chemistries:      Lab Results   Component Value Date    NITRU NEGATIVE 03/26/2022    COLORU Dark Yellow 03/26/2022    PHUR 5.0 03/26/2022    WBCUA 0 TO 2 03/26/2022    RBCUA 10 TO 20 03/26/2022    MUCUS 1+ 03/26/2022    CLARITYU clear 10/27/2016    SPECGRAV 1.023 03/26/2022    LEUKOCYTESUR TRACE 03/26/2022    UROBILINOGEN Normal 03/26/2022    BILIRUBINUR NEGATIVE  Verified by ictotest. 03/26/2022    BILIRUBINUR neg 10/27/2016    BLOODU neg 10/27/2016    GLUCOSEU NEGATIVE 03/26/2022    KETUA TRACE 03/26/2022     Urine Sodium:     Lab Results   Component Value Date    SELENA 20 03/26/2022     Urine Potassium:  No results found for: KUR  Urine Chloride:  No results found for: CLUR  Urine Osmolarity: No results found for: OSMOU  Urine Protein:   No components found for: TOTALPROTEIN, URINE   Urine Creatinine:     Lab Results   Component Value Date    LABCREA 226.4 03/26/2022     Urine Eosinophils:  No components found for: UEOS    Radiology:     CXR:     Assessment:     1. Acute Kidney Injury: Secondary to ischemic ATN from hypoperfusion post coronary bypass graft and arrhythmia post procedure. Creatinine plateaued at 1.9 now resolving down to 0.9 baseline 1.0  2. Decompensated left heart failure, status post coronary bypass graft and acute kidney injury resolving, preserved ejection fraction  3. Status post coronary bypass graft preserved ejection fraction  4. Pulmonary hypertension  5. History of substance abuse and heavy alcohol use  6. Hypokalemia, in part secondary to diuretic use, is on spironolactone  7. High postvoid residuals after Hancock was removed    Plan:   1. Keep off loop diuretics for now   2. Replace potassium, intravenously by scale as needed  3. Continue Aldactone 25 daily  4. Continue midodrine 5 mg oral three times daily and hold for systolic of more than 094  5. Check bladder scan and if postvoid residual is more than 200 mL continue straight catheterizations however it may be better off putting in a Hancock if he continues to have retention. We will leave the decision up to cardiothoracic surgery  6.   Nothing else to add will sign off please call for questions    Nutrition     Avoid nephrotoxic drugs/contrast exposure. We will continue to follow along with you.

## 2022-03-31 NOTE — CARE COORDINATION
Received message from [facility] that LTACH has been denied. A peer to peer can be completed within 3 days - [phone]. Option for an expedited appeal is available - [phone]. Reference number: [id number]    [phone] - Left message for Syed Ramirez NP, that [facility] was denied by insurance, options available for peer to peer or expedited appeal.    [phone] - Placed call to Louisa Vaz 8141 at [facility], she states that the [facility] and [place] facilities can accommodate high-flow up to 10L. Pt is on 20L fiO2 40%. [address] [facility] call to Syed Ramirez NP, and he states that he will do a peer to peer. [phone] - Placed call to Hitesh English at [address] [place] [address] and per Hitesh English, the reason that [facility] was denied is that pt can just stay where he is. Per Lizeth Gastelum sent a letter stating this, she will forward letter to Yesi Chew, manager, case management. 5[address] [facility] call to pt's daughter, Estevan Andrews, to obtain possible SNF choice, either [facility] or [place] as facilities can accommodate high-flow up to 10L.    0053 - Per Syed Ramirez, he tried twice to do a peer to peer, no one was available at [facility].

## 2022-03-31 NOTE — PROGRESS NOTES
Occupational Therapy  Facility/Department: [facility] CAR 1  Daily Treatment Note  NAME: Boo Kerr  : 1955  MRN: [id number]    Date of Service: 3/31/2022    Discharge Recommendations:  Patient would benefit from continued therapy after discharge       Assessment   Performance deficits / Impairments: Decreased ADL status; Decreased functional mobility ; Decreased strength;Decreased safe awareness;Decreased endurance;Decreased balance;Decreased high-level IADLs;Decreased cognition  Assessment: Pt would benefit from continued acute care and post acute care OT to address above listed deficits. Treatment Diagnosis: CABG X2  Prognosis: Good  REQUIRES OT FOLLOW UP: Yes  Activity Tolerance  Activity Tolerance: Patient limited by fatigue;Treatment limited secondary to decreased cognition  Safety Devices  Safety Devices in place: Yes  Type of devices: Bed alarm in place;Call light within reach; Patient at risk for falls; Left in bed;Nurse notified, Shelley Escalante in room         Patient Diagnosis(es): The encounter diagnosis was S/P cardiac catheterization. has a past medical history of 3-vessel CAD, Alcohol abuse, Arthritis, Chronic kidney disease, Full dentures, Hypertension, Other emphysema ([address] Pure hypercholesterolemia, Systolic CHF ([address] and Wears glasses. has a past surgical history that includes Hip fracture surgery (Left); Appendectomy; pr egd transoral biopsy single/multiple (2017); and Coronary artery bypass graft (N/A, 3/21/2022).     Restrictions  Restrictions/Precautions  Restrictions/Precautions: Cardiac,Surgical Protocols,Fall Risk,Up as Tolerated  Required Braces or Orthoses?: Yes (Heart Hugger)  Implants present? : Metal implants (left hip)  Required Braces or Orthoses  Other: Heart Hugger Brace  Position Activity Restriction  Sternal Precautions: 5# Lifting Restrictions  Sternal Precautions: CABG x23/21/22  Other position/activity restrictions: High flow, IV  Subjective   General  Patient assessed for rehabilitation services?: Yes  Family / Caregiver Present: No  Diagnosis: CABGx2 on 3/21/22, NSTEMI, EF 34%, hx of ETOH/drug use  Pain Assessment  Eaton-Taylor Pain Rating: No hurt (No S/S or grimacing noted)  Clinical Progression: Not changed  Response to Pain Intervention: Patient Satisfied  Vital Signs  Patient Currently in Pain: Denies   Orientation  Orientation  Overall Orientation Status:  (Unable to assess d/t lethargy)  Objective    ADL  Feeding: Minimal assistance;Setup; Dentures; Increased time to complete (assist w/opening some containers and able to feed self)  Grooming: Maximum assistance;Setup;Verbal cueing; Increased time to complete (hand over hand assist to wash face seated at EOB)  UB Dressing: Maximum assistance;Setup;Verbal cueing; Increased time to complete (assist to tie gown and don heart hugger correctly while seated at EOB)  LB Dressing: Maximum assistance;Setup;Verbal cueing; Increased time to complete (assist to don footies supine in bed)      Pt in bed upon arrival. Sup to sit to EOB w/max x2. Pt needed min assist throughout to maintain sitting balance d/t lethargy. Verbal cues and hand over hand assist to wash face w/poor carryover. Pt kept head down, following minimal commands. Pt retired back to supine. Bed alarm and bed rails up. Pt restrained earlier this date d/t agitation RN had doffed prior to OT arrival and ok to leave wrist restraints off.        Balance  Sitting Balance: Minimal assistance (seated at EOB approx 15 min w/assist to maintain sitting balance d/t lethargy)  Standing Balance: Unable to assess (d/t lethargy & would not be safe to attempt)  Bed mobility  Supine to Sit: Maximum assistance;2 Person assistance  Sit to Supine: 2 Person assistance;Maximum assistance  Scooting: Maximal assistance      Plan   Plan  Times per week: 4-6x (CABG)  Current Treatment Recommendations: Self-Care / ADL,Strengthening,Balance Training,Functional Mobility Pb [place] Management,Safety Education & Training,Patient/Caregiver Education & Training,Equipment Evaluation, Education, & procurement,Home Management Training    AM-PAC Score  AM-PAC Inpatient Daily Activity Raw Score: 13 (03/31/22 1449)  AM-PAC Inpatient ADL T-Scale Score : 32.03 (03/31/22 1449)  ADL Inpatient CMS 0-100% Score: 63.03 (03/31/22 1449)  ADL Inpatient CMS G-Code Modifier : CL (03/31/22 1449)    Goals  Short term goals  Time Frame for Short term goals: Pt will by discharge  Short term goal 1: identify/maintain all sternal precautions with 1 vc  Short term goal 2: demo ADL UB bathing/dressing activity at SBA and increased time, including heart hugger  Short term goal 3: demo ADL LB bathing/dressing activity at min A and increased time, using sock-aid/reacher PRN  Short term goal 4: demo good safety awareness druing func mob around room at min A, RW PRN, and 1 cue  Short term goal 5: demo SBA for all bed mobility/func transfers using bed rails/LRD PRN     Therapy Time   Individual Concurrent Group Co-treatment   Time In  1412         Time Out  1436         Minutes  12 total tx time      12 co-tx time           SHMUEL SÁNCHEZ/ALAINA

## 2022-03-31 NOTE — PLAN OF CARE
Problem: Pain:  Goal: Pain level will decrease  Description: Pain level will decrease  3/31/2022 0911 by Charlotte Montano RN  Outcome: Ongoing  3/31/2022 0512 by Nichelle Carreno  Outcome: Ongoing  Goal: Control of acute pain  Description: Control of acute pain  3/31/2022 0911 by Charlotte Montano RN  Outcome: Ongoing  3/31/2022 0512 by Nichelle Carreno  Outcome: Ongoing  Goal: Control of chronic pain  Description: Control of chronic pain  3/31/2022 0911 by Charlotte Montano RN  Outcome: Ongoing  3/31/2022 0512 by Nichelle Carreno  Outcome: Ongoing     Problem: Skin Integrity:  Goal: Will show no infection signs and symptoms  Description: Will show no infection signs and symptoms  3/31/2022 0911 by Charlotte Montano RN  Outcome: Ongoing  3/31/2022 0512 by Nichelle Carreno  Outcome: Ongoing  Goal: Absence of new skin breakdown  Description: Absence of new skin breakdown  3/31/2022 0911 by Charlotte Montano RN  Outcome: Ongoing  3/31/2022 0512 by Nichelle Carreno  Outcome: Ongoing     Problem: Falls - Risk of:  Goal: Will remain free from falls  Description: Will remain free from falls  3/31/2022 0911 by Charlotte Montano RN  Outcome: Ongoing  3/31/2022 0512 by Nichelle Carreno  Outcome: Ongoing  Goal: Absence of physical injury  Description: Absence of physical injury  3/31/2022 0911 by Charlotte Montano RN  Outcome: Ongoing  3/31/2022 0512 by Nichelle Carreno  Outcome: Ongoing     Problem: Nutrition  Goal: Optimal nutrition therapy  3/31/2022 0911 by Charlotte Montano RN  Outcome: Ongoing  3/31/2022 0512 by Nichelle Carreno  Outcome: Ongoing     Problem: Cardiac:  Goal: Ability to maintain vital signs within normal range will improve  Description: Ability to maintain vital signs within normal range will improve  3/31/2022 0911 by Charlotte Montano RN  Outcome: Ongoing  3/31/2022 0512 by Nichelle Carreno  Outcome: Ongoing  Goal: Cardiovascular alteration will improve  Description: Cardiovascular alteration will improve  3/31/2022 0911 by Charlotte Montano RN  Outcome: Ongoing  3/31/2022

## 2022-03-31 NOTE — PROGRESS NOTES
PULMONARY & CRITICAL CARE MEDICINE PROGRESS NOTE     Patient:  Rayne Cool  MRN: [id number]  Admit date: 3/11/2022  Primary Care Physician: Madyson Arzola MD  Consulting Physician: Angel Saleem MD  CODE Status: Full Code  LOS: 20    SUBJECTIVE     CHIEF COMPLAINT/REASON FOR INITIAL CONSULT:    COPD/chronic cough/preoperative evaluation    BRIEF HOSPITAL COURSE:  The patient is a 74 y.o. male history of hypertension, history of mild systolic dysfunction admitted with chest pain initially to [facility] on 03/09/2022 non-ST elevation MI ruled out by troponins in 20s had a stress test done because of high suspicion which shows ischemia of lateral septal wall with ejection fraction of 34%.  He does have history of cocaine use and apparently he had used cocaine before admission to [facility].  He was transferred to [place] for cardiac catheterization which shows multivessel coronary artery disease with EF of 30% including left main disease.  He remains on a statin and heparin.  CT surgery was consulted for CABG.    Patient had pulmonary function test done which shows FEV1 of 46% with significance positive bronchodilator FEV1 of 56% with severe reduction in diffusion capacity of less than 30% with air trapping/hyperinflation. CT scan of the chest shows areas of blebs and bulla medially and centrilobular and paraseptal emphysema on CT scan. Patient does complain of shortness of breath on mild activity and exertion.  He does have chronic cough without no change denies increased wheezing currently denies chest pain.  He has history of cocaine use.  Does have history of smoking patient is not able to tell me that how many cigarettes he used to smoke but he claimed that now he smoke 1 pack/week and had been smoking for almost 50 years. INTERVAL HISTORY:  03/31/22     Patient was seen and examined. Patient is on high flow nasal oxygen alternating with BiPAP.   Patient seems to be calm today. Nurse said he still noncompliant and refusing care. He is alert and oriented x4 for me. Patient remains on Precedex 0.2. Patient denied any symptoms. Hypokalemia resolved. He restarted his p.o. medication. IV Lasix still on hold. REVIEW OF SYSTEMS:    Review of Systems   Constitutional: Positive for activity change and fatigue. Negative for appetite change. HENT: Negative for postnasal drip, sinus pain, sore throat, trouble swallowing and voice change. Eyes: Negative for photophobia, redness and visual disturbance. Respiratory: Negative for cough and shortness of breath. Cardiovascular: Negative for chest pain and leg swelling. Gastrointestinal: Negative for abdominal pain, diarrhea and vomiting. Endocrine: Negative. Genitourinary: Negative for dysuria and hematuria. Musculoskeletal: Negative. Allergic/Immunologic: Negative. Neurological: Negative for dizziness, syncope, speech difficulty and headaches. Hematological: Negative for adenopathy. Does not bruise/bleed easily. Psychiatric/Behavioral: Negative for agitation, behavioral problems and confusion.      OBJECTIVE     Ventilator Settings:  Vent Information  $Ventilation: $Subsequent Day  Skin Assessment: Clean, dry, & intact  Equipment ID:  ([email or web address])  Equipment Changed: HME  Vent Type: Servo i  Vent Mode: (S) CPAP (SBT)  Vt Ordered: 600 mL  Rate Set: 16 bmp  Pressure Support: 8 cmH20  FiO2 : 50 %  SpO2: 95 %  SpO2/FiO2 ratio: 190  Sensitivity: 5  PEEP/CPAP: 8  I Time/ I Time %: 0.9 s  Humidification Source: Heated wire  Humidification Temp: 33  Humidification Temp Measured: 33  Circuit Condensation: Drained  Nitric Oxide/Epoprostenol In Use?: No  Mask Type: Full face mask  Mask Size: Medium    VITAL SIGNS:   LAST-  BP (!) 100/59   Pulse 64   Temp 99.1 °F (37.3 °C) (Oral)   Resp 21   Ht 6' 3\" (1.905 m)   Wt 169 lb 5 oz (76.8 kg)   SpO2 95%   BMI 21.16 kg/m²   8-24 HR RANGE-  TEMP Temp  Av.8 °F (36.6 °C)  Min: 96.6 °F (35.9 °C)  Max: 99.1 °F (82.9 °C)   BP Systolic (81QUY), VNR:40 , Min:78 , VEJ:835      Diastolic (79SGT), WVA:69, Min:55, Max:72     PULSE Pulse  Av  Min: 56  Max: 108   RR Resp  Av.5  Min: 18  Max: 24   O2 SAT SpO2  Av.8 %  Min: 94 %  Max: 100 %   OXYGEN DELIVERY O2 Flow Rate (L/min)  Av L/min  Min: 25 L/min  Max: 25 L/min     SYSTEMIC EXAMINATION:   General appearance -arousable and awake, chronically ill-appearing follows commands intermittently look very weak  Mental status -alert and awake and oriented  Eyes - pupils equal and reactive sluggish, sclera anicteric  Mouth -oral mucosa look moist  Neck - supple, no significant adenopathy, carotids upstroke normal bilaterally, no bruits. Right IJ catheter present. Chest -bilateral basilar breath sounds decreased. Scattered rhonchi present. Midsternal scar present.   Heart -regular S1-S2, regular rhythm, normal S1, S2, no murmurs, clicks or gallops  Abdomen - soft, nontender, nondistended, no masses or organomegaly  Extremities - peripheral pulses normal, no pedal edema, no clubbing or cyanosis  Skin - normal coloration and turgor, no rashes, no suspicious skin lesions noted     DATA REVIEW     Medications: Current Inpatient  Scheduled Meds:   potassium chloride  40 mEq Oral Once    metoprolol tartrate  12.5 mg Oral BID    midodrine  5 mg Oral TID     spironolactone  25 mg Oral Daily    amiodarone  200 mg Oral Daily    polyethylene glycol  17 g Oral BID    nystatin  5 mL Oral 4x Daily    sodium chloride flush  10 mL IntraVENous 2 times per day    clopidogrel  75 mg Oral Daily    sennosides-docusate sodium  1 tablet Oral BID    atorvastatin  80 mg Oral Nightly    pantoprazole  40 mg Oral Daily    tamsulosin  0.4 mg Oral Daily     Continuous Infusions:   dexmedetomidine 0.2 mcg/kg/hr (22 0612)    sodium chloride Stopped (22 5536)    dextrose      sodium chloride      sodium chloride INPUT/OUTPUT:  In: 266.7 [P.O.:150; I.V.:66.5]  Out: 790 [Urine:790]  Date 03/31/22 0000 - 03/31/22 2359   Shift 8003-6881 9502-7495 1893-6673 24 Hour Total   INTAKE   P.O.(mL/kg/hr) 150(0.2)   150   I. V.(mL/kg) 22.2(0.3)   22.2(0.3)   Shift Total(mL/kg) 172.2(2.2)   172.2(2.2)   OUTPUT   Urine(mL/kg/hr) 350(0.6)   350   Shift Total(mL/kg) 350(4.6)   350(4.6)   Weight (kg) 76.8 76.8 76.8 76.8       LABS:-  ABG:   No results for input(s): POCPH, POCPCO2, POCPO2, POCHCO3, BREG7KMK in the last 72 hours. CBC:   Recent Labs     03/30/22  1007 03/30/22  1247 03/31/22 0425   WBC 12.4* 13.4* 12.5*   HGB 7.2* 7.9* 8.3*   HCT 22.7* 25.5* 26.4*   MCV 86.3 87.0 85.7    296 319   LYMPHOPCT 11* 13* 16*   RBC 2.63* 2.93* 3.08*   MCH 27.4 27.0 26.9   MCHC 31.7 31.0 31.4   RDW 15.9* 15.9* 15.9*     BMP:   Recent Labs     03/30/22  1007 03/30/22  1248 03/31/22  0425    142 136   K 2.8* 3.5* 3.9   CL 97* 101 98   CO2 27 28 26   BUN 21 22 22   CREATININE 0.94 1.13 0.97   GLUCOSE 89 79 128*     Liver Function Test:   No results for input(s): PROT, LABALBU, ALT, AST, GGT, ALKPHOS, BILITOT in the last 72 hours. Amylase/Lipase:  No results for input(s): AMYLASE, LIPASE in the last 72 hours. Coagulation Profile:   Recent Labs     03/29/22  0455 03/30/22  1007 03/31/22  0425   INR 1.3 1.6 1.3   PROTIME 14.0* 16.4* 13.7*     Cardiac Enzymes:  No results for input(s): CKTOTAL, CKMB, CKMBINDEX, TROPONINI in the last 72 hours.   Lactic Acid:  Lab Results   Component Value Date    LACTA NOT REPORTED 03/11/2018    LACTA NOT REPORTED 03/10/2018    LACTA NOT REPORTED 03/10/2018     BNP:   Lab Results   Component Value Date    BNP 70 09/13/2013     D-Dimer:  No results found for: DDIMER  Others:   No results found for: TSH, Q7CBFAJ, B5BAECU, THYROIDAB, FT3, T4FREE  Lab Results   Component Value Date    SEDRATE 20 (H) 05/21/2014    CRP 15.2 (H) 05/21/2014     No results found for: Rendell Shirts  No results found for: IRON, TIBC, FERRITIN  No results found for: SPEP, UPEP  Lab Results   Component Value Date    PSA 1.11 11/04/2016       Microbiology:  No results for input(s): SPECDESC, SPECDESC, SPECIAL, CULTURE, CULTURE, STATUS, ORG, CDIFFTOXPCR, CAMPYLOBPCR, SALMONELLAPC, SHIGAPCR, SHIGELLAPCR, MPNEUG, MPNEUM, LACTOQL in the last 72 hours. Pathology:    Radiology Reports:  XR CHEST PORTABLE   Final Result   No significant interval change. XR CHEST PORTABLE   Final Result   Slight increase in pulmonary vascularity compared to prior study 1 day   earlier. Stable cardiomegaly with bilateral effusions. For findings as   above. XR CHEST PORTABLE   Final Result   Little change from prior study. Postoperative changes. Support tubes and   lines as above. Small bilateral effusions and bibasilar atelectasis. XR CHEST PORTABLE   Final Result   Multiple tubes lines as above. Continued left effusion and basilar   atelectasis with vascular congestion. No extrapleural air. XR CHEST PORTABLE   Final Result   1. Stable positions of support apparatus. 2. Stable bibasilar airspace disease, atelectasis favored over pneumonia. 3. Stable small bilateral pleural effusions. US RENAL COMPLETE   Final Result      1. Borderline small right kidney but otherwise sonographically unremarkable   appearing kidneys. 2.  Mild to moderate amount of ascites was noted. XR CHEST PORTABLE   Final Result   Bibasilar atelectasis and pleural effusion more pronounced on the left. No   significant change. Stable support tubes and line. XR CHEST PORTABLE   Final Result   Overall improvement seen in the aeration lung bases. The remainder of lines   and tubes in stool are stable. No definite pneumothorax on the left. XR CHEST PORTABLE   Final Result   1. Lines and tubes as described above. 2.  Increasing right pleural effusion and right basilar airspace disease.    Stable left pleural effusion. XR CHEST PORTABLE   Final Result   1. Support lines and tubes remain in place. 2.  Bibasilar opacities persist, slightly increased on the right, suggesting   layering pleural effusions and atelectasis. 3.  Mildly increased vascular congestion, possibly accentuated by supine   positioning. XR CHEST PORTABLE   Final Result   Stable chest (suspect right pleural effusion in addition to left effusion   and/or atelectasis) with line and tube placements as detailed above. XR CHEST PORTABLE   Final Result   As compared to prior examination, the ET tube now is in satisfactory   position. Examination otherwise is unchanged. XR CHEST PORTABLE   Final Result   1. Endotracheal tube is low in position, approximately 1 cm above the   veronique. This should be pulled back approximately 3 cm. 2.  Other lines and tubes are unchanged in position. 3.  New small right pleural effusion. 4.  Unchanged left basilar opacity. XR CHEST PORTABLE   Final Result   1. Lines and tubes are unchanged in position. 2. Linear bibasilar opacities are similar prior study, atelectasis versus   pneumonia. XR CHEST PORTABLE   Final Result   All tubes and catheters are in good position. No evidence for pneumothorax. Bilateral basal atelectasis noted. VL DUP UPPER EXTREMITY ARTERIES BILATERAL   Final Result      VL Vein Mapping Lower Bilateral   Final Result      VL DUP CAROTID BILATERAL   Final Result      CT CHEST WO CONTRAST   Final Result   1. Extensive centrilobular and paraseptal emphysema. 2. Extensive atherosclerotic disease, particularly along the coronary   arteries. 3. There are areas of atelectasis in the lingula. There is collapse of the   right middle lobe   4. No focal lung infiltrate.          XR CHEST PORTABLE    (Results Pending)       Echocardiogram:   Results for orders placed during the hospital encounter of 03/11/22    Echocardiogram Complete 2D w Doppler w Color    Narrative  Transthoracic Echocardiography Report (TTE)    Patient Name Guillermo Menard       Date of Study           03/13/2022  [facility]    Date of      1955  Gender                  Male  Birth    Age          74 year(s)  Race                    Black    Room Number  2107        Height:                 75 inch, 190.5 cm    Corporate ID [id number]    Weight:                 136 pounds, 61.7 kg  #    Patient Acct [de-identified]   BSA:        1.86 m^2    BMI:        17 kg/m^2  #    MR #         [id number]      Sonographer             Hung Frias    Accession #  [id number]  Interpreting Physician  Vinh Chau    Fellow                   Referring Nurse  Practitioner    Interpreting             Referring Physician     Luan Medina. Hector Munson,  Fellow                                           APRN-NP    Type of Study    TTE procedure:2D Echocardiogram, M-Mode, Doppler, Color Doppler. Procedure Date  Date: 03/13/2022 Start: 12:17 PM    Study Location: [facility]  Technical Quality: Fair visualization    Indications:Multi vessel CAD and Pre-Op CABG. History / Tech. Comments:  Procedure explained to patient. Study done at the bedside. STAT call in    Patient Status: Inpatient    Height: 75 inches Weight: 136 pounds BSA: 1.86 m^2 BMI: 17 kg/m^2    Allergies  - *No Known Allergies. CONCLUSIONS    Summary  Global left ventricular systolic function is normal. Estimated EF 38-82%  Normal diastolic filling. Normal right ventricular size and function. Mild mitral regurgitation. Mild tricuspid regurgitation. Mild pulmonary hypertension with an estimated right ventricular systolic  pressure of 41 mmHg. No pericardial effusion.     Signature  ----------------------------------------------------------------------------  Electronically signed by Vesna Shaffer(Sonographer) on 03/13/2022  12:51 PM  ----------------------------------------------------------------------------    ----------------------------------------------------------------------------  Electronically signed by Vinh Chau(Interpreting physician) on  2022 09:19 AM  ----------------------------------------------------------------------------  FINDINGS  Left Atrium  Left atrium is normal in size. Left Ventricle  Left ventricle is normal in size. Global left ventricular systolic function  is normal. Calculated EF via Parks's method is 48 %. Mild septal hypertrophy. Normal diastolic filling. Right Atrium  Right atrium is normal in size. Right Ventricle  Normal right ventricular size and function. Mitral Valve  Normal mitral valve structure. Mild mitral regurgitation. No mitral stenosis. Aortic Valve  Aortic valve structure and function normal.  Aortic valve is trileaflet. No aortic insufficiency. No aortic stenosis. Tricuspid Valve  Normal tricuspid valve leaflets. Mild tricuspid regurgitation. No tricuspid stenosis. Mild pulmonary hypertension with an estimated right ventricular systolic  pressure of 41 mmHg. Pulmonic Valve  Pulmonic valve is normal in structure and function. No pulmonic insufficiency. No evidence of pulmonic stenosis. Pericardial Effusion  No pericardial effusion. Miscellaneous  Normal aortic root dimension. E/E' average = 11.0. IVC normal diameter & inspiratory collapse indicating normal RA filling  pressure .     M-mode / 2D Measurements & Calculations:    LVIDd:4.9 cm(3.7 - 5.6 cm)       Diastolic JCYYCT:05.74 ml  LVIDs:3.8 cm(2.2 - 4.0 cm)       Systolic HATPDD:97.96 ml  IVSd:1.2 cm(0.6 - 1.1 cm)        Aortic Root:3.3 cm(2.0 - 3.7 cm)  LVPWd:0.8 cm(0.6 - 1.1 cm)       LA Dimension: 2.9 cm(1.9 - 4.0 cm)  Fractional Shortenin.45 %    LA volume/Index: 35.47 ml /19m^2  Calculated LVEF (%): 49.08 %     LVOT:2.2 cm  RVDd:3.5 cm    Mitral: Aortic    Valve Area (P1/2-Time): 2.78 cm^2       Peak Velocity: 1.26 m/s  Peak E-Wave: 0.87 m/s                   Mean Velocity: 0.84 m/s  Peak A-Wave: 0.67 m/s                   Peak Gradient: 6.35 mmHg  E/A Ratio: 1.29                         Mean Gradient: 4 mmHg  Peak Gradient: 2.99 mmHg  Mean Gradient: 1 mmHg  Deceleration Time: 258 msec             Area (continuity): 2.33 cm^2  P1/2t: 79 msec                          AV VTI: 26.9 cm    Area (continuity): 3.32 cm^2  Mean Velocity: 0.49 m/s    Tricuspid:                              Pulmonic:    Estimated RVSP: 41 mmHg                 Peak Velocity: 0.71 m/s  Peak TR Velocity: 3.00 m/s              Peak Gradient: 1.99 mmHg  Peak TR Gradient: 36 mmHg  Estimated RA Pressure: 5 mmHg    Estimated PASP: 41 mmHg    Diastology / Tissue Doppler  Septal Wall E' velocity:0.06 m/s  Septal Wall E/E':14.7  Lateral Wall E' velocity:0.12 m/s  Lateral Wall E/E':7.3      Cardiac Catheterization:   No results found for this or any previous visit. ASSESSMENT AND PLAN     Assessment:  Acute metabolic encephalopathy  Bradycardia  Sinus pause lasting for 4.7 seconds 3/17/2022  COPD stage II on PFTs with severe reduction diffusion capacity  Chronic smoker  Cocaine use  Chronic combined diastolic and systolic CHF, EF 76%.  Previously 50-55%  Mild pulmonary hypertension  Essential hypertension  CKD stage III  Leukocytosis   Prolonged QT    Plan:    I personally interviewed/examined the patient; reviewed interval history, interpreted all available radiographic and laboratory data at the time of service. Patient is a HFNC 20 L 40% FiO2. Continues to be on Precedex 0.2, wean as tolerated. IV Lasix still on hold, nephrology following. hypokalemia resolved. Continue DuoNeb nebs. Patient has been encouraged to use incentive spirometry, deep breathing. Blood pressure is on the lower side, continue to be on midodrine 3 times daily.   Continue to monitor I/O with a goal of even/negative fluid balance. Chemical DVT prophylaxis on therapeutic Eliquis  Antimicrobials reviewed; currently not on antibiotics  Glycemic control appropriate on Lantus  Physical/occupational therapy  Increase ambulation/physical therapy out of bed to chair.       Alvarado Patel MD.  Internal Medicine Resident PGY [facility]   3/31/2022, 12:51 PM

## 2022-03-31 NOTE — PLAN OF CARE
Problem: Pain:  Goal: Pain level will decrease  Description: Pain level will decrease  Outcome: Ongoing  Goal: Control of acute pain  Description: Control of acute pain  Outcome: Ongoing  Goal: Control of chronic pain  Description: Control of chronic pain  Outcome: Ongoing     Problem: Skin Integrity:  Goal: Will show no infection signs and symptoms  Description: Will show no infection signs and symptoms  Outcome: Ongoing  Goal: Absence of new skin breakdown  Description: Absence of new skin breakdown  Outcome: Ongoing     Problem: Falls - Risk of:  Goal: Will remain free from falls  Description: Will remain free from falls  Outcome: Ongoing  Goal: Absence of physical injury  Description: Absence of physical injury  Outcome: Ongoing     Problem: Nutrition  Goal: Optimal nutrition therapy  Outcome: Ongoing     Problem: Cardiac:  Goal: Ability to maintain vital signs within normal range will improve  Description: Ability to maintain vital signs within normal range will improve  Outcome: Ongoing  Goal: Cardiovascular alteration will improve  Description: Cardiovascular alteration will improve  Outcome: Ongoing     Problem: Health Behavior:  Goal: Will modify at least one risk factor affecting health status  Description: Will modify at least one risk factor affecting health status  Outcome: Ongoing  Goal: Identification of resources available to assist in meeting health care needs will improve  Description: Identification of resources available to assist in meeting health care needs will improve  Outcome: Ongoing     Problem: Physical Regulation:  Goal: Complications related to the disease process, condition or treatment will be avoided or minimized  Description: Complications related to the disease process, condition or treatment will be avoided or minimized  Outcome: Ongoing     Problem: Non-Violent Restraints  Goal: Removal from restraints as soon as assessed to be safe  Outcome: Ongoing  Goal: No harm/injury to patient while restraints in use  Outcome: Ongoing  Goal: Patient's dignity will be maintained  Outcome: Ongoing     Problem: Musculor/Skeletal Functional Status  Goal: Highest potential functional level  Outcome: Ongoing  Goal: Absence of falls  Outcome: Ongoing

## 2022-04-01 ENCOUNTER — APPOINTMENT (OUTPATIENT)
Dept: GENERAL RADIOLOGY | Age: 67
DRG: 233 | End: 2022-04-01
Attending: INTERNAL MEDICINE
Payer: MEDICARE

## 2022-04-01 PROBLEM — Z02.9 PROBLEM RELATED TO DISCHARGE PLANNING: Status: ACTIVE | Noted: 2022-04-01

## 2022-04-01 PROBLEM — J96.01 ACUTE RESPIRATORY FAILURE WITH HYPOXIA (HCC): Status: ACTIVE | Noted: 2022-04-01

## 2022-04-01 PROBLEM — I50.43 ACUTE ON CHRONIC COMBINED SYSTOLIC AND DIASTOLIC CONGESTIVE HEART FAILURE (HCC): Status: ACTIVE | Noted: 2022-03-09

## 2022-04-01 PROBLEM — Z75.8 PROBLEM RELATED TO DISCHARGE PLANNING: Status: ACTIVE | Noted: 2022-04-01

## 2022-04-01 LAB
ABSOLUTE EOS #: 0.24 K/UL (ref 0–0.44)
ABSOLUTE IMMATURE GRANULOCYTE: 0.09 K/UL (ref 0–0.3)
ABSOLUTE LYMPH #: 2.23 K/UL (ref 1.1–3.7)
ABSOLUTE MONO #: 1.22 K/UL (ref 0.1–1.2)
ANION GAP SERPL CALCULATED.3IONS-SCNC: 10 MMOL/L (ref 9–17)
BASOPHILS # BLD: 1 % (ref 0–2)
BASOPHILS ABSOLUTE: 0.06 K/UL (ref 0–0.2)
BUN BLDV-MCNC: 20 MG/DL (ref 8–23)
CALCIUM SERPL-MCNC: 8.2 MG/DL (ref 8.6–10.4)
CHLORIDE BLD-SCNC: 98 MMOL/L (ref 98–107)
CO2: 25 MMOL/L (ref 20–31)
CREAT SERPL-MCNC: 0.93 MG/DL (ref 0.7–1.2)
EOSINOPHILS RELATIVE PERCENT: 2 % (ref 1–4)
GFR AFRICAN AMERICAN: >60 ML/MIN
GFR NON-AFRICAN AMERICAN: >60 ML/MIN
GFR SERPL CREATININE-BSD FRML MDRD: ABNORMAL ML/MIN/{1.73_M2}
GLUCOSE BLD-MCNC: 100 MG/DL (ref 70–99)
GLUCOSE BLD-MCNC: 109 MG/DL (ref 75–110)
GLUCOSE BLD-MCNC: 111 MG/DL (ref 75–110)
GLUCOSE BLD-MCNC: 116 MG/DL (ref 75–110)
GLUCOSE BLD-MCNC: 157 MG/DL (ref 75–110)
HCT VFR BLD CALC: 25.2 % (ref 40.7–50.3)
HEMOGLOBIN: 8.2 G/DL (ref 13–17)
IMMATURE GRANULOCYTES: 1 %
INR BLD: 1.3
LYMPHOCYTES # BLD: 18 % (ref 24–43)
MCH RBC QN AUTO: 27.1 PG (ref 25.2–33.5)
MCHC RBC AUTO-ENTMCNC: 32.5 G/DL (ref 28.4–34.8)
MCV RBC AUTO: 83.2 FL (ref 82.6–102.9)
MONOCYTES # BLD: 10 % (ref 3–12)
NRBC AUTOMATED: 0 PER 100 WBC
PDW BLD-RTO: 15.8 % (ref 11.8–14.4)
PLATELET # BLD: 342 K/UL (ref 138–453)
PMV BLD AUTO: 10.6 FL (ref 8.1–13.5)
POTASSIUM SERPL-SCNC: 4 MMOL/L (ref 3.7–5.3)
PROTHROMBIN TIME: 13.7 SEC (ref 9.1–12.3)
RBC # BLD: 3.03 M/UL (ref 4.21–5.77)
RBC # BLD: ABNORMAL 10*6/UL
SEG NEUTROPHILS: 68 % (ref 36–65)
SEGMENTED NEUTROPHILS ABSOLUTE COUNT: 8.75 K/UL (ref 1.5–8.1)
SODIUM BLD-SCNC: 133 MMOL/L (ref 135–144)
WBC # BLD: 12.6 K/UL (ref 3.5–11.3)

## 2022-04-01 PROCEDURE — 99221 1ST HOSP IP/OBS SF/LOW 40: CPT | Performed by: INTERNAL MEDICINE

## 2022-04-01 PROCEDURE — 85025 COMPLETE CBC W/AUTO DIFF WBC: CPT

## 2022-04-01 PROCEDURE — 6370000000 HC RX 637 (ALT 250 FOR IP): Performed by: NURSE PRACTITIONER

## 2022-04-01 PROCEDURE — 2700000000 HC OXYGEN THERAPY PER DAY

## 2022-04-01 PROCEDURE — 99233 SBSQ HOSP IP/OBS HIGH 50: CPT | Performed by: INTERNAL MEDICINE

## 2022-04-01 PROCEDURE — 97530 THERAPEUTIC ACTIVITIES: CPT

## 2022-04-01 PROCEDURE — 6360000002 HC RX W HCPCS: Performed by: INTERNAL MEDICINE

## 2022-04-01 PROCEDURE — 94761 N-INVAS EAR/PLS OXIMETRY MLT: CPT

## 2022-04-01 PROCEDURE — 82947 ASSAY GLUCOSE BLOOD QUANT: CPT

## 2022-04-01 PROCEDURE — 2060000000 HC ICU INTERMEDIATE R&B

## 2022-04-01 PROCEDURE — 6360000002 HC RX W HCPCS

## 2022-04-01 PROCEDURE — 36415 COLL VENOUS BLD VENIPUNCTURE: CPT

## 2022-04-01 PROCEDURE — 85610 PROTHROMBIN TIME: CPT

## 2022-04-01 PROCEDURE — 80048 BASIC METABOLIC PNL TOTAL CA: CPT

## 2022-04-01 PROCEDURE — 97116 GAIT TRAINING THERAPY: CPT

## 2022-04-01 PROCEDURE — 6370000000 HC RX 637 (ALT 250 FOR IP): Performed by: STUDENT IN AN ORGANIZED HEALTH CARE EDUCATION/TRAINING PROGRAM

## 2022-04-01 PROCEDURE — 99024 POSTOP FOLLOW-UP VISIT: CPT | Performed by: PHYSICIAN ASSISTANT

## 2022-04-01 PROCEDURE — 71045 X-RAY EXAM CHEST 1 VIEW: CPT

## 2022-04-01 PROCEDURE — 6360000002 HC RX W HCPCS: Performed by: NURSE PRACTITIONER

## 2022-04-01 PROCEDURE — 2580000003 HC RX 258: Performed by: NURSE PRACTITIONER

## 2022-04-01 PROCEDURE — 6370000000 HC RX 637 (ALT 250 FOR IP): Performed by: INTERNAL MEDICINE

## 2022-04-01 RX ORDER — FUROSEMIDE 10 MG/ML
40 INJECTION INTRAMUSCULAR; INTRAVENOUS 2 TIMES DAILY
Status: DISCONTINUED | OUTPATIENT
Start: 2022-04-01 | End: 2022-04-08 | Stop reason: HOSPADM

## 2022-04-01 RX ORDER — FUROSEMIDE 10 MG/ML
INJECTION INTRAMUSCULAR; INTRAVENOUS
Status: COMPLETED
Start: 2022-04-01 | End: 2022-04-01

## 2022-04-01 RX ADMIN — FENTANYL CITRATE 25 MCG: 50 INJECTION, SOLUTION INTRAMUSCULAR; INTRAVENOUS at 13:33

## 2022-04-01 RX ADMIN — FUROSEMIDE 40 MG: 10 INJECTION, SOLUTION INTRAMUSCULAR; INTRAVENOUS at 13:25

## 2022-04-01 RX ADMIN — ATORVASTATIN CALCIUM 80 MG: 80 TABLET, FILM COATED ORAL at 21:06

## 2022-04-01 RX ADMIN — TAMSULOSIN HYDROCHLORIDE 0.4 MG: 0.4 CAPSULE ORAL at 08:42

## 2022-04-01 RX ADMIN — SODIUM CHLORIDE, PRESERVATIVE FREE 10 ML: 5 INJECTION INTRAVENOUS at 08:45

## 2022-04-01 RX ADMIN — OXYCODONE HYDROCHLORIDE AND ACETAMINOPHEN 2 TABLET: 5; 325 TABLET ORAL at 21:17

## 2022-04-01 RX ADMIN — BISACODYL 5 MG: 5 TABLET, COATED ORAL at 00:24

## 2022-04-01 RX ADMIN — Medication 500000 UNITS: at 12:26

## 2022-04-01 RX ADMIN — Medication 500000 UNITS: at 08:41

## 2022-04-01 RX ADMIN — PANTOPRAZOLE SODIUM 40 MG: 40 TABLET, DELAYED RELEASE ORAL at 08:42

## 2022-04-01 RX ADMIN — FUROSEMIDE 40 MG: 10 INJECTION, SOLUTION INTRAMUSCULAR; INTRAVENOUS at 21:17

## 2022-04-01 RX ADMIN — CLOPIDOGREL 75 MG: 75 TABLET, FILM COATED ORAL at 12:25

## 2022-04-01 RX ADMIN — SODIUM CHLORIDE, PRESERVATIVE FREE 10 ML: 5 INJECTION INTRAVENOUS at 21:02

## 2022-04-01 RX ADMIN — SPIRONOLACTONE 25 MG: 25 TABLET ORAL at 08:42

## 2022-04-01 RX ADMIN — METOPROLOL TARTRATE 12.5 MG: 25 TABLET ORAL at 21:06

## 2022-04-01 RX ADMIN — AMIODARONE HYDROCHLORIDE 200 MG: 200 TABLET ORAL at 08:42

## 2022-04-01 RX ADMIN — Medication 500000 UNITS: at 21:06

## 2022-04-01 RX ADMIN — METOPROLOL TARTRATE 12.5 MG: 25 TABLET ORAL at 08:44

## 2022-04-01 ASSESSMENT — PAIN DESCRIPTION - ONSET: ONSET: AWAKENED FROM SLEEP

## 2022-04-01 ASSESSMENT — PAIN DESCRIPTION - LOCATION
LOCATION: LEG
LOCATION: CHEST

## 2022-04-01 ASSESSMENT — ENCOUNTER SYMPTOMS
DIARRHEA: 0
COUGH: 0
SINUS PAIN: 0
SHORTNESS OF BREATH: 0
VOICE CHANGE: 0
EYE REDNESS: 0
SORE THROAT: 0
VOMITING: 0
TROUBLE SWALLOWING: 0
ALLERGIC/IMMUNOLOGIC NEGATIVE: 1
ABDOMINAL PAIN: 0
PHOTOPHOBIA: 0

## 2022-04-01 ASSESSMENT — PAIN SCALES - GENERAL
PAINLEVEL_OUTOF10: 8
PAINLEVEL_OUTOF10: 6
PAINLEVEL_OUTOF10: 8

## 2022-04-01 ASSESSMENT — PAIN DESCRIPTION - DESCRIPTORS: DESCRIPTORS: SPASM

## 2022-04-01 ASSESSMENT — PAIN DESCRIPTION - PAIN TYPE: TYPE: ACUTE PAIN

## 2022-04-01 ASSESSMENT — PAIN DESCRIPTION - ORIENTATION: ORIENTATION: OTHER (COMMENT)

## 2022-04-01 ASSESSMENT — PAIN - FUNCTIONAL ASSESSMENT: PAIN_FUNCTIONAL_ASSESSMENT: ACTIVITIES ARE NOT PREVENTED

## 2022-04-01 ASSESSMENT — PAIN DESCRIPTION - FREQUENCY: FREQUENCY: INTERMITTENT

## 2022-04-01 ASSESSMENT — PAIN DESCRIPTION - PROGRESSION: CLINICAL_PROGRESSION: NOT CHANGED

## 2022-04-01 NOTE — FLOWSHEET NOTE
04/01/22 1050   Hygiene   Hygiene Bathed   Level of Assistance Maximum assist   Skin Care Chlorhexidine solution   [place] [facility] Soap and water

## 2022-04-01 NOTE — CARE COORDINATION
[place] NP, attempted peer to peer x2 yesterday, states that he will call back today. 80 - Pt weaned to 6L NC, placed call to [place] [id number], [address] to f/u on referrals, left message at both facilities, call back requested. 1143 - Received message from [facility], they are no longer contracted with pt's insurance. 1252 - Left message for pt's daughter, Randy Mack, to discuss transition plans. Printed list of in-network facilities from [facility] and provided this and hospital SNF list to pt. Discussed with pt that [facility] is unable to accept and have not heard back from [facility], pt states that he has no preference in facility. Will send referrals to in-network facilities. 1300 - Referrals sent to [facility], [facility], and [facility]. 1303 - Referral also sent to [facility]. [phone] Received call from [facility], they are unable to accept pt.    6049 - Received call from Camilo at [facility], referral is in review, provided pt's social security number. 1601 - Pt is on 6L nasal cannula currently. Per [facility], they are reviewing     1118 - Placed call to [facility], they will notify admissions of call and call back. 1631 - Received call from Olivia at [facility], they are not currently in-network with pt's Medicare plan.

## 2022-04-01 NOTE — PLAN OF CARE
Problem: Pain:  Goal: Pain level will decrease  Description: Pain level will decrease  Outcome: Ongoing  Goal: Control of acute pain  Description: Control of acute pain  Outcome: Ongoing  Goal: Control of chronic pain  Description: Control of chronic pain  Outcome: Ongoing     Problem: Skin Integrity:  Goal: Will show no infection signs and symptoms  Description: Will show no infection signs and symptoms  Outcome: Ongoing  Goal: Absence of new skin breakdown  Description: Absence of new skin breakdown  Outcome: Ongoing     Problem: Falls - Risk of:  Goal: Will remain free from falls  Description: Will remain free from falls  Outcome: Ongoing  Goal: Absence of physical injury  Description: Absence of physical injury  Outcome: Ongoing     Problem: Nutrition  Goal: Optimal nutrition therapy  Outcome: Ongoing     Problem: Cardiac:  Goal: Ability to maintain vital signs within normal range will improve  Description: Ability to maintain vital signs within normal range will improve  Outcome: Ongoing  Goal: Cardiovascular alteration will improve  Description: Cardiovascular alteration will improve  Outcome: Ongoing     Problem: Health Behavior:  Goal: Will modify at least one risk factor affecting health status  Description: Will modify at least one risk factor affecting health status  Outcome: Ongoing  Goal: Identification of resources available to assist in meeting health care needs will improve  Description: Identification of resources available to assist in meeting health care needs will improve  Outcome: Ongoing     Problem: Physical Regulation:  Goal: Complications related to the disease process, condition or treatment will be avoided or minimized  Description: Complications related to the disease process, condition or treatment will be avoided or minimized  Outcome: Ongoing     Problem: Musculor/Skeletal Functional Status  Goal: Highest potential functional level  Outcome: Ongoing  Goal: Absence of falls  Outcome: Ongoing

## 2022-04-01 NOTE — PROGRESS NOTES
Writer requested pt get up to the chair, pt refused. Writer requested pt get up to take a shower, pt refused. Will continue to educated.

## 2022-04-01 NOTE — PROGRESS NOTES
Occupational Therapy  Facility/Department: [facility]  Daily Treatment Note  NAME: Boo Kerr  : 1955  MRN: [id number]    Date of Service: 2022    Discharge Recommendations: Pt. Would benefit from further skilled OT services to enhance functional outcomes. Patient would benefit from continued therapy after discharge       Assessment   Performance deficits / Impairments: Decreased ADL status; Decreased functional mobility ; Decreased strength;Decreased safe awareness;Decreased endurance;Decreased balance;Decreased high-level IADLs;Decreased cognition  Assessment: Pt would benefit from continued acute care and post acute care OT to address above listed deficits. Treatment Diagnosis: CABG X2  Prognosis: Good  OT Education: OT Role;Plan of Care;ADL Adaptive Strategies;Transfer Training;Precautions; Equipment  Patient Education: P return from pt  REQUIRES OT FOLLOW UP: Yes  Activity Tolerance  Activity Tolerance: Patient limited by fatigue;Treatment limited secondary to decreased cognition;Patient limited by pain  Activity Tolerance: SOB  Safety Devices  Safety Devices in place: Yes  Type of devices: Bed alarm in place;Call light within reach; Patient at risk for falls; Left in bed;Nurse notified;Gait belt (Martinez sitter)  Restraints  Initially in place: No         Patient Diagnosis(es): The encounter diagnosis was S/P cardiac catheterization. has a past medical history of 3-vessel CAD, Alcohol abuse, Arthritis, Chronic kidney disease, Full dentures, Hypertension, Other emphysema ([address] Pure hypercholesterolemia, Systolic CHF ([address] and Wears glasses. has a past surgical history that includes Hip fracture surgery (Left); Appendectomy; pr egd transoral biopsy single/multiple (2017); and Coronary artery bypass graft (N/A, 3/21/2022).     Restrictions  Restrictions/Precautions  Restrictions/Precautions: Fall Risk,General Precautions,Cardiac  Required Braces or Orthoses?: Yes  Implants present? : Metal implants  Required Braces or Orthoses  Other: Heart Hugger Brace  Position Activity Restriction  Sternal Precautions: 5# Lifting Restrictions,No Pushing,No Pulling  Sternal Precautions: CABG X   Other position/activity restrictions: Amb pt, up in chair for meals, NC or  hi-flow entire session  Subjective   General  Patient assessed for rehabilitation services?: Yes  Family / Caregiver Present: Yes  Diagnosis: CABGx2 on 3/21/22, NSTEMI, EF 34%, hx o0f ETOH/drug use  Pain Assessment  Pain Location: Chest  Non-Pharmaceutical Pain Intervention(s): Emotional support;Distraction; Ambulation/Increased Activity;Relaxation techniques;Repositioned  Vital Signs  Patient Currently in Pain: Yes (Per pt. his pain is \"livid\", unable to rate.)   Orientation  Orientation  Overall Orientation Status:  (MURTAZA d/t pt. SOB/fatigue levels, pt. focusing on pursed lip breathing.)  Objective    ADL  Additional Comments: decliend d/t SOB/fatigue        Balance  Sitting Balance: Minimal assistance (anterior lean, min A sitting EOB >5 minutes, static sitting)  Standing Balance: Maximum assistance (x2)  Standing Balance  Time: ~1 minute  Activity: static/dynamic/pivot transfer  Comment: w/RW x2, Max A d/t P safety with tech/B hand placement/RW management/SOB  Bed mobility  Sit to Supine: Moderate assistance  Scooting: Moderate assistance  Comment: HOB elevated, mod A with B LE progression. Transfers  Stand Pivot Transfers: Maximum assistance; Moderate assistance;2 Person assistance  Sit to stand: 2 Person assistance; Moderate assistance  Stand to sit: 2 Person assistance; Moderate assistance  Transfer Comments: Recliner->stand- mod A x 2, Recliner->EOB max-mod A for pivot transfer. RW                       Cognition  Overall Cognitive Status: WFL  Arousal/Alertness: Delayed responses to stimuli  Following Commands: Follows multistep commands with repitition; Follows multistep commands with increased time  Attention Span: Difficulty attending to directions; Attends with cues to redirect  Safety Judgement: Decreased awareness of need for assistance;Decreased awareness of need for safety  Insights: Decreased awareness of deficits  Initiation: Requires cues for all  Sequencing: Requires cues for all  Cognition Comment: Flat affect, not compliant with verbal cues for hand placement, sternal precautions, coughing etc, up to Max verbal and tactile cues with poor return                                         Plan   Plan  Times per week: 4-6x (CABG)  Current Treatment Recommendations: Self-Care / ADL,Strengthening,Balance Training,Functional Mobility Training,Endurance Training,Pain Management,Safety Education & Training,Patient/Caregiver Education & Training,Equipment Evaluation, Education, & procurement,Home Management Training                        AM-PAC Score        AM-PAC Inpatient Daily Activity Raw Score: 13 (04/01/22 1234)  AM-PAC Inpatient ADL T-Scale Score : 32.03 (04/01/22 1234)  ADL Inpatient CMS 0-100% Score: 63.03 (04/01/22 1234)  ADL Inpatient CMS G-Code Modifier : CL (04/01/22 1234)    Goals  Short term goals  Time Frame for Short term goals: Pt will by discharge  Short term goal 1: identify/maintain all sternal precautions with 1 vc  Short term goal 2: demo ADL UB bathing/dressing activity at SBA and increased time, including heart hugger  Short term goal 3: demo ADL LB bathing/dressing activity at min A and increased time, using sock-aid/reacher PRN  Short term goal 4: demo good safety awareness druing func mob around room at min A, RW PRN, and 1 cue  Short term goal 5: demo SBA for all bed mobility/func transfers using bed rails/LRD PRN       Therapy Time   Individual Concurrent Group Co-treatment   Time In 1210         Time Out 1233         Minutes 23         Timed Code Treatment Minutes: [address], [address]

## 2022-04-01 NOTE — PROGRESS NOTES
Physical Therapy  Facility/Department: [facility]  Daily Treatment Note  NAME: August Caro  : 1955  MRN: [id number]    Date of Service: 2022    Discharge Recommendations:  Patient would benefit from continued therapy after discharge    Assessment   Body structures, Functions, Activity limitations: Decreased strength;Decreased endurance;Decreased functional mobility ; Decreased balance; Increased pain;Decreased safe awareness  Assessment: The pt able to amb 3ft this PM with RW MAX A x2 for safety. Demo improvement with mobility but requires max encouragement for particpation. On safe to performed any functional mobility without skilled assist at this time. Bellajordan Guaman He will continue to benefit from more therapy to aaddress deficits . Treatment Diagnosis: general weakness; difficulty walking  Specific instructions for Next Treatment: endurance, Txs to chair, ambulation  Prognosis: Good;Fair  PT Education: Goals; Home Exercise Program;General Safety;Transfer Training;Family Education;PT Role;Functional Mobility Training;Precautions  REQUIRES PT FOLLOW UP: Yes  Activity Tolerance  Activity Tolerance: Patient limited by fatigue;Patient limited by endurance;Treatment limited secondary to agitation     Patient Diagnosis(es): The encounter diagnosis was S/P cardiac catheterization. has a past medical history of 3-vessel CAD, Alcohol abuse, Arthritis, Chronic kidney disease, Full dentures, Hypertension, Other emphysema ([address] Pure hypercholesterolemia, Systolic CHF ([address] and Wears glasses. has a past surgical history that includes Hip fracture surgery (Left); Appendectomy; pr egd transoral biopsy single/multiple (2017); and Coronary artery bypass graft (N/A, 3/21/2022).     Restrictions  Restrictions/Precautions  Restrictions/Precautions: Fall Risk,General Precautions,Cardiac  Required Braces or Orthoses?: Yes  Implants present? : Metal implants  Required Braces or Orthoses  Other: Heart Hugger Brace  Position Activity Restriction  Sternal Precautions: 5# Lifting Restrictions,No Pushing,No Pulling  Sternal Precautions: CABG X   Other position/activity restrictions: Amb pt, up in chair for meals, NC or  hi-flow entire session  Subjective   General  Chart Reviewed: Yes  Response To Previous Treatment: Patient with no complaints from previous session. Family / Caregiver Present: No  Subjective  Subjective: Pt requested to return to bed, RN encourage the Pt to ambute with PT for safe transfer with good return. General Comment  Comments: writer return to Pt room and Performed ambulation with OT for 3ft from recliner to EOB. Orientation  Orientation  Overall Orientation Status: Within Functional Limits  Cognition      Objective   Bed mobility  Rolling to Left: Maximum assistance  Rolling to Right: Moderate assistance  Supine to Sit: 2 Person assistance;Maximum assistance  Sit to Supine: Moderate assistance  Scooting: Moderate assistance  Comment: HOB elavated ,  Transfers  Sit to Stand: Maximum Assistance;2 Person Assistance  Stand to sit: Moderate Assistance;2 Person Assistance  Comment: Transfer with RW . Max cueing for safety as pt demo poor safety awareness t/o. Ambulation  Ambulation?: No  Ambulation 1  Surface: level tile  Device: Rolling Walker  Assistance: Maximum assistance;2 Person assistance  Quality of Gait: Difficulty advancing BLE requiring Max tactile and verbal cueing with very little return. Gait Deviations: Slow Gale;Staggers; Deviated path;Shuffles  Distance: 3ft from recline to bed. Comments: Limited by weakness and fatigue,Demo poor safety awareness making pt a high fall risk.      Balance  Sitting - Static: Fair;-  Sitting - Dynamic: Fair;-  Standing - Static: Poor;+ (Sat on toilet seat for ~15mins with flex poture t/o)  Standing - Dynamic: Poor             AM-PAC Score  AM-PAC Inpatient Mobility Raw Score : 11 (04/01/22 1615)  AM-PAC Inpatient T-Scale Score : 33.86 (04/01/22 1615)  Mobility

## 2022-04-01 NOTE — PROGRESS NOTES
[id number] [facility]  Daily Progress Note    Surgeon: Dr. Sophie Earl   S/P : CABG X 2   POD#: 10  EF: 45%     Subjective:  Mr. Sven Kuo   Resting on bipap alternating with HFNC. No distress. Noncompliance noted often. A&0x4. Pt non compliant with staff. Refusing care. Refusing to walk. Refusing medications A&0x4. Physical Exam  Vital Signs: /62   Pulse 68   Temp 98.4 °F (36.9 °C) (Oral)   Resp 25   Ht 6' 3\" (1.905 m)   Wt 169 lb 15.6 oz (77.1 kg)   SpO2 95%   BMI 21.25 kg/m²  O2 Flow Rate (L/min): 20 L/min   Admit Weight: Weight: 141 lb 5 oz (64.1 kg)   WEIGHTWeight: 169 lb 15.6 oz (77.1 kg)     General: noncompliant   Heart: Normal S1 and S2.  Regular rhythm. No murmurs, gallops, or rubs. Pacing Wires: Yes -To be clipped prior to discharge  Lungs: clear to auscultation bilaterally and diminished breath sounds bibasilar Chest tubes: Yes, Air Leak: No  Abdomen: soft, non tender, non distended, BS x4  Extremities: negative  Wounds: clean and dry, healing appropriately. Sternum: Healing  SVG sites: Healing  CXR-no appreciated effusions noted. Diffuse consolidation. No pneumothorax noted.      Scheduled Meds:    potassium chloride  40 mEq Oral Once    metoprolol tartrate  12.5 mg Oral BID    midodrine  5 mg Oral TID WC    spironolactone  25 mg Oral Daily    amiodarone  200 mg Oral Daily    polyethylene glycol  17 g Oral BID    nystatin  5 mL Oral 4x Daily    sodium chloride flush  10 mL IntraVENous 2 times per day    clopidogrel  75 mg Oral Daily    sennosides-docusate sodium  1 tablet Oral BID    atorvastatin  80 mg Oral Nightly    pantoprazole  40 mg Oral Daily    tamsulosin  0.4 mg Oral Daily     Continuous Infusions:    dexmedetomidine Stopped (03/31/22 1217)    sodium chloride Stopped (03/29/22 1653)    dextrose      sodium chloride      sodium chloride         Data:  CBC:   Recent Labs     03/30/22  1247 03/31/22  0425 04/01/22  0334   WBC 13.4* 12.5* 12.6* HGB 7.9* 8.3* 8.2*   HCT 25.5* 26.4* 25.2*   MCV 87.0 85.7 83.2    319 342     BMP:   Recent Labs     03/30/22  1248 03/31/22  0425 04/01/22  0334    136 133*   K 3.5* 3.9 4.0    98 98   CO2 28 26 25   BUN 22 22 20   CREATININE 1.13 0.97 0.93     PT/INR:   Recent Labs     03/30/22  1007 03/31/22  0425 04/01/22  0334   PROTIME 16.4* 13.7* 13.7*   INR 1.6 1.3 1.3     APTT:   No results for input(s): APTT in the last 72 hours. Chest X-Ray: worsening atelectasis process to left thorax. I/O:  I/O last 3 completed shifts: In: 839.1 [P.O.:601; I.V.:92.8; IV Piggyback:145.3]  Out: 1000 [Urine:1000]    Assessment/Plan:      Diagnosis Date    3-vessel CAD 03/11/2022    Alcohol abuse 06/23/2015    Arthritis     Chronic kidney disease     Full dentures     upper and lower full    Hypertension     Other emphysema ([address]     COPD stage II on PFTs with severe reduction diffusion capacity    Pure hypercholesterolemia 97/23/7225    Systolic CHF ([address]     Wears glasses        Beta-Blocker: yes  ASA: yes  Plavix: yes  GI: yes  Statin: yes  Coumadin: no  ACE-I: no  EF: 45%     PLAN:  PEER to PEER yday. Call back today D/C soon.       SAEID Mckeon

## 2022-04-01 NOTE — PROGRESS NOTES
Physical Therapy  Facility/Department: [facility]  Daily Treatment Note  NAME: Tera Calles  : 1955  MRN: [id number]    Date of Service: 2022    Discharge Recommendations:  Patient would benefit from continued therapy after discharge    Assessment   Body structures, Functions, Activity limitations: Decreased strength;Decreased endurance;Decreased functional mobility ; Decreased balance; Increased pain;Decreased safe awareness  Assessment: The pt requires macx encoauragement for participation with good return this date. Demo improvement with mobility, requires Mod A for bed mobs nd MAX-MOA x2 persons for transfer with Sera stedy. significntly limited by fatigue and decrease endurance. Lacks insight to deficits and demo poor safety awareness. He will continue to benefit from more therapy to aaddress deficits . Specific instructions for Next Treatment: endurance, Txs to chair, ambulation  Prognosis: Good;Fair  PT Education: Goals; Home Exercise Program;General Safety;Transfer Training;Family Education;PT Role;Functional Mobility Training;Precautions  REQUIRES PT FOLLOW UP: Yes  Activity Tolerance  Activity Tolerance: Patient limited by fatigue;Patient limited by endurance;Treatment limited secondary to agitation     Patient Diagnosis(es): The encounter diagnosis was S/P cardiac catheterization. has a past medical history of 3-vessel CAD, Alcohol abuse, Arthritis, Chronic kidney disease, Full dentures, Hypertension, Other emphysema ([address] Pure hypercholesterolemia, Systolic CHF ([address] and Wears glasses. has a past surgical history that includes Hip fracture surgery (Left); Appendectomy; pr egd transoral biopsy single/multiple (2017); and Coronary artery bypass graft (N/A, 3/21/2022).     Restrictions  Restrictions/Precautions  Restrictions/Precautions: Fall Risk,General Precautions,Cardiac  Required Braces or Orthoses?: Yes  Implants present? : Metal implants  Required Braces or Orthoses  Other: Heart Hugger Brace  Position Activity Restriction  Sternal Precautions: 5# Lifting Restrictions,No Pushing,No Pulling  Sternal Precautions: CABG X   Other position/activity restrictions: Amb pt, up in chair for meals, hi-flow entire session  Subjective   General  Chart Reviewed: Yes  Response To Previous Treatment: Patient with no complaints from previous session. Family / Caregiver Present: No  Subjective  Subjective: pt supine in bed with HOB elevated ~45 degrees this date. pt and RN agreeable to PT treatment          Orientation  Orientation  Overall Orientation Status: Within Functional Limits  Cognition      Objective   Bed mobility  Rolling to Right: Moderate assistance  Sit to Supine: Moderate assistance  Scooting: Moderate assistance  Comment: HOB elavated ,  Transfers  Sit to Stand: Moderate Assistance;Maximum Assistance;2 Person Assistance ( Max x2 from toilet seat)  Stand to sit: Moderate Assistance;2 Person Assistance  Bed to Chair: Dependent/Total (SS)  Comment: Sit to stand [id number] with SS. Pt demo flexed posture requiring Max cueing to correct. Stood 30 seconds x2 and 3mins x1 . max encoauragement to maitina standing . Ambulation  Ambulation?: No     Balance  Sitting - Static: Fair;-  Sitting - Dynamic: Fair;-  Standing - Static: Poor;+ (Sat on toilet seat for ~15mins with flex poture t/o)  Standing - Dynamic: Poor      AM-PAC Score  AM-PAC Inpatient Mobility Raw Score : 10 (04/01/22 0948)  AM-PAC Inpatient T-Scale Score : 32.29 (04/01/22 0948)  Mobility Inpatient CMS 0-100% Score: 76.75 (04/01/22 0948)  Mobility Inpatient CMS G-Code Modifier : CL (04/01/22 5878)          Goals  Short term goals  Time Frame for Short term goals: 14 days  Short term goal 1: Pt to transfer sit to stand with min A +1. Short term goal 2: Pt to ambulate 75ft with RW and no LOB. Short term goal 3: Pt to demo good standing balance for decrease fall risk.   Short term goal 4: Pt to tolerate 20-30 mins ther ex/act for improved strength and endurance.   Patient Goals   Patient goals : get stronger    Plan    Plan  Times per week: 7x week  Times per day:  (1-2 x day)  Specific instructions for Next Treatment: endurance, Txs to chair, ambulation  Current Treatment Recommendations: Strengthening,Balance Training,Endurance Training,Functional Mobility Training,Transfer Training,Gait Training,Stair training,Neuromuscular Re-education,Home Exercise Program,Safety Education & Training  Safety Devices  Type of devices: Call light within reach,Nurse notified,Gait belt,Patient at risk for falls,All fall risk precautions in place,Telesitter in use,Chair alarm in place,Left in chair  Restraints  Initially in place: Yes  Restraints: x 4 bed rails up     Therapy Time   Individual Concurrent Group Co-treatment   Time In 0938         Time Out 1023         Minutes 45         Timed Code Treatment Minutes: [facility]

## 2022-04-01 NOTE — CONSULTS
[facility]  Office: [address], DO, Andreina Grace, DO, Paulo Arredondo, DO, Maria C Eric, DO, David Cruz MD, Mila Paula MD, Apurva Verdugo MD, Deanna Ruvalcaba MD, Dileep Tse MD, Oral French MD, Evelyn Nava MD, Manas Joaquin DO, Angela Bonds DO, Joanne Amaya MD,  Geronimo Broussard DO, David Chambers MD, Christian Mcconnell MD, Chris Hurt MD, Julio Cesar Rosario DO, Ace Cagle MD, Madyson Garcia MD, Dae Alejo, [facility], [facility], CNP, Ludin Godoy, CNP, Enrique Pulido, CNS, Robert Brink, CNP, Claude Leep, CNP, Juan Miguel Curiel, CNP, Clementine Chavez, CNP, Edenilson Long, CNP, Alvarado Becker PA-C, Kyra Chavira, [facility], Annie Brown, [facility], Beata Jorgensen, CNP, Zac Waters, CNP, Shane Martin, [facility]   [address] [facility] / HISTORY AND PHYSICAL EXAMINATION            Date:   4/1/2022  Patient name:  José Miguel Hernandez  Date of admission:  3/11/2022  7:15 PM  MRN:   [id number]  Account:  [de-identified]  YOB: 1955  PCP:    Karina Key MD  Room:   [address] [place] [address]  Code Status:    Full Code    Physician Requesting Consult: Angle Woodruff MD    Reason for Consult:  Medical management     Chief Complaint:   Chest pain    History Obtained From:     patient, electronic medical record    History of Present Illness: This is a 68-year-old male with a history of hypertension, dyslipidemia, HFrEF, who presents to our hospital as transfer for cardiac evaluation for chest pain. Pt had stress test done which showed ischemia of lateral and septal wall with ejection fraction of 34%. Patient underwent cardiac catheterization which showed multivessel coronary artery disease with EF 30% including left main disease. CT surgery was consulted for CABG. Pt underwent CABG x2 on 3/22/22.  Pulmonology was consulted for respiratory failure, pt dependent on HFNC at 20L and 40% FIO2 which was thought to be from capsule Take 1 capsule by mouth daily 3/12/18   Marisol Short MD        Allergies:     Patient has no known allergies. Social History:     Tobacco:    reports that he has been smoking cigarettes. He has been smoking about 0.00 packs per day. He has never used smokeless tobacco.  Alcohol:      reports current alcohol use. Drug Use:  reports current drug use. Drugs: Cocaine and Marijuana (Garima Fraire). Family History:     Family History   Problem Relation Age of Onset    Diabetes Mother     Cancer Mother        Review of Systems:     Positive and Negative as described in HPI. CONSTITUTIONAL:  negative for fevers, chills, sweats, fatigue, weight loss  HEENT:  negative for vision, hearing changes, runny nose, throat pain  RESPIRATORY: admits to shortness of breath, no cough, congestion, wheezing. CARDIOVASCULAR:  negative for chest pain, palpitations.   GASTROINTESTINAL:  negative for nausea, vomiting, diarrhea, constipation, change in bowel habits, abdominal pain   GENITOURINARY:  negative for difficulty of urination, burning with urination, frequency   INTEGUMENT:  negative for rash, skin lesions, easy bruising   HEMATOLOGIC/LYMPHATIC:  negative for swelling/edema   ALLERGIC/IMMUNOLOGIC:  negative for urticaria , itching  ENDOCRINE:  negative increase in drinking, increase in urination, hot or cold intolerance  MUSCULOSKELETAL:  negative joint pains, muscle aches, swelling of joints  NEUROLOGICAL:  negative for headaches, dizziness, lightheadedness, numbness, pain, tingling extremities  BEHAVIOR/PSYCH:  negative for depression, anxiety    Physical Exam:     /68   Pulse 105   Temp 98.1 °F (36.7 °C) (Oral)   Resp (!) 33   Ht 6' 3\" (1.905 m)   Wt 169 lb 15.6 oz (77.1 kg)   SpO2 (!) 88%   BMI 21.25 kg/m²   Temp (24hrs), Av.2 °F (36.8 °C), Min:98.1 °F (36.7 °C), Max:98.4 °F (36.9 °C)    Recent Labs     22  1635 22  1910 22  0759 22  1256   POCGLU 123* 105 111* 157* Intake/Output Summary (Last 24 hours) at 4/1/2022 1310  Last data filed at 4/1/2022 0800  Gross per 24 hour   Intake 472.4 ml   Output 650 ml   Net -177.6 ml       General Appearance:  alert, acutely ill appearing, and in  acute distress  Mental status: oriented to person, place, and time with normal affect  Head:  normocephalic, atraumatic. Eye: no icterus, redness, pupils equal and reactive, extraocular eye movements intact, conjunctiva clear  Ear: normal external ear, no discharge, hearing intact  Nose:  no drainage noted  Mouth: mucous membranes moist  Neck: supple, no carotid bruits, thyroid not palpable  Lungs:Decreased air entry, clear to ausculation, no wheezing, rales or rhonchi, normal effort  Cardiovascular: normal rate, regular rhythm, no murmur, gallop, rub.   Abdomen: Soft, nontender, nondistended, normal bowel sounds, no hepatomegaly or splenomegaly  Neurologic: There are no new focal motor or sensory deficits, normal muscle tone and bulk, no abnormal sensation, normal speech, cranial nerves II through XII grossly intact  Skin: No gross lesions, rashes, bruising or bleeding on exposed skin area  Extremities:  peripheral pulses palpable, no pedal edema or calf pain with palpation  Psych: normal affect    Investigations:      Laboratory Testing:  Recent Results (from the past 24 hour(s))   POC Glucose Fingerstick    Collection Time: 03/31/22  4:35 PM   Result Value Ref Range    POC Glucose 123 (H) 75 - 110 mg/dL   POC Glucose Fingerstick    Collection Time: 03/31/22  7:10 PM   Result Value Ref Range    POC Glucose 105 75 - 110 mg/dL   Basic Metabolic Panel w/ Reflex to MG    Collection Time: 04/01/22  3:34 AM   Result Value Ref Range    Glucose 100 (H) 70 - 99 mg/dL    BUN 20 8 - 23 mg/dL    CREATININE 0.93 0.70 - 1.20 mg/dL    Calcium 8.2 (L) 8.6 - 10.4 mg/dL    Sodium 133 (L) 135 - 144 mmol/L    Potassium 4.0 3.7 - 5.3 mmol/L    Chloride 98 98 - 107 mmol/L    CO2 25 20 - 31 mmol/L    Anion Gap 10 9 - 17 mmol/L    GFR Non-African American >60 >60 mL/min    GFR African American >60 >60 mL/min    GFR Comment         CBC with Auto Differential    Collection Time: 04/01/22  3:34 AM   Result Value Ref Range    WBC 12.6 (H) 3.5 - 11.3 k/uL    RBC 3.03 (L) 4.21 - 5.77 m/uL    Hemoglobin 8.2 (L) 13.0 - 17.0 g/dL    Hematocrit 25.2 (L) 40.7 - 50.3 %    MCV 83.2 82.6 - 102.9 fL    MCH 27.1 25.2 - 33.5 pg    MCHC 32.5 28.4 - 34.8 g/dL    RDW 15.8 (H) 11.8 - 14.4 %    Platelets 883 258 - 578 k/uL    MPV 10.6 8.1 - 13.5 fL    NRBC Automated 0.0 0.0 per 100 WBC    Seg Neutrophils 68 (H) 36 - 65 %    Lymphocytes 18 (L) 24 - 43 %    Monocytes 10 3 - 12 %    Eosinophils % 2 1 - 4 %    Basophils 1 0 - 2 %    Immature Granulocytes 1 (H) 0 %    Segs Absolute 8.75 (H) 1.50 - 8.10 k/uL    Absolute Lymph # 2.23 1.10 - 3.70 k/uL    Absolute Mono # 1.22 (H) 0.10 - 1.20 k/uL    Absolute Eos # 0.24 0.00 - 0.44 k/uL    Basophils Absolute 0.06 0.00 - 0.20 k/uL    Absolute Immature Granulocyte 0.09 0.00 - 0.30 k/uL    RBC Morphology ANISOCYTOSIS PRESENT    Protime-INR    Collection Time: 04/01/22  3:34 AM   Result Value Ref Range    Protime 13.7 (H) 9.1 - 12.3 sec    INR 1.3    POC Glucose Fingerstick    Collection Time: 04/01/22  7:59 AM   Result Value Ref Range    POC Glucose 111 (H) 75 - 110 mg/dL   POC Glucose Fingerstick    Collection Time: 04/01/22 12:56 PM   Result Value Ref Range    POC Glucose 157 (H) 75 - 110 mg/dL       Imaging/Diagonstics:  US RENAL COMPLETE    Result Date: 3/26/2022  1. Borderline small right kidney but otherwise sonographically unremarkable appearing kidneys. 2.  Mild to moderate amount of ascites was noted. XR CHEST PORTABLE    Result Date: 4/1/2022  Multifocal bibasilar predominant airspace disease and likely trace bilateral pleural effusions, not significantly changed. Persistent interstitial pulmonary edema. XR CHEST PORTABLE    Result Date: 3/31/2022  No significant interval change.      XR CHEST PORTABLE    Result Date: 3/30/2022  Slight increase in pulmonary vascularity compared to prior study 1 day earlier. Stable cardiomegaly with bilateral effusions. For findings as above. XR CHEST PORTABLE    Result Date: 3/29/2022  1. Stable positions of support apparatus. 2. Stable bibasilar airspace disease, atelectasis favored over pneumonia. 3. Stable small bilateral pleural effusions. XR CHEST PORTABLE    Result Date: 3/29/2022  Little change from prior study. Postoperative changes. Support tubes and lines as above. Small bilateral effusions and bibasilar atelectasis. XR CHEST PORTABLE    Result Date: 3/28/2022  Multiple tubes lines as above. Continued left effusion and basilar atelectasis with vascular congestion. No extrapleural air. XR CHEST PORTABLE    Result Date: 3/26/2022  Bibasilar atelectasis and pleural effusion more pronounced on the left. No significant change. Stable support tubes and line. Assessment :      Hospital Problems           Last Modified POA    * (Principal) Acute on chronic combined systolic and diastolic congestive heart failure ([address] 4/1/2022 Yes    3-vessel CAD 3/11/2022 Yes    Acute respiratory failure with hypoxia ([address] 4/1/2022 No    S/P cardiac catheterization 4/1/2022 Yes    Acute renal failure with tubular necrosis ([address] 3/29/2022 Yes    Problem related to discharge planning 4/1/2022 No          Plan:     1. Continue management per CTS. 2. Pt still requiring high flow nasal cannula with SPO2 40% 20L. CXR shows edema. Will add lasix  3. Continue Plavix, lipitor,lopressor,aldactone  4. Continue PPI for GERD  5. PTOT as tolerated  6. Will discuss with CM  7. ETOH abuse-outside of withdrawal window  8.  Pt has history of cocaine use-no withdrawal.     Consultations:   IP CONSULT TO PULMONOLOGY  IP CONSULT TO CASE MANAGEMENT  IP CONSULT TO NEPHROLOGY  IP CONSULT TO IV TEAM  IP CONSULT TO INTERNAL MEDICINE      Ezekiel Dakin, DO  4/1/2022  1:10 PM    Copy sent to Dr. Anuj Jaffe MD

## 2022-04-01 NOTE — PROGRESS NOTES
PULMONARY & CRITICAL CARE MEDICINE PROGRESS NOTE     Patient:  Homero Bailey  MRN: [id number]  Admit date: 3/11/2022  Primary Care Physician: Prakash Carrillo MD  Consulting Physician: Sherryle Citizen, MD  CODE Status: Full Code  LOS: 21    SUBJECTIVE     CHIEF COMPLAINT/REASON FOR INITIAL CONSULT: COPD/chronic cough/preoperative evaluation    BRIEF HOSPITAL COURSE:  The patient is a 74 y.o. male history of hypertension, history of mild systolic dysfunction admitted with chest pain initially to [facility] on 03/09/2022 non-ST elevation MI ruled out by troponins in 20s had a stress test done because of high suspicion which shows ischemia of lateral septal wall with ejection fraction of 34%.  He does have history of cocaine use and apparently he had used cocaine before admission to [facility].  He was transferred to [facility] for cardiac catheterization which shows multivessel coronary artery disease with EF of 30% including left main disease.  He remains on a statin and heparin.  CT surgery was consulted for CABG.    Patient had pulmonary function test done which shows FEV1 of 46% with significance positive bronchodilator FEV1 of 56% with severe reduction in diffusion capacity of less than 30% with air trapping/hyperinflation. CT scan of the chest shows areas of blebs and bulla medially and centrilobular and paraseptal emphysema on CT scan. Patient does complain of shortness of breath on mild activity and exertion.  He does have chronic cough without no change denies increased wheezing currently denies chest pain.  He has history of cocaine use.  Does have history of smoking patient is not able to tell me that how many cigarettes he used to smoke but he claimed that now he smoke 1 pack/week and had been smoking for almost 50 years.     INTERVAL HISTORY:  04/01/22   Awake, alert  Afebrile  Hemodynamically stable  Off Precedex infusion  Saturating well on nasal cannula  No overnight issues reported    REVIEW OF SYSTEMS:  Review of Systems   Constitutional: Positive for activity change and fatigue. Negative for appetite change. HENT: Negative for postnasal drip, sinus pain, sore throat, trouble swallowing and voice change. Eyes: Negative for photophobia, redness and visual disturbance. Respiratory: Negative for cough and shortness of breath. Cardiovascular: Negative for chest pain and leg swelling. Gastrointestinal: Negative for abdominal pain, diarrhea and vomiting. Endocrine: Negative. Genitourinary: Negative for dysuria and hematuria. Musculoskeletal: Negative. Allergic/Immunologic: Negative. Neurological: Negative for dizziness, syncope, speech difficulty and headaches. Hematological: Negative for adenopathy. Does not bruise/bleed easily. Psychiatric/Behavioral: Negative for agitation, behavioral problems and confusion. OBJECTIVE     VITAL SIGNS:   LAST-  /72   Pulse 78   Temp 98.1 °F (36.7 °C) (Oral)   Resp 25   Ht 6' 3\" (1.905 m)   Wt 169 lb 15.6 oz (77.1 kg)   SpO2 91%   BMI 21.25 kg/m²   8-24 HR RANGE-  TEMP Temp  Av.2 °F (36.8 °C)  Min: 98.1 °F (36.7 °C)  Max: 98.4 °F (88.8 °C)   BP Systolic (77QTH), PQC:467 , Min:84 , WAU:465      Diastolic (92SEG), ILR:33, Min:52, Max:79     PULSE Pulse  Av.9  Min: 57  Max: 105   RR Resp  Av.6  Min: 24  Max: 35   O2 SAT SpO2  Av.3 %  Min: 88 %  Max: 96 %   OXYGEN DELIVERY O2 Flow Rate (L/min)  Av L/min  Min: 20 L/min  Max: 20 L/min     SYSTEMIC EXAMINATION:   General appearance -arousable and awake, chronically ill-appearing follows commands intermittently look very weak  Mental status -alert and awake and oriented  Eyes - pupils equal and reactive sluggish, sclera anicteric  Mouth -oral mucosa look moist  Neck - supple, no significant adenopathy, carotids upstroke normal bilaterally, no bruits. Right IJ catheter present. Chest -bilateral basilar breath sounds decreased. Scattered rhonchi present. Midsternal scar present. Heart -regular S1-S2, regular rhythm, normal S1, S2, no murmurs, clicks or gallops  Abdomen - soft, nontender, nondistended, no masses or organomegaly  Extremities - peripheral pulses normal, no pedal edema, no clubbing or cyanosis  Skin - normal coloration and turgor, no rashes, no suspicious skin lesions noted     DATA REVIEW     Medications: Current Inpatient  Scheduled Meds:   furosemide  40 mg IntraVENous BID    potassium chloride  40 mEq Oral Once    metoprolol tartrate  12.5 mg Oral BID    midodrine  5 mg Oral TID WC    spironolactone  25 mg Oral Daily    amiodarone  200 mg Oral Daily    polyethylene glycol  17 g Oral BID    nystatin  5 mL Oral 4x Daily    sodium chloride flush  10 mL IntraVENous 2 times per day    clopidogrel  75 mg Oral Daily    sennosides-docusate sodium  1 tablet Oral BID    atorvastatin  80 mg Oral Nightly    pantoprazole  40 mg Oral Daily    tamsulosin  0.4 mg Oral Daily     Continuous Infusions:   dexmedetomidine Stopped (03/31/22 1217)    sodium chloride Stopped (03/29/22 1653)    dextrose      sodium chloride      sodium chloride       INPUT/OUTPUT:  In: 772.4 [P.O.:651; I.V.:26.3]  Out: 650 [Urine:650]  Date 04/01/22 0000 - 04/01/22 2359   Shift 8875-4362 7761-3728 3163-6909 24 Hour Total   INTAKE   P.O.(mL/kg/hr)  200  200   Shift Total(mL/kg)  200(2.6)  200(2.6)   OUTPUT   Urine(mL/kg/hr) 300(0.5)   300   Shift Total(mL/kg) 300(3.9)   300(3.9)   Weight (kg) 77.1 77.1 77.1 77.1       LABS:-  ABG:   No results for input(s): POCPH, POCPCO2, POCPO2, POCHCO3, ZXRQ9CMU in the last 72 hours.   CBC:   Recent Labs     03/30/22  1247 03/31/22  0425 04/01/22  0334   WBC 13.4* 12.5* 12.6*   HGB 7.9* 8.3* 8.2*   HCT 25.5* 26.4* 25.2*   MCV 87.0 85.7 83.2    319 342   LYMPHOPCT 13* 16* 18*   RBC 2.93* 3.08* 3.03*   MCH 27.0 26.9 27.1   MCHC 31.0 31.4 32.5   RDW 15.9* 15.9* 15.8*     BMP:   Recent Labs 03/30/22  1248 03/31/22  0425 04/01/22  0334    136 133*   K 3.5* 3.9 4.0    98 98   CO2 28 26 25   BUN 22 22 20   CREATININE 1.13 0.97 0.93   GLUCOSE 79 128* 100*     Liver Function Test:   No results for input(s): PROT, LABALBU, ALT, AST, GGT, ALKPHOS, BILITOT in the last 72 hours. Amylase/Lipase:  No results for input(s): AMYLASE, LIPASE in the last 72 hours. Coagulation Profile:   Recent Labs     03/30/22  1007 03/31/22  0425 04/01/22  0334   INR 1.6 1.3 1.3   PROTIME 16.4* 13.7* 13.7*     Cardiac Enzymes:  No results for input(s): CKTOTAL, CKMB, CKMBINDEX, TROPONINI in the last 72 hours. Lactic Acid:  Lab Results   Component Value Date    LACTA NOT REPORTED 03/11/2018    LACTA NOT REPORTED 03/10/2018    LACTA NOT REPORTED 03/10/2018     BNP:   Lab Results   Component Value Date    BNP 70 09/13/2013     D-Dimer:  No results found for: DDIMER  Others:   No results found for: TSH, I8TDPZP, U8AFCAD, THYROIDAB, FT3, T4FREE  Lab Results   Component Value Date    SEDRATE 20 (H) 05/21/2014    CRP 15.2 (H) 05/21/2014     No results found for: Jose Renner  No results found for: IRON, TIBC, FERRITIN  No results found for: SPEP, UPEP  Lab Results   Component Value Date    PSA 1.11 11/04/2016       Microbiology:  No results for input(s): SPECDESC, SPECDESC, SPECIAL, CULTURE, CULTURE, STATUS, ORG, CDIFFTOXPCR, CAMPYLOBPCR, SALMONELLAPC, SHIGAPCR, SHIGELLAPCR, MPNEUG, MPNEUM, LACTOQL in the last 72 hours. Pathology:    Radiology Reports:  XR CHEST PORTABLE   Final Result   Multifocal bibasilar predominant airspace disease and likely trace bilateral   pleural effusions, not significantly changed. Persistent interstitial   pulmonary edema. XR CHEST PORTABLE   Final Result   No significant interval change. XR CHEST PORTABLE   Final Result   Slight increase in pulmonary vascularity compared to prior study 1 day   earlier. Stable cardiomegaly with bilateral effusions.   For findings as above.         XR CHEST PORTABLE   Final Result   Little change from prior study. Postoperative changes. Support tubes and   lines as above. Small bilateral effusions and bibasilar atelectasis. XR CHEST PORTABLE   Final Result   Multiple tubes lines as above. Continued left effusion and basilar   atelectasis with vascular congestion. No extrapleural air. XR CHEST PORTABLE   Final Result   1. Stable positions of support apparatus. 2. Stable bibasilar airspace disease, atelectasis favored over pneumonia. 3. Stable small bilateral pleural effusions. US RENAL COMPLETE   Final Result      1. Borderline small right kidney but otherwise sonographically unremarkable   appearing kidneys. 2.  Mild to moderate amount of ascites was noted. XR CHEST PORTABLE   Final Result   Bibasilar atelectasis and pleural effusion more pronounced on the left. No   significant change. Stable support tubes and line. XR CHEST PORTABLE   Final Result   Overall improvement seen in the aeration lung bases. The remainder of lines   and tubes in stool are stable. No definite pneumothorax on the left. XR CHEST PORTABLE   Final Result   1. Lines and tubes as described above. 2.  Increasing right pleural effusion and right basilar airspace disease. Stable left pleural effusion. XR CHEST PORTABLE   Final Result   1. Support lines and tubes remain in place. 2.  Bibasilar opacities persist, slightly increased on the right, suggesting   layering pleural effusions and atelectasis. 3.  Mildly increased vascular congestion, possibly accentuated by supine   positioning. XR CHEST PORTABLE   Final Result   Stable chest (suspect right pleural effusion in addition to left effusion   and/or atelectasis) with line and tube placements as detailed above.          XR CHEST PORTABLE   Final Result   As compared to prior examination, the ET tube now is in satisfactory   position. Examination otherwise is unchanged. XR CHEST PORTABLE   Final Result   1. Endotracheal tube is low in position, approximately 1 cm above the   veronique. This should be pulled back approximately 3 cm. 2.  Other lines and tubes are unchanged in position. 3.  New small right pleural effusion. 4.  Unchanged left basilar opacity. XR CHEST PORTABLE   Final Result   1. Lines and tubes are unchanged in position. 2. Linear bibasilar opacities are similar prior study, atelectasis versus   pneumonia. XR CHEST PORTABLE   Final Result   All tubes and catheters are in good position. No evidence for pneumothorax. Bilateral basal atelectasis noted. VL DUP UPPER EXTREMITY ARTERIES BILATERAL   Final Result      VL Vein Mapping Lower Bilateral   Final Result      VL DUP CAROTID BILATERAL   Final Result      CT CHEST WO CONTRAST   Final Result   1. Extensive centrilobular and paraseptal emphysema. 2. Extensive atherosclerotic disease, particularly along the coronary   arteries. 3. There are areas of atelectasis in the lingula. There is collapse of the   right middle lobe   4. No focal lung infiltrate. XR CHEST PORTABLE    (Results Pending)       Echocardiogram:   Results for orders placed during the hospital encounter of 03/11/22    Echocardiogram Complete 2D w Doppler w Color    Summary  Global left ventricular systolic function is normal. Estimated EF 58-20%  Normal diastolic filling. Normal right ventricular size and function. Mild mitral regurgitation. Mild tricuspid regurgitation. Mild pulmonary hypertension with an estimated right ventricular systolic  pressure of 41 mmHg. No pericardial effusion.     ASSESSMENT AND PLAN     Assessment:  Acute metabolic encephalopathy, improving  Bradycardia  Sinus pause lasting for 4.7 seconds 3/17/2022  COPD stage II on PFTs with severe reduction diffusion capacity  Chronic smoker  Cocaine use  Chronic combined diastolic and systolic CHF, EF 04%.  Previously 50-55%  Mild pulmonary hypertension  Essential hypertension  CKD stage III  Leukocytosis   Prolonged QT    Plan:    I personally interviewed/examined the patient; reviewed interval history, interpreted all available radiographic and laboratory data at the time of service. Patient has been weaned off high flow nasal cannula  Currently saturating well on O2 Flow Rate (L/min)  Av L/min  Min: 6 L/min  Max: 6 L/min  Remains hemodynamically stable  Off Precedex  Continue diuresis as per nephrology recommendations  Continue DuoNeb nebs. Encourage incentive spirometry/Acapella  Continue to monitor I/O with a goal of even/negative fluid balance. Chemical DVT prophylaxis not indicated, patient on therapeutic Eliquis  Antimicrobials reviewed; currently not on antibiotics  Physical/occupational therapy  Increase ambulation/physical therapy    We will continue to follow. I would like to thank you for allowing me to participate in the care of this patient. Please feel free to call with any further questions or concerns.     Josefina Childers MD  Pulmonary and critical care medicine  2022, 3:59 PM

## 2022-04-01 NOTE — PROGRESS NOTES
Comprehensive Nutrition Assessment    Type and Reason for Visit:  Reassess    Nutrition Recommendations/Plan: Continue current diet with Ensure Enlive oral supplements at all meals. Encourage/monitor PO intakes as tolerated. Monitor labs, weights, and plan of care. Nutrition Assessment:  Pt sleeping at visit. Noted telesitter in room. RN reports pt has been eating meals and picking at foods. This morning pt ate more than 75% of his breakfast.  Unsure of intakes of oral supplements - will continue to provide with meals. Last BM 4/1. Using Bipap and HFNC as needed. Weight fluctuations noted. Labs reviewed: Na 133 mmol/L. Meds reviewed. Malnutrition Assessment:  Malnutrition Status: Moderate malnutrition    Context:  Acute Illness     Findings of the 6 clinical characteristics of malnutrition:  Energy Intake:  Mild decrease in energy intake  Weight Loss:  No significant weight loss per chart review; weight gain since admission noted     Body Fat Loss:  1 - Mild body fat loss Orbital,Buccal region   Muscle Mass Loss:  1 - Mild muscle mass loss Temples (temporalis),Clavicles (pectoralis & deltoids)  Fluid Accumulation:  No significant fluid accumulation   Strength:  Not Performed    Estimated Daily Nutrient Needs:  Energy (kcal):  25-30 kcal/kg = 6673-6958 kcals/day; Weight Used for Energy Requirements:  Admission     Protein (g):  1.2-1.5 gm/kg = 75-95 gm pro/day; Weight Used for Protein Requirements:  Admission    Fluid (ml/day):  25 mL/kg = 7385-9881 mL/day or per MD; Method Used for Fluid Requirements:  ml/Kg      Nutrition Related Findings:  Labs/Meds reviewed. Last BM 4/1. Wounds:  Surgical Incision,Multiple       Current Nutrition Therapies:    ADULT ORAL NUTRITION SUPPLEMENT; Breakfast, Lunch, Dinner; Standard High Calorie/High Protein Oral Supplement  ADULT DIET;  Dysphagia - Soft and Bite Sized    Anthropometric Measures:  · Height: 6' 3\" (190.5 cm)  · Current Body Weight: 169 lb 15.6 oz (77.1 kg)   · Admission Body Weight: 141 lb 5 oz (64.1 kg)    · Usual Body Weight: 145 lb (65.8 kg)     · Ideal Body Weight: 196 lbs; % Ideal Body Weight 86.7 %   · BMI: 21.2  · BMI Categories: Normal Weight (BMI 18.5-24. 9)       Nutrition Diagnosis:   · Inadequate oral intake related to  (current condition; mentation/agitation) as evidenced by  (variable PO intakes; need for oral supplements)    Nutrition Interventions:   Food and/or Nutrient Delivery:  Continue Current Diet,Continue Oral Nutrition Supplement  Nutrition Education/Counseling:  No recommendation at this time   Coordination of Nutrition Care:  Continue to monitor while inpatient    Goals:  Meet % of estimated nutrition needs. Nutrition Monitoring and Evaluation:   Behavioral-Environmental Outcomes:  None Identified   Food/Nutrient Intake Outcomes:  Food and Nutrient Intake,Supplement Intake  Physical Signs/Symptoms Outcomes:  Biochemical Data,Chewing or Swallowing,GI Status,Fluid Status or Edema,Hemodynamic Status,Nutrition Focused Physical Findings,Skin,Weight     Discharge Planning:     Too soon to determine     Electronically signed by Oscar Reynoso RD, LD on 4/1/22 at 12:04 PM EDT    Contact: [phone]

## 2022-04-02 ENCOUNTER — APPOINTMENT (OUTPATIENT)
Dept: GENERAL RADIOLOGY | Age: 67
DRG: 233 | End: 2022-04-02
Attending: INTERNAL MEDICINE
Payer: MEDICARE

## 2022-04-02 LAB
ABSOLUTE EOS #: 0.29 K/UL (ref 0–0.44)
ABSOLUTE IMMATURE GRANULOCYTE: 0.13 K/UL (ref 0–0.3)
ABSOLUTE LYMPH #: 2.14 K/UL (ref 1.1–3.7)
ABSOLUTE MONO #: 1.38 K/UL (ref 0.1–1.2)
ANION GAP SERPL CALCULATED.3IONS-SCNC: 13 MMOL/L (ref 9–17)
BASOPHILS # BLD: 1 % (ref 0–2)
BASOPHILS ABSOLUTE: 0.06 K/UL (ref 0–0.2)
BUN BLDV-MCNC: 14 MG/DL (ref 8–23)
CALCIUM SERPL-MCNC: 8.5 MG/DL (ref 8.6–10.4)
CHLORIDE BLD-SCNC: 98 MMOL/L (ref 98–107)
CO2: 28 MMOL/L (ref 20–31)
CREAT SERPL-MCNC: 0.88 MG/DL (ref 0.7–1.2)
EOSINOPHILS RELATIVE PERCENT: 2 % (ref 1–4)
GFR AFRICAN AMERICAN: >60 ML/MIN
GFR NON-AFRICAN AMERICAN: >60 ML/MIN
GFR SERPL CREATININE-BSD FRML MDRD: ABNORMAL ML/MIN/{1.73_M2}
GLUCOSE BLD-MCNC: 103 MG/DL (ref 75–110)
GLUCOSE BLD-MCNC: 117 MG/DL (ref 75–110)
GLUCOSE BLD-MCNC: 78 MG/DL (ref 70–99)
GLUCOSE BLD-MCNC: 83 MG/DL (ref 75–110)
HCT VFR BLD CALC: 27.8 % (ref 40.7–50.3)
HEMOGLOBIN: 8.7 G/DL (ref 13–17)
IMMATURE GRANULOCYTES: 1 %
INR BLD: 1.2
LYMPHOCYTES # BLD: 17 % (ref 24–43)
MCH RBC QN AUTO: 27.1 PG (ref 25.2–33.5)
MCHC RBC AUTO-ENTMCNC: 31.3 G/DL (ref 28.4–34.8)
MCV RBC AUTO: 86.6 FL (ref 82.6–102.9)
MONOCYTES # BLD: 11 % (ref 3–12)
NRBC AUTOMATED: 0 PER 100 WBC
PDW BLD-RTO: 16.2 % (ref 11.8–14.4)
PLATELET # BLD: ABNORMAL K/UL (ref 138–453)
PLATELET, FLUORESCENCE: NORMAL K/UL (ref 138–453)
POTASSIUM SERPL-SCNC: 3.6 MMOL/L (ref 3.7–5.3)
PROTHROMBIN TIME: 12.7 SEC (ref 9.1–12.3)
RBC # BLD: 3.21 M/UL (ref 4.21–5.77)
RBC # BLD: ABNORMAL 10*6/UL
SEG NEUTROPHILS: 69 % (ref 36–65)
SEGMENTED NEUTROPHILS ABSOLUTE COUNT: 8.96 K/UL (ref 1.5–8.1)
SODIUM BLD-SCNC: 139 MMOL/L (ref 135–144)
WBC # BLD: 13 K/UL (ref 3.5–11.3)

## 2022-04-02 PROCEDURE — 6370000000 HC RX 637 (ALT 250 FOR IP): Performed by: NURSE PRACTITIONER

## 2022-04-02 PROCEDURE — 82947 ASSAY GLUCOSE BLOOD QUANT: CPT

## 2022-04-02 PROCEDURE — 2580000003 HC RX 258: Performed by: NURSE PRACTITIONER

## 2022-04-02 PROCEDURE — 99233 SBSQ HOSP IP/OBS HIGH 50: CPT | Performed by: INTERNAL MEDICINE

## 2022-04-02 PROCEDURE — 6370000000 HC RX 637 (ALT 250 FOR IP): Performed by: STUDENT IN AN ORGANIZED HEALTH CARE EDUCATION/TRAINING PROGRAM

## 2022-04-02 PROCEDURE — 80048 BASIC METABOLIC PNL TOTAL CA: CPT

## 2022-04-02 PROCEDURE — 71045 X-RAY EXAM CHEST 1 VIEW: CPT

## 2022-04-02 PROCEDURE — 6370000000 HC RX 637 (ALT 250 FOR IP): Performed by: INTERNAL MEDICINE

## 2022-04-02 PROCEDURE — 85055 RETICULATED PLATELET ASSAY: CPT

## 2022-04-02 PROCEDURE — 6360000002 HC RX W HCPCS: Performed by: INTERNAL MEDICINE

## 2022-04-02 PROCEDURE — 85610 PROTHROMBIN TIME: CPT

## 2022-04-02 PROCEDURE — 85025 COMPLETE CBC W/AUTO DIFF WBC: CPT

## 2022-04-02 PROCEDURE — 36415 COLL VENOUS BLD VENIPUNCTURE: CPT

## 2022-04-02 PROCEDURE — 2060000000 HC ICU INTERMEDIATE R&B

## 2022-04-02 PROCEDURE — 99232 SBSQ HOSP IP/OBS MODERATE 35: CPT | Performed by: FAMILY MEDICINE

## 2022-04-02 RX ADMIN — PANTOPRAZOLE SODIUM 40 MG: 40 TABLET, DELAYED RELEASE ORAL at 08:58

## 2022-04-02 RX ADMIN — SODIUM CHLORIDE, PRESERVATIVE FREE 10 ML: 5 INJECTION INTRAVENOUS at 20:10

## 2022-04-02 RX ADMIN — OXYCODONE HYDROCHLORIDE AND ACETAMINOPHEN 2 TABLET: 5; 325 TABLET ORAL at 16:41

## 2022-04-02 RX ADMIN — MIDODRINE HYDROCHLORIDE 5 MG: 5 TABLET ORAL at 16:42

## 2022-04-02 RX ADMIN — CLOPIDOGREL 75 MG: 75 TABLET, FILM COATED ORAL at 08:58

## 2022-04-02 RX ADMIN — MIDODRINE HYDROCHLORIDE 5 MG: 5 TABLET ORAL at 08:58

## 2022-04-02 RX ADMIN — STANDARDIZED SENNA CONCENTRATE AND DOCUSATE SODIUM 1 TABLET: 8.6; 5 TABLET ORAL at 08:58

## 2022-04-02 RX ADMIN — POLYETHYLENE GLYCOL 3350 17 G: 17 POWDER, FOR SOLUTION ORAL at 08:56

## 2022-04-02 RX ADMIN — ATORVASTATIN CALCIUM 80 MG: 80 TABLET, FILM COATED ORAL at 20:10

## 2022-04-02 RX ADMIN — Medication 500000 UNITS: at 12:40

## 2022-04-02 RX ADMIN — TAMSULOSIN HYDROCHLORIDE 0.4 MG: 0.4 CAPSULE ORAL at 08:58

## 2022-04-02 RX ADMIN — AMIODARONE HYDROCHLORIDE 200 MG: 200 TABLET ORAL at 08:58

## 2022-04-02 RX ADMIN — MIDODRINE HYDROCHLORIDE 5 MG: 5 TABLET ORAL at 12:40

## 2022-04-02 RX ADMIN — SPIRONOLACTONE 25 MG: 25 TABLET ORAL at 08:58

## 2022-04-02 RX ADMIN — METOPROLOL TARTRATE 12.5 MG: 25 TABLET ORAL at 08:58

## 2022-04-02 RX ADMIN — FUROSEMIDE 40 MG: 10 INJECTION, SOLUTION INTRAMUSCULAR; INTRAVENOUS at 08:58

## 2022-04-02 RX ADMIN — FUROSEMIDE 40 MG: 10 INJECTION, SOLUTION INTRAMUSCULAR; INTRAVENOUS at 16:42

## 2022-04-02 RX ADMIN — Medication 500000 UNITS: at 08:58

## 2022-04-02 ASSESSMENT — ENCOUNTER SYMPTOMS
WHEEZING: 0
TROUBLE SWALLOWING: 0
ALLERGIC/IMMUNOLOGIC NEGATIVE: 1
NAUSEA: 0
SINUS PAIN: 0
COUGH: 0
PHOTOPHOBIA: 0
SHORTNESS OF BREATH: 0
BLOOD IN STOOL: 0
SORE THROAT: 0
CONSTIPATION: 0
EYE REDNESS: 0
CHEST TIGHTNESS: 0
ABDOMINAL PAIN: 0
VOMITING: 0
DIARRHEA: 0
VOICE CHANGE: 0

## 2022-04-02 ASSESSMENT — PAIN SCALES - GENERAL
PAINLEVEL_OUTOF10: 0
PAINLEVEL_OUTOF10: 0
PAINLEVEL_OUTOF10: 6
PAINLEVEL_OUTOF10: 7

## 2022-04-02 NOTE — PROGRESS NOTES
[facility]  Office: [address], DO, Denilson Kiel, DO, Deonna Colorado, DO, Amyarely Mathieu Blood, DO, Anayeli Perdomo MD, Mya Gardner MD, Karli Agustin MD, Deyvi Dunn MD, Quinten Santana MD, Lea Shields MD, Adeola Simon MD, Dominique Longo, DO, Luciano Evans, DO, Olive Agustin MD,  [place] DO, Jean-Claude Hernandez MD, Denise Rock MD, Edith Olivera MD, Kate Pineda DO, Migel Iglesias MD, Ginette Enrique MD, Liam Alva, [facility], [facility] [place], [facility], Lugene Nose, CNP, Nedra Loredo, CNS, Osiel Duenas, CNP, Alpha Grain, CNP, Juan F Ing, CNP, Boy Comings, CNP, Jennifer Baum PA-C, Abimbola Milner, DNP, Salvador Torre, CNP, Gilberto Devlin, CNP, Nica Bob, DNP, Ernesto Gan, CNP, Saida Sanz, CNP, Chasidy Mola, CNP         [facility]   [address] [facility]    Progress Note    4/2/2022    8:13 AM    Name:   Ness Woody  MRN:     [id number]     [place]:      [de-identified]   Room:   [address] [place] [address] Day:  25  Admit Date:  3/11/2022  7:15 PM    PCP:   Sylvia Sun MD  Code Status:  Full Code    Subjective:     C/C:  S/P CABG/ SOB  Interval History Status: improved. Patient seen and examined at bedside, no acute events overnight. Continue to improve overall with better breathing. intermittent on [facility]  Patient denies any chest pain, shortness of breath, chills, fevers, nausea or vomiting. Patient vitals, labs and all providers notes were reviewed,from overnight shift and morning updates were noted and discussed with the nurse      Brief History: This is a 56-year-old male with a history of hypertension, dyslipidemia, HFrEF, who presents to our hospital as transfer for cardiac evaluation for chest pain. Pt had stress test done which showed ischemia of lateral and septal wall with ejection fraction of 34%.   Patient underwent cardiac catheterization which showed multivessel coronary artery disease with EF 30% including left main disease. CT surgery was consulted for CABG. Pt underwent CABG x2 on 3/22/22. Pulmonology was consulted for respiratory failure, pt dependent on HFNC at 20L and 40% FIO2 which was thought to be from CHF/COPD. He was treated with diureses, nebulizers. Pt hospital stay complicated by ischemic ATN.    Medicine was consulted to assist with discharge planning. Case management has been having difficulty with placement. Peer to PEER not successful x 2. Review of Systems:     Review of Systems   Constitutional: Positive for activity change, appetite change and fatigue. Negative for chills, diaphoresis and fever. HENT: Negative for congestion. Eyes: Negative for visual disturbance. Respiratory: Negative for cough, chest tightness, shortness of breath (improving) and wheezing. Cardiovascular: Negative for chest pain, palpitations and leg swelling. Gastrointestinal: Negative for abdominal pain, blood in stool, constipation, diarrhea, nausea and vomiting. Genitourinary: Negative for difficulty urinating. Neurological: Positive for weakness. Negative for dizziness, light-headedness, numbness and headaches. All other systems reviewed and are negative. Medications:      Allergies:  No Known Allergies    Current Meds:   Scheduled Meds:    furosemide  40 mg IntraVENous BID    potassium chloride  40 mEq Oral Once    metoprolol tartrate  12.5 mg Oral BID    midodrine  5 mg Oral TID WC    spironolactone  25 mg Oral Daily    amiodarone  200 mg Oral Daily    polyethylene glycol  17 g Oral BID    nystatin  5 mL Oral 4x Daily    sodium chloride flush  10 mL IntraVENous 2 times per day    clopidogrel  75 mg Oral Daily    sennosides-docusate sodium  1 tablet Oral BID    atorvastatin  80 mg Oral Nightly    pantoprazole  40 mg Oral Daily    tamsulosin  0.4 mg Oral Daily     Continuous Infusions:    dexmedetomidine Stopped (03/31/22 1217)    sodium chloride Stopped (03/29/22 1653)  dextrose      sodium chloride      sodium chloride       PRN Meds: LORazepam, albuterol, bisacodyl, sodium chloride flush, sodium chloride, ondansetron **OR** ondansetron, oxyCODONE-acetaminophen **OR** oxyCODONE-acetaminophen, fentanNYL **OR** fentanNYL, hydrALAZINE, metoprolol, diphenhydrAMINE, potassium chloride, magnesium sulfate, albumin human, glucose, dextrose, glucagon (rDNA), dextrose, sodium chloride, sodium chloride    Data:     Past Medical History:   has a past medical history of 3-vessel CAD, Alcohol abuse, Arthritis, Chronic kidney disease, Full dentures, Hypertension, Other emphysema ([address] Pure hypercholesterolemia, Systolic CHF ([address] and Wears glasses. Social History:   reports that he has been smoking cigarettes. He has been smoking about 0.00 packs per day. He has never used smokeless tobacco. He reports current alcohol use. He reports current drug use. Drugs: Cocaine and Marijuana ([address]). Family History:   Family History   Problem Relation Age of Onset    Diabetes Mother     Cancer Mother        Vitals:  /76   Pulse 77   Temp 98 °F (36.7 °C) (Oral)   Resp 17   Ht 6' 3\" (1.905 m)   Wt 169 lb 15.6 oz (77.1 kg)   SpO2 94%   BMI 21.25 kg/m²   Temp (24hrs), Av.2 °F (36.8 °C), Min:98 °F (36.7 °C), Max:98.3 °F (36.8 °C)    Recent Labs     22  1256 22  1624 228 22  0728   POCGLU 157* 116* 109 83       I/O (24Hr):     Intake/Output Summary (Last 24 hours) at 2022 0813  Last data filed at 2022 0411  Gross per 24 hour   Intake --   Output 1300 ml   Net -1300 ml       Labs:  Hematology:  Recent Labs     22  1247 22  1247 22  0425 22  0334 22  0528   WBC 13.4*   < > 12.5* 12.6* 13.0*   RBC 2.93*   < > 3.08* 3.03* 3.21*   HGB 7.9*   < > 8.3* 8.2* 8.7*   HCT 25.5*   < > 26.4* 25.2* 27.8*   MCV 87.0   < > 85.7 83.2 86.6   MCH 27.0   < > 26.9 27.1 27.1   MCHC 31.0   < > 31.4 32.5 31.3   RDW 15.9*   < > 15.9* 15.8* 16.2*      < > 319 342 See Reflexed IPF Result   MPV 10.9  --  11.0 10.6  --    INR  --    < > 1.3 1.3 1.2    < > = values in this interval not displayed. Chemistry:  Recent Labs     03/30/22  1007 03/30/22  1248 03/31/22  0425 04/01/22  0334 04/02/22  0528      < > 136 133* 139   K 2.8*   < > 3.9 4.0 3.6*   CL 97*   < > 98 98 98   CO2 27   < > 26 25 28   GLUCOSE 89   < > 128* 100* 78   BUN 21   < > 22 20 14   CREATININE 0.94   < > 0.97 0.93 0.88   MG 1.7  --   --   --   --    ANIONGAP 11   < > 12 10 13   LABGLOM >60   < > >60 >60 >60   GFRAA >60   < > >60 >60 >60   CALCIUM 7.9*   < > 8.2* 8.2* 8.5*    < > = values in this interval not displayed. Recent Labs     03/31/22  1910 04/01/22  0759 04/01/22  1256 04/01/22  1624 04/01/22  2058 04/02/22  0728   POCGLU 105 111* 157* 116* 109 83     ABG:  Lab Results   Component Value Date    POCPH 7.467 03/24/2022    POCPCO2 28.7 03/24/2022    POCPO2 48.3 03/24/2022    POCHCO3 20.7 03/24/2022    NBEA 2 03/24/2022    PBEA 0 03/24/2022    RAKZ4NLF 87 03/24/2022    FIO2 8.0 03/24/2022     Lab Results   Component Value Date/Time    SPECIAL NOT REPORTED 06/17/2017 10:40 PM     Lab Results   Component Value Date/Time    CULTURE NORMAL RESPIRATORY KYLAH MODERATE GROWTH 03/22/2022 08:30 PM       Radiology:  US RENAL COMPLETE    Result Date: 3/26/2022  1. Borderline small right kidney but otherwise sonographically unremarkable appearing kidneys. 2.  Mild to moderate amount of ascites was noted. XR CHEST PORTABLE    Result Date: 4/2/2022  Little change from prior study. Continued cardiomegaly, vascular congestion, effusions and bibasilar opacities. Findings favor pulmonary edema and probable superimposed atelectasis. XR CHEST PORTABLE    Result Date: 4/1/2022  Multifocal bibasilar predominant airspace disease and likely trace bilateral pleural effusions, not significantly changed. Persistent interstitial pulmonary edema.      XR CHEST PORTABLE    Result Date: 3/31/2022  No significant interval change. XR CHEST PORTABLE    Result Date: 3/30/2022  Slight increase in pulmonary vascularity compared to prior study 1 day earlier. Stable cardiomegaly with bilateral effusions. For findings as above. XR CHEST PORTABLE    Result Date: 3/29/2022  1. Stable positions of support apparatus. 2. Stable bibasilar airspace disease, atelectasis favored over pneumonia. 3. Stable small bilateral pleural effusions. XR CHEST PORTABLE    Result Date: 3/29/2022  Little change from prior study. Postoperative changes. Support tubes and lines as above. Small bilateral effusions and bibasilar atelectasis. XR CHEST PORTABLE    Result Date: 3/28/2022  Multiple tubes lines as above. Continued left effusion and basilar atelectasis with vascular congestion. No extrapleural air. Physical Examination:        Physical Exam  Vitals and nursing note reviewed. Constitutional:       General: He is not in acute distress. Interventions: Nasal cannula in place. Comments: Currently on [facility]   HENT:      Head: Normocephalic and atraumatic. Eyes:      Conjunctiva/sclera: Conjunctivae normal.      Pupils: Pupils are equal, round, and reactive to light. Neck:      Comments: Central line noted  Cardiovascular:      Rate and Rhythm: Normal rate and regular rhythm. Heart sounds: No murmur heard. Pulmonary:      Effort: Pulmonary effort is normal. No accessory muscle usage or respiratory distress. Breath sounds: No stridor. Decreased breath sounds and rhonchi present. No wheezing or rales. Chest:      Comments: Sternotomy wound in dressing  Abdominal:      General: Bowel sounds are normal. There is no distension. Palpations: Abdomen is soft. Abdomen is not rigid. Tenderness: There is no abdominal tenderness. There is no guarding. Musculoskeletal:         General: No tenderness. Skin:     General: Skin is warm and dry.       Findings: No

## 2022-04-02 NOTE — PROGRESS NOTES
PULMONARY & CRITICAL CARE MEDICINE PROGRESS NOTE     Patient:  Kenney Nielson  MRN: [id number]  Admit date: 3/11/2022  Primary Care Physician: Travis Dutta MD  Consulting Physician: Jocelyn Sargent MD  CODE Status: Full Code  LOS: 22    SUBJECTIVE     CHIEF COMPLAINT/REASON FOR INITIAL CONSULT: COPD/chronic cough/preoperative evaluation    BRIEF HOSPITAL COURSE:  The patient is a 74 y.o. male history of hypertension, history of mild systolic dysfunction admitted with chest pain initially to [facility] on 03/09/2022 non-ST elevation MI ruled out by troponins in 20s had a stress test done because of high suspicion which shows ischemia of lateral septal wall with ejection fraction of 34%.  He does have history of cocaine use and apparently he had used cocaine before admission to [facility].  He was transferred to [facility] for cardiac catheterization which shows multivessel coronary artery disease with EF of 30% including left main disease.  He remains on a statin and heparin.  CT surgery was consulted for CABG.    Patient had pulmonary function test done which shows FEV1 of 46% with significance positive bronchodilator FEV1 of 56% with severe reduction in diffusion capacity of less than 30% with air trapping/hyperinflation. CT scan of the chest shows areas of blebs and bulla medially and centrilobular and paraseptal emphysema on CT scan. Patient does complain of shortness of breath on mild activity and exertion.  He does have chronic cough without no change denies increased wheezing currently denies chest pain.  He has history of cocaine use.  Does have history of smoking patient is not able to tell me that how many cigarettes he used to smoke but he claimed that now he smoke 1 pack/week and had been smoking for almost 50 years. INTERVAL HISTORY:  04/02/22   Overnight events noted, chart seen, labs seen. Patient is seen the CVICU.   Overnight patient has been restless agitated as before without much change observed with telesitter and nursing staff. He had not been using BiPAP not cooperative for physical therapy or incentive spirometry and he has been pulling his oxygen off and on. He is off Precedex drip  He has been off high flow for almost 2 days now and he is on nasal cannula currently 6 L. He did not use BiPAP overnight. When I saw him he does not look in distress does not cooperate for interview and history and denies significant symptoms denies cough and shortness of breath. Labs shows sodium 139 BUN 14 creatinine 0.88 bicarbonate 28 WBC 13 is stable hemoglobin 8.7 and stable and platelet count pending with reflex    REVIEW OF SYSTEMS: Limited as not cooperative for all questions  Review of Systems   Constitutional: Positive for activity change and fatigue. Negative for appetite change. HENT: Negative for postnasal drip, sinus pain, sore throat, trouble swallowing and voice change. Eyes: Negative for photophobia, redness and visual disturbance. Respiratory: Negative for cough and shortness of breath. Cardiovascular: Negative for chest pain and leg swelling. Gastrointestinal: Negative for abdominal pain, diarrhea and vomiting. Endocrine: Negative. Genitourinary: Negative for dysuria and hematuria. Musculoskeletal: Negative. Allergic/Immunologic: Negative. Neurological: Negative for dizziness, syncope, speech difficulty and headaches. Hematological: Negative for adenopathy. Does not bruise/bleed easily. Psychiatric/Behavioral: Negative for agitation, behavioral problems and confusion.      OBJECTIVE     VITAL SIGNS:   LAST-  /76   Pulse 77   Temp 98 °F (36.7 °C) (Oral)   Resp 17   Ht 6' 3\" (1.905 m)   Wt 169 lb 15.6 oz (77.1 kg)   SpO2 94%   BMI 21.25 kg/m²   8-24 HR RANGE-  TEMP Temp  Av.2 °F (36.8 °C)  Min: 98 °F (36.7 °C)  Max: 98.3 °F (54.7 °C)   BP Systolic (63HRY), PMK:174 , Min:113 , QCV:461      Diastolic (11BQJ), Av, Min:51, Max:76     PULSE Pulse  Av.9  Min: 66  Max: 105   RR Resp  Av  Min: 15  Max: 22   O2 SAT SpO2  Av.3 %  Min: 83 %  Max: 100 %   OXYGEN DELIVERY O2 Flow Rate (L/min)  Av L/min  Min: 6 L/min  Max: 6 L/min     SYSTEMIC EXAMINATION:   General appearance -arousable and awake, chronically ill-appearing follows commands not cooperative  Mental status -alert and awake and oriented  Eyes - pupils equal and reactive sluggish, sclera anicteric  Mouth -oral mucosa look moist  Neck - supple, no significant adenopathy, carotids upstroke normal bilaterally, no bruits. Right IJ catheter present. Chest -bilateral basilar breath sounds decreased. Scattered rhonchi present. Poor inspiratory effort midsternal scar present.   Heart -regular S1-S2, regular rhythm, normal S1, S2, no murmurs, clicks or gallops  Abdomen - soft, nontender, nondistended, no masses or organomegaly  Extremities - peripheral pulses normal, no pedal edema, no clubbing or cyanosis  Skin - normal coloration and turgor, no rashes, no suspicious skin lesions noted     DATA REVIEW     Medications: Current Inpatient  Scheduled Meds:   furosemide  40 mg IntraVENous BID    potassium chloride  40 mEq Oral Once    metoprolol tartrate  12.5 mg Oral BID    midodrine  5 mg Oral TID WC    spironolactone  25 mg Oral Daily    amiodarone  200 mg Oral Daily    polyethylene glycol  17 g Oral BID    nystatin  5 mL Oral 4x Daily    sodium chloride flush  10 mL IntraVENous 2 times per day    clopidogrel  75 mg Oral Daily    sennosides-docusate sodium  1 tablet Oral BID    atorvastatin  80 mg Oral Nightly    pantoprazole  40 mg Oral Daily    tamsulosin  0.4 mg Oral Daily     Continuous Infusions:   dexmedetomidine Stopped (22 1217)    sodium chloride Stopped (22 1653)    dextrose      sodium chloride      sodium chloride       INPUT/OUTPUT:  In: 200 [P.O.:200]  Out: 1450 [Urine:1450]  Date 22 0000 - 22 3490 Shift 4215-8424 7710-9352 5086-9395 24 Hour Total   INTAKE   Shift Total(mL/kg)       OUTPUT   Urine(mL/kg/hr) 700(1.1)   700   Shift Total(mL/kg) 700(9.1)   700(9.1)   Weight (kg) 77.1 77.1 77.1 77.1       LABS:-  ABG:   No results for input(s): POCPH, POCPCO2, POCPO2, POCHCO3, TRIW4CND in the last 72 hours. CBC:   Recent Labs     03/31/22 0425 04/01/22 0334 04/02/22 0528   WBC 12.5* 12.6* 13.0*   HGB 8.3* 8.2* 8.7*   HCT 26.4* 25.2* 27.8*   MCV 85.7 83.2 86.6    342 See Reflexed IPF Result   LYMPHOPCT 16* 18* 17*   RBC 3.08* 3.03* 3.21*   MCH 26.9 27.1 27.1   MCHC 31.4 32.5 31.3   RDW 15.9* 15.8* 16.2*     BMP:   Recent Labs     03/31/22 0425 04/01/22 0334 04/02/22 0528    133* 139   K 3.9 4.0 3.6*   CL 98 98 98   CO2 26 25 28   BUN 22 20 14   CREATININE 0.97 0.93 0.88   GLUCOSE 128* 100* 78     Liver Function Test:   No results for input(s): PROT, LABALBU, ALT, AST, GGT, ALKPHOS, BILITOT in the last 72 hours. Amylase/Lipase:  No results for input(s): AMYLASE, LIPASE in the last 72 hours. Coagulation Profile:   Recent Labs     03/31/22 0425 04/01/22 0334 04/02/22 0528   INR 1.3 1.3 1.2   PROTIME 13.7* 13.7* 12.7*     Cardiac Enzymes:  No results for input(s): CKTOTAL, CKMB, CKMBINDEX, TROPONINI in the last 72 hours.   Lactic Acid:  Lab Results   Component Value Date    LACTA NOT REPORTED 03/11/2018    LACTA NOT REPORTED 03/10/2018    LACTA NOT REPORTED 03/10/2018     BNP:   Lab Results   Component Value Date    BNP 70 09/13/2013     D-Dimer:  No results found for: DDIMER  Others:   No results found for: TSH, K2XQKNL, Z9QHXBD, THYROIDAB, FT3, T4FREE  Lab Results   Component Value Date    SEDRATE 20 (H) 05/21/2014    CRP 15.2 (H) 05/21/2014     No results found for: Juluis Nadine  No results found for: IRON, TIBC, FERRITIN  No results found for: SPEP, UPEP  Lab Results   Component Value Date    PSA 1.11 11/04/2016       Microbiology:  No results for input(s): SPECDESC, [facility], SPECIAL, CULTURE, CULTURE, STATUS, ORG, CDIFFTOXPCR, CAMPYLOBPCR, SALMONELLAPC, SHIGAPCR, SHIGELLAPCR, MPNEUG, MPNEUM, LACTOQL in the last 72 hours. Pathology:    Radiology Reports:  XR CHEST PORTABLE   Final Result   Little change from prior study. Continued cardiomegaly, vascular congestion,   effusions and bibasilar opacities. Findings favor pulmonary edema and   probable superimposed atelectasis. XR CHEST PORTABLE   Final Result   Multifocal bibasilar predominant airspace disease and likely trace bilateral   pleural effusions, not significantly changed. Persistent interstitial   pulmonary edema. XR CHEST PORTABLE   Final Result   No significant interval change. XR CHEST PORTABLE   Final Result   Slight increase in pulmonary vascularity compared to prior study 1 day   earlier. Stable cardiomegaly with bilateral effusions. For findings as   above. XR CHEST PORTABLE   Final Result   Little change from prior study. Postoperative changes. Support tubes and   lines as above. Small bilateral effusions and bibasilar atelectasis. XR CHEST PORTABLE   Final Result   Multiple tubes lines as above. Continued left effusion and basilar   atelectasis with vascular congestion. No extrapleural air. XR CHEST PORTABLE   Final Result   1. Stable positions of support apparatus. 2. Stable bibasilar airspace disease, atelectasis favored over pneumonia. 3. Stable small bilateral pleural effusions. US RENAL COMPLETE   Final Result      1. Borderline small right kidney but otherwise sonographically unremarkable   appearing kidneys. 2.  Mild to moderate amount of ascites was noted. XR CHEST PORTABLE   Final Result   Bibasilar atelectasis and pleural effusion more pronounced on the left. No   significant change. Stable support tubes and line. XR CHEST PORTABLE   Final Result   Overall improvement seen in the aeration lung bases.   The remainder of lines   and tubes in stool are stable. No definite pneumothorax on the left. XR CHEST PORTABLE   Final Result   1. Lines and tubes as described above. 2.  Increasing right pleural effusion and right basilar airspace disease. Stable left pleural effusion. XR CHEST PORTABLE   Final Result   1. Support lines and tubes remain in place. 2.  Bibasilar opacities persist, slightly increased on the right, suggesting   layering pleural effusions and atelectasis. 3.  Mildly increased vascular congestion, possibly accentuated by supine   positioning. XR CHEST PORTABLE   Final Result   Stable chest (suspect right pleural effusion in addition to left effusion   and/or atelectasis) with line and tube placements as detailed above. XR CHEST PORTABLE   Final Result   As compared to prior examination, the ET tube now is in satisfactory   position. Examination otherwise is unchanged. XR CHEST PORTABLE   Final Result   1. Endotracheal tube is low in position, approximately 1 cm above the   veronique. This should be pulled back approximately 3 cm. 2.  Other lines and tubes are unchanged in position. 3.  New small right pleural effusion. 4.  Unchanged left basilar opacity. XR CHEST PORTABLE   Final Result   1. Lines and tubes are unchanged in position. 2. Linear bibasilar opacities are similar prior study, atelectasis versus   pneumonia. XR CHEST PORTABLE   Final Result   All tubes and catheters are in good position. No evidence for pneumothorax. Bilateral basal atelectasis noted. VL DUP UPPER EXTREMITY ARTERIES BILATERAL   Final Result      VL Vein Mapping Lower Bilateral   Final Result      VL DUP CAROTID BILATERAL   Final Result      CT CHEST WO CONTRAST   Final Result   1. Extensive centrilobular and paraseptal emphysema. 2. Extensive atherosclerotic disease, particularly along the coronary   arteries.    3. There are areas of atelectasis in the lingula. There is collapse of the   right middle lobe   4. No focal lung infiltrate. XR CHEST PORTABLE    (Results Pending)       Echocardiogram:   Results for orders placed during the hospital encounter of 22    Echocardiogram Complete 2D w Doppler w Color    Summary  Global left ventricular systolic function is normal. Estimated EF 91-51%  Normal diastolic filling. Normal right ventricular size and function. Mild mitral regurgitation. Mild tricuspid regurgitation. Mild pulmonary hypertension with an estimated right ventricular systolic  pressure of 41 mmHg. No pericardial effusion. ASSESSMENT AND PLAN     Assessment:  Acute metabolic encephalopathy, improving  Bradycardia  Sinus pause lasting for 4.7 seconds 3/17/2022  COPD stage II on PFTs with severe reduction diffusion capacity  Chronic smoker  Cocaine use  Chronic combined diastolic and systolic CHF, EF 94%.  Previously 50-55%  Mild pulmonary hypertension  Essential hypertension  CKD stage III  Leukocytosis   Prolonged QT    Plan:    I personally interviewed/examined the patient; reviewed interval history, interpreted all available radiographic and laboratory data at the time of service. Patient has been off high flow nasal cannula and not been using and refusing BiPAP  Currently saturating well on O2 Flow Rate (L/min)  Av L/min  Min: 6 L/min  Max: 6 L/min  Remains hemodynamically stable does not require pressor support  He has been off Precedex drip  Continue diuresis as per nephrology recommendations currently on Lasix 40 mg twice daily  Continue DuoNeb nebs if patient agrees and cooperate. Encourage incentive spirometry/Acapella  Continue to monitor I/O with a goal of even/negative fluid balance.   Chemical DVT prophylaxis, patient currently off therapeutic Eliquis  Antimicrobials reviewed; currently not on antibiotics  Physical/occupational therapy  Increase ambulation/physical therapy    Discussed with nursing staff. Discussed with respiratory therapist.      We will continue to follow. I would like to thank you for allowing me to participate in the care of this patient. Please feel free to call with any further questions or concerns.     Jene Simmonds, MD  Pulmonary and critical care medicine  4/2/2022, 10:39 AM

## 2022-04-02 NOTE — PROGRESS NOTES
Physical Therapy        Physical Therapy Cancel Note      DATE: 2022    NAME: Toi Montalvo  MRN: [id number]   : 1955      Patient not seen this date for Physical Therapy due to:    Patient Declined: Pt refusing to participate in PT at this time. Will check back when able.       Electronically signed by Carole Miller PTA on 2022 at 12:09 PM

## 2022-04-02 NOTE — CARE COORDINATION
Spoke to Mahnaz at centralized intake for [employer]. She will have current notes reviewed and requests follow up on Monday.  Cira left for centralized intake at [facility] requesting return call

## 2022-04-03 ENCOUNTER — APPOINTMENT (OUTPATIENT)
Dept: GENERAL RADIOLOGY | Age: 67
DRG: 233 | End: 2022-04-03
Attending: INTERNAL MEDICINE
Payer: MEDICARE

## 2022-04-03 LAB
GLUCOSE BLD-MCNC: 110 MG/DL (ref 75–110)
MYOGLOBIN: 39 NG/ML (ref 28–72)
TROPONIN, HIGH SENSITIVITY: 299 NG/L (ref 0–22)

## 2022-04-03 PROCEDURE — 93005 ELECTROCARDIOGRAM TRACING: CPT | Performed by: NURSE PRACTITIONER

## 2022-04-03 PROCEDURE — 6370000000 HC RX 637 (ALT 250 FOR IP): Performed by: NURSE PRACTITIONER

## 2022-04-03 PROCEDURE — 2060000000 HC ICU INTERMEDIATE R&B

## 2022-04-03 PROCEDURE — 99232 SBSQ HOSP IP/OBS MODERATE 35: CPT | Performed by: FAMILY MEDICINE

## 2022-04-03 PROCEDURE — 97530 THERAPEUTIC ACTIVITIES: CPT

## 2022-04-03 PROCEDURE — 84484 ASSAY OF TROPONIN QUANT: CPT

## 2022-04-03 PROCEDURE — 83874 ASSAY OF MYOGLOBIN: CPT

## 2022-04-03 PROCEDURE — 82947 ASSAY GLUCOSE BLOOD QUANT: CPT

## 2022-04-03 PROCEDURE — 2700000000 HC OXYGEN THERAPY PER DAY

## 2022-04-03 PROCEDURE — 6370000000 HC RX 637 (ALT 250 FOR IP): Performed by: STUDENT IN AN ORGANIZED HEALTH CARE EDUCATION/TRAINING PROGRAM

## 2022-04-03 PROCEDURE — 6360000002 HC RX W HCPCS: Performed by: INTERNAL MEDICINE

## 2022-04-03 PROCEDURE — 71045 X-RAY EXAM CHEST 1 VIEW: CPT

## 2022-04-03 PROCEDURE — 94761 N-INVAS EAR/PLS OXIMETRY MLT: CPT

## 2022-04-03 PROCEDURE — 99024 POSTOP FOLLOW-UP VISIT: CPT | Performed by: PHYSICIAN ASSISTANT

## 2022-04-03 PROCEDURE — 97535 SELF CARE MNGMENT TRAINING: CPT

## 2022-04-03 PROCEDURE — 2580000003 HC RX 258: Performed by: NURSE PRACTITIONER

## 2022-04-03 PROCEDURE — 6370000000 HC RX 637 (ALT 250 FOR IP): Performed by: INTERNAL MEDICINE

## 2022-04-03 PROCEDURE — 99233 SBSQ HOSP IP/OBS HIGH 50: CPT | Performed by: INTERNAL MEDICINE

## 2022-04-03 PROCEDURE — 36415 COLL VENOUS BLD VENIPUNCTURE: CPT

## 2022-04-03 RX ORDER — NITROGLYCERIN 0.4 MG/1
0.4 TABLET SUBLINGUAL EVERY 5 MIN PRN
Status: DISCONTINUED | OUTPATIENT
Start: 2022-04-03 | End: 2022-04-08 | Stop reason: HOSPADM

## 2022-04-03 RX ADMIN — FUROSEMIDE 40 MG: 10 INJECTION, SOLUTION INTRAMUSCULAR; INTRAVENOUS at 08:07

## 2022-04-03 RX ADMIN — ATORVASTATIN CALCIUM 80 MG: 80 TABLET, FILM COATED ORAL at 20:14

## 2022-04-03 RX ADMIN — MIDODRINE HYDROCHLORIDE 5 MG: 5 TABLET ORAL at 12:18

## 2022-04-03 RX ADMIN — AMIODARONE HYDROCHLORIDE 200 MG: 200 TABLET ORAL at 08:06

## 2022-04-03 RX ADMIN — TAMSULOSIN HYDROCHLORIDE 0.4 MG: 0.4 CAPSULE ORAL at 08:06

## 2022-04-03 RX ADMIN — CLOPIDOGREL 75 MG: 75 TABLET, FILM COATED ORAL at 08:06

## 2022-04-03 RX ADMIN — NITROGLYCERIN 0.4 MG: 0.4 TABLET SUBLINGUAL at 20:45

## 2022-04-03 RX ADMIN — MIDODRINE HYDROCHLORIDE 5 MG: 5 TABLET ORAL at 08:06

## 2022-04-03 RX ADMIN — STANDARDIZED SENNA CONCENTRATE AND DOCUSATE SODIUM 1 TABLET: 8.6; 5 TABLET ORAL at 20:14

## 2022-04-03 RX ADMIN — PANTOPRAZOLE SODIUM 40 MG: 40 TABLET, DELAYED RELEASE ORAL at 08:06

## 2022-04-03 RX ADMIN — SODIUM CHLORIDE, PRESERVATIVE FREE 10 ML: 5 INJECTION INTRAVENOUS at 20:14

## 2022-04-03 RX ADMIN — STANDARDIZED SENNA CONCENTRATE AND DOCUSATE SODIUM 1 TABLET: 8.6; 5 TABLET ORAL at 08:06

## 2022-04-03 RX ADMIN — METOPROLOL TARTRATE 12.5 MG: 25 TABLET ORAL at 20:14

## 2022-04-03 RX ADMIN — OXYCODONE HYDROCHLORIDE AND ACETAMINOPHEN 2 TABLET: 5; 325 TABLET ORAL at 20:14

## 2022-04-03 RX ADMIN — SODIUM CHLORIDE, PRESERVATIVE FREE 10 ML: 5 INJECTION INTRAVENOUS at 08:16

## 2022-04-03 RX ADMIN — OXYCODONE HYDROCHLORIDE AND ACETAMINOPHEN 2 TABLET: 5; 325 TABLET ORAL at 05:43

## 2022-04-03 RX ADMIN — Medication 500000 UNITS: at 08:07

## 2022-04-03 RX ADMIN — METOPROLOL TARTRATE 12.5 MG: 25 TABLET ORAL at 08:06

## 2022-04-03 RX ADMIN — SPIRONOLACTONE 25 MG: 25 TABLET ORAL at 08:16

## 2022-04-03 ASSESSMENT — ENCOUNTER SYMPTOMS
SINUS PAIN: 0
VOICE CHANGE: 0
VOMITING: 0
EYE REDNESS: 0
WHEEZING: 0
DIARRHEA: 0
ALLERGIC/IMMUNOLOGIC NEGATIVE: 1
SHORTNESS OF BREATH: 0
PHOTOPHOBIA: 0
CONSTIPATION: 0
CHEST TIGHTNESS: 0
COUGH: 0
NAUSEA: 0
BLOOD IN STOOL: 0
ABDOMINAL PAIN: 0
SORE THROAT: 0
TROUBLE SWALLOWING: 0

## 2022-04-03 ASSESSMENT — PAIN SCALES - GENERAL
PAINLEVEL_OUTOF10: 7
PAINLEVEL_OUTOF10: 7

## 2022-04-03 NOTE — PROGRESS NOTES
Patient refusing evening vitals. Refusing to wear his oxygen. Spo2 is 89-92%. Refusing evening medicine.

## 2022-04-03 NOTE — PROGRESS NOTES
[facility]  Office: [address], DO, Franklin Roe, DO, Karen Zhang, DO, Beth Moreno Blood, DO, Poppy Carrillo MD, Carey Adair MD, Lilly Mcdowell MD, Maximilian Singleton MD, Vanessa Goodwin MD, Ray Clayton MD, Chino Vasquez MD, Lenka Moncada, DO, Fallon Grossman, DO, Paige Hale MD,  Josiane Smith, DO, Jessika Gallegos MD, Verito Shaw MD, Gus Cardozo MD, Maame Stock DO, Indira Stokes MD, Damon Hare MD, Noel Jiang, [facility], [facility], [facility], Rivera Boltonippo, CNP, Rayshawn Back, CNS, Mike Damon, CNP, Joanne Chan, CNP, Marcela Delgado, CNP, Tavo Noel, CNP, Pili Chen PA-C, Ko Valentine [facility], Layla Barreto, CNP, Micki Wilhelm, CNP, Lieutenant Whatley, [facility], Nirmala Caputo, [facility], [place], CNP, Anna Raymond, [facility] [address]    Progress Note    4/3/2022    9:22 AM    Name:   Deepak Wilkes  MRN:     [id number]     Bess Aviles:      [de-identified]   Room:   [address] [place] [address] Day:  23  Admit Date:  3/11/2022  7:15 PM    PCP:   Moira Lentz MD  Code Status:  Full Code    Subjective:     C/C:  S/P CABG/ SOB  Interval History Status: improved. Patient seen and examined at bedside, patient is with frustration due to his LOS, overnight he wished to be dead and now he has a sitter   He is denying any plan or any suicidal thoughts , I feel it was more of a frustration   On [facility]  Patient denies any chest pain,  chills, fevers, nausea or vomiting. Patient vitals, labs and all providers notes were reviewed,from overnight shift and morning updates were noted and discussed with the nurse      Brief History: This is a 49-year-old male with a history of hypertension, dyslipidemia, HFrEF, who presents to our hospital as transfer for cardiac evaluation for chest pain. Pt had stress test done which showed ischemia of lateral and septal wall with ejection fraction of 34%.   Patient underwent cardiac catheterization which showed multivessel coronary artery disease with EF 30% including left main disease. CT surgery was consulted for CABG. Pt underwent CABG x2 on 3/22/22. Pulmonology was consulted for respiratory failure, pt dependent on HFNC at 20L and 40% FIO2 which was thought to be from CHF/COPD. He was treated with diureses, nebulizers. Pt hospital stay complicated by ischemic ATN.    Medicine was consulted to assist with discharge planning. Case management has been having difficulty with placement. Peer to PEER not successful x 2. Review of Systems:     Review of Systems   Constitutional: Positive for activity change, appetite change and fatigue. Negative for chills, diaphoresis and fever. HENT: Negative for congestion. Eyes: Negative for visual disturbance. Respiratory: Negative for cough, chest tightness, shortness of breath (improving) and wheezing. Cardiovascular: Negative for chest pain, palpitations and leg swelling. Gastrointestinal: Negative for abdominal pain, blood in stool, constipation, diarrhea, nausea and vomiting. Genitourinary: Negative for difficulty urinating. Neurological: Positive for weakness. Negative for dizziness, light-headedness, numbness and headaches. All other systems reviewed and are negative. Medications:      Allergies:  No Known Allergies    Current Meds:   Scheduled Meds:    furosemide  40 mg IntraVENous BID    potassium chloride  40 mEq Oral Once    metoprolol tartrate  12.5 mg Oral BID    midodrine  5 mg Oral TID WC    spironolactone  25 mg Oral Daily    amiodarone  200 mg Oral Daily    polyethylene glycol  17 g Oral BID    nystatin  5 mL Oral 4x Daily    sodium chloride flush  10 mL IntraVENous 2 times per day    clopidogrel  75 mg Oral Daily    sennosides-docusate sodium  1 tablet Oral BID    atorvastatin  80 mg Oral Nightly    pantoprazole  40 mg Oral Daily    tamsulosin  0.4 mg Oral Daily     Continuous Infusions:    dexmedetomidine Stopped (22 1217)    sodium chloride Stopped (22 1653)    dextrose      sodium chloride      sodium chloride       PRN Meds: LORazepam, albuterol, bisacodyl, sodium chloride flush, sodium chloride, ondansetron **OR** ondansetron, oxyCODONE-acetaminophen **OR** oxyCODONE-acetaminophen, fentanNYL **OR** fentanNYL, hydrALAZINE, metoprolol, diphenhydrAMINE, potassium chloride, magnesium sulfate, albumin human, glucose, dextrose, glucagon (rDNA), dextrose, sodium chloride, sodium chloride    Data:     Past Medical History:   has a past medical history of 3-vessel CAD, Alcohol abuse, Arthritis, Chronic kidney disease, Full dentures, Hypertension, Other emphysema ([address] Pure hypercholesterolemia, Systolic CHF ([address] and Wears glasses. Social History:   reports that he has been smoking cigarettes. He has been smoking about 0.00 packs per day. He has never used smokeless tobacco. He reports current alcohol use. He reports current drug use. Drugs: Cocaine and Marijuana ([place]). Family History:   Family History   Problem Relation Age of Onset    Diabetes Mother     Cancer Mother        Vitals:  /69   Pulse 92   Temp 98.5 °F (36.9 °C) (Oral)   Resp 17   Ht 6' 3\" (1.905 m)   Wt 171 lb 15.3 oz (78 kg)   SpO2 91%   BMI 21.49 kg/m²   Temp (24hrs), Av.2 °F (36.8 °C), Min:97.9 °F (36.6 °C), Max:98.5 °F (36.9 °C)    Recent Labs     22  0728 22  1134 22  1629   POCGLU 109 83 117* 103       I/O (24Hr):     Intake/Output Summary (Last 24 hours) at 4/3/2022 0922  Last data filed at 4/3/2022 0000  Gross per 24 hour   Intake --   Output 1400 ml   Net -1400 ml       Labs:  Hematology:  Recent Labs     22  0334 22  0528   WBC 12.6* 13.0*   RBC 3.03* 3.21*   HGB 8.2* 8.7*   HCT 25.2* 27.8*   MCV 83.2 86.6   MCH 27.1 27.1   MCHC 32.5 31.3   RDW 15.8* 16.2*    See Reflexed IPF Result   MPV 10.6  --    INR 1.3 1.2     Chemistry:  Recent Labs     04/01/22  0334 04/02/22  0528   * 139   K 4.0 3.6*   CL 98 98   CO2 25 28   GLUCOSE 100* 78   BUN 20 14   CREATININE 0.93 0.88   ANIONGAP 10 13   LABGLOM >60 >60   GFRAA >60 >60   CALCIUM 8.2* 8.5*     Recent Labs     04/01/22  1256 04/01/22  1624 04/01/22  2058 04/02/22  0728 04/02/22  1134 04/02/22  1629   POCGLU 157* 116* 109 83 117* 103     ABG:  Lab Results   Component Value Date    POCPH 7.467 03/24/2022    POCPCO2 28.7 03/24/2022    POCPO2 48.3 03/24/2022    POCHCO3 20.7 03/24/2022    NBEA 2 03/24/2022    PBEA 0 03/24/2022    IQUY1LAE 87 03/24/2022    FIO2 8.0 03/24/2022     Lab Results   Component Value Date/Time    SPECIAL NOT REPORTED 06/17/2017 10:40 PM     Lab Results   Component Value Date/Time    CULTURE NORMAL RESPIRATORY KYLAH MODERATE GROWTH 03/22/2022 08:30 PM       Radiology:  US RENAL COMPLETE    Result Date: 3/26/2022  1. Borderline small right kidney but otherwise sonographically unremarkable appearing kidneys. 2.  Mild to moderate amount of ascites was noted. XR CHEST PORTABLE    Result Date: 4/2/2022  Little change from prior study. Continued cardiomegaly, vascular congestion, effusions and bibasilar opacities. Findings favor pulmonary edema and probable superimposed atelectasis. XR CHEST PORTABLE    Result Date: 4/1/2022  Multifocal bibasilar predominant airspace disease and likely trace bilateral pleural effusions, not significantly changed. Persistent interstitial pulmonary edema. XR CHEST PORTABLE    Result Date: 3/31/2022  No significant interval change. XR CHEST PORTABLE    Result Date: 3/30/2022  Slight increase in pulmonary vascularity compared to prior study 1 day earlier. Stable cardiomegaly with bilateral effusions. For findings as above. XR CHEST PORTABLE    Result Date: 3/29/2022  1. Stable positions of support apparatus. 2. Stable bibasilar airspace disease, atelectasis favored over pneumonia. 3. Stable small bilateral pleural effusions. XR CHEST PORTABLE    Result Date: 3/29/2022  Little change from prior study. Postoperative changes. Support tubes and lines as above. Small bilateral effusions and bibasilar atelectasis. XR CHEST PORTABLE    Result Date: 3/28/2022  Multiple tubes lines as above. Continued left effusion and basilar atelectasis with vascular congestion. No extrapleural air. Physical Examination:        Physical Exam  Vitals and nursing note reviewed. Constitutional:       General: He is not in acute distress. Interventions: Nasal cannula in place. Comments: Currently on [facility]   HENT:      Head: Normocephalic and atraumatic. Eyes:      Conjunctiva/sclera: Conjunctivae normal.      Pupils: Pupils are equal, round, and reactive to light. Neck:      Comments: Central line noted  Cardiovascular:      Rate and Rhythm: Normal rate and regular rhythm. Heart sounds: No murmur heard. Pulmonary:      Effort: Pulmonary effort is normal. No accessory muscle usage or respiratory distress. Breath sounds: No stridor. Decreased breath sounds and rhonchi present. No wheezing or rales. Chest:      Comments: Sternotomy wound in dressing  Abdominal:      General: Bowel sounds are normal. There is no distension. Palpations: Abdomen is soft. Abdomen is not rigid. Tenderness: There is no abdominal tenderness. There is no guarding. Musculoskeletal:         General: No tenderness. Skin:     General: Skin is warm and dry. Findings: No erythema, lesion or rash. Neurological:      Mental Status: He is alert and oriented to person, place, and time. Cranial Nerves: No cranial nerve deficit. Motor: No seizure activity. Psychiatric:         Speech: Speech normal.         Behavior: Behavior normal. Behavior is cooperative.          Assessment:        Hospital Problems           Last Modified POA    * (Principal) Acute on chronic combined systolic and diastolic congestive heart failure ([address] 4/1/2022 Yes    3-vessel CAD 3/11/2022 Yes    S/P cardiac catheterization 4/1/2022 Yes    Acute renal failure with tubular necrosis ([address] 3/29/2022 Yes    Problem related to discharge planning 4/1/2022 No    Acute respiratory failure with hypoxia ([address] 4/1/2022 No          Plan:        Principal Problem:    Acute on chronic combined systolic and diastolic congestive heart failure (HCC)  Active Problems:    3-vessel CAD    S/P cardiac catheterization    Acute renal failure with tubular necrosis ([address]    Problem related to discharge planning    Acute respiratory failure with hypoxia ([address]  Resolved Problems:    * No resolved hospital problems. *      - Management per CTS  - Continue spirometry and Acapella  - On and off diuresis, continue with negative balance  - Nephro signed off  - Currently on [employer] [place], seems to ne tolerating fine  - Does not want BIPAP or HFNC  - Encourage ambulation  - PT/OT  - DVT PPx   - patient is with frustration due to his LOS, overnight he wished to be dead and now he has a sitter   He is denying any plan or any siucidal thoughts , I feel it was more of a frustration   I did discuss with him and he acknowledge that he has no help at home and that he needs help and I subsequently elaborated this is the main reason why he is still here that we are trying to help him with placement   - Discussed with RN as well   -Thank you for the opportunity to participate in this patient care, please do not hesitate to contact me with any question.    Will follow      Aliyah Ordonez MD  4/3/2022  9:22 AM

## 2022-04-03 NOTE — PLAN OF CARE
[x]  IDENTIFY APPROPRIATE OXYGEN THERAPY  [x]   MONITOR SP02/ABG'S AS NEEDED   [x]   PATIENT EDUCATION AS NEEDED       PATIENT REFUSES TO WEAR BIPAP     [x] Risks and benefits explained to patient   [x] Patient refuses to wear Bipap stating \"Id rather die than wear any of that, I want to have sex than rather wear that\" Speaking to the female nurse \"will you have sex with me\"  [x] Patient verbalizes understanding of information presented. Patient refusing to let anyone check his oxygen with pulse ox.  No distress noted at this time

## 2022-04-03 NOTE — PROGRESS NOTES
Psych consult placed last night due to patient stating that he wants to die. Patient denies thoughts/wants of hurting himself to this RN. States \"I said a lot of stuff last night\". Patient refused  tele health psych consult. States \"I will do what I want when I want. \" Patient refusing for dietary to take order. States \"I just want popcorn shrimp\". Patient is very uncooperative in nursing assessment/needs.

## 2022-04-03 NOTE — PROGRESS NOTES
PULMONARY & CRITICAL CARE MEDICINE PROGRESS NOTE     Patient:  Elza Ty  MRN: [id number]  Admit date: 3/11/2022  Primary Care Physician: Martha Avila MD  Consulting Physician: Antoinette Vazquez MD  CODE Status: Full Code  LOS: 23    SUBJECTIVE     CHIEF COMPLAINT/REASON FOR INITIAL CONSULT: COPD/chronic cough/preoperative evaluation    BRIEF HOSPITAL COURSE:  The patient is a 74 y.o. male history of hypertension, history of mild systolic dysfunction admitted with chest pain initially to [facility] on 03/09/2022 non-ST elevation MI ruled out by troponins in 20s had a stress test done because of high suspicion which shows ischemia of lateral septal wall with ejection fraction of 34%.  He does have history of cocaine use and apparently he had used cocaine before admission to [facility].  He was transferred to [place] for cardiac catheterization which shows multivessel coronary artery disease with EF of 30% including left main disease.  He remains on a statin and heparin.  CT surgery was consulted for CABG.    Patient had pulmonary function test done which shows FEV1 of 46% with significance positive bronchodilator FEV1 of 56% with severe reduction in diffusion capacity of less than 30% with air trapping/hyperinflation. CT scan of the chest shows areas of blebs and bulla medially and centrilobular and paraseptal emphysema on CT scan. Patient does complain of shortness of breath on mild activity and exertion.  He does have chronic cough without no change denies increased wheezing currently denies chest pain.  He has history of cocaine use.  Does have history of smoking patient is not able to tell me that how many cigarettes he used to smoke but he claimed that now he smoke 1 pack/week and had been smoking for almost 50 years. INTERVAL HISTORY:  04/03/22   Overnight events noted, chart seen, labs seen. Patient is seen the CVICU.   Overnight patient has been restless and intermittently agitated continue require one-to-one observation by bedside sitter. He is on nasal cannula does require high flow oxygen but had not keep the oxygen on all the time. According to nursing staff he did not refuses medication and took his medication early this morning. He had not been requiring Precedex drip for about 3 days now. Patient does not answer a lot of questions denies shortness of breath or cough and look comfortable when I saw him he has bilateral breath sounds present without rhonchi distant breath sound bilaterally. He did not use BiPAP last night when he takes his oxygen off his saturation is 88%    REVIEW OF SYSTEMS: Limited as not cooperative for all questions  Review of Systems   Constitutional: Positive for activity change and fatigue. Negative for appetite change. HENT: Negative for postnasal drip, sinus pain, sore throat, trouble swallowing and voice change. Eyes: Negative for photophobia, redness and visual disturbance. Respiratory: Negative for cough and shortness of breath. Cardiovascular: Negative for chest pain and leg swelling. Gastrointestinal: Negative for abdominal pain, diarrhea and vomiting. Endocrine: Negative. Genitourinary: Negative for dysuria and hematuria. Musculoskeletal: Negative. Allergic/Immunologic: Negative. Neurological: Negative for dizziness, syncope, speech difficulty and headaches. Hematological: Negative for adenopathy. Does not bruise/bleed easily. Psychiatric/Behavioral: Negative for agitation, behavioral problems and confusion.      OBJECTIVE     VITAL SIGNS:   LAST-  /69   Pulse 67   Temp 98.5 °F (36.9 °C) (Oral)   Resp 14   Ht 6' 3\" (1.905 m)   Wt 171 lb 15.3 oz (78 kg)   SpO2 100%   BMI 21.49 kg/m²   8-24 HR RANGE-  TEMP Temp  Av.2 °F (36.8 °C)  Min: 97.9 °F (36.6 °C)  Max: 98.5 °F (51.1 °C)   BP Systolic (36BXS), VCR:997 , Min:94 , OEB:588      Diastolic (46ARN), LGD:63, Min:56, Max:71     PULSE Pulse  Av.8  Min: 67  Max: 92   RR Resp  Av  Min: 14  Max: 14   O2 SAT SpO2  Av %  Min: 100 %  Max: 100 %   OXYGEN DELIVERY O2 Flow Rate (L/min)  Avg: 3.5 L/min  Min: 3.5 L/min  Max: 3.5 L/min     SYSTEMIC EXAMINATION:   General appearance -arousable and awake, chronically ill-appearing follows commands not cooperative  Mental status -alert and awake and oriented  Eyes - pupils equal and reactive sluggish, sclera anicteric  Mouth -oral mucosa look moist  Neck - supple, no significant adenopathy, carotids upstroke normal bilaterally, no bruits. Chest -bilateral basilar breath sounds decreased. Scattered rhonchi present. Poor inspiratory effort midsternal scar present.   Heart -regular S1-S2, regular rhythm, normal S1, S2, no murmurs, clicks or gallops  Abdomen - soft, nontender, nondistended, no masses or organomegaly  Extremities - peripheral pulses normal, no pedal edema, no clubbing or cyanosis  Skin - normal coloration and turgor, no rashes, no suspicious skin lesions noted     DATA REVIEW     Medications: Current Inpatient  Scheduled Meds:   furosemide  40 mg IntraVENous BID    potassium chloride  40 mEq Oral Once    metoprolol tartrate  12.5 mg Oral BID    midodrine  5 mg Oral TID WC    spironolactone  25 mg Oral Daily    amiodarone  200 mg Oral Daily    polyethylene glycol  17 g Oral BID    nystatin  5 mL Oral 4x Daily    sodium chloride flush  10 mL IntraVENous 2 times per day    clopidogrel  75 mg Oral Daily    sennosides-docusate sodium  1 tablet Oral BID    atorvastatin  80 mg Oral Nightly    pantoprazole  40 mg Oral Daily    tamsulosin  0.4 mg Oral Daily     Continuous Infusions:   dexmedetomidine Stopped (22 1217)    sodium chloride Stopped (22 1653)    dextrose      sodium chloride      sodium chloride       INPUT/OUTPUT:  In: -   Out: 1400 [Urine:1400]  Date 22 - 22   Shift 6643-8534 9363-5448 0402-5989 24 Hour Total   INTAKE Shift Total(mL/kg)       OUTPUT   Urine(mL/kg/hr) 350(0.6)   350   Shift Total(mL/kg) 350(4.5)   350(4.5)   Weight (kg) 78 78 78 78       LABS:-  ABG:   No results for input(s): POCPH, POCPCO2, POCPO2, POCHCO3, KYPQ8VQI in the last 72 hours. CBC:   Recent Labs     04/01/22 0334 04/02/22 0528   WBC 12.6* 13.0*   HGB 8.2* 8.7*   HCT 25.2* 27.8*   MCV 83.2 86.6    See Reflexed IPF Result   LYMPHOPCT 18* 17*   RBC 3.03* 3.21*   MCH 27.1 27.1   MCHC 32.5 31.3   RDW 15.8* 16.2*     BMP:   Recent Labs     04/01/22 0334 04/02/22 0528   * 139   K 4.0 3.6*   CL 98 98   CO2 25 28   BUN 20 14   CREATININE 0.93 0.88   GLUCOSE 100* 78     Liver Function Test:   No results for input(s): PROT, LABALBU, ALT, AST, GGT, ALKPHOS, BILITOT in the last 72 hours. Amylase/Lipase:  No results for input(s): AMYLASE, LIPASE in the last 72 hours. Coagulation Profile:   Recent Labs     04/01/22 0334 04/02/22 0528   INR 1.3 1.2   PROTIME 13.7* 12.7*     Cardiac Enzymes:  No results for input(s): CKTOTAL, CKMB, CKMBINDEX, TROPONINI in the last 72 hours. Lactic Acid:  Lab Results   Component Value Date    LACTA NOT REPORTED 03/11/2018    LACTA NOT REPORTED 03/10/2018    LACTA NOT REPORTED 03/10/2018     BNP:   Lab Results   Component Value Date    BNP 70 09/13/2013     D-Dimer:  No results found for: DDIMER  Others:   No results found for: TSH, F8EYDBT, Z3PAPAB, THYROIDAB, FT3, T4FREE  Lab Results   Component Value Date    SEDRATE 20 (H) 05/21/2014    CRP 15.2 (H) 05/21/2014     No results found for: Derenda Bilberry  No results found for: IRON, TIBC, FERRITIN  No results found for: SPEP, UPEP  Lab Results   Component Value Date    PSA 1.11 11/04/2016       Microbiology:  No results for input(s): SPECDESC, SPECDESC, SPECIAL, CULTURE, CULTURE, STATUS, ORG, CDIFFTOXPCR, CAMPYLOBPCR, SALMONELLAPC, SHIGAPCR, SHIGELLAPCR, MPNEUG, MPNEUM, LACTOQL in the last 72 hours.     Pathology:    Radiology Reports:  XR CHEST PORTABLE Final Result   Little change from prior study. Cardiomegaly, vascular congestion, effusions   and bibasilar opacities are noted favoring pulmonary edema and atelectasis. XR CHEST PORTABLE   Final Result   Little change from prior study. Continued cardiomegaly, vascular congestion,   effusions and bibasilar opacities. Findings favor pulmonary edema and   probable superimposed atelectasis. XR CHEST PORTABLE   Final Result   Multifocal bibasilar predominant airspace disease and likely trace bilateral   pleural effusions, not significantly changed. Persistent interstitial   pulmonary edema. XR CHEST PORTABLE   Final Result   No significant interval change. XR CHEST PORTABLE   Final Result   Slight increase in pulmonary vascularity compared to prior study 1 day   earlier. Stable cardiomegaly with bilateral effusions. For findings as   above. XR CHEST PORTABLE   Final Result   Little change from prior study. Postoperative changes. Support tubes and   lines as above. Small bilateral effusions and bibasilar atelectasis. XR CHEST PORTABLE   Final Result   Multiple tubes lines as above. Continued left effusion and basilar   atelectasis with vascular congestion. No extrapleural air. XR CHEST PORTABLE   Final Result   1. Stable positions of support apparatus. 2. Stable bibasilar airspace disease, atelectasis favored over pneumonia. 3. Stable small bilateral pleural effusions. US RENAL COMPLETE   Final Result      1. Borderline small right kidney but otherwise sonographically unremarkable   appearing kidneys. 2.  Mild to moderate amount of ascites was noted. XR CHEST PORTABLE   Final Result   Bibasilar atelectasis and pleural effusion more pronounced on the left. No   significant change. Stable support tubes and line. XR CHEST PORTABLE   Final Result   Overall improvement seen in the aeration lung bases.   The remainder of lines   and tubes in stool are stable. No definite pneumothorax on the left. XR CHEST PORTABLE   Final Result   1. Lines and tubes as described above. 2.  Increasing right pleural effusion and right basilar airspace disease. Stable left pleural effusion. XR CHEST PORTABLE   Final Result   1. Support lines and tubes remain in place. 2.  Bibasilar opacities persist, slightly increased on the right, suggesting   layering pleural effusions and atelectasis. 3.  Mildly increased vascular congestion, possibly accentuated by supine   positioning. XR CHEST PORTABLE   Final Result   Stable chest (suspect right pleural effusion in addition to left effusion   and/or atelectasis) with line and tube placements as detailed above. XR CHEST PORTABLE   Final Result   As compared to prior examination, the ET tube now is in satisfactory   position. Examination otherwise is unchanged. XR CHEST PORTABLE   Final Result   1. Endotracheal tube is low in position, approximately 1 cm above the   veronique. This should be pulled back approximately 3 cm. 2.  Other lines and tubes are unchanged in position. 3.  New small right pleural effusion. 4.  Unchanged left basilar opacity. XR CHEST PORTABLE   Final Result   1. Lines and tubes are unchanged in position. 2. Linear bibasilar opacities are similar prior study, atelectasis versus   pneumonia. XR CHEST PORTABLE   Final Result   All tubes and catheters are in good position. No evidence for pneumothorax. Bilateral basal atelectasis noted. VL DUP UPPER EXTREMITY ARTERIES BILATERAL   Final Result      VL Vein Mapping Lower Bilateral   Final Result      VL DUP CAROTID BILATERAL   Final Result      CT CHEST WO CONTRAST   Final Result   1. Extensive centrilobular and paraseptal emphysema. 2. Extensive atherosclerotic disease, particularly along the coronary   arteries.    3. There are areas of atelectasis in the lingula. There is collapse of the   right middle lobe   4. No focal lung infiltrate. XR CHEST PORTABLE    (Results Pending)       Echocardiogram:   Results for orders placed during the hospital encounter of 03/11/22    Echocardiogram Complete 2D w Doppler w Color    Summary  Global left ventricular systolic function is normal. Estimated EF 37-25%  Normal diastolic filling. Normal right ventricular size and function. Mild mitral regurgitation. Mild tricuspid regurgitation. Mild pulmonary hypertension with an estimated right ventricular systolic  pressure of 41 mmHg. No pericardial effusion. ASSESSMENT AND PLAN     Assessment:  Acute metabolic encephalopathy, improving  Bradycardia improved  Sinus pause lasting for 4.7 seconds 3/17/2022  COPD stage II on PFTs with severe reduction diffusion capacity  Chronic smoker  Cocaine use  Chronic combined diastolic and systolic CHF, EF 59%.  Previously 50-55%  Mild pulmonary hypertension  Essential hypertension  CKD stage III  Leukocytosis   Prolonged QT    Plan:    I personally interviewed/examined the patient; reviewed interval history, interpreted all available radiographic and laboratory data at the time of service. Patient has been off high flow nasal cannula for 4 days now. He has been refusing BiPAP. Currently saturating well on O2 Flow Rate (L/min)  Avg: 3.5 L/min  Min: 3.5 L/min  Max: 3.5 L/min  Remains hemodynamically stable does not require pressor support  Continue diuresis as per nephrology recommendations currently on Lasix 40 mg twice daily  Will use bronchodilators as needed   Encourage incentive spirometry/Acapella  Continue to monitor I/O with a goal of even/negative fluid balance. Chemical DVT prophylaxis, patient was on Eliquis currently off if no plans for therapeutic Eliquis then would recommend DVT prophylaxis.   Antimicrobials reviewed; currently not on antibiotics  Physical/occupational therapy  Increase ambulation/physical therapy    Discussed with nursing staff. We will continue to follow. I would like to thank you for allowing me to participate in the care of this patient. Please feel free to call with any further questions or concerns.     Delicia Zuniga MD  Pulmonary and critical care medicine  4/3/2022, 12:23 PM

## 2022-04-03 NOTE — PROGRESS NOTES
Physical Therapy  Facility/Department: [facility]  Daily Treatment Note  NAME: Trisha Jarquin  : 1955  MRN: [id number]    Date of Service: 4/3/2022    Discharge Recommendations:  Patient would benefit from continued therapy after discharge        Assessment   Body structures, Functions, Activity limitations: Decreased strength;Decreased endurance;Decreased functional mobility ; Decreased balance; Increased pain;Decreased safe awareness;Decreased cognition  Assessment: Patient sat up 3 times max assist.  Unable to ambulate. Patient will need further PT to regain functional independence. Prognosis: Fair  PT Education: Goals; Home Exercise Program;General Safety;Transfer Training;Family Education;PT Role;Functional Mobility Training;Precautions;Plan of Care  REQUIRES PT FOLLOW UP: Yes  Activity Tolerance  Activity Tolerance: Patient limited by cognitive status;Treatment limited secondary to agitation     Patient Diagnosis(es): The encounter diagnosis was S/P cardiac catheterization. has a past medical history of 3-vessel CAD, Alcohol abuse, Arthritis, Chronic kidney disease, Full dentures, Hypertension, Other emphysema ([address] Pure hypercholesterolemia, Systolic CHF ([address] and Wears glasses. has a past surgical history that includes Hip fracture surgery (Left); Appendectomy; pr egd transoral biopsy single/multiple (2017); and Coronary artery bypass graft (N/A, 3/21/2022).     Restrictions  Restrictions/Precautions  Restrictions/Precautions: Fall Risk,General Precautions,Cardiac  Required Braces or Orthoses?: Yes  Implants present? : Metal implants  Required Braces or Orthoses  Other: Heart Hugger Brace  Position Activity Restriction  Sternal Precautions: 5# Lifting Restrictions,No Pushing,No Pulling  Sternal Precautions: CABG X   Other position/activity restrictions: Amb pt, up in chair for meals, NC 3.5 LPM  Subjective   General  Chart Reviewed: Yes  Response To Previous Treatment:  (Patient is complaining per week: 7x week  Times per day:  (1-2 x day)  Specific instructions for Next Treatment: endurance, Txs to chair, ambulation  Current Treatment Recommendations: Strengthening,Endurance Training,Functional Mobility Training,Transfer Training,Gait Training,Neuromuscular Re-education,Home Exercise Program,Safety Education & Training  Safety Devices  Type of devices: Call light within reach,Nurse notified,Gait belt,Patient at risk for falls,All fall risk precautions in place,Sitter present,Left in bed,Bed alarm in place  Therapy Time   Individual Concurrent Group Co-treatment   Time In 0835         Time Out 0848         Minutes 13         Timed Code Treatment Minutes: [address], PT

## 2022-04-03 NOTE — PROGRESS NOTES
[id number] [facility]  Daily Progress Note    Surgeon: Dr. Nilton Edwards   S/P : CABG X 2   EF: 45%     Subjective:  Mr. Haja Ley   Resting on bipap alternating with HFNC. No distress. Noncompliance noted often. A&0x4. Pt non compliant with staff. Refusing care. Refusing to walk. Refusing medications A&0x4. Physical Exam  Vital Signs: /69   Pulse 67   Temp 98.5 °F (36.9 °C) (Oral)   Resp 14   Ht 6' 3\" (1.905 m)   Wt 171 lb 15.3 oz (78 kg)   SpO2 100%   BMI 21.49 kg/m²  O2 Flow Rate (L/min): 3.5 L/min   Admit Weight: Weight: 141 lb 5 oz (64.1 kg)   WEIGHTWeight: 171 lb 15.3 oz (78 kg)     General: noncompliant   Heart: Normal S1 and S2.  Regular rhythm. No murmurs, gallops, or rubs. Pacing Wires: Yes -To be clipped prior to discharge  Lungs: clear to auscultation bilaterally and diminished breath sounds bibasilar Chest tubes: Yes, Air Leak: No  Abdomen: soft, non tender, non distended, BS x4  Extremities: negative  Wounds: clean and dry, healing appropriately. Sternum: Healing  SVG sites: Healing  CXR-no appreciated effusions noted. Diffuse consolidation. No pneumothorax noted.      Scheduled Meds:    furosemide  40 mg IntraVENous BID    potassium chloride  40 mEq Oral Once    metoprolol tartrate  12.5 mg Oral BID    midodrine  5 mg Oral TID WC    spironolactone  25 mg Oral Daily    amiodarone  200 mg Oral Daily    polyethylene glycol  17 g Oral BID    nystatin  5 mL Oral 4x Daily    sodium chloride flush  10 mL IntraVENous 2 times per day    clopidogrel  75 mg Oral Daily    sennosides-docusate sodium  1 tablet Oral BID    atorvastatin  80 mg Oral Nightly    pantoprazole  40 mg Oral Daily    tamsulosin  0.4 mg Oral Daily     Continuous Infusions:    dexmedetomidine Stopped (03/31/22 1217)    sodium chloride Stopped (03/29/22 1653)    dextrose      sodium chloride      sodium chloride         Data:  CBC:   Recent Labs     04/01/22  0334 04/02/22  0528   WBC 12.6* 13.0*   HGB 8.2* 8.7*   HCT 25.2* 27.8*   MCV 83.2 86.6    See Reflexed IPF Result     BMP:   Recent Labs     04/01/22  0334 04/02/22  0528   * 139   K 4.0 3.6*   CL 98 98   CO2 25 28   BUN 20 14   CREATININE 0.93 0.88     PT/INR:   Recent Labs     04/01/22  0334 04/02/22  0528   PROTIME 13.7* 12.7*   INR 1.3 1.2     APTT:   No results for input(s): APTT in the last 72 hours. Chest X-Ray: worsening atelectasis process to left thorax.     I/O:  I/O last 3 completed shifts:  In: -   Out: 2100 [Urine:2100]    Assessment/Plan:      Diagnosis Date    3-vessel CAD 03/11/2022    Alcohol abuse 06/23/2015    Arthritis     Chronic kidney disease     Full dentures     upper and lower full    Hypertension     Other emphysema ([address]     COPD stage II on PFTs with severe reduction diffusion capacity    Pure hypercholesterolemia 40/35/5974    Systolic CHF ([address]     Wears glasses        Beta-Blocker: yes  ASA: yes  Plavix: yes  GI: yes  Statin: yes  Coumadin: no  ACE-I: no  EF: 45%     PLAN:  D/C soon Lajoyce Cogan, PA

## 2022-04-03 NOTE — PROGRESS NOTES
Patient refusing to wear pulse ox, risks explained to patient. Patient verbalized understanding, then proceeds to state \"I want to die. \" [place] asks patient if he has an active plan to harm himself, patient denied. Patient continued to state \"Just let me die. \" Leticia Lala NP notified of patient's statements, suicide precautions ordered per NP. Guard at bedside, plastic trash bags and wires removed from patient's room.

## 2022-04-03 NOTE — PROGRESS NOTES
Occupational Therapy  Facility/Department: [facility] CAR 1  Daily Treatment Note  NAME: Tutu Bautista  : 1955  MRN: [id number]    Date of Service: 4/3/2022    Discharge Recommendations: Pt. Would benefit from further skilled OT services enhance functional outocmes  Patient would benefit from continued therapy after discharge       Assessment   Performance deficits / Impairments: Decreased ADL status; Decreased functional mobility ; Decreased strength;Decreased safe awareness;Decreased endurance;Decreased balance;Decreased high-level IADLs;Decreased cognition  Assessment: Pt would benefit from continued acute care and post acute care OT to address above listed deficits. Treatment Diagnosis: CABG X2  Prognosis: Good  Decision Making: Medium Complexity  OT Education: OT Role;Plan of Care;ADL Adaptive Strategies;Transfer Training;Precautions; Equipment  Patient Education: P return from pt  REQUIRES OT FOLLOW UP: Yes  Activity Tolerance  Activity Tolerance: Treatment limited secondary to decreased cognition;Patient limited by pain;Treatment limited secondary to agitation;Patient limited by fatigue  Safety Devices  Safety Devices in place: Yes  Type of devices: Bed alarm in place;Call light within reach; Patient at risk for falls; Left in bed;Nurse notified;Gait belt (One on one sitter)  Restraints  Initially in place: No         Patient Diagnosis(es): The encounter diagnosis was S/P cardiac catheterization. has a past medical history of 3-vessel CAD, Alcohol abuse, Arthritis, Chronic kidney disease, Full dentures, Hypertension, Other emphysema ([address] Pure hypercholesterolemia, Systolic CHF ([address] and Wears glasses. has a past surgical history that includes Hip fracture surgery (Left); Appendectomy; pr egd transoral biopsy single/multiple (2017); and Coronary artery bypass graft (N/A, 3/21/2022).     Restrictions  Restrictions/Precautions  Restrictions/Precautions: Fall Risk,General Precautions,Cardiac  Required Braces or Orthoses?: Yes  Implants present? : Metal implants  Required Braces or Orthoses  Other: Heart Hugger Brace  Position Activity Restriction  Sternal Precautions: 5# Lifting Restrictions,No Pushing,No Pulling  Sternal Precautions: CABG X   Other position/activity restrictions: Amb pt, up in chair for meals, NC 3.5 LPM  Subjective   General  Patient assessed for rehabilitation services?: Yes  Family / Caregiver Present: No  Diagnosis: CABGx2 on 3/21/22, NSTEMI, EF 34%, hx o0f ETOH/drug use  Pain Assessment  Non-Pharmaceutical Pain Intervention(s): Ambulation/Increased Activity; Distraction; Emotional support  Vital Signs  Patient Currently in Pain: Yes (Pt. would not speak about pain, other than that he is in pain.)   Orientation  Orientation  Overall Orientation Status:  (MURTAZA d/t agitation, pt. did respond to his name)  Objective    ADL  UE Dressing: Increased time to complete;Setup;Maximum assistance;Verbal cueing (Max encouragement to change gown in supine position. Gown visibly soiled, very low participation during activity.)  Additional Comments: declined all other ADL  d/t SOB/fatigue/agitation        Balance  Sitting Balance: Maximum assistance (max A sitting EOB d/t pt. very reluctant to get OOB. Static sitting EOB , flexed at trunk.)  Standing Balance: Maximum assistance  Standing Balance  Time: >30 sec  Activity: static stand for sheet management  Comment: w/RW x2, Max A d/t P safety with tech/B hand placement  Functional Mobility  Functional Mobility Comments: Decliend  Bed mobility  Rolling to Left: Maximum assistance  Rolling to Right: Maximum assistance  Supine to Sit: Maximum assistance  Sit to Supine: Stand by assistance  Scooting: Moderate assistance  Comment: supine<->sit 3 times, max encouragement to participate.   Transfers  Sit to stand: 2 Person assistance;Maximum assistance  Stand to sit: 2 Person assistance;Maximum assistance  Transfer Comments: 1 time, declined further standing Cognition  Overall Cognitive Status: Exceptions  Arousal/Alertness: Delayed responses to stimuli  Following Commands: Inconsistently follows commands  Attention Span: Difficulty attending to directions  Safety Judgement: Decreased awareness of need for assistance;Decreased awareness of need for safety  Problem Solving: Decreased awareness of errors  Insights: Decreased awareness of deficits  Initiation: Requires cues for all  Sequencing: Requires cues for all  Cognition Comment: Flat affect, not compliant with verbal cues for hand placement, sternal , precautions, coughing etc, up to Max verbal and tactile cues with poor return                                         Plan   Plan  Times per week: 4-6x (CABG)  Current Treatment Recommendations: Self-Care / ADL,Strengthening,Balance Training,Functional Mobility Training,Endurance Training,Pain Management,Safety Education & Training,Patient/Caregiver Education & Training,Equipment Evaluation, Education, & procurement,Home Management Training                                               AM-PAC Score        AM-PAC Inpatient Daily Activity Raw Score: 13 (04/03/22 1017)  AM-PAC Inpatient ADL T-Scale Score : 32.03 (04/03/22 1017)  ADL Inpatient CMS 0-100% Score: 63.03 (04/03/22 1017)  ADL Inpatient CMS G-Code Modifier : CL (04/03/22 1017)    Goals  Short term goals  Time Frame for Short term goals: Pt will by discharge  Short term goal 1: identify/maintain all sternal precautions with 1 vc  Short term goal 2: demo ADL UB bathing/dressing activity at SBA and increased time, including heart hugger  Short term goal 3: demo ADL LB bathing/dressing activity at min A and increased time, using sock-aid/reacher PRN  Short term goal 4: demo good safety awareness druing func mob around room at min A, RW PRN, and 1 cue  Short term goal 5: demo SBA for all bed mobility/func transfers using bed rails/LRD PRN       Therapy Time   Individual Concurrent Group Co-treatment   Time In 8295         Time Out 0855         Minutes 17         Timed Code Treatment Minutes: [address], [place]/L

## 2022-04-04 ENCOUNTER — APPOINTMENT (OUTPATIENT)
Dept: GENERAL RADIOLOGY | Age: 67
DRG: 233 | End: 2022-04-04
Attending: INTERNAL MEDICINE
Payer: MEDICARE

## 2022-04-04 LAB
ABSOLUTE EOS #: 0.32 K/UL (ref 0–0.44)
ABSOLUTE IMMATURE GRANULOCYTE: 0.08 K/UL (ref 0–0.3)
ABSOLUTE LYMPH #: 2.1 K/UL (ref 1.1–3.7)
ABSOLUTE MONO #: 1.27 K/UL (ref 0.1–1.2)
ANION GAP SERPL CALCULATED.3IONS-SCNC: 10 MMOL/L (ref 9–17)
BASOPHILS # BLD: 1 % (ref 0–2)
BASOPHILS ABSOLUTE: 0.09 K/UL (ref 0–0.2)
BUN BLDV-MCNC: 9 MG/DL (ref 8–23)
CALCIUM SERPL-MCNC: 8.4 MG/DL (ref 8.6–10.4)
CHLORIDE BLD-SCNC: 97 MMOL/L (ref 98–107)
CO2: 26 MMOL/L (ref 20–31)
CREAT SERPL-MCNC: 0.81 MG/DL (ref 0.7–1.2)
EKG ATRIAL RATE: 73 BPM
EKG P AXIS: 88 DEGREES
EKG P-R INTERVAL: 156 MS
EKG Q-T INTERVAL: 438 MS
EKG QRS DURATION: 92 MS
EKG QTC CALCULATION (BAZETT): 482 MS
EKG R AXIS: 45 DEGREES
EKG T AXIS: 81 DEGREES
EKG VENTRICULAR RATE: 73 BPM
EOSINOPHILS RELATIVE PERCENT: 2 % (ref 1–4)
GFR AFRICAN AMERICAN: >60 ML/MIN
GFR NON-AFRICAN AMERICAN: >60 ML/MIN
GFR SERPL CREATININE-BSD FRML MDRD: ABNORMAL ML/MIN/{1.73_M2}
GLUCOSE BLD-MCNC: 114 MG/DL (ref 70–99)
HCT VFR BLD CALC: 27.9 % (ref 40.7–50.3)
HEMOGLOBIN: 8.9 G/DL (ref 13–17)
IMMATURE GRANULOCYTES: 1 %
INR BLD: 1.2
LYMPHOCYTES # BLD: 15 % (ref 24–43)
MCH RBC QN AUTO: 27.1 PG (ref 25.2–33.5)
MCHC RBC AUTO-ENTMCNC: 31.9 G/DL (ref 28.4–34.8)
MCV RBC AUTO: 85.1 FL (ref 82.6–102.9)
MONOCYTES # BLD: 9 % (ref 3–12)
MYOGLOBIN: 39 NG/ML (ref 28–72)
MYOGLOBIN: 40 NG/ML (ref 28–72)
NRBC AUTOMATED: 0 PER 100 WBC
PDW BLD-RTO: 16.3 % (ref 11.8–14.4)
PLATELET # BLD: 551 K/UL (ref 138–453)
PMV BLD AUTO: 9.9 FL (ref 8.1–13.5)
POTASSIUM SERPL-SCNC: 3.8 MMOL/L (ref 3.7–5.3)
PROTHROMBIN TIME: 13.1 SEC (ref 9.1–12.3)
RBC # BLD: 3.28 M/UL (ref 4.21–5.77)
RBC # BLD: ABNORMAL 10*6/UL
SEG NEUTROPHILS: 72 % (ref 36–65)
SEGMENTED NEUTROPHILS ABSOLUTE COUNT: 10.22 K/UL (ref 1.5–8.1)
SODIUM BLD-SCNC: 133 MMOL/L (ref 135–144)
TROPONIN, HIGH SENSITIVITY: 309 NG/L (ref 0–22)
TROPONIN, HIGH SENSITIVITY: 314 NG/L (ref 0–22)
WBC # BLD: 14.1 K/UL (ref 3.5–11.3)

## 2022-04-04 PROCEDURE — 80048 BASIC METABOLIC PNL TOTAL CA: CPT

## 2022-04-04 PROCEDURE — 97116 GAIT TRAINING THERAPY: CPT

## 2022-04-04 PROCEDURE — 83874 ASSAY OF MYOGLOBIN: CPT

## 2022-04-04 PROCEDURE — 85025 COMPLETE CBC W/AUTO DIFF WBC: CPT

## 2022-04-04 PROCEDURE — 99233 SBSQ HOSP IP/OBS HIGH 50: CPT | Performed by: INTERNAL MEDICINE

## 2022-04-04 PROCEDURE — 84484 ASSAY OF TROPONIN QUANT: CPT

## 2022-04-04 PROCEDURE — 2580000003 HC RX 258: Performed by: NURSE PRACTITIONER

## 2022-04-04 PROCEDURE — 97110 THERAPEUTIC EXERCISES: CPT

## 2022-04-04 PROCEDURE — 6370000000 HC RX 637 (ALT 250 FOR IP): Performed by: NURSE PRACTITIONER

## 2022-04-04 PROCEDURE — 36415 COLL VENOUS BLD VENIPUNCTURE: CPT

## 2022-04-04 PROCEDURE — 97530 THERAPEUTIC ACTIVITIES: CPT

## 2022-04-04 PROCEDURE — 2060000000 HC ICU INTERMEDIATE R&B

## 2022-04-04 PROCEDURE — 93010 ELECTROCARDIOGRAM REPORT: CPT | Performed by: INTERNAL MEDICINE

## 2022-04-04 PROCEDURE — 71045 X-RAY EXAM CHEST 1 VIEW: CPT

## 2022-04-04 PROCEDURE — 99024 POSTOP FOLLOW-UP VISIT: CPT | Performed by: NURSE PRACTITIONER

## 2022-04-04 PROCEDURE — 99232 SBSQ HOSP IP/OBS MODERATE 35: CPT | Performed by: FAMILY MEDICINE

## 2022-04-04 PROCEDURE — 85610 PROTHROMBIN TIME: CPT

## 2022-04-04 RX ADMIN — METOPROLOL TARTRATE 12.5 MG: 25 TABLET ORAL at 23:54

## 2022-04-04 RX ADMIN — SODIUM CHLORIDE, PRESERVATIVE FREE 10 ML: 5 INJECTION INTRAVENOUS at 23:55

## 2022-04-04 RX ADMIN — ATORVASTATIN CALCIUM 80 MG: 80 TABLET, FILM COATED ORAL at 23:54

## 2022-04-04 RX ADMIN — STANDARDIZED SENNA CONCENTRATE AND DOCUSATE SODIUM 1 TABLET: 8.6; 5 TABLET ORAL at 23:54

## 2022-04-04 RX ADMIN — Medication 500000 UNITS: at 23:54

## 2022-04-04 ASSESSMENT — PAIN SCALES - GENERAL
PAINLEVEL_OUTOF10: 0
PAINLEVEL_OUTOF10: 5

## 2022-04-04 ASSESSMENT — ENCOUNTER SYMPTOMS
DIARRHEA: 0
PHOTOPHOBIA: 0
COUGH: 0
CHEST TIGHTNESS: 0
BLOOD IN STOOL: 0
VOICE CHANGE: 0
NAUSEA: 0
VOMITING: 0
TROUBLE SWALLOWING: 0
CONSTIPATION: 0
SHORTNESS OF BREATH: 0
ALLERGIC/IMMUNOLOGIC NEGATIVE: 1
WHEEZING: 0
ABDOMINAL PAIN: 0
SORE THROAT: 0
EYE REDNESS: 0
SINUS PAIN: 0

## 2022-04-04 NOTE — PROGRESS NOTES
Pt is not willing to participate in his care at this time. Pt refused O2 initially and then was agreeable. RN reinforced need to move more to prevent DVT, and to help improve oxygenation. Pt reluctant to participate. Pt reminded that PT/OT are rounding to help him and that by working with them today will move the discharge process along.  Pt seemed to be somewhat willing to attempt PT/OT today

## 2022-04-04 NOTE — PROGRESS NOTES
PULMONARY & CRITICAL CARE MEDICINE PROGRESS NOTE     Patient:  Meg Lopez  MRN: [id number]  Admit date: 3/11/2022  Primary Care Physician: Kathy Nunez MD  Consulting Physician: Iza Ceballos MD  CODE Status: Full Code  LOS: 24    SUBJECTIVE     CHIEF COMPLAINT/REASON FOR INITIAL CONSULT: COPD/chronic cough/preoperative evaluation    BRIEF HOSPITAL COURSE:  The patient is a 74 y.o. male history of hypertension, history of mild systolic dysfunction admitted with chest pain initially to [facility] on 03/09/2022 non-ST elevation MI ruled out by troponins in 20s had a stress test done because of high suspicion which shows ischemia of lateral septal wall with ejection fraction of 34%.  He does have history of cocaine use and apparently he had used cocaine before admission to [facility].  He was transferred to [place] for cardiac catheterization which shows multivessel coronary artery disease with EF of 30% including left main disease.  He remains on a statin and heparin. CT surgery was consulted for CABG.    Patient had pulmonary function test done which shows FEV1 of 46% with significance positive bronchodilator FEV1 of 56% with severe reduction in diffusion capacity of less than 30% with air trapping/hyperinflation. CT scan of the chest shows areas of blebs and bulla medially and centrilobular and paraseptal emphysema on CT scan. Patient does complain of shortness of breath on mild activity and exertion.  He does have chronic cough without no change denies increased wheezing currently denies chest pain.  He has history of cocaine use.  Does have history of smoking patient is not able to tell me that how many cigarettes he used to smoke but he claimed that now he smoke 1 pack/week and had been smoking for almost 50 years. INTERVAL HISTORY:  04/04/22     No issues overnight noted. Patient was seen and examined at bedside.   Patient does get intermittently restless and agitated, still requiring one-to-one bedside sitter. Vitally and hemodynamically stable. He saturating 94% on 3.5 L oxygen. He got desatted in low 80s off of oxygen. He is eating and drinking okay. He is not participating with physical therapy much. He did not use BiPAP not using incentive spirometer Acapella. He is not answering much of my questions. REVIEW OF SYSTEMS: Limited as not cooperative for all questions  Review of Systems   Constitutional: Positive for activity change and fatigue. Negative for appetite change. HENT: Negative for postnasal drip, sinus pain, sore throat, trouble swallowing and voice change. Eyes: Negative for photophobia, redness and visual disturbance. Respiratory: Negative for cough and shortness of breath. Cardiovascular: Negative for chest pain and leg swelling. Gastrointestinal: Negative for abdominal pain, diarrhea and vomiting. Endocrine: Negative. Genitourinary: Negative for dysuria and hematuria. Musculoskeletal: Negative. Allergic/Immunologic: Negative. Neurological: Negative for dizziness, syncope, speech difficulty and headaches. Hematological: Negative for adenopathy. Does not bruise/bleed easily. Psychiatric/Behavioral: Negative for agitation, behavioral problems and confusion.      OBJECTIVE     VITAL SIGNS:   LAST-  BP (!) 153/83   Pulse 81   Temp 98.2 °F (36.8 °C) (Oral)   Resp 21   Ht 6' 3\" (1.905 m)   Wt 171 lb 15.3 oz (78 kg)   SpO2 92%   BMI 21.49 kg/m²   8-24 HR RANGE-  TEMP Temp  Av °F (36.7 °C)  Min: 97.4 °F (36.3 °C)  Max: 98.5 °F (46.2 °C)   BP Systolic (69TOU), LXR:226 , Min:104 , KTD:258      Diastolic (77BKC), UJA:22, Min:61, Max:83     PULSE Pulse  Av.4  Min: 63  Max: 105   RR Resp  Av  Min: 15  Max: 21   O2 SAT SpO2  Av.7 %  Min: 88 %  Max: 92 %   OXYGEN DELIVERY O2 Flow Rate (L/min)  Av L/min  Min: 2 L/min  Max: 2 L/min     SYSTEMIC EXAMINATION:   General appearance -arousable and awake, chronically ill-appearing follows commands not cooperative  Mental status -alert and awake and oriented  Eyes - pupils equal and reactive sluggish, sclera anicteric  Mouth -oral mucosa look moist  Neck - supple, no significant adenopathy, carotids upstroke normal bilaterally, no bruits. Chest -bilateral basilar breath sounds decreased. Poor inspiratory effort midsternal scar present.    Heart -regular S1-S2, regular rhythm, normal S1, S2, no murmurs, clicks or gallops  Abdomen - soft, nontender, nondistended, no masses or organomegaly  Extremities - peripheral pulses normal, no pedal edema, no clubbing or cyanosis  Skin - normal coloration and turgor, no rashes, no suspicious skin lesions noted     DATA REVIEW     Medications: Current Inpatient  Scheduled Meds:   enoxaparin  40 mg SubCUTAneous Daily    furosemide  40 mg IntraVENous BID    potassium chloride  40 mEq Oral Once    metoprolol tartrate  12.5 mg Oral BID    midodrine  5 mg Oral TID WC    spironolactone  25 mg Oral Daily    amiodarone  200 mg Oral Daily    polyethylene glycol  17 g Oral BID    nystatin  5 mL Oral 4x Daily    sodium chloride flush  10 mL IntraVENous 2 times per day    clopidogrel  75 mg Oral Daily    sennosides-docusate sodium  1 tablet Oral BID    atorvastatin  80 mg Oral Nightly    pantoprazole  40 mg Oral Daily    tamsulosin  0.4 mg Oral Daily     Continuous Infusions:   dexmedetomidine Stopped (03/31/22 1217)    sodium chloride Stopped (03/29/22 1653)    dextrose      sodium chloride      sodium chloride       INPUT/OUTPUT:  In: 350 [P.O.:350]  Out: 500 [Urine:500]  Date 04/04/22 0000 - 04/04/22 2359   Shift 8475-2290 8558-9319 0643-0491 24 Hour Total   INTAKE   P.O.(mL/kg/hr) 350(0.6)   350   Shift Total(mL/kg) 350(4.5)   350(4.5)   OUTPUT   Urine(mL/kg/hr) 500(0.8)   500   Shift Total(mL/kg) 500(6.4)   500(6.4)   Weight (kg) 78 78 78 78       LABS:-  ABG:   No results for input(s): POCPH, POCPCO2, POCPO2, POCHCO3, IMTZ7QDJ in the last 72 hours. CBC:   Recent Labs     04/02/22  0528 04/04/22 0232   WBC 13.0* 14.1*   HGB 8.7* 8.9*   HCT 27.8* 27.9*   MCV 86.6 85.1   PLT See Reflexed IPF Result 551*   LYMPHOPCT 17* 15*   RBC 3.21* 3.28*   MCH 27.1 27.1   MCHC 31.3 31.9   RDW 16.2* 16.3*     BMP:   Recent Labs     04/02/22 0528 04/04/22 0232    133*   K 3.6* 3.8   CL 98 97*   CO2 28 26   BUN 14 9   CREATININE 0.88 0.81   GLUCOSE 78 114*     Liver Function Test:   No results for input(s): PROT, LABALBU, ALT, AST, GGT, ALKPHOS, BILITOT in the last 72 hours. Amylase/Lipase:  No results for input(s): AMYLASE, LIPASE in the last 72 hours. Coagulation Profile:   Recent Labs     04/02/22 0528 04/04/22 0232   INR 1.2 1.2   PROTIME 12.7* 13.1*     Cardiac Enzymes:  No results for input(s): CKTOTAL, CKMB, CKMBINDEX, TROPONINI in the last 72 hours. Lactic Acid:  Lab Results   Component Value Date    LACTA NOT REPORTED 03/11/2018    LACTA NOT REPORTED 03/10/2018    LACTA NOT REPORTED 03/10/2018     BNP:   Lab Results   Component Value Date    BNP 70 09/13/2013     D-Dimer:  No results found for: DDIMER  Others:   No results found for: TSH, V5GVSYC, J0KDEXC, THYROIDAB, FT3, T4FREE  Lab Results   Component Value Date    SEDRATE 20 (H) 05/21/2014    CRP 15.2 (H) 05/21/2014     No results found for: Kristi Roulette  No results found for: IRON, TIBC, FERRITIN  No results found for: SPEP, UPEP  Lab Results   Component Value Date    PSA 1.11 11/04/2016       Microbiology:  No results for input(s): SPECDESC, SPECDESC, SPECIAL, CULTURE, CULTURE, STATUS, ORG, CDIFFTOXPCR, CAMPYLOBPCR, SALMONELLAPC, SHIGAPCR, SHIGELLAPCR, MPNEUG, MPNEUM, LACTOQL in the last 72 hours. Pathology:    Radiology Reports:  XR CHEST PORTABLE   Final Result   Stable chest x-ray with opacification of the left base and retrocardiac   region. Lesser opacities in the right base with small amounts of effusion.          XR CHEST PORTABLE   Final Result   Little change from prior study. Cardiomegaly, vascular congestion, effusions   and bibasilar opacities are noted favoring pulmonary edema and atelectasis. XR CHEST PORTABLE   Final Result   Little change from prior study. Continued cardiomegaly, vascular congestion,   effusions and bibasilar opacities. Findings favor pulmonary edema and   probable superimposed atelectasis. XR CHEST PORTABLE   Final Result   Multifocal bibasilar predominant airspace disease and likely trace bilateral   pleural effusions, not significantly changed. Persistent interstitial   pulmonary edema. XR CHEST PORTABLE   Final Result   No significant interval change. XR CHEST PORTABLE   Final Result   Slight increase in pulmonary vascularity compared to prior study 1 day   earlier. Stable cardiomegaly with bilateral effusions. For findings as   above. XR CHEST PORTABLE   Final Result   Little change from prior study. Postoperative changes. Support tubes and   lines as above. Small bilateral effusions and bibasilar atelectasis. XR CHEST PORTABLE   Final Result   Multiple tubes lines as above. Continued left effusion and basilar   atelectasis with vascular congestion. No extrapleural air. XR CHEST PORTABLE   Final Result   1. Stable positions of support apparatus. 2. Stable bibasilar airspace disease, atelectasis favored over pneumonia. 3. Stable small bilateral pleural effusions. US RENAL COMPLETE   Final Result      1. Borderline small right kidney but otherwise sonographically unremarkable   appearing kidneys. 2.  Mild to moderate amount of ascites was noted. XR CHEST PORTABLE   Final Result   Bibasilar atelectasis and pleural effusion more pronounced on the left. No   significant change. Stable support tubes and line. XR CHEST PORTABLE   Final Result   Overall improvement seen in the aeration lung bases.   The remainder of lines   and tubes in stool are stable. No definite pneumothorax on the left. XR CHEST PORTABLE   Final Result   1. Lines and tubes as described above. 2.  Increasing right pleural effusion and right basilar airspace disease. Stable left pleural effusion. XR CHEST PORTABLE   Final Result   1. Support lines and tubes remain in place. 2.  Bibasilar opacities persist, slightly increased on the right, suggesting   layering pleural effusions and atelectasis. 3.  Mildly increased vascular congestion, possibly accentuated by supine   positioning. XR CHEST PORTABLE   Final Result   Stable chest (suspect right pleural effusion in addition to left effusion   and/or atelectasis) with line and tube placements as detailed above. XR CHEST PORTABLE   Final Result   As compared to prior examination, the ET tube now is in satisfactory   position. Examination otherwise is unchanged. XR CHEST PORTABLE   Final Result   1. Endotracheal tube is low in position, approximately 1 cm above the   veronique. This should be pulled back approximately 3 cm. 2.  Other lines and tubes are unchanged in position. 3.  New small right pleural effusion. 4.  Unchanged left basilar opacity. XR CHEST PORTABLE   Final Result   1. Lines and tubes are unchanged in position. 2. Linear bibasilar opacities are similar prior study, atelectasis versus   pneumonia. XR CHEST PORTABLE   Final Result   All tubes and catheters are in good position. No evidence for pneumothorax. Bilateral basal atelectasis noted. VL DUP UPPER EXTREMITY ARTERIES BILATERAL   Final Result      VL Vein Mapping Lower Bilateral   Final Result      VL DUP CAROTID BILATERAL   Final Result      CT CHEST WO CONTRAST   Final Result   1. Extensive centrilobular and paraseptal emphysema. 2. Extensive atherosclerotic disease, particularly along the coronary   arteries.    3. There are areas of atelectasis in the lingula. There is collapse of the   right middle lobe   4. No focal lung infiltrate. Echocardiogram:   Results for orders placed during the hospital encounter of 03/11/22    Echocardiogram Complete 2D w Doppler w Color    Summary  Global left ventricular systolic function is normal. Estimated EF 31-27%  Normal diastolic filling. Normal right ventricular size and function. Mild mitral regurgitation. Mild tricuspid regurgitation. Mild pulmonary hypertension with an estimated right ventricular systolic  pressure of 41 mmHg. No pericardial effusion. ASSESSMENT AND PLAN     Assessment:  Acute metabolic encephalopathy, improving  Bradycardia improved  S/p CABG x2 on 3/22/2022  COPD stage II on PFTs with severe reduction diffusion capacity  Chronic smoker  Cocaine use  Chronic combined diastolic and systolic CHF, EF 18%.  Previously 50-55%  Mild pulmonary hypertension  Essential hypertension  CKD stage III  Leukocytosis   Prolonged QT    Plan:    I personally interviewed/examined the patient; reviewed interval history, interpreted all available radiographic and laboratory data at the time of service. Patient remained hemodynamically stable saturating 94% on 3.5 L oxygen. He did desat off of oxygen. Patient has been off of high flow for 5 days now. He has been refusing BiPAP and incentive spirometry. Continue diuresis as per nephrology recommendations currently on Lasix 40 mg twice daily  Will use bronchodilators as needed   Encourage incentive spirometry/Acapella  Continue to monitor I/O with a goal of even/negative fluid balance. Chemical DVT prophylaxis with Lovenox 40. On amiodarone, Lipitor, Lasix, Lopressor and Aldactone, Plavix. Antimicrobials reviewed; currently not on antibiotics  Physical/occupational therapy  Increase ambulation/physical therapy    Discussed with nursing staff. We will continue to follow.  I would like to thank you for allowing me to participate in the care of this patient. Please feel free to call with any further questions or concerns.         Hui Simmons MD.  Internal Medicine Resident PGY [facility] [address] [place] [address]   4/4/2022, 8:19 AM

## 2022-04-04 NOTE — PROGRESS NOTES
Occupational [address]  Occupational Therapy Not Seen Note    DATE: 2022    NAME: Toi Montalvo  MRN: [id number]   : 1955      Patient not seen this date for Occupational Therapy due to:      Pt not seen this date due to patient declining OT services yelling at [address] to \"get out\" and 'Bye. \" Will continue as able.        Electronically signed by JENNYFER Saavedra on 2022 at 2:30 PM

## 2022-04-04 NOTE — PROGRESS NOTES
Physical Therapy  Facility/Department: [facility] CAR 1  Daily Treatment Note  NAME: José Miguel Hernandez  : 1955  MRN: [id number]    Date of Service: 2022    Home Oxygen     Home Oxygen completed. Patient is on 4.5 liters per minute via NC  Resting SpO2 with supplemental O2 =97%  Resting SpO2 on room air ( for >5 minutes) =84%    SpO2 on room air with 6 minutes of exercise =82%    Nocturnal Oximetry with patient on room air is recommended is SpO2 is between 89% and 95% (requires additional order). Kelley Hamlin, PTA  10:26 AM    Discharge Recommendations:  Patient would benefit from continued therapy after discharge   PT Equipment Recommendations  Equipment Needed: No (Pt states he has ADs at home, CTA)    Assessment   Assessment: Pt poor safety awareness, MIN A for sup to sit, following sternal precautions, with a log rolling technique, poor return, pt able to push through legs for STS with max verbal cues and MIN , pt poor line managment, and unsafe stand to sit in chair w/ Max a due to fall risk, , pt ambulated 3ft x2, with Mod assist for line mangment, safety awareness, pt retired to chair, therapist exited room and pt stood up and was transferring to chair, therapist assisted, and safely postioned in bed for comfoirt, pt ignores bed alarms and chair alarms. Pt will need further PT to regain functional independance  Treatment Diagnosis: general weakness; difficulty walking  Specific instructions for Next Treatment: endurance, Txs to chair, ambulation  Prognosis: Fair  Decision Making: High Complexity  PT Education: Goals; Home Exercise Program;General Safety;Transfer Training;Family Education;PT Role;Functional Mobility Training;Precautions;Plan of Care  Patient Education: sternal precautions, HEP and breathing ex  Barriers to Learning: poor safety awareness  REQUIRES PT FOLLOW UP: Yes  Activity Tolerance  Activity Tolerance: Patient limited by cognitive status  Activity Tolerance: pt desats with activity, <80, quick recovery with PLB     Patient Diagnosis(es): The encounter diagnosis was S/P cardiac catheterization. has a past medical history of 3-vessel CAD, Alcohol abuse, Arthritis, Chronic kidney disease, Full dentures, Hypertension, Other emphysema ([address] Pure hypercholesterolemia, Systolic CHF ([address] and Wears glasses. has a past surgical history that includes Hip fracture surgery (Left); Appendectomy; pr egd transoral biopsy single/multiple (6/19/2017); and Coronary artery bypass graft (N/A, 3/21/2022). Restrictions  Restrictions/Precautions  Restrictions/Precautions: Fall Risk,General Precautions,Cardiac  Required Braces or Orthoses?: Yes  Implants present? : Metal implants  Required Braces or Orthoses  Other: Heart Hugger Brace  Position Activity Restriction  Sternal Precautions: 5# Lifting Restrictions,No Pushing,No Pulling  Sternal Precautions: CABG X 3  Other position/activity restrictions: Amb pt, up in chair for meals, NC 4.5 LPM  Subjective   General  Chart Reviewed: Yes  Response To Previous Treatment: Patient with no complaints from previous session. Family / Caregiver Present: No  Subjective  Subjective: Pt supine in bed, stated he wanted to get up, RN aggreable to chair transfer  General Comment  Comments: Pt transferred with Mod x1  Pain Screening  Patient Currently in Pain: Yes  Pain Assessment  Pain Assessment: 0-10  Pain Level: 0  Vital Signs  Patient Currently in Pain: Yes       Orientation  Orientation  Overall Orientation Status: Impaired  Orientation Level: Oriented to person;Oriented to situation;Oriented to place; Disoriented to time  Cognition   Cognition  Overall Cognitive Status: Exceptions  Arousal/Alertness: Delayed responses to stimuli  Following Commands: Inconsistently follows commands  Attention Span: Difficulty attending to directions  Memory: Decreased recall of precautions  Safety Judgement: Decreased awareness of need for assistance;Decreased awareness of need for safety  Problem Solving: Decreased awareness of errors  Insights: Decreased awareness of deficits  Initiation: Requires cues for all  Sequencing: Requires cues for all  Cognition Comment: Restless, talking more,  not compliant with verbal cues for hand placement, sternal , precautions, coughing etc, up to Max verbal and tactcle cues with poor return  Objective   Bed mobility  Rolling to Left: Minimal assistance  Sit to Supine: Minimal assistance  Scooting: Contact guard assistance  Comment: Pt Max verbal cues to log roll, tactle assist needed for redirection, pt very impulsive, with poor saferty awareness  Transfers  Sit to Stand: Contact guard assistance (Max verbal cues for cues to not push thru B UEs)  Stand to sit: Maximum Assistance (Pt prematurely sat, therapist able to pull hips to chair, RN aware and assiting)  Bed to Chair: Moderate assistance (Very poor safety awareness, poor line angment, increased fall risk due to failure to recognize trip hazards)  Comment: poor safety awareness, has strength/core strength, unsafe unassisted due to impulsiveness and not following sternal precautions  Ambulation  Ambulation?: Yes  Ambulation 1  Surface: level tile  Device: Rolling Walker  Assistance: Moderate assistance  Quality of Gait: Pt forward flexed  Gait Deviations: Slow Gale;Staggers; Deviated path;Shuffles  Distance: 3ft x2  Comments: Limited by weakness and fatigue,Demo poor safety awareness making pt a high fall risk  Stairs/Curb  Stairs?: No     Balance  Posture: Good  Sitting - Static: Fair;-  Sitting - Dynamic: Fair;-  Standing - Static: Poor;+  Standing - Dynamic: Poor  Comments: RW, poor safety awareness  Exercises  Core Strengthening: pt sat EOB ~5 minutes total. demonstrates mod kyphotic kyphosis, and pt unable to self correct.   Comments: .sit  Other exercises  Other exercises?: Yes  Other exercises 1: incentive spirometer and acapella x10 with max verbal cues for technique           AM-PAC Score  -[facility] Inpatient Mobility Raw Score : 17 (04/04/22 1025)  -PAC Inpatient T-Scale Score : 42.13 (04/04/22 1025)  Mobility Inpatient CMS 0-100% Score: 50.57 (04/04/22 1025)  Mobility Inpatient CMS G-Code Modifier : CK (04/04/22 1025)          Goals  Short term goals  Time Frame for Short term goals: 14 days  Short term goal 1: Pt to transfer sit to stand with min A +1. Short term goal 2: Pt to ambulate 75ft with RW and no LOB. Short term goal 3: Pt to demo good standing balance for decrease fall risk. Short term goal 4: Pt to tolerate 20-30 mins ther ex/act for improved strength and endurance.   Patient Goals   Patient goals : get stronger    Plan    Plan  Times per week: 7x week  Times per day:  (1-2 x day)  Specific instructions for Next Treatment: endurance, Txs to chair, ambulation  Current Treatment Recommendations: Strengthening,Endurance Training,Functional Mobility Training,Transfer Training,Gait Training,Neuromuscular Re-education,Home Exercise Program,Safety Education & Training  Safety Devices  Type of devices: Call light within reach,Nurse notified,Gait belt,Patient at risk for falls,All fall risk precautions in place,,Left in bed,Bed alarm in place  Restraints  Initially in place: No  Restraints: only x3 bed rails up with use of tray table     Therapy Time   Individual Concurrent Group Co-treatment   Time In 0840         Time Out 0935         Minutes 55         Timed Code Treatment Minutes: [address], PTA

## 2022-04-04 NOTE — PROGRESS NOTES
Patient c/o chest pain, rates 5/10 midsternal. Patient states he takes PRN nitro tabs at home, Ernesto Carter NP notified of patient condition. PO nitro and stat trop/myoglob. ordered per NP.     2104: critical trop 299, patient denies chest pain upon assessment. [facility] notified, ekg ordered. Patient states pain improved after administration of nitro. In- house NP Wesley Dec at bedside to evaluate, EKG done. No further orders at this time.

## 2022-04-04 NOTE — CARE COORDINATION
Placed call to pt's daughter, Gloria Roblero, to discuss transition plans, left message, call back requested. [address]  [facility] spoke with Whit, discussed that suicide precautions and thus guard were dc'd this morning, and per Whit, referral is in review, will call writer back. 1000 - Placed call to [address], left message, requested call back. [address] call to [address] [place] [address], spoke with Cheri, she states that pt is accepted clinically, will need a pre-cert, checking to see if a bed is available. Placed call to [address], left message, call back requested.

## 2022-04-04 NOTE — PROGRESS NOTES
[facility]  Office: [address], DO, Franklin Roe, DO, Karen Zhang, DO, Beth Moreno Blood, DO, Poppy Carrillo MD, Carey Adair MD, Lilly Mcdowell MD, Maximilian Singleton MD, Vanessa Goodwin MD, Ray Clayton MD, Chino Vasquez MD, Lenka Moncada, DO, Fallon Grossman, DO, Paige Hale MD,  Josiane Smith, DO, Jessika Gallegos MD, Verito Shaw MD, Gus Cardozo MD, Maame Stock DO, Indira Stokes MD, Damon Hare MD, Noel Jiang, [facility], [facility], [facility], Rivera Gardner, CNP, Rayshawn Back, CNS, Mike Damon, CNP, Joanne Chan, CNP, Marcela Delgado, CNP, Tavo Noel, CNP, Pili Chen PA-C, Ko Valentine [facility], Layla Barreto, CNP, Micki Wilhelm, CNP, Lieutenant Whatley, [facility], Nirmala Caputo, [facility], [place], CNP, Anna Raymond, [facility] [place] [address]    Progress Note    4/4/2022    8:28 AM    Name:   Deepak Wilkes  MRN:     [id number]     [place]:      [de-identified]   Room:   [address] [place] [address] Day:  24  Admit Date:  3/11/2022  7:15 PM    PCP:   Moira Lentz MD  Code Status:  Full Code    Subjective:     C/C:  S/P CABG/ SOB  Interval History Status: improved. Patient seen and examined at bedside, patient is with frustration due to him being still in the hospital   He is very appropriate  Still with a sitter   Had episode of CP overnight but seems like he pulled his nasal canula and was hypoxic at that time  On [facility]  Patient denies any chest pain,  chills, fevers, nausea or vomiting. Patient vitals, labs and all providers notes were reviewed,from overnight shift and morning updates were noted and discussed with the nurse      Brief History: This is a 40-year-old male with a history of hypertension, dyslipidemia, HFrEF, who presents to our hospital as transfer for cardiac evaluation for chest pain. Pt had stress test done which showed ischemia of lateral and septal wall with ejection fraction of 34%.   Patient underwent cardiac catheterization which showed multivessel coronary artery disease with EF 30% including left main disease. CT surgery was consulted for CABG. Pt underwent CABG x2 on 3/22/22. Pulmonology was consulted for respiratory failure, pt dependent on HFNC at 20L and 40% FIO2 which was thought to be from CHF/COPD. He was treated with diureses, nebulizers. Pt hospital stay complicated by ischemic ATN.    Medicine was consulted to assist with discharge planning. Case management has been having difficulty with placement. Peer to PEER not successful x 2. Review of Systems:     Review of Systems   Constitutional: Positive for activity change, appetite change and fatigue. Negative for chills, diaphoresis and fever. HENT: Negative for congestion. Eyes: Negative for visual disturbance. Respiratory: Negative for cough, chest tightness, shortness of breath (improving) and wheezing. Cardiovascular: Negative for chest pain, palpitations and leg swelling. Gastrointestinal: Negative for abdominal pain, blood in stool, constipation, diarrhea, nausea and vomiting. Genitourinary: Negative for difficulty urinating. Neurological: Positive for weakness. Negative for dizziness, light-headedness, numbness and headaches. Medications:      Allergies:  No Known Allergies    Current Meds:   Scheduled Meds:    enoxaparin  40 mg SubCUTAneous Daily    furosemide  40 mg IntraVENous BID    potassium chloride  40 mEq Oral Once    metoprolol tartrate  12.5 mg Oral BID    midodrine  5 mg Oral TID WC    spironolactone  25 mg Oral Daily    amiodarone  200 mg Oral Daily    polyethylene glycol  17 g Oral BID    nystatin  5 mL Oral 4x Daily    sodium chloride flush  10 mL IntraVENous 2 times per day    clopidogrel  75 mg Oral Daily    sennosides-docusate sodium  1 tablet Oral BID    atorvastatin  80 mg Oral Nightly    pantoprazole  40 mg Oral Daily    tamsulosin  0.4 mg Oral Daily     Continuous Infusions:  dexmedetomidine Stopped (22 1217)    sodium chloride Stopped (22 1653)    dextrose      sodium chloride      sodium chloride       PRN Meds: nitroGLYCERIN, LORazepam, albuterol, bisacodyl, sodium chloride flush, sodium chloride, ondansetron **OR** ondansetron, oxyCODONE-acetaminophen **OR** oxyCODONE-acetaminophen, fentanNYL **OR** fentanNYL, hydrALAZINE, metoprolol, diphenhydrAMINE, potassium chloride, magnesium sulfate, albumin human, glucose, dextrose, glucagon (rDNA), dextrose, sodium chloride, sodium chloride    Data:     Past Medical History:   has a past medical history of 3-vessel CAD, Alcohol abuse, Arthritis, Chronic kidney disease, Full dentures, Hypertension, Other emphysema ([address] Pure hypercholesterolemia, Systolic CHF ([address] and Wears glasses. Social History:   reports that he has been smoking cigarettes. He has been smoking about 0.00 packs per day. He has never used smokeless tobacco. He reports current alcohol use. He reports current drug use. Drugs: Cocaine and Marijuana ([place]ane [place]choa). Family History:   Family History   Problem Relation Age of Onset    Diabetes Mother     Cancer Mother        Vitals:  BP (!) 153/83   Pulse 81   Temp 98.2 °F (36.8 °C) (Oral)   Resp 21   Ht 6' 3\" (1.905 m)   Wt 171 lb 15.3 oz (78 kg)   SpO2 92%   BMI 21.49 kg/m²   Temp (24hrs), Av °F (36.7 °C), Min:97.4 °F (36.3 °C), Max:98.5 °F (36.9 °C)    Recent Labs     22  0728 22  1134 22  1629 22  1209   POCGLU 83 117* 103 110       I/O (24Hr):     Intake/Output Summary (Last 24 hours) at 2022 0828  Last data filed at 2022 0600  Gross per 24 hour   Intake 350 ml   Output 500 ml   Net -150 ml       Labs:  Hematology:  Recent Labs     22  0528 22  0232   WBC 13.0* 14.1*   RBC 3.21* 3.28*   HGB 8.7* 8.9*   HCT 27.8* 27.9*   MCV 86.6 85.1   MCH 27.1 27.1   MCHC 31.3 31.9   RDW 16.2* 16.3*   PLT See Reflexed IPF Result 551*   MPV  --  9.9   INR 1.2 1.2 Chemistry:  Recent Labs     04/02/22  0528 04/03/22 2104 04/04/22  0232     --  133*   K 3.6*  --  3.8   CL 98  --  97*   CO2 28  --  26   GLUCOSE 78  --  114*   BUN 14  --  9   CREATININE 0.88  --  0.81   ANIONGAP 13  --  10   LABGLOM >60  --  >60   GFRAA >60  --  >60   CALCIUM 8.5*  --  8.4*   TROPHS  --  299* 314*   MYOGLOBIN  --  39 40     Recent Labs     04/01/22  1624 04/01/22 2058 04/02/22  0728 04/02/22  1134 04/02/22  1629 04/03/22  1209   POCGLU 116* 109 83 117* 103 110     ABG:  Lab Results   Component Value Date    POCPH 7.467 03/24/2022    POCPCO2 28.7 03/24/2022    POCPO2 48.3 03/24/2022    POCHCO3 20.7 03/24/2022    NBEA 2 03/24/2022    PBEA 0 03/24/2022    LDOD3FPS 87 03/24/2022    FIO2 8.0 03/24/2022     Lab Results   Component Value Date/Time    SPECIAL NOT REPORTED 06/17/2017 10:40 PM     Lab Results   Component Value Date/Time    CULTURE NORMAL RESPIRATORY KYLAH MODERATE GROWTH 03/22/2022 08:30 PM       Radiology:  US RENAL COMPLETE    Result Date: 3/26/2022  1. Borderline small right kidney but otherwise sonographically unremarkable appearing kidneys. 2.  Mild to moderate amount of ascites was noted. XR CHEST PORTABLE    Result Date: 4/2/2022  Little change from prior study. Continued cardiomegaly, vascular congestion, effusions and bibasilar opacities. Findings favor pulmonary edema and probable superimposed atelectasis. XR CHEST PORTABLE    Result Date: 4/1/2022  Multifocal bibasilar predominant airspace disease and likely trace bilateral pleural effusions, not significantly changed. Persistent interstitial pulmonary edema. XR CHEST PORTABLE    Result Date: 3/31/2022  No significant interval change. XR CHEST PORTABLE    Result Date: 3/30/2022  Slight increase in pulmonary vascularity compared to prior study 1 day earlier. Stable cardiomegaly with bilateral effusions. For findings as above. XR CHEST PORTABLE    Result Date: 3/29/2022  1.  Stable positions of support apparatus. 2. Stable bibasilar airspace disease, atelectasis favored over pneumonia. 3. Stable small bilateral pleural effusions. XR CHEST PORTABLE    Result Date: 3/29/2022  Little change from prior study. Postoperative changes. Support tubes and lines as above. Small bilateral effusions and bibasilar atelectasis. XR CHEST PORTABLE    Result Date: 3/28/2022  Multiple tubes lines as above. Continued left effusion and basilar atelectasis with vascular congestion. No extrapleural air. Physical Examination:        Physical Exam  Vitals and nursing note reviewed. Constitutional:       General: He is not in acute distress. Interventions: Nasal cannula in place. Comments: Currently on [facility]   HENT:      Head: Normocephalic and atraumatic. Eyes:      Conjunctiva/sclera: Conjunctivae normal.      Pupils: Pupils are equal, round, and reactive to light. Neck:      Comments: Central line noted  Cardiovascular:      Rate and Rhythm: Normal rate and regular rhythm. Heart sounds: No murmur heard. Pulmonary:      Effort: Pulmonary effort is normal. No accessory muscle usage or respiratory distress. Breath sounds: No stridor. Decreased breath sounds and rhonchi present. No wheezing or rales. Chest:      Comments: Sternotomy wound in dressing  Abdominal:      General: Bowel sounds are normal. There is no distension. Palpations: Abdomen is soft. Abdomen is not rigid. Tenderness: There is no abdominal tenderness. There is no guarding. Musculoskeletal:         General: No tenderness. Skin:     General: Skin is warm and dry. Findings: No erythema, lesion or rash. Neurological:      Mental Status: He is alert and oriented to person, place, and time. Cranial Nerves: No cranial nerve deficit. Motor: No seizure activity. Psychiatric:         Speech: Speech normal.         Behavior: Behavior normal. Behavior is cooperative.          Assessment: Hospital Problems           Last Modified POA    * (Principal) Acute on chronic combined systolic and diastolic congestive heart failure ([address] 4/1/2022 Yes    3-vessel CAD 3/11/2022 Yes    S/P cardiac catheterization 4/1/2022 Yes    Acute renal failure with tubular necrosis ([address] 3/29/2022 Yes    Problem related to discharge planning 4/1/2022 No    Acute respiratory failure with hypoxia ([address] 4/1/2022 No          Plan:        Principal Problem:    Acute on chronic combined systolic and diastolic congestive heart failure (HCC)  Active Problems:    3-vessel CAD    S/P cardiac catheterization    Acute renal failure with tubular necrosis (HCC)    Problem related to discharge planning    Acute respiratory failure with hypoxia ([address]  Resolved Problems:    * No resolved hospital problems. *      - Management per CTS  - Continue spirometry and Acapella  - On and off diuresis, continue with negative balance  - Nephro signed off  - Currently on [employer] [place], seems to ne tolerating fine  - Does not want BIPAP or HFNC  - Encourage ambulation  - PT/OT  - DVT PPx   - patient is with frustration due to his LOS, overnight he wished to be dead and now he has a sitter   He is denying any plan or any siucidal thoughts , I feel it was more of a frustration   I did discuss with him and he acknowledge that he has no help at home and that he needs help and I subsequently elaborated this is the main reason why he is still here that we are trying to help him with placement   - DC psych eval, DC sitter and suicidal precaution    - Discussed with RN as well  - Medicine have no objection to DC home today if doing well with PT or if he is refusion placement      -Thank you for the opportunity to participate in this patient care, please do not hesitate to contact me with any question.    Will S/O      Sharonda Sandoval MD  4/4/2022  8:28 AM

## 2022-04-04 NOTE — PROGRESS NOTES
Called to bedside to eval complaint of chest pain. Pt reported mid sternal chest ache. Pain relieved with 1 NTG SL.  EKG normal.  Troponin 299, 1st one done since CABG 3/22. Pt denies worsening SOB, N/V or diaphoresis. Vitals stable, physical exam remarkable for diminished lung sounds throughout. Sats 88% on ra when I entered room, pt states \"I took it off\". Agrees to keep O2 in place. Will monitor repeat trop in 6 hrs.

## 2022-04-04 NOTE — PROGRESS NOTES
Called and spoke to patient, explained that Dr. Rody Marie does not do colonoscopies. Offered pt to see one of our other providers in the office that do colonoscopies.   Pt requesting dr. Luis Patel, appointment changed to Dr. Luis Patel 5/14 @ 12:15 pm The Respiratory Therapy Department completed the Respiratory Care evaluation. No therapy is indicated at this time. The reason therapy is not indicated is due to the following:     [x] Patient does not meet indications for therapy. [] Patient has returned to their home regimen. [x] Patient frequency has changed to prn, nursing to assess and call if needed  . Respiratory Care will not see this patient further unless otherwise requested. Please call the respiratory care charge therapist with questions or concerns at extension [phone].  Thank you for using the Respiratory Therapy Protocol Program.     Mariah Celeste RCP   4:43 AM

## 2022-04-04 NOTE — PROGRESS NOTES
Comprehensive Nutrition Assessment    Type and Reason for Visit:  Reassess    Nutrition Recommendations/Plan: Continue current diet with Ensure Enlive oral supplements at all meals. Encourage/monitor PO intakes as tolerated. Will monitor labs, weights, and plan of care. Nutrition Assessment:  Pt sleeping under a blanket at attempted visit. Pt has been consuming variable amounts of meals and fluids. Will continue to provide oral supplements with meals. Last BM 4/2. Weight fluctuations noted. Labs reviewed: Na 133 mmol/L. Meds reviewed. Malnutrition Assessment:  Malnutrition Status: Moderate malnutrition    Context:  Acute Illness     Findings of the 6 clinical characteristics of malnutrition:  Energy Intake:  Mild decrease in energy intake  Weight Loss:  No significant weight loss per chart review; weight gain since admission noted     Body Fat Loss:  1 - Mild body fat loss Orbital,Buccal region   Muscle Mass Loss:  1 - Mild muscle mass loss Temples (temporalis),Clavicles (pectoralis & deltoids)  Fluid Accumulation:  No significant fluid accumulation   Strength:  Not Performed    Estimated Daily Nutrient Needs:  Energy (kcal):  25-30 kcal/kg = 1496-0645 kcals/day; Weight Used for Energy Requirements:  Admission     Protein (g):  1.2-1.5 gm/kg = 75-95 gm pro/day; Weight Used for Protein Requirements:  Admission    Fluid (ml/day):  25 mL/kg = 2511-4698 mL/day or per MD; Method Used for Fluid Requirements:  ml/Kg      Nutrition Related Findings:  Labs/Meds reviewed. Last BM 4/2. Wounds:  Surgical Incision,Multiple       Current Nutrition Therapies:    ADULT ORAL NUTRITION SUPPLEMENT; Breakfast, Lunch, Dinner; Standard High Calorie/High Protein Oral Supplement  ADULT DIET; Dysphagia - Soft and Bite Sized;  Safety Tray; Safety Tray (Disposables)    Anthropometric Measures:  · Height: 6' 3\" (190.5 cm)  · Current Body Weight: 171 lb 15.3 oz (78 kg)   · Admission Body Weight: 141 lb 5 oz (64.1 kg)

## 2022-04-05 PROCEDURE — 97116 GAIT TRAINING THERAPY: CPT

## 2022-04-05 PROCEDURE — 6360000002 HC RX W HCPCS: Performed by: INTERNAL MEDICINE

## 2022-04-05 PROCEDURE — 6370000000 HC RX 637 (ALT 250 FOR IP): Performed by: NURSE PRACTITIONER

## 2022-04-05 PROCEDURE — 6370000000 HC RX 637 (ALT 250 FOR IP): Performed by: INTERNAL MEDICINE

## 2022-04-05 PROCEDURE — 99233 SBSQ HOSP IP/OBS HIGH 50: CPT | Performed by: INTERNAL MEDICINE

## 2022-04-05 PROCEDURE — 2580000003 HC RX 258: Performed by: NURSE PRACTITIONER

## 2022-04-05 PROCEDURE — 97530 THERAPEUTIC ACTIVITIES: CPT

## 2022-04-05 PROCEDURE — 6370000000 HC RX 637 (ALT 250 FOR IP): Performed by: STUDENT IN AN ORGANIZED HEALTH CARE EDUCATION/TRAINING PROGRAM

## 2022-04-05 PROCEDURE — 2060000000 HC ICU INTERMEDIATE R&B

## 2022-04-05 PROCEDURE — 99024 POSTOP FOLLOW-UP VISIT: CPT | Performed by: PHYSICIAN ASSISTANT

## 2022-04-05 PROCEDURE — 97110 THERAPEUTIC EXERCISES: CPT

## 2022-04-05 RX ADMIN — SPIRONOLACTONE 25 MG: 25 TABLET ORAL at 09:25

## 2022-04-05 RX ADMIN — METOPROLOL TARTRATE 12.5 MG: 25 TABLET ORAL at 19:51

## 2022-04-05 RX ADMIN — TAMSULOSIN HYDROCHLORIDE 0.4 MG: 0.4 CAPSULE ORAL at 09:25

## 2022-04-05 RX ADMIN — ATORVASTATIN CALCIUM 80 MG: 80 TABLET, FILM COATED ORAL at 19:51

## 2022-04-05 RX ADMIN — DIPHENHYDRAMINE HCL 25 MG: 25 TABLET ORAL at 19:51

## 2022-04-05 RX ADMIN — ENOXAPARIN SODIUM 40 MG: 40 INJECTION SUBCUTANEOUS at 09:30

## 2022-04-05 RX ADMIN — FUROSEMIDE 40 MG: 10 INJECTION, SOLUTION INTRAMUSCULAR; INTRAVENOUS at 18:34

## 2022-04-05 RX ADMIN — AMIODARONE HYDROCHLORIDE 200 MG: 200 TABLET ORAL at 09:26

## 2022-04-05 RX ADMIN — Medication 500000 UNITS: at 09:26

## 2022-04-05 RX ADMIN — Medication 500000 UNITS: at 19:51

## 2022-04-05 RX ADMIN — STANDARDIZED SENNA CONCENTRATE AND DOCUSATE SODIUM 1 TABLET: 8.6; 5 TABLET ORAL at 19:51

## 2022-04-05 RX ADMIN — PANTOPRAZOLE SODIUM 40 MG: 40 TABLET, DELAYED RELEASE ORAL at 09:25

## 2022-04-05 RX ADMIN — STANDARDIZED SENNA CONCENTRATE AND DOCUSATE SODIUM 1 TABLET: 8.6; 5 TABLET ORAL at 09:26

## 2022-04-05 RX ADMIN — METOPROLOL TARTRATE 12.5 MG: 25 TABLET ORAL at 09:25

## 2022-04-05 RX ADMIN — MIDODRINE HYDROCHLORIDE 5 MG: 5 TABLET ORAL at 09:25

## 2022-04-05 RX ADMIN — FUROSEMIDE 40 MG: 10 INJECTION, SOLUTION INTRAMUSCULAR; INTRAVENOUS at 09:37

## 2022-04-05 RX ADMIN — CLOPIDOGREL 75 MG: 75 TABLET, FILM COATED ORAL at 09:33

## 2022-04-05 RX ADMIN — SODIUM CHLORIDE, PRESERVATIVE FREE 10 ML: 5 INJECTION INTRAVENOUS at 09:26

## 2022-04-05 RX ADMIN — SODIUM CHLORIDE, PRESERVATIVE FREE 10 ML: 5 INJECTION INTRAVENOUS at 19:51

## 2022-04-05 ASSESSMENT — ENCOUNTER SYMPTOMS
SORE THROAT: 0
COUGH: 0
VOICE CHANGE: 0
DIARRHEA: 0
PHOTOPHOBIA: 0
SHORTNESS OF BREATH: 0
ABDOMINAL PAIN: 0
TROUBLE SWALLOWING: 0
VOMITING: 0
EYE REDNESS: 0
SINUS PAIN: 0
ALLERGIC/IMMUNOLOGIC NEGATIVE: 1

## 2022-04-05 ASSESSMENT — PAIN SCALES - GENERAL
PAINLEVEL_OUTOF10: 0
PAINLEVEL_OUTOF10: 0

## 2022-04-05 NOTE — HOME CARE
Home Oxygen Evaluation    Home Oxygen Evaluation completed. Patient is on room air   Resting SpO2 on room air = 90%    Patient SpO2 while attempting to stand dropped to 86%, evaluation stopped and pt placed on oxygen      Nocturnal Oximetry with patient on room air is recommended is SpO2 is between 89% and 95% (requires additional order).     Josephine Langley RCP  11:46 AM

## 2022-04-05 NOTE — PROGRESS NOTES
[facility]  Daily Progress Note    Surgeon: Dr. Gerber Score   S/P : CABG X 2   EF: 45%     Subjective:  Mr. Gagandeep Marsh   Resting on NC with no SOB. No acute distress. A&0x4    Physical Exam  Vital Signs: BP (!) 148/76   Pulse 80   Temp 98.6 °F (37 °C) (Oral)   Resp 21   Ht 6' 3\" (1.905 m)   Wt 157 lb 9.6 oz (71.5 kg)   SpO2 96%   BMI 19.70 kg/m²  O2 Flow Rate (L/min): 4.5 L/min   Admit Weight: Weight: 141 lb 5 oz (64.1 kg)   WEIGHTWeight: 157 lb 9.6 oz (71.5 kg)     General: noncompliant   Heart: Normal S1 and S2.  Regular rhythm. No murmurs, gallops, or rubs. Pacing Wires: Yes -To be clipped prior to discharge  Lungs: clear to auscultation bilaterally and diminished breath sounds bibasilar Chest tubes: Yes, Air Leak: No  Abdomen: soft, non tender, non distended, BS x4  Extremities: negative  Wounds: clean and dry, healing appropriately. Sternum: Healing  SVG sites: Healing  CXR-no appreciated effusions noted. Diffuse consolidation. No pneumothorax noted.      Scheduled Meds:    enoxaparin  40 mg SubCUTAneous Daily    furosemide  40 mg IntraVENous BID    potassium chloride  40 mEq Oral Once    metoprolol tartrate  12.5 mg Oral BID    midodrine  5 mg Oral TID WC    spironolactone  25 mg Oral Daily    amiodarone  200 mg Oral Daily    polyethylene glycol  17 g Oral BID    nystatin  5 mL Oral 4x Daily    sodium chloride flush  10 mL IntraVENous 2 times per day    clopidogrel  75 mg Oral Daily    sennosides-docusate sodium  1 tablet Oral BID    atorvastatin  80 mg Oral Nightly    pantoprazole  40 mg Oral Daily    tamsulosin  0.4 mg Oral Daily     Continuous Infusions:    dexmedetomidine Stopped (03/31/22 1217)    sodium chloride Stopped (03/29/22 1653)    dextrose      sodium chloride      sodium chloride         Data:  CBC:   Recent Labs     04/04/22 0232   WBC 14.1*   HGB 8.9*   HCT 27.9*   MCV 85.1   *     BMP:   Recent Labs     04/04/22 0232   *   K 3.8   CL 97*   CO2 26   BUN 9   CREATININE 0.81     PT/INR:   Recent Labs     04/04/22  0232   PROTIME 13.1*   INR 1.2     APTT:   No results for input(s): APTT in the last 72 hours. Chest X-Ray: worsening atelectasis process to left thorax. I/O:  I/O last 3 completed shifts:   In: 200 [P.O.:350]  Out: 750 [Urine:750]    Assessment/Plan:      Diagnosis Date    3-vessel CAD 03/11/2022    Alcohol abuse 06/23/2015    Arthritis     Chronic kidney disease     Full dentures     upper and lower full    Hypertension     Other emphysema ([address]     COPD stage II on PFTs with severe reduction diffusion capacity    Pure hypercholesterolemia 11/02/5794    Systolic CHF ([address]     Wears glasses        Beta-Blocker: yes  ASA: yes  Plavix: yes  GI: yes  Statin: yes  Coumadin: no  ACE-I: no  EF: 45%    o Pt is not suicidal. He is A&0x4.   o Please DC patient today-from CTS medically ready for placement at this time  o Pt refuses treatment when upset.  o Waiting on placement feedback       Ho Person, PA

## 2022-04-05 NOTE — PROGRESS NOTES
Physical Therapy  Facility/Department: [facility]  Daily Treatment Note  NAME: Toi Montalvo  : 1955  MRN: [id number]    Date of Service: 2022    Discharge Recommendations:  Patient would benefit from continued therapy after discharge   PT Equipment Recommendations  Equipment Needed: No    Assessment   Body structures, Functions, Activity limitations: Decreased strength;Decreased endurance;Decreased functional mobility ; Decreased balance; Increased pain;Decreased safe awareness;Decreased cognition  Assessment: Pt poor safety awareness, ModA for sup to sit, max cues for following sternal precautions,Required modA for amb 3ft bed to chair demonstrating decreased ability to progress R LE and decreased safety awareness,  Pt will need further PT to regain functional independance  Treatment Diagnosis: general weakness; difficulty walking  Prognosis: Fair  PT Education: Goals; Home Exercise Program;General Safety;Transfer Training;Family Education;PT Role;Functional Mobility Training;Precautions;Plan of Care  Patient Education: sternal precautions, safety  Barriers to Learning: poor safety awareness, dec cognition  REQUIRES PT FOLLOW UP: Yes  Activity Tolerance  Activity Tolerance: Patient limited by cognitive status; Patient limited by endurance     Patient Diagnosis(es): The encounter diagnosis was S/P cardiac catheterization. has a past medical history of 3-vessel CAD, Alcohol abuse, Arthritis, Chronic kidney disease, Full dentures, Hypertension, Other emphysema ([address] Pure hypercholesterolemia, Systolic CHF ([address] and Wears glasses. has a past surgical history that includes Hip fracture surgery (Left); Appendectomy; pr egd transoral biopsy single/multiple (2017); and Coronary artery bypass graft (N/A, 3/21/2022).     Restrictions  Restrictions/Precautions  Restrictions/Precautions: Fall Risk,General Precautions,Cardiac  Required Braces or Orthoses?: Yes  Implants present? : Metal implants  Required Braces or Orthoses  Other: Heart Hugger Brace  Position Activity Restriction  Sternal Precautions: 5# Lifting Restrictions,No Pushing,No Pulling  Sternal Precautions: CABG X 3  Other position/activity restrictions: Amb pt, up in chair for meals, NC 4.5 LPM  Subjective   General  Response To Previous Treatment: Patient with no complaints from previous session. Family / Caregiver Present: No  Subjective  Subjective: Pt and RN agreeable to PT. Pt upset upon arrival \"I've been waiting all day for my breakfast\". Willing to attempt transfer to chair  General Comment  Comments: Pt left seated in recliner with call light within reach, alarm activated  Pain Screening  Patient Currently in Pain: Denies  Vital Signs  Patient Currently in Pain: Denies       Orientation  Orientation  Overall Orientation Status: Impaired  Orientation Level: Oriented to person;Oriented to situation;Oriented to place; Disoriented to time  Cognition      Objective   Bed mobility  Rolling to Left: Moderate assistance  Supine to Sit: Moderate assistance  Sit to Supine:  (pt left seated in recliner)  Scooting: Minimal assistance  Comment: max vc's for log rolling and and for sternal precautions with poor return demo, pt very impulsive with poor safety awareness t/o session  Transfers  Sit to Stand: Moderate Assistance  Stand to sit: Moderate Assistance (for controlled descent)  Bed to Chair: Moderate assistance (very poor safety awareness, impulsive and not following commands consistently)  Comment: pt demonstrated difficulty with advancing R LE when attempting to ambulate to recliner and did not follow commands for safety  Ambulation  Ambulation?: Yes  Ambulation 1  Surface: level tile  Device: Rolling Walker  Assistance: Moderate assistance  Quality of Gait: Pt forward flexed  Gait Deviations: Slow Gale;Staggers; Deviated path;Shuffles  Distance: 3ft  Comments: Limited by weakness and fatigue,Demo poor safety awareness making pt a high fall risk  Stairs/Curb  Stairs?: No     Balance  Posture: Fair  Sitting - Static: Fair  Sitting - Dynamic: Fair;-  Standing - Static: Poor  Standing - Dynamic: Poor  Comments: standing balance assessed with RW, poor safety awareness  Exercises  Seated LE exercise program: Long Arc Quads, hip abduction/adduction, heel/toe raises, and marches. Reps: ~5 reps each  Comments: pt very easily distracted and agitated d/t not receiving breakfast tray. Max verbal and visual cues required for participation and completion    AM-PAC Score  AM-PAC Inpatient Mobility Raw Score : 11 (04/05/22 1253)  AM-PAC Inpatient T-Scale Score : 33.86 (04/05/22 1253)  Mobility Inpatient CMS 0-100% Score: 72.57 (04/05/22 1253)  Mobility Inpatient CMS G-Code Modifier : CL (04/05/22 1253)    Goals  Short term goals  Time Frame for Short term goals: 14 days  Short term goal 1: Pt to transfer sit to stand with min A +1. Short term goal 2: Pt to ambulate 75ft with RW and no LOB. Short term goal 3: Pt to demo good standing balance for decrease fall risk. Short term goal 4: Pt to tolerate 20-30 mins ther ex/act for improved strength and endurance.   Patient Goals   Patient goals : get stronger    Plan    Plan  Times per week: 7x week  Times per day:  (1-2x/day)  Specific instructions for Next Treatment: endurance, Txs to chair, ambulation  Current Treatment Recommendations: Strengthening,Endurance Training,Functional Mobility Training,Transfer Training,Gait Training,Neuromuscular Re-education,Home Exercise Program,Safety Education & Training  Safety Devices  Type of devices: Call light within reach,Nurse notified,Gait belt,Patient at risk for falls,All fall risk precautions in place,Left in chair,Chair alarm in place  Restraints  Initially in place: No  Restraints: only x3 bed rails up with use of tray table     Therapy Time   Individual Concurrent Group Co-treatment   Time In 1034         Time Out 1112         Minutes 38         Timed Code Treatment Minutes: [place] [address], [place]

## 2022-04-05 NOTE — CARE COORDINATION
Placed call to [facility], spoke with Fabián Darden, pre-cert started, checking to see if they have an isolation bed as pt not vaccinated against Covid. 1137 - Received call from Camilo at [address], they are not able to accept pt.    1141- Placed call to [address] to f/u on referral, in review, they will call back. 1145 - SNF referrals sent to [facility] and [facility]. [address] call to pt's cousinErin, left message, call back requested. 1203 - Received message from [facility]  stating that they could assist with discharge planning, returned call, [phone], left message, requested call back. 1224 - Received call from pt's daughter, Gloria Roblero, discussed additional SNF referrals, possible need to refer out of area. [facility] she would like to discuss with family and call back. Received call back from Gloria Roblero, questions regarding [address] answered and Gloria Roblero states she is in agreement with additional SNF referrals, as needed, wants to \"do what is best\" for pt.    1210 Placed call to Amaury Guzman at [facility], she will review and get back. Full referral land faxed as Amaurynithin Guzman is awaiting Epic access. 5 Received call from Amaury Guzman, provided pt's social security number per request for insurance verification. Updated therapy notes faxed. [address] with Amaury Guzman at [facility], pre-cert started. 4/8/22 1131 - Received message from [facility], stated they had a question, writer returned call, left message on director of nursing's voicemail as no one answered at the nurse's stations x2, call back requested.

## 2022-04-05 NOTE — PROGRESS NOTES
PULMONARY & CRITICAL CARE MEDICINE PROGRESS NOTE     Patient:  Meg Lopez  MRN: [id number]  Admit date: 3/11/2022  Primary Care Physician: Kathy Nunez MD  Consulting Physician: Iza Ceballos MD  CODE Status: Full Code  LOS: 25    SUBJECTIVE     CHIEF COMPLAINT/REASON FOR INITIAL CONSULT: COPD/chronic cough/preoperative evaluation    BRIEF HOSPITAL COURSE:  The patient is a 74 y.o. male history of hypertension, history of mild systolic dysfunction admitted with chest pain initially to [facility] on 03/09/2022 non-ST elevation MI ruled out by troponins in 20s had a stress test done because of high suspicion which shows ischemia of lateral septal wall with ejection fraction of 34%.  He does have history of cocaine use and apparently he had used cocaine before admission to [facility].  He was transferred to [place] for cardiac catheterization which shows multivessel coronary artery disease with EF of 30% including left main disease.  He remains on a statin and heparin. CT surgery was consulted for CABG.    Patient had pulmonary function test done which shows FEV1 of 46% with significance positive bronchodilator FEV1 of 56% with severe reduction in diffusion capacity of less than 30% with air trapping/hyperinflation. CT scan of the chest shows areas of blebs and bulla medially and centrilobular and paraseptal emphysema on CT scan. Patient does complain of shortness of breath on mild activity and exertion.  He does have chronic cough without no change denies increased wheezing currently denies chest pain.  He has history of cocaine use.  Does have history of smoking patient is not able to tell me that how many cigarettes he used to smoke but he claimed that now he smoke 1 pack/week and had been smoking for almost 50 years. INTERVAL HISTORY:  04/05/22     No issues overnight noted. Patient was seen and examined at bedside. Patient is feeling better.   No more restlessness or agitation. Denied any symptoms including cough and shortness of breath. Patient had home O2 eval, resting SPO2 on room air was 90%, it dropped to 86% while attempting to stand. Vitally and hemodynamically stable. He is eating and drinking okay. He is not participating with physical therapy much. He did not use BiPAP not using incentive spirometer Acapella. He is going to the facility. REVIEW OF SYSTEMS: Limited as not cooperative for all questions  Review of Systems   Constitutional: Positive for activity change and fatigue. Negative for appetite change. HENT: Negative for postnasal drip, sinus pain, sore throat, trouble swallowing and voice change. Eyes: Negative for photophobia, redness and visual disturbance. Respiratory: Negative for cough and shortness of breath. Cardiovascular: Negative for chest pain and leg swelling. Gastrointestinal: Negative for abdominal pain, diarrhea and vomiting. Endocrine: Negative. Genitourinary: Negative for dysuria and hematuria. Musculoskeletal: Negative. Allergic/Immunologic: Negative. Neurological: Negative for dizziness, syncope, speech difficulty and headaches. Hematological: Negative for adenopathy. Does not bruise/bleed easily. Psychiatric/Behavioral: Negative for agitation, behavioral problems and confusion.      OBJECTIVE     VITAL SIGNS:   LAST-  BP (!) 148/76   Pulse 80   Temp 98.6 °F (37 °C) (Oral)   Resp 21   Ht 6' 3\" (1.905 m)   Wt 157 lb 9.6 oz (71.5 kg)   SpO2 96%   BMI 19.70 kg/m²   8-24 HR RANGE-  TEMP Temp  Av.2 °F (36.8 °C)  Min: 98 °F (36.7 °C)  Max: 98.6 °F (37 °C)   BP Systolic (80CJU), LGD:125 , Min:107 , DOM:102      Diastolic (17IUD), WUR:80, Min:55, Max:85     PULSE Pulse  Av.9  Min: 69  Max: 91   RR Resp  Av  Min: 21  Max: 29   O2 SAT SpO2  Av.5 %  Min: 89 %  Max: 96 %   OXYGEN DELIVERY No data recorded     SYSTEMIC EXAMINATION:   General appearance -arousable and awake, chronically ill-appearing follows commands not cooperative  Mental status -alert and awake and oriented  Eyes - pupils equal and reactive sluggish, sclera anicteric  Mouth -oral mucosa look moist  Neck - supple, no significant adenopathy, carotids upstroke normal bilaterally, no bruits. Chest -bilateral basilar breath sounds decreased. Poor inspiratory effort midsternal scar present.    Heart -regular S1-S2, regular rhythm, normal S1, S2, no murmurs, clicks or gallops  Abdomen - soft, nontender, nondistended, no masses or organomegaly  Extremities - peripheral pulses normal, no pedal edema, no clubbing or cyanosis  Skin - normal coloration and turgor, no rashes, no suspicious skin lesions noted     DATA REVIEW     Medications: Current Inpatient  Scheduled Meds:   enoxaparin  40 mg SubCUTAneous Daily    furosemide  40 mg IntraVENous BID    potassium chloride  40 mEq Oral Once    metoprolol tartrate  12.5 mg Oral BID    midodrine  5 mg Oral TID WC    spironolactone  25 mg Oral Daily    amiodarone  200 mg Oral Daily    polyethylene glycol  17 g Oral BID    nystatin  5 mL Oral 4x Daily    sodium chloride flush  10 mL IntraVENous 2 times per day    clopidogrel  75 mg Oral Daily    sennosides-docusate sodium  1 tablet Oral BID    atorvastatin  80 mg Oral Nightly    pantoprazole  40 mg Oral Daily    tamsulosin  0.4 mg Oral Daily     Continuous Infusions:   dexmedetomidine Stopped (03/31/22 1217)    sodium chloride Stopped (03/29/22 1653)    dextrose      sodium chloride      sodium chloride       INPUT/OUTPUT:  In: -   Out: 1435 [Urine:1435]  Date 04/05/22 0000 - 04/05/22 2359   Shift 4604-4284 2259-6502 [id number] 24 Hour Total   INTAKE   Shift Total(mL/kg)       OUTPUT   Urine(mL/kg/hr) 250(0.4) 1185  1435   Shift Total(mL/kg) 250(3.5) 1185(16.6)  1435(20.1)   Weight (kg) 71.5 71.5 71.5 71.5       LABS:-  ABG:   No results for input(s): POCPH, POCPCO2, POCPO2, POCHCO3, LMNT8NGS in the last 72 hours.  CBC:   Recent Labs     04/04/22 0232   WBC 14.1*   HGB 8.9*   HCT 27.9*   MCV 85.1   *   LYMPHOPCT 15*   RBC 3.28*   MCH 27.1   MCHC 31.9   RDW 16.3*     BMP:   Recent Labs     04/04/22 0232   *   K 3.8   CL 97*   CO2 26   BUN 9   CREATININE 0.81   GLUCOSE 114*     Liver Function Test:   No results for input(s): PROT, LABALBU, ALT, AST, GGT, ALKPHOS, BILITOT in the last 72 hours. Amylase/Lipase:  No results for input(s): AMYLASE, LIPASE in the last 72 hours. Coagulation Profile:   Recent Labs     04/04/22 0232   INR 1.2   PROTIME 13.1*     Cardiac Enzymes:  No results for input(s): CKTOTAL, CKMB, CKMBINDEX, TROPONINI in the last 72 hours. Lactic Acid:  Lab Results   Component Value Date    LACTA NOT REPORTED 03/11/2018    LACTA NOT REPORTED 03/10/2018    LACTA NOT REPORTED 03/10/2018     BNP:   Lab Results   Component Value Date    BNP 70 09/13/2013     D-Dimer:  No results found for: DDIMER  Others:   No results found for: TSH, B9QLLBS, A4LVDFP, THYROIDAB, FT3, T4FREE  Lab Results   Component Value Date    SEDRATE 20 (H) 05/21/2014    CRP 15.2 (H) 05/21/2014     No results found for: [place]  No results found for: IRON, TIBC, FERRITIN  No results found for: SPEP, UPEP  Lab Results   Component Value Date    PSA 1.11 11/04/2016       Microbiology:  No results for input(s): SPECDESC, SPECDESC, SPECIAL, CULTURE, CULTURE, STATUS, ORG, CDIFFTOXPCR, CAMPYLOBPCR, SALMONELLAPC, SHIGAPCR, SHIGELLAPCR, MPNEUG, MPNEUM, LACTOQL in the last 72 hours. Pathology:    Radiology Reports:  XR CHEST PORTABLE   Final Result   Stable chest x-ray with opacification of the left base and retrocardiac   region. Lesser opacities in the right base with small amounts of effusion. XR CHEST PORTABLE   Final Result   Little change from prior study. Cardiomegaly, vascular congestion, effusions   and bibasilar opacities are noted favoring pulmonary edema and atelectasis.          XR CHEST PORTABLE Final Result   Little change from prior study. Continued cardiomegaly, vascular congestion,   effusions and bibasilar opacities. Findings favor pulmonary edema and   probable superimposed atelectasis. XR CHEST PORTABLE   Final Result   Multifocal bibasilar predominant airspace disease and likely trace bilateral   pleural effusions, not significantly changed. Persistent interstitial   pulmonary edema. XR CHEST PORTABLE   Final Result   No significant interval change. XR CHEST PORTABLE   Final Result   Slight increase in pulmonary vascularity compared to prior study 1 day   earlier. Stable cardiomegaly with bilateral effusions. For findings as   above. XR CHEST PORTABLE   Final Result   Little change from prior study. Postoperative changes. Support tubes and   lines as above. Small bilateral effusions and bibasilar atelectasis. XR CHEST PORTABLE   Final Result   Multiple tubes lines as above. Continued left effusion and basilar   atelectasis with vascular congestion. No extrapleural air. XR CHEST PORTABLE   Final Result   1. Stable positions of support apparatus. 2. Stable bibasilar airspace disease, atelectasis favored over pneumonia. 3. Stable small bilateral pleural effusions. US RENAL COMPLETE   Final Result      1. Borderline small right kidney but otherwise sonographically unremarkable   appearing kidneys. 2.  Mild to moderate amount of ascites was noted. XR CHEST PORTABLE   Final Result   Bibasilar atelectasis and pleural effusion more pronounced on the left. No   significant change. Stable support tubes and line. XR CHEST PORTABLE   Final Result   Overall improvement seen in the aeration lung bases. The remainder of lines   and tubes in stool are stable. No definite pneumothorax on the left. XR CHEST PORTABLE   Final Result   1. Lines and tubes as described above.       2.  Increasing right pleural effusion and right basilar airspace disease. Stable left pleural effusion. XR CHEST PORTABLE   Final Result   1. Support lines and tubes remain in place. 2.  Bibasilar opacities persist, slightly increased on the right, suggesting   layering pleural effusions and atelectasis. 3.  Mildly increased vascular congestion, possibly accentuated by supine   positioning. XR CHEST PORTABLE   Final Result   Stable chest (suspect right pleural effusion in addition to left effusion   and/or atelectasis) with line and tube placements as detailed above. XR CHEST PORTABLE   Final Result   As compared to prior examination, the ET tube now is in satisfactory   position. Examination otherwise is unchanged. XR CHEST PORTABLE   Final Result   1. Endotracheal tube is low in position, approximately 1 cm above the   veronique. This should be pulled back approximately 3 cm. 2.  Other lines and tubes are unchanged in position. 3.  New small right pleural effusion. 4.  Unchanged left basilar opacity. XR CHEST PORTABLE   Final Result   1. Lines and tubes are unchanged in position. 2. Linear bibasilar opacities are similar prior study, atelectasis versus   pneumonia. XR CHEST PORTABLE   Final Result   All tubes and catheters are in good position. No evidence for pneumothorax. Bilateral basal atelectasis noted. VL DUP UPPER EXTREMITY ARTERIES BILATERAL   Final Result      VL Vein Mapping Lower Bilateral   Final Result      VL DUP CAROTID BILATERAL   Final Result      CT CHEST WO CONTRAST   Final Result   1. Extensive centrilobular and paraseptal emphysema. 2. Extensive atherosclerotic disease, particularly along the coronary   arteries. 3. There are areas of atelectasis in the lingula. There is collapse of the   right middle lobe   4. No focal lung infiltrate.              Echocardiogram:   Results for orders placed during the hospital encounter of 03/11/22    Echocardiogram Complete 2D w Doppler w Color    Summary  Global left ventricular systolic function is normal. Estimated EF 38-50%  Normal diastolic filling. Normal right ventricular size and function. Mild mitral regurgitation. Mild tricuspid regurgitation. Mild pulmonary hypertension with an estimated right ventricular systolic  pressure of 41 mmHg. No pericardial effusion. ASSESSMENT AND PLAN     Assessment:  Acute metabolic encephalopathy, improving  Bradycardia improved  S/p CABG x2 on 3/22/2022  COPD stage II on PFTs with severe reduction diffusion capacity  Chronic smoker  Cocaine use  Chronic combined diastolic and systolic CHF, EF 76%.  Previously 50-55%  Mild pulmonary hypertension  Essential hypertension  CKD stage III  Leukocytosis   Prolonged QT    Plan:    I personally interviewed/examined the patient; reviewed interval history, interpreted all available radiographic and laboratory data at the time of service. Patient remained hemodynamically stable saturating 94%. He did desat off of oxygen. Patient has been off of high flow for 6 days now. He has been refusing BiPAP and incentive spirometry whenever he is upset. Continue diuresis as per nephrology recommendations currently on Lasix 40 mg twice daily  Use bronchodilators as needed   Encourage incentive spirometry/Acapella  Continue to monitor I/O with a goal of even/negative fluid balance. Chemical DVT prophylaxis with Lovenox 40. On amiodarone, Lipitor, Lasix, Lopressor and Aldactone, Plavix. Antimicrobials reviewed; currently not on antibiotics  Physical/occupational therapy  Increase ambulation/physical therapy  Okay to discharge to the facility from pulmonology standpoint. Discussed with nursing staff. We will continue to follow. I would like to thank you for allowing me to participate in the care of this patient. Please feel free to call with any further questions or concerns.       Plan discussed with  Tono Jones MD.  Internal Medicine Resident PGY [facility]   4/5/2022, 1:50 PM

## 2022-04-06 LAB
ABSOLUTE EOS #: 0.16 K/UL (ref 0–0.4)
ABSOLUTE IMMATURE GRANULOCYTE: 0 K/UL (ref 0–0.3)
ABSOLUTE LYMPH #: 2.77 K/UL (ref 1–4.8)
ABSOLUTE MONO #: 0.98 K/UL (ref 0.1–0.8)
ANION GAP SERPL CALCULATED.3IONS-SCNC: 10 MMOL/L (ref 9–17)
BASOPHILS # BLD: 0 % (ref 0–2)
BASOPHILS ABSOLUTE: 0 K/UL (ref 0–0.2)
BUN BLDV-MCNC: 11 MG/DL (ref 8–23)
CALCIUM SERPL-MCNC: 8.1 MG/DL (ref 8.6–10.4)
CHLORIDE BLD-SCNC: 101 MMOL/L (ref 98–107)
CO2: 25 MMOL/L (ref 20–31)
CREAT SERPL-MCNC: 0.84 MG/DL (ref 0.7–1.2)
EOSINOPHILS RELATIVE PERCENT: 1 % (ref 1–4)
GFR AFRICAN AMERICAN: >60 ML/MIN
GFR NON-AFRICAN AMERICAN: >60 ML/MIN
GFR SERPL CREATININE-BSD FRML MDRD: ABNORMAL ML/MIN/{1.73_M2}
GLUCOSE BLD-MCNC: 76 MG/DL (ref 70–99)
HCT VFR BLD CALC: 26.7 % (ref 40.7–50.3)
HEMOGLOBIN: 8.2 G/DL (ref 13–17)
IMMATURE GRANULOCYTES: 0 %
INR BLD: 1.2
LYMPHOCYTES # BLD: 17 % (ref 24–44)
MCH RBC QN AUTO: 26.5 PG (ref 25.2–33.5)
MCHC RBC AUTO-ENTMCNC: 30.7 G/DL (ref 28.4–34.8)
MCV RBC AUTO: 86.1 FL (ref 82.6–102.9)
MONOCYTES # BLD: 6 % (ref 1–7)
MORPHOLOGY: ABNORMAL
NRBC AUTOMATED: 0 PER 100 WBC
PDW BLD-RTO: 16.5 % (ref 11.8–14.4)
PLATELET # BLD: 602 K/UL (ref 138–453)
PMV BLD AUTO: 9.3 FL (ref 8.1–13.5)
POTASSIUM SERPL-SCNC: 3.9 MMOL/L (ref 3.7–5.3)
PROTHROMBIN TIME: 12.6 SEC (ref 9.1–12.3)
RBC # BLD: 3.1 M/UL (ref 4.21–5.77)
SEG NEUTROPHILS: 76 % (ref 36–66)
SEGMENTED NEUTROPHILS ABSOLUTE COUNT: 12.39 K/UL (ref 1.8–7.7)
SODIUM BLD-SCNC: 136 MMOL/L (ref 135–144)
WBC # BLD: 16.3 K/UL (ref 3.5–11.3)

## 2022-04-06 PROCEDURE — 6370000000 HC RX 637 (ALT 250 FOR IP): Performed by: NURSE PRACTITIONER

## 2022-04-06 PROCEDURE — 2580000003 HC RX 258: Performed by: NURSE PRACTITIONER

## 2022-04-06 PROCEDURE — 6360000002 HC RX W HCPCS: Performed by: INTERNAL MEDICINE

## 2022-04-06 PROCEDURE — 97530 THERAPEUTIC ACTIVITIES: CPT

## 2022-04-06 PROCEDURE — 97110 THERAPEUTIC EXERCISES: CPT

## 2022-04-06 PROCEDURE — 85610 PROTHROMBIN TIME: CPT

## 2022-04-06 PROCEDURE — 2060000000 HC ICU INTERMEDIATE R&B

## 2022-04-06 PROCEDURE — 97535 SELF CARE MNGMENT TRAINING: CPT

## 2022-04-06 PROCEDURE — 80048 BASIC METABOLIC PNL TOTAL CA: CPT

## 2022-04-06 PROCEDURE — 6370000000 HC RX 637 (ALT 250 FOR IP): Performed by: INTERNAL MEDICINE

## 2022-04-06 PROCEDURE — 6370000000 HC RX 637 (ALT 250 FOR IP): Performed by: STUDENT IN AN ORGANIZED HEALTH CARE EDUCATION/TRAINING PROGRAM

## 2022-04-06 PROCEDURE — 99024 POSTOP FOLLOW-UP VISIT: CPT | Performed by: NURSE PRACTITIONER

## 2022-04-06 PROCEDURE — 36415 COLL VENOUS BLD VENIPUNCTURE: CPT

## 2022-04-06 PROCEDURE — 85025 COMPLETE CBC W/AUTO DIFF WBC: CPT

## 2022-04-06 PROCEDURE — 99233 SBSQ HOSP IP/OBS HIGH 50: CPT | Performed by: INTERNAL MEDICINE

## 2022-04-06 RX ADMIN — Medication 500000 UNITS: at 09:09

## 2022-04-06 RX ADMIN — CLOPIDOGREL 75 MG: 75 TABLET, FILM COATED ORAL at 09:08

## 2022-04-06 RX ADMIN — SODIUM CHLORIDE, PRESERVATIVE FREE 10 ML: 5 INJECTION INTRAVENOUS at 09:10

## 2022-04-06 RX ADMIN — MIDODRINE HYDROCHLORIDE 5 MG: 5 TABLET ORAL at 16:12

## 2022-04-06 RX ADMIN — Medication 500000 UNITS: at 16:12

## 2022-04-06 RX ADMIN — Medication 500000 UNITS: at 22:23

## 2022-04-06 RX ADMIN — ATORVASTATIN CALCIUM 80 MG: 80 TABLET, FILM COATED ORAL at 22:22

## 2022-04-06 RX ADMIN — SPIRONOLACTONE 25 MG: 25 TABLET ORAL at 09:09

## 2022-04-06 RX ADMIN — METOPROLOL TARTRATE 12.5 MG: 25 TABLET ORAL at 22:24

## 2022-04-06 RX ADMIN — AMIODARONE HYDROCHLORIDE 200 MG: 200 TABLET ORAL at 09:09

## 2022-04-06 RX ADMIN — SODIUM CHLORIDE, PRESERVATIVE FREE 10 ML: 5 INJECTION INTRAVENOUS at 22:22

## 2022-04-06 RX ADMIN — ENOXAPARIN SODIUM 40 MG: 40 INJECTION SUBCUTANEOUS at 09:09

## 2022-04-06 RX ADMIN — FUROSEMIDE 40 MG: 10 INJECTION, SOLUTION INTRAMUSCULAR; INTRAVENOUS at 17:38

## 2022-04-06 RX ADMIN — Medication 500000 UNITS: at 13:00

## 2022-04-06 RX ADMIN — FUROSEMIDE 40 MG: 10 INJECTION, SOLUTION INTRAMUSCULAR; INTRAVENOUS at 09:08

## 2022-04-06 RX ADMIN — TAMSULOSIN HYDROCHLORIDE 0.4 MG: 0.4 CAPSULE ORAL at 09:08

## 2022-04-06 RX ADMIN — STANDARDIZED SENNA CONCENTRATE AND DOCUSATE SODIUM 1 TABLET: 8.6; 5 TABLET ORAL at 09:08

## 2022-04-06 RX ADMIN — METOPROLOL TARTRATE 12.5 MG: 25 TABLET ORAL at 09:08

## 2022-04-06 RX ADMIN — PANTOPRAZOLE SODIUM 40 MG: 40 TABLET, DELAYED RELEASE ORAL at 09:08

## 2022-04-06 ASSESSMENT — ENCOUNTER SYMPTOMS
SINUS PAIN: 0
PHOTOPHOBIA: 0
SHORTNESS OF BREATH: 0
COUGH: 0
VOMITING: 0
ALLERGIC/IMMUNOLOGIC NEGATIVE: 1
DIARRHEA: 0
SORE THROAT: 0
TROUBLE SWALLOWING: 0
ABDOMINAL PAIN: 0
EYE REDNESS: 0
VOICE CHANGE: 0

## 2022-04-06 ASSESSMENT — PAIN SCALES - WONG BAKER

## 2022-04-06 ASSESSMENT — PAIN SCALES - GENERAL
PAINLEVEL_OUTOF10: 0

## 2022-04-06 NOTE — PROGRESS NOTES
PULMONARY & CRITICAL CARE MEDICINE PROGRESS NOTE     Patient:  Theo Santos  MRN: [id number]  Admit date: 3/11/2022  Primary Care Physician: Gailen Mortimer, MD  Consulting Physician: Sully Gould MD  CODE Status: Full Code  LOS: 26    SUBJECTIVE     CHIEF COMPLAINT/REASON FOR INITIAL CONSULT: COPD/chronic cough/preoperative evaluation    BRIEF HOSPITAL COURSE:  The patient is a 74 y.o. male history of hypertension, history of mild systolic dysfunction admitted with chest pain initially to [facility] on 03/09/2022 non-ST elevation MI ruled out by troponins in 20s had a stress test done because of high suspicion which shows ischemia of lateral septal wall with ejection fraction of 34%.  He does have history of cocaine use and apparently he had used cocaine before admission to [facility].  He was transferred to [place] for cardiac catheterization which shows multivessel coronary artery disease with EF of 30% including left main disease.  He remains on a statin and heparin. CT surgery was consulted for CABG.    Patient had pulmonary function test done which shows FEV1 of 46% with significance positive bronchodilator FEV1 of 56% with severe reduction in diffusion capacity of less than 30% with air trapping/hyperinflation. CT scan of the chest shows areas of blebs and bulla medially and centrilobular and paraseptal emphysema on CT scan. Patient does complain of shortness of breath on mild activity and exertion.  He does have chronic cough without no change denies increased wheezing currently denies chest pain.  He has history of cocaine use.  Does have history of smoking patient is not able to tell me that how many cigarettes he used to smoke but he claimed that now he smoke 1 pack/week and had been smoking for almost 50 years. INTERVAL HISTORY:  04/06/22     No issues overnight noted. Patient was seen and examined at bedside. Patient is feeling better.   No more restlessness or agitation. Denied any symptoms including cough and shortness of breath. Vitally and hemodynamically stable. He is eating and drinking okay. He is not participating with physical therapy much. He did not use BiPAP not using incentive spirometer Acapella. He is going to the facility. On nasal cannula 5 L oxygen saturating above 90%. REVIEW OF SYSTEMS: Limited as not cooperative for all questions  Review of Systems   Constitutional: Negative for activity change, appetite change and fatigue. HENT: Negative for postnasal drip, sinus pain, sore throat, trouble swallowing and voice change. Eyes: Negative for photophobia, redness and visual disturbance. Respiratory: Negative for cough and shortness of breath. Cardiovascular: Negative for chest pain and leg swelling. Gastrointestinal: Negative for abdominal pain, diarrhea and vomiting. Endocrine: Negative. Genitourinary: Negative for dysuria and hematuria. Musculoskeletal: Negative. Allergic/Immunologic: Negative. Neurological: Negative for dizziness, syncope, speech difficulty and headaches. Hematological: Negative for adenopathy. Does not bruise/bleed easily. Psychiatric/Behavioral: Negative for agitation, behavioral problems and confusion.      OBJECTIVE     VITAL SIGNS:   LAST-  /69   Pulse 80   Temp 97.9 °F (36.6 °C) (Oral)   Resp 30   Ht 6' 3\" (1.905 m)   Wt 157 lb 13.6 oz (71.6 kg)   SpO2 94%   BMI 19.73 kg/m²   8-24 HR RANGE-  TEMP Temp  Av.3 °F (36.8 °C)  Min: 97.9 °F (36.6 °C)  Max: 98.8 °F (88.2 °C)   BP Systolic (81GUH), :053 , Min:104 , NBN:897      Diastolic (65HTY), OVU:43, Min:66, Max:83     PULSE Pulse  Av.1  Min: 70  Max: 87   RR Resp  Av.8  Min: 21  Max: 31   O2 SAT SpO2  Av.5 %  Min: 94 %  Max: 97 %   OXYGEN DELIVERY O2 Flow Rate (L/min)  Av L/min  Min: 5 L/min  Max: 5 L/min     SYSTEMIC EXAMINATION:   General appearance -arousable and awake, chronically ill-appearing follows commands not cooperative  Mental status -alert and awake and oriented  Eyes - pupils equal and reactive sluggish, sclera anicteric  Mouth -oral mucosa look moist  Neck - supple, no significant adenopathy, carotids upstroke normal bilaterally, no bruits. Chest -bilateral basilar breath sounds decreased. Poor inspiratory effort midsternal scar present.    Heart -regular S1-S2, regular rhythm, normal S1, S2, no murmurs, clicks or gallops  Abdomen - soft, nontender, nondistended, no masses or organomegaly  Extremities - peripheral pulses normal, no pedal edema, no clubbing or cyanosis  Skin - normal coloration and turgor, no rashes, no suspicious skin lesions noted     DATA REVIEW     Medications: Current Inpatient  Scheduled Meds:   enoxaparin  40 mg SubCUTAneous Daily    furosemide  40 mg IntraVENous BID    potassium chloride  40 mEq Oral Once    metoprolol tartrate  12.5 mg Oral BID    midodrine  5 mg Oral TID WC    spironolactone  25 mg Oral Daily    amiodarone  200 mg Oral Daily    polyethylene glycol  17 g Oral BID    nystatin  5 mL Oral 4x Daily    sodium chloride flush  10 mL IntraVENous 2 times per day    clopidogrel  75 mg Oral Daily    sennosides-docusate sodium  1 tablet Oral BID    atorvastatin  80 mg Oral Nightly    pantoprazole  40 mg Oral Daily    tamsulosin  0.4 mg Oral Daily     Continuous Infusions:   dexmedetomidine Stopped (03/31/22 1217)    sodium chloride Stopped (03/29/22 1653)    dextrose      sodium chloride      sodium chloride       INPUT/OUTPUT:  In: 600 [P.O.:600]  Out: 2685 [Urine:2685]  Date 04/06/22 0000 - 04/06/22 2359   Shift 9809-3870 2426-0131 4627-1513 24 Hour Total   INTAKE   P.O.(mL/kg/hr)  400  400   Shift Total(mL/kg)  400(5.6)  400(5.6)   OUTPUT   Urine(mL/kg/hr)  900  900   Shift Total(mL/kg)  900(12.6)  900(12.6)   Weight (kg) 71.6 71.6 71.6 71.6       LABS:-  ABG:   No results for input(s): POCPH, POCPCO2, POCPO2, POCHCO3, TKWU3IBN in the last 72 hours. CBC:   Recent Labs     04/04/22 0232 04/06/22  0717   WBC 14.1* 16.3*   HGB 8.9* 8.2*   HCT 27.9* 26.7*   MCV 85.1 86.1   * 602*   LYMPHOPCT 15* 17*   RBC 3.28* 3.10*   MCH 27.1 26.5   MCHC 31.9 30.7   RDW 16.3* 16.5*     BMP:   Recent Labs     04/04/22 0232 04/06/22  0717   * 136   K 3.8 3.9   CL 97* 101   CO2 26 25   BUN 9 11   CREATININE 0.81 0.84   GLUCOSE 114* 76     Liver Function Test:   No results for input(s): PROT, LABALBU, ALT, AST, GGT, ALKPHOS, BILITOT in the last 72 hours. Amylase/Lipase:  No results for input(s): AMYLASE, LIPASE in the last 72 hours. Coagulation Profile:   Recent Labs     04/04/22 0232 04/06/22 0717   INR 1.2 1.2   PROTIME 13.1* 12.6*     Cardiac Enzymes:  No results for input(s): CKTOTAL, CKMB, CKMBINDEX, TROPONINI in the last 72 hours. Lactic Acid:  Lab Results   Component Value Date    LACTA NOT REPORTED 03/11/2018    LACTA NOT REPORTED 03/10/2018    LACTA NOT REPORTED 03/10/2018     BNP:   Lab Results   Component Value Date    BNP 70 09/13/2013     D-Dimer:  No results found for: DDIMER  Others:   No results found for: TSH, E5YECFQ, C0MJHOV, THYROIDAB, FT3, T4FREE  Lab Results   Component Value Date    SEDRATE 20 (H) 05/21/2014    CRP 15.2 (H) 05/21/2014     No results found for: Cee Handsome  No results found for: IRON, TIBC, FERRITIN  No results found for: SPEP, UPEP  Lab Results   Component Value Date    PSA 1.11 11/04/2016       Microbiology:  No results for input(s): SPECDESC, SPECDESC, SPECIAL, CULTURE, CULTURE, STATUS, ORG, CDIFFTOXPCR, CAMPYLOBPCR, SALMONELLAPC, SHIGAPCR, SHIGELLAPCR, MPNEUG, MPNEUM, LACTOQL in the last 72 hours. Pathology:    Radiology Reports:  XR CHEST PORTABLE   Final Result   Stable chest x-ray with opacification of the left base and retrocardiac   region. Lesser opacities in the right base with small amounts of effusion. XR CHEST PORTABLE   Final Result   Little change from prior study. Cardiomegaly, vascular congestion, effusions   and bibasilar opacities are noted favoring pulmonary edema and atelectasis. XR CHEST PORTABLE   Final Result   Little change from prior study. Continued cardiomegaly, vascular congestion,   effusions and bibasilar opacities. Findings favor pulmonary edema and   probable superimposed atelectasis. XR CHEST PORTABLE   Final Result   Multifocal bibasilar predominant airspace disease and likely trace bilateral   pleural effusions, not significantly changed. Persistent interstitial   pulmonary edema. XR CHEST PORTABLE   Final Result   No significant interval change. XR CHEST PORTABLE   Final Result   Slight increase in pulmonary vascularity compared to prior study 1 day   earlier. Stable cardiomegaly with bilateral effusions. For findings as   above. XR CHEST PORTABLE   Final Result   Little change from prior study. Postoperative changes. Support tubes and   lines as above. Small bilateral effusions and bibasilar atelectasis. XR CHEST PORTABLE   Final Result   Multiple tubes lines as above. Continued left effusion and basilar   atelectasis with vascular congestion. No extrapleural air. XR CHEST PORTABLE   Final Result   1. Stable positions of support apparatus. 2. Stable bibasilar airspace disease, atelectasis favored over pneumonia. 3. Stable small bilateral pleural effusions. US RENAL COMPLETE   Final Result      1. Borderline small right kidney but otherwise sonographically unremarkable   appearing kidneys. 2.  Mild to moderate amount of ascites was noted. XR CHEST PORTABLE   Final Result   Bibasilar atelectasis and pleural effusion more pronounced on the left. No   significant change. Stable support tubes and line. XR CHEST PORTABLE   Final Result   Overall improvement seen in the aeration lung bases. The remainder of lines   and tubes in stool are stable.   No definite pneumothorax on the left. XR CHEST PORTABLE   Final Result   1. Lines and tubes as described above. 2.  Increasing right pleural effusion and right basilar airspace disease. Stable left pleural effusion. XR CHEST PORTABLE   Final Result   1. Support lines and tubes remain in place. 2.  Bibasilar opacities persist, slightly increased on the right, suggesting   layering pleural effusions and atelectasis. 3.  Mildly increased vascular congestion, possibly accentuated by supine   positioning. XR CHEST PORTABLE   Final Result   Stable chest (suspect right pleural effusion in addition to left effusion   and/or atelectasis) with line and tube placements as detailed above. XR CHEST PORTABLE   Final Result   As compared to prior examination, the ET tube now is in satisfactory   position. Examination otherwise is unchanged. XR CHEST PORTABLE   Final Result   1. Endotracheal tube is low in position, approximately 1 cm above the   veronique. This should be pulled back approximately 3 cm. 2.  Other lines and tubes are unchanged in position. 3.  New small right pleural effusion. 4.  Unchanged left basilar opacity. XR CHEST PORTABLE   Final Result   1. Lines and tubes are unchanged in position. 2. Linear bibasilar opacities are similar prior study, atelectasis versus   pneumonia. XR CHEST PORTABLE   Final Result   All tubes and catheters are in good position. No evidence for pneumothorax. Bilateral basal atelectasis noted. VL DUP UPPER EXTREMITY ARTERIES BILATERAL   Final Result      VL Vein Mapping Lower Bilateral   Final Result      VL DUP CAROTID BILATERAL   Final Result      CT CHEST WO CONTRAST   Final Result   1. Extensive centrilobular and paraseptal emphysema. 2. Extensive atherosclerotic disease, particularly along the coronary   arteries. 3. There are areas of atelectasis in the lingula.   There is collapse of the   right middle lobe   4. No focal lung infiltrate. Echocardiogram:   Results for orders placed during the hospital encounter of 03/11/22    Echocardiogram Complete 2D w Doppler w Color    Summary  Global left ventricular systolic function is normal. Estimated EF 56-67%  Normal diastolic filling. Normal right ventricular size and function. Mild mitral regurgitation. Mild tricuspid regurgitation. Mild pulmonary hypertension with an estimated right ventricular systolic  pressure of 41 mmHg. No pericardial effusion. ASSESSMENT AND PLAN     Assessment:  Acute metabolic encephalopathy. Resolved. Asymptomatic bradycardia, resolved. S/p CABG x2 on 3/22/2022  COPD stage II on PFTs with severe reduction diffusion capacity  Chronic smoker  Cocaine use  Chronic combined diastolic and systolic CHF, EF 63%.  Previously 50-55%  Mild pulmonary hypertension  Essential hypertension  CKD stage III  Leukocytosis  Prolonged QT    Plan:    I personally interviewed/examined the patient; reviewed interval history, interpreted all available radiographic and laboratory data at the time of service. Patient remained hemodynamically stable saturating 94%. He does desat off of oxygen. Patient has been off of high flow for 7 days now. He has been refusing BiPAP and incentive spirometry whenever he is upset. Continue diuresis as per nephrology recommendations currently on Lasix 40 mg twice daily  Use bronchodilators as needed   Encourage incentive spirometry/Acapella  Continue to monitor I/O with a goal of even/negative fluid balance. Chemical DVT prophylaxis with Lovenox 40. On amiodarone, Lipitor, Lasix, Lopressor and Aldactone, Plavix. Antimicrobials reviewed; currently not on antibiotics  Physical/occupational therapy  Increase ambulation/physical therapy  Okay to discharge to the facility from pulmonology standpoint. We will continue to follow.  I would like to thank you for allowing me to participate in the care of this patient. Please feel free to call with any further questions or concerns.       Plan discussed with Dr. Jt Ozuna MD.  Internal Medicine Resident  [address]   4/6/2022, 1:45 PM

## 2022-04-06 NOTE — PLAN OF CARE
Problem: Pain:  Description: Pain management should include both nonpharmacologic and pharmacologic interventions.   Goal: Pain level will decrease  Description: Pain level will decrease  Outcome: Ongoing  Goal: Control of acute pain  Description: Control of acute pain  Outcome: Ongoing  Goal: Control of chronic pain  Description: Control of chronic pain  Outcome: Ongoing     Problem: Skin Integrity:  Goal: Will show no infection signs and symptoms  Description: Will show no infection signs and symptoms  Outcome: Ongoing  Goal: Absence of new skin breakdown  Description: Absence of new skin breakdown  Outcome: Ongoing     Problem: Falls - Risk of:  Goal: Will remain free from falls  Description: Will remain free from falls  Outcome: Ongoing  Goal: Absence of physical injury  Description: Absence of physical injury  Outcome: Ongoing     Problem: Nutrition  Goal: Optimal nutrition therapy  Outcome: Ongoing     Problem: Musculor/Skeletal Functional Status  Goal: Highest potential functional level  Outcome: Ongoing  Goal: Absence of falls  Outcome: Ongoing     Problem: Cardiac:  Goal: Ability to maintain vital signs within normal range will improve  Description: Ability to maintain vital signs within normal range will improve  Outcome: Ongoing  Goal: Cardiovascular alteration will improve  Description: Cardiovascular alteration will improve  Outcome: Ongoing     Problem: Health Behavior:  Goal: Will modify at least one risk factor affecting health status  Description: Will modify at least one risk factor affecting health status  Outcome: Ongoing  Goal: Identification of resources available to assist in meeting health care needs will improve  Description: Identification of resources available to assist in meeting health care needs will improve  Outcome: Ongoing     Problem: Physical Regulation:  Goal: Complications related to the disease process, condition or treatment will be avoided or minimized  Description:

## 2022-04-06 NOTE — PROGRESS NOTES
Comprehensive Nutrition Assessment    Type and Reason for Visit:  Reassess    Nutrition Recommendations/Plan: Continue current diet with Ensure Enlive oral supplements at all meals. Encourage/monitor PO intakes as tolerated. Will monitor labs, weights, and plan of care. Nutrition Assessment:  Pt working on breakfast tray at visit. Has been eating % of meals. Encouraged intakes of oral supplements. Last BM 4/6. Weight fluctuations noted. Labs reviewed. Meds include: Lasix, Senokot. Malnutrition Assessment:  Malnutrition Status: Moderate malnutrition    Context:  Acute Illness     Findings of the 6 clinical characteristics of malnutrition:  Energy Intake:  Mild decrease in energy intake  Weight Loss:  Weight fluctuations since admission noted - weight gain and weight loss noted. Body Fat Loss:  1 - Mild body fat loss Orbital,Buccal region   Muscle Mass Loss:  1 - Mild muscle mass loss Temples (temporalis),Clavicles (pectoralis & deltoids)  Fluid Accumulation:  No significant fluid accumulation   Strength:  Not Performed    Estimated Daily Nutrient Needs:  Energy (kcal):  25-30 kcal/kg = 1991-4209 kcals/day; Weight Used for Energy Requirements:  Admission     Protein (g):  1.2-1.5 gm/kg = 75-95 gm pro/day; Weight Used for Protein Requirements:  Admission   Fluid (ml/day):  25 mL/kg = 1639-5414 mL/day or per MD; Method Used for Fluid Requirements:  ml/Kg      Nutrition Related Findings:  Labs/Meds reviewed. Hypoactive bowel sounds. Last BM 4/6. Wounds:  Surgical Incision,Multiple       Current Nutrition Therapies:    ADULT ORAL NUTRITION SUPPLEMENT; Breakfast, Lunch, Dinner; Standard High Calorie/High Protein Oral Supplement  ADULT DIET;  Dysphagia - Soft and Bite Sized    Anthropometric Measures:  · Height: 6' 3\" (190.5 cm)  · Current Body Weight: 157 lb 13.6 oz (71.6 kg)   · Admission Body Weight: 141 lb 5 oz (64.1 kg)    · Usual Body Weight: 145 lb (65.8 kg)     · Ideal Body Weight: 196 lbs; % Ideal Body Weight 80.5 %   · BMI: 19.7  · BMI Categories: Normal Weight (BMI 18.5-24. 9)       Nutrition Diagnosis:   · Inadequate oral intake related to  (current condition; mentation/agitation) as evidenced by  (variable PO intakes; need for oral supplements)    Nutrition Interventions:   Food and/or Nutrient Delivery:  Continue Current Diet,Continue Oral Nutrition Supplement  Nutrition Education/Counseling:  No recommendation at this time   Coordination of Nutrition Care:  Continue to monitor while inpatient    Goals:  Meet % of estimated nutrition needs. Nutrition Monitoring and Evaluation:   Behavioral-Environmental Outcomes:  None Identified   Food/Nutrient Intake Outcomes:  Food and Nutrient Intake,Supplement Intake  Physical Signs/Symptoms Outcomes:  Biochemical Data,GI Status,Fluid Status or Edema,Hemodynamic Status,Nutrition Focused Physical Findings,Skin,Weight     Discharge Planning:     Too soon to determine     Electronically signed by Osman Wiseman RD, LD on 4/6/22 at 11:01 AM EDT    Contact: [phone]

## 2022-04-06 NOTE — PROGRESS NOTES
Physical Therapy  Facility/Department: [facility] CAR 1  Daily Treatment Note  NAME: Asya Bajwa  : 1955  MRN: [id number]    Date of Service: 2022    Discharge Recommendations:  Patient would benefit from continued therapy after discharge   PT Equipment Recommendations  Equipment Needed: No (unsafe for transfers/amb without skilled A)    Assessment   Body structures, Functions, Activity limitations: Decreased strength;Decreased endurance;Decreased functional mobility ; Decreased balance; Increased pain;Decreased safe awareness;Decreased cognition  Assessment: Pt ambulates 2 ft RW min Ax2. Pt is unsafe to return to prior living situation d/t high fall risk from decreased safety awareness, decreased balance and endurance. Pt would benefit from continued therapy to promote endurance, balance and strengthening. Treatment Diagnosis: general weakness; difficulty walking  Prognosis: Fair  Decision Making: Medium Complexity  PT Education: Goals; Home Exercise Program;General Safety;Transfer Training;Family Education;PT Role;Functional Mobility Training;Precautions;Plan of Care;Energy Conservation  Patient Education: sternal precautions, safety  Barriers to Learning: poor safety awareness, dec cognition  REQUIRES PT FOLLOW UP: Yes  Activity Tolerance  Activity Tolerance: Patient limited by cognitive status; Patient limited by endurance; Patient limited by fatigue     Patient Diagnosis(es): The encounter diagnosis was S/P cardiac catheterization. has a past medical history of 3-vessel CAD, Alcohol abuse, Arthritis, Chronic kidney disease, Full dentures, Hypertension, Other emphysema ([address] Pure hypercholesterolemia, Systolic CHF ([address] and Wears glasses. has a past surgical history that includes Hip fracture surgery (Left); Appendectomy; pr egd transoral biopsy single/multiple (2017); and Coronary artery bypass graft (N/A, 3/21/2022).     Restrictions  Restrictions/Precautions  Restrictions/Precautions: Fall Risk,General Precautions,Cardiac  Required Braces or Orthoses?: Yes  Implants present? : Metal implants  Required Braces or Orthoses  Other: Heart Hugger Brace  Position Activity Restriction  Sternal Precautions: 5# Lifting Restrictions,No Pushing,No Pulling  Sternal Precautions: CABG X 3  Other position/activity restrictions: Amb pt, up in chair for meals  Subjective   General  Response To Previous Treatment: Patient with no complaints from previous session. Family / Caregiver Present: No  Subjective  Subjective: RN and pt agreeable to PT. pt agreeable, requires maximal encouragement to participate. Pt supine in bed at start of session. Pain Screening  Patient Currently in Pain: No  Pain Assessment  Pain Assessment: Faces  Eaton-Taylor Pain Rating: No hurt  Vital Signs  Patient Currently in Pain: No       Orientation  Orientation  Overall Orientation Status: Impaired  Orientation Level:  (not formally assessed, pt easily agitated)  Cognition   Cognition  Overall Cognitive Status: Exceptions  Arousal/Alertness: Delayed responses to stimuli  Following Commands: Inconsistently follows commands; Follows one step commands with repetition  Attention Span: Difficulty attending to directions  Memory: Decreased recall of precautions  Safety Judgement: Decreased awareness of need for assistance;Decreased awareness of need for safety  Problem Solving: Decreased awareness of errors  Insights: Decreased awareness of deficits  Initiation: Requires cues for all  Sequencing: Requires cues for all  Cognition Comment: Not compliant with verbal cues for hand placement, sternal precautions, coughing etc, up to Max verbal and tactile cues with poor return  Objective   Bed mobility  Rolling to Left: Minimal assistance  Rolling to Right: Minimal assistance  Supine to Sit: Contact guard assistance  Sit to Supine: Unable to assess (pt ends in recliner)  Scooting: Contact guard assistance  Comment: HOB elevated.   Transfers  Sit to Stand: Minimal Assistance;2 Person Assistance  Stand to sit: Minimal Assistance;2 Person Assistance  Comment: RW for UE support, poor return of cues for hand placement and use of heart hugger. Ambulation  Ambulation?: Yes  More Ambulation?: No  Ambulation 1  Surface: level tile  Device: Rolling Walker  Other Apparatus: O2 (6 L per NC)  Assistance: Minimal assistance;2 Person assistance  Quality of Gait: Pt forward flexed  Gait Deviations: Slow Gale;Staggers; Deviated path;Shuffles  Distance: 2ft  Comments: Limited by weakness and fatigue,Demo poor safety awareness making pt a high fall risk  Stairs/Curb  Stairs?: No     Balance  Posture: Fair  Sitting - Static: Good  Sitting - Dynamic: -;Good  Standing - Static: Fair;-  Standing - Dynamic: Fair;-  Comments: standing balance assessed with RW, poor safety awareness                 Seated LE exercise program: Long Arc Quads, hip abduction/adduction, heel/toe raises, and marches. Reps: 10x AROM BLE                                   AM-PAC Score  AM-PAC Inpatient Mobility Raw Score : 16 (04/06/22 1424)  AM-PAC Inpatient T-Scale Score : 40.78 (04/06/22 1424)  Mobility Inpatient CMS 0-100% Score: 54.16 (04/06/22 1424)  Mobility Inpatient CMS G-Code Modifier : CK (04/06/22 1424)          Goals  Short term goals  Time Frame for Short term goals: 14 days  Short term goal 1: Pt to transfer sit to stand with min A +1. Short term goal 2: Pt to ambulate 75ft with RW and no LOB. Short term goal 3: Pt to demo good standing balance for decrease fall risk. Short term goal 4: Pt to tolerate 20-30 mins ther ex/act for improved strength and endurance.   Patient Goals   Patient goals : get stronger    Plan    Plan  Times per week: 7x week  Times per day:  (1-2x/day)  Specific instructions for Next Treatment: endurance, Txs to chair, ambulation  Current Treatment Recommendations: Kenisha Shipley Mobility Training,Transfer Lynsey Lloyd Re-education,Home Exercise Program,Safety Education & Training  Safety Devices  Type of devices: Call light within reach,Nurse notified,Gait belt,All fall risk precautions in place,Left in chair,Chair alarm in place  Restraints  Initially in place: No  Restraints: only x3 bed rails up with use of tray table     Therapy Time   Individual Concurrent Group Co-treatment   Time In 0955         Time Out 1033         Minutes 38         Timed Code Treatment Minutes: [employer],

## 2022-04-06 NOTE — PROGRESS NOTES
[facility]  Daily Progress Note    Surgeon: Dr. Lind Seen   S/P : CABG X 2   EF: 45%     Subjective:  Mr. Daisy Lawrence   Resting on NC with no SOB. No acute distress. A&0x4    Physical Exam  Vital Signs: /77   Pulse 76   Temp 98.4 °F (36.9 °C) (Axillary)   Resp 24   Ht 6' 3\" (1.905 m)   Wt 157 lb 13.6 oz (71.6 kg)   SpO2 98%   BMI 19.73 kg/m²  O2 Flow Rate (L/min): 6 L/min   Admit Weight: Weight: 141 lb 5 oz (64.1 kg)   WEIGHTWeight: 157 lb 13.6 oz (71.6 kg)     General: noncompliant   Heart: Normal S1 and S2.  Regular rhythm. No murmurs, gallops, or rubs. Pacing Wires: Yes -To be clipped prior to discharge  Lungs: clear to auscultation bilaterally and diminished breath sounds bibasilar Chest tubes: Yes, Air Leak: No  Abdomen: soft, non tender, non distended, BS x4  Extremities: negative  Wounds: clean and dry, healing appropriately. Sternum: Healing  SVG sites: Healing  CXR-no appreciated effusions noted. Diffuse consolidation. No pneumothorax noted.      Scheduled Meds:    enoxaparin  40 mg SubCUTAneous Daily    furosemide  40 mg IntraVENous BID    potassium chloride  40 mEq Oral Once    metoprolol tartrate  12.5 mg Oral BID    midodrine  5 mg Oral TID WC    spironolactone  25 mg Oral Daily    amiodarone  200 mg Oral Daily    polyethylene glycol  17 g Oral BID    nystatin  5 mL Oral 4x Daily    sodium chloride flush  10 mL IntraVENous 2 times per day    clopidogrel  75 mg Oral Daily    sennosides-docusate sodium  1 tablet Oral BID    atorvastatin  80 mg Oral Nightly    pantoprazole  40 mg Oral Daily    tamsulosin  0.4 mg Oral Daily     Continuous Infusions:    dexmedetomidine Stopped (03/31/22 1217)    sodium chloride Stopped (03/29/22 1653)    dextrose      sodium chloride      sodium chloride         Data:  CBC:   Recent Labs     04/04/22  0232 04/06/22  0717   WBC 14.1* 16.3*   HGB 8.9* 8.2*   HCT 27.9* 26.7*   MCV 85.1 86.1   * 602*     BMP: Recent Labs     04/04/22 0232   *   K 3.8   CL 97*   CO2 26   BUN 9   CREATININE 0.81     PT/INR:   Recent Labs     04/04/22 0232   PROTIME 13.1*   INR 1.2     APTT:   No results for input(s): APTT in the last 72 hours. Chest X-Ray: worsening atelectasis process to left thorax. I/O:  I/O last 3 completed shifts:   In: 200 [P.O.:200]  Out: 2035 [Urine:2035]    Assessment/Plan:      Diagnosis Date    3-vessel CAD 03/11/2022    Alcohol abuse 06/23/2015    Arthritis     Chronic kidney disease     Full dentures     upper and lower full    Hypertension     Other emphysema ([address]     COPD stage II on PFTs with severe reduction diffusion capacity    Pure hypercholesterolemia 06/26/2382    Systolic CHF ([address]     Wears glasses        Beta-Blocker: yes  ASA: yes  Plavix: yes  GI: yes  Statin: yes  Coumadin: no  ACE-I: no  EF: 45%    o Pt is not suicidal. He is A&0x4.   o Please DC patient today-from [facility] medically ready for placement at this time  o Waiting on placement feedback    Looking at [address] access who recently decline   [facility] reviewing and precert   [facility] reviewing and precert  Pt ready for [address] [facility] [place]

## 2022-04-06 NOTE — PROGRESS NOTES
Occupational Therapy  Facility/Department: [facility] CAR 1  Daily Treatment Note  NAME: José Miguel Hernandez  : 1955  MRN: [id number]    Date of Service: 2022    Discharge Recommendations:Pt. Would benefit from further skillet OT services to enhance functional outcomes. Patient would benefit from continued therapy after discharge       Assessment   Performance deficits / Impairments: Decreased ADL status; Decreased functional mobility ; Decreased strength;Decreased safe awareness;Decreased endurance;Decreased balance;Decreased high-level IADLs;Decreased cognition  Assessment: Pt would benefit from continued acute care and post acute care OT to address above listed deficits. Treatment Diagnosis: CABG X2  Prognosis: Good  Decision Making: Medium Complexity  OT Education: OT Role;Plan of Care;Transfer Training;Precautions  Patient Education: P return from pt  REQUIRES OT FOLLOW UP: Yes  Activity Tolerance  Activity Tolerance: Treatment limited secondary to decreased cognition;Treatment limited secondary to agitation;Patient limited by fatigue  Safety Devices  Type of devices: Call light within reach; Patient at risk for falls;Nurse notified;Gait belt;Left in chair;Chair alarm in place  Restraints  Initially in place: No         Patient Diagnosis(es): The encounter diagnosis was S/P cardiac catheterization. has a past medical history of 3-vessel CAD, Alcohol abuse, Arthritis, Chronic kidney disease, Full dentures, Hypertension, Other emphysema ([address] Pure hypercholesterolemia, Systolic CHF ([address] and Wears glasses. has a past surgical history that includes Hip fracture surgery (Left); Appendectomy; pr egd transoral biopsy single/multiple (2017); and Coronary artery bypass graft (N/A, 3/21/2022).     Restrictions  Restrictions/Precautions  Restrictions/Precautions: Fall Risk,General Precautions,Cardiac  Required Braces or Orthoses?: Yes  Implants present? : Metal implants  Required Braces or Orthoses  Other: Heart Hugger Brace  Position Activity Restriction  Sternal Precautions: 5# Lifting Restrictions,No Pushing,No Pulling  Sternal Precautions: CABG X 3  Other position/activity restrictions: Amb pt, up in chair for meals, NC 4.5 LPM  Subjective   General  Patient assessed for rehabilitation services?: Yes  Family / Caregiver Present: No  Diagnosis: CABGx2 on 3/21/22, NSTEMI, EF 34%, hx o0f ETOH/drug use  Vital Signs  Patient Currently in Pain: Denies   Orientation  Orientation  Orientation Level: Oriented to person;Oriented to place  Objective    ADL  Grooming: Setup;Verbal cueing; Increased time to complete;Minimal assistance (Face washing in long sitting position, min A)  UE Bathing: Setup; Increased time to complete;Minimal assistance;Verbal cueing (Min A to bathe B axilla area only in long sitting position.)  LE Bathing: Maximum assistance (Max A d/t P understanding of importance of task. In long sitting position.)  UE Dressing: Increased time to complete;Setup;Maximum assistance;Verbal cueing (Max A to doff/don soiled gown in long sitting position, d/t difficult understanding purpose of task)  Toileting: Maximum assistance (Max A to get cleaned up in bed d/t having BM while turning in bed, pt. needing min A for bed mobility)  Additional Comments: Encouragement needed to initiate activity and for sequencing, Pt. demo P/F carryover with extended time and repetition. Balance  Sitting Balance: Minimal assistance (Min A sitting EOB ~5 minutes, static/dynamic.)  Standing Balance: Minimal assistance (x2)  Standing Balance  Time: >1 minute  Activity: static/dynamic/pivot transfer  Comment: w/RW x2, Min A d/t P safety with tech/B hand placement  Bed mobility  Rolling to Left: Minimal assistance  Rolling to Right: Minimal assistance  Supine to Sit: Contact guard assistance  Scooting: Contact guard assistance  Comment: Max verbal cues for tech and safety, pt. demo P carryover.   Transfers  Stand Pivot Transfers: 2 Person assistance;Minimal assistance  Sit to stand: 2 Person assistance;Minimal assistance  Stand to sit: 2 Person assistance;Minimal assistance  Transfer Comments: Pt. requesting to get into recliner chair, RW used. Pt. sat in recliner chair ~40 minutes. Cognition  Overall Cognitive Status: Exceptions  Arousal/Alertness: Delayed responses to stimuli  Following Commands: Inconsistently follows commands; Follows one step commands with repetition  Attention Span: Difficulty attending to directions  Memory: Decreased recall of precautions  Safety Judgement: Decreased awareness of need for assistance;Decreased awareness of need for safety  Problem Solving: Decreased awareness of errors  Insights: Decreased awareness of deficits  Initiation: Requires cues for all  Sequencing: Requires cues for all  Cognition Comment: Not compliant with verbal cues for hand placement, sternal , precautions, coughing etc, up to Max verbal and tactile cues with poor return                                         Plan   Plan  Times per week: 4-6x (CABG)  Current Treatment Recommendations: Self-Care / ADL,Strengthening,Balance Training,Functional Mobility Training,Endurance Training,Pain Management,Safety Education & Training,Patient/Caregiver Education & Training,Equipment Evaluation, Education, & procurement,Home Management Training                                               AM-PAC Score        AM-[facility] Inpatient Daily Activity Raw Score: 13 (04/06/22 1258)  AM-PAC Inpatient ADL T-Scale Score : 32.03 (04/06/22 1258)  ADL Inpatient CMS 0-100% Score: 63.03 (04/06/22 1258)  ADL Inpatient CMS G-Code Modifier : CL (04/06/22 1258)    Goals  Short term goals  Time Frame for Short term goals: Pt will by discharge  Short term goal 1: identify/maintain all sternal precautions with 1 vc  Short term goal 2: demo ADL UB bathing/dressing activity at SBA and increased time, including heart hugger  Short term goal 3: demo ADL LB bathing/dressing activity at min A and increased time, using sock-aid/reacher PRN  Short term goal 4: demo good safety awareness druing func mob around room at min A, RW PRN, and 1 cue  Short term goal 5: demo SBA for all bed mobility/func transfers using bed rails/LRD PRN       Therapy Time   Individual Concurrent Group Co-treatment   Time In 1004         Time Out 1030         Minutes 26         Timed Code Treatment Minutes: 23 Minutes (co tx with PT.)       SHMUEL Tong/ALAINA

## 2022-04-07 LAB
ABSOLUTE EOS #: 0.18 K/UL (ref 0–0.4)
ABSOLUTE IMMATURE GRANULOCYTE: 0 K/UL (ref 0–0.3)
ABSOLUTE LYMPH #: 2.21 K/UL (ref 1–4.8)
ABSOLUTE MONO #: 1.47 K/UL (ref 0.1–0.8)
ANION GAP SERPL CALCULATED.3IONS-SCNC: 11 MMOL/L (ref 9–17)
BASOPHILS # BLD: 0 % (ref 0–2)
BASOPHILS ABSOLUTE: 0 K/UL (ref 0–0.2)
BUN BLDV-MCNC: 11 MG/DL (ref 8–23)
CALCIUM SERPL-MCNC: 8.4 MG/DL (ref 8.6–10.4)
CHLORIDE BLD-SCNC: 98 MMOL/L (ref 98–107)
CO2: 25 MMOL/L (ref 20–31)
CREAT SERPL-MCNC: 0.95 MG/DL (ref 0.7–1.2)
EOSINOPHILS RELATIVE PERCENT: 1 % (ref 1–4)
GFR AFRICAN AMERICAN: >60 ML/MIN
GFR NON-AFRICAN AMERICAN: >60 ML/MIN
GFR SERPL CREATININE-BSD FRML MDRD: ABNORMAL ML/MIN/{1.73_M2}
GLUCOSE BLD-MCNC: 98 MG/DL (ref 70–99)
HCT VFR BLD CALC: 30.6 % (ref 40.7–50.3)
HEMOGLOBIN: 9.4 G/DL (ref 13–17)
IMMATURE GRANULOCYTES: 0 %
INR BLD: 1.2
LYMPHOCYTES # BLD: 12 % (ref 24–44)
MCH RBC QN AUTO: 26.4 PG (ref 25.2–33.5)
MCHC RBC AUTO-ENTMCNC: 30.7 G/DL (ref 28.4–34.8)
MCV RBC AUTO: 86 FL (ref 82.6–102.9)
MONOCYTES # BLD: 8 % (ref 1–7)
MORPHOLOGY: ABNORMAL
NRBC AUTOMATED: 0 PER 100 WBC
PDW BLD-RTO: 16.4 % (ref 11.8–14.4)
PLATELET # BLD: 662 K/UL (ref 138–453)
PMV BLD AUTO: 9.4 FL (ref 8.1–13.5)
POTASSIUM SERPL-SCNC: 4 MMOL/L (ref 3.7–5.3)
PROTHROMBIN TIME: 12.8 SEC (ref 9.1–12.3)
RBC # BLD: 3.56 M/UL (ref 4.21–5.77)
SEG NEUTROPHILS: 79 % (ref 36–66)
SEGMENTED NEUTROPHILS ABSOLUTE COUNT: 14.54 K/UL (ref 1.8–7.7)
SODIUM BLD-SCNC: 134 MMOL/L (ref 135–144)
WBC # BLD: 18.4 K/UL (ref 3.5–11.3)

## 2022-04-07 PROCEDURE — 6370000000 HC RX 637 (ALT 250 FOR IP): Performed by: NURSE PRACTITIONER

## 2022-04-07 PROCEDURE — 6370000000 HC RX 637 (ALT 250 FOR IP): Performed by: INTERNAL MEDICINE

## 2022-04-07 PROCEDURE — 6360000002 HC RX W HCPCS: Performed by: INTERNAL MEDICINE

## 2022-04-07 PROCEDURE — 2060000000 HC ICU INTERMEDIATE R&B

## 2022-04-07 PROCEDURE — 97116 GAIT TRAINING THERAPY: CPT

## 2022-04-07 PROCEDURE — 36415 COLL VENOUS BLD VENIPUNCTURE: CPT

## 2022-04-07 PROCEDURE — 97530 THERAPEUTIC ACTIVITIES: CPT

## 2022-04-07 PROCEDURE — 85025 COMPLETE CBC W/AUTO DIFF WBC: CPT

## 2022-04-07 PROCEDURE — 85610 PROTHROMBIN TIME: CPT

## 2022-04-07 PROCEDURE — 97535 SELF CARE MNGMENT TRAINING: CPT

## 2022-04-07 PROCEDURE — 99233 SBSQ HOSP IP/OBS HIGH 50: CPT | Performed by: INTERNAL MEDICINE

## 2022-04-07 PROCEDURE — 6370000000 HC RX 637 (ALT 250 FOR IP): Performed by: STUDENT IN AN ORGANIZED HEALTH CARE EDUCATION/TRAINING PROGRAM

## 2022-04-07 PROCEDURE — 80048 BASIC METABOLIC PNL TOTAL CA: CPT

## 2022-04-07 PROCEDURE — 2580000003 HC RX 258: Performed by: NURSE PRACTITIONER

## 2022-04-07 RX ORDER — ATORVASTATIN CALCIUM 80 MG/1
80 TABLET, FILM COATED ORAL NIGHTLY
Qty: 30 TABLET | Refills: 3 | Status: SHIPPED | OUTPATIENT
Start: 2022-04-07

## 2022-04-07 RX ORDER — PANTOPRAZOLE SODIUM 40 MG/1
40 TABLET, DELAYED RELEASE ORAL DAILY
Qty: 30 TABLET | Refills: 3 | Status: SHIPPED | OUTPATIENT
Start: 2022-04-07

## 2022-04-07 RX ORDER — MIDODRINE HYDROCHLORIDE 5 MG/1
5 TABLET ORAL
Qty: 90 TABLET | Refills: 3 | Status: SHIPPED | OUTPATIENT
Start: 2022-04-07 | End: 2022-08-04

## 2022-04-07 RX ORDER — CLOPIDOGREL BISULFATE 75 MG/1
75 TABLET ORAL DAILY
Qty: 30 TABLET | Refills: 3 | Status: SHIPPED | OUTPATIENT
Start: 2022-04-08

## 2022-04-07 RX ORDER — OXYCODONE HYDROCHLORIDE AND ACETAMINOPHEN 5; 325 MG/1; MG/1
1 TABLET ORAL EVERY 8 HOURS PRN
Qty: 9 TABLET | Refills: 0 | Status: SHIPPED | OUTPATIENT
Start: 2022-04-07 | End: 2022-04-10

## 2022-04-07 RX ORDER — SPIRONOLACTONE 25 MG/1
25 TABLET ORAL DAILY
Qty: 30 TABLET | Refills: 3 | Status: SHIPPED | OUTPATIENT
Start: 2022-04-08

## 2022-04-07 RX ORDER — FUROSEMIDE 20 MG/1
20 TABLET ORAL DAILY
Qty: 60 TABLET | Refills: 3 | Status: SHIPPED | OUTPATIENT
Start: 2022-04-07

## 2022-04-07 RX ORDER — LEVOFLOXACIN 250 MG/1
250 TABLET ORAL DAILY
Qty: 10 TABLET | Refills: 0 | Status: SHIPPED | OUTPATIENT
Start: 2022-04-07 | End: 2022-04-17

## 2022-04-07 RX ORDER — AMIODARONE HYDROCHLORIDE 200 MG/1
200 TABLET ORAL 2 TIMES DAILY
Qty: 30 TABLET | Refills: 0 | Status: ON HOLD | OUTPATIENT
Start: 2022-04-07 | End: 2022-08-04 | Stop reason: HOSPADM

## 2022-04-07 RX ADMIN — OXYCODONE HYDROCHLORIDE AND ACETAMINOPHEN 2 TABLET: 5; 325 TABLET ORAL at 13:11

## 2022-04-07 RX ADMIN — STANDARDIZED SENNA CONCENTRATE AND DOCUSATE SODIUM 1 TABLET: 8.6; 5 TABLET ORAL at 20:39

## 2022-04-07 RX ADMIN — STANDARDIZED SENNA CONCENTRATE AND DOCUSATE SODIUM 1 TABLET: 8.6; 5 TABLET ORAL at 08:05

## 2022-04-07 RX ADMIN — Medication 500000 UNITS: at 08:05

## 2022-04-07 RX ADMIN — AMIODARONE HYDROCHLORIDE 200 MG: 200 TABLET ORAL at 08:05

## 2022-04-07 RX ADMIN — PANTOPRAZOLE SODIUM 40 MG: 40 TABLET, DELAYED RELEASE ORAL at 13:11

## 2022-04-07 RX ADMIN — FUROSEMIDE 40 MG: 10 INJECTION, SOLUTION INTRAMUSCULAR; INTRAVENOUS at 08:05

## 2022-04-07 RX ADMIN — METOPROLOL TARTRATE 12.5 MG: 25 TABLET ORAL at 20:39

## 2022-04-07 RX ADMIN — Medication 500000 UNITS: at 17:01

## 2022-04-07 RX ADMIN — Medication 500000 UNITS: at 13:11

## 2022-04-07 RX ADMIN — MIDODRINE HYDROCHLORIDE 5 MG: 5 TABLET ORAL at 13:11

## 2022-04-07 RX ADMIN — ATORVASTATIN CALCIUM 80 MG: 80 TABLET, FILM COATED ORAL at 20:39

## 2022-04-07 RX ADMIN — SPIRONOLACTONE 25 MG: 25 TABLET ORAL at 08:05

## 2022-04-07 RX ADMIN — FUROSEMIDE 40 MG: 10 INJECTION, SOLUTION INTRAMUSCULAR; INTRAVENOUS at 17:01

## 2022-04-07 RX ADMIN — MIDODRINE HYDROCHLORIDE 5 MG: 5 TABLET ORAL at 17:01

## 2022-04-07 RX ADMIN — OXYCODONE HYDROCHLORIDE AND ACETAMINOPHEN 2 TABLET: 5; 325 TABLET ORAL at 07:58

## 2022-04-07 RX ADMIN — METOPROLOL TARTRATE 12.5 MG: 25 TABLET ORAL at 08:04

## 2022-04-07 RX ADMIN — TAMSULOSIN HYDROCHLORIDE 0.4 MG: 0.4 CAPSULE ORAL at 08:04

## 2022-04-07 RX ADMIN — CLOPIDOGREL 75 MG: 75 TABLET, FILM COATED ORAL at 08:05

## 2022-04-07 RX ADMIN — SODIUM CHLORIDE, PRESERVATIVE FREE 10 ML: 5 INJECTION INTRAVENOUS at 20:40

## 2022-04-07 RX ADMIN — OXYCODONE HYDROCHLORIDE AND ACETAMINOPHEN 2 TABLET: 5; 325 TABLET ORAL at 18:38

## 2022-04-07 RX ADMIN — SODIUM CHLORIDE, PRESERVATIVE FREE 10 ML: 5 INJECTION INTRAVENOUS at 08:06

## 2022-04-07 RX ADMIN — ENOXAPARIN SODIUM 40 MG: 40 INJECTION SUBCUTANEOUS at 08:05

## 2022-04-07 ASSESSMENT — ENCOUNTER SYMPTOMS
VOMITING: 0
PHOTOPHOBIA: 0
ALLERGIC/IMMUNOLOGIC NEGATIVE: 1
SINUS PAIN: 0
VOICE CHANGE: 0
COUGH: 0
ABDOMINAL PAIN: 0
DIARRHEA: 0
EYE REDNESS: 0
SHORTNESS OF BREATH: 0
SORE THROAT: 0
TROUBLE SWALLOWING: 0

## 2022-04-07 ASSESSMENT — PAIN SCALES - GENERAL
PAINLEVEL_OUTOF10: 8
PAINLEVEL_OUTOF10: 3
PAINLEVEL_OUTOF10: 8
PAINLEVEL_OUTOF10: 4
PAINLEVEL_OUTOF10: 3
PAINLEVEL_OUTOF10: 6
PAINLEVEL_OUTOF10: 3
PAINLEVEL_OUTOF10: 0

## 2022-04-07 ASSESSMENT — PAIN SCALES - WONG BAKER

## 2022-04-07 NOTE — PLAN OF CARE
Problem: Pain:  Description: Pain management should include both nonpharmacologic and pharmacologic interventions.   Goal: Pain level will decrease  Description: Pain level will decrease  4/7/2022 0824 by Yaneth Rojas RN  Outcome: Ongoing  4/7/2022 0623 by Aurora Xavier RN  Outcome: Ongoing  Goal: Control of acute pain  Description: Control of acute pain  4/7/2022 0824 by Yaneth Rojas RN  Outcome: Ongoing  4/7/2022 0623 by Aurora Xavier RN  Outcome: Ongoing  Goal: Control of chronic pain  Description: Control of chronic pain  4/7/2022 0824 by Yaneth Rojas RN  Outcome: Ongoing  4/7/2022 0623 by Aurora Xavier RN  Outcome: Ongoing     Problem: Skin Integrity:  Goal: Will show no infection signs and symptoms  Description: Will show no infection signs and symptoms  4/7/2022 0824 by Yaneth Rojas RN  Outcome: Ongoing  4/7/2022 0623 by Aurora Xavier RN  Outcome: Ongoing  Goal: Absence of new skin breakdown  Description: Absence of new skin breakdown  4/7/2022 0824 by Yaneth Rojas RN  Outcome: Ongoing  4/7/2022 0623 by Aurora Xavier RN  Outcome: Ongoing     Problem: Falls - Risk of:  Goal: Will remain free from falls  Description: Will remain free from falls  4/7/2022 0824 by Yaneth Rojas RN  Outcome: Ongoing  4/7/2022 0623 by Aurora Xavier RN  Outcome: Ongoing  Goal: Absence of physical injury  Description: Absence of physical injury  4/7/2022 0824 by Yaneth Rojas RN  Outcome: Ongoing  4/7/2022 0623 by Aurora Xavier RN  Outcome: Ongoing     Problem: Nutrition  Goal: Optimal nutrition therapy  4/7/2022 0824 by Yaneth Rojas RN  Outcome: Ongoing  4/7/2022 0623 by Aurora Xavier RN  Outcome: Ongoing     Problem: Musculor/Skeletal Functional Status  Goal: Highest potential functional level  4/7/2022 0824 by Yaneth Rojas RN  Outcome: Ongoing  4/7/2022 0623 by Aurora Xavier RN  Outcome: Ongoing  Goal: Absence of falls  4/7/2022 0824 by Yaneth Rojas RN  Outcome: Ongoing  4/7/2022 0623 by Aurora Xavier RN  Outcome: Ongoing Problem: Cardiac:  Goal: Ability to maintain vital signs within normal range will improve  Description: Ability to maintain vital signs within normal range will improve  4/7/2022 0824 by Alcira Sanabria RN  Outcome: Ongoing  4/7/2022 0623 by Mark Barth RN  Outcome: Ongoing  Goal: Cardiovascular alteration will improve  Description: Cardiovascular alteration will improve  4/7/2022 0824 by Alcira Sanabria RN  Outcome: Ongoing  4/7/2022 0623 by Mark Barth RN  Outcome: Ongoing     Problem: Health Behavior:  Goal: Will modify at least one risk factor affecting health status  Description: Will modify at least one risk factor affecting health status  4/7/2022 0824 by Alcira Sanabria RN  Outcome: Ongoing  4/7/2022 0623 by Mark Barth RN  Outcome: Ongoing  Goal: Identification of resources available to assist in meeting health care needs will improve  Description: Identification of resources available to assist in meeting health care needs will improve  4/7/2022 0824 by Alcira Sanabria RN  Outcome: Ongoing  4/7/2022 0623 by Mark Barth RN  Outcome: Ongoing     Problem: Physical Regulation:  Goal: Complications related to the disease process, condition or treatment will be avoided or minimized  Description: Complications related to the disease process, condition or treatment will be avoided or minimized  4/7/2022 0824 by Alcira Sanabria RN  Outcome: Ongoing  4/7/2022 0623 by Mark Barth RN  Outcome: Ongoing

## 2022-04-07 NOTE — PROGRESS NOTES
Physical Therapy  Facility/Department: [facility] CAR 1  Daily Treatment Note  NAME: Boo Kerr  : 1955  MRN: [id number]    Date of Service: 2022    Discharge Recommendations:  Patient would benefit from continued therapy after discharge   PT Equipment Recommendations  Equipment Needed: No  Other: CTA, pt durrently unsafe to perform any aspect of mobility without physical assistance and use of RW    Assessment   Body structures, Functions, Activity limitations: Decreased strength;Decreased endurance;Decreased functional mobility ; Decreased balance; Increased pain;Decreased safe awareness;Decreased cognition  Assessment: Pt ambulates 4 ft RW min Ax2. Pt is unsafe to return to prior living situation d/t high fall risk from decreased safety awareness, decreased balance and endurance. Pt would benefit from continued therapy to promote endurance, balance and strengthening. Specific instructions for Next Treatment: endurance, Txs to chair, ambulation  Prognosis: Fair  PT Education: Goals; General Safety;Transfer Training;PT Role;Functional Mobility Training;Precautions  Patient Education: sternal precautions, safety  Barriers to Learning: poor safety awareness, dec cognition  REQUIRES PT FOLLOW UP: Yes  Activity Tolerance  Activity Tolerance: Patient limited by cognitive status; Patient limited by endurance; Patient limited by fatigue     Patient Diagnosis(es): The primary encounter diagnosis was S/P CABG x 2. A diagnosis of S/P cardiac catheterization was also pertinent to this visit. has a past medical history of 3-vessel CAD, Alcohol abuse, Arthritis, Chronic kidney disease, Full dentures, Hypertension, Other emphysema ([address] Pure hypercholesterolemia, Systolic CHF ([address] and Wears glasses. has a past surgical history that includes Hip fracture surgery (Left);  Appendectomy; pr egd transoral biopsy single/multiple (2017); and Coronary artery bypass graft (N/A, 3/21/2022). Restrictions  Restrictions/Precautions  Restrictions/Precautions: Fall Risk,General Precautions,Cardiac  Required Braces or Orthoses?: Yes  Implants present? : Metal implants  Required Braces or Orthoses  Other: Heart Hugger Brace  Position Activity Restriction  Sternal Precautions: 5# Lifting Restrictions,No Pushing,No Pulling  Sternal Precautions: CABG X 3  Other position/activity restrictions: Amb pt, up in chair for meals  Subjective   General  Response To Previous Treatment: Patient with no complaints from previous session. Family / Caregiver Present: Yes (MI)  Subjective  Subjective: RN and pt agreeable to PT. pt agreeable, requires maximal encouragement to participate. Pt seaetd EOB upon arrival with MI present. General Comment  Comments: Pt left seated in recliner with call light within reach, alarm activated and RN present  Pain Screening  Patient Currently in Pain: Denies  Vital Signs  Patient Currently in Pain: Denies       Orientation  Orientation  Overall Orientation Status: Impaired  Orientation Level: Oriented to place;Oriented to situation;Oriented to person;Disoriented to time  Cognition      Objective   Bed mobility  Scooting: Contact guard assistance  Comment: pt seated EOB with MI upon arrival, mod vc's for safety and progression required  Transfers  Sit to Stand: Minimal Assistance;2 Person Assistance  Stand to sit: Minimal Assistance;2 Person Assistance  Bed to Chair: 2 Person Assistance  Comment: RW for UE support, poor return of cues for hand placement and use of heart hugger. Ambulation  Ambulation?: Yes  Ambulation 1  Surface: level tile  Device: Rolling Walker  Other Apparatus: O2  Assistance: Minimal assistance;2 Person assistance  Quality of Gait: Pt forward flexed  Gait Deviations: Slow Gale;Staggers; Deviated path;Shuffles  Distance: 4ft bed to chair  Comments: Limited by weakness and fatigue,Demo poor safety awareness making pt a high fall risk.  Pt attempting to sit prior to positioning in front of chair requiring verbal and tactile cueing for safety  Stairs/Curb  Stairs?: No     Balance  Posture: Fair  Sitting - Dynamic: Fair;+  Standing - Static: Fair;-  Comments: standing balance assessed with RW, poor safety awareness  Exercises  Comments: pt deferred ther exs, states \"I'll do them later\" but refused when therapist returned to room later in the AM  Other exercises  Other exercises?: No     AM-PAC Score  AM-PAC Inpatient Mobility Raw Score : 14 (04/07/22 1528)  AM-PAC Inpatient T-Scale Score : 38.1 (04/07/22 1528)  Mobility Inpatient CMS 0-100% Score: 61.29 (04/07/22 1528)  Mobility Inpatient CMS G-Code Modifier : CL (04/07/22 1528)    Goals  Short term goals  Time Frame for Short term goals: 14 days  Short term goal 1: Pt to transfer sit to stand with min A +1. Short term goal 2: Pt to ambulate 75ft with RW and no LOB. Short term goal 3: Pt to demo good standing balance for decrease fall risk. Short term goal 4: Pt to tolerate 20-30 mins ther ex/act for improved strength and endurance. Patient Goals   Patient goals : get stronger    Plan    Plan  Times per week: 7x week  Times per day:  (1-2x/day)  Specific instructions for Next Treatment: endurance, Txs to chair, ambulation  Current Treatment Recommendations: Strengthening,Endurance Training,Functional Mobility Training,Transfer Training,Gait Training,Neuromuscular Re-education,Home Exercise Program,Safety Education & Training  Safety Devices  Type of devices: Call light within reach,Nurse notified,Gait belt,All fall risk precautions in place,Left in chair,Chair alarm in place  Restraints  Initially in place: No  Restraints: .      Therapy Time   Individual Concurrent Group Co-treatment   Time In 0388         Time Out 0905         Minutes 15         Timed Code Treatment Minutes: [address]

## 2022-04-07 NOTE — DISCHARGE INSTR - COC
Continuity of Care Form    Patient Name: Elana Pagan   :    MRN:  [id number]    Admit date:  3/11/2022  Discharge date:  2022    Code Status Order: Full Code   Advance Directives:      Admitting Physician:  Paulina Briceno MD  PCP: Anuj Jaffe MD    Discharging Nurse: [facility] Unit/Room#: 1007/1007-01  Discharging Unit Phone Number: [phone]    Emergency Contact:   Extended Emergency Contact Information  Primary Emergency Contact: Erin Shepherd(Cousin)   [address] Phone: [phone]  Relation: Other  Secondary Emergency Contact: Toan Madsen  [employer] Phone: [phone]  Relation: Child   needed?  No    Past Surgical History:  Past Surgical History:   Procedure Laterality Date    APPENDECTOMY      CORONARY ARTERY BYPASS GRAFT N/A 3/21/2022    CABG X 2 , EVH RT LEG , MELI performed by Paulina Briceno MD at [address] Left     W/ HARDWARE    NM EGD TRANSORAL BIOPSY SINGLE/MULTIPLE  2017    EGD BIOPSY performed by Carlota Luong DO at [facility]       Immunization History:   Immunization History   Administered Date(s) Administered    COVID-19, J&J, PF, 0.5 mL 2021    Influenza, Quadv, IM, (6 mo and older Fluzone, Flulaval, Fluarix and 3 yrs and older Afluria) 2016, 2017    Pneumococcal Polysaccharide (Tilagjbrn84) 2014    Tdap (Boostrix, Adacel) 2016       Active Problems:  Patient Active Problem List   Diagnosis Code    CKD (chronic kidney disease) stage 3, GFR 30-59 ml/min (HCC) N18.30    Essential hypertension I10    Arthritis of right hip M16.11    Chronic systolic congestive heart failure (HCC) I50.22    Benign prostatic hyperplasia with urinary obstruction N40.1, N13.8    Pure hypercholesterolemia E78.00    Abdominal pain R10.9    Chest pain R07.9    Acute on chronic combined systolic and diastolic congestive heart failure (HCC) I50.43    S/P cardiac catheterization W51.697    3-vessel CAD I25.10    Acute renal failure with tubular necrosis (HCC) N17.0    Problem related to discharge planning Z02.9    Acute respiratory failure with hypoxia (HCC) J96.01       Isolation/Infection:   Isolation            No Isolation          Patient Infection Status       Infection Onset Added Last Indicated Last Indicated By Review Planned Expiration Resolved Resolved By    None active    Resolved    COVID-19 (Rule Out) 03/09/22 03/09/22 03/09/22 COVID-19 & Influenza Combo (Ordered)   03/09/22 Rule-Out Test Resulted            Nurse Assessment:  Last Vital Signs: BP (!) 148/74   Pulse 82   Temp 98.1 °F (36.7 °C) (Oral)   Resp 30   Ht 6' 3\" (1.905 m)   Wt 157 lb 13.6 oz (71.6 kg)   SpO2 95% Comment: patient continues to take of sat probe  BMI 19.73 kg/m²     Last documented pain score (0-10 scale): Pain Level: 8  Last Weight:   Wt Readings from Last 1 Encounters:   04/06/22 157 lb 13.6 oz (71.6 kg)     Mental Status:  oriented    IV Access:  - None    Nursing Mobility/ADLs:  Walking   Assisted  Transfer  Assisted  Bathing  Assisted  Dressing  Assisted  Toileting  Assisted  Feeding  Assisted  Med Admin  Assisted  Med Delivery   whole    Wound Care Documentation and Therapy:        Elimination:  Continence: Bowel: No  Bladder: Yes  Urinary Catheter: None   Colostomy/Ileostomy/Ileal Conduit: No       Date of Last BM: 04/06/2022    Intake/Output Summary (Last 24 hours) at 4/7/2022 1236  Last data filed at 4/7/2022 0000  Gross per 24 hour   Intake 360 ml   Output 1250 ml   Net -890 ml     I/O last 3 completed shifts: In: 960 [P.O.:960]  Out: 2450 [Urine:2450]    Safety Concerns: At Risk for Falls    Impairments/Disabilities:      None    Nutrition Therapy:  Current Nutrition Therapy:   - Oral Diet:  General    Routes of Feeding: Oral  Liquids:  Thin Liquids  Daily Fluid Restriction: no  Last Modified Barium Swallow with Video (Video Swallowing Test): not done    Treatments at the Time of Hospital Discharge:   Respiratory Treatments: n/a  Oxygen Therapy:  is on oxygen at 4 L/min per nasal cannula. Ventilator:    - No ventilator support    Rehab Therapies: Physical Therapy and Occupational Therapy  Weight Bearing Status/Restrictions: No weight bearing restrictions  Other Medical Equipment (for information only, NOT a DME order):  walker and bath bench  Other Treatments: n/a    Patient's personal belongings (please select all that are sent with patient):  None    RN SIGNATURE:  Electronically signed by Kassy Cerrato RN on 4/7/22 at 12:40 PM EDT    CASE MANAGEMENT/SOCIAL WORK SECTION    Inpatient Status Date: 3/11/22    Readmission Risk Assessment Score:  Readmission Risk              Risk of Unplanned Readmission:  20           Discharging to Facility/ Agency   Name:   Address:  Phone:  [address] [place] [address], [address] [address] [place] [address]       Phone: [phone]        Fax:    Dialysis Facility (if applicable)   Name:  Address:  Dialysis Schedule:  Phone:  Fax:    / signature: Electronically signed by Landry Whitehead RN on 4/7/22 at 12:46 PM EDT    PHYSICIAN SECTION    Prognosis: Good    Condition at Discharge: Stable    Rehab Potential (if transferring to Rehab): Good    Recommended Labs or Other Treatments After Discharge:   Skilled nursing cardio/pulmonary assessment EVERY shift/visit with vital signs and SaO2 call abnormal results to Surgeon's office  Notify Surgeon's office for temp >101.5 F  Daily weight and record. Call for weight gain of 3 lbs in 3 days or 5-7lbs in 7 days    Monitor surgical incisions for signs of infection (redness, warmth, pain, purulent drainage, odor) and call with abnormal findings. Leave incisions open to air unless drainage is prohibitive. Call with any signs of separation or dehiscence. Shower daily with warm water and mild soap. Pat dry with clean towel, do not rub.    Heart Hugger and Surgical bra on during daytime  Large breasted patients need to wear surgical or soft sports bra at all times to reduce tension on sternal incision  Rayshawn hose on during day time, rinse and dry overnight to prevent infection of leg incisions    Strict Sternal Precautions: No lifting/pushing/pulling over 5lbs x 4 weeks, may increase 5-10 lbs per week after that as tolerated. Physical therapy BID, plus ambulation 3x per day in addition. Up in chair for all meals  Reinforce Cardiac and Diabetic Diet, medication compliance and activity instructions    Arrange for transportation to all appointments  Patient needs to see Surgeon and Cardiologist within 2 weeks of discharge. Physician Certification: I certify the above information and transfer of Shanice Hernandez  is necessary for the continuing treatment of the diagnosis listed and that he requires Merged with [facility] for less 30 days.      Update Admission H&P: No change in H&P    PHYSICIAN SIGNATURE:  Electronically signed by Dr. Albert Amaya MD on 4/7/22 at 2:13 PM EDT

## 2022-04-07 NOTE — PROGRESS NOTES
Occupational Therapy  Facility/Department: [facility] CAR 1  Daily Treatment Note  NAME: Pavel Giraldo  : 1955  MRN: [id number]    Date of Service: 2022    Discharge Recommendations: Pt. Would benefit from further skilled OT services to enhance functional outcomes. Patient would benefit from continued therapy after discharge       Assessment   Performance deficits / Impairments: Decreased ADL status; Decreased functional mobility ; Decreased strength;Decreased safe awareness;Decreased endurance;Decreased balance;Decreased high-level IADLs;Decreased cognition  Assessment: Pt would benefit from continued acute care and post acute care OT to address above listed deficits. Treatment Diagnosis: CABG X2  Decision Making: Medium Complexity  OT Education: OT Role;Plan of Care;Transfer Training;Precautions  Patient Education: F return from pt  REQUIRES OT FOLLOW UP: Yes  Activity Tolerance  Activity Tolerance: Patient limited by fatigue;Patient Tolerated treatment well  Activity Tolerance: SOB  Safety Devices  Safety Devices in place: Yes  Type of devices: Call light within reach; Patient at risk for falls;Nurse notified;Gait belt;Left in chair;Chair alarm in place  Restraints  Initially in place: No         Patient Diagnosis(es): The primary encounter diagnosis was S/P CABG x 2. A diagnosis of S/P cardiac catheterization was also pertinent to this visit. has a past medical history of 3-vessel CAD, Alcohol abuse, Arthritis, Chronic kidney disease, Full dentures, Hypertension, Other emphysema ([address] Pure hypercholesterolemia, Systolic CHF ([address] and Wears glasses. has a past surgical history that includes Hip fracture surgery (Left); Appendectomy; pr egd transoral biopsy single/multiple (2017); and Coronary artery bypass graft (N/A, 3/21/2022).     Restrictions  Restrictions/Precautions  Restrictions/Precautions: Fall Risk,General Precautions,Cardiac  Required Braces or Orthoses?: Yes  Implants present? : Metal commands with repetition; Follows one step commands with increased time  Attention Span: Attends with cues to redirect  Problem Solving: Decreased awareness of errors  Insights: Decreased awareness of deficits  Initiation: Requires cues for some  Sequencing: Requires cues for some                                         Plan   Plan  Times per week: 4-6x (CABG)  Current Treatment Recommendations: Self-Care / ADL,Strengthening,Balance Training,Functional Mobility Training,Endurance Training,Pain Management,Safety Education & Training,Patient/Caregiver Education & Training,Equipment Evaluation, Education, & procurement,Home Management Training                                                    AM-PAC Score        AM-PAC Inpatient Daily Activity Raw Score: 16 (04/07/22 1241)  AM-PAC Inpatient ADL T-Scale Score : 35.96 (04/07/22 1241)  ADL Inpatient CMS 0-100% Score: 53.32 (04/07/22 1241)  ADL Inpatient CMS G-Code Modifier : CK (04/07/22 1241)    Goals  Short term goals  Time Frame for Short term goals: Pt will by discharge  Short term goal 1: identify/maintain all sternal precautions with 1 vc  Short term goal 2: demo ADL UB bathing/dressing activity at SBA and increased time, including heart hugger  Short term goal 3: demo ADL LB bathing/dressing activity at min A and increased time, using sock-aid/reacher PRN  Short term goal 4: demo good safety awareness druing func mob around room at min A, RW PRN, and 1 cue  Short term goal 5: demo SBA for all bed mobility/func transfers using bed rails/LRD PRN       Therapy Time   Individual Concurrent Group Co-treatment   Time In 0836         Time Out 0900         Minutes 24         Timed Code Treatment Minutes: [address] [facility], [place]

## 2022-04-07 NOTE — PROGRESS NOTES
PULMONARY & CRITICAL CARE MEDICINE PROGRESS NOTE     Patient:  Farida Meade  MRN: [id number]  Admit date: 3/11/2022  Primary Care Physician: Yoon Sanchez MD  Consulting Physician: Carolina Lopes MD  CODE Status: Full Code  LOS: 27    SUBJECTIVE     CHIEF COMPLAINT/REASON FOR INITIAL CONSULT: COPD/chronic cough/preoperative evaluation    BRIEF HOSPITAL COURSE:  The patient is a 74 y.o. male history of hypertension, history of mild systolic dysfunction admitted with chest pain initially to [facility] on 03/09/2022 non-ST elevation MI ruled out by troponins in 20s had a stress test done because of high suspicion which shows ischemia of lateral septal wall with ejection fraction of 34%.  He does have history of cocaine use and apparently he had used cocaine before admission to [facility].  He was transferred to [place] for cardiac catheterization which shows multivessel coronary artery disease with EF of 30% including left main disease.  He remains on a statin and heparin. CT surgery was consulted for CABG.    Patient had pulmonary function test done which shows FEV1 of 46% with significance positive bronchodilator FEV1 of 56% with severe reduction in diffusion capacity of less than 30% with air trapping/hyperinflation. CT scan of the chest shows areas of blebs and bulla medially and centrilobular and paraseptal emphysema on CT scan. Patient does complain of shortness of breath on mild activity and exertion.  He does have chronic cough without no change denies increased wheezing currently denies chest pain.  He has history of cocaine use.  Does have history of smoking patient is not able to tell me that how many cigarettes he used to smoke but he claimed that now he smoke 1 pack/week and had been smoking for almost 50 years. INTERVAL HISTORY:  04/07/22   No issues overnight noted. Patient was seen and examined at bedside.   Denies any new complaints  Afebrile, hemodynamically stable  Remains on nasal cannula  Awaits placement    REVIEW OF SYSTEMS: Limited as not cooperative for all questions  Review of Systems   Constitutional: Negative for activity change, appetite change and fatigue. HENT: Negative for postnasal drip, sinus pain, sore throat, trouble swallowing and voice change. Eyes: Negative for photophobia, redness and visual disturbance. Respiratory: Negative for cough and shortness of breath. Cardiovascular: Negative for chest pain and leg swelling. Gastrointestinal: Negative for abdominal pain, diarrhea and vomiting. Endocrine: Negative. Genitourinary: Negative for dysuria and hematuria. Musculoskeletal: Negative. Allergic/Immunologic: Negative. Neurological: Negative for dizziness, syncope, speech difficulty and headaches. Hematological: Negative for adenopathy. Does not bruise/bleed easily. Psychiatric/Behavioral: Negative for agitation, behavioral problems and confusion. OBJECTIVE     VITAL SIGNS:   LAST-  BP (!) 148/74   Pulse 82   Temp 98.1 °F (36.7 °C) (Oral)   Resp 30   Ht 6' 3\" (1.905 m)   Wt 157 lb 13.6 oz (71.6 kg)   SpO2 95% Comment: patient continues to take of sat probe  BMI 19.73 kg/m²   8-24 HR RANGE-  TEMP Temp  Av.4 °F (36.9 °C)  Min: 97.9 °F (36.6 °C)  Max: 99.7 °F (63.9 °C)   BP Systolic (88SAO), TPN:923 , Min:106 , EJI:657      Diastolic (29IGX), TQU:86, Min:59, Max:85     PULSE Pulse  Av.4  Min: 63  Max: 83   RR Resp  Av  Min: 22  Max: 30   O2 SAT No data recorded   OXYGEN DELIVERY No data recorded     SYSTEMIC EXAMINATION:   General appearance -arousable and awake, chronically ill-appearing follows commands not cooperative  Mental status -alert and awake and oriented  Eyes - pupils equal and reactive sluggish, sclera anicteric  Mouth -oral mucosa look moist  Neck - supple, no significant adenopathy, carotids upstroke normal bilaterally, no bruits.    Chest -bilateral basilar breath sounds decreased. Poor inspiratory effort midsternal scar present. Heart -regular S1-S2, regular rhythm, normal S1, S2, no murmurs, clicks or gallops  Abdomen - soft, nontender, nondistended, no masses or organomegaly  Extremities - peripheral pulses normal, no pedal edema, no clubbing or cyanosis  Skin - normal coloration and turgor, no rashes, no suspicious skin lesions noted     DATA REVIEW     Medications: Current Inpatient  Scheduled Meds:   enoxaparin  40 mg SubCUTAneous Daily    furosemide  40 mg IntraVENous BID    potassium chloride  40 mEq Oral Once    metoprolol tartrate  12.5 mg Oral BID    midodrine  5 mg Oral TID WC    spironolactone  25 mg Oral Daily    amiodarone  200 mg Oral Daily    polyethylene glycol  17 g Oral BID    nystatin  5 mL Oral 4x Daily    sodium chloride flush  10 mL IntraVENous 2 times per day    clopidogrel  75 mg Oral Daily    sennosides-docusate sodium  1 tablet Oral BID    atorvastatin  80 mg Oral Nightly    pantoprazole  40 mg Oral Daily    tamsulosin  0.4 mg Oral Daily     Continuous Infusions:   dexmedetomidine Stopped (03/31/22 1217)    sodium chloride Stopped (03/29/22 1653)    dextrose      sodium chloride      sodium chloride       INPUT/OUTPUT:  In: 760 [P.O.:760]  Out: 2150 [Urine:2150]  Date 04/07/22 0000 - 04/07/22 2359   Shift 1365-6814 8977-2038 7335-0591 24 Hour Total   INTAKE   Shift Total(mL/kg)       OUTPUT   Urine(mL/kg/hr) 400(0.7)   400   Shift Total(mL/kg) 400(5.6)   400(5.6)   Weight (kg) 71.6 71.6 71.6 71.6       LABS:-  ABG:   No results for input(s): POCPH, POCPCO2, POCPO2, POCHCO3, UUIL0OCH in the last 72 hours.   CBC:   Recent Labs     04/06/22  0717 04/07/22  0259   WBC 16.3* 18.4*   HGB 8.2* 9.4*   HCT 26.7* 30.6*   MCV 86.1 86.0   * 662*   LYMPHOPCT 17* 12*   RBC 3.10* 3.56*   MCH 26.5 26.4   MCHC 30.7 30.7   RDW 16.5* 16.4*     BMP:   Recent Labs     04/06/22  0717 04/07/22  0259    134*   K 3.9 4.0    98   CO2 25 25 XR CHEST PORTABLE   Final Result   Multifocal bibasilar predominant airspace disease and likely trace bilateral   pleural effusions, not significantly changed. Persistent interstitial   pulmonary edema. XR CHEST PORTABLE   Final Result   No significant interval change. XR CHEST PORTABLE   Final Result   Slight increase in pulmonary vascularity compared to prior study 1 day   earlier. Stable cardiomegaly with bilateral effusions. For findings as   above. XR CHEST PORTABLE   Final Result   Little change from prior study. Postoperative changes. Support tubes and   lines as above. Small bilateral effusions and bibasilar atelectasis. XR CHEST PORTABLE   Final Result   Multiple tubes lines as above. Continued left effusion and basilar   atelectasis with vascular congestion. No extrapleural air. XR CHEST PORTABLE   Final Result   1. Stable positions of support apparatus. 2. Stable bibasilar airspace disease, atelectasis favored over pneumonia. 3. Stable small bilateral pleural effusions. US RENAL COMPLETE   Final Result      1. Borderline small right kidney but otherwise sonographically unremarkable   appearing kidneys. 2.  Mild to moderate amount of ascites was noted. XR CHEST PORTABLE   Final Result   Bibasilar atelectasis and pleural effusion more pronounced on the left. No   significant change. Stable support tubes and line. XR CHEST PORTABLE   Final Result   Overall improvement seen in the aeration lung bases. The remainder of lines   and tubes in stool are stable. No definite pneumothorax on the left. XR CHEST PORTABLE   Final Result   1. Lines and tubes as described above. 2.  Increasing right pleural effusion and right basilar airspace disease. Stable left pleural effusion. XR CHEST PORTABLE   Final Result   1. Support lines and tubes remain in place.       2.  Bibasilar opacities persist, slightly increased on the right, suggesting   layering pleural effusions and atelectasis. 3.  Mildly increased vascular congestion, possibly accentuated by supine   positioning. XR CHEST PORTABLE   Final Result   Stable chest (suspect right pleural effusion in addition to left effusion   and/or atelectasis) with line and tube placements as detailed above. XR CHEST PORTABLE   Final Result   As compared to prior examination, the ET tube now is in satisfactory   position. Examination otherwise is unchanged. XR CHEST PORTABLE   Final Result   1. Endotracheal tube is low in position, approximately 1 cm above the   veronique. This should be pulled back approximately 3 cm. 2.  Other lines and tubes are unchanged in position. 3.  New small right pleural effusion. 4.  Unchanged left basilar opacity. XR CHEST PORTABLE   Final Result   1. Lines and tubes are unchanged in position. 2. Linear bibasilar opacities are similar prior study, atelectasis versus   pneumonia. XR CHEST PORTABLE   Final Result   All tubes and catheters are in good position. No evidence for pneumothorax. Bilateral basal atelectasis noted. VL DUP UPPER EXTREMITY ARTERIES BILATERAL   Final Result      VL Vein Mapping Lower Bilateral   Final Result      VL DUP CAROTID BILATERAL   Final Result      CT CHEST WO CONTRAST   Final Result   1. Extensive centrilobular and paraseptal emphysema. 2. Extensive atherosclerotic disease, particularly along the coronary   arteries. 3. There are areas of atelectasis in the lingula. There is collapse of the   right middle lobe   4. No focal lung infiltrate. Echocardiogram:   Results for orders placed during the hospital encounter of 03/11/22    Echocardiogram Complete 2D w Doppler w Color    Summary  Global left ventricular systolic function is normal. Estimated EF 68-90%  Normal diastolic filling.   Normal right ventricular size and function. Mild mitral regurgitation. Mild tricuspid regurgitation. Mild pulmonary hypertension with an estimated right ventricular systolic  pressure of 41 mmHg. No pericardial effusion. ASSESSMENT AND PLAN     Assessment:  Acute metabolic encephalopathy. Resolved. Asymptomatic bradycardia, resolved. S/p CABG x2 on 3/22/2022  COPD stage II on PFTs with severe reduction diffusion capacity  Chronic smoker  Cocaine use  Chronic combined diastolic and systolic CHF, EF 26%.  Previously 50-55%  Mild pulmonary hypertension  Essential hypertension  CKD stage III  Leukocytosis  Prolonged QT    Plan:    I personally interviewed/examined the patient; reviewed interval history, interpreted all available radiographic and laboratory data at the time of service. Afebrile, hemodynamically stable  Continue supplemental oxygen to keep oxygen saturation greater than 90%  Continue bronchopulmonary hygiene, aspiration precautions, bronchodilators  Encourage incentive spirometry/Acapella  Continue to monitor I/O with a goal of even/negative fluid balance. Chemical DVT prophylaxis with Lovenox 40. On amiodarone, Lipitor, Lasix, Lopressor and Aldactone, Plavix. Antimicrobials reviewed; continue to monitor off a antimicrobials  Physical/occupational therapy  Awaits placement      I would like to thank you for allowing me to participate in the care of this patient. Please feel free to call with any further questions or concerns.       Tez Gan MD  Pulmonary and critical care medicine  4/7/2022, 12:23 PM

## 2022-04-07 NOTE — CARE COORDINATION
[address] [phone], and left a message with option 7-general line. 1008 Spoke to patient about plan for discharge. He relates he is going home with Sarai. Patient aggravated that people keep asking him about what his plan is when he leaves the hospital. CM told him that we want to make sure he is safe. CM asked him if he has a walker. No answer. CM asked if it is okay for CM to call Erin. It is okay. CM called Erin [phone], and left  requesting return phone call. [address] [facility] Juani Bonilla [phone], and left  requesting return phone call. 1020 Asked patient if he is open to home care. [facility] and requested choices. Bao Solis from [facility] called back and relates pre cert is still pending. CM told her that patient wants to go home, but to keep process going in case someone, maybe his dtr, is able to change his mind. Or if patient changes his mind. PT notes say patient is not safe to return home. [address] called back. She lives in [facility]. She relates that patient lives with his Yamileth Crouch and she is not doing very well and is unable to help take care of Patient if he comes home. Dtr relates patient has an electric chair to get around outside, nothing to use in the house. He does not have a walker. [address] from Nguyễn Francis at [facility] relates that patient has pre cert to go to [facility]. 1210 spoke to patient. He does not want to go to SNF. Dtr said he cannot go home as there is no one to take care of him. Per dtr patient lives in a 2 story home on the second floor. His Aunt Christina lives on the first floor and she does not want patient to come home as she is struggling herself. Patient states \"I will just lie here and die then\". 1228 after conversation with his niece, patient is agreeable to go to SNF.     Geraldo 201 to Nguyễn Francis at [facility], report # [phone], fax # [phone], cory HONG, last covid test on 3/9-do not need another one.    1300 GELY completed, copy to blue envelope    [address] and told her no transportation available until tomorrow morning. Patient getting picked up at 0830.     1416 AVS/GUERRERO, GELY and oxycodone script faxed to [address] [phone] Patient sleeping at this time. CM told patient's RN that  is set for tomorrow at 0830. She will tell patient    46 CM told patient that he is scheduled for  tomorrow morning at 0830 to go to SNF. He verbalized understanding. CM explained IMM letter to patient. He refused to sign it unless he can read it himself. He cannot read it without his glasses and he does not have his glasses with him. Blank IMM letter left at bedside.

## 2022-04-08 VITALS
RESPIRATION RATE: 22 BRPM | TEMPERATURE: 98.7 F | HEIGHT: 75 IN | BODY MASS INDEX: 19.17 KG/M2 | SYSTOLIC BLOOD PRESSURE: 141 MMHG | OXYGEN SATURATION: 86 % | HEART RATE: 86 BPM | WEIGHT: 154.2 LBS | DIASTOLIC BLOOD PRESSURE: 83 MMHG

## 2022-04-08 PROCEDURE — 2580000003 HC RX 258: Performed by: NURSE PRACTITIONER

## 2022-04-08 PROCEDURE — 6360000002 HC RX W HCPCS: Performed by: INTERNAL MEDICINE

## 2022-04-08 PROCEDURE — 6370000000 HC RX 637 (ALT 250 FOR IP): Performed by: NURSE PRACTITIONER

## 2022-04-08 PROCEDURE — 6370000000 HC RX 637 (ALT 250 FOR IP): Performed by: INTERNAL MEDICINE

## 2022-04-08 PROCEDURE — APPNB30 APP NON BILLABLE TIME 0-30 MINS: Performed by: NURSE PRACTITIONER

## 2022-04-08 PROCEDURE — 6370000000 HC RX 637 (ALT 250 FOR IP): Performed by: STUDENT IN AN ORGANIZED HEALTH CARE EDUCATION/TRAINING PROGRAM

## 2022-04-08 RX ADMIN — SODIUM CHLORIDE, PRESERVATIVE FREE 10 ML: 5 INJECTION INTRAVENOUS at 08:17

## 2022-04-08 RX ADMIN — SPIRONOLACTONE 25 MG: 25 TABLET ORAL at 08:12

## 2022-04-08 RX ADMIN — PANTOPRAZOLE SODIUM 40 MG: 40 TABLET, DELAYED RELEASE ORAL at 08:12

## 2022-04-08 RX ADMIN — AMIODARONE HYDROCHLORIDE 200 MG: 200 TABLET ORAL at 08:12

## 2022-04-08 RX ADMIN — Medication 500000 UNITS: at 08:12

## 2022-04-08 RX ADMIN — CLOPIDOGREL 75 MG: 75 TABLET, FILM COATED ORAL at 08:12

## 2022-04-08 RX ADMIN — POLYETHYLENE GLYCOL 3350 17 G: 17 POWDER, FOR SOLUTION ORAL at 08:31

## 2022-04-08 RX ADMIN — MIDODRINE HYDROCHLORIDE 5 MG: 5 TABLET ORAL at 08:12

## 2022-04-08 RX ADMIN — ENOXAPARIN SODIUM 40 MG: 40 INJECTION SUBCUTANEOUS at 08:12

## 2022-04-08 RX ADMIN — METOPROLOL TARTRATE 12.5 MG: 25 TABLET ORAL at 08:12

## 2022-04-08 RX ADMIN — TAMSULOSIN HYDROCHLORIDE 0.4 MG: 0.4 CAPSULE ORAL at 08:12

## 2022-04-08 RX ADMIN — STANDARDIZED SENNA CONCENTRATE AND DOCUSATE SODIUM 1 TABLET: 8.6; 5 TABLET ORAL at 08:12

## 2022-04-08 ASSESSMENT — PAIN SCALES - GENERAL
PAINLEVEL_OUTOF10: 0
PAINLEVEL_OUTOF10: 0

## 2022-04-08 ASSESSMENT — PAIN SCALES - WONG BAKER
WONGBAKER_NUMERICALRESPONSE: 0
WONGBAKER_NUMERICALRESPONSE: 0

## 2022-04-08 NOTE — PLAN OF CARE
Pt report called to receiving facility, pt in stable condition, IV removed, and all belongings sent with patient. Family called and notified of move upon request of patient demanding his bank card prior to leaving.  We were unable to meet those demands although family aware of patient request.   Problem: Pain:  Goal: Pain level will decrease  Description: Pain level will decrease  Outcome: Completed  Goal: Control of acute pain  Description: Control of acute pain  Outcome: Completed  Goal: Control of chronic pain  Description: Control of chronic pain  Outcome: Completed     Problem: Skin Integrity:  Goal: Will show no infection signs and symptoms  Description: Will show no infection signs and symptoms  Outcome: Completed  Goal: Absence of new skin breakdown  Description: Absence of new skin breakdown  Outcome: Completed     Problem: Falls - Risk of:  Goal: Will remain free from falls  Description: Will remain free from falls  Outcome: Completed  Goal: Absence of physical injury  Description: Absence of physical injury  Outcome: Completed     Problem: Nutrition  Goal: Optimal nutrition therapy  Outcome: Completed     Problem: Cardiac:  Goal: Ability to maintain vital signs within normal range will improve  Description: Ability to maintain vital signs within normal range will improve  Outcome: Completed  Goal: Cardiovascular alteration will improve  Description: Cardiovascular alteration will improve  Outcome: Completed     Problem: Health Behavior:  Goal: Will modify at least one risk factor affecting health status  Description: Will modify at least one risk factor affecting health status  Outcome: Completed  Goal: Identification of resources available to assist in meeting health care needs will improve  Description: Identification of resources available to assist in meeting health care needs will improve  Outcome: Completed     Problem: Physical Regulation:  Goal: Complications related to the disease process, condition or treatment will be avoided or minimized  Description: Complications related to the disease process, condition or treatment will be avoided or minimized  Outcome: Completed     Problem: Musculor/Skeletal Functional Status  Goal: Highest potential functional level  Outcome: Completed  Goal: Absence of falls  Outcome: Completed

## 2022-04-08 NOTE — PROGRESS NOTES
Report called to Teja Mooney at [facility], transport due to be here around 0830.  Pt in stable condition on 4 L NC

## 2022-04-14 ASSESSMENT — ENCOUNTER SYMPTOMS
RESPIRATORY NEGATIVE: 1
GASTROINTESTINAL NEGATIVE: 1
EYES NEGATIVE: 1
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
## 2022-04-26 NOTE — PROGRESS NOTES
Physician Progress Note      PATIENT:               Marley Gomez  CSN #:                  [id number]  :                       1955  ADMIT DATE:       3/9/2022 11:03 AM  DISCH DATE:        3/11/2022 2:37 PM  RESPONDING  PROVIDER #:        Ginny Jin          QUERY TEXT:    Pt admitted for evaluation and treatment of chest pain. Left heart cath on    indicated findings of 3 vessel CAD w/ an LVEF of 30%. After further   review, please specify the likely etiology of this patients chest pain:    The medical record reflects the following:  Risk Factors: CAD, HFr EF 45%, CKD, HTN, DM2, cocaine use, tobacco use, and   alcohol use  Clinical Indicators:  to the ED on 3/9 c/o dull, substernal chest pain. Reports Cocaine the night before. Stress test \"Significant stress-induced   ischemia in the lateral wall and septum. Infarct in the apex encroaching into   the apical inferior wall and also in the basal inferior wall. LVEF 34% Risk   stratification: High risk\". Cardiac Cath 3/11: 3 vessel CAD w/ left main   involvement. CABG eval per CT surgery. The patient's anginal syndrome was   assessed as CCS III  Treatment: Cardio consult, Cardiac testing- Cath/ Sress test. CT surg consult   for CABG  Options provided:  -- Chest pain due to CAD with unstable angina  -- Chest pain due to NSTEMI  -- Chest pain due to Cocaine use  -- Chest pain due to, Please specify. -- Defer to cardiology consultant  -- Other - I will add my own diagnosis  -- Disagree - Not applicable / Not valid  -- Disagree - Clinically unable to determine / Unknown  -- Refer to Clinical Documentation Reviewer    PROVIDER RESPONSE TEXT:    This patient has CAD with unstable angina.     Query created by: Faith Thompson on 2022 3:27 AM      Electronically signed by:  Ginny Jin 2022 8:41 AM

## 2022-08-02 ENCOUNTER — HOSPITAL ENCOUNTER (OUTPATIENT)
Age: 67
Setting detail: OBSERVATION
Discharge: HOME | End: 2022-08-04
Attending: EMERGENCY MEDICINE | Admitting: STUDENT IN AN ORGANIZED HEALTH CARE EDUCATION/TRAINING PROGRAM
Payer: MEDICARE

## 2022-08-02 ENCOUNTER — APPOINTMENT (OUTPATIENT)
Dept: GENERAL RADIOLOGY | Age: 67
End: 2022-08-02
Payer: MEDICARE

## 2022-08-02 DIAGNOSIS — I20.0 UNSTABLE ANGINA PECTORIS (HCC): Primary | ICD-10-CM

## 2022-08-02 PROBLEM — I24.9 ACS (ACUTE CORONARY SYNDROME) (HCC): Status: ACTIVE | Noted: 2022-08-02

## 2022-08-02 LAB
ABSOLUTE EOS #: 0.23 K/UL (ref 0–0.44)
ABSOLUTE IMMATURE GRANULOCYTE: <0.03 K/UL (ref 0–0.3)
ABSOLUTE LYMPH #: 1.9 K/UL (ref 1.1–3.7)
ABSOLUTE MONO #: 0.3 K/UL (ref 0.1–1.2)
ALBUMIN SERPL-MCNC: 3.8 G/DL (ref 3.5–5.2)
ALBUMIN/GLOBULIN RATIO: 1.2 (ref 1–2.5)
ALP BLD-CCNC: 83 U/L (ref 40–129)
ALT SERPL-CCNC: 12 U/L (ref 5–41)
ANION GAP SERPL CALCULATED.3IONS-SCNC: 20 MMOL/L (ref 9–17)
AST SERPL-CCNC: 19 U/L
BASOPHILS # BLD: 1 % (ref 0–2)
BASOPHILS ABSOLUTE: 0.09 K/UL (ref 0–0.2)
BILIRUB SERPL-MCNC: 0.32 MG/DL (ref 0.3–1.2)
BILIRUBIN DIRECT: 0.14 MG/DL
BILIRUBIN, INDIRECT: 0.18 MG/DL (ref 0–1)
BUN BLDV-MCNC: 22 MG/DL (ref 8–23)
CALCIUM SERPL-MCNC: 8.7 MG/DL (ref 8.6–10.4)
CHLORIDE BLD-SCNC: 107 MMOL/L (ref 98–107)
CO2: 16 MMOL/L (ref 20–31)
CREAT SERPL-MCNC: 1.13 MG/DL (ref 0.7–1.2)
EOSINOPHILS RELATIVE PERCENT: 4 % (ref 1–4)
ETHANOL PERCENT: 0.01 %
ETHANOL: 12 MG/DL
GFR AFRICAN AMERICAN: >60 ML/MIN
GFR NON-AFRICAN AMERICAN: >60 ML/MIN
GFR SERPL CREATININE-BSD FRML MDRD: ABNORMAL ML/MIN/{1.73_M2}
GLUCOSE BLD-MCNC: 122 MG/DL (ref 70–99)
HCT VFR BLD CALC: 37.9 % (ref 40.7–50.3)
HEMOGLOBIN: 11.5 G/DL (ref 13–17)
IMMATURE GRANULOCYTES: 0 %
LYMPHOCYTES # BLD: 29 % (ref 24–43)
MAGNESIUM: 1.6 MG/DL (ref 1.6–2.6)
MCH RBC QN AUTO: 24.6 PG (ref 25.2–33.5)
MCHC RBC AUTO-ENTMCNC: 30.3 G/DL (ref 28.4–34.8)
MCV RBC AUTO: 81.2 FL (ref 82.6–102.9)
MONOCYTES # BLD: 5 % (ref 3–12)
NRBC AUTOMATED: 0 PER 100 WBC
PDW BLD-RTO: 16.8 % (ref 11.8–14.4)
PLATELET # BLD: 254 K/UL (ref 138–453)
PMV BLD AUTO: 10.7 FL (ref 8.1–13.5)
POTASSIUM SERPL-SCNC: 3.7 MMOL/L (ref 3.7–5.3)
RBC # BLD: 4.67 M/UL (ref 4.21–5.77)
RBC # BLD: ABNORMAL 10*6/UL
SEDIMENTATION RATE, ERYTHROCYTE: 34 MM/HR (ref 0–20)
SEG NEUTROPHILS: 61 % (ref 36–65)
SEGMENTED NEUTROPHILS ABSOLUTE COUNT: 4.07 K/UL (ref 1.5–8.1)
SODIUM BLD-SCNC: 143 MMOL/L (ref 135–144)
TOTAL PROTEIN: 6.9 G/DL (ref 6.4–8.3)
TROPONIN, HIGH SENSITIVITY: 18 NG/L (ref 0–22)
TROPONIN, HIGH SENSITIVITY: 18 NG/L (ref 0–22)
WBC # BLD: 6.6 K/UL (ref 3.5–11.3)

## 2022-08-02 PROCEDURE — 2500000003 HC RX 250 WO HCPCS: Performed by: EMERGENCY MEDICINE

## 2022-08-02 PROCEDURE — 84484 ASSAY OF TROPONIN QUANT: CPT

## 2022-08-02 PROCEDURE — 71045 X-RAY EXAM CHEST 1 VIEW: CPT

## 2022-08-02 PROCEDURE — 96366 THER/PROPH/DIAG IV INF ADDON: CPT

## 2022-08-02 PROCEDURE — 96367 TX/PROPH/DG ADDL SEQ IV INF: CPT

## 2022-08-02 PROCEDURE — G0378 HOSPITAL OBSERVATION PER HR: HCPCS

## 2022-08-02 PROCEDURE — G0480 DRUG TEST DEF 1-7 CLASSES: HCPCS

## 2022-08-02 PROCEDURE — 99285 EMERGENCY DEPT VISIT HI MDM: CPT

## 2022-08-02 PROCEDURE — 85652 RBC SED RATE AUTOMATED: CPT

## 2022-08-02 PROCEDURE — 99219 PR INITIAL OBSERVATION CARE/DAY 50 MINUTES: CPT | Performed by: INTERNAL MEDICINE

## 2022-08-02 PROCEDURE — 6360000002 HC RX W HCPCS: Performed by: INTERNAL MEDICINE

## 2022-08-02 PROCEDURE — 85025 COMPLETE CBC W/AUTO DIFF WBC: CPT

## 2022-08-02 PROCEDURE — 93005 ELECTROCARDIOGRAM TRACING: CPT | Performed by: EMERGENCY MEDICINE

## 2022-08-02 PROCEDURE — 80076 HEPATIC FUNCTION PANEL: CPT

## 2022-08-02 PROCEDURE — 80048 BASIC METABOLIC PNL TOTAL CA: CPT

## 2022-08-02 PROCEDURE — 96365 THER/PROPH/DIAG IV INF INIT: CPT

## 2022-08-02 PROCEDURE — 83735 ASSAY OF MAGNESIUM: CPT

## 2022-08-02 PROCEDURE — 93005 ELECTROCARDIOGRAM TRACING: CPT | Performed by: NURSE PRACTITIONER

## 2022-08-02 RX ORDER — LORAZEPAM 2 MG/ML
4 INJECTION INTRAMUSCULAR
Status: DISCONTINUED | OUTPATIENT
Start: 2022-08-02 | End: 2022-08-04

## 2022-08-02 RX ORDER — ASPIRIN 81 MG/1
324 TABLET, CHEWABLE ORAL ONCE
Status: DISCONTINUED | OUTPATIENT
Start: 2022-08-02 | End: 2022-08-02

## 2022-08-02 RX ORDER — SODIUM CHLORIDE 0.9 % (FLUSH) 0.9 %
5-40 SYRINGE (ML) INJECTION EVERY 12 HOURS SCHEDULED
Status: DISCONTINUED | OUTPATIENT
Start: 2022-08-02 | End: 2022-08-04 | Stop reason: HOSPADM

## 2022-08-02 RX ORDER — LANOLIN ALCOHOL/MO/W.PET/CERES
100 CREAM (GRAM) TOPICAL DAILY
Status: DISCONTINUED | OUTPATIENT
Start: 2022-08-09 | End: 2022-08-04 | Stop reason: HOSPADM

## 2022-08-02 RX ORDER — DEXTROSE MONOHYDRATE 100 MG/ML
INJECTION, SOLUTION INTRAVENOUS CONTINUOUS PRN
Status: DISCONTINUED | OUTPATIENT
Start: 2022-08-02 | End: 2022-08-04 | Stop reason: HOSPADM

## 2022-08-02 RX ORDER — SODIUM CHLORIDE 9 MG/ML
INJECTION, SOLUTION INTRAVENOUS PRN
Status: DISCONTINUED | OUTPATIENT
Start: 2022-08-02 | End: 2022-08-04 | Stop reason: HOSPADM

## 2022-08-02 RX ORDER — NITROGLYCERIN 0.4 MG/1
0.4 TABLET SUBLINGUAL EVERY 5 MIN PRN
Status: DISCONTINUED | OUTPATIENT
Start: 2022-08-02 | End: 2022-08-04 | Stop reason: HOSPADM

## 2022-08-02 RX ORDER — NITROGLYCERIN 20 MG/100ML
5-200 INJECTION INTRAVENOUS CONTINUOUS
Status: DISCONTINUED | OUTPATIENT
Start: 2022-08-02 | End: 2022-08-02

## 2022-08-02 RX ORDER — LORAZEPAM 2 MG/ML
1 INJECTION INTRAMUSCULAR
Status: DISCONTINUED | OUTPATIENT
Start: 2022-08-02 | End: 2022-08-04

## 2022-08-02 RX ORDER — LORAZEPAM 2 MG/ML
2 INJECTION INTRAMUSCULAR
Status: DISCONTINUED | OUTPATIENT
Start: 2022-08-02 | End: 2022-08-04

## 2022-08-02 RX ORDER — SODIUM CHLORIDE 0.9 % (FLUSH) 0.9 %
5-40 SYRINGE (ML) INJECTION PRN
Status: DISCONTINUED | OUTPATIENT
Start: 2022-08-02 | End: 2022-08-04 | Stop reason: HOSPADM

## 2022-08-02 RX ORDER — LORAZEPAM 0.5 MG/1
2 TABLET ORAL
Status: DISCONTINUED | OUTPATIENT
Start: 2022-08-02 | End: 2022-08-04

## 2022-08-02 RX ORDER — MAGNESIUM SULFATE IN WATER 40 MG/ML
2000 INJECTION, SOLUTION INTRAVENOUS ONCE
Status: COMPLETED | OUTPATIENT
Start: 2022-08-02 | End: 2022-08-03

## 2022-08-02 RX ORDER — LORAZEPAM 2 MG/ML
3 INJECTION INTRAMUSCULAR
Status: DISCONTINUED | OUTPATIENT
Start: 2022-08-02 | End: 2022-08-04

## 2022-08-02 RX ORDER — LORAZEPAM 2 MG/1
4 TABLET ORAL
Status: DISCONTINUED | OUTPATIENT
Start: 2022-08-02 | End: 2022-08-04

## 2022-08-02 RX ORDER — LORAZEPAM 0.5 MG/1
1 TABLET ORAL
Status: DISCONTINUED | OUTPATIENT
Start: 2022-08-02 | End: 2022-08-04

## 2022-08-02 RX ADMIN — MAGNESIUM SULFATE HEPTAHYDRATE 2000 MG: 40 INJECTION, SOLUTION INTRAVENOUS at 21:57

## 2022-08-02 RX ADMIN — NITROGLYCERIN 5 MCG/MIN: 20 INJECTION INTRAVENOUS at 18:32

## 2022-08-02 ASSESSMENT — ENCOUNTER SYMPTOMS
DIARRHEA: 0
CHEST TIGHTNESS: 0
ABDOMINAL PAIN: 0
NAUSEA: 1
CONSTIPATION: 0
VOMITING: 0
SHORTNESS OF BREATH: 0
COLOR CHANGE: 0

## 2022-08-02 NOTE — ED PROVIDER NOTES
[facility]MED SURG  Emergency Department Encounter  EmergencyMedicine Resident     Pt Name:Nik Langley  MRN: [id number]  [place] 1955  Date of evaluation: 8/2/22  PCP:  Delicia Hernandez MD    CHIEF COMPLAINT       Chief Complaint   Patient presents with    Chest Pain     X1 day       HISTORY OF PRESENT ILLNESS  (Location/Symptom, Timing/Onset, Context/Setting, Quality, Duration, Modifying Factors, Severity.)      Beth Odell is a 74 y.o. male who presents with left-sided chest pain that has been off and on today, patient states that he may had it yesterday little bit. Patient describes it as left-sided chest pain, radiating throughout chest, not into arm. Patient states that he has been sweaty. Patient recently had a CABG back in March. Patient states that he did cocaine about 2 days ago. Patient did state that he had some alcoholic beverages today. Patient states he has some associated lightheadedness, but no headache. Patient also has had some nausea, but no vomiting. PAST MEDICAL / SURGICAL / SOCIAL / FAMILY HISTORY      has a past medical history of 3-vessel CAD, Alcohol abuse, Arthritis, Chronic kidney disease, Full dentures, Hypertension, Other emphysema ([address] Pure hypercholesterolemia, Systolic CHF ([address] and Wears glasses. No additional pertinent     has a past surgical history that includes Hip fracture surgery (Left); Appendectomy; pr egd transoral biopsy single/multiple (6/19/2017); and Coronary artery bypass graft (N/A, 3/21/2022).   No additional pertinent    Social History     Socioeconomic History    Marital status: Single     Spouse name: Not on file    Number of children: Not on file    Years of education: Not on file    Highest education level: Not on file   Occupational History    Not on file   Tobacco Use    Smoking status: Light Smoker     Packs/day: 0.00     Types: Cigarettes    Smokeless tobacco: Never    Tobacco comments:     Nohelia Virgen smokes when he drinks, 1 packs lasts him a month\"   Vaping Use    Vaping Use: Never used   Substance and Sexual Activity    Alcohol use: Yes     Alcohol/week: 0.0 standard drinks     Comment:  \" drinks a pint on the weekend\" off and on    Drug use: Yes     Types: Cocaine, Marijuana (Weed)     Comment: cocaine and marijuana last used 9/9/17,    Sexual activity: Not Currently   Other Topics Concern    Not on file   Social History Narrative    Not on file     Social Determinants of Health     Financial Resource Strain: Not on file   Food Insecurity: Not on file   Transportation Needs: Not on file   Physical Activity: Not on file   Stress: Not on file   Social Connections: Not on file   Intimate Partner Violence: Not on file   Housing Stability: Not on file       Family History   Problem Relation Age of Onset    Diabetes Mother     Cancer Mother        Allergies:  Patient has no known allergies. Home Medications:  Prior to Admission medications    Medication Sig Start Date End Date Taking?  Authorizing Provider   amiodarone (CORDARONE) 200 MG tablet Take 1 tablet by mouth 2 times daily 4/7/22   Chele Velazquez APRN - NP   atorvastatin (LIPITOR) 80 MG tablet Take 1 tablet by mouth nightly 4/7/22   Chele Velazquez APRN - NP   clopidogrel (PLAVIX) 75 MG tablet Take 1 tablet by mouth daily 4/8/22   Chele Velazquez APRN - NP   metoprolol tartrate (LOPRESSOR) 25 MG tablet Take 0.5 tablets by mouth 2 times daily 4/7/22   Chele Velazquez APRN - NP   midodrine (PROAMATINE) 5 MG tablet Take 1 tablet by mouth 3 times daily (with meals) 4/7/22   Chele Velazquez APRN - NP   pantoprazole (PROTONIX) 40 MG tablet Take 1 tablet by mouth daily 4/7/22   Chele Velazquez APRN - NP   spironolactone (ALDACTONE) 25 MG tablet Take 1 tablet by mouth daily 4/8/22   Chele Velazquez APRN - NP   furosemide (LASIX) 20 MG tablet Take 1 tablet by mouth daily 4/7/22   Chele Velazquez APRN - NP   nitroGLYCERIN (NITROSTAT) 0.3 MG SL tablet Place 0.3 mg under the tongue every 5 minutes as needed for Chest pain up to max of 3 total doses. If no relief after 1 dose, call 911. Historical Provider, MD   ibuprofen (ADVIL;MOTRIN) 800 MG tablet Take 1 tablet by mouth every 8 hours as needed for Pain 4/25/18   Laura Hernandez MD   aspirin 81 MG EC tablet Take 1 tablet by mouth daily 3/12/18   Pk Dougherty MD   tamsulosin [facility]) 0.4 MG capsule Take 1 capsule by mouth daily 3/12/18   Pk Dougherty MD       REVIEW OF SYSTEMS    (2-9 systems for level 4, 10 or more for level 5)      Review of Systems   Constitutional:  Positive for diaphoresis. Negative for chills and fever. HENT:  Negative for congestion. Respiratory:  Negative for chest tightness and shortness of breath. Cardiovascular:  Positive for chest pain. Negative for leg swelling. Gastrointestinal:  Positive for nausea. Negative for abdominal pain, constipation, diarrhea and vomiting. Endocrine: Negative for polyuria. Genitourinary:  Negative for difficulty urinating. Skin:  Negative for color change. Neurological:  Negative for dizziness, weakness, light-headedness and headaches. Psychiatric/Behavioral:  Negative for confusion. PHYSICAL EXAM   (up to 7 for level 4, 8 or more for level 5)      INITIAL VITALS:   BP (!) 166/88   Pulse 61   Temp 97.9 °F (36.6 °C) (Oral)   Resp 16   Ht 6' 3\" (1.905 m) Comment: per patinet  Wt 150 lb (68 kg)   SpO2 100%   BMI 18.75 kg/m²     Physical Exam  Constitutional:       Appearance: Normal appearance. He is diaphoretic. HENT:      Head: Normocephalic and atraumatic. Mouth/Throat:      Mouth: Mucous membranes are moist.      Pharynx: Oropharynx is clear. Eyes:      Extraocular Movements: Extraocular movements intact. Conjunctiva/sclera: Conjunctivae normal.   Cardiovascular:      Rate and Rhythm: Normal rate and regular rhythm. Pulses: Normal pulses. Heart sounds: Normal heart sounds. No murmur heard.      Comments: No chest pain to palpation of the chest wall  Pulmonary:      Effort: Pulmonary effort is normal.      Breath sounds: Normal breath sounds. Abdominal:      General: Bowel sounds are normal. There is no distension. Tenderness: There is no abdominal tenderness. There is no guarding. Musculoskeletal:         General: Normal range of motion. Comments: Range of motion noted to be normal with patient's natural movements   Skin:     General: Skin is warm. Findings: No rash (On exposed skin). Neurological:      General: No focal deficit present. Mental Status: He is alert and oriented to person, place, and time. Psychiatric:         Mood and Affect: Mood normal.         Behavior: Behavior normal.     DIFFERENTIAL  DIAGNOSIS     PLAN (LABS / IMAGING / EKG):  Orders Placed This Encounter   Procedures    XR CHEST PORTABLE    CBC with Auto Differential    BMP    Troponin    Magnesium    Troponin    Troponin    Comprehensive Metabolic Panel w/ Reflex to MG    Magnesium    CBC    Lipid panel - fasting    Ethanol    Hepatic Function Panel    Sedimentation Rate    ADULT DIET; Regular;  No Caffeine    Vital signs per unit routine    Notify physician    Up as tolerated    Daily weights    Intake and output    Telemetry monitoring - 24 hour duration    HYPOGLYCEMIA TREATMENT: blood glucose LESS THAN 70 mg/dL and patient ALERT and TOLERATING PO    HYPOGLYCEMIA TREATMENT: blood glucose LESS THAN 70 mg/dL and patient NOT ALERT or NPO    Neuro checks    Notify physician    Full Code    Inpatient consult to Cardiology    Inpatient consult to Hospitalist    Consult to Cardiology    Inpatient consult to Palliative Care    Inpatient consult to Social Work    Initiate Oxygen Therapy Protocol    Pulse Oximetry Spot Check    Initiate Oxygen Therapy Protocol    POCT Glucose    POCT glucose    EKG 12 Lead    EKG 12 Lead    EKG 12 lead    EKG 12 lead    Place in Observation Service    Alcohol and or drug assessment    Seizure precautions    Fall precautions       MEDICATIONS ORDERED:  Orders Placed This Encounter   Medications    DISCONTD: aspirin chewable tablet 324 mg    DISCONTD: nitroGLYCERIN 50 mg in dextrose 5% 250 mL infusion     Order Specific Question:   Titrate Infusion? Answer:   Yes     Order Specific Question:   Initial Infusion Dose: Answer:   5 mcg/min     Order Specific Question:   Goal of Therapy is:      Answer:   Chest pain symptom relief     Order Specific Question:   Contact Provider if:     Answer:   SBP less than 90 mmHg    metoprolol tartrate (LOPRESSOR) tablet 12.5 mg    amiodarone (CORDARONE) tablet 200 mg    aspirin EC tablet 81 mg    atorvastatin (LIPITOR) tablet 80 mg    clopidogrel (PLAVIX) tablet 75 mg    furosemide (LASIX) tablet 20 mg    pantoprazole (PROTONIX) tablet 40 mg    spironolactone (ALDACTONE) tablet 25 mg    tamsulosin (FLOMAX) capsule 0.4 mg    sodium chloride flush 0.9 % injection 5-40 mL    sodium chloride flush 0.9 % injection 10 mL    0.9 % sodium chloride infusion    OR Linked Order Group     ondansetron (ZOFRAN-ODT) disintegrating tablet 4 mg     ondansetron (ZOFRAN) injection 4 mg    OR Linked Order Group     acetaminophen (TYLENOL) tablet 650 mg     acetaminophen (TYLENOL) suppository 650 mg    magnesium hydroxide (MILK OF MAGNESIA) 400 MG/5ML suspension 30 mL    OR Linked Order Group     potassium chloride (KLOR-CON M) extended release tablet 40 mEq     potassium bicarb-citric acid (EFFER-K) effervescent tablet 40 mEq     potassium chloride 10 mEq/100 mL IVPB (Peripheral Line)    potassium chloride 10 mEq/100 mL IVPB (Peripheral Line)    magnesium sulfate 1000 mg in dextrose 5% 100 mL IVPB    enoxaparin (LOVENOX) injection 40 mg     Order Specific Question:   Indication of Use     Answer:   Prophylaxis-DVT/PE    magnesium sulfate 2000 mg in 50 mL IVPB premix    glucose chewable tablet 16 g    OR Linked Order Group     dextrose bolus 10% 125 mL     dextrose bolus 10% 250 mL glucagon (rDNA) injection 1 mg    dextrose 10 % infusion    nitroGLYCERIN (NITROSTAT) SL tablet 0.4 mg    sodium chloride flush 0.9 % injection 5-40 mL    sodium chloride flush 0.9 % injection 5-40 mL    0.9 % sodium chloride infusion    FOLLOWED BY Linked Order Group     thiamine (B-1) 500 mg in sodium chloride 0.9 % 100 mL IVPB     thiamine (B-1) 250 mg in sodium chloride 0.9 % 100 mL IVPB     thiamine tablet 100 mg    OR Linked Order Group     LORazepam (ATIVAN) tablet 1 mg     LORazepam (ATIVAN) injection 1 mg     LORazepam (ATIVAN) tablet 2 mg     LORazepam (ATIVAN) injection 2 mg     LORazepam (ATIVAN) tablet 3 mg     LORazepam (ATIVAN) injection 3 mg     LORazepam (ATIVAN) tablet 4 mg     LORazepam (ATIVAN) injection 4 mg       DIAGNOSTIC RESULTS / EMERGENCY DEPARTMENT COURSE / MDM   LAB RESULTS:  Results for orders placed or performed during the hospital encounter of 08/02/22   CBC with Auto Differential   Result Value Ref Range    WBC 6.6 3.5 - 11.3 k/uL    RBC 4.67 4.21 - 5.77 m/uL    Hemoglobin 11.5 (L) 13.0 - 17.0 g/dL    Hematocrit 37.9 (L) 40.7 - 50.3 %    MCV 81.2 (L) 82.6 - 102.9 fL    MCH 24.6 (L) 25.2 - 33.5 pg    MCHC 30.3 28.4 - 34.8 g/dL    RDW 16.8 (H) 11.8 - 14.4 %    Platelets 955 534 - 817 k/uL    MPV 10.7 8.1 - 13.5 fL    NRBC Automated 0.0 0.0 per 100 WBC    Seg Neutrophils 61 36 - 65 %    Lymphocytes 29 24 - 43 %    Monocytes 5 3 - 12 %    Eosinophils % 4 1 - 4 %    Basophils 1 0 - 2 %    Immature Granulocytes 0 0 %    Segs Absolute 4.07 1.50 - 8.10 k/uL    Absolute Lymph # 1.90 1.10 - 3.70 k/uL    Absolute Mono # 0.30 0.10 - 1.20 k/uL    Absolute Eos # 0.23 0.00 - 0.44 k/uL    Basophils Absolute 0.09 0.00 - 0.20 k/uL    Absolute Immature Granulocyte <0.03 0.00 - 0.30 k/uL    RBC Morphology ANISOCYTOSIS PRESENT    BMP   Result Value Ref Range    Glucose 122 (H) 70 - 99 mg/dL    BUN 22 8 - 23 mg/dL    Creatinine 1.13 0.70 - 1.20 mg/dL    Calcium 8.7 8.6 - 10.4 mg/dL    Sodium 143 135 - 144 mmol/L    Potassium 3.7 3.7 - 5.3 mmol/L    Chloride 107 98 - 107 mmol/L    CO2 16 (L) 20 - 31 mmol/L    Anion Gap 20 (H) 9 - 17 mmol/L    GFR Non-African American >60 >60 mL/min    GFR African American >60 >60 mL/min    GFR Comment         Troponin   Result Value Ref Range    Troponin, High Sensitivity 18 0 - 22 ng/L   Magnesium   Result Value Ref Range    Magnesium 1.6 1.6 - 2.6 mg/dL   Troponin   Result Value Ref Range    Troponin, High Sensitivity 18 0 - 22 ng/L   Ethanol   Result Value Ref Range    Ethanol 12 (H) <10 mg/dL    Ethanol percent 0.012 (H) <0.010 %   Hepatic Function Panel   Result Value Ref Range    Albumin 3.8 3.5 - 5.2 g/dL    Alkaline Phosphatase 83 40 - 129 U/L    ALT 12 5 - 41 U/L    AST 19 <40 U/L    Total Bilirubin 0.32 0.3 - 1.2 mg/dL    Bilirubin, Direct 0.14 <0.31 mg/dL    Bilirubin, Indirect 0.18 0.00 - 1.00 mg/dL    Total Protein 6.9 6.4 - 8.3 g/dL    Albumin/Globulin Ratio 1.2 1.0 - 2.5   Sedimentation Rate   Result Value Ref Range    Sed Rate 34 (H) 0 - 20 mm/Hr       RADIOLOGY:  XR CHEST PORTABLE   Final Result   Improved bibasilar airspace disease and edema with residual right perihilar   infiltrate. COPD. EKG  EKG Interpretation    Interpreted by emergency department physician    Rhythm: normal sinus   Rate: normal  Axis: normal  Ectopy: none  Conduction: normal  ST Segments: Mild elevation in  v2 and v3 with reciprocal depressions in the inferior leads and aVL  T Waves: inversion in  lateral leads  Q Waves: lateral leads    Clinical Impression: non-specific EKG    Vicky Augilar,      All EKG's are interpreted by the Emergency Department Physician who either signs or Co-signs this chart in the absence of a cardiologist.    EMERGENCY DEPARTMENT COURSE:  ED Course as of 08/03/22 0153   Tue Aug 02, 2022   [address] Discussed patient with interventional cardiology, sent patient EKG to interventional cardiology.   They do not feel that this is a STEMI at this time requiring immediate intervention. Patient with recent history of CABG for concerns of musculoskeletal: Patient had no pain with palpation of the chest wall and they were informed of this. Repeat EKG demonstrated unchanged. We will start patient on nitro drip based upon interventional cardiology's recommendations for pain control. Plan to admit patient for serial EKGs and troponins overnight and reevaluate. If troponins are elevated will start patient on heparin.     [CR]      ED Course User Index  [CR] Ella Perry DO        PROCEDURES:  None    CONSULTS:  IP CONSULT TO CARDIOLOGY  IP CONSULT TO HOSPITALIST  IP CONSULT TO PALLIATIVE CARE  IP CONSULT TO SOCIAL WORK  IP CONSULT TO CARDIOLOGY    MEDICAL DECISION MAKING:  Patient presenting with concerns for ACS, slight changes in EKG from previous with some elevations in V3 and V2. There were some inferior ST depressions that were minor and less than 1 mm. I do not feel that these meet STEMI criteria, did discuss it with interventional cardiology and they agree and feel that this does not meet STEMI criteria. It is a concern EKG with changes from previous and patient does need to be admitted for further observation and work-up. Troponins were noted to be at 18 x2, patient's chest pain resolved after being started on nitro drip per interventional cardiology's recommendations. Patient's blood pressure was able to handle the nitro drip without any difficulty. Patient was admitted to hospitalist group for further work-up and treatment of chest pain with extensive cardiac history and concerning EKG changes. Plan to obtain troponins and keep patient on nitro drip overnight per cardiology. CRITICAL CARE:  Please see attending note    FINAL IMPRESSION      1. Unstable angina pectoris ([address]          DISPOSITION / PLAN     DISPOSITION Admitted 08/03/2022 12:37:25 AM      PATIENT REFERRED TO:  No follow-up provider specified.     DISCHARGE

## 2022-08-02 NOTE — ED PROVIDER NOTES
[facility]     Emergency Department     Faculty Attestation    I performed a history and physical examination of the patient and discussed management with the resident. I have reviewed and agree with the residents findings including all diagnostic interpretations, and treatment plans as written at the time of my review. Any areas of disagreement are noted on the chart. I was personally present for the key portions of any procedures. I have documented in the chart those procedures where I was not present during the key portions. For Physician Assistant/ Nurse Practitioner cases/documentation I have personally evaluated this patient and have completed at least one if not all key elements of the E/M (history, physical exam, and MDM). Additional findings are as noted. Primary Care Physician: Marianne Walls MD    History: This is a 74 y.o. male who presents to the Emergency Department with complaint of chest pain. The patient presents emerged part complaint of achiness in the chest but has been off and on for the last couple of days. He has some associated diaphoresis. He denies any nausea vomiting or shortness of breath. This occurred after using cocaine. Physical:   weight is 150 lb (68 kg). His oral temperature is 98.7 °F (37.1 °C). His blood pressure is 162/86 (abnormal) and his pulse is 61. His respiration is 15 and oxygen saturation is 97%. Lungs are clear to auscultation bilaterally, heart regular to rhythm, abdomen is soft nontender    Impression: Chest pain    Plan: Chest x-ray, EKG, CBC, BMP, troponin      EKG Interpretation    Interpret by me  1810 hrs.   Normal sinus rhythm ventricular 71, normal [place] interval, normal QRS duration, cannot rule out inferior infarct, age undetermined, ST and T wave abnormality consider ischemia, prolonged QT corrected, normal axis  Compared EKG of April 3, 2022, QT corrected has lengthened,    1834 hrs.  Normal sinus rhythm ventricular 63, normal RI interval, normal QRS duration, cannot rule out inferior infarct, age undetermined, ST and T wave abnormality serial lateral ischemia        (Please note that portions of this note were completed with a voice recognition program.  Efforts were made to edit the dictations but occasionally words are mis-transcribed.)    [place].  Carlota López MD, [facility]  Attending Emergency Medicine Physician        Waleska Eubanks MD  08/02/22 [id number]

## 2022-08-03 PROBLEM — F14.90 COCAINE USE: Status: ACTIVE | Noted: 2022-08-03

## 2022-08-03 PROBLEM — Z51.5 ENCOUNTER FOR PALLIATIVE CARE: Status: ACTIVE | Noted: 2022-08-03

## 2022-08-03 LAB
ALBUMIN SERPL-MCNC: 3.3 G/DL (ref 3.5–5.2)
ALBUMIN/GLOBULIN RATIO: 1.1 (ref 1–2.5)
ALP BLD-CCNC: 81 U/L (ref 40–129)
ALT SERPL-CCNC: 13 U/L (ref 5–41)
AMPHETAMINE SCREEN URINE: NEGATIVE
ANION GAP SERPL CALCULATED.3IONS-SCNC: 9 MMOL/L (ref 9–17)
AST SERPL-CCNC: 17 U/L
BARBITURATE SCREEN URINE: NEGATIVE
BENZODIAZEPINE SCREEN, URINE: NEGATIVE
BILIRUB SERPL-MCNC: 0.34 MG/DL (ref 0.3–1.2)
BUN BLDV-MCNC: 18 MG/DL (ref 8–23)
CALCIUM SERPL-MCNC: 7.8 MG/DL (ref 8.6–10.4)
CANNABINOID SCREEN URINE: POSITIVE
CHLORIDE BLD-SCNC: 115 MMOL/L (ref 98–107)
CHOLESTEROL/HDL RATIO: 1.9
CHOLESTEROL: 89 MG/DL
CO2: 19 MMOL/L (ref 20–31)
COCAINE METABOLITE, URINE: POSITIVE
CREAT SERPL-MCNC: 0.91 MG/DL (ref 0.7–1.2)
EKG ATRIAL RATE: 60 BPM
EKG P AXIS: 77 DEGREES
EKG P-R INTERVAL: 180 MS
EKG Q-T INTERVAL: 460 MS
EKG QRS DURATION: 94 MS
EKG QTC CALCULATION (BAZETT): 460 MS
EKG R AXIS: -45 DEGREES
EKG T AXIS: -88 DEGREES
EKG VENTRICULAR RATE: 60 BPM
FENTANYL URINE: NEGATIVE
GFR AFRICAN AMERICAN: >60 ML/MIN
GFR NON-AFRICAN AMERICAN: >60 ML/MIN
GFR SERPL CREATININE-BSD FRML MDRD: ABNORMAL ML/MIN/{1.73_M2}
GLUCOSE BLD-MCNC: 100 MG/DL (ref 75–110)
GLUCOSE BLD-MCNC: 105 MG/DL (ref 75–110)
GLUCOSE BLD-MCNC: 78 MG/DL (ref 75–110)
GLUCOSE BLD-MCNC: 80 MG/DL (ref 75–110)
GLUCOSE BLD-MCNC: 86 MG/DL (ref 70–99)
HCT VFR BLD CALC: 43.5 % (ref 40.7–50.3)
HDLC SERPL-MCNC: 48 MG/DL
HEMOGLOBIN: 12.4 G/DL (ref 13–17)
LDL CHOLESTEROL: 35 MG/DL (ref 0–130)
MAGNESIUM: 1.9 MG/DL (ref 1.6–2.6)
MCH RBC QN AUTO: 24.7 PG (ref 25.2–33.5)
MCHC RBC AUTO-ENTMCNC: 28.5 G/DL (ref 28.4–34.8)
MCV RBC AUTO: 86.5 FL (ref 82.6–102.9)
METHADONE SCREEN, URINE: NEGATIVE
NRBC AUTOMATED: 0 PER 100 WBC
OPIATES, URINE: NEGATIVE
OXYCODONE SCREEN URINE: NEGATIVE
PDW BLD-RTO: 17.1 % (ref 11.8–14.4)
PHENCYCLIDINE, URINE: NEGATIVE
PLATELET # BLD: 254 K/UL (ref 138–453)
PMV BLD AUTO: 10.9 FL (ref 8.1–13.5)
POTASSIUM SERPL-SCNC: 3.8 MMOL/L (ref 3.7–5.3)
RBC # BLD: 5.03 M/UL (ref 4.21–5.77)
SODIUM BLD-SCNC: 143 MMOL/L (ref 135–144)
TEST INFORMATION: ABNORMAL
TOTAL PROTEIN: 6.3 G/DL (ref 6.4–8.3)
TRIGL SERPL-MCNC: 30 MG/DL
TROPONIN, HIGH SENSITIVITY: 19 NG/L (ref 0–22)
TROPONIN, HIGH SENSITIVITY: 19 NG/L (ref 0–22)
WBC # BLD: 7 K/UL (ref 3.5–11.3)

## 2022-08-03 PROCEDURE — 85027 COMPLETE CBC AUTOMATED: CPT

## 2022-08-03 PROCEDURE — 80061 LIPID PANEL: CPT

## 2022-08-03 PROCEDURE — 93010 ELECTROCARDIOGRAM REPORT: CPT | Performed by: INTERNAL MEDICINE

## 2022-08-03 PROCEDURE — 82947 ASSAY GLUCOSE BLOOD QUANT: CPT

## 2022-08-03 PROCEDURE — 99225 PR SBSQ OBSERVATION CARE/DAY 25 MINUTES: CPT | Performed by: FAMILY MEDICINE

## 2022-08-03 PROCEDURE — 2580000003 HC RX 258: Performed by: INTERNAL MEDICINE

## 2022-08-03 PROCEDURE — 36415 COLL VENOUS BLD VENIPUNCTURE: CPT

## 2022-08-03 PROCEDURE — 80307 DRUG TEST PRSMV CHEM ANLYZR: CPT

## 2022-08-03 PROCEDURE — 6360000002 HC RX W HCPCS: Performed by: INTERNAL MEDICINE

## 2022-08-03 PROCEDURE — 99214 OFFICE O/P EST MOD 30 MIN: CPT | Performed by: FAMILY MEDICINE

## 2022-08-03 PROCEDURE — 84484 ASSAY OF TROPONIN QUANT: CPT

## 2022-08-03 PROCEDURE — 80053 COMPREHEN METABOLIC PANEL: CPT

## 2022-08-03 PROCEDURE — 76937 US GUIDE VASCULAR ACCESS: CPT

## 2022-08-03 PROCEDURE — G0378 HOSPITAL OBSERVATION PER HR: HCPCS

## 2022-08-03 PROCEDURE — 2580000003 HC RX 258: Performed by: NURSE PRACTITIONER

## 2022-08-03 PROCEDURE — 93005 ELECTROCARDIOGRAM TRACING: CPT | Performed by: EMERGENCY MEDICINE

## 2022-08-03 PROCEDURE — 83735 ASSAY OF MAGNESIUM: CPT

## 2022-08-03 PROCEDURE — 96367 TX/PROPH/DG ADDL SEQ IV INF: CPT

## 2022-08-03 PROCEDURE — 96376 TX/PRO/DX INJ SAME DRUG ADON: CPT

## 2022-08-03 PROCEDURE — 6370000000 HC RX 637 (ALT 250 FOR IP): Performed by: NURSE PRACTITIONER

## 2022-08-03 PROCEDURE — 6360000002 HC RX W HCPCS: Performed by: NURSE PRACTITIONER

## 2022-08-03 PROCEDURE — 96372 THER/PROPH/DIAG INJ SC/IM: CPT

## 2022-08-03 RX ORDER — AMIODARONE HYDROCHLORIDE 200 MG/1
200 TABLET ORAL DAILY
Status: DISCONTINUED | OUTPATIENT
Start: 2022-08-04 | End: 2022-08-04 | Stop reason: HOSPADM

## 2022-08-03 RX ORDER — ONDANSETRON 4 MG/1
4 TABLET, ORALLY DISINTEGRATING ORAL EVERY 8 HOURS PRN
Status: DISCONTINUED | OUTPATIENT
Start: 2022-08-03 | End: 2022-08-04 | Stop reason: HOSPADM

## 2022-08-03 RX ORDER — SODIUM CHLORIDE 0.9 % (FLUSH) 0.9 %
5-40 SYRINGE (ML) INJECTION EVERY 12 HOURS SCHEDULED
Status: DISCONTINUED | OUTPATIENT
Start: 2022-08-03 | End: 2022-08-04 | Stop reason: HOSPADM

## 2022-08-03 RX ORDER — SPIRONOLACTONE 25 MG/1
25 TABLET ORAL DAILY
Status: DISCONTINUED | OUTPATIENT
Start: 2022-08-03 | End: 2022-08-04 | Stop reason: HOSPADM

## 2022-08-03 RX ORDER — PANTOPRAZOLE SODIUM 40 MG/1
40 TABLET, DELAYED RELEASE ORAL DAILY
Status: DISCONTINUED | OUTPATIENT
Start: 2022-08-03 | End: 2022-08-04 | Stop reason: HOSPADM

## 2022-08-03 RX ORDER — POTASSIUM CHLORIDE 7.45 MG/ML
10 INJECTION INTRAVENOUS PRN
Status: DISCONTINUED | OUTPATIENT
Start: 2022-08-03 | End: 2022-08-04 | Stop reason: HOSPADM

## 2022-08-03 RX ORDER — TAMSULOSIN HYDROCHLORIDE 0.4 MG/1
0.4 CAPSULE ORAL DAILY
Status: DISCONTINUED | OUTPATIENT
Start: 2022-08-03 | End: 2022-08-04 | Stop reason: HOSPADM

## 2022-08-03 RX ORDER — ENOXAPARIN SODIUM 100 MG/ML
40 INJECTION SUBCUTANEOUS DAILY
Status: DISCONTINUED | OUTPATIENT
Start: 2022-08-03 | End: 2022-08-04 | Stop reason: HOSPADM

## 2022-08-03 RX ORDER — AMIODARONE HYDROCHLORIDE 200 MG/1
200 TABLET ORAL 2 TIMES DAILY
Status: DISCONTINUED | OUTPATIENT
Start: 2022-08-03 | End: 2022-08-03

## 2022-08-03 RX ORDER — ACETAMINOPHEN 650 MG/1
650 SUPPOSITORY RECTAL EVERY 6 HOURS PRN
Status: DISCONTINUED | OUTPATIENT
Start: 2022-08-03 | End: 2022-08-04 | Stop reason: HOSPADM

## 2022-08-03 RX ORDER — ASPIRIN 81 MG/1
81 TABLET ORAL DAILY
Status: DISCONTINUED | OUTPATIENT
Start: 2022-08-03 | End: 2022-08-04 | Stop reason: HOSPADM

## 2022-08-03 RX ORDER — CLOPIDOGREL BISULFATE 75 MG/1
75 TABLET ORAL DAILY
Status: DISCONTINUED | OUTPATIENT
Start: 2022-08-03 | End: 2022-08-04 | Stop reason: HOSPADM

## 2022-08-03 RX ORDER — ATORVASTATIN CALCIUM 80 MG/1
80 TABLET, FILM COATED ORAL NIGHTLY
Status: DISCONTINUED | OUTPATIENT
Start: 2022-08-03 | End: 2022-08-04 | Stop reason: HOSPADM

## 2022-08-03 RX ORDER — FUROSEMIDE 20 MG/1
20 TABLET ORAL DAILY
Status: DISCONTINUED | OUTPATIENT
Start: 2022-08-03 | End: 2022-08-04 | Stop reason: HOSPADM

## 2022-08-03 RX ORDER — SODIUM CHLORIDE 0.9 % (FLUSH) 0.9 %
10 SYRINGE (ML) INJECTION PRN
Status: DISCONTINUED | OUTPATIENT
Start: 2022-08-03 | End: 2022-08-04 | Stop reason: HOSPADM

## 2022-08-03 RX ORDER — POTASSIUM CHLORIDE 20 MEQ/1
40 TABLET, EXTENDED RELEASE ORAL PRN
Status: DISCONTINUED | OUTPATIENT
Start: 2022-08-03 | End: 2022-08-04 | Stop reason: HOSPADM

## 2022-08-03 RX ORDER — AMLODIPINE BESYLATE 10 MG/1
10 TABLET ORAL DAILY
Status: DISCONTINUED | OUTPATIENT
Start: 2022-08-03 | End: 2022-08-04 | Stop reason: HOSPADM

## 2022-08-03 RX ORDER — ONDANSETRON 2 MG/ML
4 INJECTION INTRAMUSCULAR; INTRAVENOUS EVERY 6 HOURS PRN
Status: DISCONTINUED | OUTPATIENT
Start: 2022-08-03 | End: 2022-08-04 | Stop reason: HOSPADM

## 2022-08-03 RX ORDER — SODIUM CHLORIDE 9 MG/ML
INJECTION, SOLUTION INTRAVENOUS PRN
Status: DISCONTINUED | OUTPATIENT
Start: 2022-08-03 | End: 2022-08-04 | Stop reason: HOSPADM

## 2022-08-03 RX ORDER — MAGNESIUM SULFATE 1 G/100ML
1000 INJECTION INTRAVENOUS PRN
Status: DISCONTINUED | OUTPATIENT
Start: 2022-08-03 | End: 2022-08-04 | Stop reason: HOSPADM

## 2022-08-03 RX ORDER — ACETAMINOPHEN 325 MG/1
650 TABLET ORAL EVERY 6 HOURS PRN
Status: DISCONTINUED | OUTPATIENT
Start: 2022-08-03 | End: 2022-08-04 | Stop reason: HOSPADM

## 2022-08-03 RX ADMIN — ENOXAPARIN SODIUM 40 MG: 100 INJECTION SUBCUTANEOUS at 08:18

## 2022-08-03 RX ADMIN — SODIUM CHLORIDE: 9 INJECTION, SOLUTION INTRAVENOUS at 15:43

## 2022-08-03 RX ADMIN — TAMSULOSIN HYDROCHLORIDE 0.4 MG: 0.4 CAPSULE ORAL at 08:19

## 2022-08-03 RX ADMIN — ATORVASTATIN CALCIUM 80 MG: 80 TABLET, FILM COATED ORAL at 21:27

## 2022-08-03 RX ADMIN — METOPROLOL TARTRATE 12.5 MG: 25 TABLET ORAL at 08:19

## 2022-08-03 RX ADMIN — MAGNESIUM HYDROXIDE 30 ML: 400 SUSPENSION ORAL at 21:50

## 2022-08-03 RX ADMIN — THIAMINE HYDROCHLORIDE 500 MG: 100 INJECTION, SOLUTION INTRAMUSCULAR; INTRAVENOUS at 15:44

## 2022-08-03 RX ADMIN — Medication 81 MG: at 08:19

## 2022-08-03 RX ADMIN — CLOPIDOGREL 75 MG: 75 TABLET, FILM COATED ORAL at 08:19

## 2022-08-03 RX ADMIN — AMIODARONE HYDROCHLORIDE 200 MG: 200 TABLET ORAL at 08:18

## 2022-08-03 RX ADMIN — SODIUM CHLORIDE, PRESERVATIVE FREE 10 ML: 5 INJECTION INTRAVENOUS at 21:27

## 2022-08-03 RX ADMIN — THIAMINE HYDROCHLORIDE 500 MG: 100 INJECTION, SOLUTION INTRAMUSCULAR; INTRAVENOUS at 06:41

## 2022-08-03 RX ADMIN — SODIUM CHLORIDE, PRESERVATIVE FREE 10 ML: 5 INJECTION INTRAVENOUS at 08:29

## 2022-08-03 RX ADMIN — PANTOPRAZOLE SODIUM 40 MG: 40 TABLET, DELAYED RELEASE ORAL at 08:19

## 2022-08-03 RX ADMIN — SODIUM CHLORIDE, PRESERVATIVE FREE 10 ML: 5 INJECTION INTRAVENOUS at 08:18

## 2022-08-03 RX ADMIN — FUROSEMIDE 20 MG: 20 TABLET ORAL at 08:19

## 2022-08-03 RX ADMIN — SPIRONOLACTONE 25 MG: 25 TABLET ORAL at 08:19

## 2022-08-03 ASSESSMENT — ENCOUNTER SYMPTOMS
DIARRHEA: 0
NAUSEA: 0
CONSTIPATION: 0
VOMITING: 0
CHEST TIGHTNESS: 0
WHEEZING: 0
ABDOMINAL PAIN: 0
SHORTNESS OF BREATH: 0
BLOOD IN STOOL: 0
COUGH: 0

## 2022-08-03 ASSESSMENT — PAIN SCALES - GENERAL: PAINLEVEL_OUTOF10: 5

## 2022-08-03 NOTE — CARE COORDINATION
08/03/22 1130   Service Assessment   Patient Orientation Alert and Oriented   Cognition Alert   History Provided By Patient   Primary Caregiver Self   Support Systems Family Members   PCP Verified by CM Yes  (pt is unsure)   Prior Functional Level Independent in ADLs/IADLs   Current Functional Level Independent in ADLs/IADLs   Can patient return to prior living arrangement Yes   Ability to make needs known: Good   Family able to assist with home care needs: Yes   Would you like for me to discuss the discharge plan with any other family members/significant others, and if so, who? No   Social/Functional History   Lives With Family  (lives with cousin)   Type of [address] One level   [address] to enter with rails   Entrance Stairs - Number of Steps 14   Entrance Stairs - Rails Left   Bathroom Shower/Tub Tub/Shower unit   New [place], rolling   ADL Assistance Independent   Ambulation Assistance Independent   Transfer Assistance Independent   Active  No   Patient's  Info uses wheel chair   Mode of Transportation Other   Discharge Planning   Living Arrangements Family Members  (lives with cousin)   Current Services Prior To Admission None   Potential Assistance Needed N/A   Potential Assistance Purchasing Medications No   Meds-to-Beds: Does the patient want to have any new prescriptions delivered to bedside prior to discharge?  Yes   Type of Home Care Services None   Patient expects to be discharged to: House   One/Two Story Residence One story   History of falls? 0

## 2022-08-03 NOTE — PROGRESS NOTES
[facility]  Office: [address], DO, Fabiola Hinojosa, DO, Laurita Sanz, DO, Destiney Eric, DO, Laquita Li MD, Naveen Young MD, Sofia Yi MD, Candice Dye MD,  Krystle Cronin MD, Tanner Shepherd MD, Laura Nash, DO, Tim Lopez MD,  Seth Garcia MD, Sinai Moreland MD, Asa Webb, DO, Keven Livingston MD, Campbell Chavira MD, Sophia Ludwig MD, Felisa Tian, DO, Gloria Escalante MD, Dietrich Halsted, MD, Aime Feliciano, CNP,  Hernan Guerrero, CNP, Shaun Torres, CNP, Sonia Rai, CNP, Den Flores, PA-C, Anil Toth, DNP, James Aguilera, CNP, Chante Granados, CNP, Leo Jarrett, CNP, Chely Estevez, CNP, Arina Garzon, CNP, Grey Decker, CNS, Greg Marshall, DNP, Ying King, CNP, Diann Pulido, CNP, Tobin Mckeon, CNP           Alisa Cullen 19    Progress Note    8/3/2022    1:40 PM    Name:   Papi Tidwell  MRN:     [id number]     [place]:      [de-identified]   Room:   [address] [place] [address] Day:  0  Admit Date:  8/2/2022  6:07 PM    PCP:   Tevin Antonio MD  Code Status:  DNR-CCA    Subjective:     C/C:   Chief Complaint   Patient presents with    Chest Pain     X1 day     Interval History Status: not changed. Patient seen and examined at bedside, no acute events overnight. Feels about the same , he currently to me denies chest pain  Vitals and labs reviewed, Trops are still unremarkable  Patient vitals, labs and all providers notes were reviewed,from overnight shift and morning updates were noted and discussed with the nurse      Brief History:     Per my colleague  Mr Triston Weller is a 75 yo cachectic appearing AAM with CAD s/p cabg in march 2022, long standing Etoh and drug use who presented with vague dull chest pain reported as 5/10, worse with palpation of sternum and associated with nausea. Patient says his pain has been ongoing since cabg but was worse after cocaine use a few days ago. Patient cannot remember how long his symptoms occurred  and reports drinking etoh before arrival thus cannot remember his symptoms. He says in general his chest pain has improved since cabg. He reports the sublingual nitro in EMS helped and has yet to come back. He denies any SOB at rest, pnd, orthopnea, leg swelling. He doesn't know why his sugar is low but says he hasn't been eating. He is reluctant to give any information about etoh or drug use and is requesting to be left alone to get rest. He denies any current chest pain when assessed. Review of Systems:     Review of Systems   Constitutional:  Positive for activity change, appetite change and fatigue. Negative for chills, diaphoresis and fever. HENT:  Negative for congestion. Eyes:  Negative for visual disturbance. Respiratory:  Negative for cough, chest tightness, shortness of breath and wheezing. Cardiovascular:  Negative for chest pain (resolved now), palpitations and leg swelling. Gastrointestinal:  Negative for abdominal pain, blood in stool, constipation, diarrhea, nausea and vomiting. Genitourinary:  Negative for difficulty urinating. Neurological:  Negative for dizziness, weakness, light-headedness, numbness and headaches. All other systems reviewed and are negative. Medications:      Allergies:  No Known Allergies    Current Meds:   Scheduled Meds:    aspirin  81 mg Oral Daily    atorvastatin  80 mg Oral Nightly    clopidogrel  75 mg Oral Daily    furosemide  20 mg Oral Daily    pantoprazole  40 mg Oral Daily    spironolactone  25 mg Oral Daily    tamsulosin  0.4 mg Oral Daily    sodium chloride flush  5-40 mL IntraVENous 2 times per day    enoxaparin  40 mg SubCUTAneous Daily    [START ON 8/4/2022] amiodarone  200 mg Oral Daily    amLODIPine  10 mg Oral Daily    sodium chloride flush  5-40 mL IntraVENous 2 times per day    thiamine (VITAMIN B1) IVPB  500 mg IntraVENous Q8H    Followed by    [START ON 8/4/2022] thiamine (VITAMIN B1) IVPB  250 mg IntraVENous Q24H    Followed by    Allen Pillion ON 2022] thiamine  100 mg Oral Daily     Continuous Infusions:    sodium chloride      dextrose      sodium chloride       PRN Meds: sodium chloride flush, sodium chloride, ondansetron **OR** ondansetron, acetaminophen **OR** acetaminophen, magnesium hydroxide, potassium chloride **OR** potassium alternative oral replacement **OR** potassium chloride, potassium chloride, magnesium sulfate, glucose, dextrose bolus **OR** dextrose bolus, glucagon (rDNA), dextrose, nitroGLYCERIN, sodium chloride flush, sodium chloride, LORazepam **OR** LORazepam **OR** LORazepam **OR** LORazepam **OR** LORazepam **OR** LORazepam **OR** LORazepam **OR** LORazepam    Data:     Past Medical History:   has a past medical history of 3-vessel CAD, Alcohol abuse, Arthritis, Chronic kidney disease, Full dentures, Hypertension, Other emphysema ([address] Pure hypercholesterolemia, Systolic CHF ([address] and Wears glasses. Social History:   reports that he has been smoking cigarettes. He has never used smokeless tobacco. He reports current alcohol use. He reports current drug use. Drugs: Cocaine and Marijuana ([facility]). Family History:   Family History   Problem Relation Age of Onset    Diabetes Mother     Cancer Mother        Vitals:  /61   Pulse 51   Temp 97.9 °F (36.6 °C)   Resp 16   Ht 6' 3\" (1.905 m) Comment: per patinet  Wt 150 lb (68 kg)   SpO2 92%   BMI 18.75 kg/m²   Temp (24hrs), Av.2 °F (36.8 °C), Min:97.9 °F (36.6 °C), Max:98.7 °F (37.1 °C)    Recent Labs     22  0729 22  1103   POCGLU 78 105       I/O (24Hr):     Intake/Output Summary (Last 24 hours) at 8/3/2022 1340  Last data filed at 8/3/2022 0932  Gross per 24 hour   Intake 338.47 ml   Output --   Net 338.47 ml       Labs:  Hematology:  Recent Labs     22  1827 22  0737   WBC 6.6 7.0   RBC 4.67 5.03   HGB 11.5* 12.4*   HCT 37.9* 43.5   MCV 81.2* 86.5   MCH 24.6* 24.7* reactive to light. Cardiovascular:      Rate and Rhythm: Normal rate and regular rhythm. Heart sounds: No murmur heard. Pulmonary:      Effort: Pulmonary effort is normal. No accessory muscle usage or respiratory distress. Breath sounds: No stridor. No decreased breath sounds, wheezing, rhonchi or rales. Abdominal:      General: Bowel sounds are normal. There is no distension. Palpations: Abdomen is soft. Abdomen is not rigid. Tenderness: There is no abdominal tenderness. There is no guarding. Musculoskeletal:         General: No tenderness. Skin:     General: Skin is warm and dry. Findings: No erythema, lesion or rash. Neurological:      Mental Status: He is alert and oriented to person, place, and time. Cranial Nerves: No cranial nerve deficit. Motor: No seizure activity. Psychiatric:         Speech: Speech normal.         Behavior: Behavior normal. Behavior is cooperative.        Assessment:        Hospital Problems             Last Modified POA    * (Principal) ACS (acute coronary syndrome) ([facility] [address] 8/2/2022 Yes    3-vessel CAD 8/3/2022 Yes    Benign prostatic hyperplasia with urinary obstruction 8/3/2022 Yes    Overview Signed 10/14/2017  7:31 PM by [address] Amb[facility]     Updating Deprecated Diagnoses         Cocaine use 8/3/2022 Yes       Plan:          Chest pain and the patient was coronary artery status post CABG after cocaine use:  Trop negative, EKG as needed  Cardiology evaluation  is in progress    Coronary artery disease S/P CABG : Continue aspirin Plavix and statin    Chronic systolic heart failure: Continue chronic medication, no evidence of decompensation    History of CVA: Continue antiplatelets and statin    Hypoglycemia: better after PO intake, continue to monitor     BPH: Continue Flomax    Alcohol abuse and intoxication: Multivitamin, CIWA protocol, monitor S/S withdrawal    HTN: continue home medications    HPL: continue home medications    DM2: Continue diabetes home medications , insulin sliding scale, hypoglycemia protocol    DVT prophylaxis    Will discharge when arrangements complete and ok with other services.   Follow-up with PCP in one week, Rosemarie Aguila MD  Notify PCP of discharge        Elicia Rodriges MD  8/3/2022  1:40 PM

## 2022-08-03 NOTE — PLAN OF CARE
Problem: Discharge Planning  Goal: Discharge to home or other facility with appropriate resources  8/3/2022 1626 by Cesar Castano RN  Outcome: Progressing  8/3/2022 0508 by Shyanne Mukherjee RN  Outcome: Progressing     Problem: Skin/Tissue Integrity  Goal: Absence of new skin breakdown  Description: 1. Monitor for areas of redness and/or skin breakdown  2. Assess vascular access sites hourly  3. Every 4-6 hours minimum:  Change oxygen saturation probe site  4. Every 4-6 hours:  If on nasal continuous positive airway pressure, respiratory therapy assess nares and determine need for appliance change or resting period.   8/3/2022 1626 by Cesar Castano RN  Outcome: Progressing  8/3/2022 0508 by Shyanne Mukherjee RN  Outcome: Progressing     Problem: Safety - Adult  Goal: Free from fall injury  8/3/2022 1626 by Cesar Castano RN  Outcome: Progressing  8/3/2022 0508 by Shyanne Mukherjee RN  Outcome: Progressing     Problem: ABCDS Injury Assessment  Goal: Absence of physical injury  8/3/2022 1626 by Cesar Castano RN  Outcome: Progressing  8/3/2022 0508 by Shyanne Mukherjee RN  Outcome: Progressing     Problem: Pain  Goal: Verbalizes/displays adequate comfort level or baseline comfort level  Outcome: Progressing     Problem: Nutrition Deficit:  Goal: Optimize nutritional status  8/3/2022 1626 by Cesar Castano RN  Outcome: Progressing  8/3/2022 1537 by Roosevelt Miller RD  Flowsheets (Taken 8/3/2022 1537)  Nutrient intake appropriate for improving, restoring, or maintaining nutritional needs:   Assess nutritional status and recommend course of action   Monitor oral intake, labs, and treatment plans   Recommend appropriate diets, oral nutritional supplements, and vitamin/mineral supplements

## 2022-08-03 NOTE — PROGRESS NOTES
Comprehensive Nutrition Assessment    Type and Reason for Visit:  Positive Nutrition Screen    Nutrition Recommendations/Plan:   Continue Regular diet  Start High Herb/High Pro ONS 3 x per day  Monitor labs, wt, plan of care     Malnutrition Assessment:  Malnutrition Status:  Insufficient data (08/03/22 1533)    Context:  Chronic Illness     Findings of the 6 clinical characteristics of malnutrition:  Energy Intake:  Mild decrease in energy intake   Weight Loss:  Unable to assess     Body Fat Loss:  Unable to assess     Muscle Mass Loss:  Unable to assess    Fluid Accumulation:  No significant fluid accumulation     Strength:  Not Performed    Nutrition Assessment:    Pt presents with chest pain. PMH: CAD s/p cabg in march 2022, Etoh and drug use, CHF, CKD, HTN. Positive nutrition screen for decreased appetite, unsure amount of wt loss. Pt reports an okay appetite, lunch tray observed - 50% intake. Pt agreeable to Ensure Enlive ONS. Wts reviewed - flux noted x 5 months, current wt stated. Will order bed scale weight. Nutrition Related Findings:    labs/meds reviewed Wound Type: None       Current Nutrition Intake & Therapies:    Average Meal Intake: 26-50%  Average Supplements Intake: None Ordered  ADULT DIET; Regular; No Caffeine  ADULT ORAL NUTRITION SUPPLEMENT; Breakfast, Lunch, Dinner; Standard High Calorie/High Protein Oral Supplement    Anthropometric Measures:  Height: 6' 3\" (190.5 cm) (per patinet)  Ideal Body Weight (IBW): 196 lbs (89 kg)       Current Body Weight: 150 lb (68 kg),   IBW. Weight Source: Stated  Current BMI (kg/m2): 18.7                          BMI Categories: Normal Weight (BMI 18.5-24. 9)    Estimated Daily Nutrient Needs:  Energy Requirements Based On: Kcal/kg  Weight Used for Energy Requirements: Current  Energy (kcal/day): 0064-9266 kcal/day (27-30 kcal/kg)  Weight Used for Protein Requirements: Current  Protein (g/day):  g/day (1.2-1.5 g/kg)  Method Used for Fluid

## 2022-08-03 NOTE — CONSULTS
[facility]   Consult Note         Today's Date: 8/3/2022  Patient Name: Jh Buckley  Date of admission: 8/2/2022  6:07 PM  Patient's age: 74 y.o., 1955  Admission Dx: Unstable angina pectoris ([facility] [I20.0]  ACS (acute coronary syndrome) ([address] [I24.9]    Reason for Consult:  Cardiac evaluation    Requesting Physician: Erin Boone MD    REASON FOR CONSULT:  Chest pain    History Obtained From:  Patient, chart, staff, records    HISTORY OF PRESENT ILLNESS:      The patient is a 74 y.o. male who is admitted to the hospital for Chest Pain  Jh Buckley complains of chest pain. Onset was few days ago. Symptoms have worsened since that time. The patient's pain is intermittent. The patient describes the pain as dull and does not radiate. Patient rates pain as a 5/10 in intensity. Associated symptoms are: none. Aggravating factors are: none. Alleviating factors are: sublingual nitrates. Patient had CABG surgery in march 20220. Long history of alcohol and cocaine use, last time two days ago. Denies SOB, leg swelling, dizziness, or palpitations. Patient is refusing to cooperate, to give thorough history, and to discuss future plans. Past Medical History:   has a past medical history of 3-vessel CAD, Alcohol abuse, Arthritis, Chronic kidney disease, Full dentures, Hypertension, Other emphysema ([address] Pure hypercholesterolemia, Systolic CHF ([address] and Wears glasses. Past Surgical History:   has a past surgical history that includes Hip fracture surgery (Left); Appendectomy; pr egd transoral biopsy single/multiple (6/19/2017); and Coronary artery bypass graft (N/A, 3/21/2022). Home Medications:    Prior to Admission medications    Medication Sig Start Date End Date Taking?  Authorizing Provider   amiodarone (CORDARONE) 200 MG tablet Take 1 tablet by mouth 2 times daily 4/7/22   Chloe Rajput, APRN - [place]   atorvastatin (LIPITOR) 80 MG tablet Take 1 tablet by mouth nightly 4/7/22   Hannah Scott Viry Nino, APRN - NP   clopidogrel (PLAVIX) 75 MG tablet Take 1 tablet by mouth daily 4/8/22   Isidoro Charles, APRN - NP   metoprolol tartrate (LOPRESSOR) 25 MG tablet Take 0.5 tablets by mouth 2 times daily 4/7/22   Isidoro Charles, APRN - NP   midodrine (PROAMATINE) 5 MG tablet Take 1 tablet by mouth 3 times daily (with meals) 4/7/22   Isidoro Charles, APRN - NP   pantoprazole (PROTONIX) 40 MG tablet Take 1 tablet by mouth daily 4/7/22   Isidoro Charles, APRN - NP   spironolactone (ALDACTONE) 25 MG tablet Take 1 tablet by mouth daily 4/8/22   Isidoro Charles, APRN - NP   furosemide (LASIX) 20 MG tablet Take 1 tablet by mouth daily 4/7/22   Isidoro Charles, APRN - NP   nitroGLYCERIN (NITROSTAT) 0.3 MG SL tablet Place 0.3 mg under the tongue every 5 minutes as needed for Chest pain up to max of 3 total doses. If no relief after 1 dose, call 911.     Historical Provider, MD   ibuprofen (ADVIL;MOTRIN) 800 MG tablet Take 1 tablet by mouth every 8 hours as needed for Pain 4/25/18   Miguel Michel MD   aspirin 81 MG EC tablet Take 1 tablet by mouth daily 3/12/18   Lluvia Hutchinson MD   tamsulosin [facility]) 0.4 MG capsule Take 1 capsule by mouth daily 3/12/18   Lluvia Hutchinson MD       metoprolol tartrate, 12.5 mg, Oral, BID    amiodarone, 200 mg, Oral, BID    aspirin, 81 mg, Oral, Daily    atorvastatin, 80 mg, Oral, Nightly    clopidogrel, 75 mg, Oral, Daily    furosemide, 20 mg, Oral, Daily    pantoprazole, 40 mg, Oral, Daily    spironolactone, 25 mg, Oral, Daily    tamsulosin, 0.4 mg, Oral, Daily    sodium chloride flush, 5-40 mL, IntraVENous, 2 times per day    enoxaparin, 40 mg, SubCUTAneous, Daily    sodium chloride flush, 5-40 mL, IntraVENous, 2 times per day    thiamine (VITAMIN B1) IVPB, 500 mg, IntraVENous, Q8H **FOLLOWED BY** [START ON 8/4/2022] thiamine (VITAMIN B1) IVPB, 250 mg, IntraVENous, Q24H **FOLLOWED BY** [START ON 8/9/2022] thiamine, 100 mg, Oral, Daily      Allergies:  Patient has no known allergies. Social History:   reports that he has been smoking cigarettes. He has never used smokeless tobacco. He reports current alcohol use. He reports current drug use. Drugs: Cocaine and Marijuana ([facility]). Family History: family history includes Cancer in his mother; Diabetes in his mother. No h/o sudden cardiac death. REVIEW OF SYSTEMS:    Constitutional: there has been no unanticipated weight loss. There's been No change in energy level, No change in activity level. Eyes: No visual changes or diplopia. No scleral icterus. ENT: No Headaches, hearing loss or vertigo. No mouth sores or sore throat. Cardiovascular: per HPI  Respiratory: per HPI  Gastrointestinal: No abdominal pain, appetite loss, blood in stools. No change in bowel or bladder habits. Genitourinary: No dysuria, trouble voiding, or hematuria. Musculoskeletal:  No gait disturbance, No weakness or joint complaints. Integumentary: No rash or pruritis. Neurological: No headache, diplopia, change in muscle strength, numbness or tingling. No change in gait, balance, coordination, mood, affect, memory, mentation, behavior. Psychiatric: No anxiety, or depression. Endocrine: No temperature intolerance. No excessive thirst, fluid intake, or urination. No tremor. Hematologic/Lymphatic: No abnormal bruising or bleeding, blood clots or swollen lymph nodes. Allergic/Immunologic: No nasal congestion or hives. PHYSICAL EXAM:      BP (!) 166/88   Pulse 61   Temp 97.9 °F (36.6 °C) (Oral)   Resp 16   Ht 6' 3\" (1.905 m) Comment: per patinet  Wt 150 lb (68 kg)   SpO2 100%   BMI 18.75 kg/m²    Constitutional and General Appearance: alert, non-cooperative, no distress and appears stated age  HEENT: PERRL, no cervical lymphadenopathy. No masses palpable. Normal oral mucosa  Respiratory:  Normal excursion and expansion without use of accessory muscles  Resp Auscultation: Good respiratory effort. No for increased work of breathing.  On auscultation: clear to auscultation bilaterally  Cardiovascular: The apical impulse is not displaced  Heart tones are crisp and normal. regular S1 and S2.  Jugular venous pulsation Normal  The carotid upstroke is normal in amplitude and contour without delay or bruit  Peripheral pulses are symmetrical and full   Abdomen:   No masses or tenderness  Bowel sounds present  Extremities:   No Cyanosis or Clubbing   Lower extremity edema: No   Skin: Warm and dry  Neurological:  Alert and oriented. Moves all extremities well  No abnormalities of mood, affect, memory, mentation, or behavior are noted        EKG:    Date: 08/03/22  Reading: No acute ischemia      LAST ECHO:  Date: 3/2022  Findings Summary:  Left systolic function is normal with EF 50 - 55%    LAST Stress Test:   Date of last ST: 3/2022  Major Findings: Ischemia in the lateral and septal walls. Labs:     CBC:   Recent Labs     08/02/22 1827 08/03/22  0737   WBC 6.6 7.0   HGB 11.5* 12.4*   HCT 37.9* 43.5    254     BMP:   Recent Labs     08/02/22 1827 08/03/22  0737    143   K 3.7 3.8   CO2 16* 19*   BUN 22 18   CREATININE 1.13 0.91   LABGLOM >60 >60   GLUCOSE 122* 86     BNP: No results for input(s): BNP in the last 72 hours. PT/INR: No results for input(s): PROTIME, INR in the last 72 hours. APTT:No results for input(s): APTT in the last 72 hours. CARDIAC ENZYMES:No results for input(s): CKTOTAL, CKMB, CKMBINDEX, TROPONINI in the last 72 hours.   FASTING LIPID PANEL:  Lab Results   Component Value Date/Time    HDL 48 08/03/2022 07:37 AM    TRIG 30 08/03/2022 07:37 AM     LIVER PROFILE:  Recent Labs     08/02/22 1827 08/03/22  0737   AST 19 17   ALT 12 13   LABALBU 3.8 3.3*     Troponins: Invalid input(s): TROPONIN     Other Current Problems  Patient Active Problem List   Diagnosis    CKD (chronic kidney disease) stage 3, GFR 30-59 ml/min ([facility])    Essential hypertension    Arthritis of right hip    Chronic systolic congestive heart failure (HCC)    Benign prostatic hyperplasia with urinary obstruction    Pure hypercholesterolemia    Abdominal pain    Chest pain    Acute on chronic combined systolic and diastolic congestive heart failure (HCC)    S/P cardiac catheterization    3-vessel CAD    Acute renal failure with tubular necrosis (HCC)    Problem related to discharge planning    Acute respiratory failure with hypoxia ([facility])    ACS (acute coronary syndrome) ([facility] [address]           IMPRESSION & Recommendations:    - Most likely cocaine induced chest pain. - Decrease the amiodarone dose to 200 OD.  - Discontinue lopressor 12.5 mg due to cocaine.  - Start amlodipine 10 mg.  - Will continue aldactone 20 mg OD. - Will continue lasix 20 mg OD. - Will continue Aspirin 81.  - Will continue Plavix 75 mg.  - Will continue Lipitor 80 mg  - Patient was not cooperative in discussing plans        Discussed with patient, family, and Nurse. Electronically signed by Betty Noel MD on 8/3/2022 at 10:07 Lavern Pritchard MD  Internal Medicine Resident, PGY-1  [place] [address],  [facility]. 10:07 AM on 8/3/2022       I reviewed the patient history personally, and examined by me during the visit. I have reviewed the H&P/Consult / Progress note as completed, and made appropriate changes to the patient exam and treatment plans.       I have reviewed the case in details including physical exam and treatment plan with resident / fellow / NP    Patient treatment plan was explained to patient, correction in notes was made as appropriate, and discussed final arrangement based on  my evaluation and exam.    Additional Recommendations:      Dyllan Gamboa MD  [facility]

## 2022-08-03 NOTE — H&P
@[facility]@    [facility]    HISTORY AND PHYSICAL EXAMINATION            Date:   8/2/2022  Patient name:  Jayjay Crowley  Date of admission:  8/2/2022  6:07 PM  MRN:   [id number]  Account:  [de-identified]  YOB: 1955  PCP:    Maggy Garcia MD  Room:   27/27  Code Status:    Prior    Chief Complaint:     Vague chest pain complaints (patient is poor historian, weak and cachectic and intoxicated as he reports drinking etoh immediately before arrival    History Obtained From:     Patient, chart and er nurse    History of Present Illness:     Mr Greyson Genao is a 75 yo cachectic appearing AAM with CAD s/p cabg in march 2022, long standing Etoh and drug use who presented with vague dull chest pain reported as 5/10, worse with palpation of sternum and associated with nausea. Patient says his pain has been ongoing since cabg but was worse after cocaine use a few days ago. Patient cannot remember how long his symptoms occurred  and reports drinking etoh before arrival thus cannot remember his symptoms. He says in general his chest pain has improved since cabg. He reports the sublingual nitro in EMS helped and has yet to come back. He denies any SOB at rest, pnd, orthopnea, leg swelling. He doesn't know why his sugar is low but says he hasn't been eating. He is reluctant to give any information about etoh or drug use and is requesting to be left alone to get rest. He denies any current chest pain when assessed.      Past Medical History:     Past Medical History:   Diagnosis Date    3-vessel CAD 03/11/2022    Alcohol abuse 06/23/2015    Arthritis     Chronic kidney disease     Full dentures     upper and lower full    Hypertension     Other emphysema ([address]     COPD stage II on PFTs with severe reduction diffusion capacity    Pure hypercholesterolemia 01/80/1656    Systolic CHF (HCC)     Wears glasses         Past Surgical History:     Past Surgical History:     Tobacco:    reports that he has been smoking cigarettes. He has never used smokeless tobacco.  Alcohol:      reports current alcohol use. Drug Use:  reports current drug use. Drugs: Cocaine and Marijuana (Fredi Leavens). Family History:     Family History   Problem Relation Age of Onset    Diabetes Mother     Cancer Mother        Review of Systems:     Positive and Negative as described in HPI. Unable to provide ROS due to patient not cooperating     Physical Exam:   BP (!) 142/69   Pulse 63   Temp 98.7 °F (37.1 °C) (Oral)   Resp 13   Wt 150 lb (68 kg)   SpO2 97%   BMI 18.75 kg/m²   Temp (24hrs), Av.7 °F (37.1 °C), Min:98.7 °F (37.1 °C), Max:98.7 °F (37.1 °C)    No results for input(s): POCGLU in the last 72 hours. No intake or output data in the 24 hours ending 22    General Appearance: chronically ill appearing, frail and cachetic. Speech very soft and barely audible. Arousable but closes eyes and wants to be left alone. Appears disheveled and neglected. Appears to still be intoxicated  Mental status: oriented to person, place, and time  Head: normocephalic, atraumatic  Eye: no icterus, redness, pupils equal and reactive, extraocular eye movements intact, conjunctiva clear  Ear: normal external ear, no discharge, hearing intact  Nose: no drainage noted  Mouth: mucous membranes dry  Neck: supple, no carotid bruits, thyroid not palpable  Lungs: Bilateral equal air entry, clear to ausculation, no wheezing, rales or rhonchi, normal effort  Cardiovascular: heart sounds distant and faint. normal rate, regular rhythm. Unable to appreciate any murmers Limbs normothermic and non edematous. Tenderness on chest wall over scar which appears to be healing as expected   Abdomen: Soft, nontender, nondistended, normal bowel sounds,   Neurologic: AO3 but slow to respond, no obcious focal deficits. Speech clear and moving limbs spontaneously.    Skin: No gross lesions, rashes, bruising or bleeding on PORTABLE    Result Date: 8/2/2022  Improved bibasilar airspace disease and edema with residual right perihilar infiltrate. COPD. Assessment :      Hospital Problems             Last Modified POA    * (Principal) ACS (acute coronary syndrome) ([facility] 8/2/2022 Yes     #Chest pain   -intermittent left sided cp for a few days which started after cocaine use a few days ago  -[facility] ACS with recent CABG this year, underlying HFrEF  -troponin unrevealing x3  -EKG without any acute ischemic ST changes  -CXR: no typical CHF signs on cxr.   -Underlying CKD3  -Echo from 3/2022: nml EF, nml diastolic fllnig. Mild MR,TR and mild pulm htn with RVSP of 41mmhg  -Left heart Cath report from 3/2022: Three vessel disease with left main involvement. Moderate LV systolic dysfunction. LVEF 30%. Referred to CT surgery for CABG evaluation   -home meds include asa, plavix, metoprolol 25mg BID, aldactone 25mg, lasix 20mg,  Amiodarone 200mg BID. PLAN  -sublingual nitro prn, DC nitro infusion, anaglesia, resume home meds, monitor on tele, cycle cardiac biomarkers and EKG, sublingual nitro, LFTs,   -Confirm if patient is on midrodrine 5mg TID  - on cocain cessation        #Hypoglycemia/Failure to thrive   -EMS reports patient's initial BS was 41. He was given  amp d5 and had a lunch box in ER. Glucose now in low 100s. Patient appears malnourished and significantly cachectic in appearance. -BMI 18, albumin and liver pne pending []  -palliative care consult []      #Etoh Intoxication   -reluctant to provide the amount or type he drinks. He says he drinks at least a pint of etoh. Reports he is tipsey from drinking etoh prior to admission. PLAN  -etoh withdrawal protocol, thiamine, folic acid  -Follow up on etoh level, LFTs []  -replace mg until goal       #HFrEF  -Echo showing EF 30% on cath reports from march 2022  -no signs of fluid overload, cxr clear.  Renal function at baseline  -Resume home medications      #Previous MCA

## 2022-08-03 NOTE — CARE COORDINATION
Consult for consideration of rehab  Met with pt this date was awake and admits to drinking a pint of Gin that would last for 2 days. Last drink date of admission. Pt states he uses cocaine every so often. Last used 4 days ago. Denies any marijuana use or other substances. No previous rehab. Pt not interested in rehab at this time. Declined tx resources also.   Insurance is [employer]

## 2022-08-03 NOTE — CONSULTS
Palliative Care Inpatient Consult    NAME:  Jake Rendon  MEDICAL RECORD NUMBER:  [id number]  AGE: 74 y.o.    GENDER: male  : 1955  TODAY'S DATE:  8/3/2022    Reasons for Consultation:    Symptom and/or pain management  Provision of information regarding PC and/or hospice philosophies  Complex, time-intensive communication and interdisciplinary psychosocial support  Clarification of goals of care and/or assistance with difficult decision-making  Guidance in regards to resources and transition(s)    Members of PC team contributing to this consultation are :  Dr. Gatito Aldridge palliative care attending  Plan      Palliative Interaction:  The patient was seen today, was lying comfortably in the bed alert, awake, oriented and following commands  No family member was present in patient's room  I deduced myself to the patient and explained him the role of palliative care and told him that palliative care is an extra layer of support and strength for him and family  Patient reports that he is single and lives with his cousin and has only 1 living child-his daughter named Marilynn Almanza  I explained the significance of POA paperwork for health to the patient and told him that if anytime he is not able to make his healthcare decisions then his daughter Marilynn Almanza is his legal spokesperson  I explained the different types of codes to the patient and patient stated that he would not want to have CPR/chest compressions done if his heart stopped and also would not want to be intubated if his breathing stopped and told that if this happens then let him go peacefully and naturally  I changed patient's CODE STATUS to DNR CCA with no intubation as per patient's wishes and this was witnessed by myself and patient's RN Lorin Sanabria  I offered comfort and emotional support to the patient    Education/support to staff  Education/support to family  Education/support to patient  Discharge planning/helping to coordinate care  Communications with primary service  Caregiver support/education  Code status clarified: Full Code  Code status clarified: [facility]  Code status clarified: [facility]  Provided information about hospice  Other major issues      History of Present Illness     The patient is a 74 y.o. Non- / non  male who presents with Chest Pain (X1 day)      Referred to Palliative Care by   [x] Physician   [] Nursing  [] Family Request   [] Other:       He was admitted to the internal medicine service for Unstable angina pectoris ([address] [I20.0]  ACS (acute coronary syndrome) ([address] [I24.9]. His hospital course has been associated with ACS (acute coronary syndrome) ([facility] [address] [address] The patient has a complicated medical history and has been hospitalized since 8/2/2022  6:07 PM.  The patient was admitted due to vague dull chest pain about 5/10, worse with palpation of sternum and associated with nausea. As per records patient was not able to clearly state when the patient started since patient had been using cocaine when the patient got worse and also therefore coming to the hospital patient was drinking alcohol and thus not remember his symptoms. In general patient said that his symptoms improved since CABG which was done in March 2022. Patient has history of AAM with CAD s/p CABG, longstanding ethanol and drug use. Palliative care consulted for goals of care, CODE STATUS discussion, symptom management and family support.     Active Hospital Problems    Diagnosis Date Noted    3-vessel CAD [I25.10] 03/11/2022     Priority: High    ACS (acute coronary syndrome) ([address] [I24.9] 08/02/2022     Priority: Medium    Benign prostatic hyperplasia with urinary obstruction [N40.1, N13.8] 10/27/2016       PAST MEDICAL HISTORY      Diagnosis Date    3-vessel CAD 03/11/2022    Alcohol abuse 06/23/2015    Arthritis     Chronic kidney disease     Full dentures     upper and lower full    Hypertension     Other emphysema ([address]     COPD stage II on PFTs with severe reduction diffusion capacity    Pure hypercholesterolemia 43/51/9343    Systolic CHF (HCC)     Wears glasses        PAST SURGICAL HISTORY  Past Surgical History:   Procedure Laterality Date    APPENDECTOMY      CORONARY ARTERY BYPASS GRAFT N/A 3/21/2022    CABG X 2 , EVH RT LEG , MELI performed by Alesha Martinez MD at [address] Left     W/ HARDWARE    PA EGD TRANSORAL BIOPSY SINGLE/MULTIPLE  6/19/2017    EGD BIOPSY performed by Douglas Lawrence DO at [facility]       SOCIAL HISTORY  Social History     Tobacco Use    Smoking status: Light Smoker     Packs/day: 0.00     Types: Cigarettes    Smokeless tobacco: Never    Tobacco comments:     Nohelia Virgen smokes when he drinks, 1 packs lasts him a month\"   Vaping Use    Vaping Use: Never used   Substance Use Topics    Alcohol use:  Yes     Alcohol/week: 0.0 standard drinks     Comment:  \" drinks a pint on the weekend\" off and on    Drug use: Yes     Types: Cocaine, Marijuana (Weed)     Comment: cocaine and marijuana last used 9/9/17,       ALLERGIES  No Known Allergies      MEDICATIONS  Current Medications    metoprolol tartrate  12.5 mg Oral BID    amiodarone  200 mg Oral BID    aspirin  81 mg Oral Daily    atorvastatin  80 mg Oral Nightly    clopidogrel  75 mg Oral Daily    furosemide  20 mg Oral Daily    pantoprazole  40 mg Oral Daily    spironolactone  25 mg Oral Daily    tamsulosin  0.4 mg Oral Daily    sodium chloride flush  5-40 mL IntraVENous 2 times per day    enoxaparin  40 mg SubCUTAneous Daily    sodium chloride flush  5-40 mL IntraVENous 2 times per day    thiamine (VITAMIN B1) IVPB  500 mg IntraVENous Q8H    Followed by    Jaylene Mask ON 8/4/2022] thiamine (VITAMIN B1) IVPB  250 mg IntraVENous Q24H    Followed by    Jaylene Mask ON 8/9/2022] thiamine  100 mg Oral Daily     sodium chloride flush, sodium chloride, ondansetron **OR** ondansetron, acetaminophen **OR** acetaminophen, magnesium hydroxide, potassium chloride **OR** potassium alternative oral replacement **OR** potassium chloride, potassium chloride, magnesium sulfate, glucose, dextrose bolus **OR** dextrose bolus, glucagon (rDNA), dextrose, nitroGLYCERIN, sodium chloride flush, sodium chloride, LORazepam **OR** LORazepam **OR** LORazepam **OR** LORazepam **OR** LORazepam **OR** LORazepam **OR** LORazepam **OR** LORazepam  IV Drips/Infusions   sodium chloride      dextrose      sodium chloride       Home Medications  No current facility-administered medications on file prior to encounter. Current Outpatient Medications on File Prior to Encounter   Medication Sig Dispense Refill    amiodarone (CORDARONE) 200 MG tablet Take 1 tablet by mouth 2 times daily 30 tablet 0    atorvastatin (LIPITOR) 80 MG tablet Take 1 tablet by mouth nightly 30 tablet 3    clopidogrel (PLAVIX) 75 MG tablet Take 1 tablet by mouth daily 30 tablet 3    metoprolol tartrate (LOPRESSOR) 25 MG tablet Take 0.5 tablets by mouth 2 times daily 60 tablet 3    midodrine (PROAMATINE) 5 MG tablet Take 1 tablet by mouth 3 times daily (with meals) 90 tablet 3    pantoprazole (PROTONIX) 40 MG tablet Take 1 tablet by mouth daily 30 tablet 3    spironolactone (ALDACTONE) 25 MG tablet Take 1 tablet by mouth daily 30 tablet 3    furosemide (LASIX) 20 MG tablet Take 1 tablet by mouth daily 60 tablet 3    nitroGLYCERIN (NITROSTAT) 0.3 MG SL tablet Place 0.3 mg under the tongue every 5 minutes as needed for Chest pain up to max of 3 total doses.  If no relief after 1 dose, call 911.      ibuprofen (ADVIL;MOTRIN) 800 MG tablet Take 1 tablet by mouth every 8 hours as needed for Pain 30 tablet 0    aspirin 81 MG EC tablet Take 1 tablet by mouth daily 30 tablet 3    tamsulosin (FLOMAX) 0.4 MG capsule Take 1 capsule by mouth daily 30 capsule 3       Data         BP (!) 166/88   Pulse 61   Temp 97.9 °F (36.6 °C) (Oral)   Resp 16   Ht 6' 3\" (1.905 m) Comment: per patinet  Wt 150 lb (68 kg)   SpO2 100%   BMI 18.75 kg/m²     Wt Readings from Last 3 Encounters:   08/03/22 150 lb (68 kg)   04/08/22 154 lb 3.2 oz (69.9 kg)   03/10/22 145 lb 8.1 oz (66 kg)        Code Status: Full Code     ADVANCED CARE PLANNING:  Patient has capacity for medical decisions: yes  Health Care Power of : no  Living Will: no     Personal, Social, and Family History  Marital Status: single  Living situation: with family: Cousin  Importance of eduardo/Voodoo/spiritual beliefs: [] Very [x] Somewhat [] Not   Psychological Distress: mild  Does patient understand diagnosis/treatment? yes  Does caregiver understand diagnosis/treatment? not asked    Past Medical History:   Diagnosis Date    3-vessel CAD 03/11/2022    Alcohol abuse 06/23/2015    Arthritis     Chronic kidney disease     Full dentures     upper and lower full    Hypertension     Other emphysema ([address]     COPD stage II on PFTs with severe reduction diffusion capacity    Pure hypercholesterolemia 68/98/3854    Systolic CHF ([address]     Wears glasses          Family History   Problem Relation Age of Onset    Diabetes Mother     Cancer Mother        Social History     Tobacco Use    Smoking status: Light Smoker     Packs/day: 0.00     Types: Cigarettes    Smokeless tobacco: Never    Tobacco comments:     Yue Campos smokes when he drinks, 1 packs lasts him a month\"   Vaping Use    Vaping Use: Never used   Substance Use Topics    Alcohol use:  Yes     Alcohol/week: 0.0 standard drinks     Comment:  \" drinks a pint on the weekend\" off and on    Drug use: Yes     Types: Cocaine, Marijuana (Weed)     Comment: cocaine and marijuana last used 9/9/17,           Assessment        REVIEW OF SYSTEMS  Constitutional: no fever, no chills or weight loss, + fatigue, + tiredness, + weakness  Eyes: no eye pain or blurred vision  ENT: no hearing loss, congestion, or difficulty swallowing   Respiratory: no wheezing, chest tightness, or shortness of breath   Cardiovascular: no chest pain or pressure, no palpitations, no diaphoresis   Gastrointestinal: Dyspnea:  none                          Fatigue:   Weakness and tiredness    We feel the patient symptoms are being controlled. his current regimen is reviewed by myself and discussed with the staff. CODE STATUS changed to DNR CCA with no intubation as per patient's wishes    We will continue to follow-up with the patient for further goals of care  We will continue to provide comfort and support to the patient and family    2- Goals of care evaluation   The patient goals of care are improve or maintain function/quality of life, spiritual needs, strengthening relationships, preserve independence/autonomy/control, and support for family/caregiver   Goals of care discussed with:    [x] Patient independently    [] Patient and Family    [] Family or Healthcare DPOA independently    [] Unable to discuss with patient, family/DPOA not present    3- Code Status  DNR CCA with no intubation    4- Other recommendations   - We will continue to provide comfort and support to the patient and the family  Please call with any palliative questions or concerns. Palliative Care Team is available via perfect serve or via phone. Palliative Care will continue to follow Mr. Andriy calhoun as needed. Thank you for allowing Palliative Care to participate in the care of Mr. Tai Rivera . This note has been dictated by dragon, typing errors may be a possibility.     The total time I spent in seeing the patient, discussing goals of care, advanced directives, code status, greater than 50% time in counseling and other major issues was more than 60 minutes      Electronically signed by   Mahad King MD  Palliative Care Team  on 8/3/2022 at 9:29 AM    Palliative care office: [phone]

## 2022-08-03 NOTE — PROGRESS NOTES
RCA)- Stable and doing well. Incisions healing nicely. Denies any significant pain. COntinue PO ASA, statin, Plavix, BB. Decrease Amio to 200mg daily. 2. Chronic systolic CHF/ICMP with LVEF 35% - refused Lifevest and continues to refuse. States he is doing \"fine. \" Continue PO BB and Aldactone. No ACE/ARB/ARNI d/t renal function & hypotension on Midodrine. Will repeat BMP and Mg and consider adding Farxiga and/or ACE/ARB/Entresto pending renal recovery/function. Discusssed in detail with patient. Questions/concerns addressed. Plan to repeat limited echo after 90 days. 3. HTN- stable. Had hypotension in hospital. Continue PO BB along with Midodrine. 4. HLP- continue statin. F/U on lipid panel.  Discussed improtance of compliance, diet, & exercise        Avni Rendon, [facility]

## 2022-08-03 NOTE — ACP (ADVANCE CARE PLANNING)
..Advance Care Planning     Advance Care Planning Clinical Specialist  Conversation Note      Date of ACP Conversation: 8/2/2022    Conversation Conducted with: Patient with Decision Making Capacity    ACP Clinical Specialist: Renan Jauregui 186 Decision Maker:     Current Designated Healthcare Decision Maker:     Primary Decision Maker: Serg Arnold - [phone]  Click here to complete Healthcare Decision Makers including section of the Healthcare Decision Maker Relationship (ie \"Primary\")  Today we documented Decision Maker(s) consistent with Legal Next of Kin hierarchy. Care Preferences    Hospitalization: \"If your health worsens and it becomes clear that your chance of recovery is unlikely, what would your preference be regarding hospitalization? \"    Choice:  [x] The patient wants hospitalization. [] The patient prefers comfort-focused treatment without hospitalization. Ventilation: \"If you were in your present state of health and suddenly became very ill and were unable to breathe on your own, what would your preference be about the use of a ventilator (breathing machine) if it were available to you? \"      If the patient would desire the use of ventilator (breathing machine), answer \"yes\". If not, \"no\": yes    \"If your health worsens and it becomes clear that your chance of recovery is unlikely, what would your preference be about the use of a ventilator (breathing machine) if it were available to you? \"     Would the patient desire the use of ventilator (breathing machine)?: Yes      Resuscitation  \"CPR works best to restart the heart when there is a sudden event, like a heart attack, in someone who is otherwise healthy. Unfortunately, CPR does not typically restart the heart for people who have serious health conditions or who are very sick. \"    \"In the event your heart stopped as a result of an underlying serious health condition, would you want attempts to be made to restart your heart (answer \"yes\" for attempt to resuscitate) or would you prefer a natural death (answer \"no\" for do not attempt to resuscitate)? \" yes       [] Yes   [] No   Educated Patient / [place] regarding differences between Advance Directives and portable DNR orders.     Length of ACP Conversation in minutes:      Conversation Outcomes:  [x] ACP discussion completed  [] Existing advance directive reviewed with patient; no changes to patient's previously recorded wishes  [] New Advance Directive completed  [] Portable Do Not Rescitate prepared for Provider review and signature  [] POLST/POST/MOLST/MOST prepared for Provider review and signature      Follow-up plan:    [] Schedule follow-up conversation to continue planning  [] Referred individual to Provider for additional questions/concerns   [] Advised patient/agent/surrogate to review completed ACP document and update if needed with changes in condition, patient preferences or care setting    [] This note routed to one or more involved healthcare providers

## 2022-08-03 NOTE — ED NOTES
The following labs labeled with pt sticker and tubed to lab:     [] Blue     [] Delicia Angles   [] on ice  [x] Green/yellow  [] Green/black [] on ice  [] Yellow  [] Red  [] Pink      [] COVID-19 swab    [] Rapid  [] PCR  [] Flu swab  [] Peds Viral Panel     [] Urine Sample  [] Pelvic Cultures  [] Blood Cultures            Tasia Sofia RN  08/02/22 2005

## 2022-08-04 VITALS
TEMPERATURE: 97.5 F | HEIGHT: 75 IN | OXYGEN SATURATION: 97 % | DIASTOLIC BLOOD PRESSURE: 79 MMHG | BODY MASS INDEX: 18.03 KG/M2 | HEART RATE: 57 BPM | SYSTOLIC BLOOD PRESSURE: 137 MMHG | RESPIRATION RATE: 16 BRPM | WEIGHT: 145 LBS

## 2022-08-04 LAB
ANION GAP SERPL CALCULATED.3IONS-SCNC: 9 MMOL/L (ref 9–17)
BUN BLDV-MCNC: 17 MG/DL (ref 8–23)
CALCIUM SERPL-MCNC: 8.6 MG/DL (ref 8.6–10.4)
CHLORIDE BLD-SCNC: 107 MMOL/L (ref 98–107)
CO2: 26 MMOL/L (ref 20–31)
CREAT SERPL-MCNC: 1.01 MG/DL (ref 0.7–1.2)
GFR AFRICAN AMERICAN: >60 ML/MIN
GFR NON-AFRICAN AMERICAN: >60 ML/MIN
GFR SERPL CREATININE-BSD FRML MDRD: NORMAL ML/MIN/{1.73_M2}
GLUCOSE BLD-MCNC: 59 MG/DL (ref 75–110)
GLUCOSE BLD-MCNC: 73 MG/DL (ref 70–99)
GLUCOSE BLD-MCNC: 84 MG/DL (ref 75–110)
HCT VFR BLD CALC: 40.7 % (ref 40.7–50.3)
HEMOGLOBIN: 12.6 G/DL (ref 13–17)
MCH RBC QN AUTO: 24.6 PG (ref 25.2–33.5)
MCHC RBC AUTO-ENTMCNC: 31 G/DL (ref 28.4–34.8)
MCV RBC AUTO: 79.3 FL (ref 82.6–102.9)
NRBC AUTOMATED: 0 PER 100 WBC
PDW BLD-RTO: 16.7 % (ref 11.8–14.4)
PLATELET # BLD: ABNORMAL K/UL (ref 138–453)
PLATELET, FLUORESCENCE: NORMAL K/UL (ref 138–453)
POTASSIUM SERPL-SCNC: 4.1 MMOL/L (ref 3.7–5.3)
RBC # BLD: 5.13 M/UL (ref 4.21–5.77)
SODIUM BLD-SCNC: 142 MMOL/L (ref 135–144)
WBC # BLD: 5.2 K/UL (ref 3.5–11.3)

## 2022-08-04 PROCEDURE — 6370000000 HC RX 637 (ALT 250 FOR IP): Performed by: INTERNAL MEDICINE

## 2022-08-04 PROCEDURE — 80048 BASIC METABOLIC PNL TOTAL CA: CPT

## 2022-08-04 PROCEDURE — G0378 HOSPITAL OBSERVATION PER HR: HCPCS

## 2022-08-04 PROCEDURE — 2580000003 HC RX 258: Performed by: INTERNAL MEDICINE

## 2022-08-04 PROCEDURE — 82947 ASSAY GLUCOSE BLOOD QUANT: CPT

## 2022-08-04 PROCEDURE — 6370000000 HC RX 637 (ALT 250 FOR IP): Performed by: NURSE PRACTITIONER

## 2022-08-04 PROCEDURE — 36415 COLL VENOUS BLD VENIPUNCTURE: CPT

## 2022-08-04 PROCEDURE — 96366 THER/PROPH/DIAG IV INF ADDON: CPT

## 2022-08-04 PROCEDURE — 96372 THER/PROPH/DIAG INJ SC/IM: CPT

## 2022-08-04 PROCEDURE — 96376 TX/PRO/DX INJ SAME DRUG ADON: CPT

## 2022-08-04 PROCEDURE — 6360000002 HC RX W HCPCS: Performed by: INTERNAL MEDICINE

## 2022-08-04 PROCEDURE — 99214 OFFICE O/P EST MOD 30 MIN: CPT | Performed by: FAMILY MEDICINE

## 2022-08-04 PROCEDURE — 85027 COMPLETE CBC AUTOMATED: CPT

## 2022-08-04 PROCEDURE — 99217 PR OBSERVATION CARE DISCHARGE MANAGEMENT: CPT | Performed by: STUDENT IN AN ORGANIZED HEALTH CARE EDUCATION/TRAINING PROGRAM

## 2022-08-04 PROCEDURE — 6360000002 HC RX W HCPCS: Performed by: NURSE PRACTITIONER

## 2022-08-04 PROCEDURE — APPSS30 APP SPLIT SHARED TIME 16-30 MINUTES: Performed by: NURSE PRACTITIONER

## 2022-08-04 PROCEDURE — APPNB30 APP NON BILLABLE TIME 0-30 MINS: Performed by: NURSE PRACTITIONER

## 2022-08-04 PROCEDURE — 85055 RETICULATED PLATELET ASSAY: CPT

## 2022-08-04 RX ORDER — AMIODARONE HYDROCHLORIDE 200 MG/1
200 TABLET ORAL DAILY
Qty: 30 TABLET | Refills: 0 | Status: SHIPPED | OUTPATIENT
Start: 2022-08-05 | End: 2022-09-04

## 2022-08-04 RX ORDER — MULTIVIT-MIN/IRON FUM/FOLIC AC 7.5 MG-4
1 TABLET ORAL DAILY
Qty: 30 TABLET | Refills: 0 | Status: SHIPPED | OUTPATIENT
Start: 2022-08-04

## 2022-08-04 RX ORDER — LANOLIN ALCOHOL/MO/W.PET/CERES
100 CREAM (GRAM) TOPICAL DAILY
Qty: 30 TABLET | Refills: 0 | Status: SHIPPED | OUTPATIENT
Start: 2022-08-09

## 2022-08-04 RX ORDER — AMLODIPINE BESYLATE 10 MG/1
10 TABLET ORAL DAILY
Qty: 30 TABLET | Refills: 0 | Status: SHIPPED | OUTPATIENT
Start: 2022-08-05

## 2022-08-04 RX ORDER — CHLORDIAZEPOXIDE HYDROCHLORIDE 5 MG/1
10 CAPSULE, GELATIN COATED ORAL EVERY 6 HOURS PRN
Status: DISCONTINUED | OUTPATIENT
Start: 2022-08-04 | End: 2022-08-04 | Stop reason: HOSPADM

## 2022-08-04 RX ADMIN — PANTOPRAZOLE SODIUM 40 MG: 40 TABLET, DELAYED RELEASE ORAL at 09:37

## 2022-08-04 RX ADMIN — THIAMINE HYDROCHLORIDE 500 MG: 100 INJECTION, SOLUTION INTRAMUSCULAR; INTRAVENOUS at 06:37

## 2022-08-04 RX ADMIN — TAMSULOSIN HYDROCHLORIDE 0.4 MG: 0.4 CAPSULE ORAL at 09:37

## 2022-08-04 RX ADMIN — ENOXAPARIN SODIUM 40 MG: 100 INJECTION SUBCUTANEOUS at 09:38

## 2022-08-04 RX ADMIN — FUROSEMIDE 20 MG: 20 TABLET ORAL at 09:37

## 2022-08-04 RX ADMIN — SPIRONOLACTONE 25 MG: 25 TABLET ORAL at 09:37

## 2022-08-04 RX ADMIN — AMLODIPINE BESYLATE 10 MG: 10 TABLET ORAL at 09:37

## 2022-08-04 RX ADMIN — THIAMINE HYDROCHLORIDE 500 MG: 100 INJECTION, SOLUTION INTRAMUSCULAR; INTRAVENOUS at 00:00

## 2022-08-04 RX ADMIN — CLOPIDOGREL 75 MG: 75 TABLET, FILM COATED ORAL at 09:37

## 2022-08-04 RX ADMIN — Medication 81 MG: at 09:37

## 2022-08-04 ASSESSMENT — PAIN SCALES - GENERAL: PAINLEVEL_OUTOF10: 0

## 2022-08-04 NOTE — PLAN OF CARE
Problem: Discharge Planning  Goal: Discharge to home or other facility with appropriate resources  8/4/2022 0552 by Boo Mccoy  Outcome: Progressing  8/3/2022 1626 by Nadege Beck RN  Outcome: Progressing     Problem: Skin/Tissue Integrity  Goal: Absence of new skin breakdown  Description: 1. Monitor for areas of redness and/or skin breakdown  2. Assess vascular access sites hourly  3. Every 4-6 hours minimum:  Change oxygen saturation probe site  4. Every 4-6 hours:  If on nasal continuous positive airway pressure, respiratory therapy assess nares and determine need for appliance change or resting period.   8/4/2022 0552 by Boo Mccoy  Outcome: Progressing  8/3/2022 1626 by Nadege Beck RN  Outcome: Progressing     Problem: Safety - Adult  Goal: Free from fall injury  8/4/2022 0552 by Boo Mccoy  Outcome: Progressing  8/3/2022 1626 by Nadege Beck RN  Outcome: Progressing     Problem: ABCDS Injury Assessment  Goal: Absence of physical injury  8/4/2022 0552 by Boo Mccoy  Outcome: Progressing  8/3/2022 1626 by Nadege Beck RN  Outcome: Progressing     Problem: Pain  Goal: Verbalizes/displays adequate comfort level or baseline comfort level  8/4/2022 0552 by Boo Mccoy  Outcome: Progressing  8/3/2022 1626 by Nadege Beck RN  Outcome: Progressing     Problem: Nutrition Deficit:  Goal: Optimize nutritional status  8/4/2022 0552 by Boo Mccoy  Outcome: Progressing  8/3/2022 1626 by Nadege Beck RN  Outcome: Progressing

## 2022-08-04 NOTE — DISCHARGE SUMMARY
AVS reviewed with patient. No questions or concerns at this time. IV removed. Patient wheeled down to cab ride to meet family at home.

## 2022-08-04 NOTE — CARE COORDINATION
Transitional planning. Pt is discharged, spoke to pt - he will need transport, has WC and Walker at home. He states someone will be there to help him into his home. LM for pt's cousin that he lives with to call CM, Spoke to pt's other cousin Eladio Kirk, informed her that pt is discharging today and will need help into his home form the car. She stated pt's cousin will be there and they also have family next door to help if needed. Pt verified address: [address] [place] [address], [address] [facility]    0900 informed that pt is ready for cab ride home. [employer] transportation scheduled for [phone], confirmation number [id number]  Informed pt's nurse, Oscar Sullivan RN and informed pt.

## 2022-08-04 NOTE — PROGRESS NOTES
[facility]  Office: [address], DO, Lakhwinder Newby, DO, Mor Dry, DO, Phoebe Domingou Blood, DO, Susi Nair MD, Abimbola Lowe MD, Asim Odom MD, Melissa Troy MD,  Saul Chaves MD, Lore Prater MD, Gabriela Ham, DO, Alicia Noriega MD,  Mirela Coto MD, Jamal Mckinnon MD, Sam Hood, DO, Brenden Payne MD, Vicente Aleman MD, Sonia Nance MD, Claudell Orion, DO, Sergio Hodges MD, Karen Stiles MD, Kathe Laboy, CNP,  Hung Kline, CNP, Mariya Sims, CNP, Win Pritchett, CNP, Manuela Garcia PA-C, Nolvia Quigley, DNP, Wayne mSith, CNP, Berlinda Boast, CNP, Jess Quiles, CNP, Sonia Nicole, CNP, Suha Steve, CNP, Emeka Paris, CNS, Katherine Gonzalez, DNP, Brady Ray, CNP, Abel Segovia, CNP, Akil Duckworth, CNP           [address]    Progress Note    8/4/2022    7:41 AM    Name:   Valencia Pang  MRN:     [id number]     [place]:      [de-identified]   Room:   033/0331-01   Day:  0  Admit Date:  8/2/2022  6:07 PM    PCP:   Karina Romo MD  Code Status:  DNR-CCA    Subjective:     C/C:   Chief Complaint   Patient presents with    Chest Pain     X1 day     Interval History Status: improved. Seen and evaluated in room resting in bed  No acute events overnight, tolerating medication change well  No further chest pain  Discussion with patient regarding cocaine cessation prior to discharge  Discussed with cardiology, okay to discharge    Brief History:     Mr Zaida Cortes is a 73 yo cachectic appearing AAM with CAD s/p cabg in march 2022, long standing Etoh and drug use who presented with vague dull chest pain reported as 5/10, worse with palpation of sternum and associated with nausea. Patient says his pain has been ongoing since cabg but was worse after cocaine use a few days ago.   Patient cannot remember how long his symptoms occurred  and reports drinking etoh before arrival thus cannot remember his symptoms. He says in general his chest pain has improved since cabg. He reports the sublingual nitro in EMS helped and has yet to come back. He denies any SOB at rest, pnd, orthopnea, leg swelling. He doesn't know why his sugar is low but says he hasn't been eating. He is reluctant to give any information about etoh or drug use and is requesting to be left alone to get rest. He denies any current chest pain when assessed. Review of Systems:     Constitutional:  negative for chills, fevers, sweats  Respiratory:  negative for cough, dyspnea on exertion, shortness of breath, wheezing  Cardiovascular:  negative for chest pain, chest pressure/discomfort, lower extremity edema, palpitations  Gastrointestinal:  negative for abdominal pain, constipation, diarrhea, nausea, vomiting  Neurological:  negative for dizziness, headache    Medications:      Allergies:  No Known Allergies    Current Meds:   Scheduled Meds:    aspirin  81 mg Oral Daily    atorvastatin  80 mg Oral Nightly    clopidogrel  75 mg Oral Daily    furosemide  20 mg Oral Daily    pantoprazole  40 mg Oral Daily    spironolactone  25 mg Oral Daily    tamsulosin  0.4 mg Oral Daily    sodium chloride flush  5-40 mL IntraVENous 2 times per day    enoxaparin  40 mg SubCUTAneous Daily    amiodarone  200 mg Oral Daily    amLODIPine  10 mg Oral Daily    sodium chloride flush  5-40 mL IntraVENous 2 times per day    thiamine (VITAMIN B1) IVPB  500 mg IntraVENous Q8H    Followed by    thiamine (VITAMIN B1) IVPB  250 mg IntraVENous Q24H    Followed by    Brandon Linton ON 8/9/2022] thiamine  100 mg Oral Daily     Continuous Infusions:    sodium chloride      dextrose      sodium chloride 25 mL/hr at 08/03/22 1543     PRN Meds: sodium chloride flush, sodium chloride, ondansetron **OR** ondansetron, acetaminophen **OR** acetaminophen, magnesium hydroxide, potassium chloride **OR** potassium alternative oral replacement **OR** potassium chloride, potassium chloride, magnesium sulfate, glucose, dextrose bolus **OR** dextrose bolus, glucagon (rDNA), dextrose, nitroGLYCERIN, sodium chloride flush, sodium chloride, LORazepam **OR** LORazepam **OR** LORazepam **OR** LORazepam **OR** LORazepam **OR** LORazepam **OR** LORazepam **OR** LORazepam    Data:     Past Medical History:   has a past medical history of 3-vessel CAD, Alcohol abuse, Arthritis, Chronic kidney disease, Full dentures, Hypertension, Other emphysema ([address] Pure hypercholesterolemia, Systolic CHF ([address] and Wears glasses. Social History:   reports that he has been smoking cigarettes. He has never used smokeless tobacco. He reports current alcohol use. He reports current drug use. Drugs: Cocaine and Marijuana ([place] [facility]). Family History:   Family History   Problem Relation Age of Onset    Diabetes Mother     Cancer Mother        Vitals:  /74   Pulse 67   Temp 98.5 °F (36.9 °C) (Oral)   Resp 16   Ht 6' 3\" (1.905 m) Comment: per patinet  Wt 145 lb (65.8 kg)   SpO2 100%   BMI 18.12 kg/m²   Temp (24hrs), Av.1 °F (36.7 °C), Min:97.8 °F (36.6 °C), Max:98.5 °F (36.9 °C)    Recent Labs     22  0729 22  1103 22  1600 22   POCGLU 78 105 100 80       I/O (24Hr):     Intake/Output Summary (Last 24 hours) at 2022 0741  Last data filed at 2022 0032  Gross per 24 hour   Intake 338.47 ml   Output 1275 ml   Net -936.53 ml       Labs:  Hematology:  Recent Labs     22  1827 22  0737   WBC 6.6 7.0   RBC 4.67 5.03   HGB 11.5* 12.4*   HCT 37.9* 43.5   MCV 81.2* 86.5   MCH 24.6* 24.7*   MCHC 30.3 28.5   RDW 16.8* 17.1*    254   MPV 10.7 10.9   SEDRATE 34*  --      Chemistry:  Recent Labs     22  1827 22  0237 22  0737     --   --  143   K 3.7  --   --  3.8     --   --  115*   CO2 16*  --   --  19*   GLUCOSE 122*  --   --  86   BUN 22  --   --  18   CREATININE 1.13  --   --  0.91   MG 1.6  --   --  1.9   ANIONGAP 20*  --   --  9   LABGLOM >60  --   --  >60   GFRAA >60  --   --  >60   CALCIUM 8.7  --   --  7.8*   TROPHS 18 18 19 19     Recent Labs     08/02/22  1827 08/03/22  0729 08/03/22  0737 08/03/22  1103 08/03/22  1600 08/03/22  2000   PROT 6.9  --  6.3*  --   --   --    LABALBU 3.8  --  3.3*  --   --   --    AST 19  --  17  --   --   --    ALT 12  --  13  --   --   --    ALKPHOS 83  --  81  --   --   --    BILITOT 0.32  --  0.34  --   --   --    BILIDIR 0.14  --   --   --   --   --    CHOL  --   --  89  --   --   --    HDL  --   --  48  --   --   --    LDLCHOLESTEROL  --   --  35  --   --   --    CHOLHDLRATIO  --   --  1.9  --   --   --    TRIG  --   --  30  --   --   --    POCGLU  --  78  --  105 100 80     ABG:  Lab Results   Component Value Date/Time    POCPH 7.467 03/24/2022 05:27 PM    POCPCO2 28.7 03/24/2022 05:27 PM    POCPO2 48.3 03/24/2022 05:27 PM    POCHCO3 20.7 03/24/2022 05:27 PM    NBEA 2 03/24/2022 05:27 PM    PBEA 0 03/24/2022 08:49 AM    LVGD1RCF 87 03/24/2022 05:27 PM    FIO2 8.0 03/24/2022 05:27 PM     Lab Results   Component Value Date/Time    SPECIAL NOT REPORTED 06/17/2017 10:40 PM     Lab Results   Component Value Date/Time    CULTURE NORMAL RESPIRATORY KYLAH MODERATE GROWTH 03/22/2022 08:30 PM       Radiology:  XR CHEST PORTABLE    Result Date: 8/2/2022  Improved bibasilar airspace disease and edema with residual right perihilar infiltrate. COPD.        Physical Examination:        General appearance:  alert, cooperative and no distress  Mental Status:  oriented to person, place and time and normal affect  Lungs:  clear to auscultation bilaterally, normal effort  Heart:  regular rate and rhythm, no murmur  Abdomen:  soft, nontender, nondistended, normal bowel sounds, no masses, hepatomegaly, splenomegaly  Extremities:  no edema, redness, tenderness in the calves  Skin:  no gross lesions, rashes, induration    Assessment:        Hospital Problems             Last Modified POA    * (Principal) ACS (acute coronary syndrome) ([facility] 8/2/2022 Yes    3-vessel CAD 8/3/2022 Yes    Cocaine use 8/3/2022 Yes    Encounter for palliative care 8/3/2022 Yes    Essential hypertension 8/3/2022 Yes    Benign prostatic hyperplasia with urinary obstruction 8/3/2022 Yes    Overview Signed 10/14/2017  7:31 PM by [id number] [facility]     Updating Deprecated Diagnoses            Plan:        ACS with MVD:   Primary hypertension:  Sinus bradycardia: Cardiology following. Attributing CP to cocaine. s/p CABG X2 in 4/2022. Medication adjustment per their recommendations. Polysubstance use: UDS positive for cocaine and cannibis, EToH present on admission   BPH: 1.275 nL UOP documented over the last 24 hours. Continue flomax and diuretics. Dispo: discharge today     On this date 08/04/22 I have spent 25 mins  in direct patient care, reviewing notes, test results and diagnosis and plan of care discussions with the patient, as well as coordination of care.   Plan of care made with MD Siri Gonzales APRN - NP  8/4/2022  7:41 AM

## 2022-08-04 NOTE — PLAN OF CARE
Problem: Discharge Planning  Goal: Discharge to home or other facility with appropriate resources  8/4/2022 1426 by Hannah Jolley RN  Outcome: Completed  8/4/2022 1426 by Hannah Jolley RN  Outcome: Adequate for Discharge  8/4/2022 0552 by Nate Valdovinos  Outcome: Progressing     Problem: Skin/Tissue Integrity  Goal: Absence of new skin breakdown  Description: 1. Monitor for areas of redness and/or skin breakdown  2. Assess vascular access sites hourly  3. Every 4-6 hours minimum:  Change oxygen saturation probe site  4. Every 4-6 hours:  If on nasal continuous positive airway pressure, respiratory therapy assess nares and determine need for appliance change or resting period.   8/4/2022 1426 by Hannah Jolley RN  Outcome: Completed  8/4/2022 1426 by Hannah Jolley RN  Outcome: Adequate for Discharge  8/4/2022 4742 by Nate Valdovinos  Outcome: Progressing     Problem: Safety - Adult  Goal: Free from fall injury  8/4/2022 1426 by Hannah Jolley RN  Outcome: Completed  8/4/2022 1426 by Hannah Jolley RN  Outcome: Adequate for Discharge  8/4/2022 0552 by Nate Valdovinos  Outcome: Progressing     Problem: ABCDS Injury Assessment  Goal: Absence of physical injury  8/4/2022 1426 by Hannah Jolley RN  Outcome: Completed  8/4/2022 1426 by Hannah Jolley RN  Outcome: Adequate for Discharge  8/4/2022 0552 by Nate Valdovinos  Outcome: Progressing     Problem: Pain  Goal: Verbalizes/displays adequate comfort level or baseline comfort level  8/4/2022 1426 by Hannah Jolley RN  Outcome: Completed  8/4/2022 1426 by Hannah Jolley RN  Outcome: Adequate for Discharge  8/4/2022 0552 by Nate Valdovinos  Outcome: Progressing     Problem: Nutrition Deficit:  Goal: Optimize nutritional status  8/4/2022 1426 by Hannah Jolley RN  Outcome: Completed  8/4/2022 1426 by Hannah Jolley RN  Outcome: Adequate for Discharge  8/4/2022 0552 by Nate Valdovinos  Outcome: Progressing

## 2022-08-04 NOTE — PROGRESS NOTES
03/11/2022    Alcohol abuse 06/23/2015    Arthritis     Chronic kidney disease     Full dentures     upper and lower full    Hypertension     Other emphysema ([facility]     COPD stage II on PFTs with severe reduction diffusion capacity    Pure hypercholesterolemia 89/47/4341    Systolic CHF ([facility]     Wears glasses        Family History   Problem Relation Age of Onset    Diabetes Mother     Cancer Mother        Social History     Tobacco Use    Smoking status: Light Smoker     Packs/day: 0.00     Types: Cigarettes    Smokeless tobacco: Never    Tobacco comments:     Nicole Floyd smokes when he drinks, 1 packs lasts him a month\"   Vaping Use    Vaping Use: Never used   Substance Use Topics    Alcohol use:  Yes     Alcohol/week: 0.0 standard drinks     Comment:  \" drinks a pint on the weekend\" off and on    Drug use: Yes     Types: Cocaine, Marijuana (Weed)     Comment: cocaine and marijuana last used 9/9/17,         Assessment        REVIEW OF SYSTEMS    []   UNABLE TO OBTAIN:     Constitutional:  []   Chills   [x]  Fatigue   []  Fevers   []  Malaise   []  Weight loss   [x] Other: Tiredness and weakness    Respiratory:   []  Cough    []  Shortness of breath    []  Chest pain    [] Other:     Cardiovascular:   []  Chest pain  []  Dyspnea    []  Exertional chest pressure/discomfort     [] Fatigue      []  Palpitations    []  Syncope   [] Other:     Gastrointestinal:   []  Abdominal pain   []  Constipation    []  Diarrhea    []   Dysphagia   []  Reflux             []  Vomiting   [] Other:     Genitourinary:  []  Dysuria     []  Frequency   []  Hematuria   [] Nocturia   []  Urinary incontinence   [] Other:     Musculoskeletal:   [] Back pain    []  Muscle weakness   []  Myalgias    []  Neck pain   []  Stiff joints   []  Other:     Behavioral/Psych:   [] Anxiety    []  Depression     []  Mood swings   [] Other:     PHYSICAL ASSESSMENT:     General: [x]  Oriented x3      [] well appearing      [] Intubated      [] ill appearing      [] Other: Mental Status: [x] normal mental status exam      [] drowsy      [] Confused      [] Other:     Cardiovascular: [x]  Regular rate/rhythm      [] Arrhythmia      [] Other:     Chest: [x] Effort normal      [] lungs clear      [] respiratory distress      [] Tachypnea      []  Other:    Abdomen: [] Soft/non-tender      [x]  Normal appearance      [] Distended      [] Ascites      [] Other:    Neurological: [x] Normal Speech      [] Normal Sensation      []  Deficits present:      Extremity:  [x] normal skin color/temp      [] clubbing/cyanosis      []  No edema      [] Other:     Palliative Performance Scale:  __x_60%  Ambulation reduced; Significant disease; Can't do hobbies/housework; intake normal or reduced; occasional assist; LOC full/confusion  ___50%  Mainly sit/lie; Extensive disease; Can't do any work; Considerable assist; intake normal or reduced; LOC full/confusion  ___40%  Mainly in bed; Extensive disease; Mainly assist; intake normal or reduced; LOC full/confusion   ___30%  Bed Bound; Extensive disease; Total care; intake reduced; LOCfull/confusion  ___20%  Bed Bound; Extensive disease; Total care; intake minimal; Drowsy/coma  ___10%  Bed Bound; Extensive disease; Total care; Mouth care only; Drowsy/coma  ___0       Death           Principle Problem/Diagnosis:  ACS (acute coronary syndrome) ([facility] [address] [facility]    Additional Assessments:  Principal Problem:    ACS (acute coronary syndrome) ([facility])  Active Problems:    3-vessel CAD    Cocaine use    Encounter for palliative care    Essential hypertension    Benign prostatic hyperplasia with urinary obstruction    1- Symptom management/ pain control     Pain Assessment:  Pain is controlled with current analgesics. Medication(s) being used: acetaminophen. Anxiety:  none                          Dyspnea:  none                          Fatigue:   Tiredness       We feel the patient symptoms are being controlled.  his current regimen is reviewed by myself and discussed with the staff. We will continue to follow-up with the patient for further goals of care  We will continue to provide comfort and support to the patient and family    2- Goals of care evaluation   The patient goals of care are live longer, improve or maintain function/quality of life, provide comfort care/support/palliation/relieve suffering, accomplish a particular personal goal, and support for family/caregiver   Goals of care discussed with:    [x] Patient independently    [] Patient and Family    [] Family or Healthcare DPOA independently    [] Unable to discuss with patient, family/DPOA not present    3- Code Status  DNR-CCA    4- Other recommendations  - We will continue to provide comfort and support to the patient and the family    Please call with any palliative questions or concerns. Palliative Care Team is available via perfect serve or via phone. Palliative Care will continue to follow Mr. Sheyla Hays care as needed. The note has been dictated by dragon, typing errors may be a possibility     Thank you for allowing Palliative Care to participate in the care of Mr. Theresa King .        Electronically signed by   Aurora Wright MD  Palliative Care Team  on 8/4/2022 at 11:34 AM    Palliative care office: [phone]

## 2022-08-04 NOTE — PLAN OF CARE
Problem: Discharge Planning  Goal: Discharge to home or other facility with appropriate resources  8/4/2022 1426 by Hannah Jolley RN  Outcome: Adequate for Discharge  8/4/2022 0552 by Nate Valdovinos  Outcome: Progressing     Problem: Skin/Tissue Integrity  Goal: Absence of new skin breakdown  Description: 1. Monitor for areas of redness and/or skin breakdown  2. Assess vascular access sites hourly  3. Every 4-6 hours minimum:  Change oxygen saturation probe site  4. Every 4-6 hours:  If on nasal continuous positive airway pressure, respiratory therapy assess nares and determine need for appliance change or resting period.   8/4/2022 1426 by Hannah Jolley RN  Outcome: Adequate for Discharge  8/4/2022 0254 by Nate Valdovinos  Outcome: Progressing     Problem: Safety - Adult  Goal: Free from fall injury  8/4/2022 1426 by Hannah Jolley RN  Outcome: Adequate for Discharge  8/4/2022 0552 by Nate Valdovinos  Outcome: Progressing     Problem: ABCDS Injury Assessment  Goal: Absence of physical injury  8/4/2022 1426 by Hannah Jolley RN  Outcome: Adequate for Discharge  8/4/2022 0552 by Nate Valdovinos  Outcome: Progressing     Problem: Pain  Goal: Verbalizes/displays adequate comfort level or baseline comfort level  8/4/2022 1426 by Hannah Jolley RN  Outcome: Adequate for Discharge  8/4/2022 0552 by Nate Valdovinos  Outcome: Progressing     Problem: Nutrition Deficit:  Goal: Optimize nutritional status  8/4/2022 1426 by Hannah Jolley RN  Outcome: Adequate for Discharge  8/4/2022 0552 by Nate Valdovinos  Outcome: Progressing

## 2022-08-04 NOTE — CARE COORDINATION
Transitional planning. Pt is discharged, spoke to pt - he will need transport, has WC and Walker at home. He states someone will be there to help him into his home. LM for pt's cousin that he lives with to call CM, Spoke to pt's other cousin Audrey Chung, informed her that pt is discharging today and will need help into his home form the car. She stated pt's cousin will be there and they also have family next door to help if needed.    Pt verified address: [address] [place] [address], [address] [facility]

## 2022-08-04 NOTE — DISCHARGE SUMMARY
[facility]  Office: [address], DO, April Cope DO, Lena Rockwell DO, Sobiawu Eric, DO, Belia Freeman MD, Kirsty Redd MD, Kamille Vasquez MD, Jacklyn Bentley MD,  Jah Good MD, Jono Holder MD, Kathe Peterson DO, Galdino De Leon MD,  Vincent Gerard MD, Jarrod Manning MD, Denise Galvan DO, José Manuel Gross MD, Christine Langley MD, Lio Hallman MD, Adwoa Pierre DO, Heide Mccord MD, Mary Briones MD, Greta Mcneil, CNP,  Kendra Crawford, CNP, Lacho Hodges, CNP, Marilin Colby, CNP, Kirstin Mcknight PA-C, Brenda Gar, [facility], Jean Claude Andrews, CNP, Ty Sam, CNP, Farheen Barger, CNP, Risa Decker, CNP, Jennifer Alvarado, CNP, Colin Nurse, CNS, Shamir Arredondo, [facility], Aura Mcclure, CNP, Bertha Mcdaniels, CNP, Savanah Gould, [facility]    Discharge Summary     Patient ID: Rayshawn Zamora  :  [birth year]   MRN: [id number]     ACCOUNT:  [de-identified]   Patient's PCP: Nayeli Rivera MD  Admit Date: 2022   Discharge Date: 2022     Length of Stay: 0  Code Status:  DNR-CCA  Admitting Physician: Rj Ray MD  Discharge Physician: Brenda Gar, APRN - NP     Active Discharge Diagnoses:     Hospital Problem Lists:  Principal Problem:    ACS (acute coronary syndrome) [facility])  Active Problems:    3-vessel CAD    Cocaine use    Encounter for palliative care    Essential hypertension    Benign prostatic hyperplasia with urinary obstruction  Resolved Problems:    * No resolved hospital problems.  *      Admission Condition:  fair     Discharged Condition: stable    Hospital Stay:     Hospital Course:  Rayshawn Zamora is a 74 y.o. male who was admitted for the management of   ACS (acute coronary syndrome) [facility]) , presented to ER with Chest Pain (X1 day)    Mr Dayana Lawrence is a 75 yo cachectic appearing AAM with CAD s/p cabg in 2022, long standing Etoh and drug use who presented with vague dull chest pain reported as 5/10, worse with palpation of sternum and associated with nausea. Patient says his pain has been ongoing since cabg but was worse after cocaine use a few days ago. Patient cannot remember how long his symptoms occurred  and reports drinking etoh before arrival thus cannot remember his symptoms. He says in general his chest pain has improved since cabg. He reports the sublingual nitro in EMS helped and has yet to come back. He denies any SOB at rest, pnd, orthopnea, leg swelling. He doesn't know why his sugar is low but says he hasn't been eating. He is reluctant to give any information about etoh or drug use and is requesting to be left alone to get rest. He denies any current chest pain when assessed. Significant therapeutic interventions:     ACS with MVD:  Primary hypertension:  Sinus bradycardia: Cardiology following. Attributing CP to cocaine. s/p CABG X2 in 4/2022. Medication adjustment per their recommendations. Polysubstance use: UDS positive for cocaine and cannibis, EToH present on admission  BPH: 1.275 nL UOP documented over the last 24 hours. Continue flomax and diuretics.   Dispo: discharge today     Significant Diagnostic Studies:   Labs / Micro:  CBC:   Lab Results   Component Value Date/Time    WBC 5.2 08/04/2022 08:32 AM    RBC 5.13 08/04/2022 08:32 AM    HGB 12.6 08/04/2022 08:32 AM    HCT 40.7 08/04/2022 08:32 AM    MCV 79.3 08/04/2022 08:32 AM    MCH 24.6 08/04/2022 08:32 AM    MCHC 31.0 08/04/2022 08:32 AM    RDW 16.7 08/04/2022 08:32 AM    PLT See Reflexed IPF Result 08/04/2022 08:32 AM     BMP:    Lab Results   Component Value Date/Time    GLUCOSE 73 08/04/2022 08:32 AM     08/04/2022 08:32 AM    K 4.1 08/04/2022 08:32 AM     08/04/2022 08:32 AM    CO2 26 08/04/2022 08:32 AM    ANIONGAP 9 08/04/2022 08:32 AM    BUN 17 08/04/2022 08:32 AM    CREATININE 1.01 08/04/2022 08:32 AM    BUNCRER NOT REPORTED 04/25/2018 12:32 PM    CALCIUM 8.6 08/04/2022 08:32 AM    LABGLOM >60 08/04/2022 08:32 AM    GFRAA >60 08/04/2022 08:32 AM    GFR      08/04/2022 08:32 AM     HFP:    Lab Results   Component Value Date/Time    PROT 6.3 08/03/2022 07:37 AM     CMP:    Lab Results   Component Value Date/Time    GLUCOSE 73 08/04/2022 08:32 AM     08/04/2022 08:32 AM    K 4.1 08/04/2022 08:32 AM     08/04/2022 08:32 AM    CO2 26 08/04/2022 08:32 AM    BUN 17 08/04/2022 08:32 AM    CREATININE 1.01 08/04/2022 08:32 AM    ANIONGAP 9 08/04/2022 08:32 AM    ALKPHOS 81 08/03/2022 07:37 AM    ALT 13 08/03/2022 07:37 AM    AST 17 08/03/2022 07:37 AM    BILITOT 0.34 08/03/2022 07:37 AM    LABALBU 3.3 08/03/2022 07:37 AM    ALBUMIN 1.1 08/03/2022 07:37 AM    LABGLOM >60 08/04/2022 08:32 AM    GFRAA >60 08/04/2022 08:32 AM    GFR      08/04/2022 08:32 AM    PROT 6.3 08/03/2022 07:37 AM    CALCIUM 8.6 08/04/2022 08:32 AM     PT/INR:    Lab Results   Component Value Date/Time    PROTIME 12.8 04/07/2022 02:59 AM    INR 1.2 04/07/2022 02:59 AM     PTT:   Lab Results   Component Value Date/Time    APTT 27.7 03/21/2022 01:13 PM     FLP:    Lab Results   Component Value Date/Time    CHOL 89 08/03/2022 07:37 AM    TRIG 30 08/03/2022 07:37 AM    HDL 48 08/03/2022 07:37 AM     U/A:    Lab Results   Component Value Date/Time    COLORU Dark Yellow 03/26/2022 04:27 PM    TURBIDITY Clear 03/26/2022 04:27 PM    SPECGRAV 1.023 03/26/2022 04:27 PM    HGBUR MODERATE 03/26/2022 04:27 PM    PHUR 5.0 03/26/2022 04:27 PM    PROTEINU 1+ 03/26/2022 04:27 PM    GLUCOSEU NEGATIVE 03/26/2022 04:27 PM    KETUA TRACE 03/26/2022 04:27 PM    BILIRUBINUR NEGATIVE  Verified by ictotest. 03/26/2022 04:27 PM    BILIRUBINUR neg 10/27/2016 12:00 AM    UROBILINOGEN Normal 03/26/2022 04:27 PM    NITRU NEGATIVE 03/26/2022 04:27 PM    LEUKOCYTESUR TRACE 03/26/2022 04:27 PM     TSH:  No results found for: TSH     Radiology:  XR CHEST PORTABLE    Result Date: 8/2/2022  Improved bibasilar airspace disease and edema with residual right perihilar infiltrate. COPD. Consultations:    Consults:     Final Specialist Recommendations/Findings:   IP CONSULT TO CARDIOLOGY  IP CONSULT TO HOSPITALIST  IP CONSULT TO PALLIATIVE CARE  IP CONSULT TO SOCIAL WORK  IP CONSULT TO CARDIOLOGY  IP CONSULT TO IV TEAM      The patient was seen and examined on day of discharge and this discharge summary is in conjunction with any daily progress note from day of discharge. Discharge plan:     Disposition: Home    Physician Follow Up: Kim García MD  [address] [facility]  [address]  [phone]    Schedule an appointment as soon as possible for a visit in 1 week(s)  for follow up after hospitalization    [facility]  [facility] [address]  [phone]  Schedule an appointment as soon as possible for a visit         Requiring Further Evaluation/Follow Up POST HOSPITALIZATION/Incidental Findings: refrain from cocaine and other drugs. Pt needs to follow up with providers and adhere to medications that have been prescribed to him to prevent further hospitalizations. Because he continues to use drugs and does not follow up or take his medications as prescribed, repeat readmissions are expected. Diet: regular diet and cardiac diet    Activity: As tolerated    Instructions to Patient: see above     Discharge Medications:      Medication List        START taking these medications      amLODIPine 10 MG tablet  Commonly known as: NORVASC  Take 1 tablet by mouth in the morning. Start taking on: August 5, 2022     multivitamin with minerals tablet  Take 1 tablet by mouth in the morning. thiamine 100 MG tablet  Take 1 tablet by mouth in the morning. Start taking on: August 9, 2022            CHANGE how you take these medications      amiodarone 200 MG tablet  Commonly known as: CORDARONE  Take 1 tablet by mouth in the morning.   Start taking on: August 5, 2022  What changed: when to take this            CONTINUE taking these medications      aspirin 81 MG EC tablet  Take 1 tablet by mouth daily     atorvastatin 80 MG tablet  Commonly known as: LIPITOR  Take 1 tablet by mouth nightly     clopidogrel 75 MG tablet  Commonly known as: PLAVIX  Take 1 tablet by mouth daily     furosemide 20 MG tablet  Commonly known as: Lasix  Take 1 tablet by mouth daily     midodrine 5 MG tablet  Commonly known as: PROAMATINE  Take 1 tablet by mouth 3 times daily (with meals)     nitroGLYCERIN 0.3 MG SL tablet  Commonly known as: NITROSTAT     pantoprazole 40 MG tablet  Commonly known as: PROTONIX  Take 1 tablet by mouth daily     spironolactone 25 MG tablet  Commonly known as: ALDACTONE  Take 1 tablet by mouth daily     tamsulosin 0.4 MG capsule  Commonly known as: FLOMAX  Take 1 capsule by mouth daily            STOP taking these medications      ibuprofen 800 MG tablet  Commonly known as: ADVIL;MOTRIN     metoprolol tartrate 25 MG tablet  Commonly known as: LOPRESSOR               Where to Get Your Medications        These medications were sent to [facility] [address] [facility], [address] [place] [address], [address]  [address]      Phone: [phone]   amiodarone 200 MG tablet  amLODIPine 10 MG tablet  multivitamin with minerals tablet  thiamine 100 MG tablet         No discharge procedures on file. Time Spent on discharge is  32 mins in patient examination, evaluation, counseling as well as medication reconciliation, prescriptions for required medications, discharge plan and follow up. Electronically signed by   JACKELYN Abraham NP  8/4/2022  12:06 PM      Thank you Dr. Sim Jiménez MD for the opportunity to be involved in this patient's care.

## 2022-08-04 NOTE — PROGRESS NOTES
[facility]  Progress Note                   Date:   8/4/2022  Patient name: Shannon Parikh  Date of admission:  8/2/2022  6:07 PM  MRN:   [id number]  YOB: 1955  PCP: Renata Warren MD    Reason for Admission: Unstable angina pectoris [facility]) [I20.0]  ACS (acute coronary syndrome) ([facility] [address] [I24.9]    Subjective:       Clinical Changes /Abnormalities:Patient seen and examined. Denies chest pain or shortness of breath. Tele/vitals/labs reviewed .  Discussed case with rounding attending NP    Review of Systems    Medications:   Scheduled Meds:   aspirin  81 mg Oral Daily    atorvastatin  80 mg Oral Nightly    clopidogrel  75 mg Oral Daily    furosemide  20 mg Oral Daily    pantoprazole  40 mg Oral Daily    spironolactone  25 mg Oral Daily    tamsulosin  0.4 mg Oral Daily    sodium chloride flush  5-40 mL IntraVENous 2 times per day    enoxaparin  40 mg SubCUTAneous Daily    amiodarone  200 mg Oral Daily    amLODIPine  10 mg Oral Daily    sodium chloride flush  5-40 mL IntraVENous 2 times per day    thiamine (VITAMIN B1) IVPB  500 mg IntraVENous Q8H    Followed by    thiamine (VITAMIN B1) IVPB  250 mg IntraVENous Q24H    Followed by    Zaheer Sands ON 8/9/2022] thiamine  100 mg Oral Daily     Continuous Infusions:   sodium chloride      dextrose      sodium chloride 25 mL/hr at 08/03/22 1543     CBC:   Recent Labs     08/02/22 1827 08/03/22  0737 08/04/22  0832   WBC 6.6 7.0 5.2   HGB 11.5* 12.4* 12.6*    254 See Reflexed IPF Result     BMP:    Recent Labs     08/02/22  1827 08/03/22  0737 08/04/22  0832    143 142   K 3.7 3.8 4.1    115* 107   CO2 16* 19* 26   BUN 22 18 17   CREATININE 1.13 0.91 1.01   GLUCOSE 122* 86 73     Hepatic:  Recent Labs     08/02/22  1827 08/03/22  0737   AST 19 17   ALT 12 13   BILITOT 0.32 0.34   ALKPHOS 83 81     Troponin:   Recent Labs     08/02/22  2004 08/03/22  0237 08/03/22  0737   TROPHS 18 19 19     BNP: No results for input(s): BNP in the last 72 hours. Lipids:   Recent Labs     08/03/22  0737   CHOL 89   HDL 48     INR: No results for input(s): INR in the last 72 hours. EKG:    Date: 08/03/22  Reading: No acute ischemia        LAST ECHO:  Date: 3/2022  Findings Summary:  Left systolic function is normal with EF 50 - 55%     LAST Stress Test:   Date of last ST: 3/2022  Major Findings: Ischemia in the lateral and septal walls. Cath Procedure Summary      Three vessel disease with left main involvement. Moderate LV systolic dysfunction. LVEF 30%. Recommendations      Medical therapy as needed. Risk factor modification. CABG evaluation by CT surgery. Signature      ----------------------------------------------------------------   Electronically signed by Suresh Bueno(Performing Physician)   on 03/11/2022 15:05    Objective:   Vitals: /79   Pulse 57   Temp 97.5 °F (36.4 °C) (Oral)   Resp 16   Ht 6' 3\" (1.905 m) Comment: per patinet  Wt 145 lb (65.8 kg)   SpO2 97%   BMI 18.12 kg/m²   General appearance: alert and cooperative with exam  HEENT: Head: Normocephalic, no lesions, without obvious abnormality.   Neck:no JVD, trachea midline, no adenopathy  Lungs: Clear to auscultation  Heart: Regular rate and rhythm, s1/s2 auscultated, no murmurs  Abdomen: soft, non-tender, bowel sounds active  Extremities: no edema  Neurologic: not done        Assessment / Acute Cardiac Problems:   chest pain - likely induced with cocaine abuse       Patient Active Problem List:     CKD (chronic kidney disease) stage 3, GFR 30-59 ml/min ([facility])     Essential hypertension     Arthritis of right hip     Chronic systolic congestive heart failure (HCC)     Benign prostatic hyperplasia with urinary obstruction     Pure hypercholesterolemia     Abdominal pain     Chest pain     Acute on chronic combined systolic and diastolic congestive heart failure (HCC)     S/P cardiac catheterization     3-vessel CAD     Acute renal failure with tubular necrosis ([facility] [address]     Problem related to discharge planning     Acute respiratory failure with hypoxia (HCC)     ACS (acute coronary syndrome) ([facility] [address]     Cocaine use     Encounter for palliative care      Plan of Treatment:   Cardiac stable-troponin negative , denies chest pain or shortness of breath no edema noted to lower extremity-continue amiodarone , Norvasc  Continue Aldactone , aspirin Plavix and statin and Lasix  Okay to discharge home,  follow-up with cardiology in 2 weeks    Electronically signed by JACKELYN Astudillo NP on 8/4/2022 at 9:50 [facility].  [phone]

## 2022-08-05 LAB
EKG ATRIAL RATE: 63 BPM
EKG ATRIAL RATE: 71 BPM
EKG P AXIS: 80 DEGREES
EKG P AXIS: 84 DEGREES
EKG P-R INTERVAL: 160 MS
EKG P-R INTERVAL: 164 MS
EKG Q-T INTERVAL: 468 MS
EKG Q-T INTERVAL: 480 MS
EKG QRS DURATION: 88 MS
EKG QRS DURATION: 96 MS
EKG QTC CALCULATION (BAZETT): 478 MS
EKG QTC CALCULATION (BAZETT): 521 MS
EKG R AXIS: 28 DEGREES
EKG R AXIS: 45 DEGREES
EKG T AXIS: -114 DEGREES
EKG T AXIS: 133 DEGREES
EKG VENTRICULAR RATE: 63 BPM
EKG VENTRICULAR RATE: 71 BPM

## 2022-08-05 PROCEDURE — 93010 ELECTROCARDIOGRAM REPORT: CPT | Performed by: INTERNAL MEDICINE

## 2022-09-10 ENCOUNTER — APPOINTMENT (OUTPATIENT)
Dept: GENERAL RADIOLOGY | Age: 67
End: 2022-09-10
Payer: MEDICARE

## 2022-09-10 ENCOUNTER — HOSPITAL ENCOUNTER (EMERGENCY)
Age: 67
Discharge: HOME | End: 2022-09-10
Attending: EMERGENCY MEDICINE
Payer: MEDICARE

## 2022-09-10 VITALS
HEART RATE: 73 BPM | HEIGHT: 75 IN | BODY MASS INDEX: 16.16 KG/M2 | DIASTOLIC BLOOD PRESSURE: 65 MMHG | RESPIRATION RATE: 18 BRPM | WEIGHT: 130 LBS | TEMPERATURE: 98.3 F | SYSTOLIC BLOOD PRESSURE: 114 MMHG | OXYGEN SATURATION: 100 %

## 2022-09-10 DIAGNOSIS — K56.41 FECAL IMPACTION IN RECTUM (HCC): ICD-10-CM

## 2022-09-10 DIAGNOSIS — K59.00 CONSTIPATION, UNSPECIFIED CONSTIPATION TYPE: Primary | ICD-10-CM

## 2022-09-10 PROCEDURE — 99283 EMERGENCY DEPT VISIT LOW MDM: CPT

## 2022-09-10 PROCEDURE — 74018 RADEX ABDOMEN 1 VIEW: CPT

## 2022-09-10 RX ORDER — DOCUSATE SODIUM 100 MG/1
100 CAPSULE, LIQUID FILLED ORAL 2 TIMES DAILY
Qty: 30 CAPSULE | Refills: 0 | Status: SHIPPED | OUTPATIENT
Start: 2022-09-10

## 2022-09-10 RX ORDER — POLYETHYLENE GLYCOL 3350 17 G/17G
17 POWDER, FOR SOLUTION ORAL DAILY
Qty: 238 G | Refills: 0 | Status: SHIPPED | OUTPATIENT
Start: 2022-09-10 | End: 2022-09-24

## 2022-09-10 RX ADMIN — Medication 240 ML: at 19:45

## 2022-09-10 ASSESSMENT — ENCOUNTER SYMPTOMS
RHINORRHEA: 0
DIARRHEA: 0
COUGH: 0
CHEST TIGHTNESS: 0
SORE THROAT: 0
EYE DISCHARGE: 0
EYE PAIN: 0
NAUSEA: 0
ABDOMINAL PAIN: 1
WHEEZING: 0
TROUBLE SWALLOWING: 0
BLOOD IN STOOL: 0
BACK PAIN: 0
EYE REDNESS: 0
VOMITING: 0
SHORTNESS OF BREATH: 0
CONSTIPATION: 1
FACIAL SWELLING: 0
SINUS PRESSURE: 0
COLOR CHANGE: 0

## 2022-09-10 ASSESSMENT — PAIN - FUNCTIONAL ASSESSMENT: PAIN_FUNCTIONAL_ASSESSMENT: 0-10

## 2022-09-10 ASSESSMENT — PAIN SCALES - GENERAL: PAINLEVEL_OUTOF10: 8

## 2022-09-10 NOTE — ED PROVIDER NOTES
[address] ED  eMERGENCY dEPARTMENT eNCOUnter      Pt Name: Manuel Calvo  MRN: [id number]  [place] 1955  Date of evaluation: 9/10/22      CHIEF COMPLAINT       Chief Complaint   Patient presents with    Constipation     Pt to ED via EMS from home with c/o constipation x1 week  Diffuse abd pain and nausea, 1 episode of emesis \"a few\" days ago    Pt appears very frail and unkempt. Lives at home alone without support- has been using walker to get to and from the store when he can         [address] [place]    Manuel Calvo is a 69 y.o. male who presents complaining of constipation. Patient states that he is not had a bowel movement in about a week. Patient states he just feels generalized discomfort in his abdomen. Patient states he sits down because he feels like he has to have a bowel movement and nothing comes out other than urine. Patient states has had no difficulty urinating. Patient states he really does not have much abdominal pain but there is a lot of pain in his rectum. No fevers or other recent illnesses. He states he is passing some gas. REVIEW OF SYSTEMS       Review of Systems   Constitutional:  Negative for activity change, appetite change, chills, diaphoresis and fever. HENT:  Negative for congestion, ear pain, facial swelling, nosebleeds, rhinorrhea, sinus pressure, sore throat and trouble swallowing. Eyes:  Negative for pain, discharge and redness. Respiratory:  Negative for cough, chest tightness, shortness of breath and wheezing. Cardiovascular:  Negative for chest pain, palpitations and leg swelling. Gastrointestinal:  Positive for abdominal pain and constipation. Negative for blood in stool, diarrhea, nausea and vomiting. Genitourinary:  Negative for difficulty urinating, dysuria, flank pain, frequency, genital sores and hematuria. Musculoskeletal:  Negative for arthralgias, back pain, gait problem, joint swelling, myalgias and neck pain.    Skin: Negative for color change, pallor, rash and wound. Neurological:  Negative for dizziness, tremors, seizures, syncope, speech difficulty, weakness, numbness and headaches. Psychiatric/Behavioral:  Negative for confusion, decreased concentration, hallucinations, self-injury, sleep disturbance and suicidal ideas. PAST MEDICAL HISTORY     Past Medical History:   Diagnosis Date    3-vessel CAD 03/11/2022    Alcohol abuse 06/23/2015    Arthritis     Chronic kidney disease     Full dentures     upper and lower full    Hypertension     Other emphysema ([address]     COPD stage II on PFTs with severe reduction diffusion capacity    Pure hypercholesterolemia 66/52/8089    Systolic CHF ([address]     Wears glasses        SURGICAL HISTORY       Past Surgical History:   Procedure Laterality Date    APPENDECTOMY      CORONARY ARTERY BYPASS GRAFT N/A 3/21/2022    CABG X 2 , EVH RT LEG , MELI performed by Alesha Martinez MD at [address] [place] [address] Left     W/ HARDWARE    LA EGD TRANSORAL BIOPSY SINGLE/MULTIPLE  6/19/2017    EGD BIOPSY performed by Douglas Lawrence DO at [facility]       CURRENT MEDICATIONS       Previous Medications    AMIODARONE (CORDARONE) 200 MG TABLET    Take 1 tablet by mouth in the morning. AMLODIPINE (NORVASC) 10 MG TABLET    Take 1 tablet by mouth in the morning. ASPIRIN 81 MG EC TABLET    Take 1 tablet by mouth daily    ATORVASTATIN (LIPITOR) 80 MG TABLET    Take 1 tablet by mouth nightly    CLOPIDOGREL (PLAVIX) 75 MG TABLET    Take 1 tablet by mouth daily    FUROSEMIDE (LASIX) 20 MG TABLET    Take 1 tablet by mouth daily    MULTIPLE VITAMINS-MINERALS (MULTIVITAMIN WITH MINERALS) TABLET    Take 1 tablet by mouth in the morning. NITROGLYCERIN (NITROSTAT) 0.3 MG SL TABLET    Place 0.3 mg under the tongue every 5 minutes as needed for Chest pain up to max of 3 total doses. If no relief after 1 dose, call 911.     PANTOPRAZOLE (PROTONIX) 40 MG TABLET    Take 1 tablet by mouth daily SPIRONOLACTONE (ALDACTONE) 25 MG TABLET    Take 1 tablet by mouth daily    TAMSULOSIN (FLOMAX) 0.4 MG CAPSULE    Take 1 capsule by mouth daily    THIAMINE 100 MG TABLET    Take 1 tablet by mouth in the morning. ALLERGIES     has No Known Allergies. SOCIAL HISTORY      reports that he has been smoking cigarettes. He has never used smokeless tobacco. He reports current alcohol use. He reports current drug use. Drugs: Cocaine and Marijuana ([facility]). PHYSICAL EXAM     INITIAL VITALS: /65   Pulse 73   Temp 98.3 °F (36.8 °C) (Oral)   Resp 18   Ht 6' 3\" (1.905 m)   Wt 130 lb (59 kg)   SpO2 100%   BMI 16.25 kg/m²      Physical Exam  Vitals and nursing note reviewed. Constitutional:       General: He is not in acute distress. Appearance: He is well-developed. He is not diaphoretic. HENT:      Head: Normocephalic and atraumatic. Eyes:      General: No scleral icterus. Right eye: No discharge. Left eye: No discharge. Conjunctiva/sclera: Conjunctivae normal.      Pupils: Pupils are equal, round, and reactive to light. Cardiovascular:      Rate and Rhythm: Normal rate and regular rhythm. Heart sounds: Normal heart sounds. No murmur heard. No friction rub. No gallop. Pulmonary:      Effort: Pulmonary effort is normal. No respiratory distress. Breath sounds: Normal breath sounds. No wheezing or rales. Chest:      Chest wall: No tenderness. Abdominal:      General: Bowel sounds are normal. There is no distension. Palpations: Abdomen is soft. There is no mass. Tenderness: There is no abdominal tenderness. There is no guarding or rebound. Musculoskeletal:         General: No tenderness. Normal range of motion. Skin:     General: Skin is warm and dry. Coloration: Skin is not pale. Findings: No erythema or rash. Neurological:      Mental Status: He is alert and oriented to person, place, and time.       Cranial Nerves: No cranial nerve Dispense:  30 capsule     Refill:  0    polyethylene glycol (MIRALAX) 17 GM/SCOOP powder     Sig: Take 17 g by mouth daily for 14 days PRN constipation     Dispense:  238 g     Refill:  0       -------------------------  8:29 PM EDT  Patient will receive an enema hopefully have a good bowel movement and then will discharge him on some stool softeners. CONSULTS:  None    PROCEDURES:  Digital fecal disimpaction was done by myself was discomforting but he tolerated it well and we got a lot of stool out. FINAL IMPRESSION      1. Constipation, unspecified constipation type    2. Fecal impaction in rectum [facility])          DISPOSITION/PLAN   DISPOSITION Discharge - Pending Orders Complete 09/10/2022 08:25:30 PM      PATIENT REFERREDTO:  Karen Cervantes MD  [address] [facility]  [address]  [phone]    In 1 week      [facility] ED  [place] [address]  [phone]    If symptoms worsen    DISCHARGEMEDICATIONS:  New Prescriptions    DOCUSATE SODIUM (COLACE) 100 MG CAPSULE    Take 1 capsule by mouth 2 times daily    POLYETHYLENE GLYCOL (MIRALAX) 17 GM/SCOOP POWDER    Take 17 g by mouth daily for 14 days PRN constipation       (Please note that portions of this note were completed with a voice recognition program.  Efforts were made to edit thedictations but occasionally words are mis-transcribed.)    Aurbey Kinsey MD  Attending Emergency Physician                        Aubrey Kinsey MD  09/10/22 2029 no

## 2022-09-11 NOTE — ED NOTES
Patient currently sitting up in bed, waiting for transportation. Requests warm blankets and his clothes to get ready. Both are provided, he presents no further requests. Call light within reach.      Lyssa Brooks RN  09/10/22 3223

## 2022-09-11 NOTE — ED NOTES
Patient currently in bed, refers feeling an obstruction again. Call light within reach.      Lyssa Brooks RN  09/10/22 3699

## 2022-09-11 NOTE — ED NOTES
Chaperone for Rectal exam for Dr. Isela Nguyễn was offered and accepted as part of the rooming process.    Chaperone: Alva Garcia RN         [address]  09/10/22 2050

## 2022-09-26 NOTE — PLAN OF CARE
Problem: Pain:  Goal: Pain level will decrease  Description: Pain level will decrease  Outcome: Met This Shift  Goal: Control of acute pain  Description: Control of acute pain  Outcome: Met This Shift  Goal: Control of chronic pain  Description: Control of chronic pain  Outcome: Met This Shift     Problem: Falls - Risk of:  Goal: Will remain free from falls  Description: Will remain free from falls  Outcome: Met This Shift  Goal: Absence of physical injury  Description: Absence of physical injury  Outcome: Met This Shift     Problem: Non-Violent Restraints  Goal: No harm/injury to patient while restraints in use  Outcome: Met This Shift  Goal: Patient's dignity will be maintained  Outcome: Met This Shift     Problem: MECHANICAL VENTILATION  Goal: ET tube will be managed safely  3/22/2022 1808 by Kal Muñoz RN  Outcome: Met This Shift    Problem: Skin Integrity:  Goal: Will show no infection signs and symptoms  Description: Will show no infection signs and symptoms  Outcome: Ongoing  Goal: Absence of new skin breakdown  Description: Absence of new skin breakdown  Outcome: Ongoing     Problem: Cardiac:  Goal: Ability to maintain vital signs within normal range will improve  Description: Ability to maintain vital signs within normal range will improve  Outcome: Ongoing  Goal: Cardiovascular alteration will improve  Description: Cardiovascular alteration will improve  Outcome: Ongoing     Problem: Physical Regulation:  Goal: Complications related to the disease process, condition or treatment will be avoided or minimized  Description: Complications related to the disease process, condition or treatment will be avoided or minimized  Outcome: Ongoing     Problem: Non-Violent Restraints  Goal: Removal from restraints as soon as assessed to be safe  Outcome: Ongoing     Problem: OXYGENATION/RESPIRATORY FUNCTION  Goal: Patient will maintain patent airway  3/22/2022 1808 by Kal Muñoz RN    Goal: Patient will achieve/maintain normal respiratory rate/effort  Description: Respiratory rate and effort will be within normal limits for the patient  3/22/2022 1808 by Howard Yao RN  Outcome: Ongoing     Problem: MECHANICAL VENTILATION  Goal: Patient will maintain patent airway  3/22/2022 1808 by Howard Yao RN  Outcome: Ongoing    Goal: Oral health is maintained or improved  3/22/2022 1808 by Howard Yao RN  Outcome: Ongoing    Goal: Ability to express needs and understand communication  3/22/2022 1808 by Howard Yao RN  Outcome: Ongoing    Goal: Mobility/activity is maintained at optimum level for patient  Outcome: Ongoing     Problem: SKIN INTEGRITY  Goal: Skin integrity is maintained or improved  3/22/2022 1808 by Howard Yao RN  Outcome: Ongoing none known.

## 2024-03-07 ENCOUNTER — HOSPITAL ENCOUNTER (INPATIENT)
Age: 69
LOS: 2 days | Discharge: HOME | End: 2024-03-09
Attending: EMERGENCY MEDICINE | Admitting: INTERNAL MEDICINE
Payer: COMMERCIAL

## 2024-03-07 ENCOUNTER — APPOINTMENT (OUTPATIENT)
Dept: GENERAL RADIOLOGY | Age: 69
End: 2024-03-07
Payer: COMMERCIAL

## 2024-03-07 DIAGNOSIS — R07.9 CHEST PAIN, UNSPECIFIED TYPE: Primary | ICD-10-CM

## 2024-03-07 DIAGNOSIS — I25.10 3-VESSEL CAD: ICD-10-CM

## 2024-03-07 DIAGNOSIS — F14.90 COCAINE USE: ICD-10-CM

## 2024-03-07 DIAGNOSIS — I24.9 ACS (ACUTE CORONARY SYNDROME) (HCC): ICD-10-CM

## 2024-03-07 LAB
ALBUMIN SERPL-MCNC: 3.8 G/DL (ref 3.5–5.2)
ALP SERPL-CCNC: 83 U/L (ref 40–129)
ALT SERPL-CCNC: 49 U/L (ref 5–41)
AMPHET UR QL SCN: NEGATIVE
ANION GAP SERPL CALCULATED.3IONS-SCNC: 13 MMOL/L (ref 9–17)
AST SERPL-CCNC: 42 U/L
BACTERIA URNS QL MICRO: ABNORMAL
BARBITURATES UR QL SCN: NEGATIVE
BASOPHILS # BLD: 0.1 K/UL (ref 0–0.2)
BASOPHILS NFR BLD: 1 % (ref 0–2)
BENZODIAZ UR QL: NEGATIVE
BILIRUB SERPL-MCNC: 0.5 MG/DL (ref 0.3–1.2)
BILIRUB UR QL STRIP: NEGATIVE
BNP SERPL-MCNC: 7121 PG/ML
BUN SERPL-MCNC: 23 MG/DL (ref 8–23)
CALCIUM SERPL-MCNC: 9.1 MG/DL (ref 8.6–10.4)
CANNABINOIDS UR QL SCN: NEGATIVE
CASTS #/AREA URNS LPF: ABNORMAL /LPF
CHLORIDE SERPL-SCNC: 110 MMOL/L (ref 98–107)
CLARITY UR: ABNORMAL
CO2 SERPL-SCNC: 22 MMOL/L (ref 20–31)
COCAINE UR QL SCN: POSITIVE
COLOR UR: YELLOW
CREAT SERPL-MCNC: 1.5 MG/DL (ref 0.7–1.2)
CREAT UR-MCNC: 296.9 MG/DL (ref 39–259)
CREAT UR-MCNC: 298.8 MG/DL (ref 39–259)
EOSINOPHIL # BLD: 0.3 K/UL (ref 0–0.4)
EOSINOPHIL,URINE: NORMAL
EOSINOPHILS RELATIVE PERCENT: 5 % (ref 0–4)
EPI CELLS #/AREA URNS HPF: ABNORMAL /HPF
ERYTHROCYTE [DISTWIDTH] IN BLOOD BY AUTOMATED COUNT: 15.9 % (ref 11.5–14.9)
FENTANYL UR QL: NEGATIVE
FLUAV RNA RESP QL NAA+PROBE: NOT DETECTED
FLUBV RNA RESP QL NAA+PROBE: NOT DETECTED
GFR SERPL CREATININE-BSD FRML MDRD: 50 ML/MIN/1.73M2
GLUCOSE SERPL-MCNC: 85 MG/DL (ref 70–99)
GLUCOSE UR STRIP-MCNC: NEGATIVE MG/DL
HCT VFR BLD AUTO: 45.3 % (ref 41–53)
HCV AB SERPL QL IA: NONREACTIVE
HGB BLD-MCNC: 14.1 G/DL (ref 13.5–17.5)
HGB UR QL STRIP.AUTO: NEGATIVE
INR PPP: 1.2
KETONES UR STRIP-MCNC: ABNORMAL MG/DL
LEUKOCYTE ESTERASE UR QL STRIP: NEGATIVE
LYMPHOCYTES NFR BLD: 2 K/UL (ref 1–4.8)
LYMPHOCYTES RELATIVE PERCENT: 31 % (ref 24–44)
MAGNESIUM SERPL-MCNC: 1.9 MG/DL (ref 1.6–2.6)
MCH RBC QN AUTO: 27.6 PG (ref 26–34)
MCHC RBC AUTO-ENTMCNC: 31 G/DL (ref 31–37)
MCV RBC AUTO: 89 FL (ref 80–100)
METHADONE UR QL: NEGATIVE
MONOCYTES NFR BLD: 0.5 K/UL (ref 0.1–1.3)
MONOCYTES NFR BLD: 9 % (ref 1–7)
NEUTROPHILS NFR BLD: 54 % (ref 36–66)
NEUTS SEG NFR BLD: 3.4 K/UL (ref 1.3–9.1)
NITRITE UR QL STRIP: NEGATIVE
OPIATES UR QL SCN: NEGATIVE
OXYCODONE UR QL SCN: NEGATIVE
PCP UR QL SCN: NEGATIVE
PH UR STRIP: 5 [PH] (ref 5–8)
PLATELET # BLD AUTO: 214 K/UL (ref 150–450)
PMV BLD AUTO: 9.2 FL (ref 6–12)
POTASSIUM SERPL-SCNC: 3.8 MMOL/L (ref 3.7–5.3)
PROT SERPL-MCNC: 6.8 G/DL (ref 6.4–8.3)
PROT UR STRIP-MCNC: ABNORMAL MG/DL
PROTHROMBIN TIME: 15.3 SEC (ref 11.8–14.6)
RBC # BLD AUTO: 5.09 M/UL (ref 4.5–5.9)
RBC #/AREA URNS HPF: ABNORMAL /HPF
SARS-COV-2 RNA RESP QL NAA+PROBE: NOT DETECTED
SODIUM SERPL-SCNC: 145 MMOL/L (ref 135–144)
SODIUM UR-SCNC: 88 MMOL/L
SOURCE: NORMAL
SP GR UR STRIP: 1.03 (ref 1–1.03)
SPECIMEN DESCRIPTION: NORMAL
TEST INFORMATION: ABNORMAL
TOTAL PROTEIN, URINE: 42 MG/DL
TROPONIN I SERPL HS-MCNC: 38 NG/L (ref 0–22)
TROPONIN I SERPL HS-MCNC: 42 NG/L (ref 0–22)
URINE TOTAL PROTEIN CREATININE RATIO: 0.14 (ref 0–0.2)
UROBILINOGEN UR STRIP-ACNC: NORMAL EU/DL (ref 0–1)
UUN UR-MCNC: 1570 MG/DL
WBC #/AREA URNS HPF: ABNORMAL /HPF
WBC OTHER # BLD: 6.3 K/UL (ref 3.5–11)

## 2024-03-07 PROCEDURE — 81001 URINALYSIS AUTO W/SCOPE: CPT

## 2024-03-07 PROCEDURE — 2060000000 HC ICU INTERMEDIATE R&B

## 2024-03-07 PROCEDURE — 84484 ASSAY OF TROPONIN QUANT: CPT

## 2024-03-07 PROCEDURE — 99223 1ST HOSP IP/OBS HIGH 75: CPT | Performed by: INTERNAL MEDICINE

## 2024-03-07 PROCEDURE — 84540 ASSAY OF URINE/UREA-N: CPT

## 2024-03-07 PROCEDURE — 86803 HEPATITIS C AB TEST: CPT

## 2024-03-07 PROCEDURE — 93005 ELECTROCARDIOGRAM TRACING: CPT | Performed by: EMERGENCY MEDICINE

## 2024-03-07 PROCEDURE — 6370000000 HC RX 637 (ALT 250 FOR IP): Performed by: INTERNAL MEDICINE

## 2024-03-07 PROCEDURE — 71045 X-RAY EXAM CHEST 1 VIEW: CPT

## 2024-03-07 PROCEDURE — 80307 DRUG TEST PRSMV CHEM ANLYZR: CPT

## 2024-03-07 PROCEDURE — 94761 N-INVAS EAR/PLS OXIMETRY MLT: CPT

## 2024-03-07 PROCEDURE — 2580000003 HC RX 258: Performed by: INTERNAL MEDICINE

## 2024-03-07 PROCEDURE — 80053 COMPREHEN METABOLIC PANEL: CPT

## 2024-03-07 PROCEDURE — 84300 ASSAY OF URINE SODIUM: CPT

## 2024-03-07 PROCEDURE — 84156 ASSAY OF PROTEIN URINE: CPT

## 2024-03-07 PROCEDURE — 51798 US URINE CAPACITY MEASURE: CPT

## 2024-03-07 PROCEDURE — 99285 EMERGENCY DEPT VISIT HI MDM: CPT

## 2024-03-07 PROCEDURE — 87636 SARSCOV2 & INF A&B AMP PRB: CPT

## 2024-03-07 PROCEDURE — 94640 AIRWAY INHALATION TREATMENT: CPT

## 2024-03-07 PROCEDURE — 85025 COMPLETE CBC W/AUTO DIFF WBC: CPT

## 2024-03-07 PROCEDURE — 85610 PROTHROMBIN TIME: CPT

## 2024-03-07 PROCEDURE — 36415 COLL VENOUS BLD VENIPUNCTURE: CPT

## 2024-03-07 PROCEDURE — 87205 SMEAR GRAM STAIN: CPT

## 2024-03-07 PROCEDURE — 83880 ASSAY OF NATRIURETIC PEPTIDE: CPT

## 2024-03-07 PROCEDURE — 82570 ASSAY OF URINE CREATININE: CPT

## 2024-03-07 PROCEDURE — 6360000002 HC RX W HCPCS: Performed by: INTERNAL MEDICINE

## 2024-03-07 PROCEDURE — 83735 ASSAY OF MAGNESIUM: CPT

## 2024-03-07 RX ORDER — ENOXAPARIN SODIUM 100 MG/ML
40 INJECTION SUBCUTANEOUS DAILY
Status: DISCONTINUED | OUTPATIENT
Start: 2024-03-07 | End: 2024-03-08

## 2024-03-07 RX ORDER — IPRATROPIUM BROMIDE AND ALBUTEROL SULFATE 2.5; .5 MG/3ML; MG/3ML
1 SOLUTION RESPIRATORY (INHALATION)
Status: DISCONTINUED | OUTPATIENT
Start: 2024-03-07 | End: 2024-03-09 | Stop reason: HOSPADM

## 2024-03-07 RX ORDER — SODIUM CHLORIDE 0.9 % (FLUSH) 0.9 %
5-40 SYRINGE (ML) INJECTION PRN
Status: DISCONTINUED | OUTPATIENT
Start: 2024-03-07 | End: 2024-03-09 | Stop reason: HOSPADM

## 2024-03-07 RX ORDER — LORAZEPAM 2 MG/ML
3 INJECTION INTRAMUSCULAR
Status: DISCONTINUED | OUTPATIENT
Start: 2024-03-07 | End: 2024-03-09 | Stop reason: HOSPADM

## 2024-03-07 RX ORDER — SODIUM CHLORIDE 9 MG/ML
INJECTION, SOLUTION INTRAVENOUS PRN
Status: DISCONTINUED | OUTPATIENT
Start: 2024-03-07 | End: 2024-03-09 | Stop reason: HOSPADM

## 2024-03-07 RX ORDER — SODIUM CHLORIDE 0.9 % (FLUSH) 0.9 %
5-40 SYRINGE (ML) INJECTION EVERY 12 HOURS SCHEDULED
Status: DISCONTINUED | OUTPATIENT
Start: 2024-03-07 | End: 2024-03-09

## 2024-03-07 RX ORDER — POTASSIUM CHLORIDE 20 MEQ/1
40 TABLET, EXTENDED RELEASE ORAL PRN
Status: DISCONTINUED | OUTPATIENT
Start: 2024-03-07 | End: 2024-03-09 | Stop reason: HOSPADM

## 2024-03-07 RX ORDER — LORAZEPAM 1 MG/1
3 TABLET ORAL
Status: DISCONTINUED | OUTPATIENT
Start: 2024-03-07 | End: 2024-03-09 | Stop reason: HOSPADM

## 2024-03-07 RX ORDER — ASPIRIN 81 MG/1
81 TABLET ORAL DAILY
Status: DISCONTINUED | OUTPATIENT
Start: 2024-03-08 | End: 2024-03-09 | Stop reason: HOSPADM

## 2024-03-07 RX ORDER — ACETAMINOPHEN 650 MG/1
650 SUPPOSITORY RECTAL EVERY 6 HOURS PRN
Status: DISCONTINUED | OUTPATIENT
Start: 2024-03-07 | End: 2024-03-09 | Stop reason: HOSPADM

## 2024-03-07 RX ORDER — M-VIT,TX,IRON,MINS/CALC/FOLIC 27MG-0.4MG
1 TABLET ORAL DAILY
Status: DISCONTINUED | OUTPATIENT
Start: 2024-03-07 | End: 2024-03-09 | Stop reason: HOSPADM

## 2024-03-07 RX ORDER — M-VIT,TX,IRON,MINS/CALC/FOLIC 27MG-0.4MG
1 TABLET ORAL DAILY
Status: DISCONTINUED | OUTPATIENT
Start: 2024-03-07 | End: 2024-03-07

## 2024-03-07 RX ORDER — LORAZEPAM 2 MG/ML
4 INJECTION INTRAMUSCULAR
Status: DISCONTINUED | OUTPATIENT
Start: 2024-03-07 | End: 2024-03-09 | Stop reason: HOSPADM

## 2024-03-07 RX ORDER — TAMSULOSIN HYDROCHLORIDE 0.4 MG/1
0.4 CAPSULE ORAL DAILY
Status: DISCONTINUED | OUTPATIENT
Start: 2024-03-07 | End: 2024-03-09 | Stop reason: HOSPADM

## 2024-03-07 RX ORDER — ASPIRIN 325 MG
325 TABLET, DELAYED RELEASE (ENTERIC COATED) ORAL ONCE
Status: DISCONTINUED | OUTPATIENT
Start: 2024-03-07 | End: 2024-03-09 | Stop reason: HOSPADM

## 2024-03-07 RX ORDER — ATORVASTATIN CALCIUM 80 MG/1
80 TABLET, FILM COATED ORAL NIGHTLY
Status: DISCONTINUED | OUTPATIENT
Start: 2024-03-07 | End: 2024-03-09 | Stop reason: HOSPADM

## 2024-03-07 RX ORDER — LORAZEPAM 2 MG/ML
2 INJECTION INTRAMUSCULAR
Status: DISCONTINUED | OUTPATIENT
Start: 2024-03-07 | End: 2024-03-09 | Stop reason: HOSPADM

## 2024-03-07 RX ORDER — MAGNESIUM SULFATE HEPTAHYDRATE 40 MG/ML
2000 INJECTION, SOLUTION INTRAVENOUS PRN
Status: DISCONTINUED | OUTPATIENT
Start: 2024-03-07 | End: 2024-03-09 | Stop reason: HOSPADM

## 2024-03-07 RX ORDER — LORAZEPAM 1 MG/1
4 TABLET ORAL
Status: DISCONTINUED | OUTPATIENT
Start: 2024-03-07 | End: 2024-03-09 | Stop reason: HOSPADM

## 2024-03-07 RX ORDER — LORAZEPAM 2 MG/ML
1 INJECTION INTRAMUSCULAR
Status: DISCONTINUED | OUTPATIENT
Start: 2024-03-07 | End: 2024-03-09 | Stop reason: HOSPADM

## 2024-03-07 RX ORDER — POLYETHYLENE GLYCOL 3350 17 G/17G
17 POWDER, FOR SOLUTION ORAL DAILY PRN
Status: DISCONTINUED | OUTPATIENT
Start: 2024-03-07 | End: 2024-03-09 | Stop reason: HOSPADM

## 2024-03-07 RX ORDER — ONDANSETRON 4 MG/1
4 TABLET, ORALLY DISINTEGRATING ORAL EVERY 8 HOURS PRN
Status: DISCONTINUED | OUTPATIENT
Start: 2024-03-07 | End: 2024-03-09 | Stop reason: HOSPADM

## 2024-03-07 RX ORDER — HYDRALAZINE HYDROCHLORIDE 20 MG/ML
20 INJECTION INTRAMUSCULAR; INTRAVENOUS ONCE
Status: DISCONTINUED | OUTPATIENT
Start: 2024-03-07 | End: 2024-03-09 | Stop reason: HOSPADM

## 2024-03-07 RX ORDER — LORAZEPAM 1 MG/1
1 TABLET ORAL
Status: DISCONTINUED | OUTPATIENT
Start: 2024-03-07 | End: 2024-03-09 | Stop reason: HOSPADM

## 2024-03-07 RX ORDER — CLOPIDOGREL BISULFATE 75 MG/1
75 TABLET ORAL DAILY
Status: DISCONTINUED | OUTPATIENT
Start: 2024-03-07 | End: 2024-03-09 | Stop reason: HOSPADM

## 2024-03-07 RX ORDER — GAUZE BANDAGE 2" X 2"
100 BANDAGE TOPICAL DAILY
Status: DISCONTINUED | OUTPATIENT
Start: 2024-03-07 | End: 2024-03-07

## 2024-03-07 RX ORDER — GAUZE BANDAGE 2" X 2"
100 BANDAGE TOPICAL DAILY
Status: DISCONTINUED | OUTPATIENT
Start: 2024-03-07 | End: 2024-03-09 | Stop reason: HOSPADM

## 2024-03-07 RX ORDER — PANTOPRAZOLE SODIUM 40 MG/1
40 TABLET, DELAYED RELEASE ORAL DAILY
Status: DISCONTINUED | OUTPATIENT
Start: 2024-03-07 | End: 2024-03-09 | Stop reason: HOSPADM

## 2024-03-07 RX ORDER — LORAZEPAM 1 MG/1
2 TABLET ORAL
Status: DISCONTINUED | OUTPATIENT
Start: 2024-03-07 | End: 2024-03-09 | Stop reason: HOSPADM

## 2024-03-07 RX ORDER — ASPIRIN 325 MG
325 TABLET ORAL ONCE
Status: DISCONTINUED | OUTPATIENT
Start: 2024-03-07 | End: 2024-03-07

## 2024-03-07 RX ORDER — ACETAMINOPHEN 325 MG/1
650 TABLET ORAL EVERY 6 HOURS PRN
Status: DISCONTINUED | OUTPATIENT
Start: 2024-03-07 | End: 2024-03-09 | Stop reason: HOSPADM

## 2024-03-07 RX ORDER — POTASSIUM CHLORIDE 7.45 MG/ML
10 INJECTION INTRAVENOUS PRN
Status: DISCONTINUED | OUTPATIENT
Start: 2024-03-07 | End: 2024-03-09 | Stop reason: HOSPADM

## 2024-03-07 RX ORDER — SODIUM CHLORIDE 450 MG/100ML
INJECTION, SOLUTION INTRAVENOUS CONTINUOUS
Status: DISCONTINUED | OUTPATIENT
Start: 2024-03-07 | End: 2024-03-09 | Stop reason: HOSPADM

## 2024-03-07 RX ORDER — ONDANSETRON 2 MG/ML
4 INJECTION INTRAMUSCULAR; INTRAVENOUS EVERY 6 HOURS PRN
Status: DISCONTINUED | OUTPATIENT
Start: 2024-03-07 | End: 2024-03-09 | Stop reason: HOSPADM

## 2024-03-07 RX ADMIN — TAMSULOSIN HYDROCHLORIDE 0.4 MG: 0.4 CAPSULE ORAL at 15:45

## 2024-03-07 RX ADMIN — Medication 10 ML: at 20:32

## 2024-03-07 RX ADMIN — PANTOPRAZOLE SODIUM 40 MG: 40 TABLET, DELAYED RELEASE ORAL at 15:44

## 2024-03-07 RX ADMIN — IPRATROPIUM BROMIDE AND ALBUTEROL SULFATE 1 DOSE: 2.5; .5 SOLUTION RESPIRATORY (INHALATION) at 15:40

## 2024-03-07 RX ADMIN — SODIUM CHLORIDE: 4.5 INJECTION, SOLUTION INTRAVENOUS at 15:35

## 2024-03-07 RX ADMIN — THIAMINE HCL TAB 100 MG 100 MG: 100 TAB at 15:45

## 2024-03-07 RX ADMIN — Medication 1 TABLET: at 15:45

## 2024-03-07 RX ADMIN — IPRATROPIUM BROMIDE AND ALBUTEROL SULFATE 1 DOSE: 2.5; .5 SOLUTION RESPIRATORY (INHALATION) at 19:49

## 2024-03-07 RX ADMIN — ATORVASTATIN CALCIUM 80 MG: 80 TABLET, FILM COATED ORAL at 20:32

## 2024-03-07 RX ADMIN — CLOPIDOGREL BISULFATE 75 MG: 75 TABLET ORAL at 15:44

## 2024-03-07 RX ADMIN — ENOXAPARIN SODIUM 40 MG: 100 INJECTION SUBCUTANEOUS at 20:32

## 2024-03-07 ASSESSMENT — HEART SCORE
ECG: NON-SPECIFC REPOLARIZATION DISTURBANCE/LBTB/PM
ECG: 1

## 2024-03-07 ASSESSMENT — PAIN - FUNCTIONAL ASSESSMENT: PAIN_FUNCTIONAL_ASSESSMENT: NONE - DENIES PAIN

## 2024-03-07 NOTE — ED TRIAGE NOTES
Mode of arrival (squad #, walk in, police, etc) : LS2        Chief complaint(s): Chest pain        Arrival Note (brief scenario, treatment PTA, etc).: pt has had intermittent chest pain for the last few months. Pt states it is in his left upper chest. Pt has hx of cocaine abuse. Pt has nitro but ran out. Pt received 324 aspirin, and 1 nitro by EMS PTA and denies CP currently        C= \"Have you ever felt that you should Cut down on your drinking?\"  No  A= \"Have people Annoyed you by criticizing your drinking?\"  No  G= \"Have you ever felt bad or Guilty about your drinking?\"  No  E= \"Have you ever had a drink as an Eye-opener first thing in the morning to steady your nerves or to help a hangover?\"  No      Deferred []      Reason for deferring: N/A    *If yes to two or more: probable alcohol abuse.*

## 2024-03-07 NOTE — ED PROVIDER NOTES
drinks of alcohol     Comment:  \" drinks a pint on the weekend\" off and on    Drug use: Yes     Types: Cocaine, Marijuana (Weed)     Comment: cocaine and marijuana mulitple tmes daily     PHYSICAL EXAM     INITIAL VITALS: BP (!) 151/116   Pulse 92   Temp 98.2 °F (36.8 °C) (Oral)   Resp 18   Ht 1.88 m (6' 2\")   Wt 63.5 kg (140 lb)   SpO2 94%   BMI 17.97 kg/m²    Physical Exam  Constitutional:       General: He is not in acute distress.     Appearance: Normal appearance. He is well-developed. He is not diaphoretic.   HENT:      Head: Normocephalic and atraumatic.      Right Ear: External ear normal.      Left Ear: External ear normal.      Nose: Nose normal. No congestion.      Mouth/Throat:      Mouth: Mucous membranes are moist.      Pharynx: Oropharynx is clear.   Eyes:      General:         Right eye: No discharge.         Left eye: No discharge.      Conjunctiva/sclera: Conjunctivae normal.      Pupils: Pupils are equal, round, and reactive to light.   Neck:      Trachea: No tracheal deviation.   Cardiovascular:      Rate and Rhythm: Normal rate and regular rhythm.      Pulses: Normal pulses.      Heart sounds: Normal heart sounds.      Comments: Radial PT 2+ lateral.  Healed sternotomy scar.  Pulmonary:      Effort: Pulmonary effort is normal. No respiratory distress.      Breath sounds: Normal breath sounds. No stridor. No wheezing or rales.   Abdominal:      Palpations: Abdomen is soft.      Tenderness: There is no abdominal tenderness. There is no guarding or rebound.   Musculoskeletal:         General: No tenderness or deformity. Normal range of motion.      Cervical back: Normal range of motion and neck supple.      Right lower leg: No edema.      Left lower leg: No edema.   Skin:     General: Skin is warm and dry.      Capillary Refill: Capillary refill takes less than 2 seconds.      Findings: No erythema or rash.   Neurological:      General: No focal deficit present.      Mental Status: He is  abnormals are listed below.  Labs Reviewed   CBC WITH AUTO DIFFERENTIAL - Abnormal; Notable for the following components:       Result Value    RDW 15.9 (*)     Monocytes % 9 (*)     Eosinophils % 5 (*)     All other components within normal limits   COMPREHENSIVE METABOLIC PANEL - Abnormal; Notable for the following components:    Sodium 145 (*)     Chloride 110 (*)     Creatinine 1.5 (*)     Est, Glom Filt Rate 50 (*)     ALT 49 (*)     AST 42 (*)     All other components within normal limits   BRAIN NATRIURETIC PEPTIDE - Abnormal; Notable for the following components:    Pro-BNP 7,121 (*)     All other components within normal limits   TROPONIN - Abnormal; Notable for the following components:    Troponin, High Sensitivity 39 (*)     All other components within normal limits   PROTIME-INR - Abnormal; Notable for the following components:    Protime 15.3 (*)     All other components within normal limits   COVID-19 & INFLUENZA COMBO   MAGNESIUM   TROPONIN       Vitals Reviewed:    Vitals:    03/07/24 1248   BP: (!) 151/116   Pulse: 92   Resp: 18   Temp: 98.2 °F (36.8 °C)   TempSrc: Oral   SpO2: 94%   Weight: 63.5 kg (140 lb)   Height: 1.88 m (6' 2\")     MEDICATIONS GIVEN TO PATIENT THIS ENCOUNTER:  Orders Placed This Encounter   Medications    DISCONTD: aspirin tablet 325 mg     DISCHARGE PRESCRIPTIONS:  New Prescriptions    No medications on file     PHYSICIAN CONSULTS ORDERED THIS ENCOUNTER:  IP CONSULT TO HOSPITALIST  FINAL IMPRESSION      1. Chest pain, unspecified type          DISPOSITION/PLAN   DISPOSITION Decision To Admit 03/07/2024 01:40:11 PM      OUTPATIENT FOLLOW UP THE PATIENT:  No follow-up provider specified.    MD Jes Tran Wesley D, MD  03/07/24 8464

## 2024-03-07 NOTE — H&P
[facility]   IN-PATIENT SERVICE   [facility]    HISTORY AND PHYSICAL EXAMINATION            Date:   3/7/2024  Patient name:  Nik Shepherd  Date of admission:  3/7/2024 12:46 PM  MRN:   [id number]  Account:  [id number]  YOB: 1955  PCP:    Sly Reza MD  Room:   04/04  Code Status:    Prior    Chief Complaint:     Chief Complaint   Patient presents with    Chest Pain       History Obtained From:     patient    History of Present Illness:     The patient is a 68 y.o.  Non- / non  male who presents with Chest Pain   and he is admitted to the hospital for the management of chest pain.    68-year-old gentleman with past medical history significant for coronary artery disease s/p CABG 2022, hyperlipidemia, alcohol dependence, cocaine use, tobacco dependence7, essential hypertension, CKD, HF with mildly reduced ejection fraction, emphysema presents to the hospital with chief concerns of chest discomfort, substernal, no radiation, no associated nausea vomiting diaphoresis.  It started this morning, is nonexertional.  Reports having used cocaine last night.  The patient reports not taking any of his medications at home, regularly uses street drugs, smokes marijuana and tobacco, would not tell me if he is drinking at this time.  During the conversation the patient reports that he will not live forever and wants to leave his life the way he likes.  Already in the emergency room the patient reports that he will not stay in the hospital very long.  He also reports having shortness of breath with exertion, no cough, no fevers no chills.    He denies genitourinary, gastrointestinal concerns.    Past Medical History:     Past Medical History:   Diagnosis Date    3-vessel CAD 03/11/2022    Alcohol abuse 06/23/2015    Arthritis     Chronic kidney disease     Full dentures     upper and lower full    Hypertension     Other emphysema (HCC)     COPD stage II on PFTs with severe  PM   Result Value Ref Range    WBC 6.3 3.5 - 11.0 k/uL    RBC 5.09 4.5 - 5.9 m/uL    Hemoglobin 14.1 13.5 - 17.5 g/dL    Hematocrit 45.3 41 - 53 %    MCV 89.0 80 - 100 fL    MCH 27.6 26 - 34 pg    MCHC 31.0 31 - 37 g/dL    RDW 15.9 (H) 11.5 - 14.9 %    Platelets 214 150 - 450 k/uL    MPV 9.2 6.0 - 12.0 fL    Neutrophils % 54 36 - 66 %    Lymphocytes % 31 24 - 44 %    Monocytes % 9 (H) 1 - 7 %    Eosinophils % 5 (H) 0 - 4 %    Basophils % 1 0 - 2 %    Neutrophils Absolute 3.40 1.3 - 9.1 k/uL    Lymphocytes Absolute 2.00 1.0 - 4.8 k/uL    Monocytes Absolute 0.50 0.1 - 1.3 k/uL    Eosinophils Absolute 0.30 0.0 - 0.4 k/uL    Basophils Absolute 0.10 0.0 - 0.2 k/uL   Comprehensive Metabolic Panel    Collection Time: 03/07/24  1:00 PM   Result Value Ref Range    Sodium 145 (H) 135 - 144 mmol/L    Potassium 3.8 3.7 - 5.3 mmol/L    Chloride 110 (H) 98 - 107 mmol/L    CO2 22 20 - 31 mmol/L    Anion Gap 13 9 - 17 mmol/L    Glucose 85 70 - 99 mg/dL    BUN 23 8 - 23 mg/dL    Creatinine 1.5 (H) 0.7 - 1.2 mg/dL    Est, Glom Filt Rate 50 (L) >60 mL/min/1.73m2    Calcium 9.1 8.6 - 10.4 mg/dL    Total Protein 6.8 6.4 - 8.3 g/dL    Albumin 3.8 3.5 - 5.2 g/dL    Total Bilirubin 0.5 0.3 - 1.2 mg/dL    Alkaline Phosphatase 83 40 - 129 U/L    ALT 49 (H) 5 - 41 U/L    AST 42 (H) <40 U/L   Brain Natriuretic Peptide    Collection Time: 03/07/24  1:00 PM   Result Value Ref Range    Pro-BNP 7,121 (H) <300 pg/mL   Troponin    Collection Time: 03/07/24  1:00 PM   Result Value Ref Range    Troponin, High Sensitivity 39 (H) 0 - 22 ng/L   Protime-INR    Collection Time: 03/07/24  1:00 PM   Result Value Ref Range    Protime 15.3 (H) 11.8 - 14.6 sec    INR 1.2    Magnesium    Collection Time: 03/07/24  1:00 PM   Result Value Ref Range    Magnesium 1.9 1.6 - 2.6 mg/dL   COVID-19 & Influenza Combo    Collection Time: 03/07/24  1:09 PM    Specimen: Nasopharyngeal Swab   Result Value Ref Range    Specimen Description .NASOPHARYNGEAL SWAB     Source

## 2024-03-07 NOTE — ED NOTES
Bladder scanned pt. for pre residual and got a result of 45ml. Pt, stated is unable to give a urine sample at this time unless given water.

## 2024-03-07 NOTE — ED NOTES
Report given to CHEIKH Gibson from [facility].   Report method by phone   The following was reviewed with receiving RN:   Current vital signs:  /79   Pulse 78   Temp 98.2 °F (36.8 °C) (Oral)   Resp 15   Ht 1.88 m (6' 2\")   Wt 63.5 kg (140 lb)   SpO2 100%   BMI 17.97 kg/m²                MEWS Score: 1     Any medication or safety alerts were reviewed. Any pending diagnostics and notifications were also reviewed, as well as any safety concerns or issues, abnormal labs, abnormal imaging, and abnormal assessment findings. Questions were answered.

## 2024-03-07 NOTE — PROGRESS NOTES
Pharmacy Medication History Note      List of current medications patient is taking is complete.     Source of information: patient, dispense report, OARRS, Care Everywhere    Changes made to medication list:  Medications flagged for removal (include reason, ex. noncompliance):  Amiodarone 200 mg daily - not taking  Amlodipine 10 mg daily - not taking   Aspirin 81 mg daily - not taking  Atorvastatin 80 mg nightly - not taking   Clopidogrel 75 mg daily - not taking   Docusate 100 mg daily - not taking   Furosemide 20 mg daily - not taking  Multiple vitamin daily - not taking   Nitroglycerin 0.3 mg SL tablet - not taking  Pantoprazole 40 mg daily - not taking   Spironolactone 25 mg daily - not taking   Tamsulosin 0.4 mg daily - not taking   Thiamine 100 mg daily - not taking     Medications removed (include reason, ex. therapy complete or physician discontinued):  None     Medications added/doses adjusted:  None     Other notes (ex. Recent course of antibiotics, Coumadin dosing):  Patient reports he has not taken any prescription medications for a \"long time\" but cannot recall exactly when. He states the last medication he took was for his heart and this was a few weeks ago, which was a Nitroglycerin 0.3 mg SL tablet.    When asked where he gets his medications filled, the patient states he doesn't get any prescriptions from any pharmacy. Additionally, the patient states the last time he received any prescriptions was when he was in the hospital last, which was a [facility] service. Per further review of patient's chart, the last time he was in the hospital was back in August of 2022.   OARRS negative.    Denies use of other OTC or herbal medications.      Allergies clarified    Medication list provided to the patient: no  Medication education provided to the patient: none      Electronically signed by Vickie Apodaca on 3/7/2024 at 2:13 PM

## 2024-03-08 ENCOUNTER — APPOINTMENT (OUTPATIENT)
Age: 69
End: 2024-03-08
Attending: INTERNAL MEDICINE
Payer: COMMERCIAL

## 2024-03-08 PROBLEM — I21.4 NSTEMI (NON-ST ELEVATED MYOCARDIAL INFARCTION) (HCC): Status: ACTIVE | Noted: 2024-03-08

## 2024-03-08 LAB
ALBUMIN SERPL-MCNC: 3.2 G/DL (ref 3.5–5.2)
ALP SERPL-CCNC: 74 U/L (ref 40–129)
ALT SERPL-CCNC: 43 U/L (ref 5–41)
ANION GAP SERPL CALCULATED.3IONS-SCNC: 9 MMOL/L (ref 9–17)
AST SERPL-CCNC: 33 U/L
BASOPHILS # BLD: 0.1 K/UL (ref 0–0.2)
BASOPHILS NFR BLD: 1 % (ref 0–2)
BILIRUB SERPL-MCNC: 0.3 MG/DL (ref 0.3–1.2)
BUN SERPL-MCNC: 22 MG/DL (ref 8–23)
CALCIUM SERPL-MCNC: 8.5 MG/DL (ref 8.6–10.4)
CHLORIDE SERPL-SCNC: 110 MMOL/L (ref 98–107)
CO2 SERPL-SCNC: 23 MMOL/L (ref 20–31)
CREAT SERPL-MCNC: 1.1 MG/DL (ref 0.7–1.2)
ECHO AO ROOT DIAM: 3.2 CM
ECHO AO ROOT INDEX: 1.71 CM/M2
ECHO AV AREA PEAK VELOCITY: 2.6 CM2
ECHO AV AREA/BSA PEAK VELOCITY: 1.4 CM2/M2
ECHO AV PEAK GRADIENT: 4 MMHG
ECHO AV PEAK VELOCITY: 1.1 M/S
ECHO AV VELOCITY RATIO: 0.55
ECHO BSA: 1.82 M2
ECHO EST RA PRESSURE: 3 MMHG
ECHO IVC PROX: 2.2 CM
ECHO LA DIAMETER INDEX: 1.87 CM/M2
ECHO LA DIAMETER: 3.5 CM
ECHO LA TO AORTIC ROOT RATIO: 1.09
ECHO LV E' LATERAL VELOCITY: 9 CM/S
ECHO LV E' SEPTAL VELOCITY: 3 CM/S
ECHO LV EDV A2C: 167 ML
ECHO LV EDV A4C: 154 ML
ECHO LV EDV INDEX A4C: 82 ML/M2
ECHO LV EDV NDEX A2C: 89 ML/M2
ECHO LV EJECTION FRACTION A2C: 27 %
ECHO LV EJECTION FRACTION A4C: 33 %
ECHO LV EJECTION FRACTION BIPLANE: 29 % (ref 55–100)
ECHO LV ESV A2C: 122 ML
ECHO LV ESV A4C: 104 ML
ECHO LV ESV INDEX A2C: 65 ML/M2
ECHO LV ESV INDEX A4C: 56 ML/M2
ECHO LV FRACTIONAL SHORTENING: 15 % (ref 28–44)
ECHO LV INTERNAL DIMENSION DIASTOLE INDEX: 3.16 CM/M2
ECHO LV INTERNAL DIMENSION DIASTOLIC: 5.9 CM (ref 4.2–5.9)
ECHO LV INTERNAL DIMENSION SYSTOLIC INDEX: 2.67 CM/M2
ECHO LV INTERNAL DIMENSION SYSTOLIC: 5 CM
ECHO LV IVSD: 0.7 CM (ref 0.6–1)
ECHO LV MASS 2D: 166.9 G (ref 88–224)
ECHO LV MASS INDEX 2D: 89.2 G/M2 (ref 49–115)
ECHO LV POSTERIOR WALL DIASTOLIC: 0.8 CM (ref 0.6–1)
ECHO LV RELATIVE WALL THICKNESS RATIO: 0.27
ECHO LVOT AREA: 4.5 CM2
ECHO LVOT DIAM: 2.4 CM
ECHO LVOT MEAN GRADIENT: 1 MMHG
ECHO LVOT PEAK GRADIENT: 1 MMHG
ECHO LVOT PEAK VELOCITY: 0.6 M/S
ECHO LVOT STROKE VOLUME INDEX: 28 ML/M2
ECHO LVOT SV: 52.5 ML
ECHO LVOT VTI: 11.6 CM
ECHO MV A VELOCITY: 0.28 M/S
ECHO MV E DECELERATION TIME (DT): 230 MS
ECHO MV E VELOCITY: 0.86 M/S
ECHO MV E/A RATIO: 3.07
ECHO MV E/E' LATERAL: 9.56
ECHO MV E/E' RATIO (AVERAGED): 19.11
ECHO PV MAX VELOCITY: 0.6 M/S
ECHO PV PEAK GRADIENT: 1 MMHG
ECHO RA AREA 4C: 18 CM2
ECHO RIGHT VENTRICULAR SYSTOLIC PRESSURE (RVSP): 39 MMHG
ECHO RV BASAL DIMENSION: 4.6 CM
ECHO RV FREE WALL PEAK S': 7 CM/S
ECHO RV TAPSE: 1.8 CM (ref 1.7–?)
ECHO TV REGURGITANT MAX VELOCITY: 3 M/S
ECHO TV REGURGITANT PEAK GRADIENT: 36 MMHG
EOSINOPHIL # BLD: 0.5 K/UL (ref 0–0.4)
EOSINOPHILS RELATIVE PERCENT: 9 % (ref 0–4)
ERYTHROCYTE [DISTWIDTH] IN BLOOD BY AUTOMATED COUNT: 15.5 % (ref 11.5–14.9)
GFR SERPL CREATININE-BSD FRML MDRD: >60 ML/MIN/1.73M2
GLUCOSE SERPL-MCNC: 93 MG/DL (ref 70–99)
HCT VFR BLD AUTO: 41.3 % (ref 41–53)
HGB BLD-MCNC: 12.8 G/DL (ref 13.5–17.5)
LYMPHOCYTES NFR BLD: 1.6 K/UL (ref 1–4.8)
LYMPHOCYTES RELATIVE PERCENT: 30 % (ref 24–44)
MCH RBC QN AUTO: 27.4 PG (ref 26–34)
MCHC RBC AUTO-ENTMCNC: 31 G/DL (ref 31–37)
MCV RBC AUTO: 88.5 FL (ref 80–100)
MONOCYTES NFR BLD: 0.7 K/UL (ref 0.1–1.3)
MONOCYTES NFR BLD: 14 % (ref 1–7)
NEUTROPHILS NFR BLD: 46 % (ref 36–66)
NEUTS SEG NFR BLD: 2.4 K/UL (ref 1.3–9.1)
PLATELET # BLD AUTO: 166 K/UL (ref 150–450)
PMV BLD AUTO: 9.2 FL (ref 6–12)
POTASSIUM SERPL-SCNC: 3.9 MMOL/L (ref 3.7–5.3)
PROT SERPL-MCNC: 5.7 G/DL (ref 6.4–8.3)
RBC # BLD AUTO: 4.67 M/UL (ref 4.5–5.9)
SODIUM SERPL-SCNC: 142 MMOL/L (ref 135–144)
TROPONIN I SERPL HS-MCNC: 31 NG/L (ref 0–22)
WBC OTHER # BLD: 5.2 K/UL (ref 3.5–11)

## 2024-03-08 PROCEDURE — 6360000002 HC RX W HCPCS: Performed by: INTERNAL MEDICINE

## 2024-03-08 PROCEDURE — 2060000000 HC ICU INTERMEDIATE R&B

## 2024-03-08 PROCEDURE — 93005 ELECTROCARDIOGRAM TRACING: CPT | Performed by: INTERNAL MEDICINE

## 2024-03-08 PROCEDURE — 94761 N-INVAS EAR/PLS OXIMETRY MLT: CPT

## 2024-03-08 PROCEDURE — 6370000000 HC RX 637 (ALT 250 FOR IP): Performed by: INTERNAL MEDICINE

## 2024-03-08 PROCEDURE — 94640 AIRWAY INHALATION TREATMENT: CPT

## 2024-03-08 PROCEDURE — 80053 COMPREHEN METABOLIC PANEL: CPT

## 2024-03-08 PROCEDURE — 2580000003 HC RX 258: Performed by: INTERNAL MEDICINE

## 2024-03-08 PROCEDURE — 93306 TTE W/DOPPLER COMPLETE: CPT

## 2024-03-08 PROCEDURE — 84484 ASSAY OF TROPONIN QUANT: CPT

## 2024-03-08 PROCEDURE — 93306 TTE W/DOPPLER COMPLETE: CPT | Performed by: INTERNAL MEDICINE

## 2024-03-08 PROCEDURE — 99232 SBSQ HOSP IP/OBS MODERATE 35: CPT | Performed by: INTERNAL MEDICINE

## 2024-03-08 PROCEDURE — 99222 1ST HOSP IP/OBS MODERATE 55: CPT | Performed by: INTERNAL MEDICINE

## 2024-03-08 PROCEDURE — 36415 COLL VENOUS BLD VENIPUNCTURE: CPT

## 2024-03-08 PROCEDURE — 85025 COMPLETE CBC W/AUTO DIFF WBC: CPT

## 2024-03-08 RX ORDER — ENOXAPARIN SODIUM 100 MG/ML
1 INJECTION SUBCUTANEOUS DAILY
Status: DISCONTINUED | OUTPATIENT
Start: 2024-03-09 | End: 2024-03-08

## 2024-03-08 RX ORDER — ENOXAPARIN SODIUM 100 MG/ML
1 INJECTION SUBCUTANEOUS 2 TIMES DAILY
Status: DISCONTINUED | OUTPATIENT
Start: 2024-03-08 | End: 2024-03-09 | Stop reason: HOSPADM

## 2024-03-08 RX ADMIN — IPRATROPIUM BROMIDE AND ALBUTEROL SULFATE 1 DOSE: 2.5; .5 SOLUTION RESPIRATORY (INHALATION) at 10:59

## 2024-03-08 RX ADMIN — IPRATROPIUM BROMIDE AND ALBUTEROL SULFATE 1 DOSE: 2.5; .5 SOLUTION RESPIRATORY (INHALATION) at 07:28

## 2024-03-08 RX ADMIN — ENOXAPARIN SODIUM 60 MG: 100 INJECTION SUBCUTANEOUS at 18:29

## 2024-03-08 RX ADMIN — TAMSULOSIN HYDROCHLORIDE 0.4 MG: 0.4 CAPSULE ORAL at 08:00

## 2024-03-08 RX ADMIN — IPRATROPIUM BROMIDE AND ALBUTEROL SULFATE 1 DOSE: 2.5; .5 SOLUTION RESPIRATORY (INHALATION) at 15:16

## 2024-03-08 RX ADMIN — THIAMINE HCL TAB 100 MG 100 MG: 100 TAB at 08:00

## 2024-03-08 RX ADMIN — CLOPIDOGREL BISULFATE 75 MG: 75 TABLET ORAL at 08:00

## 2024-03-08 RX ADMIN — ASPIRIN 81 MG: 81 TABLET, COATED ORAL at 08:00

## 2024-03-08 RX ADMIN — ATORVASTATIN CALCIUM 80 MG: 80 TABLET, FILM COATED ORAL at 20:44

## 2024-03-08 RX ADMIN — SODIUM CHLORIDE: 4.5 INJECTION, SOLUTION INTRAVENOUS at 05:43

## 2024-03-08 RX ADMIN — Medication 1 TABLET: at 08:00

## 2024-03-08 RX ADMIN — PANTOPRAZOLE SODIUM 40 MG: 40 TABLET, DELAYED RELEASE ORAL at 08:00

## 2024-03-08 RX ADMIN — ENOXAPARIN SODIUM 40 MG: 100 INJECTION SUBCUTANEOUS at 08:00

## 2024-03-08 RX ADMIN — Medication 10 ML: at 08:00

## 2024-03-08 NOTE — DISCHARGE INSTR - COC
Continuity of Care Form    Patient Name: Nik Shepherd   :  1955  MRN:  [id number]    Admit date:  3/7/2024  Discharge date:  3-9-24    Code Status Order: DNR-CCA   Advance Directives:     Admitting Physician:  José Latham MD  PCP: Sly Reza MD    Discharging Nurse: CHEIKH Lopez  Discharging Hospital Unit/Room#: 2106/2106-01  Discharging Unit Phone Number: [phone]    Emergency Contact:   Extended Emergency Contact Information  Primary Emergency Contact: Erin Shepherd(Cousin)   [facility]  Home Phone: [phone]  Relation: Other  Secondary Emergency Contact: Shira Zuñiga  Mobile Phone: [phone]  Relation: Child   needed? No    Past Surgical History:  Past Surgical History:   Procedure Laterality Date    APPENDECTOMY      CORONARY ARTERY BYPASS GRAFT N/A 3/21/2022    CABG X 2 , EVH RT LEG , MELI performed by Carlos Humphrey MD at [facility] CVOR    HIP FRACTURE SURGERY Left     W/ HARDWARE    MS EGD TRANSORAL BIOPSY SINGLE/MULTIPLE  2017    EGD BIOPSY performed by Cory Cuoch DO at [facility]       Immunization History:   Immunization History   Administered Date(s) Administered    COVID-19, J&J, (age 18y+), IM, 0.5 mL 2021    Influenza, AFLURIA (age 3 yrs+), FLUZONE, (age 6 mo+), MDV, 0.5mL 2016, 2017    Pneumococcal, PPSV23, PNEUMOVAX 23, (age 2y+), SC/IM, 0.5mL 2014    TDaP, ADACEL (age 10y-64y), BOOSTRIX (age 10y+), IM, 0.5mL 2016       Active Problems:  Patient Active Problem List   Diagnosis Code    CKD (chronic kidney disease) stage 3, GFR 30-59 ml/min ([facility]) N18.30    Essential hypertension I10    Arthritis of right hip M16.11    Chronic systolic congestive heart failure (HCC) I50.22    Benign prostatic hyperplasia with urinary obstruction N40.1, N13.8    Pure hypercholesterolemia E78.00    Abdominal pain R10.9    Chest pain R07.9    Acute on chronic combined systolic and diastolic congestive heart failure (HCC) I50.43    S/P  Time of Hospital Discharge:   Respiratory Treatments: Duoneb  Oxygen Therapy:  is not on home oxygen therapy.  Ventilator:    - No ventilator support    Rehab Therapies: Physical Therapy and Occupational Therapy  Weight Bearing Status/Restrictions: No weight bearing restrictions  Other Medical Equipment (for information only, NOT a DME order):    Other Treatments: Skilled Nursing assessment and monitoring. Medication education and monitoring per protocol.       Patient's personal belongings (please select all that are sent with patient):  None    RN SIGNATURE:  Electronically signed by Jessica Haq RN on 3/9/24 at 12:31 PM EST    CASE MANAGEMENT/SOCIAL WORK SECTION    Inpatient Status Date: ***    Readmission Risk Assessment Score:  Readmission Risk              Risk of Unplanned Readmission:  21           Discharging to Facility/ Agency   [facility]  P: [phone]  F: [phone]      Dialysis Facility (if applicable)   Name:  Address:  Dialysis Schedule:  Phone:  Fax:    / signature: Electronically signed by Manisha Barrera RN on 3/8/24 at 1:48 PM EST    PHYSICIAN SECTION    Prognosis: {Prognosis:9088098091}    Condition at Discharge: { Patient Condition:223005457}    Rehab Potential (if transferring to Rehab): {Prognosis:6251782746}    Recommended Labs or Other Treatments After Discharge: ***    Physician Certification: I certify the above information and transfer of Nik Shepherd  is necessary for the continuing treatment of the diagnosis listed and that he requires {Admit to Appropriate Level of Care:31701} for {GREATER/LESS:595638187} 30 days.     Update Admission H&P: No change in H&P    PHYSICIAN SIGNATURE:  Electronically signed by Fallon Hand MD on 3/9/24 at 12:36 PM EST

## 2024-03-08 NOTE — PLAN OF CARE
Problem: Discharge Planning  Goal: Discharge to home or other facility with appropriate resources  Outcome: Progressing     Problem: Skin/Tissue Integrity  Goal: Absence of new skin breakdown  Description: 1.  Monitor for areas of redness and/or skin breakdown  2.  Assess vascular access sites hourly  3.  Every 4-6 hours minimum:  Change oxygen saturation probe site  4.  Every 4-6 hours:  If on nasal continuous positive airway pressure, respiratory therapy assess nares and determine need for appliance change or resting period.  Outcome: Progressing     Problem: Safety - Adult  Goal: Free from fall injury  Outcome: Progressing  Note: The patient remained free from falls this shift, call light within reach, bed in locked and lowest position.  Side rails up x2.      Problem: Cardiovascular - Adult  Goal: Maintains optimal cardiac output and hemodynamic stability  Outcome: Progressing     Problem: Genitourinary - Adult  Goal: Absence of urinary retention  Outcome: Progressing  Flowsheets (Taken 3/8/2024 0310)  Absence of urinary retention: Assess patient’s ability to void and empty bladder

## 2024-03-08 NOTE — ACP (ADVANCE CARE PLANNING)
Advance Care Planning     Advance Care Planning Activator (Inpatient)  Conversation Note      Date of ACP Conversation: 3/8/2024     Conversation Conducted with: Patient with Decision Making Capacity    ACP Activator: Manisha Barrera RN    Health Care Decision Maker:     Current Designated Health Care Decision Maker:     Primary Decision Maker: Shira Zuñiga - [place] - [phone]  Click here to complete Healthcare Decision Makers including section of the Healthcare Decision Maker Relationship (ie \"Primary\")  Today we documented Decision Maker(s) consistent with Legal Next of Kin hierarchy.    Care Preferences    Ventilation:  \"If you were in your present state of health and suddenly became very ill and were unable to breathe on your own, what would your preference be about the use of a ventilator (breathing machine) if it were available to you?\"      Would the patient desire the use of ventilator (breathing machine)?: no, DNR-CCA    \"If your health worsens and it becomes clear that your chance of recovery is unlikely, what would your preference be about the use of a ventilator (breathing machine) if it were available to you?\"     Would the patient desire the use of ventilator (breathing machine)?: No      Resuscitation  \"CPR works best to restart the heart when there is a sudden event, like a heart attack, in someone who is otherwise healthy. Unfortunately, CPR does not typically restart the heart for people who have serious health conditions or who are very sick.\"    \"In the event your heart stopped as a result of an underlying serious health condition, would you want attempts to be made to restart your heart (answer \"yes\" for attempt to resuscitate) or would you prefer a natural death (answer \"no\" for do not attempt to resuscitate)?\" no, DNR-CCA       [] Yes   [] No   Educated Patient / Decision Maker regarding differences between Advance Directives and portable DNR orders.    Length of ACP Conversation in

## 2024-03-08 NOTE — PLAN OF CARE
Problem: Discharge Planning  Goal: Discharge to home or other facility with appropriate resources  3/8/2024 1345 by Sol Shepherd RN  Outcome: Progressing  Flowsheets (Taken 3/8/2024 0800)  Discharge to home or other facility with appropriate resources:   Identify barriers to discharge with patient and caregiver   Arrange for needed discharge resources and transportation as appropriate   Identify discharge learning needs (meds, wound care, etc)   Arrange for interpreters to assist at discharge as needed   Refer to discharge planning if patient needs post-hospital services based on physician order or complex needs related to functional status, cognitive ability or social support system  3/8/2024 0310 by Morelia Parks RN  Outcome: Progressing     Problem: Skin/Tissue Integrity  Goal: Absence of new skin breakdown  Description: 1.  Monitor for areas of redness and/or skin breakdown  2.  Assess vascular access sites hourly  3.  Every 4-6 hours minimum:  Change oxygen saturation probe site  4.  Every 4-6 hours:  If on nasal continuous positive airway pressure, respiratory therapy assess nares and determine need for appliance change or resting period.  3/8/2024 1345 by Sol Shepherd RN  Outcome: Progressing  3/8/2024 0310 by Morelia Parks RN  Outcome: Progressing     Problem: Safety - Adult  Goal: Free from fall injury  3/8/2024 1345 by Sol Shepherd RN  Outcome: Progressing  Flowsheets (Taken 3/8/2024 0800)  Free From Fall Injury:   Based on caregiver fall risk screen, instruct family/caregiver to ask for assistance with transferring infant if caregiver noted to have fall risk factors   Instruct family/caregiver on patient safety  3/8/2024 0310 by Morelia Parks RN  Outcome: Progressing  Note: The patient remained free from falls this shift, call light within reach, bed in locked and lowest position.  Side rails up x2.      Problem: ABCDS Injury Assessment  Goal: Absence of physical injury  Outcome:  Progressing  Flowsheets (Taken 3/8/2024 0800)  Absence of Physical Injury: Implement safety measures based on patient assessment     Problem: Cardiovascular - Adult  Goal: Maintains optimal cardiac output and hemodynamic stability  3/8/2024 1345 by Sol Shepherd RN  Outcome: Progressing  Flowsheets (Taken 3/8/2024 0800)  Maintains optimal cardiac output and hemodynamic stability:   Monitor blood pressure and heart rate   Monitor urine output and notify Licensed Independent Practitioner for values outside of normal range   Assess for signs of decreased cardiac output   Administer fluid and/or volume expanders as ordered   Administer vasoactive medications as ordered   For PPHN infants, administer sedation as ordered and minimize all controllable stressors.  3/8/2024 0310 by Morelia Parks RN  Outcome: Progressing     Problem: Genitourinary - Adult  Goal: Absence of urinary retention  3/8/2024 1345 by Sol Shepherd RN  Outcome: Progressing  Flowsheets (Taken 3/8/2024 0800)  Absence of urinary retention:   Assess patient’s ability to void and empty bladder   Monitor intake/output and perform bladder scan as needed   Place urinary catheter per Licensed Independent Practitioner order if needed   Discuss with Licensed Independent Practitioner  medications to alleviate retention as needed   Discuss catheterization for long term situations as appropriate  3/8/2024 0310 by Morelia Parks RN  Outcome: Progressing  Flowsheets (Taken 3/8/2024 0310)  Absence of urinary retention: Assess patient’s ability to void and empty bladder

## 2024-03-08 NOTE — CARE COORDINATION
Case Management Assessment  Initial Evaluation    Date/Time of Evaluation: 3/8/2024 1:52 PM  Assessment Completed by: Manisha Barrera RN    If patient is discharged prior to next notation, then this note serves as note for discharge by case management.    Patient Name: Nik Shepherd                   YOB: 1955  Diagnosis: Chest pain [R07.9]  Chest pain, unspecified type [R07.9]                   Date / Time: 3/7/2024 12:46 PM    Patient Admission Status: Inpatient   Readmission Risk (Low < 19, Mod (19-27), High > 27): Readmission Risk Score: 12.8    Current PCP: Sly Reza MD  PCP verified by CM? Yes    Chart Reviewed: Yes      History Provided by: Patient  Patient Orientation: Alert and Oriented    Patient Cognition: Alert    Hospitalization in the last 30 days (Readmission):  No    If yes, Readmission Assessment in  Navigator will be completed.    Advance Directives:      Code Status: DNR-CCA   Patient's Primary Decision Maker is: Legal Next of Kin    Primary Decision Maker: Shira Zuñiga - Child - [phone]    Discharge Planning:    Patient lives with: Family Members Type of Home: House  Primary Care Giver: Self  Patient Support Systems include: Family Members   Current Financial resources: Medicare, Medicaid  Current community resources: None  Current services prior to admission: Durable Medical Equipment            Current DME: Walker, Wheelchair            Type of Home Care services:  PT, Nursing Services, OT    ADLS  Prior functional level: Independent in ADLs/IADLs  Current functional level: Independent in ADLs/IADLs    PT AM-PAC:   /24  OT AM-PAC:   /24    Family can provide assistance at DC: Yes  Would you like Case Management to discuss the discharge plan with any other family members/significant others, and if so, who? No  Plans to Return to Present Housing: Yes  Other Identified Issues/Barriers to RETURNING to current housing: NO  Potential Assistance needed at

## 2024-03-08 NOTE — CONSULTS
[facility]    Consult                        Today's Date: 3/8/2024  Patient Name: Nik Shepherd  Date of admission: 3/7/2024 12:46 PM  Patient's age: 68 y.o., 1955  Admission Dx: Chest pain [R07.9]  Chest pain, unspecified type [R07.9]    Reason for Consult:  Cardiac evaluation    Requesting Physician: José Latham MD    CHIEF COMPLAINT:  Chest pain    History Obtained From:  patient, electronic medical record    HISTORY OF PRESENT ILLNESS:      The patient is a 68 y.o.  male who is admitted to the hospital for chest pain. Patient has history of CABG 2022. Patient reports that he used cocaine last night. He reports dyspnea on exertion. He is a chronic smoker and also is alcohol dependent.     Past Medical History:   has a past medical history of 3-vessel CAD, Alcohol abuse, Arthritis, Chronic kidney disease, Full dentures, Hypertension, Other emphysema (HCC), Pure hypercholesterolemia, Systolic CHF (HCC), and Wears glasses.    Past Surgical History:   has a past surgical history that includes Hip fracture surgery (Left); Appendectomy; pr egd transoral biopsy single/multiple (6/19/2017); and Coronary artery bypass graft (N/A, 3/21/2022).     Home Medications:    Prior to Admission medications    Medication Sig Start Date End Date Taking? Authorizing Provider   docusate sodium (COLACE) 100 MG capsule Take 1 capsule by mouth 2 times daily  Patient not taking: Reported on 3/7/2024 9/10/22   Olaf Morin MD   amiodarone (CORDARONE) 200 MG tablet Take 1 tablet by mouth in the morning.  Patient not taking: Reported on 3/7/2024 8/5/22 9/4/22  Liza Fu APRN - NP   amLODIPine (NORVASC) 10 MG tablet Take 1 tablet by mouth in the morning.  Patient not taking: Reported on 3/7/2024 8/5/22   Liza Fu APRN - NP   thiamine 100 MG tablet Take 1 tablet by mouth in the morning.  Patient not taking: Reported on 3/7/2024 8/9/22   Liza Fu APRN - NP   Multiple

## 2024-03-08 NOTE — PROGRESS NOTES
Pt admission questions complete. Pt oriented to room, bed locked in low position, call light within reach. Pt adamantly refusing staff to do a full skin assessment for the dual skin admission assessment.

## 2024-03-08 NOTE — PROGRESS NOTES
[facility]   IN-PATIENT SERVICE   [facility]    HISTORY AND PHYSICAL EXAMINATION            Date:   3/8/2024  Patient name:  Nik Shepherd  Date of admission:  3/7/2024 12:46 PM  MRN:   [id number]  Account:  [id number]  YOB: 1955  PCP:    Sly Reza MD  Room:   [address] [place] [address]  Code Status:    DNR-CCA    Chief Complaint:     Chief Complaint   Patient presents with    Chest Pain       History Obtained From:     patient    History of Present Illness:     The patient is a 68 y.o.  Non- / non  male who presents with Chest Pain   and he is admitted to the hospital for the management of chest pain.    68-year-old gentleman with past medical history significant for coronary artery disease s/p CABG 2022, hyperlipidemia, alcohol dependence, cocaine use, tobacco dependence7, essential hypertension, CKD, HF with mildly reduced ejection fraction, emphysema presents to the hospital with chief concerns of chest discomfort, substernal, no radiation, no associated nausea vomiting diaphoresis.  It started this morning, is nonexertional.  Reports having used cocaine last night.  The patient reports not taking any of his medications at home, regularly uses street drugs, smokes marijuana and tobacco, would not tell me if he is drinking at this time.  During the conversation the patient reports that he will not live forever and wants to leave his life the way he likes.  Already in the emergency room the patient reports that he will not stay in the hospital very long.  He also reports having shortness of breath with exertion, no cough, no fevers no chills.    He denies genitourinary, gastrointestinal concerns.    Past Medical History:     Past Medical History:   Diagnosis Date    3-vessel CAD 03/11/2022    Alcohol abuse 06/23/2015    Arthritis     Chronic kidney disease     Full dentures     upper and lower full    Hypertension     Other emphysema (HCC)     COPD stage II on PFTs  - 12.0 fL    Neutrophils % 46 36 - 66 %    Lymphocytes % 30 24 - 44 %    Monocytes % 14 (H) 1 - 7 %    Eosinophils % 9 (H) 0 - 4 %    Basophils % 1 0 - 2 %    Neutrophils Absolute 2.40 1.3 - 9.1 k/uL    Lymphocytes Absolute 1.60 1.0 - 4.8 k/uL    Monocytes Absolute 0.70 0.1 - 1.3 k/uL    Eosinophils Absolute 0.50 (H) 0.0 - 0.4 k/uL    Basophils Absolute 0.10 0.0 - 0.2 k/uL       Imaging/Diagnostics:    Reviewed    Assessment :      Primary Problem  Chest pain    Active Hospital Problems    Diagnosis Date Noted    Chest pain [R07.9] 03/09/2022       Plan:     Patient status Admit as inpatient in the  Progressive Unit/Step down    Chest pain, now resolved.  Troponins mildly elevated (suspect from elevated blood pressures ), blood pressure significantly elevated on presentation.  Nonspecific ST-T wave changes on EKG, unchanged since prior.  Chest pain has resolved.  Will place patient on aspirin, Plavix, atorvastatin.  Given ARABELLA will hold off on ACE/ARB, for now will hold BB as well ( even coreg) given the patient uses cocaine  HFrEF, currently not in exacerbation.  Noted BNP is elevated however suspect this is from stress from using cocaine.  On physical exam is volume deplete. last echocardiogram noted from 2021, EF of 40-45%, moderate inferior wall hypokinesis noted.  No recent echocardiogram on file.  Patient noncompliant with all his medications, discussed in detail, patient says he does not want to live indefinitely and wants to leave his life the way it is  COPD, currently not in exacerbation.  Chest x-ray negative for infiltrate  Hypernatremia secondary to dehydration.  Placed on half-normal saline, recheck in a.m.  ARABELLA on CKD, send UA urine lites, bladder scans every 6 hours, reinitiate Flomax  Mild transaminitis, check for hepatitis C and repeat in a.m.  Suspect from drugs.  For now we will reinitiate statin, monitor liver functions in a.m.  History of CABG 2022  Alcohol dependence, placed on thiamine folic  acid, CIWA protocol  Cocaine use  Tobacco use  Hypertensive emergency with troponin leak, now blood pressures better.  Noncompliance  Patient is decisional    DVT prophylaxis, Lovenox DVT prophylactic dose  GI prophylaxis reinitiate pantoprazole home medication      3/8  Patient seen and examined, continues to complain of left-sided chest pain, will do stat EKG troponin  Done have history of CAD s/p CABG x 2 in 2022  Echocardiogram that time showed ejection ration of 50 to 55%  Will check echocardiogram consult cardiology  His blood pressure is now getting better  Chest pain likely secondary to underlying cocaine use  Blood pressure is better now not on any antihypertensive currently  Hypernatremia is better  Hepatitis C negative    Consultations:   IP CONSULT TO HOSPITALIST  IP CONSULT TO SOCIAL WORK  IP CONSULT TO CARDIOLOGY    Patient is admitted as inpatient status because of co-morbidities listed above, severity of signs and symptoms as outlined, requirement for current medical therapies and most importantly because of direct risk to patient if care not provided in a hospital setting.    Fallon Hand MD  3/8/2024  12:04 PM    Copy sent to Sly Tidwell MD    Please note that this chart was generated using voice recognition Dragon dictation software.  Although every effort was made to ensure the accuracy of this automated transcription, some errors in transcription may have occurred.

## 2024-03-09 VITALS
TEMPERATURE: 98.6 F | BODY MASS INDEX: 17.82 KG/M2 | RESPIRATION RATE: 18 BRPM | HEART RATE: 72 BPM | DIASTOLIC BLOOD PRESSURE: 95 MMHG | WEIGHT: 138.89 LBS | SYSTOLIC BLOOD PRESSURE: 155 MMHG | HEIGHT: 74 IN | OXYGEN SATURATION: 96 %

## 2024-03-09 PROBLEM — R79.89 TROPONIN LEVEL ELEVATED: Status: ACTIVE | Noted: 2024-03-09

## 2024-03-09 PROBLEM — E78.5 HYPERLIPIDEMIA: Status: ACTIVE | Noted: 2024-03-09

## 2024-03-09 LAB
EKG ATRIAL RATE: 82 BPM
EKG ATRIAL RATE: 90 BPM
EKG P AXIS: 84 DEGREES
EKG P AXIS: 84 DEGREES
EKG P-R INTERVAL: 138 MS
EKG P-R INTERVAL: 146 MS
EKG Q-T INTERVAL: 370 MS
EKG Q-T INTERVAL: 388 MS
EKG QRS DURATION: 96 MS
EKG QRS DURATION: 96 MS
EKG QTC CALCULATION (BAZETT): 452 MS
EKG QTC CALCULATION (BAZETT): 453 MS
EKG R AXIS: -2 DEGREES
EKG R AXIS: -47 DEGREES
EKG T AXIS: -126 DEGREES
EKG T AXIS: -130 DEGREES
EKG VENTRICULAR RATE: 82 BPM
EKG VENTRICULAR RATE: 90 BPM

## 2024-03-09 PROCEDURE — 99239 HOSP IP/OBS DSCHRG MGMT >30: CPT | Performed by: INTERNAL MEDICINE

## 2024-03-09 PROCEDURE — 93010 ELECTROCARDIOGRAM REPORT: CPT | Performed by: INTERNAL MEDICINE

## 2024-03-09 PROCEDURE — 6360000002 HC RX W HCPCS: Performed by: INTERNAL MEDICINE

## 2024-03-09 PROCEDURE — 6370000000 HC RX 637 (ALT 250 FOR IP): Performed by: INTERNAL MEDICINE

## 2024-03-09 PROCEDURE — 99233 SBSQ HOSP IP/OBS HIGH 50: CPT | Performed by: INTERNAL MEDICINE

## 2024-03-09 RX ORDER — ATORVASTATIN CALCIUM 80 MG/1
80 TABLET, FILM COATED ORAL NIGHTLY
Qty: 30 TABLET | Refills: 3 | Status: SHIPPED | OUTPATIENT
Start: 2024-03-09

## 2024-03-09 RX ORDER — PANTOPRAZOLE SODIUM 40 MG/1
40 TABLET, DELAYED RELEASE ORAL DAILY
Qty: 30 TABLET | Refills: 3 | Status: SHIPPED | OUTPATIENT
Start: 2024-03-09

## 2024-03-09 RX ORDER — SPIRONOLACTONE 25 MG/1
25 TABLET ORAL DAILY
Status: DISCONTINUED | OUTPATIENT
Start: 2024-03-09 | End: 2024-03-09 | Stop reason: HOSPADM

## 2024-03-09 RX ORDER — TAMSULOSIN HYDROCHLORIDE 0.4 MG/1
0.4 CAPSULE ORAL DAILY
Qty: 30 CAPSULE | Refills: 3 | Status: SHIPPED | OUTPATIENT
Start: 2024-03-09

## 2024-03-09 RX ORDER — CLOPIDOGREL BISULFATE 75 MG/1
75 TABLET ORAL DAILY
Qty: 30 TABLET | Refills: 3 | Status: SHIPPED | OUTPATIENT
Start: 2024-03-09

## 2024-03-09 RX ORDER — NITROGLYCERIN 0.3 MG/1
0.3 TABLET SUBLINGUAL EVERY 5 MIN PRN
Qty: 30 TABLET | Refills: 3 | Status: SHIPPED | OUTPATIENT
Start: 2024-03-09

## 2024-03-09 RX ORDER — ASPIRIN 81 MG/1
81 TABLET ORAL DAILY
Qty: 30 TABLET | Refills: 3 | Status: SHIPPED | OUTPATIENT
Start: 2024-03-09

## 2024-03-09 RX ADMIN — CLOPIDOGREL BISULFATE 75 MG: 75 TABLET ORAL at 10:10

## 2024-03-09 RX ADMIN — ASPIRIN 81 MG: 81 TABLET, COATED ORAL at 10:10

## 2024-03-09 RX ADMIN — PANTOPRAZOLE SODIUM 40 MG: 40 TABLET, DELAYED RELEASE ORAL at 10:11

## 2024-03-09 RX ADMIN — Medication 1 TABLET: at 10:10

## 2024-03-09 RX ADMIN — ENOXAPARIN SODIUM 60 MG: 100 INJECTION SUBCUTANEOUS at 10:10

## 2024-03-09 RX ADMIN — TAMSULOSIN HYDROCHLORIDE 0.4 MG: 0.4 CAPSULE ORAL at 10:10

## 2024-03-09 RX ADMIN — THIAMINE HCL TAB 100 MG 100 MG: 100 TAB at 10:10

## 2024-03-09 NOTE — PROGRESS NOTES
Writer at bedside with cardiology. Patient and MD decided not to move forward with cardiac cath, as patient has history of cocaine use and states \"I am not going to get help, I am going to live my life the way I want to live it.\" Patient openly admits to using cocaine \"any change I get\". Patient is also non compliant with meds and MD thinks safer for patient to not get at this time. NPO order for Sunday night was discontinued.

## 2024-03-09 NOTE — PROGRESS NOTES
Patient has all belongings and was taken downstairs and put in a cab. Patients cousin states he lives next door and is going to bring patients walker out to him.

## 2024-03-09 NOTE — PROGRESS NOTES
RN called and spoke with Dr. Long to review CHF core measure. BUN and Cr reviewed. New orders were given to start entresto, jardiance and spironolactone at discharge.

## 2024-03-09 NOTE — PROGRESS NOTES
03/09/24 1235   Encounter Summary   Encounter Overview/Reason  Initial Encounter   Service Provided For: Patient   Referral/Consult From: Rounding   Last Encounter  03/09/24   Complexity of Encounter Low   Spiritual/Emotional needs   Type Spiritual Support   Assessment/Intervention/Outcome   Assessment Passive   Intervention Active listening;Prayer (assurance of)/[place];Sustaining Presence/Ministry of presence   Outcome Engaged in conversation

## 2024-03-09 NOTE — DISCHARGE SUMMARY
IN-PATIENT SERVICE   [facility]    Discharge Summary     Patient ID: Nik Shepherd  :  1955   MRN: [id number]     ACCOUNT:  [id number]   Patient's PCP: Sly Reza MD  Admit Date: 3/7/2024   Discharge Date: 3/9/2024    Length of Stay: 2  Code Status:  DNR-CCA  Admitting Physician: José Latham MD  Discharge Physician: Fallon Hand MD     Active Discharge Diagnoses:     Primary Problem  Chest pain      Hospital Problems  Active Hospital Problems    Diagnosis Date Noted   • NSTEMI (non-ST elevated myocardial infarction) (HCC) [I21.4] 2024     Priority: High   • Chest pain [R07.9] 2022       Admission Condition:  fair     Discharged Condition: fair    Hospital Stay:     Hospital Course:  Nik Shepherd is a 68 y.o. male who was admitted for the management of Chest pain , presented to ER with Chest Pain    68-year-old gentleman with past medical history significant for coronary artery disease s/p CABG , hyperlipidemia, alcohol dependence, cocaine use, tobacco dependence7, essential hypertension, CKD, HF with mildly reduced ejection fraction, emphysema presents to the hospital with chief concerns of chest discomfort, substernal, no radiation, no associated nausea vomiting diaphoresis. It started this morning, is nonexertional. Reports having used cocaine last night. The patient reports not taking any of his medications at home, regularly uses street drugs, smokes marijuana and tobacco, would not tell me if he is drinking at this time. During the conversation the patient reports that he will not live forever and wants to leave his life the way he likes. Already in the emergency room the patient reports that he will not stay in the hospital very long. He also reports having shortness of breath with exertion, no cough, no fevers no chills.     Patient not taking any medications at home, admits to cocaine use does not want to quit, when asked if he will quit, he  Hyperinflation of the lungs.  Atelectasis or scarring in the lung bases.  No confluent infiltrate, effusion, or pneumothorax identified.  Enlargement of the heart.  Mediastinal silhouette is within normal limits.     Hyperinflation of the lungs.  No acute pulmonary disease identified. Stable cardiomegaly.         Consultations:    Consults:     Final Specialist Recommendations/Findings:   IP CONSULT TO HOSPITALIST  IP CONSULT TO SOCIAL WORK  IP CONSULT TO CARDIOLOGY      The patient was seen and examined on day of discharge and this discharge summary is in conjunction with any daily progress note from day of discharge.    Discharge plan:     Disposition: Home    Physician Follow Up:   [facility]  [address]  [place] [address]  [phone]           Requiring Further Evaluation/Follow Up POST HOSPITALIZATION/Incidental Findings:    Diet: cardiac diet    Activity: As tolerated    Instructions to Patient:     Discharge Medications:      Medication List        START taking these medications      amLODIPine 10 MG tablet  Commonly known as: NORVASC  Take 1 tablet by mouth in the morning.     aspirin 81 MG EC tablet  Take 1 tablet by mouth daily     atorvastatin 80 MG tablet  Commonly known as: LIPITOR  Take 1 tablet by mouth nightly     clopidogrel 75 MG tablet  Commonly known as: PLAVIX  Take 1 tablet by mouth daily     nitroGLYCERIN 0.3 MG SL tablet  Commonly known as: NITROSTAT     pantoprazole 40 MG tablet  Commonly known as: PROTONIX  Take 1 tablet by mouth daily     spironolactone 25 MG tablet  Commonly known as: ALDACTONE  Take 1 tablet by mouth daily     tamsulosin 0.4 MG capsule  Commonly known as: FLOMAX  Take 1 capsule by mouth daily            STOP taking these medications      amiodarone 200 MG tablet  Commonly known as: CORDARONE     docusate sodium 100 MG capsule  Commonly known as: Colace     furosemide 20 MG tablet  Commonly known as: Lasix     ibuprofen 800 MG  tablet  Commonly known as: ADVIL;MOTRIN     metoprolol tartrate 25 MG tablet  Commonly known as: LOPRESSOR     midodrine 5 MG tablet  Commonly known as: PROAMATINE     multivitamin with minerals tablet     thiamine 100 MG tablet              Time Spent on discharge is  35 mins in patient examination, evaluation, counseling as well as medication reconciliation, prescriptions for required medications, discharge plan and follow up.    Electronically signed by   Fallon Hand MD  3/9/2024  12:01 PM      Thank you Sly Tidwell MD for the opportunity to be involved in this patient's care.

## 2024-03-09 NOTE — PLAN OF CARE
Problem: Discharge Planning  Goal: Discharge to home or other facility with appropriate resources  Outcome: Progressing     Problem: Skin/Tissue Integrity  Goal: Absence of new skin breakdown  Description: 1.  Monitor for areas of redness and/or skin breakdown  2.  Assess vascular access sites hourly  3.  Every 4-6 hours minimum:  Change oxygen saturation probe site  4.  Every 4-6 hours:  If on nasal continuous positive airway pressure, respiratory therapy assess nares and determine need for appliance change or resting period.  Outcome: Progressing     Problem: Safety - Adult  Goal: Free from fall injury  Outcome: Progressing  Note: The patient remained free from falls this shift, call light within reach, bed in locked and lowest position.  Side rails up x2.       Problem: Cardiovascular - Adult  Goal: Maintains optimal cardiac output and hemodynamic stability  Outcome: Progressing     Problem: Genitourinary - Adult  Goal: Absence of urinary retention  Outcome: Progressing     Problem: Chronic Conditions and Co-morbidities  Goal: Patient's chronic conditions and co-morbidity symptoms are monitored and maintained or improved  Outcome: Progressing

## 2024-03-09 NOTE — PLAN OF CARE
Problem: Discharge Planning  Goal: Discharge to home or other facility with appropriate resources  3/9/2024 1227 by Jessica Haq RN  Outcome: Adequate for Discharge     Problem: Skin/Tissue Integrity  Goal: Absence of new skin breakdown  Description: 1.  Monitor for areas of redness and/or skin breakdown  2.  Assess vascular access sites hourly  3.  Every 4-6 hours minimum:  Change oxygen saturation probe site  4.  Every 4-6 hours:  If on nasal continuous positive airway pressure, respiratory therapy assess nares and determine need for appliance change or resting period.  3/9/2024 1227 by Jessica Haq RN  Outcome: Adequate for Discharge     Problem: Safety - Adult  Goal: Free from fall injury  3/9/2024 1227 by Jessica Haq RN  Outcome: Adequate for Discharge     Problem: ABCDS Injury Assessment  Goal: Absence of physical injury  Outcome: Adequate for Discharge     Problem: Cardiovascular - Adult  Goal: Maintains optimal cardiac output and hemodynamic stability  3/9/2024 1227 by Jessica Haq RN  Outcome: Adequate for Discharge     Problem: Genitourinary - Adult  Goal: Absence of urinary retention  3/9/2024 1227 by Jessica Haq RN  Outcome: Adequate for Discharge     Problem: Chronic Conditions and Co-morbidities  Goal: Patient's chronic conditions and co-morbidity symptoms are monitored and maintained or improved  3/9/2024 1227 by Jessica Haq RN  Outcome: Adequate for Discharge

## 2024-03-09 NOTE — PROGRESS NOTES
[facility]   Progress Note                   Date:   3/9/2024  Patient name: Nik Shepherd  Date of admission:  3/7/2024 12:46 PM  MRN:   [id number]  YOB: 1955  PCP: Sly Reza MD    Reason for Admission:    Subjective:       Clinical Changes / Abnormalities: Patient denies any chest pain pressure tightness  Patient is also positive for cocaine      Medications:   Scheduled Meds:   enoxaparin  1 mg/kg SubCUTAneous BID    hydrALAZINE  20 mg IntraVENous Once    sodium chloride flush  5-40 mL IntraVENous 2 times per day    aspirin  325 mg Oral Once    aspirin  81 mg Oral Daily    atorvastatin  80 mg Oral Nightly    clopidogrel  75 mg Oral Daily    pantoprazole  40 mg Oral Daily    tamsulosin  0.4 mg Oral Daily    ipratropium 0.5 mg-albuterol 2.5 mg  1 Dose Inhalation Q4H WA RT    sodium chloride flush  5-40 mL IntraVENous 2 times per day    thiamine  100 mg Oral Daily    multivitamin  1 tablet Oral Daily     Continuous Infusions:   sodium chloride      sodium chloride Stopped (03/08/24 1250)    sodium chloride       CBC:   Recent Labs     03/07/24  1300 03/08/24  0535   WBC 6.3 5.2   HGB 14.1 12.8*    166     BMP:    Recent Labs     03/07/24  1300 03/08/24  0535   * 142   K 3.8 3.9   * 110*   CO2 22 23   BUN 23 22   CREATININE 1.5* 1.1   GLUCOSE 85 93     Hepatic:   Recent Labs     03/07/24  1300 03/08/24  0535   AST 42* 33   ALT 49* 43*   BILITOT 0.5 0.3   ALKPHOS 83 74     Troponin: No results for input(s): \"TROPONINI\" in the last 72 hours.  BNP: No results for input(s): \"BNP\" in the last 72 hours.  Lipids: No results for input(s): \"CHOL\", \"HDL\" in the last 72 hours.    Invalid input(s): \"LDLCALCU\"  INR:   Recent Labs     03/07/24  1300   INR 1.2       Objective:   Vitals: BP (!) 141/97   Pulse 71   Temp 97.8 °F (36.6 °C) (Oral)   Resp 18   Ht 1.88 m (6' 2\")   Wt 63 kg (138 lb 14.2 oz)   SpO2 100%   BMI 17.83 kg/m²   I/O last 3 completed shifts:  In:  patient say it does not work in the past and is not agreeable  At this time we will continue medical treatment.  Patient understand very clearly that if he continue cocaine with his extensive history of CAD patient can have minor to major MI along with fatality  Malcolm Long MD, MD  [facility]  [phone]

## 2024-03-17 NOTE — PROGRESS NOTES
Physician Progress Note      PATIENT:               PERCY ROSEN  CSN #:                  [id number]  :                       1955  ADMIT DATE:       3/7/2024 12:46 PM  DISCH DATE:        3/9/2024 4:55 PM  RESPONDING  PROVIDER #:        Fallon Hand MD          QUERY TEXT:    Pt admitted with cp. Pt noted to have nstemi and cocaine use. If possible,   please document in progress notes and discharge summary the relationship, if   any, between nstemi and cocaine use.    The medical record reflects the following:  Risk Factors: CAD, tobacco & cocaine, chf  Clinical Indicators: H&P-Reports having used cocaine last night, 3/8pn-Chest   pain likely secondary to underlying cocaine use. dcs-nstemi  Treatment: refused cath, aspirin, Plavix, atorvastatin  Latonia Centeno RN/CDI [id number]  Options provided:  -- nstemi due to cocaine use  -- nstemi unrelated to cocaine use  -- Other - I will add my own diagnosis  -- Disagree - Not applicable / Not valid  -- Disagree - Clinically unable to determine / Unknown  -- Refer to Clinical Documentation Reviewer    PROVIDER RESPONSE TEXT:    This patient has nstemi due to cocaine use.    Query created by: Latonia Mcdowell on 3/15/2024 1:11 PM      Electronically signed by:  Fallon Hand MD 3/17/2024 4:56 PM

## 2024-03-18 ENCOUNTER — TELEPHONE (OUTPATIENT)
Dept: INTERNAL MEDICINE | Age: 69
End: 2024-03-18

## 2024-03-18 NOTE — TELEPHONE ENCOUNTER
Called pt to see if they are going to be a np to our office, then schedule a np appt, phone rang busy.

## 2024-03-18 NOTE — TELEPHONE ENCOUNTER
----- Message from Pebbles Villasenor sent at 3/13/2024 10:00 AM EDT -----  Subject: Hospital Follow Up    QUESTIONS  What hospital was the Patient Discharged from? [facility]  Date of Discharge? 2024-03-09  Discharge Location? Home  Reason for hospitalization as patient stated? Difficulty breathing and   chest pain  What question does the patient have, if applicable? Patients cousin   [place] called. Patient was last seen in this office in 2017. Patient   needs a new provider and needs a HFU. Please call [place] to advise if   patient can be seen as a new patient for a HFU.   ---------------------------------------------------------------------------  --------------  CALL BACK INFO  What is the best way for the office to contact you? OK to leave message on   voicemail  Preferred Call Back Phone Number? [phone]  ---------------------------------------------------------------------------  --------------  SCRIPT ANSWERS  Relationship to Patient? Other/Third Party  Representative Name? [place]  Additional information verified (besides Name and Date of Birth)? Address

## 2024-03-19 NOTE — TELEPHONE ENCOUNTER
Called the patient to schedule but was unable to reach. Also called [place] to schedule for the patient, per request, and was unable to reach as well. Could not leave a message for a call back.

## 2024-04-27 ENCOUNTER — HOSPITAL ENCOUNTER (INPATIENT)
Age: 69
LOS: 6 days | Discharge: HOME | End: 2024-05-03
Attending: EMERGENCY MEDICINE | Admitting: INTERNAL MEDICINE
Payer: COMMERCIAL

## 2024-04-27 ENCOUNTER — HOSPITAL ENCOUNTER (OUTPATIENT)
Age: 69
Setting detail: SPECIMEN
Discharge: HOME | End: 2024-04-27
Payer: COMMERCIAL

## 2024-04-27 ENCOUNTER — APPOINTMENT (OUTPATIENT)
Dept: GENERAL RADIOLOGY | Age: 69
End: 2024-04-27
Payer: COMMERCIAL

## 2024-04-27 DIAGNOSIS — R07.9 CHEST PAIN, UNSPECIFIED TYPE: Primary | ICD-10-CM

## 2024-04-27 DIAGNOSIS — I21.4 NSTEMI (NON-ST ELEVATED MYOCARDIAL INFARCTION) (HCC): ICD-10-CM

## 2024-04-27 PROBLEM — Z91.148 NONCOMPLIANCE WITH MEDICATION REGIMEN: Status: ACTIVE | Noted: 2024-04-27

## 2024-04-27 LAB
AMPHET UR QL SCN: NEGATIVE
ANION GAP SERPL CALCULATED.3IONS-SCNC: 9 MMOL/L (ref 9–16)
ANTI-XA UNFRAC HEPARIN: <0.1 IU/L
BARBITURATES UR QL SCN: NEGATIVE
BASOPHILS # BLD: 0.05 K/UL (ref 0–0.2)
BASOPHILS NFR BLD: 1 % (ref 0–2)
BENZODIAZ UR QL: NEGATIVE
BUN SERPL-MCNC: 18 MG/DL (ref 8–23)
CALCIUM SERPL-MCNC: 6.5 MG/DL (ref 8.6–10.4)
CANNABINOIDS UR QL SCN: NEGATIVE
CHLORIDE SERPL-SCNC: 116 MMOL/L (ref 98–107)
CO2 SERPL-SCNC: 18 MMOL/L (ref 20–31)
COCAINE UR QL SCN: POSITIVE
CREAT SERPL-MCNC: 0.9 MG/DL (ref 0.7–1.2)
EKG ATRIAL RATE: 88 BPM
EKG P AXIS: 85 DEGREES
EKG P-R INTERVAL: 152 MS
EKG Q-T INTERVAL: 390 MS
EKG QRS DURATION: 96 MS
EKG QTC CALCULATION (BAZETT): 471 MS
EKG R AXIS: 57 DEGREES
EKG T AXIS: -104 DEGREES
EKG VENTRICULAR RATE: 88 BPM
EOSINOPHIL # BLD: 0.31 K/UL (ref 0–0.44)
EOSINOPHILS RELATIVE PERCENT: 6 % (ref 1–4)
ERYTHROCYTE [DISTWIDTH] IN BLOOD BY AUTOMATED COUNT: 14.6 % (ref 11.8–14.4)
FENTANYL UR QL: NEGATIVE
GFR SERPL CREATININE-BSD FRML MDRD: >90 ML/MIN/1.73M2
GLUCOSE SERPL-MCNC: 115 MG/DL (ref 74–99)
HCT VFR BLD AUTO: 45.5 % (ref 40.7–50.3)
HGB BLD-MCNC: 12.6 G/DL (ref 13–17)
IMM GRANULOCYTES # BLD AUTO: 0 K/UL (ref 0–0.3)
IMM GRANULOCYTES NFR BLD: 0 %
INR PPP: 1.2
LYMPHOCYTES NFR BLD: 1.77 K/UL (ref 1.1–3.7)
LYMPHOCYTES RELATIVE PERCENT: 34 % (ref 24–43)
MAGNESIUM SERPL-MCNC: 1.5 MG/DL (ref 1.6–2.4)
MCH RBC QN AUTO: 27.5 PG (ref 25.2–33.5)
MCHC RBC AUTO-ENTMCNC: 27.7 G/DL (ref 28.4–34.8)
MCV RBC AUTO: 99.3 FL (ref 82.6–102.9)
METHADONE UR QL: NEGATIVE
MONOCYTES NFR BLD: 0.42 K/UL (ref 0.1–1.2)
MONOCYTES NFR BLD: 8 % (ref 3–12)
MORPHOLOGY: ABNORMAL
NEUTROPHILS NFR BLD: 51 % (ref 36–65)
NEUTS SEG NFR BLD: 2.65 K/UL (ref 1.5–8.1)
NRBC BLD-RTO: 0 PER 100 WBC
OPIATES UR QL SCN: NEGATIVE
OXYCODONE UR QL SCN: NEGATIVE
PARTIAL THROMBOPLASTIN TIME: 28.2 SEC (ref 23–36.5)
PCP UR QL SCN: NEGATIVE
PLATELET # BLD AUTO: 186 K/UL (ref 138–453)
PMV BLD AUTO: 10.6 FL (ref 8.1–13.5)
POTASSIUM SERPL-SCNC: 3.4 MMOL/L (ref 3.7–5.3)
PROTHROMBIN TIME: 15.3 SEC (ref 11.7–14.9)
RBC # BLD AUTO: 4.58 M/UL (ref 4.21–5.77)
SODIUM SERPL-SCNC: 143 MMOL/L (ref 136–145)
TEST INFORMATION: ABNORMAL
TROPONIN I SERPL HS-MCNC: 24 NG/L (ref 0–22)
TROPONIN I SERPL HS-MCNC: 25 NG/L (ref 0–22)
TROPONIN I SERPL HS-MCNC: 32 NG/L (ref 0–22)
WBC OTHER # BLD: 5.2 K/UL (ref 3.5–11.3)

## 2024-04-27 PROCEDURE — 85025 COMPLETE CBC W/AUTO DIFF WBC: CPT

## 2024-04-27 PROCEDURE — 71046 X-RAY EXAM CHEST 2 VIEWS: CPT

## 2024-04-27 PROCEDURE — 6370000000 HC RX 637 (ALT 250 FOR IP)

## 2024-04-27 PROCEDURE — 74018 RADEX ABDOMEN 1 VIEW: CPT

## 2024-04-27 PROCEDURE — 84484 ASSAY OF TROPONIN QUANT: CPT

## 2024-04-27 PROCEDURE — 2580000003 HC RX 258: Performed by: EMERGENCY MEDICINE

## 2024-04-27 PROCEDURE — 6370000000 HC RX 637 (ALT 250 FOR IP): Performed by: EMERGENCY MEDICINE

## 2024-04-27 PROCEDURE — 96365 THER/PROPH/DIAG IV INF INIT: CPT

## 2024-04-27 PROCEDURE — 99285 EMERGENCY DEPT VISIT HI MDM: CPT

## 2024-04-27 PROCEDURE — 93005 ELECTROCARDIOGRAM TRACING: CPT | Performed by: EMERGENCY MEDICINE

## 2024-04-27 PROCEDURE — 83735 ASSAY OF MAGNESIUM: CPT

## 2024-04-27 PROCEDURE — 36415 COLL VENOUS BLD VENIPUNCTURE: CPT

## 2024-04-27 PROCEDURE — 85730 THROMBOPLASTIN TIME PARTIAL: CPT

## 2024-04-27 PROCEDURE — 80048 BASIC METABOLIC PNL TOTAL CA: CPT

## 2024-04-27 PROCEDURE — 6370000000 HC RX 637 (ALT 250 FOR IP): Performed by: STUDENT IN AN ORGANIZED HEALTH CARE EDUCATION/TRAINING PROGRAM

## 2024-04-27 PROCEDURE — 6360000002 HC RX W HCPCS: Performed by: EMERGENCY MEDICINE

## 2024-04-27 PROCEDURE — 80307 DRUG TEST PRSMV CHEM ANLYZR: CPT

## 2024-04-27 PROCEDURE — 85610 PROTHROMBIN TIME: CPT

## 2024-04-27 PROCEDURE — 6360000002 HC RX W HCPCS: Performed by: STUDENT IN AN ORGANIZED HEALTH CARE EDUCATION/TRAINING PROGRAM

## 2024-04-27 PROCEDURE — 85520 HEPARIN ASSAY: CPT

## 2024-04-27 PROCEDURE — 1200000000 HC SEMI PRIVATE

## 2024-04-27 RX ORDER — TAMSULOSIN HYDROCHLORIDE 0.4 MG/1
0.4 CAPSULE ORAL DAILY
Status: DISCONTINUED | OUTPATIENT
Start: 2024-04-27 | End: 2024-05-03 | Stop reason: HOSPADM

## 2024-04-27 RX ORDER — SODIUM CHLORIDE 0.9 % (FLUSH) 0.9 %
5-40 SYRINGE (ML) INJECTION PRN
Status: DISCONTINUED | OUTPATIENT
Start: 2024-04-27 | End: 2024-05-03 | Stop reason: HOSPADM

## 2024-04-27 RX ORDER — NITROGLYCERIN 0.4 MG/1
0.4 TABLET SUBLINGUAL EVERY 5 MIN PRN
Status: DISCONTINUED | OUTPATIENT
Start: 2024-04-27 | End: 2024-05-03 | Stop reason: HOSPADM

## 2024-04-27 RX ORDER — SPIRONOLACTONE 25 MG/1
25 TABLET ORAL DAILY
Status: DISCONTINUED | OUTPATIENT
Start: 2024-04-27 | End: 2024-05-03 | Stop reason: HOSPADM

## 2024-04-27 RX ORDER — BENZONATATE 100 MG/1
100 CAPSULE ORAL 3 TIMES DAILY PRN
Status: DISCONTINUED | OUTPATIENT
Start: 2024-04-27 | End: 2024-05-03 | Stop reason: HOSPADM

## 2024-04-27 RX ORDER — AMLODIPINE BESYLATE 10 MG/1
10 TABLET ORAL DAILY
Status: DISCONTINUED | OUTPATIENT
Start: 2024-04-27 | End: 2024-05-01

## 2024-04-27 RX ORDER — MAGNESIUM SULFATE IN WATER 40 MG/ML
2000 INJECTION, SOLUTION INTRAVENOUS ONCE
Status: COMPLETED | OUTPATIENT
Start: 2024-04-27 | End: 2024-04-27

## 2024-04-27 RX ORDER — ENOXAPARIN SODIUM 100 MG/ML
40 INJECTION SUBCUTANEOUS DAILY
Status: DISCONTINUED | OUTPATIENT
Start: 2024-04-27 | End: 2024-04-27

## 2024-04-27 RX ORDER — HEPARIN SODIUM 1000 [USP'U]/ML
60 INJECTION, SOLUTION INTRAVENOUS; SUBCUTANEOUS ONCE
Status: COMPLETED | OUTPATIENT
Start: 2024-04-27 | End: 2024-04-27

## 2024-04-27 RX ORDER — ONDANSETRON 2 MG/ML
4 INJECTION INTRAMUSCULAR; INTRAVENOUS EVERY 6 HOURS PRN
Status: DISCONTINUED | OUTPATIENT
Start: 2024-04-27 | End: 2024-05-03 | Stop reason: HOSPADM

## 2024-04-27 RX ORDER — HEPARIN SODIUM 1000 [USP'U]/ML
30 INJECTION, SOLUTION INTRAVENOUS; SUBCUTANEOUS PRN
Status: DISCONTINUED | OUTPATIENT
Start: 2024-04-27 | End: 2024-04-30

## 2024-04-27 RX ORDER — SODIUM CHLORIDE 9 MG/ML
INJECTION, SOLUTION INTRAVENOUS PRN
Status: DISCONTINUED | OUTPATIENT
Start: 2024-04-27 | End: 2024-05-03 | Stop reason: HOSPADM

## 2024-04-27 RX ORDER — HEPARIN SODIUM 1000 [USP'U]/ML
60 INJECTION, SOLUTION INTRAVENOUS; SUBCUTANEOUS PRN
Status: DISCONTINUED | OUTPATIENT
Start: 2024-04-27 | End: 2024-04-30

## 2024-04-27 RX ORDER — CLOPIDOGREL BISULFATE 75 MG/1
75 TABLET ORAL DAILY
Status: DISCONTINUED | OUTPATIENT
Start: 2024-04-27 | End: 2024-05-03 | Stop reason: HOSPADM

## 2024-04-27 RX ORDER — PANTOPRAZOLE SODIUM 40 MG/1
40 TABLET, DELAYED RELEASE ORAL DAILY
Status: DISCONTINUED | OUTPATIENT
Start: 2024-04-27 | End: 2024-05-03 | Stop reason: HOSPADM

## 2024-04-27 RX ORDER — ONDANSETRON 4 MG/1
4 TABLET, ORALLY DISINTEGRATING ORAL EVERY 4 HOURS PRN
Status: DISCONTINUED | OUTPATIENT
Start: 2024-04-27 | End: 2024-05-03 | Stop reason: HOSPADM

## 2024-04-27 RX ORDER — POLYETHYLENE GLYCOL 3350 17 G/17G
17 POWDER, FOR SOLUTION ORAL DAILY
Status: DISCONTINUED | OUTPATIENT
Start: 2024-04-27 | End: 2024-05-03 | Stop reason: HOSPADM

## 2024-04-27 RX ORDER — ATORVASTATIN CALCIUM 80 MG/1
80 TABLET, FILM COATED ORAL NIGHTLY
Status: DISCONTINUED | OUTPATIENT
Start: 2024-04-27 | End: 2024-05-03 | Stop reason: HOSPADM

## 2024-04-27 RX ORDER — ACETAMINOPHEN 325 MG/1
650 TABLET ORAL EVERY 4 HOURS PRN
Status: DISCONTINUED | OUTPATIENT
Start: 2024-04-27 | End: 2024-05-03 | Stop reason: HOSPADM

## 2024-04-27 RX ORDER — SODIUM CHLORIDE 0.9 % (FLUSH) 0.9 %
5-40 SYRINGE (ML) INJECTION EVERY 12 HOURS SCHEDULED
Status: DISCONTINUED | OUTPATIENT
Start: 2024-04-27 | End: 2024-05-03 | Stop reason: HOSPADM

## 2024-04-27 RX ORDER — ASPIRIN 81 MG/1
81 TABLET ORAL DAILY
Status: DISCONTINUED | OUTPATIENT
Start: 2024-04-27 | End: 2024-05-03 | Stop reason: HOSPADM

## 2024-04-27 RX ORDER — SENNA AND DOCUSATE SODIUM 50; 8.6 MG/1; MG/1
1 TABLET, FILM COATED ORAL DAILY
Status: DISCONTINUED | OUTPATIENT
Start: 2024-04-27 | End: 2024-04-30

## 2024-04-27 RX ORDER — HEPARIN SODIUM 10000 [USP'U]/100ML
5-30 INJECTION, SOLUTION INTRAVENOUS CONTINUOUS
Status: DISCONTINUED | OUTPATIENT
Start: 2024-04-27 | End: 2024-04-30

## 2024-04-27 RX ADMIN — HEPARIN SODIUM 12 UNITS/KG/HR: 10000 INJECTION, SOLUTION INTRAVENOUS at 19:54

## 2024-04-27 RX ADMIN — TAMSULOSIN HYDROCHLORIDE 0.4 MG: 0.4 CAPSULE ORAL at 18:21

## 2024-04-27 RX ADMIN — AMLODIPINE BESYLATE 10 MG: 10 TABLET ORAL at 14:52

## 2024-04-27 RX ADMIN — POTASSIUM BICARBONATE 40 MEQ: 782 TABLET, EFFERVESCENT ORAL at 14:52

## 2024-04-27 RX ADMIN — EMPAGLIFLOZIN 10 MG: 10 TABLET, FILM COATED ORAL at 14:52

## 2024-04-27 RX ADMIN — HEPARIN SODIUM 3900 UNITS: 1000 INJECTION INTRAVENOUS; SUBCUTANEOUS at 19:56

## 2024-04-27 RX ADMIN — SPIRONOLACTONE 25 MG: 25 TABLET, FILM COATED ORAL at 18:21

## 2024-04-27 RX ADMIN — ASPIRIN 81 MG: 81 TABLET, COATED ORAL at 14:52

## 2024-04-27 RX ADMIN — BENZONATATE 100 MG: 100 CAPSULE ORAL at 20:50

## 2024-04-27 RX ADMIN — SODIUM CHLORIDE, PRESERVATIVE FREE 10 ML: 5 INJECTION INTRAVENOUS at 19:57

## 2024-04-27 RX ADMIN — CLOPIDOGREL BISULFATE 75 MG: 75 TABLET ORAL at 14:52

## 2024-04-27 RX ADMIN — ATORVASTATIN CALCIUM 80 MG: 80 TABLET, FILM COATED ORAL at 20:50

## 2024-04-27 RX ADMIN — PANTOPRAZOLE SODIUM 40 MG: 40 TABLET, DELAYED RELEASE ORAL at 18:21

## 2024-04-27 RX ADMIN — POLYETHYLENE GLYCOL 3350 17 G: 17 POWDER, FOR SOLUTION ORAL at 16:53

## 2024-04-27 RX ADMIN — MAGNESIUM SULFATE HEPTAHYDRATE 2000 MG: 40 INJECTION, SOLUTION INTRAVENOUS at 12:25

## 2024-04-27 RX ADMIN — SENNOSIDES AND DOCUSATE SODIUM 1 TABLET: 8.6; 5 TABLET ORAL at 16:54

## 2024-04-27 ASSESSMENT — HEART SCORE: ECG: SIGNIFICANT ST-DEVIATION
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
## 2024-04-27 NOTE — CARE COORDINATION
Case Management Assessment  Initial Evaluation    Date/Time of Evaluation: 4/27/2024 3:39 PM  Assessment Completed by: VALE CAT RN    If patient is discharged prior to next notation, then this note serves as note for discharge by case management.    Patient Name: Nik Shepherd                   YOB: 1955  Diagnosis: Chest pain [R07.9]  Chest pain, unspecified type [R07.9]                   Date / Time: 4/27/2024 10:40 AM    Patient Admission Status: Observation   Readmission Risk (Low < 19, Mod (19-27), High > 27): Readmission Risk Score: 13.3    Current PCP: Sly Reza MD  PCP verified by CM? (P) Yes    Chart Reviewed: Yes      History Provided by: (P) Patient  Patient Orientation: (P) Alert and Oriented    Patient Cognition: (P) Alert    Hospitalization in the last 30 days (Readmission):  No    If yes, Readmission Assessment in CM Navigator will be completed.    Advance Directives:      Code Status: Full Code   Patient's Primary Decision Maker is:      Primary Decision Maker: Samia Zuñigaa - Child - [phone]    Discharge Planning:    Patient lives with: (P) Alone Type of Home: (P) House, Skilled Nursing Facility  Primary Care Giver: (P) Self  Patient Support Systems include: (P) Family Members (checks on him every few days)   Current Financial resources: (P) Medicare  Current community resources:    Current services prior to admission: (P) None            Current DME:              Type of Home Care services:  (P) None    ADLS  Prior functional level: (P) Independent in ADLs/IADLs  Current functional level: (P) Independent in ADLs/IADLs    PT AM-PAC:   /24  OT AM-PAC:   /24    Family can provide assistance at DC: (P) No  Would you like Case Management to discuss the discharge plan with any other family members/significant others, and if so, who? (P) No  Plans to Return to Present Housing: (P) Unknown at present  Other Identified Issues/Barriers to RETURNING to current housing:  mobility  Potential Assistance needed at discharge: (P) Skilled Nursing Facility            Potential DME:    Patient expects to discharge to: (P) Skilled nursing facility  Plan for transportation at discharge: (P) Family    Financial    Payor: SID JESUS / Plan: SID JESUS DUAL / Product Type: *No Product type* /     Does insurance require precert for SNF: Yes    Potential assistance Purchasing Medications: (P) Yes (will need meds to beds)  Meds-to-Beds request:        [id number] - [place] - [address] -  [phone] - F [phone]  [address] [address]  Phone: [phone] Fax: [phone]    [id number] - [place] - [address] -  [phone] - F [phone]  [address] [address]  Phone: [phone] Fax: [phone]      Notes:    Factors facilitating achievement of predicted outcomes: Cooperative and Pleasant    Barriers to discharge: living situation, bathroom upper floor, crawling up stairs    Additional Case Management Notes: will need PT/OT for possible SNF needs, SW consult placed for living insecurities    The Plan for Transition of Care is related to the following treatment goals of Chest pain [R07.9]  Chest pain, unspecified type [R07.9]    IF APPLICABLE: The Patient and/or patient representative Nik and his family were provided with a choice of provider and agrees with the discharge plan. Freedom of choice list with basic dialogue that supports the patient's individualized plan of care/goals and shares the quality data associated with the providers was provided to: (P) Patient   Patient Representative Name:       The Patient and/or Patient Representative Agree with the Discharge Plan? (P) No    VALE CAT RN  Case Management Department

## 2024-04-27 NOTE — ED TRIAGE NOTES
Pt presents to ED rm 24 with c/o sudden onset chest pain. The chest pain started earlier this morning when the patient got up to use the bathroom. Pt has a hx of MI with stent placement. EMS states elevation in V1 and V2.Pt describes chest pain as mid sternal and non radiating.  Pt arrives and has bed bugs. Pt cleaned and bed linen changed. Pt in no acute distress at this time. Pt resting in bed with personal items and call light within reach. Monitor attached. Will continue with plan of care.

## 2024-04-27 NOTE — PROGRESS NOTES
CDU Transfer Summary        Patient:  Nik Shepherd  YOB: 1955    MRN: [id number]   Acct: [id number]    Primary Care Physician: Sly Reza MD    Admit date:  4/27/2024 10:40 AM  Transfer date: 04/27/24 6:02 PM     Transfer Diagnoses:     1.) NSTEMI  2.) Cocaine abuse  3.) Medication noncompliance           Medication List        ASK your doctor about these medications      amLODIPine 10 MG tablet  Commonly known as: NORVASC  Take 1 tablet by mouth in the morning.     aspirin 81 MG EC tablet  Take 1 tablet by mouth daily     atorvastatin 80 MG tablet  Commonly known as: LIPITOR  Take 1 tablet by mouth nightly     clopidogrel 75 MG tablet  Commonly known as: PLAVIX  Take 1 tablet by mouth daily     empagliflozin 10 MG tablet  Commonly known as: JARDIANCE  Take 1 tablet by mouth daily     ibuprofen 800 MG tablet  Commonly known as: ADVIL;MOTRIN  Take 1 tablet by mouth every 8 hours as needed for Pain     metoprolol tartrate 25 MG tablet  Commonly known as: LOPRESSOR  Take 0.5 tablets by mouth 2 times daily     midodrine 5 MG tablet  Commonly known as: PROAMATINE  Take 1 tablet by mouth 3 times daily (with meals)     nitroGLYCERIN 0.3 MG SL tablet  Commonly known as: NITROSTAT  Place 1 tablet under the tongue every 5 minutes as needed for Chest pain up to max of 3 total doses. If no relief after 1 dose, call 911.     pantoprazole 40 MG tablet  Commonly known as: PROTONIX  Take 1 tablet by mouth daily     sacubitril-valsartan 24-26 MG per tablet  Commonly known as: ENTRESTO  Take 1 tablet by mouth 2 times daily     spironolactone 25 MG tablet  Commonly known as: ALDACTONE  Take 1 tablet by mouth daily     tamsulosin 0.4 MG capsule  Commonly known as: FLOMAX  Take 1 capsule by mouth daily              Diet:  ADULT DIET; Regular, advance as tolerated     Activity:  As tolerated    Consultants: IP CONSULT TO CARDIOLOGY  IP CONSULT TO SOCIAL WORK    Procedures:      Diagnostic Test:   Results for  ABDOMEN 4/27/2024 1:14 pm COMPARISON: 10 September 2022 HISTORY: ORDERING SYSTEM PROVIDED HISTORY: Constipated TECHNOLOGIST PROVIDED HISTORY: Constipated FINDINGS: AP portable view of the abdomen demonstrates gas and fecal material in a nonobstructive pattern.  Mildly increase left colonic stool is noted.  No organomegaly or free air.  Patient has advanced arthritic changes in the right hip and a left total hip arthroplasty.  Included lung bases demonstrate no acute abnormality.  Evidence of prior median sternotomy.     Nonobstructive bowel gas pattern. Mildly increase left colonic stool.     XR CHEST (2 VW)    Result Date: 4/27/2024  EXAMINATION: TWO XRAY VIEWS OF THE CHEST 4/27/2024 11:05 am COMPARISON: 7 March 2024 HISTORY: ORDERING SYSTEM PROVIDED HISTORY: Chest Pain TECHNOLOGIST PROVIDED HISTORY: Chest Pain Reason for Exam: chest pain. AP, Lat. upr. FINDINGS: AP portable view of the chest time stamped at 1102 hours demonstrates overlying cardiac monitoring electrodes and prior median sternotomy.  No change in cardiomegaly.  Findings of generalized COPD are noted.  Area of consolidation in the lower medial right lower lobe is noted, atelectasis and or consolidation which shows evidence of worsening compared to prior exams. There is distal atelectasis in the right base.  This appears to be related to airspace disease in the right lower lobe.  No extrapleural air.     Area of consolidation in the lower medial right lower lobe which shows evidence of worsening compared to prior exams.  This may be related atelectasis or consolidation.  There is also new stranding at the right base likely related to airspace disease in the lower lobe.  Continued radiographic monitoring is advised.  If can addition worsens or does not improve would advise CT scanning of the chest. RECOMMENDATION: Continued radiographic monitoring as above.             Hospital Course:  Clinical course has improved, labs and imaging reviewed.

## 2024-04-27 NOTE — ED PROVIDER NOTES
Sexual Activity    Alcohol use: Yes     Alcohol/week: 0.0 standard drinks of alcohol     Comment:  \" drinks a pint on the weekend\" off and on    Drug use: Yes     Types: Cocaine, Marijuana (Weed)     Comment: cocaine and marijuana mulitple tmes daily    Sexual activity: Not Currently   Other Topics Concern    Not on file   Social History Narrative    Not on file     Social Determinants of Health     Financial Resource Strain: Not on file   Food Insecurity: Food Insecurity Present (3/7/2024)    Hunger Vital Sign     Worried About Running Out of Food in the Last Year: Never true     Ran Out of Food in the Last Year: Sometimes true   Transportation Needs: No Transportation Needs (3/7/2024)    PRAPARE - Transportation     Lack of Transportation (Medical): No     Lack of Transportation (Non-Medical): No   Physical Activity: Not on file   Stress: Not on file   Social Connections: Not on file   Intimate Partner Violence: Not on file   Housing Stability: Low Risk  (3/7/2024)    Housing Stability Vital Sign     Unable to Pay for Housing in the Last Year: No     Number of Places Lived in the Last Year: 1     Unstable Housing in the Last Year: No       Family History   Problem Relation Age of Onset    Diabetes Mother     Cancer Mother        Allergies:  Patient has no known allergies.    Home Medications:  Prior to Admission medications    Medication Sig Start Date End Date Taking? Authorizing Provider   aspirin 81 MG EC tablet Take 1 tablet by mouth daily 3/9/24   Fallon Hand MD   nitroGLYCERIN (NITROSTAT) 0.3 MG SL tablet Place 1 tablet under the tongue every 5 minutes as needed for Chest pain up to max of 3 total doses. If no relief after 1 dose, call 911. 3/9/24   Fallon Hand MD   empagliflozin (JARDIANCE) 10 MG tablet Take 1 tablet by mouth daily 3/9/24   Fallon Hand MD   atorvastatin (LIPITOR) 80 MG tablet Take 1 tablet by mouth nightly 3/9/24   Fallon Hand MD   tamsulosin (FLOMAX) 0.4 MG capsule Take 1  capsule by mouth daily 3/9/24   Fallon Hadn MD   clopidogrel (PLAVIX) 75 MG tablet Take 1 tablet by mouth daily 3/9/24   Fallon Hand MD   pantoprazole (PROTONIX) 40 MG tablet Take 1 tablet by mouth daily 3/9/24   Fallon Hand MD   sacubitril-valsartan (ENTRESTO) 24-26 MG per tablet Take 1 tablet by mouth 2 times daily 3/10/24   Malcolm Long MD   amLODIPine (NORVASC) 10 MG tablet Take 1 tablet by mouth in the morning. 8/5/22   Liza Fu APRN - NP   spironolactone (ALDACTONE) 25 MG tablet Take 1 tablet by mouth daily 4/8/22   Hiren Martin APRN - NP   metoprolol tartrate (LOPRESSOR) 25 MG tablet Take 0.5 tablets by mouth 2 times daily 4/7/22 8/4/22  Hiren Martin APRN - NP   midodrine (PROAMATINE) 5 MG tablet Take 1 tablet by mouth 3 times daily (with meals) 4/7/22 8/4/22  Hiren Martin APRN - NP   ibuprofen (ADVIL;MOTRIN) 800 MG tablet Take 1 tablet by mouth every 8 hours as needed for Pain 4/25/18 8/4/22  Liza Mack MD       REVIEW OF SYSTEMS       Review of Systems   Constitutional:  Negative for chills and fever.   Respiratory:  Negative for cough and shortness of breath.    Cardiovascular:  Positive for chest pain.   Gastrointestinal:  Negative for abdominal pain, nausea and vomiting.       PHYSICAL EXAM      INITIAL VITALS:   BP (!) 156/101   Pulse 86   Temp 97.5 °F (36.4 °C) (Oral)   Resp 20   Ht 1.88 m (6' 2\")   Wt 65.7 kg (144 lb 13.5 oz)   SpO2 98%   BMI 18.60 kg/m²     Physical Exam  Constitutional:       General: He is not in acute distress.     Appearance: Normal appearance. He is not toxic-appearing.   HENT:      Head: Normocephalic and atraumatic.      Nose: No congestion or rhinorrhea.      Mouth/Throat:      Mouth: Mucous membranes are moist.   Eyes:      Conjunctiva/sclera: Conjunctivae normal.      Pupils: Pupils are equal, round, and reactive to light.   Cardiovascular:      Rate and Rhythm: Normal rate and regular rhythm.      Pulses: Normal pulses.
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
## 2024-04-27 NOTE — ED PROVIDER NOTES
[facility] ED     Emergency Department     Faculty Attestation    I performed a history and physical examination of the patient and discussed management with the resident. I reviewed the resident’s note and agree with the documented findings and plan of care. Any areas of disagreement are noted on the chart. I was personally present for the key portions of any procedures. I have documented in the chart those procedures where I was not present during the key portions. I have reviewed the emergency nurses triage note. I agree with the chief complaint, past medical history, past surgical history, allergies, medications, social and family history as documented unless otherwise noted below. For Physician Assistant/ Nurse Practitioner cases/documentation I have personally evaluated this patient and have completed at least one if not all key elements of the E/M (history, physical exam, and MDM). Additional findings are as noted.    10:56 AM EDT    Patient with his 3 of coronary artery disease with stent placement in the past presents with chest pain.  He says it started this morning while he was trying to have a bowel movement.  On arrival here, patient is resting comfortably in the bed.  He says the chest pain was relieved when he received nitro by EMS.  Will get EKG, chest x-ray, labs and reassess.    EKG Interpretation    Interpreted by emergency department physician    Rhythm: normal sinus   Rate: normal  Axis: normal  Ectopy: none  Conduction: normal  ST Segments: nonspecific changes  T Waves: non specific changes  Q Waves: none    Clinical Impression: non-specific EKG, similar to ecg on 3/7/2024    MD Liza Rowell MD  Attending Emergency  Physician

## 2024-04-28 LAB
ANTI-XA UNFRAC HEPARIN: 0.3 IU/L
ANTI-XA UNFRAC HEPARIN: 0.3 IU/L
ANTI-XA UNFRAC HEPARIN: 0.33 IU/L

## 2024-04-28 PROCEDURE — 85520 HEPARIN ASSAY: CPT

## 2024-04-28 PROCEDURE — 6370000000 HC RX 637 (ALT 250 FOR IP)

## 2024-04-28 PROCEDURE — 99222 1ST HOSP IP/OBS MODERATE 55: CPT | Performed by: INTERNAL MEDICINE

## 2024-04-28 PROCEDURE — 6370000000 HC RX 637 (ALT 250 FOR IP): Performed by: STUDENT IN AN ORGANIZED HEALTH CARE EDUCATION/TRAINING PROGRAM

## 2024-04-28 PROCEDURE — 36415 COLL VENOUS BLD VENIPUNCTURE: CPT

## 2024-04-28 PROCEDURE — 1200000000 HC SEMI PRIVATE

## 2024-04-28 PROCEDURE — 6370000000 HC RX 637 (ALT 250 FOR IP): Performed by: EMERGENCY MEDICINE

## 2024-04-28 RX ADMIN — POLYETHYLENE GLYCOL 3350 17 G: 17 POWDER, FOR SOLUTION ORAL at 09:14

## 2024-04-28 RX ADMIN — EMPAGLIFLOZIN 10 MG: 10 TABLET, FILM COATED ORAL at 09:14

## 2024-04-28 RX ADMIN — PANTOPRAZOLE SODIUM 40 MG: 40 TABLET, DELAYED RELEASE ORAL at 09:14

## 2024-04-28 RX ADMIN — TAMSULOSIN HYDROCHLORIDE 0.4 MG: 0.4 CAPSULE ORAL at 09:14

## 2024-04-28 RX ADMIN — ASPIRIN 81 MG: 81 TABLET, COATED ORAL at 09:14

## 2024-04-28 RX ADMIN — SENNOSIDES AND DOCUSATE SODIUM 1 TABLET: 8.6; 5 TABLET ORAL at 09:14

## 2024-04-28 RX ADMIN — CLOPIDOGREL BISULFATE 75 MG: 75 TABLET ORAL at 09:14

## 2024-04-28 RX ADMIN — SPIRONOLACTONE 25 MG: 25 TABLET, FILM COATED ORAL at 09:13

## 2024-04-28 RX ADMIN — AMLODIPINE BESYLATE 10 MG: 10 TABLET ORAL at 09:13

## 2024-04-28 NOTE — PLAN OF CARE
Problem: Chronic Conditions and Co-morbidities  Goal: Patient's chronic conditions and co-morbidity symptoms are monitored and maintained or improved  Outcome: Progressing     Problem: Discharge Planning  Goal: Discharge to home or other facility with appropriate resources  Outcome: Progressing     Problem: ABCDS Injury Assessment  Goal: Absence of physical injury  Outcome: Progressing     Problem: Safety - Adult  Goal: Free from fall injury  Outcome: Progressing      noted

## 2024-04-28 NOTE — CONSULTS
normal in amplitude and contour without delay or bruit  Peripheral pulses are symmetrical and full   Abdomen:   No masses or tenderness  Bowel sounds present  Extremities:   No Cyanosis or Clubbing   Lower extremity edema: No   Skin: Warm and dry  Neurological:  Alert and oriented.  Moves all extremities well  No abnormalities of mood, affect, memory, mentation, or behavior are noted    DATA:    Diagnostics:      ECHO: 3/8/2024      Left Ventricle: Moderately reduced left ventricular systolic function with a visually estimated EF of 35 - 40%. Left ventricle size is normal. Normal wall thickness. Global hypokinesis present.    Right Ventricle: Right ventricle is mildly dilated. Reduced systolic function.    Aortic Valve: Trileaflet valve. Thickened cusp.    Mitral Valve: Thickened leaflet. Mild regurgitation.    Tricuspid Valve: Normal RVSP. The estimated RVSP is 39 mmHg.    Image quality is adequate.    Stress Test: 2022  IMPRESSION:     Significant stress-induced ischemia in the lateral wall and septum.     Infarct in the apex encroaching into the apical inferior wall and also in the  basal inferior wall.     LVEF 34%     Risk stratification: High risk.    Cardiac Angiography: 4/7/2022  Conclusions      Procedure Summary      Three vessel disease with left main involvement.   Moderate LV systolic dysfunction. LVEF 30%.      Recommendations      Medical therapy as needed.   Risk factor modification.   CABG evaluation by CT surgery.    Labs:     CBC:   Recent Labs     04/27/24  1052   WBC 5.2   HGB 12.6*   HCT 45.5        BMP:   Recent Labs     04/27/24  1052      K 3.4*   CO2 18*   BUN 18   CREATININE 0.9   LABGLOM >90   GLUCOSE 115*     BNP: No results for input(s): \"BNP\" in the last 72 hours.  PT/INR:   Recent Labs     04/27/24  1858   PROTIME 15.3*   INR 1.2     APTT:  Recent Labs     04/27/24  1858   APTT 28.2     CARDIAC ENZYMES:No results for input(s): \"CKTOTAL\", \"CKMB\", \"CKMBINDEX\", \"TROPONINI\"  in the last 72 hours.  FASTING LIPID PANEL:  Lab Results   Component Value Date/Time    HDL 48 08/03/2022 07:37 AM    TRIG 30 08/03/2022 07:37 AM     LIVER PROFILE:No results for input(s): \"AST\", \"ALT\", \"LABALBU\" in the last 72 hours.    IMPRESSION:    Patient Active Problem List   Diagnosis    CKD (chronic kidney disease) stage 3, GFR 30-59 ml/min ([facility])    Primary hypertension    Arthritis of right hip    Cocaine abuse ([facility])    Chronic systolic congestive heart failure ([facility])    Benign prostatic hyperplasia with urinary obstruction    Pure hypercholesterolemia    Abdominal pain    Chest pain    Acute on chronic combined systolic and diastolic congestive heart failure ([facility])    S/P cardiac catheterization    Atherosclerotic heart disease    Acute renal failure with tubular necrosis ([facility])    Problem related to discharge planning    Acute respiratory failure with hypoxia ([facility])    ACS (acute coronary syndrome) ([facility])    Cocaine use    Encounter for palliative care    NSTEMI (non-ST elevated myocardial infarction) ([facility])    Troponin level elevated    Hyperlipidemia    Noncompliance with medication regimen     Typical chest pain with minimally elevated troponin  CAD s/p CABG x 2 LIMA to LAD and SVG to OM in 2022  New onset cardiomyopathy with EF 30 to 35% on recent 2D echo   Cocaine use  Hypertension  Hyperlipidemia  Alcohol use  Medication non compliance     RECOMMENDATIONS:  Recent 2D ECHO reviewed, with EF decline, needs cardia cath for risk stratification. Will discuss with Dr Hardy regarding medication compliance and unwillingness to quit cocaine prior to the same.    consult.   He will need to be started on GDMT for HFrEF, aldactone, entresto and jardiance. Not on BB due to cocaine use.   Continue ASA and lipitor.   Start I.v. heparin.   Replace electrolytes as needed tokeep K>4 and Mg>2.  Counseled on cocaine cessation.   We will follow.       Laura Quiroga MD  Fellow, Cardiovascular Diseases    [facility]

## 2024-04-28 NOTE — PROGRESS NOTES
[facility]  Internal Medicine Teaching Residency Program  Inpatient Daily Progress Note  ______________________________________________________________________________    Patient: Nik Shepherd  YOB: 1955   MRN:[id number]    Acct: [id number]     Room: 0348/0348-02  Admit date: 4/27/2024  Today's date: 04/28/24  Number of days in the hospital: 1    SUBJECTIVE   Admitting Diagnosis: NSTEMI (non-ST elevated myocardial infarction) (HCC)  CC: Chest pain  Pt examined at bedside. Chart & results reviewed.     No acute events overnight.  This morning, he is afebrile and hemodynamically stable, saturating appropriately on room air.  Denies any chest pain or shortness of breath.      Morning labs pending.    Plan:  -Follow-up cardiology recommendations    Review of Systems   Constitutional:  Negative for chills.   Respiratory:  Negative for cough and shortness of breath.    Cardiovascular:  Negative for chest pain and palpitations.   Neurological:  Negative for dizziness and headaches.       BRIEF HISTORY     The patient is a pleasant 68 y.o. male with a past medical history of polysubstance drug use, alcohol dependence, smoking, CAD s/p CABG, HFmrEF, hypertension, BPH, presents with a chief complaint of non-radiating left-sided chest pain that began while trying to have a bowel movement. Associated with shortness of breath and cold chills. Given nitro and Asprin 324 by EMS with resolution of symptoms. Consumes cocaine and alcohol regularly stating that he drinks as much as he can whenever he can. Last drink, one week ago. Long-standing history of smoking. Reports medication noncompliance, per dispense report, last filled in 2022.      /108. HR 66. SpO2 98 on room air. Troponin 25-24-32. K 3.4 / Mg 1.5. EKG shows NSR with inferolateral ischemia. CXR unremarkable, no cardiomegaly present. Most recent echo on 3/8 showed moderately reduced LVSF, EF 35-40%, RVSP 39.  PRN  acetaminophen, 650 mg, Q4H PRN  ondansetron, 4 mg, Q4H PRN   Or  ondansetron, 4 mg, Q6H PRN  heparin (porcine), 60 Units/kg, PRN  heparin (porcine), 30 Units/kg, PRN  nitroGLYCERIN, 0.4 mg, Q5 Min PRN  benzonatate, 100 mg, TID PRN        Diagnostic Labs:  CBC:   Recent Labs     04/27/24  1052   WBC 5.2   RBC 4.58   HGB 12.6*   HCT 45.5   MCV 99.3   RDW 14.6*        BMP:   Recent Labs     04/27/24  1052      K 3.4*   *   CO2 18*   BUN 18   CREATININE 0.9     BNP: No results for input(s): \"BNP\" in the last 72 hours.  PT/INR:   Recent Labs     04/27/24  1858   PROTIME 15.3*   INR 1.2     APTT:   Recent Labs     04/27/24  1858   APTT 28.2     CARDIAC ENZYMES: No results for input(s): \"CKMB\", \"CKMBINDEX\", \"TROPONINI\" in the last 72 hours.    Invalid input(s): \"CKTOTAL;3\"  FASTING LIPID PANEL:  Lab Results   Component Value Date    CHOL 89 08/03/2022    HDL 48 08/03/2022    TRIG 30 08/03/2022     LIVER PROFILE: No results for input(s): \"AST\", \"ALT\", \"ALB\", \"BILIDIR\", \"BILITOT\", \"ALKPHOS\" in the last 72 hours.   MICROBIOLOGY:   Lab Results   Component Value Date/Time    CULTURE NORMAL RESPIRATORY KYLAH MODERATE GROWTH 03/22/2022 08:30 PM       Imaging:    XR ABDOMEN (KUB) (SINGLE AP VIEW)    Result Date: 4/27/2024  Nonobstructive bowel gas pattern. Mildly increase left colonic stool.     XR CHEST (2 VW)    Result Date: 4/27/2024  Area of consolidation in the lower medial right lower lobe which shows evidence of worsening compared to prior exams.  This may be related atelectasis or consolidation.  There is also new stranding at the right base likely related to airspace disease in the lower lobe.  Continued radiographic monitoring is advised.  If can addition worsens or does not improve would advise CT scanning of the chest. RECOMMENDATION: Continued radiographic monitoring as above.       ASSESSMENT & PLAN     ASSESSMENT / PLAN:     This is a 68 y.o. male with a past medical history of polysubstance

## 2024-04-28 NOTE — PLAN OF CARE
Problem: Chronic Conditions and Co-morbidities  Goal: Patient's chronic conditions and co-morbidity symptoms are monitored and maintained or improved  4/28/2024 1437 by Eun Bowser RN  Outcome: Progressing  4/28/2024 0635 by Juan Gomez RN  Outcome: Progressing     Problem: ABCDS Injury Assessment  Goal: Absence of physical injury  4/28/2024 1437 by Eun Bowser RN  Outcome: Progressing  4/28/2024 0635 by Juna Gomez RN  Outcome: Progressing     Problem: Safety - Adult  Goal: Free from fall injury  4/28/2024 1437 by Eun Bowser RN  Outcome: Progressing  4/28/2024 0635 by Juan Gomez RN  Outcome: Progressing

## 2024-04-28 NOTE — H&P
[facility]     Department of Internal Medicine - Staff Internal Medicine Teaching Service          ADMISSION NOTE/HISTORY AND PHYSICAL EXAMINATION   Date: 4/27/2024  Patient Name: Nik Shepherd  Date of admission: 4/27/2024 10:40 AM  YOB: 1955  PCP: Sly Reza MD  History Obtained From:  patient, electronic medical record    CHIEF COMPLAINT     Chief complaint: chest pain    HISTORY OF PRESENTING ILLNESS     The patient is a pleasant 68 y.o. male with a past medical history of polysubstance drug use, alcohol dependence, smoking, CAD s/p CABG, HFmrEF, hypertension, BPH, presents with a chief complaint of non-radiating left-sided chest pain that began while trying to have a bowel movement. Associated with shortness of breath and cold chills. Given nitro and Asprin 324 by EMS with resolution of symptoms. Consumes cocaine and alcohol regularly stating that he drinks as much as he can whenever he can. Last drink, one week ago. Long-standing history of smoking. Reports medication noncompliance, per dispense report, last filled in 2022.     /108. HR 66. SpO2 98 on room air. Troponin 25-24-32. K 3.4 / Mg 1.5. EKG shows NSR with inferolateral ischemia. CXR unremarkable, no cardiomegaly present. Most recent echo on 3/8 showed moderately reduced LVSF, EF 35-40%, RVSP 39. Presented with similar complaints to [place] and recommended heart cath, however, patient had refused. Initially, admitted to the observation unit and later transferred to medicine floors due to need for heparin gtt.    Currently, he is asymptomatic on room air and denying chest pain, shortness, of breath, or palpitations. He is irritable and is refusing physical exam. On peripheral examination, no lower extremity edema noted.    Review of Systems:  General ROS: Completed and except as mentioned above were negative   HEENT ROS: Completed and except as mentioned above were negative   Allergy and  right lower lobe which shows evidence of worsening compared to prior exams.  This may be related atelectasis or consolidation.  There is also new stranding at the right base likely related to airspace disease in the lower lobe.  Continued radiographic monitoring is advised.  If can addition worsens or does not improve would advise CT scanning of the chest. RECOMMENDATION: Continued radiographic monitoring as above.       ASSESSMENT & PLAN     ASSESSMENT / PLAN:     IMPRESSION  This is a 68 y.o. male with a past medical history of polysubstance drug use, alcohol dependence, smoking, CAD s/p CABG, HFmrEF, hypertension, BPH, who presented with chest pain and found to have NSTEMI likely secondary coronary vasospasm from cocaine use. Patient admitted to inpatient status for cardiology evaluation and left heart cath.    NSTEMI   Troponin 25-24-32  EKG shows NSR with inferolateral ischemia. Likely secondary to cocaine-induced coronary vasospasm however cannot rule out type I  Heparin gtt   Cardiology consulted.   Sublingual nitro prn    CAD s/p CABG  HFmrEF  No cardiomegaly on CXR   3/8 echo: moderately reduced LVSF, EF 35-40%, RVSP 39  Home meds: entresto 24-26, jardiance 10, aldactone 25, lipitor 80, aspirin, plavix  Noncompliant with medications. No cardiomegaly or s/s of fluid overload on exam  Home meds resumed. Lopressor held due to concern for cocaine use    Hypokalemia and hypomagnesemia, electrolytes replaced    Hypertension  Home meds: norvasc 10, lopressor 25 BID   Norvasc resumed    Polysubstance drug use  Alcohol dependence   Smoking  Regular cocaine, THC, and alcohol use. UDS ordered  Last drink one week ago, CIWA protocol not indicated   Nicotine patch    BPH  PSA   Flomax 0.4       DVT ppx: heparin gtt  GI ppx: protonix    PT/OT/SW  Discharge Planning:     Rosalie Roman MD  Internal Medicine Resident, PGY-1  [facility]; [place], [place]  4/27/2024, 10:44 PM

## 2024-04-29 LAB
ANION GAP SERPL CALCULATED.3IONS-SCNC: 12 MMOL/L (ref 9–16)
ANTI-XA UNFRAC HEPARIN: 0.28 IU/L
BASOPHILS # BLD: 0.07 K/UL (ref 0–0.2)
BASOPHILS NFR BLD: 2 % (ref 0–2)
BUN SERPL-MCNC: 12 MG/DL (ref 8–23)
CALCIUM SERPL-MCNC: 8.5 MG/DL (ref 8.6–10.4)
CHLORIDE SERPL-SCNC: 107 MMOL/L (ref 98–107)
CO2 SERPL-SCNC: 21 MMOL/L (ref 20–31)
CREAT SERPL-MCNC: 1.1 MG/DL (ref 0.7–1.2)
ECHO BSA: 1.85 M2
EOSINOPHIL # BLD: 0.29 K/UL (ref 0–0.44)
EOSINOPHILS RELATIVE PERCENT: 6 % (ref 1–4)
ERYTHROCYTE [DISTWIDTH] IN BLOOD BY AUTOMATED COUNT: 14.5 % (ref 11.8–14.4)
GFR SERPL CREATININE-BSD FRML MDRD: 75 ML/MIN/1.73M2
GLUCOSE SERPL-MCNC: 67 MG/DL (ref 74–99)
HCT VFR BLD AUTO: 43.5 % (ref 40.7–50.3)
HGB BLD-MCNC: 13.7 G/DL (ref 13–17)
IMM GRANULOCYTES # BLD AUTO: <0.03 K/UL (ref 0–0.3)
IMM GRANULOCYTES NFR BLD: 0 %
LYMPHOCYTES NFR BLD: 1.39 K/UL (ref 1.1–3.7)
LYMPHOCYTES RELATIVE PERCENT: 30 % (ref 24–43)
MCH RBC QN AUTO: 27 PG (ref 25.2–33.5)
MCHC RBC AUTO-ENTMCNC: 31.5 G/DL (ref 28.4–34.8)
MCV RBC AUTO: 85.6 FL (ref 82.6–102.9)
MONOCYTES NFR BLD: 0.66 K/UL (ref 0.1–1.2)
MONOCYTES NFR BLD: 14 % (ref 3–12)
NEUTROPHILS NFR BLD: 48 % (ref 36–65)
NEUTS SEG NFR BLD: 2.25 K/UL (ref 1.5–8.1)
NRBC BLD-RTO: 0 PER 100 WBC
PLATELET # BLD AUTO: 206 K/UL (ref 138–453)
PMV BLD AUTO: 10.9 FL (ref 8.1–13.5)
POTASSIUM SERPL-SCNC: 4.2 MMOL/L (ref 3.7–5.3)
RBC # BLD AUTO: 5.08 M/UL (ref 4.21–5.77)
RBC # BLD: ABNORMAL 10*6/UL
SODIUM SERPL-SCNC: 140 MMOL/L (ref 136–145)
WBC OTHER # BLD: 4.7 K/UL (ref 3.5–11.3)

## 2024-04-29 PROCEDURE — 97530 THERAPEUTIC ACTIVITIES: CPT

## 2024-04-29 PROCEDURE — 6360000004 HC RX CONTRAST MEDICATION: Performed by: INTERNAL MEDICINE

## 2024-04-29 PROCEDURE — 85025 COMPLETE CBC W/AUTO DIFF WBC: CPT

## 2024-04-29 PROCEDURE — 99233 SBSQ HOSP IP/OBS HIGH 50: CPT

## 2024-04-29 PROCEDURE — 6360000002 HC RX W HCPCS: Performed by: STUDENT IN AN ORGANIZED HEALTH CARE EDUCATION/TRAINING PROGRAM

## 2024-04-29 PROCEDURE — 36415 COLL VENOUS BLD VENIPUNCTURE: CPT

## 2024-04-29 PROCEDURE — 99232 SBSQ HOSP IP/OBS MODERATE 35: CPT | Performed by: INTERNAL MEDICINE

## 2024-04-29 PROCEDURE — 97166 OT EVAL MOD COMPLEX 45 MIN: CPT

## 2024-04-29 PROCEDURE — 1200000000 HC SEMI PRIVATE

## 2024-04-29 PROCEDURE — 6370000000 HC RX 637 (ALT 250 FOR IP): Performed by: EMERGENCY MEDICINE

## 2024-04-29 PROCEDURE — 97535 SELF CARE MNGMENT TRAINING: CPT

## 2024-04-29 PROCEDURE — 92610 EVALUATE SWALLOWING FUNCTION: CPT

## 2024-04-29 PROCEDURE — 85520 HEPARIN ASSAY: CPT

## 2024-04-29 PROCEDURE — 6370000000 HC RX 637 (ALT 250 FOR IP)

## 2024-04-29 PROCEDURE — 97162 PT EVAL MOD COMPLEX 30 MIN: CPT

## 2024-04-29 PROCEDURE — 6370000000 HC RX 637 (ALT 250 FOR IP): Performed by: STUDENT IN AN ORGANIZED HEALTH CARE EDUCATION/TRAINING PROGRAM

## 2024-04-29 PROCEDURE — 2709999900 HC NON-CHARGEABLE SUPPLY: Performed by: INTERNAL MEDICINE

## 2024-04-29 PROCEDURE — 80048 BASIC METABOLIC PNL TOTAL CA: CPT

## 2024-04-29 RX ORDER — SODIUM CHLORIDE 9 MG/ML
INJECTION, SOLUTION INTRAVENOUS CONTINUOUS
Status: DISCONTINUED | OUTPATIENT
Start: 2024-04-29 | End: 2024-05-03 | Stop reason: HOSPADM

## 2024-04-29 RX ADMIN — TAMSULOSIN HYDROCHLORIDE 0.4 MG: 0.4 CAPSULE ORAL at 09:57

## 2024-04-29 RX ADMIN — EMPAGLIFLOZIN 10 MG: 10 TABLET, FILM COATED ORAL at 09:57

## 2024-04-29 RX ADMIN — HEPARIN SODIUM 12 UNITS/KG/HR: 10000 INJECTION, SOLUTION INTRAVENOUS at 00:23

## 2024-04-29 RX ADMIN — PANTOPRAZOLE SODIUM 40 MG: 40 TABLET, DELAYED RELEASE ORAL at 09:57

## 2024-04-29 RX ADMIN — ATORVASTATIN CALCIUM 80 MG: 80 TABLET, FILM COATED ORAL at 20:16

## 2024-04-29 RX ADMIN — ASPIRIN 81 MG: 81 TABLET, COATED ORAL at 09:56

## 2024-04-29 RX ADMIN — SPIRONOLACTONE 25 MG: 25 TABLET, FILM COATED ORAL at 09:57

## 2024-04-29 RX ADMIN — AMLODIPINE BESYLATE 10 MG: 10 TABLET ORAL at 09:57

## 2024-04-29 RX ADMIN — HEPARIN SODIUM 2000 UNITS: 1000 INJECTION INTRAVENOUS; SUBCUTANEOUS at 11:43

## 2024-04-29 RX ADMIN — CLOPIDOGREL BISULFATE 75 MG: 75 TABLET ORAL at 09:57

## 2024-04-29 ASSESSMENT — ENCOUNTER SYMPTOMS
COUGH: 0
SHORTNESS OF BREATH: 0

## 2024-04-29 NOTE — CARE COORDINATION
Discussed the transition plan with the patient. Explained the IMM and provided a copy. Discussed current medical equipment he has at home and his decreased mobility. Informed him the physical therapist recommends physical therapy to increase strength. Educated on SNF services. Patient willing to go if he qualifies.     1235 Patient choice list provided for SNF. Discussed potential site for referrals. Patient made two selections: [facility] and [facility].    1258 Received voice message from Rhoda with [facility] [place]. Patient declined due to drug an alcohol abuse history.    1356 Received a call from Arabella from [facility]. Declined the patient due to drug and alcohol use.    1639 Spoke with the patient. Informed him the two facilities he chose declined admission due to his alcohol and drug use. Two additional facilities selected by the patient. Discussed benefits of IPR. Writer to request an evaluation be ordered by the primary physician.     1650 [facility]erral made to [facility] and [facility].

## 2024-04-29 NOTE — PLAN OF CARE
Problem: Chronic Conditions and Co-morbidities  Goal: Patient's chronic conditions and co-morbidity symptoms are monitored and maintained or improved  Outcome: Progressing     Problem: Discharge Planning  Goal: Discharge to home or other facility with appropriate resources  Outcome: Progressing     Problem: ABCDS Injury Assessment  Goal: Absence of physical injury  Outcome: Progressing     Problem: Safety - Adult  Goal: Free from fall injury  Outcome: Progressing     Problem: Cardiovascular - Adult  Goal: Maintains optimal cardiac output and hemodynamic stability  Outcome: Progressing  Goal: Absence of cardiac dysrhythmias or at baseline  Outcome: Progressing

## 2024-04-29 NOTE — PROGRESS NOTES
[facility]  Internal Medicine Teaching Residency Program  Inpatient Daily Progress Note  ______________________________________________________________________________    Patient: Nik Shepherd  YOB: 1955   MRN:[id number]    Acct: [id number]     Room: 0348/0348-02  Admit date: 4/27/2024  Today's date: 04/29/24  Number of days in the hospital: 2    SUBJECTIVE   Admitting Diagnosis: NSTEMI (non-ST elevated myocardial infarction) (HCC)  CC: Chest pain  Pt examined at bedside. Chart & results reviewed.     No acute events overnight.  This morning, he is afebrile and hemodynamically stable saturating well on room air.  He denies any chest pain at present.  Agreeable to left heart cath, cardiology notified.  N.p.o. due to concern for aspiration as per RN, patient choked while eating yesterday.    Morning labs stable.    Plan:  -Keep n.p.o. for heart cath.  Will resume pending swallow study findings  -Swallow study  -Left heart cath    Review of Systems   Constitutional:  Negative for chills.   Respiratory:  Negative for cough and shortness of breath.    Cardiovascular:  Negative for chest pain and palpitations.   Neurological:  Negative for dizziness and headaches.       BRIEF HISTORY     The patient is a pleasant 68 y.o. male with a past medical history of polysubstance drug use, alcohol dependence, smoking, CAD s/p CABG, HFmrEF, hypertension, BPH, presents with a chief complaint of non-radiating left-sided chest pain that began while trying to have a bowel movement. Associated with shortness of breath and cold chills. Given nitro and Asprin 324 by EMS with resolution of symptoms. Consumes cocaine and alcohol regularly stating that he drinks as much as he can whenever he can. Last drink, one week ago. Long-standing history of smoking. Reports medication noncompliance, per dispense report, last filled in 2022.      /108. HR 66. SpO2 98 on room air. Troponin

## 2024-04-29 NOTE — CARE COORDINATION
Discussed the transition plan with the patient and his son. Plan is to discharge home, denies needs. Patient's son expressed concern with the patient ambulating with pain - believes he is being discharged too early. Explained the IMM and provided a copy to the patient. Educated the patient that pain alone is not an indicator to remain in the hospital. Encouraged the patient to raise any concerns he may have with the physician. Discussed Inpatient rehab as a possibility for strengthening. Patient states he does not want it and plan to return home.

## 2024-04-29 NOTE — PROGRESS NOTES
[facility]  Progress Note                   Date:   4/29/2024  Patient name: Nik Shepherd  Date of admission:  4/27/2024 10:40 AM  MRN:   [id number]  YOB: 1955  PCP: Sly Reza MD    Reason for Admission: Chest pain [R07.9]  Chest pain, unspecified type [R07.9]  NSTEMI (non-ST elevated myocardial infarction) (HCC) [I21.4]    Subjective:       Clinical Changes /Abnormalities: Patient seen and examined in room. Denies CP or SOB. No acute CV events overnight. Labs, vitals, and tele reviewed.   RN at bedside. Passed swallow study today.     Review of Systems    Medications:   Scheduled Meds:   nicotine  1 patch TransDERmal Daily    amLODIPine  10 mg Oral Daily    aspirin  81 mg Oral Daily    atorvastatin  80 mg Oral Nightly    clopidogrel  75 mg Oral Daily    empagliflozin  10 mg Oral Daily    sodium chloride flush  5-40 mL IntraVENous 2 times per day    polyethylene glycol  17 g Oral Daily    sennosides-docusate sodium  1 tablet Oral Daily    pantoprazole  40 mg Oral Daily    spironolactone  25 mg Oral Daily    tamsulosin  0.4 mg Oral Daily     Continuous Infusions:   sodium chloride      heparin (PORCINE) Infusion 12 Units/kg/hr (04/29/24 0023)     CBC:   Recent Labs     04/27/24  1052 04/29/24  0828   WBC 5.2 4.7   HGB 12.6* 13.7    206     BMP:    Recent Labs     04/27/24  1052 04/29/24  0828    140   K 3.4* 4.2   * 107   CO2 18* 21   BUN 18 12   CREATININE 0.9 1.1   GLUCOSE 115* 67*     Hepatic:No results for input(s): \"AST\", \"ALT\", \"ALB\", \"BILITOT\", \"ALKPHOS\" in the last 72 hours.  Troponin:   Recent Labs     04/27/24  1052 04/27/24  1211 04/27/24  1858   TROPHS 25* 24* 32*     BNP: No results for input(s): \"BNP\" in the last 72 hours.  Lipids: No results for input(s): \"CHOL\", \"HDL\" in the last 72 hours.    Invalid input(s): \"LDLCALCU\"  INR:   Recent Labs     04/27/24  1858   INR 1.2       Objective:   Vitals: /87   Pulse 59   Temp 97.6 °F (36.4

## 2024-04-29 NOTE — PLAN OF CARE
Plan of care:    Patient refusing LHC. He does not want to proceed with the procedure. LHC cancelled.     Golden Stevenson MD  [employer] Fellow

## 2024-04-29 NOTE — PROGRESS NOTES
Physician Progress Note      PATIENT:               PERCY ROSEN  CSN #:                  [id number]  :                       1955  ADMIT DATE:       2024 10:40 AM  DISCH DATE:  RESPONDING  PROVIDER #:        OCTAVIA RIZO          QUERY TEXT:    Pt admitted with chest pain. Pt noted to have UDS + cocaine. If possible,   please document if you are evaluating and/or treating any of the following:    The medical record reflects the following:  Risk Factors: Cocaine use and noncompliant with meds.    Clinical Indicators: UDS + cocaine. HS troponins: 25, 24, 32.  3/8 echo:   moderately reduced LVSF, EF 35-40%, RVSP 39  Home meds: entresto 24-26, jardiance 10, aldactone 25, lipitor 80, aspirin,   plavix    Cardio consult: Typical chest pain with minimally elevated troponin. Patient   was seen last month by me and recommended cardiac cath which he declined. He   continues to use cocaine and has medical non-compliance. He does not want to   do cardiac cath. Counseled to take medications and quit cocaine and alcohol   abuse.   IM PN: NSTEMI  EKG shows NSR with inferolateral ischemia. Likely   secondary to cocaine-induced coronary vasospasm however cannot rule out type I    Treatment: Counseled to take medications and quit cocaine and alcohol abuse.    Thank-you,  Renay Aguilar RN, CDS  [email or web address]  Options provided:  -- Chest pain due to cocaine use  -- Chest pain due to demand ischemia secondary to cardiomyopathy and cocaine   use.  -- Chest pain due to Type 2 MI secondary to cardiomyopathy and cocaine use.  -- Chest pain due to NSTEMI  -- Other - I will add my own diagnosis  -- Disagree - Not applicable / Not valid  -- Disagree - Clinically unable to determine / Unknown  -- Refer to Clinical Documentation Reviewer    PROVIDER RESPONSE TEXT:    This patient has chest pain due to cocaine use.    Query created by: Renay Aguilar on 2024 9:21 AM      Electronically signed by:  OCTAVIA RIZO

## 2024-04-29 NOTE — PROGRESS NOTES
Occupational Therapy  Facility/Department: [address] MED SURG  Occupational Therapy Initial Assessment    Name: Nik Shepherd  : 1955  MRN: [id number]  Date of Service: 2024    Chief Complaint   Patient presents with    Chest Pain       Discharge Recommendations:  Patient would benefit from continued therapy after discharge  OT Equipment Recommendations  Equipment Needed: Yes  Mobility Devices: Walker;ADL Assistive Devices  Walker: Rolling  ADL Assistive Devices: Reacher;Grab Bars - shower;Grab Bars - toilet;Long-handled Shoe Horn;Long-handled Sponge;Dressing Stick       Patient Diagnosis(es): The primary encounter diagnosis was Chest pain, unspecified type. A diagnosis of NSTEMI (non-ST elevated myocardial infarction) (HCC) was also pertinent to this visit.  Past Medical History:  has a past medical history of 3-vessel CAD, Alcohol abuse, Arthritis, Chronic kidney disease, Full dentures, Hypertension, Other emphysema (HCC), Pure hypercholesterolemia, Systolic CHF (HCC), and Wears glasses.  Past Surgical History:  has a past surgical history that includes Hip fracture surgery (Left); Appendectomy; pr egd transoral biopsy single/multiple (2017); and Coronary artery bypass graft (N/A, 3/21/2022).           Assessment   Performance deficits / Impairments: Decreased functional mobility ;Decreased ADL status;Decreased strength;Decreased endurance;Decreased balance;Decreased high-level IADLs;Decreased cognition  Prognosis: Fair  Decision Making: Medium Complexity  REQUIRES OT FOLLOW-UP: Yes  Activity Tolerance  Activity Tolerance: Patient limited by fatigue        Plan   Occupational Therapy Plan  Times Per Week: 4-5 x/week     Restrictions  Restrictions/Precautions  Restrictions/Precautions: Fall Risk, General Precautions, Up as Tolerated  Required Braces or Orthoses?: No    Subjective   General  Patient assessed for rehabilitation services?: Yes  Family / Caregiver Present: No  Diagnosis: Angina,  personal grooming?: A Little  How much help for eating meals?: A Little  AM-PAC Inpatient Daily Activity Raw Score: 18  AM-PAC Inpatient ADL T-Scale Score : 38.66  ADL Inpatient CMS 0-100% Score: 46.65  ADL Inpatient CMS G-Code Modifier : CK    Goals  Short Term Goals  Time Frame for Short Term Goals: Pt will by d/c  Short Term Goal 1: Demo UB/LB ADL activity at SBA using AE PRN  Short Term Goal 2: Follow 3-step command during func activity with one cue   Short Term Goal 3: Identify and demo 2 EC/WS techniques during func activity with one cue  Short Term Goal 4: Demo func mob around room at A using LRAD PRN  Short Term Goal 5: Demo standing during func activity for 10 min+ one rest break at SBA using LRAD PRN       Therapy Time   Individual Concurrent Group Co-treatment   Time In 1003         Time Out 1046         Minutes 43         Timed Code Treatment Minutes: 26 Minutes       KARYNA HE, S/OT

## 2024-04-29 NOTE — PLAN OF CARE
Problem: Chronic Conditions and Co-morbidities  Goal: Patient's chronic conditions and co-morbidity symptoms are monitored and maintained or improved  4/29/2024 1845 by Manisha Khan RN  Outcome: Progressing  4/29/2024 0606 by Juan Gomez RN  Outcome: Progressing     Problem: Discharge Planning  Goal: Discharge to home or other facility with appropriate resources  4/29/2024 1845 by Manisha Khan RN  Outcome: Progressing  4/29/2024 0606 by Juan Gomez RN  Outcome: Progressing     Problem: ABCDS Injury Assessment  Goal: Absence of physical injury  4/29/2024 1845 by Manisha Khan RN  Outcome: Progressing  4/29/2024 0606 by Juan Gomez RN  Outcome: Progressing     Problem: Safety - Adult  Goal: Free from fall injury  4/29/2024 1845 by Manisha Khan RN  Outcome: Progressing  4/29/2024 0606 by Juan Gomez RN  Outcome: Progressing     Problem: Cardiovascular - Adult  Goal: Maintains optimal cardiac output and hemodynamic stability  4/29/2024 1845 by Manisha Khan RN  Outcome: Progressing  4/29/2024 0606 by Juan Gomez RN  Outcome: Progressing  Goal: Absence of cardiac dysrhythmias or at baseline  4/29/2024 0606 by Juan Gomez RN  Outcome: Progressing

## 2024-04-29 NOTE — PROGRESS NOTES
Patient admitted, consent signed and questions answered.  Call light to reach with side rails up 2 of 2. Bilateral Groin clipped with writer and CHEIKH Sanderson, present.  RN at bedside with patient.  History and physical up-to-date. VS & Pulses obtained and documented. Will continue to monitor.

## 2024-04-29 NOTE — PROGRESS NOTES
SLP ALL NOTES  Facility/Department: [address] MED SURG   CLINICAL BEDSIDE SWALLOW EVALUATION    NAME: Nik Shepherd  : 1955  MRN: [id number]    ADMISSION DATE: 2024  ADMITTING DIAGNOSIS: has CKD (chronic kidney disease) stage 3, GFR 30-59 ml/min (HCC); Primary hypertension; Arthritis of right hip; Cocaine abuse (HCC); Chronic systolic congestive heart failure (HCC); Benign prostatic hyperplasia with urinary obstruction; Pure hypercholesterolemia; Abdominal pain; Chest pain; Acute on chronic combined systolic and diastolic congestive heart failure (HCC); S/P cardiac catheterization; Atherosclerotic heart disease; Acute renal failure with tubular necrosis (HCC); Problem related to discharge planning; Acute respiratory failure with hypoxia ([facility]); ACS (acute coronary syndrome) ([facility]); Cocaine use; Encounter for palliative care; NSTEMI (non-ST elevated myocardial infarction) ([facility]); Troponin level elevated; Hyperlipidemia; and Noncompliance with medication regimen on their problem list.    Date of Eval: 2024  Evaluating Therapist: GARRY WEBB    Current Diet level:  Current Diet : NPO  Current Liquid Diet : NPO    Primary Complaint  The patient is a pleasant 68 y.o. male with a past medical history of polysubstance drug use, alcohol dependence, smoking, CAD s/p CABG, HFmrEF, hypertension, BPH, presents with a chief complaint of non-radiating left-sided chest pain that began while trying to have a bowel movement. Associated with shortness of breath and cold chills. Given nitro and Asprin 324 by EMS with resolution of symptoms. Consumes cocaine and alcohol regularly stating that he drinks as much as he can whenever he can. Last drink, one week ago. Long-standing history of smoking. Reports medication noncompliance, per dispense report, last filled in      Pain:  Pain Assessment  Pain Assessment: None - Denies Pain    Reason for Referral  Nik Shepherd was referred for a bedside swallow evaluation to

## 2024-04-29 NOTE — PROGRESS NOTES
Physical Therapy  Facility/Department: [address] MED SURG  Physical Therapy Initial Assessment    Name: Nik Shepherd  : 1955  MRN: [id number]  Date of Service: 2024    Discharge Recommendations:  Therapy recommended at discharge   PT Equipment Recommendations  Equipment Needed: No      Patient Diagnosis(es): The primary encounter diagnosis was Chest pain, unspecified type. A diagnosis of NSTEMI (non-ST elevated myocardial infarction) (HCC) was also pertinent to this visit.  Past Medical History:  has a past medical history of 3-vessel CAD, Alcohol abuse, Arthritis, Chronic kidney disease, Full dentures, Hypertension, Other emphysema (HCC), Pure hypercholesterolemia, Systolic CHF (HCC), and Wears glasses.  Past Surgical History:  has a past surgical history that includes Hip fracture surgery (Left); Appendectomy; pr egd transoral biopsy single/multiple (2017); and Coronary artery bypass graft (N/A, 3/21/2022).    Assessment   Body Structures, Functions, Activity Limitations Requiring Skilled Therapeutic Intervention: Decreased functional mobility ;Decreased strength;Decreased safe awareness;Decreased endurance;Decreased balance  Assessment: Pt able to ambulate 15 ft and 10 ft with rw, Prasanth for safety.  Pt Prasanth for transfers, CGA for bed mobility. Pt is unsafe to return to previous living siutation due to limited mobility and strength, crawls up stairs. Pt will benefit from continued therapy to improve deficits and progress function.  Therapy Prognosis: Good  Requires PT Follow-Up: Yes  Activity Tolerance  Activity Tolerance: Patient tolerated evaluation without incident;Patient limited by fatigue     Plan   Physical Therapy Plan  General Plan:  (5x/week)  Current Treatment Recommendations: Strengthening, Balance training, Functional mobility training, Transfer training, Endurance training, Neuromuscular re-education, Stair training, Gait training, Therapeutic activities, Safety education & training,  Assistance  Stand to Sit: Minimal Assistance  Stand Pivot Transfers: Minimal Assistance  Comment: cues for hand placement and safety, up with rw  Ambulation  Surface: Level tile  Device: Rolling Walker  Assistance: Minimal assistance  Gait Deviations: Slow Gale;Shuffles  Distance: 10 ft, 15 ft  Comments: Pt with decreased safety awareness on use of rw, does not keep close, reports using rollator with brakes on in home  More Ambulation?: No   Pt limited by SOB with mobility, unable to continue walking due to breathing.    Balance  Posture: Fair  Sitting - Static: Good  Sitting - Dynamic: Fair;+  Standing - Static: Fair  Standing - Dynamic: Fair;-  Comments: standing balance with B UE support           OutComes Score         AM-PAC - Mobility    AM-PAC Basic Mobility - Inpatient   How much help is needed turning from your back to your side while in a flat bed without using bedrails?: None  How much help is needed moving from lying on your back to sitting on the side of a flat bed without using bedrails?: A Little  How much help is needed moving to and from a bed to a chair?: A Little  How much help is needed standing up from a chair using your arms?: A Little  How much help is needed walking in hospital room?: A Little  How much help is needed climbing 3-5 steps with a railing?: A Lot  AM-PAC Inpatient Mobility Raw Score : 18  AM-PAC Inpatient T-Scale Score : 43.63  Mobility Inpatient CMS 0-100% Score: 46.58  Mobility Inpatient CMS G-Code Modifier : CK         Goals  Short Term Goals  Time Frame for Short Term Goals: 14 visits  Short Term Goal 1: Pt indep with bed mobility  Short Term Goal 2: Pt indep with transfers with proper safety awareness  Short Term Goal 3: Pt amb 150 ft with least restrictive device independently, no loss of balance  Short Term Goal 4: Pt negotiate 4 stairs with Prasanth and rails, proper safety  Short Term Goal 5: Pt tolerate 25 minutes ther ex and activity to improve balance and strength for

## 2024-04-30 LAB
ANION GAP SERPL CALCULATED.3IONS-SCNC: 11 MMOL/L (ref 9–16)
ANTI-XA UNFRAC HEPARIN: <0.1 IU/L
BASOPHILS # BLD: 0.05 K/UL (ref 0–0.2)
BASOPHILS NFR BLD: 1 % (ref 0–2)
BUN SERPL-MCNC: 14 MG/DL (ref 8–23)
CALCIUM SERPL-MCNC: 7.9 MG/DL (ref 8.6–10.4)
CHLORIDE SERPL-SCNC: 105 MMOL/L (ref 98–107)
CO2 SERPL-SCNC: 20 MMOL/L (ref 20–31)
CREAT SERPL-MCNC: 1.2 MG/DL (ref 0.7–1.2)
EOSINOPHIL # BLD: 0.19 K/UL (ref 0–0.44)
EOSINOPHILS RELATIVE PERCENT: 4 % (ref 1–4)
ERYTHROCYTE [DISTWIDTH] IN BLOOD BY AUTOMATED COUNT: 14.3 % (ref 11.8–14.4)
GFR SERPL CREATININE-BSD FRML MDRD: 66 ML/MIN/1.73M2
GLUCOSE SERPL-MCNC: 82 MG/DL (ref 74–99)
HCT VFR BLD AUTO: 39.6 % (ref 40.7–50.3)
HGB BLD-MCNC: 12.1 G/DL (ref 13–17)
IMM GRANULOCYTES # BLD AUTO: <0.03 K/UL (ref 0–0.3)
IMM GRANULOCYTES NFR BLD: 0 %
LYMPHOCYTES NFR BLD: 1.31 K/UL (ref 1.1–3.7)
LYMPHOCYTES RELATIVE PERCENT: 29 % (ref 24–43)
MAGNESIUM SERPL-MCNC: 1.9 MG/DL (ref 1.6–2.4)
MCH RBC QN AUTO: 27.3 PG (ref 25.2–33.5)
MCHC RBC AUTO-ENTMCNC: 30.6 G/DL (ref 28.4–34.8)
MCV RBC AUTO: 89.4 FL (ref 82.6–102.9)
MONOCYTES NFR BLD: 0.74 K/UL (ref 0.1–1.2)
MONOCYTES NFR BLD: 16 % (ref 3–12)
NEUTROPHILS NFR BLD: 50 % (ref 36–65)
NEUTS SEG NFR BLD: 2.28 K/UL (ref 1.5–8.1)
NRBC BLD-RTO: 0 PER 100 WBC
PLATELET # BLD AUTO: 246 K/UL (ref 138–453)
PMV BLD AUTO: 11.1 FL (ref 8.1–13.5)
POTASSIUM SERPL-SCNC: 4.3 MMOL/L (ref 3.7–5.3)
RBC # BLD AUTO: 4.43 M/UL (ref 4.21–5.77)
SODIUM SERPL-SCNC: 136 MMOL/L (ref 136–145)
WBC OTHER # BLD: 4.6 K/UL (ref 3.5–11.3)

## 2024-04-30 PROCEDURE — 6370000000 HC RX 637 (ALT 250 FOR IP): Performed by: EMERGENCY MEDICINE

## 2024-04-30 PROCEDURE — 2580000003 HC RX 258: Performed by: EMERGENCY MEDICINE

## 2024-04-30 PROCEDURE — 85520 HEPARIN ASSAY: CPT

## 2024-04-30 PROCEDURE — 1200000000 HC SEMI PRIVATE

## 2024-04-30 PROCEDURE — 83735 ASSAY OF MAGNESIUM: CPT

## 2024-04-30 PROCEDURE — 80048 BASIC METABOLIC PNL TOTAL CA: CPT

## 2024-04-30 PROCEDURE — 6370000000 HC RX 637 (ALT 250 FOR IP): Performed by: STUDENT IN AN ORGANIZED HEALTH CARE EDUCATION/TRAINING PROGRAM

## 2024-04-30 PROCEDURE — 85025 COMPLETE CBC W/AUTO DIFF WBC: CPT

## 2024-04-30 PROCEDURE — 97535 SELF CARE MNGMENT TRAINING: CPT

## 2024-04-30 PROCEDURE — 6370000000 HC RX 637 (ALT 250 FOR IP)

## 2024-04-30 PROCEDURE — 6360000002 HC RX W HCPCS

## 2024-04-30 PROCEDURE — 36415 COLL VENOUS BLD VENIPUNCTURE: CPT

## 2024-04-30 PROCEDURE — 99232 SBSQ HOSP IP/OBS MODERATE 35: CPT | Performed by: INTERNAL MEDICINE

## 2024-04-30 PROCEDURE — 99221 1ST HOSP IP/OBS SF/LOW 40: CPT | Performed by: PHYSICAL MEDICINE & REHABILITATION

## 2024-04-30 RX ORDER — ENOXAPARIN SODIUM 100 MG/ML
40 INJECTION SUBCUTANEOUS DAILY
Status: DISCONTINUED | OUTPATIENT
Start: 2024-05-01 | End: 2024-05-03 | Stop reason: HOSPADM

## 2024-04-30 RX ORDER — SENNOSIDES A AND B 8.6 MG/1
1 TABLET, FILM COATED ORAL 2 TIMES DAILY
Status: DISCONTINUED | OUTPATIENT
Start: 2024-04-30 | End: 2024-05-03 | Stop reason: HOSPADM

## 2024-04-30 RX ORDER — MAGNESIUM SULFATE 1 G/100ML
1000 INJECTION INTRAVENOUS ONCE
Status: COMPLETED | OUTPATIENT
Start: 2024-04-30 | End: 2024-04-30

## 2024-04-30 RX ORDER — DOCUSATE SODIUM 100 MG/1
100 CAPSULE, LIQUID FILLED ORAL DAILY
Status: DISCONTINUED | OUTPATIENT
Start: 2024-04-30 | End: 2024-05-03 | Stop reason: HOSPADM

## 2024-04-30 RX ORDER — SENNA AND DOCUSATE SODIUM 50; 8.6 MG/1; MG/1
1 TABLET, FILM COATED ORAL 2 TIMES DAILY
Status: DISCONTINUED | OUTPATIENT
Start: 2024-04-30 | End: 2024-05-03 | Stop reason: HOSPADM

## 2024-04-30 RX ADMIN — STANDARDIZED SENNA CONCENTRATE AND DOCUSATE SODIUM 1 TABLET: 8.6; 5 TABLET ORAL at 20:51

## 2024-04-30 RX ADMIN — MAGNESIUM SULFATE HEPTAHYDRATE 1000 MG: 1 INJECTION, SOLUTION INTRAVENOUS at 11:08

## 2024-04-30 RX ADMIN — TAMSULOSIN HYDROCHLORIDE 0.4 MG: 0.4 CAPSULE ORAL at 08:44

## 2024-04-30 RX ADMIN — SODIUM CHLORIDE, PRESERVATIVE FREE 10 ML: 5 INJECTION INTRAVENOUS at 20:52

## 2024-04-30 RX ADMIN — ATORVASTATIN CALCIUM 80 MG: 80 TABLET, FILM COATED ORAL at 20:51

## 2024-04-30 RX ADMIN — DOCUSATE SODIUM 100 MG: 100 CAPSULE, LIQUID FILLED ORAL at 08:44

## 2024-04-30 RX ADMIN — ASPIRIN 81 MG: 81 TABLET, COATED ORAL at 08:44

## 2024-04-30 RX ADMIN — SODIUM CHLORIDE, PRESERVATIVE FREE 10 ML: 5 INJECTION INTRAVENOUS at 08:50

## 2024-04-30 RX ADMIN — CLOPIDOGREL BISULFATE 75 MG: 75 TABLET ORAL at 08:44

## 2024-04-30 RX ADMIN — POLYETHYLENE GLYCOL 3350 17 G: 17 POWDER, FOR SOLUTION ORAL at 08:44

## 2024-04-30 RX ADMIN — SPIRONOLACTONE 25 MG: 25 TABLET, FILM COATED ORAL at 08:44

## 2024-04-30 RX ADMIN — STANDARDIZED SENNA CONCENTRATE AND DOCUSATE SODIUM 1 TABLET: 8.6; 5 TABLET ORAL at 08:50

## 2024-04-30 RX ADMIN — SENNOSIDES 8.6 MG: 8.6 TABLET, FILM COATED ORAL at 08:44

## 2024-04-30 RX ADMIN — PANTOPRAZOLE SODIUM 40 MG: 40 TABLET, DELAYED RELEASE ORAL at 08:44

## 2024-04-30 RX ADMIN — AMLODIPINE BESYLATE 10 MG: 10 TABLET ORAL at 08:44

## 2024-04-30 RX ADMIN — SENNOSIDES 8.6 MG: 8.6 TABLET, FILM COATED ORAL at 20:51

## 2024-04-30 ASSESSMENT — ENCOUNTER SYMPTOMS
SHORTNESS OF BREATH: 0
COUGH: 0

## 2024-04-30 NOTE — PROGRESS NOTES
Congestive Heart Failure Education completed and charted. CHF booklet given. Patient was receptive to education.    Discussed the  importance of medication compliance.    Discussed the importance of a heart healthy diet. Discussed 2000 mg sodium-restricted daily diet.  Patient instructed to limit fluid intake to  1.5 to 2 liters per day.    Patient instructed to weigh self at the same time of each day each morning, reinforced teaching to monitor for 3-5 lb weight increase over 1-2 days notify physician if change noted.      Signs and symptoms of CHF discussed with patient, such as feeling more tired than normal, feeling short of breath, coughing that increases when lying down, sudden weight gain, swelling of the feet, legs or belly.  Patient verbalized understanding to notify physician office if these symptoms occur.  EF 35-40%

## 2024-04-30 NOTE — DISCHARGE INSTRUCTIONS
-You were admitted for chest pain and evaluated by the cardiolgist who recommended you get a left heart cath to evaluate your heart vessels for any blockage. You declined the procedure when you were taken down to the cath lab. Please see coronary angiogram information packet in your discharge paperwork for more information.   - Your urine drug screen was positive for cocaine. Please abstain from cocaine as this is a known cause of chest pain. Cocaine causes your blood vessels to narrow making your blood pressure go up and also decreasing the oxygen supply to your hear causing you to experience chest pain.   -You are being prescribed norvasc for your blood pressure. Please take daily as prescribed  -You have been prescribed aspirin, plavix, and lipitor. Please take daily for your history of heart disease  -You have been prescribed jardiance and aldactone for your heart failure. Please take as prescribed. You will need to restrict your salt intake to 2g / day and limit your fluid intake to 2 liters daily. You will also need to follow-up with cardiology outpatient. Please call and schedule an appointment  -You underwent a swallow study due to an episode of choking you had while hospitalized. Your result came back normal.     PLEASE TAKE YOUR MEDICATIONS DAILY AS PRESCRIBED AND ABSTAIN FROM COCAINE, SMOKING, ALCOHOL, AND ILLICIT DRUG USE     -Please follow-up with your primary care provider within 5 to 7 days for continued care.   -If you begin to experience any symptoms such as chest pain shortness of breath nausea vomiting dizziness drowsiness abdominal pain loss of consciousness or any other symptoms you find concerning please return to the ED for follow-up evaluation.    -Please feel free return to the hospital if your symptoms worsen or any new concerning symptoms develop.  Follow-up with your primary care physician as needed for all other the concerns. '

## 2024-04-30 NOTE — CONSULTS
Physical Medicine & Rehabilitation  Consult Note      Admitting Physician: Yaw Eaton MD    Primary Care Provider: Sly Reza MD     Reason for Consult:  Acute Inpatient Rehabilitation    Chief Complaint: Chest pain    History of Present Illness:  Referring Provider is requesting an evaluation for appropriate placement upon discharge from acute care.     Mr. Nik Shepherd is a 68 y.o.  male who was admitted to [facility] on 4/27/2024 with Chest Pain    68-year-old male with history of polysubstance use, alcohol dependence, smoker, coronary disease status post CABG, heart failure with reduced ejection fraction, hypertension, BPH presented with left-sided chest pain given aspirin and notes continues cocaine and alcohol regularly and drinks as much as he can whenever he can last drink 1 week ago along, longstanding history of smoking, noncompliant on admission was asymptomatic    Cardiology elevated troponin recommend cardiac cath patient declined continues on Entresto Jardiance Aldactone aspirin Plavix and statin    Internal medicine-NSTEMI, EKG showed normal sinus rhythm with inferolateral ischemia possibly due to cocaine induced coronary spasms on heparin, concern for aspiration n.p.o. swallow study ordered, ejection fraction 35 to 40% noncompliant with meds, polysubstance abuse on CIWA protocol, on Flomax for BPH on heparin drip    Radiology:  XR ABDOMEN (KUB) (SINGLE AP VIEW)    Result Date: 4/27/2024  Nonobstructive bowel gas pattern. Mildly increase left colonic stool.     XR CHEST (2 VW)    Result Date: 4/27/2024  Area of consolidation in the lower medial right lower lobe which shows evidence of worsening compared to prior exams.  This may be related atelectasis or consolidation.  There is also new stranding at the right base likely related to airspace disease in the lower lobe.  Continued radiographic monitoring is advised.  If can addition worsens or does not improve would advise CT scanning of the  requiring min cueing to initiate and continue task, pt transferred seated EOB>supine at A, pt retired supine in bed with bed alarm in place.  Skin Care: Soap and water    Toilet Transfer: Minimum assistance    ST:  Recommended Diet and Intervention  Diet Solids Recommendation: Regular  Liquid Consistency Recommendation: Thin  Therapeutic Interventions: Diet tolerance monitoring;Patient/Family education    Past Medical History:        Diagnosis Date    3-vessel CAD 03/11/2022    Alcohol abuse 06/23/2015    Arthritis     Chronic kidney disease     Full dentures     upper and lower full    Hypertension     Other emphysema (HCC)     COPD stage II on PFTs with severe reduction diffusion capacity    Pure hypercholesterolemia 11/04/2016    Systolic CHF (HCC)     Wears glasses        Past Surgical History:        Procedure Laterality Date    APPENDECTOMY      CORONARY ARTERY BYPASS GRAFT N/A 3/21/2022    CABG X 2 , EVH RT LEG , MELI performed by Carlos Humphrey MD at [facility]    HIP FRACTURE SURGERY Left     W/ HARDWARE    MO EGD TRANSORAL BIOPSY SINGLE/MULTIPLE  6/19/2017    EGD BIOPSY performed by Cory Couch DO at [facility]       Allergies:    No Known Allergies     Current Medications:   Current Facility-Administered Medications: senna (SENOKOT) tablet 8.6 mg, 1 tablet, Oral, BID  sennosides-docusate sodium (SENOKOT-S) 8.6-50 MG tablet 1 tablet, 1 tablet, Oral, BID  docusate sodium (COLACE) capsule 100 mg, 100 mg, Oral, Daily  0.9 % sodium chloride infusion, , IntraVENous, Continuous  nicotine (NICODERM CQ) 7 MG/24HR 1 patch, 1 patch, TransDERmal, Daily  amLODIPine (NORVASC) tablet 10 mg, 10 mg, Oral, Daily  aspirin EC tablet 81 mg, 81 mg, Oral, Daily  atorvastatin (LIPITOR) tablet 80 mg, 80 mg, Oral, Nightly  clopidogrel (PLAVIX) tablet 75 mg, 75 mg, Oral, Daily  [Held by provider] empagliflozin (JARDIANCE) tablet 10 mg, 10 mg, Oral, Daily  sodium chloride flush 0.9 % injection 5-40 mL, 5-40 mL, IntraVENous,

## 2024-04-30 NOTE — PROGRESS NOTES
Occupational Therapy  Facility/Department: [address] MED SURG  Occupational Therapy Daily Treatment Note      Name: Nik Shepherd  : 1955  MRN: [id number]  Date of Service: 2024    Discharge Recommendations:  Patient would benefit from continued therapy after discharge  OT Equipment Recommendations  Walker: Rolling  ADL Assistive Devices: Reacher;Grab Bars - shower;Grab Bars - toilet;Long-handled Shoe Horn;Long-handled Sponge;Dressing Stick       Patient Diagnosis(es): The primary encounter diagnosis was Chest pain, unspecified type. A diagnosis of NSTEMI (non-ST elevated myocardial infarction) (HCC) was also pertinent to this visit.  Past Medical History:  has a past medical history of 3-vessel CAD, Alcohol abuse, Arthritis, Chronic kidney disease, Full dentures, Hypertension, Other emphysema (HCC), Pure hypercholesterolemia, Systolic CHF (HCC), and Wears glasses.  Past Surgical History:  has a past surgical history that includes Hip fracture surgery (Left); Appendectomy; pr egd transoral biopsy single/multiple (2017); and Coronary artery bypass graft (N/A, 3/21/2022).      Assessment   Performance deficits / Impairments: Decreased functional mobility ;Decreased ADL status;Decreased safe awareness;Decreased endurance;Decreased balance  Assessment: Pt would benefit from continued acute care and post acute care OT to address above listed deficits.  Prognosis: Good  Activity Tolerance  Activity Tolerance: Patient Tolerated treatment well;Patient limited by fatigue        Plan   Occupational Therapy Plan  Times Per Week: 4-5 x/week     Restrictions  Restrictions/Precautions  Restrictions/Precautions: Fall Risk, General Precautions, Up as Tolerated  Required Braces or Orthoses?: No  Position Activity Restriction  Other position/activity restrictions: Up w/assist    Subjective   General  Patient assessed for rehabilitation services?: Yes  Family / Caregiver Present: No  Diagnosis: Angina, cardiomegaly, CAD,  HTN  Subjective  Subjective: RN approved therapy session  Pain Rating: Denies  Pain Location:   Non-Pharmaceutical Pain Intervention: Pt repositioned in bed at end of tx for comfort.       Objective   O2 Device: None (Room air)  Safety Devices  Type of Devices: Patient at risk for falls;Left in bed;Bed alarm in place;Call light within reach;Gait belt;Heels elevated for pressure relief  Bed Mobility Training  Bed Mobility Training: Yes  Overall Level of Assistance: Stand-by assistance;Additional time  Rolling: Stand-by assistance;Additional time  Supine to Sit: Stand-by assistance;Additional time  Sit to Supine: Stand-by assistance;Additional time  Scooting: Stand-by assistance;Additional time  Balance  Sitting: Without support (seated at EOB ~45 min w/SBA)  Standing: With support (stood ~2 min for christos care w/min assist)  Transfer Training  Transfer Training: Yes  Overall Level of Assistance: Minimum assistance;Additional time;Assist X1  Interventions: Safety awareness training;Tactile cues;Verbal cues  Sit to Stand: Minimum assistance;Additional time;Adaptive equipment  Stand to Sit: Minimum assistance;Additional time;Adaptive equipment        ADL  Feeding: Independent;Increased time to complete  Grooming: Increased time to complete;Setup;Verbal cueing;Stand by assistance (wash face, brush teeth)  UE Bathing: Verbal cueing;Setup;Increased time to complete;Stand by assistance (assist to wash back)  LE Bathing: Increased time to complete;Verbal cueing;Setup;Minimal assistance (assist to wash feet)  UE Dressing: Increased time to complete;Verbal cueing;Stand by assistance;Setup (assist to doff/don gown)  LE Dressing: Verbal cueing;Increased time to complete;Maximum assistance (assist to doff/don footies)  Toileting: Stand by assistance;Setup;Increased time to complete (used urinal seated at EOB)  Functional Mobility: MURTAZA (d/t fatigue and SOB)  Additional Comments: Pt in bed upon arrival. Transferred to EOB to use

## 2024-04-30 NOTE — PLAN OF CARE
Problem: Chronic Conditions and Co-morbidities  Goal: Patient's chronic conditions and co-morbidity symptoms are monitored and maintained or improved  4/30/2024 0322 by Brianna Martínez RN  Outcome: Progressing  4/29/2024 1845 by Manisha Khan RN  Outcome: Progressing     Problem: Discharge Planning  Goal: Discharge to home or other facility with appropriate resources  4/30/2024 0322 by Brianna Martínez RN  Outcome: Progressing  4/29/2024 1845 by Manisha Khan RN  Outcome: Progressing     Problem: ABCDS Injury Assessment  Goal: Absence of physical injury  4/30/2024 0322 by Brianna Martínez RN  Outcome: Progressing  4/29/2024 1845 by Manisha Khan RN  Outcome: Progressing     Problem: Safety - Adult  Goal: Free from fall injury  4/30/2024 0322 by Brianna Martínez RN  Outcome: Progressing  4/29/2024 1845 by Manisha Khan RN  Outcome: Progressing

## 2024-04-30 NOTE — PROGRESS NOTES
Physical Therapy        Physical Therapy Cancel Note      DATE: 2024    NAME: Nik Shepherd  MRN: [id number]   : 1955      Patient not seen this date for Physical Therapy due to:    Patient Declined: Attempted treatment. Pt adamantly refused treatment for today. Pt educated on importance of getting up and moving. Pt continues to decline for today. Will continue to pursue as able.       Electronically signed by Kacey Powell PTA on 2024 at 2:58 PM

## 2024-04-30 NOTE — PROGRESS NOTES
[facility]  Internal Medicine Teaching Residency Program  Inpatient Daily Progress Note  ______________________________________________________________________________    Patient: Nik Shepherd  YOB: 1955   MRN:[id number]    Acct: [id number]     Room: 0348/0348-02  Admit date: 4/27/2024  Today's date: 04/30/24  Number of days in the hospital: 3    SUBJECTIVE   Admitting Diagnosis: NSTEMI (non-ST elevated myocardial infarction) (HCC)  CC: Chest pain  Pt examined at bedside. Chart & results reviewed.     No acute events overnight.  This morning, he is afebrile and hemodynamically stable saturating well on room air.  Patient refused left heart cath yesterday. Denies any chest pain this morning. Passed swallow study    Mg 1.9     Plan:  -replace magnesium  -Docusate  -Discharge home    Review of Systems   Constitutional:  Negative for chills.   Respiratory:  Negative for cough and shortness of breath.    Cardiovascular:  Negative for chest pain and palpitations.   Neurological:  Negative for dizziness and headaches.       BRIEF HISTORY     The patient is a pleasant 68 y.o. male with a past medical history of polysubstance drug use, alcohol dependence, smoking, CAD s/p CABG, HFmrEF, hypertension, BPH, presents with a chief complaint of non-radiating left-sided chest pain that began while trying to have a bowel movement. Associated with shortness of breath and cold chills. Given nitro and Asprin 324 by EMS with resolution of symptoms. Consumes cocaine and alcohol regularly stating that he drinks as much as he can whenever he can. Last drink, one week ago. Long-standing history of smoking. Reports medication noncompliance, per dispense report, last filled in 2022.      /108. HR 66. SpO2 98 on room air. Troponin 25-24-32. K 3.4 / Mg 1.5. EKG shows NSR with inferolateral ischemia. CXR unremarkable, no cardiomegaly present. Most recent echo on 3/8 showed  chloride      heparin (PORCINE) Infusion Stopped (04/29/24 1535)     PRN Medicationssodium chloride flush, 5-40 mL, PRN  sodium chloride, , PRN  acetaminophen, 650 mg, Q4H PRN  ondansetron, 4 mg, Q4H PRN   Or  ondansetron, 4 mg, Q6H PRN  heparin (porcine), 60 Units/kg, PRN  heparin (porcine), 30 Units/kg, PRN  nitroGLYCERIN, 0.4 mg, Q5 Min PRN  benzonatate, 100 mg, TID PRN        Diagnostic Labs:  CBC:   Recent Labs     04/27/24  1052 04/29/24  0828 04/30/24  0646   WBC 5.2 4.7 4.6   RBC 4.58 5.08 4.43   HGB 12.6* 13.7 12.1*   HCT 45.5 43.5 39.6*   MCV 99.3 85.6 89.4   RDW 14.6* 14.5* 14.3    206 246       BMP:   Recent Labs     04/27/24  1052 04/29/24  0828 04/30/24  0646    140 136   K 3.4* 4.2 4.3   * 107 105   CO2 18* 21 20   BUN 18 12 14   CREATININE 0.9 1.1 1.2       BNP: No results for input(s): \"BNP\" in the last 72 hours.  PT/INR:   Recent Labs     04/27/24  1858   PROTIME 15.3*   INR 1.2       APTT:   Recent Labs     04/27/24  1858   APTT 28.2       CARDIAC ENZYMES: No results for input(s): \"CKMB\", \"CKMBINDEX\", \"TROPONINI\" in the last 72 hours.    Invalid input(s): \"CKTOTAL;3\"  FASTING LIPID PANEL:  Lab Results   Component Value Date    CHOL 89 08/03/2022    HDL 48 08/03/2022    TRIG 30 08/03/2022     LIVER PROFILE: No results for input(s): \"AST\", \"ALT\", \"ALB\", \"BILIDIR\", \"BILITOT\", \"ALKPHOS\" in the last 72 hours.   MICROBIOLOGY:   Lab Results   Component Value Date/Time    CULTURE NORMAL RESPIRATORY KYLAH MODERATE GROWTH 03/22/2022 08:30 PM       Imaging:    XR ABDOMEN (KUB) (SINGLE AP VIEW)    Result Date: 4/27/2024  Nonobstructive bowel gas pattern. Mildly increase left colonic stool.     XR CHEST (2 VW)    Result Date: 4/27/2024  Area of consolidation in the lower medial right lower lobe which shows evidence of worsening compared to prior exams.  This may be related atelectasis or consolidation.  There is also new stranding at the right base likely related to airspace disease in the

## 2024-04-30 NOTE — CARE COORDINATION
Faxed a referral to [facility] for skilled nursing consideration.    [phone] Called Sim with [facility] and [facility]. Requested a call back concerning the referral sent for the patient.

## 2024-05-01 LAB
ANION GAP SERPL CALCULATED.3IONS-SCNC: 8 MMOL/L (ref 9–16)
BASOPHILS # BLD: 0.04 K/UL (ref 0–0.2)
BASOPHILS NFR BLD: 1 % (ref 0–2)
BUN SERPL-MCNC: 15 MG/DL (ref 8–23)
CALCIUM SERPL-MCNC: 8.5 MG/DL (ref 8.6–10.4)
CHLORIDE SERPL-SCNC: 110 MMOL/L (ref 98–107)
CO2 SERPL-SCNC: 22 MMOL/L (ref 20–31)
CREAT SERPL-MCNC: 1.2 MG/DL (ref 0.7–1.2)
EOSINOPHIL # BLD: 0.28 K/UL (ref 0–0.44)
EOSINOPHILS RELATIVE PERCENT: 5 % (ref 1–4)
ERYTHROCYTE [DISTWIDTH] IN BLOOD BY AUTOMATED COUNT: 14.6 % (ref 11.8–14.4)
GFR, ESTIMATED: 63 ML/MIN/1.73M2
GLUCOSE SERPL-MCNC: 87 MG/DL (ref 74–99)
HCT VFR BLD AUTO: 39.4 % (ref 40.7–50.3)
HGB BLD-MCNC: 12 G/DL (ref 13–17)
IMM GRANULOCYTES # BLD AUTO: <0.03 K/UL (ref 0–0.3)
IMM GRANULOCYTES NFR BLD: 0 %
LYMPHOCYTES NFR BLD: 1.97 K/UL (ref 1.1–3.7)
LYMPHOCYTES RELATIVE PERCENT: 38 % (ref 24–43)
MCH RBC QN AUTO: 27.3 PG (ref 25.2–33.5)
MCHC RBC AUTO-ENTMCNC: 30.5 G/DL (ref 28.4–34.8)
MCV RBC AUTO: 89.7 FL (ref 82.6–102.9)
MONOCYTES NFR BLD: 0.83 K/UL (ref 0.1–1.2)
MONOCYTES NFR BLD: 16 % (ref 3–12)
NEUTROPHILS NFR BLD: 40 % (ref 36–65)
NEUTS SEG NFR BLD: 2.04 K/UL (ref 1.5–8.1)
NRBC BLD-RTO: 0 PER 100 WBC
PLATELET # BLD AUTO: 246 K/UL (ref 138–453)
PMV BLD AUTO: 10.7 FL (ref 8.1–13.5)
POTASSIUM SERPL-SCNC: 4.4 MMOL/L (ref 3.7–5.3)
RBC # BLD AUTO: 4.39 M/UL (ref 4.21–5.77)
RBC # BLD: ABNORMAL 10*6/UL
SODIUM SERPL-SCNC: 140 MMOL/L (ref 136–145)
WBC OTHER # BLD: 5.2 K/UL (ref 3.5–11.3)

## 2024-05-01 PROCEDURE — 6370000000 HC RX 637 (ALT 250 FOR IP): Performed by: STUDENT IN AN ORGANIZED HEALTH CARE EDUCATION/TRAINING PROGRAM

## 2024-05-01 PROCEDURE — 99232 SBSQ HOSP IP/OBS MODERATE 35: CPT | Performed by: PHYSICAL MEDICINE & REHABILITATION

## 2024-05-01 PROCEDURE — 99232 SBSQ HOSP IP/OBS MODERATE 35: CPT | Performed by: INTERNAL MEDICINE

## 2024-05-01 PROCEDURE — 6360000002 HC RX W HCPCS

## 2024-05-01 PROCEDURE — 36415 COLL VENOUS BLD VENIPUNCTURE: CPT

## 2024-05-01 PROCEDURE — 85025 COMPLETE CBC W/AUTO DIFF WBC: CPT

## 2024-05-01 PROCEDURE — 2580000003 HC RX 258: Performed by: EMERGENCY MEDICINE

## 2024-05-01 PROCEDURE — 1200000000 HC SEMI PRIVATE

## 2024-05-01 PROCEDURE — 6370000000 HC RX 637 (ALT 250 FOR IP): Performed by: EMERGENCY MEDICINE

## 2024-05-01 PROCEDURE — 80048 BASIC METABOLIC PNL TOTAL CA: CPT

## 2024-05-01 PROCEDURE — 6370000000 HC RX 637 (ALT 250 FOR IP)

## 2024-05-01 RX ORDER — CARVEDILOL 3.12 MG/1
3.12 TABLET ORAL 2 TIMES DAILY WITH MEALS
Status: DISCONTINUED | OUTPATIENT
Start: 2024-05-01 | End: 2024-05-03 | Stop reason: HOSPADM

## 2024-05-01 RX ORDER — CARVEDILOL 3.12 MG/1
3.12 TABLET ORAL 2 TIMES DAILY WITH MEALS
Qty: 60 TABLET | Refills: 3 | Status: SHIPPED | OUTPATIENT
Start: 2024-05-01

## 2024-05-01 RX ADMIN — TAMSULOSIN HYDROCHLORIDE 0.4 MG: 0.4 CAPSULE ORAL at 08:51

## 2024-05-01 RX ADMIN — SENNOSIDES 8.6 MG: 8.6 TABLET, FILM COATED ORAL at 20:10

## 2024-05-01 RX ADMIN — STANDARDIZED SENNA CONCENTRATE AND DOCUSATE SODIUM 1 TABLET: 8.6; 5 TABLET ORAL at 08:51

## 2024-05-01 RX ADMIN — ATORVASTATIN CALCIUM 80 MG: 80 TABLET, FILM COATED ORAL at 20:10

## 2024-05-01 RX ADMIN — EMPAGLIFLOZIN 10 MG: 10 TABLET, FILM COATED ORAL at 08:51

## 2024-05-01 RX ADMIN — SACUBITRIL AND VALSARTAN 1 TABLET: 24; 26 TABLET, FILM COATED ORAL at 08:56

## 2024-05-01 RX ADMIN — SACUBITRIL AND VALSARTAN 1 TABLET: 24; 26 TABLET, FILM COATED ORAL at 20:10

## 2024-05-01 RX ADMIN — STANDARDIZED SENNA CONCENTRATE AND DOCUSATE SODIUM 1 TABLET: 8.6; 5 TABLET ORAL at 20:10

## 2024-05-01 RX ADMIN — PANTOPRAZOLE SODIUM 40 MG: 40 TABLET, DELAYED RELEASE ORAL at 08:51

## 2024-05-01 RX ADMIN — SPIRONOLACTONE 25 MG: 25 TABLET, FILM COATED ORAL at 08:51

## 2024-05-01 RX ADMIN — DOCUSATE SODIUM 100 MG: 100 CAPSULE, LIQUID FILLED ORAL at 08:51

## 2024-05-01 RX ADMIN — POLYETHYLENE GLYCOL 3350 17 G: 17 POWDER, FOR SOLUTION ORAL at 08:51

## 2024-05-01 RX ADMIN — SENNOSIDES 8.6 MG: 8.6 TABLET, FILM COATED ORAL at 08:51

## 2024-05-01 RX ADMIN — ENOXAPARIN SODIUM 40 MG: 100 INJECTION SUBCUTANEOUS at 08:51

## 2024-05-01 RX ADMIN — CARVEDILOL 3.12 MG: 3.12 TABLET, FILM COATED ORAL at 08:51

## 2024-05-01 RX ADMIN — SODIUM CHLORIDE, PRESERVATIVE FREE 10 ML: 5 INJECTION INTRAVENOUS at 20:10

## 2024-05-01 RX ADMIN — SODIUM CHLORIDE, PRESERVATIVE FREE 10 ML: 5 INJECTION INTRAVENOUS at 08:55

## 2024-05-01 RX ADMIN — ASPIRIN 81 MG: 81 TABLET, COATED ORAL at 08:51

## 2024-05-01 RX ADMIN — CLOPIDOGREL BISULFATE 75 MG: 75 TABLET ORAL at 08:51

## 2024-05-01 NOTE — CARE COORDINATION
Transitional planning: Phone call to Sim with [facility] and [facility] regarding referral status. They are declining due to drug and ETOH abuse.    1138 Phone call to Lee Ann with [facility]. They received face sheet for referral but need clinicals faxed as she does not yet have access to EPIC.    1145 Face sheet and all pertinent clinicals faxed to [facility].    1452 Phone call from Autumn with [facility]. They can accept patient there but not [facility]. They will start precert.

## 2024-05-01 NOTE — PROGRESS NOTES
Physical Medicine & Rehabilitation  Progress Note    5/1/2024 10:35 AM     CC: Ambulatory and ADL dysfunction due to chest pain, cardiomyopathy    68-year-old male with polysubstance use, alcohol dependence, smoker, cocaine, marijuana, coronary artery disease status post CABG, heart failure reduced ejection fraction, hypertension, BPH admitted with chest pain elevated troponin- refusing cath    Subjective:   No complaints today    ROS:  Denies fevers, chills, sweats.  No chest pain, palpitations, lightheadedness.  Denies coughing, wheezing or shortness of breath.  Denies abdominal pain, nausea, diarrhea or constipation.  No new areas of joint pain.  Denies new areas of numbness or weakness.  Denies new anxiety or depression issues.  No new skin problems.    Rehabilitation:   PT:    Bed mobility  Rolling to Right: Contact guard assistance  Supine to Sit: Contact guard assistance  Bed Mobility Comments: left at side of bed with OT    Transfers  Sit to Stand: Minimal Assistance  Stand to Sit: Minimal Assistance  Stand Pivot Transfers: Minimal Assistance  Comment: cues for hand placement and safety, up with rw    Ambulation  Surface: Level tile  Device: Rolling Walker  Assistance: Minimal assistance  Gait Deviations: Slow Gale, Shuffles  Distance: 10 ft, 15 ft  Comments: Pt with decreased safety awareness on use of rw, does not keep close, reports using rollator with brakes on in home  More Ambulation?: No       Patient not seen this date for Physical Therapy due to:     Patient Declined: Attempted treatment. Pt adamantly refused treatment for today. Pt educated on importance of getting up and moving. Pt continues to decline for today. Will continue to pursue as able.         Electronically signed by Kacey Powell PTA on 4/30/2024 at 2:58 PM          OT:    eeding: Independent;Increased time to complete  Grooming: Increased time to complete;Setup;Verbal cueing;Stand by assistance (wash face, brush teeth)  UE

## 2024-05-01 NOTE — PROGRESS NOTES
[facility]  Internal Medicine Teaching Residency Program  Inpatient Daily Progress Note  ______________________________________________________________________________    Patient: Nik Shepherd  YOB: 1955   MRN:[id number]    Acct: [id number]     Room: 0348/0348-02  Admit date: 4/27/2024  Today's date: 05/01/24  Number of days in the hospital: 4    SUBJECTIVE   Admitting Diagnosis: NSTEMI (non-ST elevated myocardial infarction) (HCC)  CC: Chest pain  Pt examined at bedside. Chart & results reviewed.     No acute events overnight.  This morning, he is afebrile and hemodynamically stable saturating well on room air.  Patient refused left heart cath. Denies any chest pain this morning. Passed swallow study    CBC and BMP unremarkable.    Patient has been medically discharged. Awaiting placement.    BRIEF HISTORY     The patient is a pleasant 68 y.o. male with a past medical history of polysubstance drug use, alcohol dependence, smoking, CAD s/p CABG, HFmrEF, hypertension, BPH, presents with a chief complaint of non-radiating left-sided chest pain that began while trying to have a bowel movement. Associated with shortness of breath and cold chills. Given nitro and Asprin 324 by EMS with resolution of symptoms. Consumes cocaine and alcohol regularly stating that he drinks as much as he can whenever he can. Last drink, one week ago. Long-standing history of smoking. Reports medication noncompliance, per dispense report, last filled in 2022.      /108. HR 66. SpO2 98 on room air. Troponin 25-24-32. K 3.4 / Mg 1.5. EKG shows NSR with inferolateral ischemia. CXR unremarkable, no cardiomegaly present. Most recent echo on 3/8 showed moderately reduced LVSF, EF 35-40%, RVSP 39. Presented with similar complaints to [place] and recommended heart cath, however, patient had refused. Initially, admitted to the observation unit and later transferred to medicine  Or  ondansetron, 4 mg, Q6H PRN  nitroGLYCERIN, 0.4 mg, Q5 Min PRN  benzonatate, 100 mg, TID PRN        Diagnostic Labs:  CBC:   Recent Labs     04/29/24  0828 04/30/24  0646 05/01/24  0609   WBC 4.7 4.6 5.2   RBC 5.08 4.43 4.39   HGB 13.7 12.1* 12.0*   HCT 43.5 39.6* 39.4*   MCV 85.6 89.4 89.7   RDW 14.5* 14.3 14.6*    246 246       BMP:   Recent Labs     04/29/24  0828 04/30/24  0646 05/01/24  0609    136 140   K 4.2 4.3 4.4    105 110*   CO2 21 20 22   BUN 12 14 15   CREATININE 1.1 1.2 1.2       BNP: No results for input(s): \"BNP\" in the last 72 hours.  PT/INR:   No results for input(s): \"PROTIME\", \"INR\" in the last 72 hours.    APTT:   No results for input(s): \"APTT\" in the last 72 hours.    CARDIAC ENZYMES: No results for input(s): \"CKMB\", \"CKMBINDEX\", \"TROPONINI\" in the last 72 hours.    Invalid input(s): \"CKTOTAL;3\"  FASTING LIPID PANEL:  Lab Results   Component Value Date    CHOL 89 08/03/2022    HDL 48 08/03/2022    TRIG 30 08/03/2022     LIVER PROFILE: No results for input(s): \"AST\", \"ALT\", \"BILIDIR\", \"BILITOT\", \"ALKPHOS\" in the last 72 hours.    Invalid input(s): \"ALB\"   MICROBIOLOGY:   Lab Results   Component Value Date/Time    CULTURE NORMAL RESPIRATORY KYLAH MODERATE GROWTH 03/22/2022 08:30 PM       Imaging:    XR ABDOMEN (KUB) (SINGLE AP VIEW)    Result Date: 4/27/2024  Nonobstructive bowel gas pattern. Mildly increase left colonic stool.     XR CHEST (2 VW)    Result Date: 4/27/2024  Area of consolidation in the lower medial right lower lobe which shows evidence of worsening compared to prior exams.  This may be related atelectasis or consolidation.  There is also new stranding at the right base likely related to airspace disease in the lower lobe.  Continued radiographic monitoring is advised.  If can addition worsens or does not improve would advise CT scanning of the chest. RECOMMENDATION: Continued radiographic monitoring as above.       ASSESSMENT & PLAN     ASSESSMENT / PLAN:

## 2024-05-01 NOTE — PLAN OF CARE
Problem: Chronic Conditions and Co-morbidities  Goal: Patient's chronic conditions and co-morbidity symptoms are monitored and maintained or improved  5/1/2024 0150 by Jessica Gudino RN  Outcome: Progressing  4/30/2024 1911 by Manisha Khan RN  Outcome: Progressing     Problem: Discharge Planning  Goal: Discharge to home or other facility with appropriate resources  5/1/2024 0150 by Jessica Gudino RN  Outcome: Progressing  4/30/2024 1911 by Manisha Khan RN  Outcome: Progressing     Problem: ABCDS Injury Assessment  Goal: Absence of physical injury  5/1/2024 0150 by Jessica Gudino RN  Outcome: Progressing  4/30/2024 1911 by Manisha Khan RN  Outcome: Progressing     Problem: Safety - Adult  Goal: Free from fall injury  5/1/2024 0150 by Jessica Gudino RN  Outcome: Progressing  4/30/2024 1911 by Manisha Khan RN  Outcome: Progressing     Problem: Cardiovascular - Adult  Goal: Maintains optimal cardiac output and hemodynamic stability  5/1/2024 0150 by Jessica Gudino RN  Outcome: Progressing  4/30/2024 1911 by Manisha Khan RN  Outcome: Progressing  Goal: Absence of cardiac dysrhythmias or at baseline  5/1/2024 0150 by Jessica Gudino RN  Outcome: Progressing  4/30/2024 1911 by Manisha Khan RN  Outcome: Progressing

## 2024-05-02 LAB
ANION GAP SERPL CALCULATED.3IONS-SCNC: 10 MMOL/L (ref 9–16)
BASOPHILS # BLD: 0.08 K/UL (ref 0–0.2)
BASOPHILS NFR BLD: 1 % (ref 0–2)
BUN SERPL-MCNC: 19 MG/DL (ref 8–23)
CALCIUM SERPL-MCNC: 8.7 MG/DL (ref 8.6–10.4)
CHLORIDE SERPL-SCNC: 109 MMOL/L (ref 98–107)
CO2 SERPL-SCNC: 20 MMOL/L (ref 20–31)
CREAT SERPL-MCNC: 1.2 MG/DL (ref 0.7–1.2)
EOSINOPHIL # BLD: 0.35 K/UL (ref 0–0.44)
EOSINOPHILS RELATIVE PERCENT: 6 % (ref 1–4)
ERYTHROCYTE [DISTWIDTH] IN BLOOD BY AUTOMATED COUNT: 14.6 % (ref 11.8–14.4)
GFR, ESTIMATED: 63 ML/MIN/1.73M2
GLUCOSE SERPL-MCNC: 91 MG/DL (ref 74–99)
HCT VFR BLD AUTO: 44.5 % (ref 40.7–50.3)
HGB BLD-MCNC: 13.7 G/DL (ref 13–17)
IMM GRANULOCYTES # BLD AUTO: <0.03 K/UL (ref 0–0.3)
IMM GRANULOCYTES NFR BLD: 0 %
LYMPHOCYTES NFR BLD: 2.16 K/UL (ref 1.1–3.7)
LYMPHOCYTES RELATIVE PERCENT: 40 % (ref 24–43)
MCH RBC QN AUTO: 27.4 PG (ref 25.2–33.5)
MCHC RBC AUTO-ENTMCNC: 30.8 G/DL (ref 28.4–34.8)
MCV RBC AUTO: 89 FL (ref 82.6–102.9)
MONOCYTES NFR BLD: 0.86 K/UL (ref 0.1–1.2)
MONOCYTES NFR BLD: 15 % (ref 3–12)
NEUTROPHILS NFR BLD: 38 % (ref 36–65)
NEUTS SEG NFR BLD: 2.13 K/UL (ref 1.5–8.1)
NRBC BLD-RTO: 0 PER 100 WBC
PLATELET # BLD AUTO: 261 K/UL (ref 138–453)
PMV BLD AUTO: 10.9 FL (ref 8.1–13.5)
POTASSIUM SERPL-SCNC: 4.5 MMOL/L (ref 3.7–5.3)
RBC # BLD AUTO: 5 M/UL (ref 4.21–5.77)
RBC # BLD: ABNORMAL 10*6/UL
SODIUM SERPL-SCNC: 139 MMOL/L (ref 136–145)
WBC OTHER # BLD: 5.6 K/UL (ref 3.5–11.3)

## 2024-05-02 PROCEDURE — 85025 COMPLETE CBC W/AUTO DIFF WBC: CPT

## 2024-05-02 PROCEDURE — 1200000000 HC SEMI PRIVATE

## 2024-05-02 PROCEDURE — 80048 BASIC METABOLIC PNL TOTAL CA: CPT

## 2024-05-02 PROCEDURE — 97530 THERAPEUTIC ACTIVITIES: CPT

## 2024-05-02 PROCEDURE — 6370000000 HC RX 637 (ALT 250 FOR IP): Performed by: EMERGENCY MEDICINE

## 2024-05-02 PROCEDURE — 6370000000 HC RX 637 (ALT 250 FOR IP): Performed by: STUDENT IN AN ORGANIZED HEALTH CARE EDUCATION/TRAINING PROGRAM

## 2024-05-02 PROCEDURE — 36415 COLL VENOUS BLD VENIPUNCTURE: CPT

## 2024-05-02 PROCEDURE — 99231 SBSQ HOSP IP/OBS SF/LOW 25: CPT | Performed by: INTERNAL MEDICINE

## 2024-05-02 PROCEDURE — 6370000000 HC RX 637 (ALT 250 FOR IP)

## 2024-05-02 PROCEDURE — 6360000002 HC RX W HCPCS

## 2024-05-02 PROCEDURE — 2580000003 HC RX 258: Performed by: EMERGENCY MEDICINE

## 2024-05-02 RX ADMIN — SODIUM CHLORIDE, PRESERVATIVE FREE 10 ML: 5 INJECTION INTRAVENOUS at 21:00

## 2024-05-02 RX ADMIN — SPIRONOLACTONE 25 MG: 25 TABLET, FILM COATED ORAL at 08:39

## 2024-05-02 RX ADMIN — EMPAGLIFLOZIN 10 MG: 10 TABLET, FILM COATED ORAL at 08:39

## 2024-05-02 RX ADMIN — ASPIRIN 81 MG: 81 TABLET, COATED ORAL at 08:39

## 2024-05-02 RX ADMIN — PANTOPRAZOLE SODIUM 40 MG: 40 TABLET, DELAYED RELEASE ORAL at 08:38

## 2024-05-02 RX ADMIN — SACUBITRIL AND VALSARTAN 1 TABLET: 24; 26 TABLET, FILM COATED ORAL at 21:05

## 2024-05-02 RX ADMIN — SACUBITRIL AND VALSARTAN 1 TABLET: 24; 26 TABLET, FILM COATED ORAL at 08:42

## 2024-05-02 RX ADMIN — SENNOSIDES 8.6 MG: 8.6 TABLET, FILM COATED ORAL at 08:39

## 2024-05-02 RX ADMIN — SODIUM CHLORIDE, PRESERVATIVE FREE 10 ML: 5 INJECTION INTRAVENOUS at 08:39

## 2024-05-02 RX ADMIN — ATORVASTATIN CALCIUM 80 MG: 80 TABLET, FILM COATED ORAL at 21:00

## 2024-05-02 RX ADMIN — DOCUSATE SODIUM 100 MG: 100 CAPSULE, LIQUID FILLED ORAL at 08:39

## 2024-05-02 RX ADMIN — TAMSULOSIN HYDROCHLORIDE 0.4 MG: 0.4 CAPSULE ORAL at 08:44

## 2024-05-02 RX ADMIN — CLOPIDOGREL BISULFATE 75 MG: 75 TABLET ORAL at 08:38

## 2024-05-02 RX ADMIN — POLYETHYLENE GLYCOL 3350 17 G: 17 POWDER, FOR SOLUTION ORAL at 08:41

## 2024-05-02 RX ADMIN — CARVEDILOL 3.12 MG: 3.12 TABLET, FILM COATED ORAL at 08:39

## 2024-05-02 RX ADMIN — STANDARDIZED SENNA CONCENTRATE AND DOCUSATE SODIUM 1 TABLET: 8.6; 5 TABLET ORAL at 08:42

## 2024-05-02 RX ADMIN — ENOXAPARIN SODIUM 40 MG: 100 INJECTION SUBCUTANEOUS at 08:39

## 2024-05-02 NOTE — PROGRESS NOTES
[facility]  Internal Medicine Teaching Residency Program  Inpatient Daily Progress Note  ______________________________________________________________________________    Patient: Nik Shepherd  YOB: 1955   MRN:[id number]    Acct: [id number]     Room: 0348/0348-02  Admit date: 4/27/2024  Today's date: 05/02/24  Number of days in the hospital: 5    SUBJECTIVE   Admitting Diagnosis: NSTEMI (non-ST elevated myocardial infarction) (HCC)  CC: Chest pain  Pt examined at bedside. Chart & results reviewed.     No acute events overnight.  This morning, he is afebrile and hemodynamically stable saturating well on room air.  Patient refused left heart cath. Denies any chest pain this morning. Passed swallow study    CBC and BMP unremarkable.    Patient has been medically discharged. Awaiting placement.    BRIEF HISTORY     The patient is a pleasant 68 y.o. male with a past medical history of polysubstance drug use, alcohol dependence, smoking, CAD s/p CABG, HFmrEF, hypertension, BPH, presents with a chief complaint of non-radiating left-sided chest pain that began while trying to have a bowel movement. Associated with shortness of breath and cold chills. Given nitro and Asprin 324 by EMS with resolution of symptoms. Consumes cocaine and alcohol regularly stating that he drinks as much as he can whenever he can. Last drink, one week ago. Long-standing history of smoking. Reports medication noncompliance, per dispense report, last filled in 2022.      /108. HR 66. SpO2 98 on room air. Troponin 25-24-32. K 3.4 / Mg 1.5. EKG shows NSR with inferolateral ischemia. CXR unremarkable, no cardiomegaly present. Most recent echo on 3/8 showed moderately reduced LVSF, EF 35-40%, RVSP 39. Presented with similar complaints to [place] and recommended heart cath, however, patient had refused. Initially, admitted to the observation unit and later transferred to medicine

## 2024-05-02 NOTE — PROGRESS NOTES
Physical Therapy  Facility/Department: [address] MED SURG  Daily Treatment Note    Name: Nik Shepherd  : 1955  MRN: [id number]  Date of Service: 2024    Discharge Recommendations:  Patient would benefit from continued therapy after discharge          Patient Diagnosis(es): The primary encounter diagnosis was Chest pain, unspecified type. A diagnosis of NSTEMI (non-ST elevated myocardial infarction) (HCC) was also pertinent to this visit.  Past Medical History:  has a past medical history of 3-vessel CAD, Alcohol abuse, Arthritis, Chronic kidney disease, Full dentures, Hypertension, Other emphysema (HCC), Pure hypercholesterolemia, Systolic CHF (HCC), and Wears glasses.  Past Surgical History:  has a past surgical history that includes Hip fracture surgery (Left); Appendectomy; pr egd transoral biopsy single/multiple (2017); and Coronary artery bypass graft (N/A, 3/21/2022).    Assessment   Body Structures, Functions, Activity Limitations Requiring Skilled Therapeutic Intervention: Decreased functional mobility ;Decreased strength;Decreased safe awareness;Decreased endurance;Decreased balance  Assessment: Pt ambulated 15ft x 2 with RW and Prasanth. Pt requires Prasanth for sit<>stands. Pt is unsafe to return to previous living siutation due to limited mobility and strength. Pt will benefit from continued therapy to improve deficits and progress function.  Therapy Prognosis: Good  Activity Tolerance  Activity Tolerance: Patient limited by fatigue;Patient limited by endurance;Patient limited by pain     Plan   Physical Therapy Plan  General Plan:  (5x/week)  Current Treatment Recommendations: Strengthening, Balance training, Functional mobility training, Transfer training, Endurance training, Neuromuscular re-education, Stair training, Gait training, Therapeutic activities, Safety education & training, Patient/Caregiver education & training  Safety Devices  Type of Devices: Patient at risk for falls, Left in bed,

## 2024-05-02 NOTE — PROGRESS NOTES
Pt personal clothes noted to be covered in feces, pt refusing to get cleaned up, remove soiled clothes stated\" I am wearing these home\"

## 2024-05-03 VITALS
HEART RATE: 69 BPM | RESPIRATION RATE: 18 BRPM | SYSTOLIC BLOOD PRESSURE: 145 MMHG | WEIGHT: 138.89 LBS | HEIGHT: 74 IN | OXYGEN SATURATION: 100 % | DIASTOLIC BLOOD PRESSURE: 61 MMHG | TEMPERATURE: 97.5 F | BODY MASS INDEX: 17.82 KG/M2

## 2024-05-03 PROCEDURE — 99231 SBSQ HOSP IP/OBS SF/LOW 25: CPT | Performed by: INTERNAL MEDICINE

## 2024-05-03 NOTE — PROGRESS NOTES
Occupational Therapy    [facility]  Occupational Therapy Not Seen Note    DATE: 5/3/2024    NAME: Nik Shepherd  MRN: [id number]   : 1955      Patient not seen this date for Occupational Therapy due to:    Declined OT services, Pt. Stated \"I am not doing anything until I go home\", Pt. Educated on importance of OOB activity. RN notified.    Electronically signed by JENNYFER Cartagena on 5/3/2024 at 8:11 AM

## 2024-05-03 NOTE — PROGRESS NOTES
[facility]  Internal Medicine Teaching Residency Program  Inpatient Daily Progress Note  ______________________________________________________________________________    Patient: Nik Shepherd  YOB: 1955   MRN:[id number]    Acct: [id number]     Room: 0348/0348-02  Admit date: 4/27/2024  Today's date: 05/03/24  Number of days in the hospital: 6    SUBJECTIVE   Admitting Diagnosis: NSTEMI (non-ST elevated myocardial infarction) (HCC)  CC: Chest pain  Pt examined at bedside. Chart & results reviewed.     No acute events overnight.  This morning, he is afebrile and hemodynamically stable saturating well on room air.  Patient refused left heart cath. Denies any chest pain this morning. Passed swallow study    Labs pending at the time of evaluation.    Patient has been medically discharged. Awaiting placement.    BRIEF HISTORY     The patient is a pleasant 68 y.o. male with a past medical history of polysubstance drug use, alcohol dependence, smoking, CAD s/p CABG, HFmrEF, hypertension, BPH, presents with a chief complaint of non-radiating left-sided chest pain that began while trying to have a bowel movement. Associated with shortness of breath and cold chills. Given nitro and Asprin 324 by EMS with resolution of symptoms. Consumes cocaine and alcohol regularly stating that he drinks as much as he can whenever he can. Last drink, one week ago. Long-standing history of smoking. Reports medication noncompliance, per dispense report, last filled in 2022.      /108. HR 66. SpO2 98 on room air. Troponin 25-24-32. K 3.4 / Mg 1.5. EKG shows NSR with inferolateral ischemia. CXR unremarkable, no cardiomegaly present. Most recent echo on 3/8 showed moderately reduced LVSF, EF 35-40%, RVSP 39. Presented with similar complaints to [place] and recommended heart cath, however, patient had refused. Initially, admitted to the observation unit and later transferred to  alcohol dependence, smoking, CAD s/p CABG, HFmrEF, hypertension, BPH, who presented with chest pain and found to have NSTEMI likely secondary coronary vasospasm from cocaine use. Patient admitted to inpatient status for cardiology evaluation and left heart cath.     NSTEMI   Troponin 25-24-32  EKG shows NSR with inferolateral ischemia. Likely secondary to cocaine-induced coronary vasospasm however cannot rule out type I  Heparin gtt has been stopped  Cardiology consulted.  Patient refusing C  Sublingual nitro prn     CAD s/p CABG  HFmrEF  No cardiomegaly on CXR   3/8 echo: moderately reduced LVSF, EF 35-40%, RVSP 39  Home meds: entresto 24-26, jardiance 10, aldactone 25, lipitor 80, aspirin, plavix  Noncompliant with medications. No cardiomegaly or s/s of fluid overload on exam  Home meds resumed. Lopressor held due to concern for cocaine use  Resumed Jardiance, Entresto, Aldactone, aspirin and plavix     Hypokalemia and hypomagnesemia, electrolytes replaced     Hypertension  Home meds: norvasc 10, lopressor 25 BID   Held currently.     Polysubstance drug use  Alcohol dependence   Smoking  Regular cocaine, THC, and alcohol use.   UDS positive for cocaine  Last drink one week ago, CIWA protocol not indicated   Nicotine patch     BPH  PSA   Flomax 0.4       DVT ppx : Enoxaparin  GI ppx: Protonix    PT/OT: On board  Discharge Planning / SW: Patient has been medically discharged. Awaiting placement.    Socorro Gomez MD  Internal Medicine Resident, PGY-1  [facility]; [place], [place]  5/3/2024, 10:35 AM

## 2024-05-03 NOTE — PROGRESS NOTES
Writer went in to pt room to give medications and assess pt. Pt refusing all medications and assessment at this time. Pt stating he just wants to go home and will refuse everything until he is discharged.

## 2024-05-03 NOTE — PROGRESS NOTES
CLINICAL PHARMACY NOTE: MEDS TO BEDS    Total # of Prescriptions Filled: 1   The following medications were delivered to the patient:  Entresto 24-26mg tabs    Additional Documentation:  Delivered to pt in room 348 on 5/3 at 1:41P. No copay. Carvedilol 3.125mg tabs were transferred to home pharmacy.

## 2024-05-03 NOTE — PROGRESS NOTES
Discharge instructions reviewed with pt. Pt verbalized understanding. All questions were answered. Pt discharged from unit with belongings via [facility].

## 2024-05-10 NOTE — PROGRESS NOTES
Physician Progress Note      PATIENT:               PERCY ROSEN  CSN #:                  [id number]  :                       1955  ADMIT DATE:       2024 10:40 AM  DISCH DATE:        5/3/2024 2:07 PM  RESPONDING  PROVIDER #:        OCTAVIA RIZO          QUERY TEXT:    Patient admitted with chest pain secondary to cocaine use. Documentation   reflects NSTEMI  If possible, please document if NSTEMI was:    The medical record reflects the following:  Risk Factors: Cocaine abuse, CMP, HTN CAD refusing cath and medication non   compliance.    Clinical Indicators: UDS positive cocaine. Initial EKG: Normal sinus rhythm   with sinus arrhythmia  Anterior infarct (cited on or before 02-AUG-2022) ST & T wave abnormality,   consider inferolateral ischemia     Progress note: NSTEMI  Troponin   EKG shows NSR with   inferolateral ischemia. Likely secondary to cocaine-induced coronary vasospasm   however cannot rule out type I    Cardio consult: Patient was seen last month by me and recommended cardiac cath   which he declined. He continues to use cocaine and has medical   non-compliance. He does not want to do cardiac cath. Counseled to take   medications and quit cocaine and alcohol abuse. Typical chest pain with   minimally elevated troponin    Treatment: Cardio consult. Pt refused cath. Counseled for cocaine cessation.    Thank-you,  Renay Aguilar RN, CDS  [email or web address]  Options provided:  -- NSTEMI ruled out after study  -- NSTEMI confirmed after study  -- Other - I will add my own diagnosis  -- Disagree - Not applicable / Not valid  -- Disagree - Clinically unable to determine / Unknown  -- Refer to Clinical Documentation Reviewer    PROVIDER RESPONSE TEXT:    NSTEMI in the setting of cocaine use however, unable to rule out type I given   significant cardiac history, which includes CABG and medication noncompliance.   Patient refused to cardiac cath again during this admission.    Query

## 2024-08-20 ENCOUNTER — HOSPITAL ENCOUNTER (INPATIENT)
Age: 69
LOS: 2 days | Discharge: NURSING, REHAB OR LONG-TERM CARE | DRG: 194 | End: 2024-08-22
Attending: EMERGENCY MEDICINE | Admitting: STUDENT IN AN ORGANIZED HEALTH CARE EDUCATION/TRAINING PROGRAM
Payer: COMMERCIAL

## 2024-08-20 ENCOUNTER — APPOINTMENT (OUTPATIENT)
Dept: GENERAL RADIOLOGY | Age: 69
DRG: 194 | End: 2024-08-20
Payer: COMMERCIAL

## 2024-08-20 ENCOUNTER — APPOINTMENT (OUTPATIENT)
Dept: CT IMAGING | Age: 69
DRG: 194 | End: 2024-08-20
Payer: COMMERCIAL

## 2024-08-20 DIAGNOSIS — R07.9 CHEST PAIN, UNSPECIFIED TYPE: Primary | ICD-10-CM

## 2024-08-20 DIAGNOSIS — R29.90 STROKE-LIKE SYMPTOM: ICD-10-CM

## 2024-08-20 PROBLEM — I50.23 ACUTE ON CHRONIC SYSTOLIC HEART FAILURE (HCC): Status: ACTIVE | Noted: 2024-08-20

## 2024-08-20 LAB
ANION GAP SERPL CALCULATED.3IONS-SCNC: 12 MMOL/L (ref 9–16)
BASOPHILS # BLD: 0.04 K/UL (ref 0–0.2)
BASOPHILS NFR BLD: 1 % (ref 0–2)
BUN SERPL-MCNC: 19 MG/DL (ref 8–23)
CALCIUM SERPL-MCNC: 9.3 MG/DL (ref 8.6–10.4)
CHLORIDE SERPL-SCNC: 110 MMOL/L (ref 98–107)
CO2 SERPL-SCNC: 25 MMOL/L (ref 20–31)
CREAT SERPL-MCNC: 1.3 MG/DL (ref 0.7–1.2)
EOSINOPHIL # BLD: 0.46 K/UL (ref 0–0.44)
EOSINOPHILS RELATIVE PERCENT: 7 % (ref 1–4)
ERYTHROCYTE [DISTWIDTH] IN BLOOD BY AUTOMATED COUNT: 14.1 % (ref 11.8–14.4)
GFR, ESTIMATED: 61 ML/MIN/1.73M2
GLUCOSE SERPL-MCNC: 90 MG/DL (ref 74–99)
HCT VFR BLD AUTO: 40.4 % (ref 40.7–50.3)
HGB BLD-MCNC: 12.1 G/DL (ref 13–17)
IMM GRANULOCYTES # BLD AUTO: <0.03 K/UL (ref 0–0.3)
IMM GRANULOCYTES NFR BLD: 0 %
LYMPHOCYTES NFR BLD: 2.1 K/UL (ref 1.1–3.7)
LYMPHOCYTES RELATIVE PERCENT: 34 % (ref 24–43)
MCH RBC QN AUTO: 27.2 PG (ref 25.2–33.5)
MCHC RBC AUTO-ENTMCNC: 30 G/DL (ref 28.4–34.8)
MCV RBC AUTO: 90.8 FL (ref 82.6–102.9)
MONOCYTES NFR BLD: 0.69 K/UL (ref 0.1–1.2)
MONOCYTES NFR BLD: 11 % (ref 3–12)
NEUTROPHILS NFR BLD: 47 % (ref 36–65)
NEUTS SEG NFR BLD: 2.91 K/UL (ref 1.5–8.1)
NRBC BLD-RTO: 0 PER 100 WBC
PLATELET # BLD AUTO: 257 K/UL (ref 138–453)
PMV BLD AUTO: 11.2 FL (ref 8.1–13.5)
POTASSIUM SERPL-SCNC: 3.8 MMOL/L (ref 3.7–5.3)
RBC # BLD AUTO: 4.45 M/UL (ref 4.21–5.77)
SODIUM SERPL-SCNC: 147 MMOL/L (ref 136–145)
TROPONIN I SERPL HS-MCNC: 19 NG/L (ref 0–22)
TROPONIN I SERPL HS-MCNC: 20 NG/L (ref 0–22)
WBC OTHER # BLD: 6.2 K/UL (ref 3.5–11.3)

## 2024-08-20 PROCEDURE — 80048 BASIC METABOLIC PNL TOTAL CA: CPT

## 2024-08-20 PROCEDURE — 84484 ASSAY OF TROPONIN QUANT: CPT

## 2024-08-20 PROCEDURE — 71045 X-RAY EXAM CHEST 1 VIEW: CPT

## 2024-08-20 PROCEDURE — 99285 EMERGENCY DEPT VISIT HI MDM: CPT

## 2024-08-20 PROCEDURE — 82550 ASSAY OF CK (CPK): CPT

## 2024-08-20 PROCEDURE — G0378 HOSPITAL OBSERVATION PER HR: HCPCS

## 2024-08-20 PROCEDURE — 6370000000 HC RX 637 (ALT 250 FOR IP)

## 2024-08-20 PROCEDURE — 2060000000 HC ICU INTERMEDIATE R&B

## 2024-08-20 PROCEDURE — 70450 CT HEAD/BRAIN W/O DYE: CPT

## 2024-08-20 PROCEDURE — 99223 1ST HOSP IP/OBS HIGH 75: CPT | Performed by: INTERNAL MEDICINE

## 2024-08-20 PROCEDURE — 93005 ELECTROCARDIOGRAM TRACING: CPT

## 2024-08-20 PROCEDURE — 85025 COMPLETE CBC W/AUTO DIFF WBC: CPT

## 2024-08-20 RX ORDER — ACETAMINOPHEN 650 MG/1
650 SUPPOSITORY RECTAL EVERY 6 HOURS PRN
Status: DISCONTINUED | OUTPATIENT
Start: 2024-08-20 | End: 2024-08-22 | Stop reason: HOSPADM

## 2024-08-20 RX ORDER — FUROSEMIDE 10 MG/ML
20 INJECTION INTRAMUSCULAR; INTRAVENOUS DAILY
Status: DISCONTINUED | OUTPATIENT
Start: 2024-08-20 | End: 2024-08-21

## 2024-08-20 RX ORDER — SODIUM CHLORIDE 0.9 % (FLUSH) 0.9 %
10 SYRINGE (ML) INJECTION PRN
Status: DISCONTINUED | OUTPATIENT
Start: 2024-08-20 | End: 2024-08-22 | Stop reason: HOSPADM

## 2024-08-20 RX ORDER — POTASSIUM CHLORIDE 7.45 MG/ML
10 INJECTION INTRAVENOUS PRN
Status: DISCONTINUED | OUTPATIENT
Start: 2024-08-20 | End: 2024-08-22 | Stop reason: HOSPADM

## 2024-08-20 RX ORDER — ACETAMINOPHEN 325 MG/1
650 TABLET ORAL EVERY 6 HOURS PRN
Status: DISCONTINUED | OUTPATIENT
Start: 2024-08-20 | End: 2024-08-22 | Stop reason: HOSPADM

## 2024-08-20 RX ORDER — ASPIRIN 81 MG/1
81 TABLET ORAL DAILY
Status: DISCONTINUED | OUTPATIENT
Start: 2024-08-21 | End: 2024-08-22 | Stop reason: HOSPADM

## 2024-08-20 RX ORDER — ONDANSETRON 2 MG/ML
4 INJECTION INTRAMUSCULAR; INTRAVENOUS EVERY 6 HOURS PRN
Status: DISCONTINUED | OUTPATIENT
Start: 2024-08-20 | End: 2024-08-22 | Stop reason: HOSPADM

## 2024-08-20 RX ORDER — PANTOPRAZOLE SODIUM 40 MG/1
40 TABLET, DELAYED RELEASE ORAL DAILY
Status: DISCONTINUED | OUTPATIENT
Start: 2024-08-21 | End: 2024-08-22 | Stop reason: HOSPADM

## 2024-08-20 RX ORDER — MAGNESIUM SULFATE 1 G/100ML
1000 INJECTION INTRAVENOUS PRN
Status: DISCONTINUED | OUTPATIENT
Start: 2024-08-20 | End: 2024-08-22 | Stop reason: HOSPADM

## 2024-08-20 RX ORDER — ONDANSETRON 4 MG/1
4 TABLET, ORALLY DISINTEGRATING ORAL EVERY 8 HOURS PRN
Status: DISCONTINUED | OUTPATIENT
Start: 2024-08-20 | End: 2024-08-22 | Stop reason: HOSPADM

## 2024-08-20 RX ORDER — SODIUM CHLORIDE 0.9 % (FLUSH) 0.9 %
5-40 SYRINGE (ML) INJECTION EVERY 12 HOURS SCHEDULED
Status: DISCONTINUED | OUTPATIENT
Start: 2024-08-20 | End: 2024-08-22 | Stop reason: HOSPADM

## 2024-08-20 RX ORDER — ASPIRIN 81 MG/1
324 TABLET, CHEWABLE ORAL ONCE
Status: COMPLETED | OUTPATIENT
Start: 2024-08-20 | End: 2024-08-20

## 2024-08-20 RX ORDER — POLYETHYLENE GLYCOL 3350 17 G/17G
17 POWDER, FOR SOLUTION ORAL DAILY PRN
Status: DISCONTINUED | OUTPATIENT
Start: 2024-08-20 | End: 2024-08-22 | Stop reason: HOSPADM

## 2024-08-20 RX ORDER — IPRATROPIUM BROMIDE AND ALBUTEROL SULFATE 2.5; .5 MG/3ML; MG/3ML
1 SOLUTION RESPIRATORY (INHALATION) EVERY 4 HOURS PRN
Status: DISCONTINUED | OUTPATIENT
Start: 2024-08-20 | End: 2024-08-22 | Stop reason: HOSPADM

## 2024-08-20 RX ORDER — CARVEDILOL 6.25 MG/1
3.12 TABLET ORAL 2 TIMES DAILY WITH MEALS
Status: DISCONTINUED | OUTPATIENT
Start: 2024-08-21 | End: 2024-08-22 | Stop reason: HOSPADM

## 2024-08-20 RX ORDER — SODIUM CHLORIDE 9 MG/ML
INJECTION, SOLUTION INTRAVENOUS PRN
Status: DISCONTINUED | OUTPATIENT
Start: 2024-08-20 | End: 2024-08-22 | Stop reason: HOSPADM

## 2024-08-20 RX ORDER — POTASSIUM CHLORIDE 1500 MG/1
40 TABLET, EXTENDED RELEASE ORAL PRN
Status: DISCONTINUED | OUTPATIENT
Start: 2024-08-20 | End: 2024-08-22 | Stop reason: HOSPADM

## 2024-08-20 RX ORDER — TAMSULOSIN HYDROCHLORIDE 0.4 MG/1
0.4 CAPSULE ORAL DAILY
Status: DISCONTINUED | OUTPATIENT
Start: 2024-08-21 | End: 2024-08-22 | Stop reason: HOSPADM

## 2024-08-20 RX ORDER — NITROGLYCERIN 0.4 MG/1
0.4 TABLET SUBLINGUAL EVERY 5 MIN PRN
Status: DISCONTINUED | OUTPATIENT
Start: 2024-08-20 | End: 2024-08-22 | Stop reason: HOSPADM

## 2024-08-20 RX ORDER — ATORVASTATIN CALCIUM 80 MG/1
80 TABLET, FILM COATED ORAL NIGHTLY
Status: DISCONTINUED | OUTPATIENT
Start: 2024-08-20 | End: 2024-08-22 | Stop reason: HOSPADM

## 2024-08-20 RX ORDER — CLOPIDOGREL BISULFATE 75 MG/1
75 TABLET ORAL DAILY
Status: DISCONTINUED | OUTPATIENT
Start: 2024-08-21 | End: 2024-08-22 | Stop reason: HOSPADM

## 2024-08-20 RX ORDER — ENOXAPARIN SODIUM 100 MG/ML
40 INJECTION SUBCUTANEOUS DAILY
Status: DISCONTINUED | OUTPATIENT
Start: 2024-08-21 | End: 2024-08-22 | Stop reason: HOSPADM

## 2024-08-20 RX ADMIN — ASPIRIN 324 MG: 81 TABLET, CHEWABLE ORAL at 17:39

## 2024-08-20 ASSESSMENT — PAIN - FUNCTIONAL ASSESSMENT: PAIN_FUNCTIONAL_ASSESSMENT: NONE - DENIES PAIN

## 2024-08-20 NOTE — ED PROVIDER NOTES
[facility]  FACULTY HANDOFF     7:47 PM EDT  Handoff taken on the following patient from prior Attending Physician:  Pt Name: Nik Shepherd  PCP:  Sly Reza MD    Attestation  I was available and discussed any additional care issues that arose and coordinated the management plans with the resident(s) caring for the patient during my duty period. Any areas of disagreement with resident's documentation of care or procedures are noted on the chart. I was personally present for the key portions of any/all procedures during my duty period. I have documented in the chart those procedures where I was not present during the key portions.         CHIEF COMPLAINT       Chief Complaint   Patient presents with    Fatigue         CURRENT MEDICATIONS     Previous Medications  Previous Medications    ASPIRIN 81 MG EC TABLET    Take 1 tablet by mouth daily    ATORVASTATIN (LIPITOR) 80 MG TABLET    Take 1 tablet by mouth nightly    CARVEDILOL (COREG) 3.125 MG TABLET    Take 1 tablet by mouth 2 times daily (with meals)    CLOPIDOGREL (PLAVIX) 75 MG TABLET    Take 1 tablet by mouth daily    EMPAGLIFLOZIN (JARDIANCE) 10 MG TABLET    Take 1 tablet by mouth daily    NITROGLYCERIN (NITROSTAT) 0.3 MG SL TABLET    Place 1 tablet under the tongue every 5 minutes as needed for Chest pain up to max of 3 total doses. If no relief after 1 dose, call 911.    PANTOPRAZOLE (PROTONIX) 40 MG TABLET    Take 1 tablet by mouth daily    SACUBITRIL-VALSARTAN (ENTRESTO) 24-26 MG PER TABLET    Take 1 tablet by mouth 2 times daily    SPIRONOLACTONE (ALDACTONE) 25 MG TABLET    Take 1 tablet by mouth daily    TAMSULOSIN (FLOMAX) 0.4 MG CAPSULE    Take 1 capsule by mouth daily       Encounter Medications  Orders Placed This Encounter   Medications    aspirin chewable tablet 324 mg       ALLERGIES     has No Known Allergies.      RECENT VITALS:   Temp: 98.5 °F (36.9 °C),  Pulse: 73, Respirations: 18, BP: 119/80    RADIOLOGY:   XR

## 2024-08-20 NOTE — ED NOTES
Pt arrives alert and oriented x4 via wheelchair from triage   Pt complains of fatigue at home   Family states they are unable to care for him at home and would like placement, patient is agreeable   Pt is very weak and cannot stand on his own   Pt is disheveld and has bed bugs   Pt placed on cardiac monitor, continuous pulse ox, and BP cuff.  Pt denies chest pain, sob or chest pain   RR even and unlabored.   NAD noted.   Whiteboard updated.  Will continue with plan of care.

## 2024-08-20 NOTE — ED PROVIDER NOTES
[facility]  Emergency Department Encounter  Emergency Medicine Resident     Pt Name:Nik Shepherd  MRN: [id number]  Birthdate 1955  Date of evaluation: 8/20/24  PCP:  Sly Reza MD  Note Started: 5:08 PM EDT      CHIEF COMPLAINT       Chief Complaint   Patient presents with    Fatigue       HISTORY OF PRESENT ILLNESS  (Location/Symptom, Timing/Onset, Context/Setting, Quality, Duration, Modifying Factors, Severity.)      Nik Shepherd is a 69 y.o. male who presents with family for concerns of the patient being unable to take care of himself. PMH significant for polysubstance abuse, alcohol dependence, cardiomyopathy EF 35-40%. Patient is a poor historian and does report some left sided chest pain. He is unsure when his pain started. The pain does not radiate anywhere. It is not associated with feer, chills, cough, shortness of breath, abdominal pain, nausea, vomiting, sweats. He was recently admitted for chest pain and was seen by cardiology who recommended a cardiac cath, but patient left AMA. He is accompanied by family who report that the patient has been unable to care for himself and is homeless.     PAST MEDICAL / SURGICAL / SOCIAL / FAMILY HISTORY      has a past medical history of 3-vessel CAD, Alcohol abuse, Arthritis, Chronic kidney disease, Full dentures, Hypertension, Other emphysema (HCC), Pure hypercholesterolemia, Systolic CHF (HCC), and Wears glasses.     has a past surgical history that includes Hip fracture surgery (Left); Appendectomy; pr egd transoral biopsy single/multiple (6/19/2017); and Coronary artery bypass graft (N/A, 3/21/2022).    Social History     Socioeconomic History    Marital status: Single     Spouse name: Not on file    Number of children: Not on file    Years of education: Not on file    Highest education level: Not on file   Occupational History    Not on file   Tobacco Use    Smoking status: Light Smoker     Types: Cigarettes    Smokeless tobacco: Never  / EMERGENCY DEPARTMENT COURSE / MDM     Medical Decision Making  Amount and/or Complexity of Data Reviewed  Labs: ordered.  Radiology: ordered.  ECG/medicine tests: ordered.        EKG  ***    All EKG's are interpreted by the Emergency Department Physician who either signs or Co-signs this chart in the absence of a cardiologist.    EMERGENCY DEPARTMENT COURSE:  ***         PROCEDURES:  ***    CONSULTS:  None    CRITICAL CARE:  There was significant risk of life threatening deterioration of patient's condition requiring my direct management. Critical care time *** minutes, excluding any documented procedures.    FINAL IMPRESSION      No diagnosis found.      DISPOSITION / PLAN     DISPOSITION    Condition at Disposition: Data Unavailable      PATIENT REFERRED TO:  No follow-up provider specified.    DISCHARGE MEDICATIONS:  New Prescriptions    No medications on file       Morro Su MD  Emergency Medicine Resident    (Please note that portions of thisnote were completed with a voice recognition program.  Efforts were made to edit the dictations but occasionally words are mis-transcribed.)
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
## 2024-08-20 NOTE — ED PROVIDER NOTES
[facility] ED     Emergency Department     Faculty Attestation        I performed a history and physical examination of the patient and discussed management with the resident. I reviewed the resident’s note and agree with the documented findings and plan of care. Any areas of disagreement are noted on the chart. I was personally present for the key portions of any procedures. I have documented in the chart those procedures where I was not present during the key portions. I have reviewed the emergency nurses triage note. I agree with the chief complaint, past medical history, past surgical history, allergies, medications, social and family history as documented unless otherwise noted below.    For mid-level providers such as nurse practitioners as well as physicians assistants:    I have personally seen and evaluated the patient.    I find the patient's history and physical exam are consistent with NP/PA documentation.  I agree with the care provided, treatment rendered, disposition, & follow-up plan.     Additional findings are as noted.    Vital Signs: There were no vitals taken for this visit.  PCP:  Sly Reza MD    Pertinent Comments:     Patient brought in by family members as he is having trouble taking care of himself.  He is admitted in May for an NSTEMI but left AMA.  Per family members his living situation is in disarray patient admits he has hard time taking his medications and take care of himself.  He is awake alert oriented with a GCS of 15 there is no focal neurological deficits.  His only complaint is mild nonradiating substernal chest pain obtain cardiac labs EKG chest x-ray social work consultation anticipate admission      EKG Interpretation    Interpreted by me    Rhythm: normal sinus   Rate: normal  Axis: normal  Ectopy: none  Conduction: normal  ST Segments: no acute change  T Waves: no acute change  Q Waves: none    Clinical  Impression: Nonspecific EKG        Eric Mccord MD    Attending Emergency Medicine Physician            Earnest Mccord MD  08/20/24 [id number]

## 2024-08-21 ENCOUNTER — APPOINTMENT (OUTPATIENT)
Dept: VASCULAR LAB | Age: 69
DRG: 194 | End: 2024-08-21
Attending: INTERNAL MEDICINE
Payer: COMMERCIAL

## 2024-08-21 ENCOUNTER — APPOINTMENT (OUTPATIENT)
Dept: MRI IMAGING | Age: 69
DRG: 194 | End: 2024-08-21
Payer: COMMERCIAL

## 2024-08-21 PROBLEM — Z86.73 CHRONIC ISCHEMIC RIGHT MCA STROKE: Status: ACTIVE | Noted: 2024-08-21

## 2024-08-21 PROBLEM — R53.1 LEFT-SIDED WEAKNESS: Status: ACTIVE | Noted: 2024-08-21

## 2024-08-21 LAB
25(OH)D3 SERPL-MCNC: 10.8 NG/ML (ref 30–100)
AMPHET UR QL SCN: NEGATIVE
ANION GAP SERPL CALCULATED.3IONS-SCNC: 10 MMOL/L (ref 9–16)
BARBITURATES UR QL SCN: NEGATIVE
BASOPHILS # BLD: 0.05 K/UL (ref 0–0.2)
BASOPHILS NFR BLD: 1 % (ref 0–2)
BENZODIAZ UR QL: NEGATIVE
BILIRUB UR QL STRIP: NEGATIVE
BUN SERPL-MCNC: 21 MG/DL (ref 8–23)
CALCIUM SERPL-MCNC: 8.9 MG/DL (ref 8.6–10.4)
CANNABINOIDS UR QL SCN: NEGATIVE
CHLORIDE SERPL-SCNC: 114 MMOL/L (ref 98–107)
CK SERPL-CCNC: 117 U/L (ref 39–308)
CLARITY UR: CLEAR
CO2 SERPL-SCNC: 23 MMOL/L (ref 20–31)
COCAINE UR QL SCN: NEGATIVE
COLOR UR: YELLOW
COMMENT: NORMAL
CREAT SERPL-MCNC: 1.2 MG/DL (ref 0.7–1.2)
ECHO BSA: 1.72 M2
EOSINOPHIL # BLD: 0.57 K/UL (ref 0–0.44)
EOSINOPHILS RELATIVE PERCENT: 9 % (ref 1–4)
ERYTHROCYTE [DISTWIDTH] IN BLOOD BY AUTOMATED COUNT: 14.3 % (ref 11.8–14.4)
EST. AVERAGE GLUCOSE BLD GHB EST-MCNC: 111 MG/DL
FENTANYL UR QL: NEGATIVE
FOLATE SERPL-MCNC: 8.5 NG/ML (ref 4.8–24.2)
GFR, ESTIMATED: 67 ML/MIN/1.73M2
GLUCOSE SERPL-MCNC: 78 MG/DL (ref 74–99)
GLUCOSE UR STRIP-MCNC: NEGATIVE MG/DL
HBA1C MFR BLD: 5.5 % (ref 4–6)
HCT VFR BLD AUTO: 39.9 % (ref 40.7–50.3)
HGB BLD-MCNC: 11.8 G/DL (ref 13–17)
HGB UR QL STRIP.AUTO: NEGATIVE
IMM GRANULOCYTES # BLD AUTO: <0.03 K/UL (ref 0–0.3)
IMM GRANULOCYTES NFR BLD: 0 %
INR PPP: 1.1
KETONES UR STRIP-MCNC: NEGATIVE MG/DL
LEUKOCYTE ESTERASE UR QL STRIP: NEGATIVE
LYMPHOCYTES NFR BLD: 2.64 K/UL (ref 1.1–3.7)
LYMPHOCYTES RELATIVE PERCENT: 42 % (ref 24–43)
MCH RBC QN AUTO: 27.1 PG (ref 25.2–33.5)
MCHC RBC AUTO-ENTMCNC: 29.6 G/DL (ref 28.4–34.8)
MCV RBC AUTO: 91.7 FL (ref 82.6–102.9)
METHADONE UR QL: NEGATIVE
MONOCYTES NFR BLD: 0.72 K/UL (ref 0.1–1.2)
MONOCYTES NFR BLD: 12 % (ref 3–12)
NEUTROPHILS NFR BLD: 36 % (ref 36–65)
NEUTS SEG NFR BLD: 2.24 K/UL (ref 1.5–8.1)
NITRITE UR QL STRIP: NEGATIVE
NRBC BLD-RTO: 0 PER 100 WBC
OPIATES UR QL SCN: NEGATIVE
OXYCODONE UR QL SCN: NEGATIVE
PCP UR QL SCN: NEGATIVE
PH UR STRIP: 5.5 [PH] (ref 5–8)
PLATELET # BLD AUTO: 232 K/UL (ref 138–453)
PMV BLD AUTO: 11.4 FL (ref 8.1–13.5)
POTASSIUM SERPL-SCNC: 4 MMOL/L (ref 3.7–5.3)
PROT UR STRIP-MCNC: NEGATIVE MG/DL
PROTHROMBIN TIME: 14.3 SEC (ref 11.7–14.9)
RBC # BLD AUTO: 4.35 M/UL (ref 4.21–5.77)
SODIUM SERPL-SCNC: 147 MMOL/L (ref 136–145)
SP GR UR STRIP: 1.01 (ref 1–1.03)
TEST INFORMATION: NORMAL
TSH SERPL DL<=0.05 MIU/L-ACNC: 3.45 UIU/ML (ref 0.27–4.2)
UROBILINOGEN UR STRIP-ACNC: NORMAL EU/DL (ref 0–1)
VAS LEFT CCA DIST EDV: 10.7 CM/S
VAS LEFT CCA DIST PSV: 55.5 CM/S
VAS LEFT CCA MID EDV: 22.4 CM/S
VAS LEFT CCA MID PSV: 85.7 CM/S
VAS LEFT CCA PROX EDV: 9 CM/S
VAS LEFT CCA PROX PSV: 67 CM/S
VAS LEFT ECA PSV: 53.1 CM/S
VAS LEFT ICA DIST EDV: 10.6 CM/S
VAS LEFT ICA DIST PSV: 24.6 CM/S
VAS LEFT ICA MID EDV: 10.8 CM/S
VAS LEFT ICA MID PSV: 29.2 CM/S
VAS LEFT ICA PROX EDV: 14.9 CM/S
VAS LEFT ICA PROX PSV: 71.4 CM/S
VAS LEFT VERTEBRAL EDV: 10.5 CM/S
VAS LEFT VERTEBRAL PSV: 46.5 CM/S
VAS RIGHT CCA DIST EDV: 12.7 CM/S
VAS RIGHT CCA DIST PSV: 76 CM/S
VAS RIGHT CCA MID EDV: 14.5 CM/S
VAS RIGHT CCA MID PSV: 80.8 CM/S
VAS RIGHT CCA PROX EDV: 10.2 CM/S
VAS RIGHT CCA PROX PSV: 67.6 CM/S
VAS RIGHT ECA EDV: 5.25 CM/S
VAS RIGHT ECA PSV: 57.1 CM/S
VAS RIGHT ICA DIST EDV: 9.3 CM/S
VAS RIGHT ICA DIST PSV: 21.3 CM/S
VAS RIGHT ICA MID EDV: 11.4 CM/S
VAS RIGHT ICA MID PSV: 33.1 CM/S
VAS RIGHT ICA PROX EDV: 8.5 CM/S
VAS RIGHT ICA PROX PSV: 35.1 CM/S
VAS RIGHT VERTEBRAL EDV: 8.48 CM/S
VAS RIGHT VERTEBRAL PSV: 38.2 CM/S
VIT B12 SERPL-MCNC: 405 PG/ML (ref 232–1245)
WBC OTHER # BLD: 6.2 K/UL (ref 3.5–11.3)

## 2024-08-21 PROCEDURE — 99223 1ST HOSP IP/OBS HIGH 75: CPT | Performed by: PSYCHIATRY & NEUROLOGY

## 2024-08-21 PROCEDURE — 70551 MRI BRAIN STEM W/O DYE: CPT

## 2024-08-21 PROCEDURE — 96376 TX/PRO/DX INJ SAME DRUG ADON: CPT

## 2024-08-21 PROCEDURE — 82607 VITAMIN B-12: CPT

## 2024-08-21 PROCEDURE — 80048 BASIC METABOLIC PNL TOTAL CA: CPT

## 2024-08-21 PROCEDURE — 96361 HYDRATE IV INFUSION ADD-ON: CPT

## 2024-08-21 PROCEDURE — 96374 THER/PROPH/DIAG INJ IV PUSH: CPT

## 2024-08-21 PROCEDURE — 92610 EVALUATE SWALLOWING FUNCTION: CPT

## 2024-08-21 PROCEDURE — 2580000003 HC RX 258: Performed by: INTERNAL MEDICINE

## 2024-08-21 PROCEDURE — 85610 PROTHROMBIN TIME: CPT

## 2024-08-21 PROCEDURE — 96372 THER/PROPH/DIAG INJ SC/IM: CPT

## 2024-08-21 PROCEDURE — 84425 ASSAY OF VITAMIN B-1: CPT

## 2024-08-21 PROCEDURE — 82306 VITAMIN D 25 HYDROXY: CPT

## 2024-08-21 PROCEDURE — 83036 HEMOGLOBIN GLYCOSYLATED A1C: CPT

## 2024-08-21 PROCEDURE — 81003 URINALYSIS AUTO W/O SCOPE: CPT

## 2024-08-21 PROCEDURE — 97535 SELF CARE MNGMENT TRAINING: CPT

## 2024-08-21 PROCEDURE — G0378 HOSPITAL OBSERVATION PER HR: HCPCS

## 2024-08-21 PROCEDURE — 80307 DRUG TEST PRSMV CHEM ANLYZR: CPT

## 2024-08-21 PROCEDURE — 85025 COMPLETE CBC W/AUTO DIFF WBC: CPT

## 2024-08-21 PROCEDURE — 6370000000 HC RX 637 (ALT 250 FOR IP): Performed by: STUDENT IN AN ORGANIZED HEALTH CARE EDUCATION/TRAINING PROGRAM

## 2024-08-21 PROCEDURE — 84443 ASSAY THYROID STIM HORMONE: CPT

## 2024-08-21 PROCEDURE — 99232 SBSQ HOSP IP/OBS MODERATE 35: CPT | Performed by: STUDENT IN AN ORGANIZED HEALTH CARE EDUCATION/TRAINING PROGRAM

## 2024-08-21 PROCEDURE — 6370000000 HC RX 637 (ALT 250 FOR IP): Performed by: INTERNAL MEDICINE

## 2024-08-21 PROCEDURE — 6360000002 HC RX W HCPCS: Performed by: INTERNAL MEDICINE

## 2024-08-21 PROCEDURE — 36415 COLL VENOUS BLD VENIPUNCTURE: CPT

## 2024-08-21 PROCEDURE — 2580000003 HC RX 258: Performed by: STUDENT IN AN ORGANIZED HEALTH CARE EDUCATION/TRAINING PROGRAM

## 2024-08-21 PROCEDURE — 97166 OT EVAL MOD COMPLEX 45 MIN: CPT

## 2024-08-21 PROCEDURE — 93880 EXTRACRANIAL BILAT STUDY: CPT

## 2024-08-21 PROCEDURE — 2060000000 HC ICU INTERMEDIATE R&B

## 2024-08-21 PROCEDURE — 97530 THERAPEUTIC ACTIVITIES: CPT

## 2024-08-21 PROCEDURE — 82746 ASSAY OF FOLIC ACID SERUM: CPT

## 2024-08-21 PROCEDURE — 93880 EXTRACRANIAL BILAT STUDY: CPT | Performed by: SURGERY

## 2024-08-21 RX ORDER — FOLIC ACID 1 MG/1
1 TABLET ORAL DAILY
Status: DISCONTINUED | OUTPATIENT
Start: 2024-08-21 | End: 2024-08-22 | Stop reason: HOSPADM

## 2024-08-21 RX ORDER — LANOLIN ALCOHOL/MO/W.PET/CERES
100 CREAM (GRAM) TOPICAL DAILY
Qty: 30 TABLET | Refills: 3 | Status: SHIPPED | OUTPATIENT
Start: 2024-08-21

## 2024-08-21 RX ORDER — MULTIVITAMIN WITH IRON
1 TABLET ORAL DAILY
Status: DISCONTINUED | OUTPATIENT
Start: 2024-08-21 | End: 2024-08-22 | Stop reason: HOSPADM

## 2024-08-21 RX ORDER — MIDODRINE HYDROCHLORIDE 5 MG/1
5 TABLET ORAL
Qty: 90 TABLET | Refills: 3 | Status: SHIPPED | OUTPATIENT
Start: 2024-08-21

## 2024-08-21 RX ORDER — 0.9 % SODIUM CHLORIDE 0.9 %
500 INTRAVENOUS SOLUTION INTRAVENOUS ONCE
Status: COMPLETED | OUTPATIENT
Start: 2024-08-21 | End: 2024-08-21

## 2024-08-21 RX ORDER — LANOLIN ALCOHOL/MO/W.PET/CERES
100 CREAM (GRAM) TOPICAL DAILY
Status: DISCONTINUED | OUTPATIENT
Start: 2024-08-21 | End: 2024-08-22 | Stop reason: HOSPADM

## 2024-08-21 RX ORDER — FOLIC ACID 1 MG/1
1 TABLET ORAL DAILY
Qty: 30 TABLET | Refills: 3 | Status: SHIPPED | OUTPATIENT
Start: 2024-08-21

## 2024-08-21 RX ORDER — MULTIVITAMIN WITH FOLIC ACID 400 MCG
1 TABLET ORAL DAILY
Qty: 30 TABLET | Refills: 0 | Status: SHIPPED | OUTPATIENT
Start: 2024-08-21 | End: 2024-09-20

## 2024-08-21 RX ADMIN — ALCOHOL 1 TABLET: 70.47 GEL TOPICAL at 16:36

## 2024-08-21 RX ADMIN — TAMSULOSIN HYDROCHLORIDE 0.4 MG: 0.4 CAPSULE ORAL at 09:49

## 2024-08-21 RX ADMIN — ENOXAPARIN SODIUM 40 MG: 100 INJECTION SUBCUTANEOUS at 09:50

## 2024-08-21 RX ADMIN — SODIUM CHLORIDE 500 ML: 9 INJECTION, SOLUTION INTRAVENOUS at 16:39

## 2024-08-21 RX ADMIN — FUROSEMIDE 20 MG: 10 INJECTION, SOLUTION INTRAMUSCULAR; INTRAVENOUS at 01:18

## 2024-08-21 RX ADMIN — ATORVASTATIN CALCIUM 80 MG: 80 TABLET, FILM COATED ORAL at 20:29

## 2024-08-21 RX ADMIN — CARVEDILOL 3.12 MG: 6.25 TABLET, FILM COATED ORAL at 09:49

## 2024-08-21 RX ADMIN — FUROSEMIDE 20 MG: 10 INJECTION, SOLUTION INTRAMUSCULAR; INTRAVENOUS at 09:50

## 2024-08-21 RX ADMIN — ASPIRIN 81 MG: 81 TABLET, COATED ORAL at 09:50

## 2024-08-21 RX ADMIN — Medication 100 MG: at 16:36

## 2024-08-21 RX ADMIN — PANTOPRAZOLE SODIUM 40 MG: 40 TABLET, DELAYED RELEASE ORAL at 09:50

## 2024-08-21 RX ADMIN — SODIUM CHLORIDE, PRESERVATIVE FREE 10 ML: 5 INJECTION INTRAVENOUS at 20:29

## 2024-08-21 RX ADMIN — CLOPIDOGREL BISULFATE 75 MG: 75 TABLET ORAL at 09:49

## 2024-08-21 RX ADMIN — SODIUM CHLORIDE, PRESERVATIVE FREE 10 ML: 5 INJECTION INTRAVENOUS at 09:50

## 2024-08-21 RX ADMIN — FOLIC ACID 1 MG: 1 TABLET ORAL at 16:36

## 2024-08-21 RX ADMIN — SODIUM CHLORIDE, PRESERVATIVE FREE 10 ML: 5 INJECTION INTRAVENOUS at 02:04

## 2024-08-21 ASSESSMENT — ENCOUNTER SYMPTOMS
STRIDOR: 0
COUGH: 0
BACK PAIN: 0
CHEST TIGHTNESS: 1

## 2024-08-21 NOTE — DISCHARGE INSTR - COC
Continuity of Care Form    Patient Name: Nik Shepherd   :  1955  MRN:  [id number]    Admit date:  2024  Discharge date:  2024    Code Status Order: Full Code   Advance Directives:   Advance Care Flowsheet Documentation             Admitting Physician:  Uri Villanueva MD  PCP: Sly Reza MD    Discharging Nurse: Johnny Deutsch  Discharging Hospital Unit/Room#: 0423/0423-01  Discharging Unit Phone Number: [phone]    Emergency Contact:   Extended Emergency Contact Information  Primary Emergency Contact: Rosi Shepherd  Home Phone: [phone]  Mobile Phone: [phone]  Relation: Other Relative  Secondary Emergency Contact: Shira Zuñiga  Home Phone: [phone]  Mobile Phone: [phone]  Relation: Child   needed? No    Past Surgical History:  Past Surgical History:   Procedure Laterality Date    APPENDECTOMY      CORONARY ARTERY BYPASS GRAFT N/A 3/21/2022    CABG X 2 , EVH RT LEG , MELI performed by Carlos Humphrey MD at [facility] CVOR    HIP FRACTURE SURGERY Left     W/ HARDWARE    MT EGD TRANSORAL BIOPSY SINGLE/MULTIPLE  2017    EGD BIOPSY performed by Cory Couch DO at [facility] Endoscopy       Immunization History:   Immunization History   Administered Date(s) Administered    COVID-19, J&J, (age 18y+), IM, 0.5 mL 2021    Influenza, AFLURIA (age 3 y+), FLUZONE, (age 6 mo+), Quadv MDV, 0.5mL 2016, 2017    Pneumococcal, PPSV23, PNEUMOVAX 23, (age 2y+), SC/IM, 0.5mL 2014    TDaP, ADACEL (age 10y-64y), BOOSTRIX (age 10y+), IM, 0.5mL 2016       Active Problems:  Patient Active Problem List   Diagnosis Code    CKD (chronic kidney disease) stage 3, GFR 30-59 ml/min ([facility]) N18.30    Primary hypertension I10    Arthritis of right hip M16.11    Cocaine abuse ([facility]) F14.10    Chronic systolic congestive heart failure (HCC) I50.22    Benign prostatic hyperplasia with urinary obstruction N40.1, N13.8    Pure hypercholesterolemia E78.00    Abdominal pain  R10.9    Chest pain R07.9    Acute on chronic combined systolic and diastolic congestive heart failure ([facility]) I50.43    S/P cardiac catheterization Z98.890    Atherosclerotic heart disease I25.10    Acute renal failure with tubular necrosis ([facility]) N17.0    Problem related to discharge planning Z75.8    Acute respiratory failure with hypoxia ([facility]) J96.01    ACS (acute coronary syndrome) ([facility]) I24.9    Cocaine use F14.90    Encounter for palliative care Z51.5    NSTEMI (non-ST elevated myocardial infarction) ([facility]) I21.4    Troponin level elevated R79.89    Hyperlipidemia E78.5    Noncompliance with medication regimen Z91.148    Acute on chronic systolic heart failure ([facility]) I50.23       Isolation/Infection:   Isolation            No Isolation          Patient Infection Status       None to display                     Nurse Assessment:  Last Vital Signs: /89   Pulse 72   Temp 98 °F (36.7 °C) (Oral)   Resp 17   Ht 1.829 m (6' 0.01\")   Wt 58.5 kg (129 lb)   SpO2 96%   BMI 17.49 kg/m²     Last documented pain score (0-10 scale): Pain Level:  (no)  Last Weight:   Wt Readings from Last 1 Encounters:   08/20/24 58.5 kg (129 lb)     Mental Status:  disoriented, oriented, and alert    IV Access:  - None    Nursing Mobility/ADLs:  Walking   Dependent  Transfer  Assisted  Bathing  Assisted  Dressing  Assisted  Toileting  Assisted  Feeding  Independent  Med Admin  Independent  Med Delivery   whole    Wound Care Documentation and Therapy:  Incision 03/21/22 Pretibial Proximal;Right (Active)   Number of days: 884       Incision 03/21/22 Sternum (Active)   Number of days: 884        Elimination:  Continence:   Bowel: No  Bladder: No  Urinary Catheter: None   Colostomy/Ileostomy/Ileal Conduit: No       Date of Last BM: 8/21    Intake/Output Summary (Last 24 hours) at 8/21/2024 1607  Last data filed at 8/21/2024 0807  Gross per 24 hour   Intake --   Output 2150 ml   Net -2150 ml     I/O last 3 completed shifts:  In: -   Out:  1250 [Urine:1250]    Safety Concerns:     None    Impairments/Disabilities:      None    Nutrition Therapy:  Current Nutrition Therapy:   - Oral Diet:  Dysphagia - Soft and Bite Sized    Routes of Feeding: Oral  Liquids: Thin Liquids  Daily Fluid Restriction: no  Last Modified Barium Swallow with Video (Video Swallowing Test): done on 8/21    Treatments at the Time of Hospital Discharge:   Respiratory Treatments: N/A  Oxygen Therapy:  is not on home oxygen therapy.  Ventilator:    - No ventilator support    Rehab Therapies: Physical Therapy and Occupational Therapy  Weight Bearing Status/Restrictions: No weight bearing restrictions  Other Medical Equipment (for information only, NOT a DME order):  wheelchair  Other Treatments: N/A    Patient's personal belongings (please select all that are sent with patient):  In personal belongings bag    RN SIGNATURE:  Electronically signed by ARMIDA LARRY RN on 8/22/24 at 10:36 AM EDT    CASE MANAGEMENT/SOCIAL WORK SECTION    Inpatient Status Date: ***    Readmission Risk Assessment Score:  Readmission Risk              Risk of Unplanned Readmission:  21           Discharging to Facility/ Agency   Name: [facility]  Address:  Phone:  Fax:    Dialysis Facility (if applicable)   Name:  Address:  Dialysis Schedule:  Phone:  Fax:    / signature: Electronically signed by Lorin Stout RN on 8/22/24 at 11:35 AM EDT    PHYSICIAN SECTION    Prognosis: Guarded    Condition at Discharge: Stable    Rehab Potential (if transferring to Rehab): Fair    Recommended Labs or Other Treatments After Discharge: Physical therapy, Occupational Therapy, speech therapy, nursing evaluation and treatment, follow-up heart failure clinic, Entresto held due to hypotension.  Requiring midodrine intermittently to increase blood pressure.    Physician Certification: I certify the above information and transfer of Nik Shepherd  is necessary for the continuing treatment

## 2024-08-21 NOTE — ED NOTES
Unknown (11/10/2020)    Received from [facility]    Overall Financial Resource Strain (CARDIA)     Difficulty of Paying Living Expenses: Patient declined   Food Insecurity: Food Insecurity Present (3/7/2024)    Hunger Vital Sign     Worried About Running Out of Food in the Last Year: Never true     Ran Out of Food in the Last Year: Sometimes true   Transportation Needs: No Transportation Needs (3/7/2024)    PRAPARE - Transportation     Lack of Transportation (Medical): No     Lack of Transportation (Non-Medical): No   Physical Activity: Unknown (11/10/2020)    Received from [facility]    Exercise Vital Sign     Days of Exercise per Week: Patient declined     Minutes of Exercise per Session: Patient declined   Stress: Unknown (11/10/2020)    Received from [facility]    [employer] [place] [employer] - Occupational Stress Questionnaire     Feeling of Stress : Patient declined   Social Connections: Unknown (11/10/2020)    Received from [facility]    Social Connection and Isolation Panel [NHANES]     Frequency of Communication with Friends and Family: Patient declined     Frequency of Social Gatherings with Friends and Family: Patient declined     Attends Latter-day Services: Patient declined     Active Member of Clubs or Organizations: Patient declined     Attends Club or Organization Meetings: Patient declined     Marital Status: Patient declined    [facility] from [facility]    [place] Safety & Environment   Housing Stability: Low Risk  (3/7/2024)    Housing Stability Vital Sign     Unable to Pay for Housing in the Last Year: No     Number of Places Lived in the Last Year: 1     Unstable Housing in the Last Year: No       FAMILY HISTORY       Family History   Problem Relation Age of Onset    Diabetes Mother     Cancer Mother        ALLERGIES     Patient has no known  Hematocrit 40.4 (*)     Eosinophils % 7 (*)     Eosinophils Absolute 0.46 (*)     All other components within normal limits   BASIC METABOLIC PANEL - Abnormal; Notable for the following components:    Sodium 147 (*)     Chloride 110 (*)     Creatinine 1.3 (*)     All other components within normal limits   TROPONIN   TROPONIN       Electronically signed by Pebbles Abad RN on 8/20/2024 at 8:50 PM

## 2024-08-21 NOTE — H&P
[facility]  Office: [phone]  Brice Humphrey DO, Yeison Melton DO, Stanford Shanks DO, Malik Eric DO, Hermes Koenig MD, Vicki Fowler MD, Cristy Lui MD, Keri Mann MD,  Yaw Eaton MD, Gabby Ludwig MD, Uri Villanueva MD,  Jacinto Haines DO, Luis Carlos Garcia MD, Cecilio Salinas MD, Avni Humphrey DO, Magi Tracy MD,  Hiren Rebollar DO, Leidy Huff MD, Annamaria Mayorga MD, Cathie Martell MD, Marquita Trevizo MD,  Orlando Bell MD, Brianne Liu MD, Deondre Nagy MD, Melonie Sosa MD, Romero Long MD, Benjamin Morrow MD, Wesley Pond DO, Toby Clark DO, Gertrude Lane MD,  Abdi Rendon MD, Shirley Waterhouse, CNP,  Jemma Puckett CNP, Carlos Fish, CNP,  Liza Fu, SYBIL, Brianna Patel, CNP, Yojana Matta, CNP, Porsche Hollingsworth CNP, Pauline Denson, CNP, Bee Marroquin, PA-C, Inez Akers PA-C, Nicole Dolan, CNP, Oneal Trevizo, CNP, Lety Delacruz, CNP, Hedy Chaudhari, CNP, Breanna Barron, CNS, Renay Orozco, CNP, Rae Abdul CNP, Tracy Schwab, CNP         [facility]   IN-PATIENT SERVICE   [facility]    HISTORY AND PHYSICAL EXAMINATION            Date:   8/20/2024  Patient name:  Nik Shepherd  Date of admission:  8/20/2024  4:52 PM  MRN:   [id number]  Account:  [id number]  YOB: 1955  PCP:    Sly Reza MD  Room:   [facility]  Code Status:    Prior    Chief Complaint:     Chief Complaint   Patient presents with    Fatigue   \"I do not know why, but I'm leavin whenever I can\"    History Obtained From:     patient    History of Present Illness:     Nik Shepherd is a 69 y.o. male with polysubstance abuse w/ chronic cocaine use, chronic alcohol dependence, COPD, CMP (echo 03/2024 EF 35-40%) w/ suspected CAD (recently evaluated in April, recommended for cardiac cath but patient refused) who was brought in by family with concerns for Fatigue   and is admitted to the hospital for the management of Acute on chronic

## 2024-08-21 NOTE — PROGRESS NOTES
SLP ALL NOTES  Facility/Department: [address] STEPDOWN   CLINICAL BEDSIDE SWALLOW EVALUATION    NAME: Nik Shepherd  : 1955  MRN: [id number]    ADMISSION DATE: 2024  ADMITTING DIAGNOSIS: has CKD (chronic kidney disease) stage 3, GFR 30-59 ml/min (HCC); Primary hypertension; Arthritis of right hip; Cocaine abuse (HCC); Chronic systolic congestive heart failure (HCC); Benign prostatic hyperplasia with urinary obstruction; Pure hypercholesterolemia; Abdominal pain; Chest pain; Acute on chronic combined systolic and diastolic congestive heart failure (HCC); S/P cardiac catheterization; Atherosclerotic heart disease; Acute renal failure with tubular necrosis (HCC); Problem related to discharge planning; Acute respiratory failure with hypoxia (HCC); ACS (acute coronary syndrome) (HCC); Cocaine use; Encounter for palliative care; NSTEMI (non-ST elevated myocardial infarction) (HCC); Troponin level elevated; Hyperlipidemia; Noncompliance with medication regimen; and Acute on chronic systolic heart failure (HCC) on their problem list.      Date of Eval: 2024  Evaluating Therapist: GARRY Jameson    Current Diet level:  Current Diet : NPO  Current Liquid Diet : NPO    Primary Complaint: Nik Shepherd is a 69 y.o. male with polysubstance abuse w/ chronic cocaine use, chronic alcohol dependence, COPD, CMP (echo 2024 EF 35-40%) w/ suspected CAD (recently evaluated in April, recommended for cardiac cath but patient refused) who was brought in by family with concerns for Fatigue   and is admitted to the hospital for the management of Acute on chronic systolic heart failure (HCC).     Review of systems limited as patient is overall poor historian.  Patient initially denies any concerns that require hospitalization, denies any recent falls or injuries, denies any weakness or recent issues at home.  He initially denied any chest pain but with redirection admits to prior episode of chest pain prior to arrival

## 2024-08-21 NOTE — CONSULTS
Comprehensive Nutrition Assessment    Type and Reason for Visit:  Initial, Consult    Nutrition Recommendations/Plan:   Continue current PO diet  High protein/calorie supplement 3x/d  Consider 100mg oral thiamine +1 mg Folic Acid daily for hx EtOH abuse   Please encourage PO intake, document % consumed in I/O     Malnutrition Assessment:  Malnutrition Status:  Mild malnutrition (08/21/24 1511)    Context:  Social/Environmental Circumstances     Findings of the 6 clinical characteristics of malnutrition:  Energy Intake:  Unable to assess  Weight Loss:  Greater than 10% over 6 months     Body Fat Loss:  Unable to assess     Muscle Mass Loss:  Unable to assess    Fluid Accumulation:  No significant fluid accumulation     Strength:  Not Performed    Nutrition Assessment:    69 y.o. male admitted for AE CHF. Per H&P, pt with poor living conditions and family seeking ECF. PMH significant for drug and alcohol abuse. Noted possible wt loss of 10-15# x3-4 months pta per EMR wt hx. Limited interview with pt as he had to leave for MRI. Pt reports stable wt and eating \"what I want when I want\" pta. ?veracity of history as pt is A&O x2. SLP evaled today and initiated SBS diet, no intakes yet to assess. Also noted ONS initiated today, agree considering pt hx. Will monitor intakes and need for further intervention. LBM pta. No edema noted      Nutrition Related Findings:    Labs: Na 147mg/dL. UDS negative at admit.  Meds: Plavix, lasix, PPI, flomax. Wound Type: None           Current Nutrition Intake & Therapies:    Average Meal Intake: NPO  Average Supplements Intake: NPO  ADULT DIET; Dysphagia - Soft and Bite Sized  ADULT ORAL NUTRITION SUPPLEMENT; Breakfast, Lunch, Dinner; Standard High Calorie/High Protein Oral Supplement    Anthropometric Measures:  Height: 182.9 cm (6' 0.01\")  Ideal Body Weight (IBW): 178 lbs (81 kg)       Current Body Weight: 58.5 kg (128 lb 15.5 oz), 72.5 % IBW. Weight Source: Not Specified  Current

## 2024-08-21 NOTE — CARE COORDINATION
Received voicemail from Aline at [facility], they can accept patient and do not need precert.  CHEIKH Ramires CM notified

## 2024-08-21 NOTE — PROGRESS NOTES
Occupational Therapy  Facility/Department: [address] STEPDOWN   Occupational Therapy Initial Evaluation    Patient Name: Nik Shepherd        MRN: [id number]    : 1955    Date of Service: 2024    Chief Complaint   Patient presents with    Fatigue     Discharge Recommendations  Discharge Recommendations: Patient would benefit from continued therapy after discharge    OT Equipment Recommendations  Equipment Needed: No    Assessment  Performance deficits / Impairments: Decreased functional mobility ;Decreased ADL status;Decreased endurance;Decreased balance;Decreased high-level IADLs  Assessment: Pt independent in ADL/IADLs prior to admission. pt would continue to benefit from OT services to address deficits listed above and improve overall functional performance prior to discharge.  Prognosis: Good  Decision Making: Medium Complexity  REQUIRES OT FOLLOW-UP: Yes  Activity Tolerance  Activity Tolerance: Patient Tolerated treatment well;Patient limited by fatigue  Safety Devices  Type of Devices: Bed alarm in place;Call light within reach;Gait belt;Patient at risk for falls;Left in bed;Nurse notified  Restraints  Restraints Initially in Place: No    Restrictions/Precautions  Restrictions/Precautions  Restrictions/Precautions: Fall Risk  Required Braces or Orthoses?: No  Position Activity Restriction  Other position/activity restrictions: up with assist    Subjective  General  Patient assessed for rehabilitation services?: Yes  Family / Caregiver Present: No  General Comment  Comments: RN ok'd OT eval this date. Pt pleasant, cooperative, and agreeable to therapy throughout entire session. pt reporting no pain during session; engaging in functional activities and repositioned at end of session for best comfort.       Home Setup/Prior Level of Function  Social/Functional History  Lives With: Family (cousin and family)  Type of Home: House  Home Layout: Two level;Bed/Bath upstairs  Home Access: Stairs to enter  education, & procurement, Self-Care / ADL, Home management training    AM-PAC Daily Activities Inpatient  AM-PAC Daily Activity - Inpatient   How much help is needed for putting on and taking off regular lower body clothing?: A Little  How much help is needed for bathing (which includes washing, rinsing, drying)?: A Little  How much help is needed for toileting (which includes using toilet, bedpan, or urinal)?: A Little  How much help is needed for putting on and taking off regular upper body clothing?: A Little  How much help is needed for taking care of personal grooming?: A Little  How much help for eating meals?: None  AM-PAC Inpatient Daily Activity Raw Score: 19  AM-PAC Inpatient ADL T-Scale Score : 40.22  ADL Inpatient CMS 0-100% Score: 42.8  ADL Inpatient CMS G-Code Modifier : CK    Minutes  OT Individual Minutes  Time In: 1010  Time Out: 1036  Minutes: 26  Time Code Minutes   Timed Code Treatment Minutes: 23 Minutes      Electronically signed by JOSE Pop on 8/21/24 at 10:53 AM EDT

## 2024-08-21 NOTE — CONSULTS
[facility] Neurology   IN-PATIENT SERVICE   [facility]    Neurology Consult Note            Date:   8/20/2024  Patient name:  Nik Shepherd  Date of admission:  8/20/2024  4:52 PM  MRN:   [id number]  Account:  [id number]  YOB: 1955  PCP:    Sly Reza MD  Room:   [address] [place] [address]  Code Status:    Full Code    Chief Complaint:     Chief Complaint   Patient presents with    Fatigue       History Obtained From:     patient    History of Present Illness:     69-year-old male with past medical history of chronic systolic congestive failure, remote history of right MCA territory infarct, cocaine abuse initially presented to ED with concerns of not being able to take care of himself.Per family members his living situation is in disarray patient admits he has hard time taking his medications and take care of himself.  He currently lives in an apartment with no electricity, heat or water resources and will need placement in an ECF.    Neurology is consulted for concerns of left-sided weakness.  He has a remote history of left MCA territory infarct in 2015 and currently takes aspirin and Plavix.CT head was done which showed old right temporal lobe infarct but no acute intracranial abnormality.    Of note, he has a history of NSTEMI and  chronic systolic dysfunction with ejection fraction of 35% and was previously planned for cardiac cath but left AMA.    On exam, patient is awake and oriented to time place and person. Bilateral motor power on lower legs is 4/5, decreased reflexes throughout 1+, otherwise nonfocal exam.    Past Medical History:     Past Medical History:   Diagnosis Date    3-vessel CAD 03/11/2022    Alcohol abuse 06/23/2015    Arthritis     Chronic kidney disease     Full dentures     upper and lower full    Hypertension     Other emphysema (HCC)     COPD stage II on PFTs with severe reduction diffusion capacity    Pure hypercholesterolemia 11/04/2016    Systolic CHF  Intact hearing   IX,X -     Symmetrical palate  XI    -     Symmetrical shoulder shrug  XII   -     Midline tongue, no atrophy    MOTOR FUNCTION: RUE: Significant for good strength of grade 5/5 in proximal and distal muscle groups   LUE: Significant for good strength of grade 4/5 in proximal and distal muscle groups   RLE: Significant for good strength of grade 4/5 in proximal and distal muscle groups   LLE: Significant for good strength of grade 4/5 in proximal and distal muscle groups      Normal bulk, normal tone and no involuntary movements, no tremor   SENSORY FUNCTION:  Normal touch, normal pinprick, normal vibration, normal proprioception   CEREBELLAR FUNCTION:  Intact fine motor control over upper limbs and lower limbs   REFLEX FUNCTION: 1+ throughout symmetric in upper and lower extremities, no Babinski sign   STATION and GAIT Deferred         Investigations:      Laboratory Testing:  Recent Results (from the past 24 hour(s))   CBC with Auto Differential    Collection Time: 08/20/24  5:45 PM   Result Value Ref Range    WBC 6.2 3.5 - 11.3 k/uL    RBC 4.45 4.21 - 5.77 m/uL    Hemoglobin 12.1 (L) 13.0 - 17.0 g/dL    Hematocrit 40.4 (L) 40.7 - 50.3 %    MCV 90.8 82.6 - 102.9 fL    MCH 27.2 25.2 - 33.5 pg    MCHC 30.0 28.4 - 34.8 g/dL    RDW 14.1 11.8 - 14.4 %    Platelets 257 138 - 453 k/uL    MPV 11.2 8.1 - 13.5 fL    NRBC Automated 0.0 0.0 per 100 WBC    Neutrophils % 47 36 - 65 %    Lymphocytes % 34 24 - 43 %    Monocytes % 11 3 - 12 %    Eosinophils % 7 (H) 1 - 4 %    Basophils % 1 0 - 2 %    Immature Granulocytes % 0 0 %    Neutrophils Absolute 2.91 1.50 - 8.10 k/uL    Lymphocytes Absolute 2.10 1.10 - 3.70 k/uL    Monocytes Absolute 0.69 0.10 - 1.20 k/uL    Eosinophils Absolute 0.46 (H) 0.00 - 0.44 k/uL    Basophils Absolute 0.04 0.00 - 0.20 k/uL    Immature Granulocytes Absolute <0.03 0.00 - 0.30 k/uL   Basic Metabolic Panel    Collection Time: 08/20/24  5:45 PM   Result Value Ref Range    Sodium 147 (H)

## 2024-08-21 NOTE — PROGRESS NOTES
Spiritual Health Assessment/Progress Note  [facility]    Loneliness/Social Isolation,  ,  ,      Name: Nik Shepherd MRN: [id number]    Age: 69 y.o.     Sex: male   Language: English   Hindu: Other   Acute on chronic systolic heart failure (HCC)     Date: 8/20/2024            Total Time Calculated: (P) 10 min              Spiritual Assessment began in [facility] 4B STEPDOWN        Referral/Consult From: Nurse   Encounter Overview/Reason: Loneliness/Social Isolation  Service Provided For: Patient    Cassandra, Belief, Meaning:   Patient Other: Patient discussed health and what led to hospital visit.  Family/Friends No family/friends present      Importance and Influence:  Patient has no beliefs influential to healthcare decision-making identified during this visit  Family/Friends no family/friends present    Community:  Patient feels well-supported. Support system includes: Unknown/Family  Family/Friends Other: Family not present    Assessment and Plan of Care:     Patient Interventions include: Other:  provided a supportive presence through active listening and words of affirmation,. Patient discussed his health and what led to his hospital visit.  Family/Friends Interventions include: Other: Family not present    Patient Plan of Care: Spiritual Care available upon further referral  Family/Friends Plan of Care: Spiritual Care available upon further referral    Electronically signed by Chaplain Tyrell on 8/20/2024 at 10:50 PM

## 2024-08-21 NOTE — PROGRESS NOTES
[facility]  Office: [phone]  Brice Humphrey DO, Yeison Melton DO, Stanford Shanks DO, Malik Eric DO, Hermes Koenig MD, Vicki Fowler MD, Cristy Lui MD, Keri Mann MD,  Yaw Eaton MD, Gabby Ludwig MD, Uri Villanueva MD,  Jacinto Haines DO, Luis Carlos Garcia MD, Cecilio Salinas MD, Avni Humphrey DO, Magi Tracy MD,  Hiren Rebollar DO, Leidy Huff MD, Annamaria Mayorga MD, Cathie Martell MD, Marquita Trevizo MD,  Orlando Bell MD, Brianne Liu MD, Deondre Nagy MD, Melonie Sosa MD, Romero Long MD, Benjamin Morrow MD, Wesley Podn DO, Toby Clark DO, Gertrude Lane MD,  Abdi Rendon MD, Shirley Waterhouse, CNP,  Jemma Puckett CNP, Carlos Fish, CNP,  Liza Fu, SYBIL, Brianna Patel, CNP, Yojana Matta, CNP, Porsche Hollingsworth CNP, Pauline Denson CNP, Bee Marroquin, PA-C, Inez Akers PA-C, Nicole Dolan, CNP, Oneal Trevizo, CNP, Lety Delacruz, CNP, Hedy Chaudhari, CNP, Breanna Barron, CNS, Renay Orozco, CNP, Rae Abdul CNP, Tracy Schwab, CNP         [facility]   IN-PATIENT SERVICE   [facility]    Progress Note    8/21/2024    12:46 PM    Name:   Nik Shepherd  MRN:     [id number]     Acct:      [id number]   Room:   0423/0423-01   Day:  1  Admit Date:  8/20/2024  4:52 PM    PCP:   Sly Reza MD  Code Status:  Full Code    Subjective:     C/C:   Chief Complaint   Patient presents with    Fatigue     Interval History Status: not changed.     Patient seen and examined at bedside.  Overall feeling better has been urinating large amounts.  Denies any dizziness, or any weakness.  Wants to try to increase his oral intake.  MRI pending, echo pending, lower extremity Dopplers pending.    Brief History:     69-year-old male past medical history of COPD, cardiomyopathy, coronary artery disease, history of polysubstance abuse presents with shortness of breath and fatigue.  Patient admitted for acute on chronic  clear to auscultation bilaterally, normal effort  Heart:  regular rate and rhythm, no murmur  Abdomen:  soft, nontender, nondistended, normal bowel sounds, no masses, hepatomegaly, splenomegaly  Extremities:  no edema, redness, tenderness in the calves  Skin:  no gross lesions, rashes, induration    Assessment:        Hospital Problems             Last Modified POA    * (Principal) Acute on chronic systolic heart failure (HCC) 8/20/2024 Yes    Atherosclerotic heart disease 8/21/2024 Yes    Hyperlipidemia 8/21/2024 Yes    CKD (chronic kidney disease) stage 3, GFR 30-59 ml/min ([facility]) 8/21/2024 Yes    Primary hypertension 8/21/2024 Yes    Arthritis of right hip 8/21/2024 Yes    Benign prostatic hyperplasia with urinary obstruction 8/21/2024 Yes    Overview Signed 10/14/2017  7:31 PM by Ambulatory, Admin     Updating Deprecated Diagnoses         Pure hypercholesterolemia 8/21/2024 Yes    S/P cardiac catheterization 8/21/2024 Yes       Plan:        Acute on chronic systolic heart failure.  Continue diuresis.  Monitor BUN/creatinine, heart rate stable.  Blood pressure on the lower side however patient is denying any dizziness upon ambulation or movement.  Follow-up limited echo  Poor oral nutrition anorexia.  Or oral nutritional supplements  Coronary artery disease aspirin, Coreg, Lipitor, Plavix  GERD.  Protonix  BPH.  Flomax  Lower extremity pain.  Follow-up lower extremity Dopplers  Altered mental status.  Urine drug screen negative.  Follow-up MRI  Continue PT and OT.  Concern for patient needing skilled nursing due to fatigue with ambulation.    Uri Villanueva MD  8/21/2024  12:46 PM

## 2024-08-21 NOTE — PROGRESS NOTES
1600: patients BP is 87/56 (66) patient seems more lethargic, is arousable. Denies dizziness. MD notified  1602: lasix discontinued  1609: 500 ML 0.9% NS bolus ordered

## 2024-08-21 NOTE — PLAN OF CARE
Problem: Discharge Planning  Goal: Discharge to home or other facility with appropriate resources  Outcome: Progressing     Problem: Skin/Tissue Integrity  Goal: Absence of new skin breakdown  Description: 1.  Monitor for areas of redness and/or skin breakdown  2.  Assess vascular access sites hourly  3.  Every 4-6 hours minimum:  Change oxygen saturation probe site  4.  Every 4-6 hours:  If on nasal continuous positive airway pressure, respiratory therapy assess nares and determine need for appliance change or resting period.  Outcome: Progressing     Problem: Safety - Adult  Goal: Free from fall injury  Outcome: Progressing     Problem: Risk for Elopement  Goal: Patient will not exit the unit/facility without proper excort  Outcome: Progressing

## 2024-08-22 ENCOUNTER — APPOINTMENT (OUTPATIENT)
Age: 69
DRG: 194 | End: 2024-08-22
Attending: STUDENT IN AN ORGANIZED HEALTH CARE EDUCATION/TRAINING PROGRAM
Payer: COMMERCIAL

## 2024-08-22 VITALS
BODY MASS INDEX: 17.47 KG/M2 | DIASTOLIC BLOOD PRESSURE: 53 MMHG | TEMPERATURE: 98 F | HEART RATE: 84 BPM | WEIGHT: 129 LBS | RESPIRATION RATE: 18 BRPM | HEIGHT: 72 IN | OXYGEN SATURATION: 96 % | SYSTOLIC BLOOD PRESSURE: 106 MMHG

## 2024-08-22 LAB
ECHO BSA: 1.72 M2
ECHO LV EDV A2C: 100 ML
ECHO LV EDV A4C: 78 ML
ECHO LV EDV INDEX A4C: 44 ML/M2
ECHO LV EDV NDEX A2C: 56 ML/M2
ECHO LV EJECTION FRACTION A2C: 33 %
ECHO LV EJECTION FRACTION A4C: 36 %
ECHO LV EJECTION FRACTION BIPLANE: 35 % (ref 55–100)
ECHO LV ESV A2C: 67 ML
ECHO LV ESV A4C: 50 ML
ECHO LV ESV INDEX A2C: 38 ML/M2
ECHO LV ESV INDEX A4C: 28 ML/M2
EKG ATRIAL RATE: 82 BPM
EKG P AXIS: 89 DEGREES
EKG P-R INTERVAL: 144 MS
EKG Q-T INTERVAL: 378 MS
EKG QRS DURATION: 96 MS
EKG QTC CALCULATION (BAZETT): 441 MS
EKG R AXIS: 47 DEGREES
EKG T AXIS: -108 DEGREES
EKG VENTRICULAR RATE: 82 BPM

## 2024-08-22 PROCEDURE — 6370000000 HC RX 637 (ALT 250 FOR IP): Performed by: STUDENT IN AN ORGANIZED HEALTH CARE EDUCATION/TRAINING PROGRAM

## 2024-08-22 PROCEDURE — G0378 HOSPITAL OBSERVATION PER HR: HCPCS

## 2024-08-22 PROCEDURE — 2580000003 HC RX 258: Performed by: INTERNAL MEDICINE

## 2024-08-22 PROCEDURE — APPSS30 APP SPLIT SHARED TIME 16-30 MINUTES: Performed by: NURSE PRACTITIONER

## 2024-08-22 PROCEDURE — 99232 SBSQ HOSP IP/OBS MODERATE 35: CPT | Performed by: PSYCHIATRY & NEUROLOGY

## 2024-08-22 PROCEDURE — 6360000002 HC RX W HCPCS: Performed by: INTERNAL MEDICINE

## 2024-08-22 PROCEDURE — 93308 TTE F-UP OR LMTD: CPT

## 2024-08-22 PROCEDURE — 6370000000 HC RX 637 (ALT 250 FOR IP): Performed by: INTERNAL MEDICINE

## 2024-08-22 PROCEDURE — 93308 TTE F-UP OR LMTD: CPT | Performed by: INTERNAL MEDICINE

## 2024-08-22 PROCEDURE — 99239 HOSP IP/OBS DSCHRG MGMT >30: CPT | Performed by: STUDENT IN AN ORGANIZED HEALTH CARE EDUCATION/TRAINING PROGRAM

## 2024-08-22 PROCEDURE — 96372 THER/PROPH/DIAG INJ SC/IM: CPT

## 2024-08-22 RX ADMIN — ALCOHOL 1 TABLET: 70.47 GEL TOPICAL at 08:07

## 2024-08-22 RX ADMIN — ASPIRIN 81 MG: 81 TABLET, COATED ORAL at 08:07

## 2024-08-22 RX ADMIN — SODIUM CHLORIDE, PRESERVATIVE FREE 10 ML: 5 INJECTION INTRAVENOUS at 08:08

## 2024-08-22 RX ADMIN — ENOXAPARIN SODIUM 40 MG: 100 INJECTION SUBCUTANEOUS at 08:08

## 2024-08-22 RX ADMIN — FOLIC ACID 1 MG: 1 TABLET ORAL at 08:07

## 2024-08-22 RX ADMIN — TAMSULOSIN HYDROCHLORIDE 0.4 MG: 0.4 CAPSULE ORAL at 08:07

## 2024-08-22 RX ADMIN — Medication 100 MG: at 08:07

## 2024-08-22 RX ADMIN — CLOPIDOGREL BISULFATE 75 MG: 75 TABLET ORAL at 08:07

## 2024-08-22 RX ADMIN — PANTOPRAZOLE SODIUM 40 MG: 40 TABLET, DELAYED RELEASE ORAL at 08:07

## 2024-08-22 NOTE — PLAN OF CARE
Problem: Discharge Planning  Goal: Discharge to home or other facility with appropriate resources  8/21/2024 2234 by Jennifer Mack RN  Outcome: Progressing  8/21/2024 1649 by Johnny Deutsch RN  Outcome: Progressing     Problem: Skin/Tissue Integrity  Goal: Absence of new skin breakdown  Description: 1.  Monitor for areas of redness and/or skin breakdown  2.  Assess vascular access sites hourly  3.  Every 4-6 hours minimum:  Change oxygen saturation probe site  4.  Every 4-6 hours:  If on nasal continuous positive airway pressure, respiratory therapy assess nares and determine need for appliance change or resting period.  8/21/2024 2234 by Jennifer Mack RN  Outcome: Progressing  8/21/2024 1649 by Johnny Deutsch RN  Outcome: Progressing     Problem: Safety - Adult  Goal: Free from fall injury  8/21/2024 2234 by Jennifer Mack RN  Outcome: Progressing  8/21/2024 1649 by Johnny Deutsch RN  Outcome: Progressing     Problem: Risk for Elopement  Goal: Patient will not exit the unit/facility without proper excort  8/21/2024 2234 by Jennifer Mack RN  Outcome: Progressing  8/21/2024 1649 by Johnny Deutsch RN  Outcome: Progressing

## 2024-08-22 NOTE — PROGRESS NOTES
[facility]  Office: [phone]  Brice Humphrey DO, Yeison Melton DO, Stanford Shanks DO, Malik Eric DO, Hermes Koenig MD, Vicki Fowler MD, Cristy Lui MD, Keri Mann MD,  Yaw Eaton MD, Gabby Ludwig MD, Uri Villanueva MD,  Jacinto Haines DO, Luis Carlos Garcia MD, Cecilio Salinas MD, Avni Humphrey DO, Magi Tracy MD,  Hiren Rebollar DO, Leidy Huff MD, Annamaria Mayorga MD, Cathie Martell MD, Marquita Trevizo MD,  Orlando Bell MD, Brianne Liu MD, Deondre Nagy MD, Melonie Sosa MD, Romero Long MD, Benjamin Morrow MD, Wesley Pond DO, Toby Clark DO, Gertrude Lane MD,  Abdi Rendon MD, Shirley Waterhouse, CNP,  Jemma Puckett CNP, Carlos Fish, CNP,  Liza Fu, DNP, Brianna Patel, CNP, Yojana Matta, CNP, Porsche Hollingsworth CNP, Pauline Denson CNP, Bee Marroquin, PA-C, Inez Akers PA-C, Nicole Dolan, CNP, Oneal Trevizo, CNP, Lety Delacruz, CNP, Hedy Chaudhari, CNP, Breanna Barron, CNS, Renay Orozco, CNP, Rae Abdul CNP, Tracy Schwab, CNP         [facility]   IN-PATIENT SERVICE   [facility]    Progress Note    8/22/2024    1:56 PM    Name:   Nik Shepherd  MRN:     [id number]     Acct:      [id number]   Room:   0423/0423-01   Day:  2  Admit Date:  8/20/2024  4:52 PM    PCP:   Sly Reza MD  Code Status:  Full Code    Subjective:     C/C:   Chief Complaint   Patient presents with    Fatigue     Interval History Status: improved.     Patient seen examined bedside.  Blood pressure improved.  No acute issues overnight.  Patient seems to be doing better with the strength.  Did try the ensures seems to like them    Brief History:     69-year-old male past medical history of COPD, cardiomyopathy, coronary artery disease, history of polysubstance abuse presents with shortness of breath and fatigue. Patient admitted for acute on chronic systolic heart failure improving with IV Lasix.  Lasix stopped from IV due

## 2024-08-22 NOTE — PROGRESS NOTES
NEUROLOGY INPATIENT PROGRESS NOTE    8/22/2024         Current Exam:     Chart reviewed. Discussed with RN. Patient denies any complaints, is partially oriented. He participates minimally in exam. Speech is dysarthric and difficult to understand. He does not have any complaints of headache, vision changes, or weakness.          Brief History:    Nik Shepherd is a  69 y.o. male with H/O NSTEMI, CHF, prior right MCA CVA, who was admitted on 8/20/2024 with stroke.  Neurology was consulted for concerns of left-sided weakness.  Family reported the patient having a hard time taking care of himself with difficulty taking medications.  CT head showed right temporal lobe encephalomalacia with no acute findings.  MRI brain was done with no acute stroke with evidence of chronic infarct in the right MCA territory and chronic lacunar infarcts in the right thalamus, right yon and both cerebellar hemispheres.  He was continued on DAPT with statin therapy.       No current facility-administered medications on file prior to encounter.     Current Outpatient Medications on File Prior to Encounter   Medication Sig Dispense Refill    carvedilol (COREG) 3.125 MG tablet Take 1 tablet by mouth 2 times daily (with meals) 60 tablet 3    aspirin 81 MG EC tablet Take 1 tablet by mouth daily 30 tablet 3    nitroGLYCERIN (NITROSTAT) 0.3 MG SL tablet Place 1 tablet under the tongue every 5 minutes as needed for Chest pain up to max of 3 total doses. If no relief after 1 dose, call 911. 30 tablet 3    empagliflozin (JARDIANCE) 10 MG tablet Take 1 tablet by mouth daily 30 tablet 3    atorvastatin (LIPITOR) 80 MG tablet Take 1 tablet by mouth nightly 30 tablet 3    tamsulosin (FLOMAX) 0.4 MG capsule Take 1 capsule by mouth daily 30 capsule 3    clopidogrel (PLAVIX) 75 MG tablet Take 1 tablet by mouth daily 30 tablet 3    pantoprazole (PROTONIX) 40 MG tablet Take 1 tablet by mouth daily 30 tablet 3    [DISCONTINUED] metoprolol tartrate (LOPRESSOR)  8/20/2024: 1. No acute intracranial abnormality.    2. Old right temporal lobe infarct.   3. Moderate diffuse cerebral atrophy and chronic white matter ischemic change.   MRI brain 8/21/2024: No acute intracranial abnormalities.  Chronic infarct in right MCA territory.  Chronic lacunar infarcts involving right thalamus, right yon and both cerebellar hemispheres                    Impression:  -Left-sided weakness, most likely related to prior right MCA territory infarct    Plan:  -MRI brain shows no acute findings. Patient does not report any worsening weakness today on exam. His participation is minimal but he is able to move all limbs easily.  -Patient continues on home dose aspirin, Plavix, and statin daily  -Push therapies; he is planning for placement on discharge  -We will sign off. Outpatient follow up in 4-6 weeks. Please call with any questions.    Please note that this note was generated using a voice recognition dictation software. Although every effort was made to ensure the accuracy of this automated transcription, some errors in transcription may have occurred.

## 2024-08-22 NOTE — PROGRESS NOTES
Physician Progress Note      PATIENT:               PERCY ROSEN  CSN #:                  [id number]  :                       1955  ADMIT DATE:       2024 4:52 PM  DISCH DATE:  RESPONDING  PROVIDER #:        Uri Villanueva MD          QUERY TEXT:    Pt admitted with CHF exacerbation and has Mild malnutrition documented in   Dietician consult note on . Please document if you agree with the   dietician assessment.    The medical record reflects the following:  Risk Factors: Hx Crack Cocaine abuse/binge drinking, ARABELLA this admission  Clinical Indicators: To ED with weakness/fatigue/worsening debility with   frequent falls.. Found to be in Systolic CHF exacerbation. Hx weekly crack   cocaine use and occasional binge drinking.Per Dietician Consult note on -   ' Mild Malnutrition-unintended weight loss r/t cognitive/neurological   impairment with poor living conditions.Weight loss greater than 10% in 6   months.'  Per IM progress note -' Poor oral nutrition anorexia.'..BMI 17  Treatment: Dietician assessment.  Oral Nutritional supplements. Monitor I&O    Thank you,  [email or web address]  office hours  m-f 7-3  Options provided:  -- Mild Malnutrition  -- Mild Protein calorie malnutrition  -- Other - I will add my own diagnosis  -- Disagree - Not applicable / Not valid  -- Disagree - Clinically unable to determine / Unknown  -- Refer to Clinical Documentation Reviewer    PROVIDER RESPONSE TEXT:    This patient has mild malnutrition.    Query created by: Taina Cohen on 2024 2:17 PM      Electronically signed by:  Uri Villanueva MD 2024 2:21 PM

## 2024-08-22 NOTE — PROGRESS NOTES
NEUROLOGY INPATIENT PROGRESS NOTE    8/22/2024         Subjective: Nik Shepherd is a  69 y.o. male admitted on 8/20/2024 with Acute on chronic systolic heart failure (HCC) [I50.23]  Chest pain, unspecified type [R07.9]    Briefly, this is a  69 y.o. male with hx of NSTEMI, CHF and prior right MCA CVA was admitted on 8/20/2024 with fatigue.  Neurology is consulted for concerns of left-sided weakness.  As per family members, he has been having hard time taking his medications and unable to take care of himself.  CT head on admit showed right temporal lobe encephalomalacia and no acute intracranial abnormalities.  On exam; alert and awake and oriented to self and place.,  Following simple as well as two-step commands well; pupils bilaterally reactive.,  Face appears symmetric.  Motor exam reveals weakness of grade 3 /5 in left upper and left lower extremities.  8/22/2024: Chart reviewed and discussed with caregivers.  No new neurologic complaints.          No current facility-administered medications on file prior to encounter.     Current Outpatient Medications on File Prior to Encounter   Medication Sig Dispense Refill    carvedilol (COREG) 3.125 MG tablet Take 1 tablet by mouth 2 times daily (with meals) 60 tablet 3    aspirin 81 MG EC tablet Take 1 tablet by mouth daily 30 tablet 3    nitroGLYCERIN (NITROSTAT) 0.3 MG SL tablet Place 1 tablet under the tongue every 5 minutes as needed for Chest pain up to max of 3 total doses. If no relief after 1 dose, call 911. 30 tablet 3    empagliflozin (JARDIANCE) 10 MG tablet Take 1 tablet by mouth daily 30 tablet 3    atorvastatin (LIPITOR) 80 MG tablet Take 1 tablet by mouth nightly 30 tablet 3    tamsulosin (FLOMAX) 0.4 MG capsule Take 1 capsule by mouth daily 30 capsule 3    clopidogrel (PLAVIX) 75 MG tablet Take 1 tablet by mouth daily 30 tablet 3    pantoprazole (PROTONIX) 40 MG tablet Take 1 tablet by mouth daily 30 tablet 3    [DISCONTINUED] metoprolol tartrate  Min:87 , Max:132   ; Diastolic (24hrs), Av, Min:56, Max:96          NEUROLOGIC EXAMINATION  On exam; alert and awake and oriented to self and place.,  Dysarthric speech noted but intact cognition.  Following simple as well as two-step commands well; pupils bilaterally reactive.,  Face appears symmetric.  Motor exam reveals weakness of grade 3 /5 in left upper and left lower extremities.  Sensory examination is intact to light touch and pinprick in all 4 extremities.      Data:    Lab Results:   CBC:   Recent Labs     24  1745 24  0235   WBC 6.2 6.2   HGB 12.1* 11.8*    232     BMP:    Recent Labs     24  1745 24  0235   * 147*   K 3.8 4.0   * 114*   CO2 25 23   BUN 19 21   CREATININE 1.3* 1.2   GLUCOSE 90 78     Recent Labs     24  0342   COLORU Yellow   PHUR 5.5   LEUKOCYTESUR NEGATIVE   UROBILINOGEN Normal   BILIRUBINUR NEGATIVE        Lab Results   Component Value Date    CHOL 89 2022    HDL 48 2022    TRIG 30 2022    ALT 43 (H) 2024    AST 33 2024    TSH 3.45 2024    INR 1.1 2024    LABA1C 5.5 2024    [id number] 405 2024    MG 1.9 2024       No results found for: \"PHENYTOIN\", \"PHENOBARB\", \"VALPROATE\", \"CBMZ\"    Diagnostic data reviewed:  CT head 2024: 1. No acute intracranial abnormality.    2. Old right temporal lobe infarct.   3. Moderate diffuse cerebral atrophy and chronic white matter ischemic change.     MRI brain 2024: No acute intracranial abnormalities.  Chronic infarct in right MCA territory.  Chronic lacunar infarcts involving right thalamus, right yon and both cerebellar hemispheres.           Impression and Plan: Mr. Nik Shepherd is a 69 y.o. male with   Left-sided weakness likely related to prior right MCA territory ischemic infarction.,  MRI brain without any evidence of new infarcts.,  To continue home dose of aspirin 81 mg daily, Plavix 75 mg daily and atorvastatin 80 mg

## 2024-08-22 NOTE — DISCHARGE SUMMARY
[facility]  Office: [phone]  Brice Humphrey DO, Yeison Melton DO, Stanford Shanks DO, Malik Eric DO, Hermes Koenig MD, Vicki Fowler MD, Cristy Lui MD, Keri Mann MD,  Yaw Eaton MD, Gabby Ludwig MD, Uri Villanueva MD,  Jacinto Haines DO, Luis Carlos Garcia MD, Cecilio Salinas MD, Avni Humphrey DO, Magi Tracy MD,  Hiren Rebollar DO, Leidy Huff MD, Annamaria Mayorga MD, Cathie Martell MD, Marquita Trevizo MD,  Orlando Bell MD, Brianne Liu MD, Deondre Nagy MD, Melonie Sosa MD, Romero Long MD, Benjamin Morrow MD, Wesley Pond DO, Toby Clark DO, Gertrude Lane MD,  Abdi Rendon MD, Shirley Waterhouse, CNP,  Jemma Puckett CNP, Carlos Fish, CNP,  Liza Fu, DNP, Brianna Patel, CNP, Yojana Matta, CNP, Porsche Hollingsworth CNP, Pauline Denson, CNP, Bee Marroquin, PA-C, Inez Akers PA-C, Nicole Dolan, CNP, Oneal Trevizo, CNP, Lety Delacruz, CNP, Hedy Chaudhari, CNP, Breanna Barron, CNS, Renay Orozoc, CNP, Rae Abdul CNP, Tracy Schwab, CNP         [facility]   IN-PATIENT SERVICE   [facility]    Discharge Summary     Patient ID: Nik Shepherd  :  1955   MRN: [id number]     ACCOUNT:  [id number]   Patient's PCP: Sly Reza MD  Admit Date: 2024   Discharge Date: 2024     Length of Stay: 2  Code Status:  Full Code  Admitting Physician: Uri Villanueva MD  Discharge Physician: Uri Villanueva MD     Active Discharge Diagnoses:     Hospital Problem Lists:  Principal Problem:    Acute on chronic systolic heart failure (HCC)  Active Problems:    Atherosclerotic heart disease    Hyperlipidemia    CKD (chronic kidney disease) stage 3, GFR 30-59 ml/min ([facility])    Primary hypertension    Arthritis of right hip    Benign prostatic hyperplasia with urinary obstruction    Pure hypercholesterolemia    S/P cardiac catheterization    Left-sided weakness    Chronic ischemic right MCA stroke  Resolved  Problems:    * No resolved hospital problems. *      Admission Condition:  stable     Discharged Condition: stable    Hospital Stay:     Hospital Course:  Nik Shepherd is a 69 y.o. male who was admitted for the management of   Acute on chronic systolic heart failure (HCC) , presented to ER with Fatigue      69-year-old male past medical history of COPD, cardiomyopathy, coronary artery disease, history of polysubstance abuse presents with shortness of breath and fatigue. Patient admitted for acute on chronic systolic heart failure improving with IV Lasix.  Lasix stopped from IV due to dehydration patient discharged on Jardiance and to follow-up with PCP and cardiology as outpatient.     Significant therapeutic interventions: n    nterpretation Summary  Show Result Comparison   •  Mild (<50%) stenosis in the bilateral internal carotid arteries.  •  Normal antegrade flow in the bilateral vertebral arteries.       Significant Diagnostic Studies:   Labs / Micro:  CBC:   Lab Results   Component Value Date/Time    WBC 6.2 08/21/2024 02:35 AM    RBC 4.35 08/21/2024 02:35 AM    HGB 11.8 08/21/2024 02:35 AM    HCT 39.9 08/21/2024 02:35 AM    MCV 91.7 08/21/2024 02:35 AM    MCH 27.1 08/21/2024 02:35 AM    MCHC 29.6 08/21/2024 02:35 AM    RDW 14.3 08/21/2024 02:35 AM     08/21/2024 02:35 AM     BMP:    Lab Results   Component Value Date/Time    GLUCOSE 78 08/21/2024 02:35 AM     08/21/2024 02:35 AM    K 4.0 08/21/2024 02:35 AM     08/21/2024 02:35 AM    CO2 23 08/21/2024 02:35 AM    ANIONGAP 10 08/21/2024 02:35 AM    ANIONGAP 15 03/22/2022 06:38 AM    BUN 21 08/21/2024 02:35 AM    CREATININE 1.2 08/21/2024 02:35 AM    CALCIUM 8.9 08/21/2024 02:35 AM    LABGLOM 67 08/21/2024 02:35 AM    LABGLOM 66 04/30/2024 06:46 AM    GFRAA >60 08/04/2022 08:32 AM    GFR      08/04/2022 08:32 AM        Radiology:  MRI BRAIN WO CONTRAST    Result Date: 8/21/2024  1. No acute intracranial abnormality.  No acute infarct. 2.  Chronic infarct involving the right mid to posterior MCA territory. 3. Chronic lacunar infarcts involving the right thalamus, right yon and both cerebellar hemispheres. 4. Mild-to-moderate global parenchymal volume loss with moderate chronic microvascular ischemic change.  This is progressed.     CT HEAD WO CONTRAST    Result Date: 8/20/2024  1. No acute intracranial abnormality. 2. Old right temporal lobe infarct. 3. Moderate diffuse cerebral atrophy and chronic white matter ischemic change.     XR CHEST PORTABLE    Result Date: 8/20/2024  No acute cardiopulmonary process.       Consultations:    Consults:     Final Specialist Recommendations/Findings:   IP CONSULT TO HOSPITALIST  IP CONSULT TO SOCIAL WORK  IP CONSULT TO NEUROLOGY  IP CONSULT TO DIETITIAN      The patient was seen and examined on day of discharge and this discharge summary is in conjunction with any daily progress note from day of discharge.    Discharge plan:     Disposition: To a non-[facility] facility    Physician Follow Up:     Nasima Parker MD  [address], [address]  [facility] [place] [address] [phone]    Schedule an appointment as soon as possible for a visit in 6 week(s)      Corazon Antunez, DO    Follow up      Sly Reza MD  [address] [facility] [address] [phone]             Requiring Further Evaluation/Follow Up POST HOSPITALIZATION/Incidental Findings: Follow PCP, nuerology, follow cardiology outpatient.    Diet: regular diet    Activity: As tolerated    Instructions to Patient: Follow PCP, follow cardiology, follow-up neurology outpatient    Discharge Medications:      Medication List        START taking these medications      folic acid 1 MG tablet  Commonly known as: FOLVITE  Take 1 tablet by mouth daily     multivitamin tablet  Take 1 tablet by mouth daily     thiamine 100 MG tablet  Take 1 tablet by mouth daily            CONTINUE taking these medications      aspirin 81 MG EC tablet  Take 1 tablet

## 2024-08-25 LAB — VIT B1 PYROPHOSHATE BLD-SCNC: 108 NMOL/L (ref 70–180)

## 2024-08-29 ASSESSMENT — ENCOUNTER SYMPTOMS
BACK PAIN: 0
VOMITING: 0
COUGH: 0
DIARRHEA: 0
NAUSEA: 0
ABDOMINAL PAIN: 0
SHORTNESS OF BREATH: 0

## 2025-07-30 NOTE — FLOWSHEET NOTE
Dr. Niall arias and jaz connors and nephrology and medicine Drs were all told that patient is refusing his medication and I found whole pills in his bed when turning patient this morning. Will prescribe Nicotine 14 mg daily patches ; currently smoking 9 cigarretes a day .  Lung cancer screening pending

## (undated) DEVICE — TTL1LYR 16FR10ML 100%SIL TMPST TR: Brand: MEDLINE

## (undated) DEVICE — INTENDED FOR TISSUE SEPARATION, AND OTHER PROCEDURES THAT REQUIRE A SHARP SURGICAL BLADE TO PUNCTURE OR CUT.: Brand: BARD-PARKER ® CARBON RIB-BACK BLADES

## (undated) DEVICE — RETRACTOR SURG INSRT SUT HLD OCTOBASE

## (undated) DEVICE — SUTURE VCRL + SZ 2-0 L27IN ABSRB CLR CT-1 1/2 CIR TAPERCUT VCP259H

## (undated) DEVICE — SUTURE VCRL + SZ 4-0 L18IN ABSRB UD L19MM PS-2 3/8 CIR PRIM VCP496H

## (undated) DEVICE — INSUFFLATION TUBING SET WITH FILTER, FUNNEL CONNECTOR AND LUER LOCK: Brand: JOSNOE MEDICAL INC

## (undated) DEVICE — ADHESIVE SKIN CLOSURE TOP 36 CC HI VISC DERMBND MINI

## (undated) DEVICE — BLADE OPHTH D5MM 15DEG GRN W/ RND KNURLED HNDL MICRO-SHARP

## (undated) DEVICE — GLOVE SURG SZ 65 L12IN FNGR THK79MIL GRN LTX FREE

## (undated) DEVICE — SUTURE PDS II SZ 0 L27IN ABSRB VLT L36MM CT-1 1/2 CIR Z340H

## (undated) DEVICE — GLOVE SURG SZ 8 L11.77IN FNGR THK9.8MIL STRW LTX POLYMER

## (undated) DEVICE — POSITIONER,HEAD,MULTIRING,36CS: Brand: MEDLINE

## (undated) DEVICE — WAX SURG 2.5GM HEMSTAT BNE BEESWAX PARAFFIN ISO PALMITATE

## (undated) DEVICE — .: Brand: PERFECTCUT AORTOTOMY SYSTEM

## (undated) DEVICE — Z DISCONTINUED BY MEDLINE USE 2711682 TRAY SKIN PREP DRY W/ PREM GLV

## (undated) DEVICE — SUTURE VCRL + SZ 4-0 L27IN ABSRB UD L26MM SH 1/2 CIR VCP415H

## (undated) DEVICE — TOWEL,OR,DSP,ST,BLUE,DLX,XR,4/PK,20PK/CS: Brand: MEDLINE

## (undated) DEVICE — BLADE OPHTH ORNG GRINDLESS SMALLER ALTERNATIVE TO NO15 GEN

## (undated) DEVICE — APPLICATOR MEDICATED 10.5 CC SOLUTION HI LT ORNG CHLORAPREP

## (undated) DEVICE — GLOVE SURG SZ 7 CRM LTX FREE POLYISOPRENE POLYMER BEAD ANTI

## (undated) DEVICE — PLEDGET SURG W3.5XL7MM THK1.5MM WHT PTFE RECT FIRM TFE

## (undated) DEVICE — CATHETER,URETHRAL,REDRUBBER,STRL,18FR: Brand: MEDLINE

## (undated) DEVICE — CONTAINER,SPECIMEN,4OZ,OR STRL: Brand: MEDLINE

## (undated) DEVICE — PACK PROCEDURE SURG OPN HRT

## (undated) DEVICE — CANNULA PERF L2IN BLNT TIP 2MM VES CLR RADPQ BODY FEM LUER

## (undated) DEVICE — FOGARTY - HYDRAGRIP SURGICAL - CLAMP INSERTS: Brand: FOGARTY HYDRAJAW

## (undated) DEVICE — AGENT HEMSTAT W2XL4IN OXIDIZED REGENERATED CELOS ABSRB SFT

## (undated) DEVICE — SUTURE PROL SZ 4-0 L36IN NONABSORBABLE BLU L26MM SH 1/2 CIR 8521H

## (undated) DEVICE — Z DISCONTINUED NO SUB IDED DRAIN SURG 2 COLL PT TB FOR ATS BG OASIS

## (undated) DEVICE — GOWN,AURORA,NONREINFORCED,LARGE: Brand: MEDLINE

## (undated) DEVICE — GOWN,SIRUS,NONRNF,SETINSLV,2XL,18/CS: Brand: MEDLINE

## (undated) DEVICE — BNDG,ELSTC,MATRIX,STRL,6"X5YD,LF,HOOK&LP: Brand: MEDLINE

## (undated) DEVICE — GEL US 20GM NONIRRITATING OVERWRAPPED FILE PCH TRNSMIT

## (undated) DEVICE — CONNECTOR TBNG WHT PLAS SUCT STR 5IN1 LTWT W/ M CONN

## (undated) DEVICE — SPONGE LAP W18XL18IN WHT COT 4 PLY FLD STRUNG RADPQ DISP ST

## (undated) DEVICE — SUTURE PERMA-HAND SZ 0 L18IN NONABSORBABLE BLK CT-2 L26MM C027D

## (undated) DEVICE — BLADE SAW W6.35XL32MM STRNM CUT STRNOTMY

## (undated) DEVICE — APPLICATOR MEDICATED 26 CC SOLUTION HI LT ORNG CHLORAPREP

## (undated) DEVICE — BLADE ES L6IN ELASTOMERIC COAT EXT DURABLE BEND UPTO 90DEG

## (undated) DEVICE — TUBE CARDIAC SUCTION 6FR SOFT TIP 10FR

## (undated) DEVICE — 48" PROBE COVER W/GEL, ULTRASOUND, STERILE: Brand: SITE-RITE

## (undated) DEVICE — Device: Brand: VIRTUOSAPH PLUS WITH RADIAL INDICATION

## (undated) DEVICE — TUBING, SUCTION, 9/32" X 20', STRAIGHT: Brand: MEDLINE INDUSTRIES, INC.

## (undated) DEVICE — GLOVE SURG SZ 7.5 L11.73IN FNGR THK9.8MIL STRW LTX POLYMER

## (undated) DEVICE — DRAIN,WOUND,ROUND,24FR,5/16",FULL-FLUTED: Brand: MEDLINE

## (undated) DEVICE — SUTURE PROL SZ 6-0 L18IN NONABSORBABLE BLU RB-2 L13MM 1/2 8714H

## (undated) DEVICE — SUTURE ETHIB EXCL BR GRN TAPR PT 2-0 30 X563H X563H

## (undated) DEVICE — LEAD PACE L475MM CHNL A OR V MYOCARDIAL STEROID ELUT SIL

## (undated) DEVICE — GLOVE SURG SZ 8 CRM LTX FREE POLYISOPRENE POLYMER BEAD ANTI

## (undated) DEVICE — NEPTUNE E-SEP SMOKE EVACUATION PENCIL, COATED, 70MM BLADE, PUSH BUTTON SWITCH: Brand: NEPTUNE E-SEP

## (undated) DEVICE — SUTURE PROL SZ 7-0 L24IN NONABSORBABLE BLU L8MM BV175-6 3/8 8735H

## (undated) DEVICE — SINGLE-USE BIOPSY FORCEPS: Brand: RADIAL JAW 4

## (undated) DEVICE — GOWN,SIRUS,NONRNF,SETINSLV,XL,20/CS: Brand: MEDLINE

## (undated) DEVICE — BNDG,ELSTC,MATRIX,STRL,4"X5YD,LF,HOOK&LP: Brand: MEDLINE

## (undated) DEVICE — DRAPE SLUSH DISC W44XL66IN ST FOR RND BSIN HUSH SLUSH SYS

## (undated) DEVICE — COVER,LIGHT HANDLE,FLX,2/PK: Brand: MEDLINE INDUSTRIES, INC.

## (undated) DEVICE — PROTECTOR ULN NRV PUR FOAM HK LOOP STRP ANATOMICALLY

## (undated) DEVICE — PLATELET CONCENTRATION PACK PROC 14-20 ML SMARTPREP 2

## (undated) DEVICE — SS SUTURE, 3 PER SLEEVE: Brand: MYO/WIRE II

## (undated) DEVICE — GAUZE,SPONGE,FLUFF,6"X6.75",STRL,5/TRAY: Brand: MEDLINE

## (undated) DEVICE — GLOVE SURG SZ 6 CRM LTX FREE POLYISOPRENE POLYMER BEAD ANTI

## (undated) DEVICE — CANNULA PERFUSION 5.5IN 9FR AORTIC ROOT

## (undated) DEVICE — PACK PROCEDURE SURG OPN HRT ADD ON